# Patient Record
Sex: FEMALE | Race: WHITE | Employment: OTHER | URBAN - METROPOLITAN AREA
[De-identification: names, ages, dates, MRNs, and addresses within clinical notes are randomized per-mention and may not be internally consistent; named-entity substitution may affect disease eponyms.]

---

## 2017-02-09 ENCOUNTER — OFFICE VISIT (OUTPATIENT)
Dept: FAMILY MEDICINE CLINIC | Age: 47
End: 2017-02-09

## 2017-02-09 VITALS
BODY MASS INDEX: 44.41 KG/M2 | SYSTOLIC BLOOD PRESSURE: 132 MMHG | HEIGHT: 68 IN | WEIGHT: 293 LBS | DIASTOLIC BLOOD PRESSURE: 68 MMHG | RESPIRATION RATE: 16 BRPM | HEART RATE: 80 BPM

## 2017-02-09 DIAGNOSIS — E11.65 TYPE 2 DIABETES MELLITUS WITH HYPERGLYCEMIA, WITHOUT LONG-TERM CURRENT USE OF INSULIN (HCC): Primary | ICD-10-CM

## 2017-02-09 DIAGNOSIS — I10 ESSENTIAL HYPERTENSION: ICD-10-CM

## 2017-02-09 DIAGNOSIS — M12.9 ARTHROPATHY, MULTIPLE SITES: ICD-10-CM

## 2017-02-09 LAB
GLUCOSE BLD-MCNC: 177 MG/DL
HBA1C MFR BLD: 8 %

## 2017-02-09 PROCEDURE — 99204 OFFICE O/P NEW MOD 45 MIN: CPT | Performed by: FAMILY MEDICINE

## 2017-02-09 PROCEDURE — 82962 GLUCOSE BLOOD TEST: CPT | Performed by: FAMILY MEDICINE

## 2017-02-09 PROCEDURE — 83036 HEMOGLOBIN GLYCOSYLATED A1C: CPT | Performed by: FAMILY MEDICINE

## 2017-02-09 RX ORDER — GABAPENTIN 300 MG/1
300 CAPSULE ORAL 2 TIMES DAILY
Qty: 60 CAPSULE | Refills: 2 | Status: SHIPPED | OUTPATIENT
Start: 2017-02-09 | End: 2017-04-11 | Stop reason: SDUPTHER

## 2017-02-09 RX ORDER — TRIAMTERENE AND HYDROCHLOROTHIAZIDE 37.5; 25 MG/1; MG/1
1 TABLET ORAL DAILY
Refills: 0 | COMMUNITY
Start: 2017-01-10 | End: 2017-04-11 | Stop reason: SDUPTHER

## 2017-02-09 RX ORDER — GLIPIZIDE 5 MG/1
5 TABLET ORAL 2 TIMES DAILY
Qty: 60 TABLET | Refills: 5 | Status: CANCELLED | OUTPATIENT
Start: 2017-02-09

## 2017-02-09 RX ORDER — GLIPIZIDE 5 MG/1
1 TABLET ORAL 2 TIMES DAILY
Refills: 0 | COMMUNITY
Start: 2017-02-06 | End: 2017-02-09 | Stop reason: ALTCHOICE

## 2017-02-09 RX ORDER — GABAPENTIN 400 MG/1
1 CAPSULE ORAL DAILY
Refills: 0 | COMMUNITY
Start: 2016-12-16 | End: 2017-02-09 | Stop reason: SDUPTHER

## 2017-02-09 ASSESSMENT — ENCOUNTER SYMPTOMS
SHORTNESS OF BREATH: 1
BACK PAIN: 1
SINUS PRESSURE: 0
CONSTIPATION: 0
RHINORRHEA: 1

## 2017-02-10 ENCOUNTER — TELEPHONE (OUTPATIENT)
Dept: FAMILY MEDICINE CLINIC | Age: 47
End: 2017-02-10

## 2017-02-10 RX ORDER — AZITHROMYCIN 250 MG/1
TABLET, FILM COATED ORAL
Qty: 1 PACKET | Refills: 0 | Status: SHIPPED | OUTPATIENT
Start: 2017-02-10 | End: 2017-02-20

## 2017-02-15 ENCOUNTER — TELEPHONE (OUTPATIENT)
Dept: FAMILY MEDICINE CLINIC | Age: 47
End: 2017-02-15

## 2017-02-27 DIAGNOSIS — E11.65 TYPE 2 DIABETES MELLITUS WITH HYPERGLYCEMIA, WITHOUT LONG-TERM CURRENT USE OF INSULIN (HCC): Primary | ICD-10-CM

## 2017-02-27 RX ORDER — ALOGLIPTIN 25 MG/1
25 TABLET, FILM COATED ORAL DAILY
Qty: 30 TABLET | Refills: 11 | Status: SHIPPED | OUTPATIENT
Start: 2017-02-27 | End: 2018-12-06

## 2017-04-11 ENCOUNTER — OFFICE VISIT (OUTPATIENT)
Dept: FAMILY MEDICINE CLINIC | Age: 47
End: 2017-04-11

## 2017-04-11 VITALS
WEIGHT: 293 LBS | SYSTOLIC BLOOD PRESSURE: 134 MMHG | DIASTOLIC BLOOD PRESSURE: 76 MMHG | HEIGHT: 68 IN | HEART RATE: 80 BPM | RESPIRATION RATE: 16 BRPM | BODY MASS INDEX: 44.41 KG/M2

## 2017-04-11 DIAGNOSIS — I26.99 OTHER PULMONARY EMBOLISM WITHOUT ACUTE COR PULMONALE, UNSPECIFIED CHRONICITY (HCC): ICD-10-CM

## 2017-04-11 DIAGNOSIS — J01.10 ACUTE NON-RECURRENT FRONTAL SINUSITIS: ICD-10-CM

## 2017-04-11 DIAGNOSIS — E78.5 HYPERLIPIDEMIA WITH TARGET LDL LESS THAN 70: Primary | ICD-10-CM

## 2017-04-11 DIAGNOSIS — E11.65 TYPE 2 DIABETES MELLITUS WITH HYPERGLYCEMIA, WITHOUT LONG-TERM CURRENT USE OF INSULIN (HCC): ICD-10-CM

## 2017-04-11 DIAGNOSIS — R93.89 THICKENED ENDOMETRIUM: ICD-10-CM

## 2017-04-11 DIAGNOSIS — I10 ESSENTIAL HYPERTENSION: ICD-10-CM

## 2017-04-11 DIAGNOSIS — M12.9 ARTHROPATHY, MULTIPLE SITES: ICD-10-CM

## 2017-04-11 DIAGNOSIS — R06.09 DYSPNEA ON EXERTION: ICD-10-CM

## 2017-04-11 LAB
CREATININE URINE POCT: NORMAL
GLUCOSE BLD-MCNC: 187 MG/DL
MICROALBUMIN/CREAT 24H UR: 20 MG/G{CREAT}
MICROALBUMIN/CREAT UR-RTO: NORMAL

## 2017-04-11 PROCEDURE — 99213 OFFICE O/P EST LOW 20 MIN: CPT | Performed by: FAMILY MEDICINE

## 2017-04-11 PROCEDURE — 82962 GLUCOSE BLOOD TEST: CPT | Performed by: FAMILY MEDICINE

## 2017-04-11 PROCEDURE — 82044 UR ALBUMIN SEMIQUANTITATIVE: CPT | Performed by: FAMILY MEDICINE

## 2017-04-11 RX ORDER — TRIAMTERENE AND HYDROCHLOROTHIAZIDE 37.5; 25 MG/1; MG/1
1 TABLET ORAL DAILY
Qty: 30 TABLET | Refills: 11 | Status: SHIPPED | OUTPATIENT
Start: 2017-04-11 | End: 2018-06-25 | Stop reason: SDUPTHER

## 2017-04-11 RX ORDER — GABAPENTIN 300 MG/1
300 CAPSULE ORAL 2 TIMES DAILY
Qty: 60 CAPSULE | Refills: 11 | Status: ON HOLD | OUTPATIENT
Start: 2017-04-11 | End: 2019-09-07

## 2017-04-11 RX ORDER — ATORVASTATIN CALCIUM 20 MG/1
20 TABLET, FILM COATED ORAL DAILY
Qty: 30 TABLET | Refills: 11 | Status: SHIPPED | OUTPATIENT
Start: 2017-04-11 | End: 2018-06-25 | Stop reason: SDUPTHER

## 2017-04-11 RX ORDER — IPRATROPIUM BROMIDE 42 UG/1
2 SPRAY, METERED NASAL 4 TIMES DAILY
Qty: 1 BOTTLE | Refills: 5 | Status: SHIPPED | OUTPATIENT
Start: 2017-04-11 | End: 2017-08-18 | Stop reason: ALTCHOICE

## 2017-04-11 RX ORDER — PRAVASTATIN SODIUM 20 MG
20 TABLET ORAL NIGHTLY
Qty: 30 TABLET | Refills: 11 | Status: CANCELLED | OUTPATIENT
Start: 2017-04-11

## 2017-04-11 ASSESSMENT — ENCOUNTER SYMPTOMS
COUGH: 1
SINUS PRESSURE: 0
SHORTNESS OF BREATH: 1
CONSTIPATION: 0

## 2017-05-01 ENCOUNTER — TELEPHONE (OUTPATIENT)
Dept: FAMILY MEDICINE CLINIC | Age: 47
End: 2017-05-01

## 2017-05-11 ENCOUNTER — TELEPHONE (OUTPATIENT)
Dept: FAMILY MEDICINE CLINIC | Age: 47
End: 2017-05-11

## 2017-05-30 ENCOUNTER — TELEPHONE (OUTPATIENT)
Dept: FAMILY MEDICINE CLINIC | Age: 47
End: 2017-05-30

## 2017-07-26 ENCOUNTER — TELEPHONE (OUTPATIENT)
Dept: FAMILY MEDICINE CLINIC | Age: 47
End: 2017-07-26

## 2017-08-14 ENCOUNTER — TELEPHONE (OUTPATIENT)
Dept: FAMILY MEDICINE CLINIC | Age: 47
End: 2017-08-14

## 2017-08-18 ENCOUNTER — TELEPHONE (OUTPATIENT)
Dept: FAMILY MEDICINE CLINIC | Age: 47
End: 2017-08-18

## 2017-08-18 ENCOUNTER — OFFICE VISIT (OUTPATIENT)
Dept: FAMILY MEDICINE CLINIC | Age: 47
End: 2017-08-18

## 2017-08-18 VITALS
WEIGHT: 293 LBS | DIASTOLIC BLOOD PRESSURE: 60 MMHG | SYSTOLIC BLOOD PRESSURE: 108 MMHG | RESPIRATION RATE: 16 BRPM | HEIGHT: 68 IN | BODY MASS INDEX: 44.41 KG/M2 | HEART RATE: 68 BPM

## 2017-08-18 DIAGNOSIS — E11.65 TYPE 2 DIABETES MELLITUS WITH HYPERGLYCEMIA, WITHOUT LONG-TERM CURRENT USE OF INSULIN (HCC): Primary | ICD-10-CM

## 2017-08-18 DIAGNOSIS — H61.22 LEFT EAR IMPACTED CERUMEN: ICD-10-CM

## 2017-08-18 LAB
GLUCOSE BLD-MCNC: 176 MG/DL
HBA1C MFR BLD: 8.1 %

## 2017-08-18 PROCEDURE — 99213 OFFICE O/P EST LOW 20 MIN: CPT | Performed by: FAMILY MEDICINE

## 2017-08-18 PROCEDURE — 69210 REMOVE IMPACTED EAR WAX UNI: CPT | Performed by: FAMILY MEDICINE

## 2017-08-18 PROCEDURE — 83036 HEMOGLOBIN GLYCOSYLATED A1C: CPT | Performed by: FAMILY MEDICINE

## 2017-08-18 PROCEDURE — 82962 GLUCOSE BLOOD TEST: CPT | Performed by: FAMILY MEDICINE

## 2017-08-18 RX ORDER — PRAVASTATIN SODIUM 20 MG
20 TABLET ORAL DAILY
Refills: 0 | COMMUNITY
Start: 2017-05-13 | End: 2017-08-18

## 2017-08-18 ASSESSMENT — ENCOUNTER SYMPTOMS
BACK PAIN: 1
SHORTNESS OF BREATH: 1
SINUS PRESSURE: 0
CONSTIPATION: 0

## 2017-09-08 ENCOUNTER — HOSPITAL ENCOUNTER (EMERGENCY)
Age: 47
Discharge: HOME | End: 2017-09-08
Payer: COMMERCIAL

## 2017-09-08 ENCOUNTER — APPOINTMENT (OUTPATIENT)
Dept: CT IMAGING | Age: 47
End: 2017-09-08
Payer: COMMERCIAL

## 2017-09-08 ENCOUNTER — APPOINTMENT (OUTPATIENT)
Dept: INTERVENTIONAL RADIOLOGY/VASCULAR | Age: 47
End: 2017-09-08
Payer: COMMERCIAL

## 2017-09-08 VITALS
RESPIRATION RATE: 18 BRPM | OXYGEN SATURATION: 93 % | WEIGHT: 293 LBS | HEIGHT: 68 IN | SYSTOLIC BLOOD PRESSURE: 133 MMHG | HEART RATE: 82 BPM | BODY MASS INDEX: 44.41 KG/M2 | TEMPERATURE: 97.8 F | DIASTOLIC BLOOD PRESSURE: 72 MMHG

## 2017-09-08 DIAGNOSIS — L98.491 ULCER, SKIN, CHRONIC, LIMITED TO BREAKDOWN OF SKIN (HCC): ICD-10-CM

## 2017-09-08 DIAGNOSIS — I87.2 VENOUS INSUFFICIENCY (CHRONIC) (PERIPHERAL): Primary | ICD-10-CM

## 2017-09-08 LAB
ANION GAP SERPL CALCULATED.3IONS-SCNC: 14 MEQ/L (ref 8–16)
ANISOCYTOSIS: ABNORMAL
BASOPHILS # BLD: 1.1 %
BASOPHILS ABSOLUTE: 0.1 THOU/MM3 (ref 0–0.1)
BUN BLDV-MCNC: 12 MG/DL (ref 7–22)
CALCIUM SERPL-MCNC: 9.2 MG/DL (ref 8.5–10.5)
CHLORIDE BLD-SCNC: 100 MEQ/L (ref 98–111)
CO2: 26 MEQ/L (ref 23–33)
CREAT SERPL-MCNC: 0.5 MG/DL (ref 0.4–1.2)
EOSINOPHIL # BLD: 3.6 %
EOSINOPHILS ABSOLUTE: 0.2 THOU/MM3 (ref 0–0.4)
GFR SERPL CREATININE-BSD FRML MDRD: > 90 ML/MIN/1.73M2
GLUCOSE BLD-MCNC: 209 MG/DL (ref 70–108)
HCT VFR BLD CALC: 36.9 % (ref 37–47)
HEMOGLOBIN: 12.4 GM/DL (ref 12–16)
INR BLD: 1.07 (ref 0.85–1.13)
LYMPHOCYTES # BLD: 28 %
LYMPHOCYTES ABSOLUTE: 1.6 THOU/MM3 (ref 1–4.8)
MCH RBC QN AUTO: 28.1 PG (ref 27–31)
MCHC RBC AUTO-ENTMCNC: 33.6 GM/DL (ref 33–37)
MCV RBC AUTO: 83.7 FL (ref 81–99)
MONOCYTES # BLD: 5.6 %
MONOCYTES ABSOLUTE: 0.3 THOU/MM3 (ref 0.4–1.3)
NUCLEATED RED BLOOD CELLS: 0 /100 WBC
OSMOLALITY CALCULATION: 285.3 MOSMOL/KG (ref 275–300)
PDW BLD-RTO: 14.9 % (ref 11.5–14.5)
PLATELET # BLD: 217 THOU/MM3 (ref 130–400)
PMV BLD AUTO: 8.3 MCM (ref 7.4–10.4)
POTASSIUM SERPL-SCNC: 4.2 MEQ/L (ref 3.5–5.2)
RBC # BLD: 4.41 MILL/MM3 (ref 4.2–5.4)
RBC # BLD: NORMAL 10*6/UL
SEG NEUTROPHILS: 61.7 %
SEGMENTED NEUTROPHILS ABSOLUTE COUNT: 3.5 THOU/MM3 (ref 1.8–7.7)
SODIUM BLD-SCNC: 140 MEQ/L (ref 135–145)
TROPONIN T: < 0.01 NG/ML
TSH SERPL DL<=0.05 MIU/L-ACNC: 1.29 UIU/ML (ref 0.4–4.2)
WBC # BLD: 5.7 THOU/MM3 (ref 4.8–10.8)

## 2017-09-08 PROCEDURE — 84443 ASSAY THYROID STIM HORMONE: CPT

## 2017-09-08 PROCEDURE — 93970 EXTREMITY STUDY: CPT

## 2017-09-08 PROCEDURE — 84484 ASSAY OF TROPONIN QUANT: CPT

## 2017-09-08 PROCEDURE — 36415 COLL VENOUS BLD VENIPUNCTURE: CPT

## 2017-09-08 PROCEDURE — 85610 PROTHROMBIN TIME: CPT

## 2017-09-08 PROCEDURE — 6360000002 HC RX W HCPCS: Performed by: STUDENT IN AN ORGANIZED HEALTH CARE EDUCATION/TRAINING PROGRAM

## 2017-09-08 PROCEDURE — 71275 CT ANGIOGRAPHY CHEST: CPT

## 2017-09-08 PROCEDURE — 96374 THER/PROPH/DIAG INJ IV PUSH: CPT

## 2017-09-08 PROCEDURE — 6360000004 HC RX CONTRAST MEDICATION: Performed by: STUDENT IN AN ORGANIZED HEALTH CARE EDUCATION/TRAINING PROGRAM

## 2017-09-08 PROCEDURE — 85025 COMPLETE CBC W/AUTO DIFF WBC: CPT

## 2017-09-08 PROCEDURE — 80048 BASIC METABOLIC PNL TOTAL CA: CPT

## 2017-09-08 PROCEDURE — 93005 ELECTROCARDIOGRAM TRACING: CPT

## 2017-09-08 PROCEDURE — 99285 EMERGENCY DEPT VISIT HI MDM: CPT

## 2017-09-08 RX ORDER — CEPHALEXIN 500 MG/1
500 CAPSULE ORAL 4 TIMES DAILY
Qty: 28 CAPSULE | Refills: 0 | Status: SHIPPED | OUTPATIENT
Start: 2017-09-08 | End: 2017-09-13

## 2017-09-08 RX ORDER — IBUPROFEN 800 MG/1
800 TABLET ORAL EVERY 8 HOURS PRN
Qty: 30 TABLET | Refills: 0 | Status: SHIPPED | OUTPATIENT
Start: 2017-09-08 | End: 2018-06-25 | Stop reason: ALTCHOICE

## 2017-09-08 RX ADMIN — HYDROMORPHONE HYDROCHLORIDE 0.5 MG: 1 INJECTION, SOLUTION INTRAMUSCULAR; INTRAVENOUS; SUBCUTANEOUS at 17:57

## 2017-09-08 RX ADMIN — IOPAMIDOL 80 ML: 755 INJECTION, SOLUTION INTRAVENOUS at 16:17

## 2017-09-08 ASSESSMENT — ENCOUNTER SYMPTOMS
VOMITING: 0
BLOOD IN STOOL: 0
ABDOMINAL PAIN: 0
COUGH: 0
CHEST TIGHTNESS: 0
WHEEZING: 0
ABDOMINAL DISTENTION: 0
NAUSEA: 0
SHORTNESS OF BREATH: 0
DIARRHEA: 0
CONSTIPATION: 0

## 2017-09-08 ASSESSMENT — PAIN DESCRIPTION - PAIN TYPE: TYPE: ACUTE PAIN

## 2017-09-08 ASSESSMENT — PAIN DESCRIPTION - LOCATION: LOCATION: LEG

## 2017-09-08 ASSESSMENT — PAIN SCALES - GENERAL
PAINLEVEL_OUTOF10: 10
PAINLEVEL_OUTOF10: 7
PAINLEVEL_OUTOF10: 10

## 2017-09-08 ASSESSMENT — PAIN DESCRIPTION - FREQUENCY: FREQUENCY: CONTINUOUS

## 2017-09-08 ASSESSMENT — PAIN DESCRIPTION - DESCRIPTORS: DESCRIPTORS: ACHING

## 2017-09-08 ASSESSMENT — PAIN DESCRIPTION - ORIENTATION: ORIENTATION: LEFT

## 2017-09-09 LAB
EKG ATRIAL RATE: 77 BPM
EKG P AXIS: 32 DEGREES
EKG P-R INTERVAL: 190 MS
EKG Q-T INTERVAL: 400 MS
EKG QRS DURATION: 104 MS
EKG QTC CALCULATION (BAZETT): 452 MS
EKG R AXIS: 13 DEGREES
EKG T AXIS: 35 DEGREES
EKG VENTRICULAR RATE: 77 BPM

## 2018-01-10 ENCOUNTER — TELEPHONE (OUTPATIENT)
Dept: FAMILY MEDICINE CLINIC | Age: 48
End: 2018-01-10

## 2018-05-05 DIAGNOSIS — I26.99 OTHER PULMONARY EMBOLISM WITHOUT ACUTE COR PULMONALE, UNSPECIFIED CHRONICITY (HCC): ICD-10-CM

## 2018-05-14 RX ORDER — RIVAROXABAN 20 MG/1
TABLET, FILM COATED ORAL
Qty: 30 TABLET | Refills: 0 | Status: SHIPPED | OUTPATIENT
Start: 2018-05-14 | End: 2018-06-13 | Stop reason: SDUPTHER

## 2018-06-13 DIAGNOSIS — I26.99 OTHER PULMONARY EMBOLISM WITHOUT ACUTE COR PULMONALE, UNSPECIFIED CHRONICITY (HCC): ICD-10-CM

## 2018-06-25 ENCOUNTER — OFFICE VISIT (OUTPATIENT)
Dept: FAMILY MEDICINE CLINIC | Age: 48
End: 2018-06-25

## 2018-06-25 VITALS
RESPIRATION RATE: 16 BRPM | BODY MASS INDEX: 44.41 KG/M2 | HEART RATE: 80 BPM | HEIGHT: 68 IN | WEIGHT: 293 LBS | DIASTOLIC BLOOD PRESSURE: 60 MMHG | SYSTOLIC BLOOD PRESSURE: 122 MMHG

## 2018-06-25 DIAGNOSIS — G89.29 CHRONIC BILATERAL LOW BACK PAIN WITHOUT SCIATICA: ICD-10-CM

## 2018-06-25 DIAGNOSIS — E78.5 HYPERLIPIDEMIA WITH TARGET LDL LESS THAN 70: ICD-10-CM

## 2018-06-25 DIAGNOSIS — M54.50 CHRONIC BILATERAL LOW BACK PAIN WITHOUT SCIATICA: ICD-10-CM

## 2018-06-25 DIAGNOSIS — E11.65 TYPE 2 DIABETES MELLITUS WITH HYPERGLYCEMIA, WITHOUT LONG-TERM CURRENT USE OF INSULIN (HCC): Primary | ICD-10-CM

## 2018-06-25 DIAGNOSIS — I10 ESSENTIAL HYPERTENSION: ICD-10-CM

## 2018-06-25 DIAGNOSIS — I26.99 OTHER PULMONARY EMBOLISM WITHOUT ACUTE COR PULMONALE, UNSPECIFIED CHRONICITY (HCC): ICD-10-CM

## 2018-06-25 LAB
GLUCOSE BLD-MCNC: 222 MG/DL
HBA1C MFR BLD: 8.5 %

## 2018-06-25 PROCEDURE — 99214 OFFICE O/P EST MOD 30 MIN: CPT | Performed by: FAMILY MEDICINE

## 2018-06-25 PROCEDURE — 82962 GLUCOSE BLOOD TEST: CPT | Performed by: FAMILY MEDICINE

## 2018-06-25 PROCEDURE — 83036 HEMOGLOBIN GLYCOSYLATED A1C: CPT | Performed by: FAMILY MEDICINE

## 2018-06-25 RX ORDER — ATORVASTATIN CALCIUM 20 MG/1
20 TABLET, FILM COATED ORAL DAILY
Qty: 90 TABLET | Refills: 1 | Status: SHIPPED | OUTPATIENT
Start: 2018-06-25 | End: 2018-12-05 | Stop reason: SDUPTHER

## 2018-06-25 RX ORDER — ATORVASTATIN CALCIUM 20 MG/1
20 TABLET, FILM COATED ORAL DAILY
Qty: 30 TABLET | Refills: 5 | Status: SHIPPED | OUTPATIENT
Start: 2018-06-25 | End: 2018-06-25 | Stop reason: SDUPTHER

## 2018-06-25 RX ORDER — TRIAMTERENE AND HYDROCHLOROTHIAZIDE 37.5; 25 MG/1; MG/1
1 TABLET ORAL DAILY
Qty: 30 TABLET | Refills: 5 | Status: SHIPPED | OUTPATIENT
Start: 2018-06-25 | End: 2018-12-05 | Stop reason: SDUPTHER

## 2018-06-25 ASSESSMENT — ENCOUNTER SYMPTOMS
SHORTNESS OF BREATH: 1
BACK PAIN: 1
CONSTIPATION: 0
SINUS PRESSURE: 0

## 2018-06-27 ENCOUNTER — TELEPHONE (OUTPATIENT)
Dept: FAMILY MEDICINE CLINIC | Age: 48
End: 2018-06-27

## 2018-07-05 ENCOUNTER — TELEPHONE (OUTPATIENT)
Dept: FAMILY MEDICINE CLINIC | Age: 48
End: 2018-07-05

## 2018-07-06 ENCOUNTER — TELEPHONE (OUTPATIENT)
Dept: FAMILY MEDICINE CLINIC | Age: 48
End: 2018-07-06

## 2018-07-06 DIAGNOSIS — M17.0 PRIMARY OSTEOARTHRITIS OF BOTH KNEES: ICD-10-CM

## 2018-07-06 DIAGNOSIS — E11.65 TYPE 2 DIABETES MELLITUS WITH HYPERGLYCEMIA, WITHOUT LONG-TERM CURRENT USE OF INSULIN (HCC): Primary | ICD-10-CM

## 2018-07-06 DIAGNOSIS — G89.29 CHRONIC LOW BACK PAIN, UNSPECIFIED BACK PAIN LATERALITY, WITH SCIATICA PRESENCE UNSPECIFIED: ICD-10-CM

## 2018-07-06 DIAGNOSIS — M54.5 CHRONIC LOW BACK PAIN, UNSPECIFIED BACK PAIN LATERALITY, WITH SCIATICA PRESENCE UNSPECIFIED: ICD-10-CM

## 2018-07-06 RX ORDER — GLIPIZIDE 5 MG/1
5 TABLET ORAL DAILY
Qty: 90 TABLET | Refills: 1 | Status: SHIPPED | OUTPATIENT
Start: 2018-07-06 | End: 2018-12-05 | Stop reason: SDUPTHER

## 2018-07-18 ENCOUNTER — HOSPITAL ENCOUNTER (OUTPATIENT)
Age: 48
Discharge: HOME | End: 2018-07-18
Payer: COMMERCIAL

## 2018-07-18 ENCOUNTER — HOSPITAL ENCOUNTER (OUTPATIENT)
Dept: GENERAL RADIOLOGY | Age: 48
Discharge: HOME | End: 2018-07-18
Payer: COMMERCIAL

## 2018-07-18 DIAGNOSIS — M17.0 PRIMARY OSTEOARTHRITIS OF BOTH KNEES: ICD-10-CM

## 2018-07-18 DIAGNOSIS — M54.5 CHRONIC LOW BACK PAIN, UNSPECIFIED BACK PAIN LATERALITY, WITH SCIATICA PRESENCE UNSPECIFIED: ICD-10-CM

## 2018-07-18 DIAGNOSIS — G89.29 CHRONIC LOW BACK PAIN, UNSPECIFIED BACK PAIN LATERALITY, WITH SCIATICA PRESENCE UNSPECIFIED: ICD-10-CM

## 2018-07-18 PROCEDURE — 73560 X-RAY EXAM OF KNEE 1 OR 2: CPT

## 2018-07-18 PROCEDURE — 72110 X-RAY EXAM L-2 SPINE 4/>VWS: CPT

## 2018-07-19 ENCOUNTER — TELEPHONE (OUTPATIENT)
Dept: FAMILY MEDICINE CLINIC | Age: 48
End: 2018-07-19

## 2018-08-30 ENCOUNTER — TELEPHONE (OUTPATIENT)
Dept: FAMILY MEDICINE CLINIC | Age: 48
End: 2018-08-30

## 2018-08-30 NOTE — TELEPHONE ENCOUNTER
[facility] sent fax stating that the insurance company will only par for 44 pills in a total of 144 days. It will need a PA .  Would you like a PA done for the Rivaroxaban 10 mg ?

## 2018-09-04 NOTE — TELEPHONE ENCOUNTER
Called and spoke with Misha Black (mom) she stated patient does not have a working # right now, she will call her son to relay the message to call the office.

## 2018-09-18 NOTE — TELEPHONE ENCOUNTER
Prior Auth approved and faxed to [facility] in [place].      Tracking CU:[id number]

## 2018-10-02 NOTE — TELEPHONE ENCOUNTER
Patient called stating she is still having trouble with Xarelto.   Advised her I would contact [facility] in [address]

## 2018-10-16 ENCOUNTER — TELEPHONE (OUTPATIENT)
Dept: FAMILY MEDICINE CLINIC | Age: 48
End: 2018-10-16

## 2018-10-16 DIAGNOSIS — G89.29 CHRONIC BILATERAL BACK PAIN, UNSPECIFIED BACK LOCATION: Primary | ICD-10-CM

## 2018-10-16 DIAGNOSIS — M54.9 CHRONIC BILATERAL BACK PAIN, UNSPECIFIED BACK LOCATION: Primary | ICD-10-CM

## 2018-10-16 DIAGNOSIS — I26.99 OTHER PULMONARY EMBOLISM WITHOUT ACUTE COR PULMONALE, UNSPECIFIED CHRONICITY (HCC): ICD-10-CM

## 2018-10-25 DIAGNOSIS — I26.99 OTHER PULMONARY EMBOLISM WITHOUT ACUTE COR PULMONALE, UNSPECIFIED CHRONICITY (HCC): ICD-10-CM

## 2018-11-29 DIAGNOSIS — I26.99 OTHER PULMONARY EMBOLISM WITHOUT ACUTE COR PULMONALE, UNSPECIFIED CHRONICITY (HCC): ICD-10-CM

## 2018-12-03 ENCOUNTER — APPOINTMENT (OUTPATIENT)
Dept: GENERAL RADIOLOGY | Age: 48
End: 2018-12-03
Payer: COMMERCIAL

## 2018-12-03 ENCOUNTER — HOSPITAL ENCOUNTER (EMERGENCY)
Age: 48
Discharge: HOME | End: 2018-12-03
Payer: COMMERCIAL

## 2018-12-03 ENCOUNTER — APPOINTMENT (OUTPATIENT)
Dept: INTERVENTIONAL RADIOLOGY/VASCULAR | Age: 48
End: 2018-12-03
Payer: COMMERCIAL

## 2018-12-03 VITALS
SYSTOLIC BLOOD PRESSURE: 140 MMHG | OXYGEN SATURATION: 99 % | HEIGHT: 68 IN | RESPIRATION RATE: 15 BRPM | BODY MASS INDEX: 44.41 KG/M2 | HEART RATE: 84 BPM | WEIGHT: 293 LBS | TEMPERATURE: 97.7 F | DIASTOLIC BLOOD PRESSURE: 75 MMHG

## 2018-12-03 DIAGNOSIS — M17.12 ARTHRITIS OF LEFT KNEE: Primary | ICD-10-CM

## 2018-12-03 LAB
ALBUMIN SERPL-MCNC: 3.8 G/DL (ref 3.5–5.1)
ALP BLD-CCNC: 69 U/L (ref 38–126)
ALT SERPL-CCNC: 20 U/L (ref 11–66)
ANION GAP SERPL CALCULATED.3IONS-SCNC: 12 MEQ/L (ref 8–16)
AST SERPL-CCNC: 19 U/L (ref 5–40)
BASOPHILS # BLD: 1.1 %
BASOPHILS ABSOLUTE: 0.1 THOU/MM3 (ref 0–0.1)
BILIRUB SERPL-MCNC: 0.2 MG/DL (ref 0.3–1.2)
BUN BLDV-MCNC: 9 MG/DL (ref 7–22)
CALCIUM SERPL-MCNC: 9.1 MG/DL (ref 8.5–10.5)
CHLORIDE BLD-SCNC: 98 MEQ/L (ref 98–111)
CO2: 26 MEQ/L (ref 23–33)
CREAT SERPL-MCNC: 0.6 MG/DL (ref 0.4–1.2)
EKG ATRIAL RATE: 81 BPM
EKG P AXIS: 33 DEGREES
EKG P-R INTERVAL: 170 MS
EKG Q-T INTERVAL: 390 MS
EKG QRS DURATION: 102 MS
EKG QTC CALCULATION (BAZETT): 453 MS
EKG R AXIS: -4 DEGREES
EKG T AXIS: 50 DEGREES
EKG VENTRICULAR RATE: 81 BPM
EOSINOPHIL # BLD: 3.1 %
EOSINOPHILS ABSOLUTE: 0.2 THOU/MM3 (ref 0–0.4)
ERYTHROCYTE [DISTWIDTH] IN BLOOD BY AUTOMATED COUNT: 14.4 % (ref 11.5–14.5)
ERYTHROCYTE [DISTWIDTH] IN BLOOD BY AUTOMATED COUNT: 45.7 FL (ref 35–45)
GFR SERPL CREATININE-BSD FRML MDRD: > 90 ML/MIN/1.73M2
GLUCOSE BLD-MCNC: 242 MG/DL (ref 70–108)
HCT VFR BLD CALC: 38.9 % (ref 37–47)
HEMOGLOBIN: 12.7 GM/DL (ref 12–16)
IMMATURE GRANS (ABS): 0.02 THOU/MM3 (ref 0–0.07)
IMMATURE GRANULOCYTES: 0.4 %
LYMPHOCYTES # BLD: 27.5 %
LYMPHOCYTES ABSOLUTE: 1.5 THOU/MM3 (ref 1–4.8)
MCH RBC QN AUTO: 28.3 PG (ref 26–33)
MCHC RBC AUTO-ENTMCNC: 32.6 GM/DL (ref 32.2–35.5)
MCV RBC AUTO: 86.8 FL (ref 81–99)
MONOCYTES # BLD: 6.8 %
MONOCYTES ABSOLUTE: 0.4 THOU/MM3 (ref 0.4–1.3)
NUCLEATED RED BLOOD CELLS: 0 /100 WBC
OSMOLALITY CALCULATION: 278.6 MOSMOL/KG (ref 275–300)
PLATELET # BLD: 238 THOU/MM3 (ref 130–400)
PMV BLD AUTO: 9.7 FL (ref 9.4–12.4)
POTASSIUM SERPL-SCNC: 4.1 MEQ/L (ref 3.5–5.2)
RBC # BLD: 4.48 MILL/MM3 (ref 4.2–5.4)
SEG NEUTROPHILS: 61.1 %
SEGMENTED NEUTROPHILS ABSOLUTE COUNT: 3.4 THOU/MM3 (ref 1.8–7.7)
SODIUM BLD-SCNC: 136 MEQ/L (ref 135–145)
TOTAL PROTEIN: 7 G/DL (ref 6.1–8)
TROPONIN T: < 0.01 NG/ML
WBC # BLD: 5.6 THOU/MM3 (ref 4.8–10.8)

## 2018-12-03 PROCEDURE — 36415 COLL VENOUS BLD VENIPUNCTURE: CPT

## 2018-12-03 PROCEDURE — 71046 X-RAY EXAM CHEST 2 VIEWS: CPT

## 2018-12-03 PROCEDURE — 93971 EXTREMITY STUDY: CPT

## 2018-12-03 PROCEDURE — 73564 X-RAY EXAM KNEE 4 OR MORE: CPT

## 2018-12-03 PROCEDURE — 93010 ELECTROCARDIOGRAM REPORT: CPT | Performed by: NUCLEAR MEDICINE

## 2018-12-03 PROCEDURE — 85025 COMPLETE CBC W/AUTO DIFF WBC: CPT

## 2018-12-03 PROCEDURE — 99284 EMERGENCY DEPT VISIT MOD MDM: CPT

## 2018-12-03 PROCEDURE — 2709999900 HC NON-CHARGEABLE SUPPLY

## 2018-12-03 PROCEDURE — 80053 COMPREHEN METABOLIC PANEL: CPT

## 2018-12-03 PROCEDURE — 93005 ELECTROCARDIOGRAM TRACING: CPT | Performed by: NURSE PRACTITIONER

## 2018-12-03 PROCEDURE — 84484 ASSAY OF TROPONIN QUANT: CPT

## 2018-12-03 ASSESSMENT — PAIN DESCRIPTION - ORIENTATION
ORIENTATION: LEFT

## 2018-12-03 ASSESSMENT — PAIN DESCRIPTION - FREQUENCY
FREQUENCY: CONTINUOUS
FREQUENCY: CONTINUOUS

## 2018-12-03 ASSESSMENT — PAIN SCALES - GENERAL
PAINLEVEL_OUTOF10: 10
PAINLEVEL_OUTOF10: 10
PAINLEVEL_OUTOF10: 9

## 2018-12-03 ASSESSMENT — PAIN DESCRIPTION - PAIN TYPE
TYPE: ACUTE PAIN

## 2018-12-03 ASSESSMENT — PAIN DESCRIPTION - LOCATION
LOCATION: LEG

## 2018-12-03 ASSESSMENT — ENCOUNTER SYMPTOMS
CONSTIPATION: 0
SHORTNESS OF BREATH: 1
NAUSEA: 0
RHINORRHEA: 0
ABDOMINAL PAIN: 0
DIARRHEA: 0
SORE THROAT: 0
VOMITING: 0

## 2018-12-03 NOTE — ED PROVIDER NOTES
tablet by mouth daily, Disp-30 tablet, R-11Normal             ALLERGIES     is allergic to demerol and tylenol [acetaminophen]. FAMILY HISTORY     indicated that her mother is alive. She indicated that her father is alive. family history includes COPD in her mother; Cancer in her mother; Heart Disease in her father; High Blood Pressure in her father; Mental Illness in her brother, brother, sister, and sister; Other in her mother. SOCIAL HISTORY       Social History     Social History    Marital status:      Spouse name: N/A    Number of children: 3    Years of education: N/A     Occupational History    Not on file. Social History Main Topics    Smoking status: Former Smoker     Packs/day: 0.50     Types: Cigarettes     Quit date: 7/8/2005    Smokeless tobacco: Never Used    Alcohol use No    Drug use: No    Sexual activity: No     Other Topics Concern    Not on file     Social History Narrative    No narrative on file       PHYSICAL EXAM     INITIAL VITALS:  height is 5' 8\" (1.727 m) and weight is 355 lb (161 kg) (abnormal). Her oral temperature is 97.7 °F (36.5 °C). Her blood pressure is 140/75 (abnormal) and her pulse is 84. Her respiration is 15 and oxygen saturation is 99%. Physical Exam   Constitutional: She is oriented to person, place, and time. She appears well-developed and well-nourished. No distress. HENT:   Head: Normocephalic and atraumatic. Eyes: Pupils are equal, round, and reactive to light. EOM are normal.   Neck: Normal range of motion. Cardiovascular: Normal rate, regular rhythm and intact distal pulses. Dorsalis pedis pulses 2+ bilaterally   Pulmonary/Chest: Effort normal. She has wheezes (with expiration). Abdominal: Soft. Bowel sounds are normal. She exhibits no distension. There is no tenderness. Musculoskeletal: Normal range of motion. She exhibits tenderness (elicited with flexion of left knee). She exhibits no edema.    Neurological: She is DIFFERENTIAL - Abnormal; Notable for the following:        Result Value    RDW-SD 45.7 (*)     All other components within normal limits   COMPREHENSIVE METABOLIC PANEL - Abnormal; Notable for the following:     Glucose 242 (*)     Total Bilirubin 0.2 (*)     All other components within normal limits   TROPONIN   ANION GAP   OSMOLALITY   GLOMERULAR FILTRATION RATE, ESTIMATED       EMERGENCY DEPARTMENT COURSE:   Vitals:    Vitals:    12/03/18 1641 12/03/18 1752 12/03/18 1919   BP: (!) 171/89 134/72 (!) 140/75   Pulse: 84 73 84   Resp: 18 14 15   Temp: 97.7 °F (36.5 °C)     TempSrc: Oral     SpO2: 99% 100% 99%   Weight: (!) 355 lb (161 kg)     Height: 5' 8\" (1.727 m)           MDM    Patient was seen and evaluated in the emergency department, patient appeared to be in stable condition. Vital signs assessed, no abnormality noted. Physical exam completed. Significant left knee tenderness mostly to the posterior, decreased ROM, no significant edema, no decreased pulses  noted. Labs and imaging Ordered. Continued hyperglycemia due to uncontrolled DM II, negative doppler, significant arthritis noted. Based on my physical exam and work up I believe the patient has arthritis and can be discharged to follow up with PCP. ACE wrap applied to help with support. I discussed my findings and plan of care with patient. They are amenable with discharge. While here in the emergency department they maintained a stable course and were appropriate for discharge. Medications - No data to display    Patient was seenindependently by myself. The patient's final impression and disposition and plan was determined by myself. CRITICAL CARE:   None    CONSULTS:  None    PROCEDURES:  None    FINAL IMPRESSION     1.  Arthritis of left knee          DISPOSITION/PLAN   Patient discharged    PATIENT REFERREDTO:  Mariela Rod MD  [address]  [phone]    Go in 3 days  For follow up and

## 2018-12-05 ENCOUNTER — OFFICE VISIT (OUTPATIENT)
Dept: FAMILY MEDICINE CLINIC | Age: 48
End: 2018-12-05

## 2018-12-05 VITALS
HEART RATE: 82 BPM | RESPIRATION RATE: 16 BRPM | BODY MASS INDEX: 44.41 KG/M2 | DIASTOLIC BLOOD PRESSURE: 84 MMHG | WEIGHT: 293 LBS | HEIGHT: 68 IN | SYSTOLIC BLOOD PRESSURE: 138 MMHG

## 2018-12-05 DIAGNOSIS — L97.518 DIABETIC ULCER OF OTHER PART OF RIGHT FOOT ASSOCIATED WITH TYPE 2 DIABETES MELLITUS, WITH OTHER ULCER SEVERITY (HCC): ICD-10-CM

## 2018-12-05 DIAGNOSIS — E11.65 TYPE 2 DIABETES MELLITUS WITH HYPERGLYCEMIA, WITHOUT LONG-TERM CURRENT USE OF INSULIN (HCC): ICD-10-CM

## 2018-12-05 DIAGNOSIS — E11.621 DIABETIC ULCER OF OTHER PART OF RIGHT FOOT ASSOCIATED WITH TYPE 2 DIABETES MELLITUS, WITH OTHER ULCER SEVERITY (HCC): ICD-10-CM

## 2018-12-05 DIAGNOSIS — E78.5 HYPERLIPIDEMIA WITH TARGET LDL LESS THAN 70: ICD-10-CM

## 2018-12-05 DIAGNOSIS — I10 ESSENTIAL HYPERTENSION: ICD-10-CM

## 2018-12-05 DIAGNOSIS — M17.12 PRIMARY OSTEOARTHRITIS OF LEFT KNEE: Primary | ICD-10-CM

## 2018-12-05 DIAGNOSIS — I26.99 OTHER PULMONARY EMBOLISM WITHOUT ACUTE COR PULMONALE, UNSPECIFIED CHRONICITY (HCC): ICD-10-CM

## 2018-12-05 DIAGNOSIS — M12.9 ARTHROPATHY, MULTIPLE SITES: ICD-10-CM

## 2018-12-05 LAB
GLUCOSE BLD-MCNC: 154 MG/DL
HBA1C MFR BLD: 8.8 %

## 2018-12-05 PROCEDURE — 83036 HEMOGLOBIN GLYCOSYLATED A1C: CPT | Performed by: FAMILY MEDICINE

## 2018-12-05 PROCEDURE — 82962 GLUCOSE BLOOD TEST: CPT | Performed by: FAMILY MEDICINE

## 2018-12-05 PROCEDURE — 99214 OFFICE O/P EST MOD 30 MIN: CPT | Performed by: FAMILY MEDICINE

## 2018-12-05 RX ORDER — GLIPIZIDE 5 MG/1
5 TABLET ORAL DAILY
Qty: 90 TABLET | Refills: 1 | Status: ON HOLD | OUTPATIENT
Start: 2018-12-05 | End: 2022-03-14

## 2018-12-05 RX ORDER — ATORVASTATIN CALCIUM 20 MG/1
20 TABLET, FILM COATED ORAL DAILY
Qty: 90 TABLET | Refills: 1 | Status: ON HOLD | OUTPATIENT
Start: 2018-12-05 | End: 2019-09-07

## 2018-12-05 RX ORDER — TRIAMTERENE AND HYDROCHLOROTHIAZIDE 37.5; 25 MG/1; MG/1
1 TABLET ORAL DAILY
Qty: 90 TABLET | Refills: 1 | Status: SHIPPED | OUTPATIENT
Start: 2018-12-05 | End: 2020-12-01

## 2018-12-05 RX ORDER — GABAPENTIN 300 MG/1
300 CAPSULE ORAL 2 TIMES DAILY
Qty: 60 CAPSULE | Refills: 11 | Status: CANCELLED | OUTPATIENT
Start: 2018-12-05

## 2018-12-05 RX ORDER — CIPROFLOXACIN 500 MG/1
500 TABLET, FILM COATED ORAL 2 TIMES DAILY
Qty: 20 TABLET | Refills: 0 | Status: SHIPPED | OUTPATIENT
Start: 2018-12-05 | End: 2018-12-15

## 2018-12-05 RX ORDER — ALOGLIPTIN 25 MG/1
25 TABLET, FILM COATED ORAL DAILY
Qty: 30 TABLET | Refills: 11 | Status: CANCELLED | OUTPATIENT
Start: 2018-12-05

## 2018-12-06 ENCOUNTER — TELEPHONE (OUTPATIENT)
Dept: FAMILY MEDICINE CLINIC | Age: 48
End: 2018-12-06

## 2018-12-06 DIAGNOSIS — G62.9 NEUROPATHY: Primary | ICD-10-CM

## 2018-12-06 RX ORDER — GABAPENTIN 300 MG/1
300 CAPSULE ORAL 2 TIMES DAILY
Qty: 60 CAPSULE | Refills: 0 | Status: ON HOLD | OUTPATIENT
Start: 2018-12-06 | End: 2019-09-07

## 2018-12-06 ASSESSMENT — ENCOUNTER SYMPTOMS
SINUS PRESSURE: 0
CONSTIPATION: 0
BACK PAIN: 1
SHORTNESS OF BREATH: 1

## 2018-12-21 ENCOUNTER — OFFICE VISIT (OUTPATIENT)
Dept: FAMILY MEDICINE CLINIC | Age: 48
End: 2018-12-21

## 2018-12-21 ENCOUNTER — TELEPHONE (OUTPATIENT)
Dept: FAMILY MEDICINE CLINIC | Age: 48
End: 2018-12-21

## 2018-12-21 ENCOUNTER — HOSPITAL ENCOUNTER (OUTPATIENT)
Dept: WOUND CARE | Age: 48
Discharge: HOME | End: 2018-12-21
Payer: COMMERCIAL

## 2018-12-21 VITALS
HEIGHT: 68 IN | OXYGEN SATURATION: 98 % | DIASTOLIC BLOOD PRESSURE: 79 MMHG | HEART RATE: 94 BPM | SYSTOLIC BLOOD PRESSURE: 172 MMHG | WEIGHT: 293 LBS | RESPIRATION RATE: 18 BRPM | BODY MASS INDEX: 44.41 KG/M2

## 2018-12-21 VITALS
RESPIRATION RATE: 12 BRPM | DIASTOLIC BLOOD PRESSURE: 68 MMHG | HEART RATE: 72 BPM | BODY MASS INDEX: 44.41 KG/M2 | HEIGHT: 68 IN | WEIGHT: 293 LBS | SYSTOLIC BLOOD PRESSURE: 128 MMHG

## 2018-12-21 DIAGNOSIS — E11.65 TYPE 2 DIABETES MELLITUS WITH HYPERGLYCEMIA, WITHOUT LONG-TERM CURRENT USE OF INSULIN (HCC): Primary | ICD-10-CM

## 2018-12-21 DIAGNOSIS — E11.621 DIABETIC ULCER OF OTHER PART OF RIGHT FOOT ASSOCIATED WITH TYPE 2 DIABETES MELLITUS, WITH FAT LAYER EXPOSED (HCC): Primary | ICD-10-CM

## 2018-12-21 DIAGNOSIS — L97.512 DIABETIC ULCER OF OTHER PART OF RIGHT FOOT ASSOCIATED WITH TYPE 2 DIABETES MELLITUS, WITH FAT LAYER EXPOSED (HCC): Primary | ICD-10-CM

## 2018-12-21 DIAGNOSIS — E66.01 MORBID OBESITY (HCC): ICD-10-CM

## 2018-12-21 LAB — GLUCOSE BLD-MCNC: 212 MG/DL

## 2018-12-21 PROCEDURE — 2709999900 HC NON-CHARGEABLE SUPPLY

## 2018-12-21 PROCEDURE — 97597 DBRDMT OPN WND 1ST 20 CM/<: CPT

## 2018-12-21 PROCEDURE — 82962 GLUCOSE BLOOD TEST: CPT | Performed by: FAMILY MEDICINE

## 2018-12-21 PROCEDURE — 99213 OFFICE O/P EST LOW 20 MIN: CPT | Performed by: FAMILY MEDICINE

## 2018-12-21 ASSESSMENT — PAIN SCALES - GENERAL: PAINLEVEL_OUTOF10: 8

## 2018-12-21 ASSESSMENT — PAIN DESCRIPTION - ORIENTATION: ORIENTATION: RIGHT;LEFT

## 2018-12-21 ASSESSMENT — PAIN DESCRIPTION - LOCATION: LOCATION: LEG

## 2018-12-21 ASSESSMENT — PAIN DESCRIPTION - PAIN TYPE: TYPE: ACUTE PAIN

## 2018-12-21 ASSESSMENT — PAIN DESCRIPTION - DESCRIPTORS: DESCRIPTORS: ACHING;CONSTANT

## 2018-12-21 NOTE — PROGRESS NOTES
Wound 12/21/18 Foot Plantar;Right #1 (Active)   Wound Image   12/21/2018  3:49 PM   Wound Pressure Stage  2 12/21/2018 12:20 PM   Dressing Changed Changed/New 12/21/2018 12:20 PM   Dressing/Treatment Collagen;4x4 12/21/2018 12:20 PM   Wound Cleansed Rinsed/Irrigated with saline 12/21/2018 12:20 PM   Wound Length (cm) 2 cm 12/21/2018 12:20 PM   Wound Width (cm) 2.5 cm 12/21/2018 12:20 PM   Wound Depth (cm) 0.05 cm 12/21/2018 12:20 PM   Wound Surface Area (cm^2) 5 cm^2 12/21/2018 12:20 PM   Wound Volume (cm^3) 0.25 cm^3 12/21/2018 12:20 PM   Post-Procedure Length (cm) 0.6 cm 12/21/2018 12:20 PM   Post-Procedure Width (cm) 1 cm 12/21/2018 12:20 PM   Post-Procedure Depth (cm) 0.4 cm 12/21/2018 12:20 PM   Post-Procedure Surface Area (cm^2) 0.6 cm^2 12/21/2018 12:20 PM   Post-Procedure Volume (cm^3) 0.24 cm^3 12/21/2018 12:20 PM   Wound Assessment Brown;Black 12/21/2018 12:20 PM   Drainage Amount None 12/21/2018 12:20 PM   Margins Attached edges 12/21/2018 12:20 PM   Kathleen-wound Assessment Calloused 12/21/2018 12:20 PM   Black%Wound Bed 100 12/21/2018 12:20 PM   Number of days: 2       Percent of Wound Debrided:  100%  Total Surface Area Debrided:  5 sq cm  Bleeding:  Minimal  Hemostasis Achieved:  by pressure  Procedural Pain: 0  / 10   Post Procedural Pain:  0 / 10         Plan:     Patient initially examined and evaluated  Selective debridement of wound site perormed, tolerated well  Patient fit/dispensed  walker   Follow up in (1) week    Treatment:   Orders Placed This Encounter   Procedures    Debridement     Selective of necrotic tissue     Standing Status:   Standing     Number of Occurrences:   1    XR FOOT RIGHT (MIN 3 VIEWS)     Standing Status:   Future     Standing Expiration Date:   12/21/2019     Order Specific Question:   Reason for exam:     Answer:   chronic ulcer right platnar 5th met head    DME Order for (Specify) as OP     - DME device ordered -  walking shoe   - Diagnosis: right diabetic

## 2018-12-27 ENCOUNTER — TELEPHONE (OUTPATIENT)
Dept: FAMILY MEDICINE CLINIC | Age: 48
End: 2018-12-27

## 2018-12-27 NOTE — TELEPHONE ENCOUNTER
----- Message from Hailey Bernard MD sent at 12/21/2018  3:40 PM EST -----  [facility] [address] as I don't see the PA for the trulicity from 98/8/35

## 2019-01-01 ASSESSMENT — ENCOUNTER SYMPTOMS
SINUS PRESSURE: 0
SHORTNESS OF BREATH: 0
BACK PAIN: 1
CONSTIPATION: 0

## 2019-01-02 ENCOUNTER — TELEPHONE (OUTPATIENT)
Dept: FAMILY MEDICINE CLINIC | Age: 49
End: 2019-01-02

## 2019-01-07 ENCOUNTER — TELEPHONE (OUTPATIENT)
Dept: WOUND CARE | Age: 49
End: 2019-01-07

## 2019-03-13 ENCOUNTER — APPOINTMENT (OUTPATIENT)
Dept: GENERAL RADIOLOGY | Age: 49
End: 2019-03-13
Payer: COMMERCIAL

## 2019-03-13 ENCOUNTER — HOSPITAL ENCOUNTER (EMERGENCY)
Age: 49
Discharge: HOME | End: 2019-03-13
Attending: EMERGENCY MEDICINE
Payer: COMMERCIAL

## 2019-03-13 VITALS
TEMPERATURE: 97.8 F | OXYGEN SATURATION: 96 % | DIASTOLIC BLOOD PRESSURE: 74 MMHG | RESPIRATION RATE: 17 BRPM | BODY MASS INDEX: 44.41 KG/M2 | HEART RATE: 80 BPM | WEIGHT: 293 LBS | SYSTOLIC BLOOD PRESSURE: 142 MMHG | HEIGHT: 68 IN

## 2019-03-13 DIAGNOSIS — R07.9 CHEST PAIN WITH LOW RISK FOR CARDIAC ETIOLOGY: Primary | ICD-10-CM

## 2019-03-13 DIAGNOSIS — R42 DIZZINESS: ICD-10-CM

## 2019-03-13 LAB
ANION GAP SERPL CALCULATED.3IONS-SCNC: 15 MEQ/L (ref 8–16)
BASOPHILS # BLD: 0.8 %
BASOPHILS ABSOLUTE: 0.1 THOU/MM3 (ref 0–0.1)
BUN BLDV-MCNC: 10 MG/DL (ref 7–22)
CALCIUM SERPL-MCNC: 9.5 MG/DL (ref 8.5–10.5)
CHLORIDE BLD-SCNC: 95 MEQ/L (ref 98–111)
CO2: 26 MEQ/L (ref 23–33)
CREAT SERPL-MCNC: 0.5 MG/DL (ref 0.4–1.2)
EKG ATRIAL RATE: 81 BPM
EKG P AXIS: 41 DEGREES
EKG P-R INTERVAL: 172 MS
EKG Q-T INTERVAL: 378 MS
EKG QRS DURATION: 100 MS
EKG QTC CALCULATION (BAZETT): 439 MS
EKG R AXIS: 4 DEGREES
EKG T AXIS: 52 DEGREES
EKG VENTRICULAR RATE: 81 BPM
EOSINOPHIL # BLD: 4.2 %
EOSINOPHILS ABSOLUTE: 0.3 THOU/MM3 (ref 0–0.4)
ERYTHROCYTE [DISTWIDTH] IN BLOOD BY AUTOMATED COUNT: 14.1 % (ref 11.5–14.5)
ERYTHROCYTE [DISTWIDTH] IN BLOOD BY AUTOMATED COUNT: 43.8 FL (ref 35–45)
GFR SERPL CREATININE-BSD FRML MDRD: > 90 ML/MIN/1.73M2
GLUCOSE BLD-MCNC: 180 MG/DL (ref 70–108)
HCT VFR BLD CALC: 42.3 % (ref 37–47)
HEMOGLOBIN: 13.8 GM/DL (ref 12–16)
IMMATURE GRANS (ABS): 0.04 THOU/MM3 (ref 0–0.07)
IMMATURE GRANULOCYTES: 0.6 %
LYMPHOCYTES # BLD: 25.4 %
LYMPHOCYTES ABSOLUTE: 1.7 THOU/MM3 (ref 1–4.8)
MCH RBC QN AUTO: 28.1 PG (ref 26–33)
MCHC RBC AUTO-ENTMCNC: 32.6 GM/DL (ref 32.2–35.5)
MCV RBC AUTO: 86.2 FL (ref 81–99)
MONOCYTES # BLD: 5.7 %
MONOCYTES ABSOLUTE: 0.4 THOU/MM3 (ref 0.4–1.3)
NUCLEATED RED BLOOD CELLS: 0 /100 WBC
OSMOLALITY CALCULATION: 275.5 MOSMOL/KG (ref 275–300)
PLATELET # BLD: 236 THOU/MM3 (ref 130–400)
PMV BLD AUTO: 9.9 FL (ref 9.4–12.4)
POTASSIUM SERPL-SCNC: 4.1 MEQ/L (ref 3.5–5.2)
RBC # BLD: 4.91 MILL/MM3 (ref 4.2–5.4)
SEG NEUTROPHILS: 63.3 %
SEGMENTED NEUTROPHILS ABSOLUTE COUNT: 4.2 THOU/MM3 (ref 1.8–7.7)
SODIUM BLD-SCNC: 136 MEQ/L (ref 135–145)
TROPONIN T: < 0.01 NG/ML
WBC # BLD: 6.6 THOU/MM3 (ref 4.8–10.8)

## 2019-03-13 PROCEDURE — 36415 COLL VENOUS BLD VENIPUNCTURE: CPT

## 2019-03-13 PROCEDURE — 71046 X-RAY EXAM CHEST 2 VIEWS: CPT

## 2019-03-13 PROCEDURE — 84484 ASSAY OF TROPONIN QUANT: CPT

## 2019-03-13 PROCEDURE — 93010 ELECTROCARDIOGRAM REPORT: CPT | Performed by: INTERNAL MEDICINE

## 2019-03-13 PROCEDURE — 93005 ELECTROCARDIOGRAM TRACING: CPT | Performed by: EMERGENCY MEDICINE

## 2019-03-13 PROCEDURE — 99285 EMERGENCY DEPT VISIT HI MDM: CPT

## 2019-03-13 PROCEDURE — 80048 BASIC METABOLIC PNL TOTAL CA: CPT

## 2019-03-13 PROCEDURE — 85025 COMPLETE CBC W/AUTO DIFF WBC: CPT

## 2019-03-13 RX ORDER — ONDANSETRON 4 MG/1
4 TABLET, ORALLY DISINTEGRATING ORAL EVERY 8 HOURS PRN
Qty: 20 TABLET | Refills: 0 | Status: SHIPPED | OUTPATIENT
Start: 2019-03-13 | End: 2019-09-27

## 2019-03-13 RX ORDER — MECLIZINE HYDROCHLORIDE 25 MG/1
25 TABLET ORAL 3 TIMES DAILY PRN
Qty: 15 TABLET | Refills: 0 | Status: ON HOLD | OUTPATIENT
Start: 2019-03-13 | End: 2019-09-07

## 2019-03-13 ASSESSMENT — ENCOUNTER SYMPTOMS
DIARRHEA: 0
COUGH: 0
SHORTNESS OF BREATH: 0
SORE THROAT: 0
VOMITING: 0
WHEEZING: 0
BACK PAIN: 0
NAUSEA: 0
BLOOD IN STOOL: 0
ABDOMINAL PAIN: 0

## 2019-03-13 ASSESSMENT — PAIN DESCRIPTION - PROGRESSION
CLINICAL_PROGRESSION: NOT CHANGED
CLINICAL_PROGRESSION: NOT CHANGED

## 2019-03-13 ASSESSMENT — PAIN DESCRIPTION - LOCATION: LOCATION: ARM;CHEST

## 2019-03-13 ASSESSMENT — PAIN SCALES - GENERAL
PAINLEVEL_OUTOF10: 8

## 2019-03-13 ASSESSMENT — HEART SCORE: ECG: 0

## 2019-03-13 ASSESSMENT — PAIN DESCRIPTION - PAIN TYPE: TYPE: ACUTE PAIN

## 2019-05-03 RX ORDER — CALCIUM CITRATE/VITAMIN D3 200MG-6.25
TABLET ORAL
Qty: 100 STRIP | Refills: 0 | OUTPATIENT
Start: 2019-05-03

## 2019-06-25 DIAGNOSIS — I26.99 OTHER PULMONARY EMBOLISM WITHOUT ACUTE COR PULMONALE, UNSPECIFIED CHRONICITY (HCC): ICD-10-CM

## 2019-07-27 ENCOUNTER — APPOINTMENT (OUTPATIENT)
Dept: GENERAL RADIOLOGY | Age: 49
End: 2019-07-27
Payer: COMMERCIAL

## 2019-07-27 ENCOUNTER — HOSPITAL ENCOUNTER (EMERGENCY)
Age: 49
Discharge: HOME | End: 2019-07-27
Attending: FAMILY MEDICINE
Payer: COMMERCIAL

## 2019-07-27 VITALS
DIASTOLIC BLOOD PRESSURE: 77 MMHG | WEIGHT: 293 LBS | TEMPERATURE: 98 F | OXYGEN SATURATION: 95 % | HEART RATE: 75 BPM | BODY MASS INDEX: 44.41 KG/M2 | SYSTOLIC BLOOD PRESSURE: 151 MMHG | HEIGHT: 68 IN | RESPIRATION RATE: 16 BRPM

## 2019-07-27 DIAGNOSIS — D68.2 FACTOR V DEFICIENCY (HCC): ICD-10-CM

## 2019-07-27 DIAGNOSIS — I26.99 OTHER PULMONARY EMBOLISM WITHOUT ACUTE COR PULMONALE, UNSPECIFIED CHRONICITY (HCC): ICD-10-CM

## 2019-07-27 DIAGNOSIS — R07.89 OTHER CHEST PAIN: Primary | ICD-10-CM

## 2019-07-27 LAB
ALBUMIN SERPL-MCNC: 4 G/DL (ref 3.5–5.1)
ALP BLD-CCNC: 64 U/L (ref 38–126)
ALT SERPL-CCNC: 19 U/L (ref 11–66)
ANION GAP SERPL CALCULATED.3IONS-SCNC: 15 MEQ/L (ref 8–16)
AST SERPL-CCNC: 19 U/L (ref 5–40)
BASOPHILS # BLD: 1 %
BASOPHILS ABSOLUTE: 0.1 THOU/MM3 (ref 0–0.1)
BILIRUB SERPL-MCNC: 0.3 MG/DL (ref 0.3–1.2)
BUN BLDV-MCNC: 11 MG/DL (ref 7–22)
CALCIUM SERPL-MCNC: 9.4 MG/DL (ref 8.5–10.5)
CHLORIDE BLD-SCNC: 98 MEQ/L (ref 98–111)
CO2: 25 MEQ/L (ref 23–33)
CREAT SERPL-MCNC: 0.6 MG/DL (ref 0.4–1.2)
EKG ATRIAL RATE: 82 BPM
EKG P AXIS: 35 DEGREES
EKG P-R INTERVAL: 174 MS
EKG Q-T INTERVAL: 386 MS
EKG QRS DURATION: 100 MS
EKG QTC CALCULATION (BAZETT): 450 MS
EKG R AXIS: -3 DEGREES
EKG T AXIS: 30 DEGREES
EKG VENTRICULAR RATE: 82 BPM
EOSINOPHIL # BLD: 2.4 %
EOSINOPHILS ABSOLUTE: 0.1 THOU/MM3 (ref 0–0.4)
ERYTHROCYTE [DISTWIDTH] IN BLOOD BY AUTOMATED COUNT: 13.7 % (ref 11.5–14.5)
ERYTHROCYTE [DISTWIDTH] IN BLOOD BY AUTOMATED COUNT: 43.4 FL (ref 35–45)
GFR SERPL CREATININE-BSD FRML MDRD: > 90 ML/MIN/1.73M2
GLUCOSE BLD-MCNC: 181 MG/DL (ref 70–108)
HCT VFR BLD CALC: 41.8 % (ref 37–47)
HEMOGLOBIN: 13.9 GM/DL (ref 12–16)
IMMATURE GRANS (ABS): 0.01 THOU/MM3 (ref 0–0.07)
IMMATURE GRANULOCYTES: 0.2 %
LYMPHOCYTES # BLD: 27.3 %
LYMPHOCYTES ABSOLUTE: 1.4 THOU/MM3 (ref 1–4.8)
MCH RBC QN AUTO: 28.9 PG (ref 26–33)
MCHC RBC AUTO-ENTMCNC: 33.3 GM/DL (ref 32.2–35.5)
MCV RBC AUTO: 86.9 FL (ref 81–99)
MONOCYTES # BLD: 5.6 %
MONOCYTES ABSOLUTE: 0.3 THOU/MM3 (ref 0.4–1.3)
NUCLEATED RED BLOOD CELLS: 0 /100 WBC
OSMOLALITY CALCULATION: 279.7 MOSMOL/KG (ref 275–300)
PLATELET # BLD: 223 THOU/MM3 (ref 130–400)
PMV BLD AUTO: 10.2 FL (ref 9.4–12.4)
POTASSIUM SERPL-SCNC: 3.9 MEQ/L (ref 3.5–5.2)
RBC # BLD: 4.81 MILL/MM3 (ref 4.2–5.4)
SEG NEUTROPHILS: 63.5 %
SEGMENTED NEUTROPHILS ABSOLUTE COUNT: 3.2 THOU/MM3 (ref 1.8–7.7)
SODIUM BLD-SCNC: 138 MEQ/L (ref 135–145)
TOTAL PROTEIN: 6.9 G/DL (ref 6.1–8)
TROPONIN T: < 0.01 NG/ML
WBC # BLD: 5 THOU/MM3 (ref 4.8–10.8)

## 2019-07-27 PROCEDURE — 80053 COMPREHEN METABOLIC PANEL: CPT

## 2019-07-27 PROCEDURE — 85025 COMPLETE CBC W/AUTO DIFF WBC: CPT

## 2019-07-27 PROCEDURE — 71046 X-RAY EXAM CHEST 2 VIEWS: CPT

## 2019-07-27 PROCEDURE — 93005 ELECTROCARDIOGRAM TRACING: CPT | Performed by: FAMILY MEDICINE

## 2019-07-27 PROCEDURE — 6370000000 HC RX 637 (ALT 250 FOR IP)

## 2019-07-27 PROCEDURE — 36415 COLL VENOUS BLD VENIPUNCTURE: CPT

## 2019-07-27 PROCEDURE — 99285 EMERGENCY DEPT VISIT HI MDM: CPT

## 2019-07-27 PROCEDURE — 84484 ASSAY OF TROPONIN QUANT: CPT

## 2019-07-27 RX ORDER — ACETAMINOPHEN 500 MG
TABLET ORAL
Status: COMPLETED
Start: 2019-07-27 | End: 2019-07-27

## 2019-07-27 RX ADMIN — ACETAMINOPHEN 1000 MG: 500 TABLET ORAL at 12:29

## 2019-07-27 ASSESSMENT — PAIN DESCRIPTION - PROGRESSION: CLINICAL_PROGRESSION: NOT CHANGED

## 2019-07-27 ASSESSMENT — PAIN DESCRIPTION - ONSET: ONSET: ON-GOING

## 2019-07-27 ASSESSMENT — PAIN DESCRIPTION - PAIN TYPE: TYPE: ACUTE PAIN

## 2019-07-27 ASSESSMENT — ENCOUNTER SYMPTOMS
NAUSEA: 0
BACK PAIN: 0
SHORTNESS OF BREATH: 0
EYE PAIN: 0
RHINORRHEA: 0
WHEEZING: 0
VOMITING: 0
DIARRHEA: 0
EYE DISCHARGE: 0
COUGH: 0
ABDOMINAL PAIN: 0
SORE THROAT: 0

## 2019-07-27 ASSESSMENT — PAIN DESCRIPTION - LOCATION: LOCATION: CHEST

## 2019-07-27 ASSESSMENT — PAIN DESCRIPTION - DESCRIPTORS: DESCRIPTORS: STABBING

## 2019-07-27 ASSESSMENT — PAIN DESCRIPTION - FREQUENCY: FREQUENCY: CONTINUOUS

## 2019-07-27 ASSESSMENT — PAIN DESCRIPTION - ORIENTATION: ORIENTATION: MID

## 2019-07-27 ASSESSMENT — PAIN SCALES - GENERAL
PAINLEVEL_OUTOF10: 7
PAINLEVEL_OUTOF10: 7

## 2019-07-27 NOTE — ED PROVIDER NOTES
HISTORY    has a past medical history of Asthma, Bipolar 1 disorder ([facility] Blood circulation, collateral, Curvature of spine, Diabetes mellitus ([facility] DVT (deep venous thrombosis) ([facility] Factor 5 Leiden mutation, heterozygous ([facility] GERD (gastroesophageal reflux disease), HTN, goal below 130/80, Hx-TIA (transient ischemic attack), Hyperlipidemia, PE (pulmonary embolism), RA (rheumatoid arthritis) ([facility] and Unspecified cerebral artery occlusion with cerebral infarction. SURGICAL HISTORY    has a past surgical history that includes Tubal ligation (2003); Cholecystectomy (1992); Knee arthroscopy (2007&2010); Tympanostomy tube placement; Cardiac catheterization (feb 2015); and Tonsillectomy. CURRENT MEDICATIONS       Discharge Medication List as of 7/27/2019  1:07 PM      CONTINUE these medications which have NOT CHANGED    Details   blood glucose test strips (TRUETRACK TEST) strip 2 TIMES DAILY Starting Fri 3/15/2019, Disp-100 each, R-0, NormalAs needed.       ondansetron (ZOFRAN ODT) 4 MG disintegrating tablet Take 1 tablet by mouth every 8 hours as needed for Nausea, Disp-20 tablet, R-0Print      meclizine (ANTIVERT) 25 MG tablet Take 1 tablet by mouth 3 times daily as needed for Dizziness, Disp-15 tablet, R-0Print      atorvastatin (LIPITOR) 20 MG tablet Take 1 tablet by mouth daily, Disp-90 tablet, R-1Normal      glipiZIDE (GLUCOTROL) 5 MG tablet Take 1 tablet by mouth daily, Disp-90 tablet, R-1Normal      metFORMIN (GLUCOPHAGE) 1000 MG tablet Take 1 tablet by mouth 2 times daily (with meals), Disp-180 tablet, R-1Normal      triamterene-hydrochlorothiazide (MAXZIDE-25) 37.5-25 MG per tablet Take 1 tablet by mouth daily, Disp-90 tablet, R-1Normal      Dulaglutide (TRULICITY) 1.5 LS/1.1BK SOPN Inject 1.5 mg into the skin once a week, Disp-4 pen, R-5Normal      gabapentin (NEURONTIN) 300 MG capsule Take 1 capsule by mouth 2 times daily, Disp-60 capsule, R-11Normal             ALLERGIES     is allergic to demerol and tylenol [acetaminophen]. FAMILY HISTORY     She indicated that her mother is alive. She indicated that her father is alive. family history includes COPD in her mother; Cancer in her mother; Heart Disease in her father; High Blood Pressure in her father; Mental Illness in her brother, brother, sister, and sister; Other in her mother. SOCIAL HISTORY      reports that she quit smoking about 14 years ago. Her smoking use included cigarettes. She smoked 0.50 packs per day. She has never used smokeless tobacco. She reports that she does not drink alcohol or use drugs. PHYSICAL EXAM     INITIAL VITALS:  height is 5' 8\" (1.727 m) and weight is 357 lb (161.9 kg) (abnormal). Her oral temperature is 98 °F (36.7 °C). Her blood pressure is 151/77 (abnormal) and her pulse is 75. Her respiration is 16 and oxygen saturation is 95%. Physical Exam   Constitutional: She is oriented to person, place, and time. She appears well-developed and well-nourished. Non-toxic appearance. HENT:   Head: Normocephalic and atraumatic. Right Ear: Tympanic membrane and external ear normal.   Left Ear: Tympanic membrane and external ear normal.   Nose: Nose normal.   Mouth/Throat: Oropharynx is clear and moist and mucous membranes are normal. No oropharyngeal exudate, posterior oropharyngeal edema or posterior oropharyngeal erythema. Eyes: Conjunctivae and EOM are normal.   Neck: Normal range of motion. Neck supple. No JVD present. Cardiovascular: Normal rate, regular rhythm, normal heart sounds, intact distal pulses and normal pulses. Exam reveals no gallop and no friction rub. No murmur heard. Pulmonary/Chest: Effort normal and breath sounds normal. No respiratory distress. She has no decreased breath sounds. She has no wheezes. She has no rhonchi. She has no rales. Abdominal: Soft. Bowel sounds are normal. She exhibits no distension. There is no tenderness. There is no rebound, no guarding and no CVA tenderness. Cielo Clark MD:        EKG Chest Pain (Preliminary result)    Component (Lab Inquiry)   Collection Time Result Time Ventricular Rate Atrial Rate P-R Interval QRS Duration Q-T Interval   07/27/19 11:07:10 07/27/19 11:54:20 82 82 174 100 386       Collection Time Result Time QTc Calculation (Bazett) P Axis R Axis T Axis   07/27/19 11:07:10 07/27/19 11:54:20 450 35 -3 30         Preliminary result                Narrative:    Normal sinus rhythm  Normal ECG  When compared with ECG of 13-MAR-2019 17:46,  No significant change was found                      RADIOLOGY: non-plain film images(s) such as CT, Ultrasound and MRI are read by the radiologist.    XR CHEST STANDARD (2 VW)   Final Result      Stable radiographic appearance of the chest. No evidence of an acute intrathoracic process. **This report has been created using voice recognition software. It may contain minor errors which are inherent in voice recognition technology. **      Final report electronically signed by Dr. Rosemary Wallis on 7/27/2019 12:25 PM           LABS:   Labs Reviewed   CBC WITH AUTO DIFFERENTIAL - Abnormal; Notable for the following components:       Result Value    Monocytes # 0.3 (*)     All other components within normal limits   COMPREHENSIVE METABOLIC PANEL - Abnormal; Notable for the following components:    Glucose 181 (*)     All other components within normal limits   TROPONIN   ANION GAP   GLOMERULAR FILTRATION RATE, ESTIMATED   OSMOLALITY       EMERGENCY DEPARTMENT COURSE:   Vitals:    Vitals:    07/27/19 1104 07/27/19 1230   BP: (!) 158/79 (!) 151/77   Pulse: 80 75   Resp: 17 16   Temp: 98 °F (36.7 °C)    TempSrc: Oral    SpO2: 96% 95%   Weight: (!) 357 lb (161.9 kg)    Height: 5' 8\" (1.727 m)        11:06 AM: The patient was seen and evaluated. MDM:  The patient was seen and evaluated in a timely manner for the complaint of chest pain that started at approximately 8 AM this morning.  Upon my initial the documentation which was dictated to the scribe in my presence, and it accurately records my words and actions.     Otf Trammell MD 7/27/19 7:29 AM        Otf Trammell MD  07/28/19 0602

## 2019-07-29 PROCEDURE — 93010 ELECTROCARDIOGRAM REPORT: CPT | Performed by: INTERNAL MEDICINE

## 2019-09-06 ENCOUNTER — APPOINTMENT (OUTPATIENT)
Dept: MRI IMAGING | Age: 49
DRG: 420 | End: 2019-09-06
Payer: COMMERCIAL

## 2019-09-06 ENCOUNTER — HOSPITAL ENCOUNTER (INPATIENT)
Age: 49
LOS: 3 days | Discharge: HOME | DRG: 420 | End: 2019-09-09
Attending: EMERGENCY MEDICINE | Admitting: INTERNAL MEDICINE
Payer: COMMERCIAL

## 2019-09-06 ENCOUNTER — APPOINTMENT (OUTPATIENT)
Dept: GENERAL RADIOLOGY | Age: 49
DRG: 420 | End: 2019-09-06
Payer: COMMERCIAL

## 2019-09-06 DIAGNOSIS — L03.115 CELLULITIS OF FOOT, RIGHT: ICD-10-CM

## 2019-09-06 DIAGNOSIS — E11.65 TYPE 2 DIABETES MELLITUS WITH HYPERGLYCEMIA, WITHOUT LONG-TERM CURRENT USE OF INSULIN (HCC): ICD-10-CM

## 2019-09-06 DIAGNOSIS — E11.621 DIABETIC FOOT ULCER ASSOCIATED WITH TYPE 2 DIABETES MELLITUS, WITH FAT LAYER EXPOSED, UNSPECIFIED LATERALITY, UNSPECIFIED PART OF FOOT (HCC): Primary | ICD-10-CM

## 2019-09-06 DIAGNOSIS — L97.502 DIABETIC FOOT ULCER ASSOCIATED WITH TYPE 2 DIABETES MELLITUS, WITH FAT LAYER EXPOSED, UNSPECIFIED LATERALITY, UNSPECIFIED PART OF FOOT (HCC): Primary | ICD-10-CM

## 2019-09-06 PROBLEM — D72.829 LEUKOCYTOSIS: Status: ACTIVE | Noted: 2019-09-06

## 2019-09-06 PROBLEM — L03.90 CELLULITIS: Status: ACTIVE | Noted: 2019-09-06

## 2019-09-06 PROBLEM — S91.301A OPEN WOUND OF RIGHT FOOT: Status: ACTIVE | Noted: 2019-09-06

## 2019-09-06 LAB
ANION GAP SERPL CALCULATED.3IONS-SCNC: 14 MEQ/L (ref 8–16)
AVERAGE GLUCOSE: 189 MG/DL (ref 70–126)
BASOPHILS # BLD: 0.4 %
BASOPHILS ABSOLUTE: 0 THOU/MM3 (ref 0–0.1)
BUN BLDV-MCNC: 7 MG/DL (ref 7–22)
C-REACTIVE PROTEIN: 4.97 MG/DL (ref 0–1)
CALCIUM SERPL-MCNC: 9.7 MG/DL (ref 8.5–10.5)
CHLORIDE BLD-SCNC: 96 MEQ/L (ref 98–111)
CO2: 25 MEQ/L (ref 23–33)
CREAT SERPL-MCNC: 0.6 MG/DL (ref 0.4–1.2)
EOSINOPHIL # BLD: 0.8 %
EOSINOPHILS ABSOLUTE: 0.1 THOU/MM3 (ref 0–0.4)
ERYTHROCYTE [DISTWIDTH] IN BLOOD BY AUTOMATED COUNT: 14 % (ref 11.5–14.5)
ERYTHROCYTE [DISTWIDTH] IN BLOOD BY AUTOMATED COUNT: 43.7 FL (ref 35–45)
GFR SERPL CREATININE-BSD FRML MDRD: > 90 ML/MIN/1.73M2
GLUCOSE BLD-MCNC: 228 MG/DL (ref 70–108)
GLUCOSE BLD-MCNC: 247 MG/DL (ref 70–108)
HBA1C MFR BLD: 8.3 % (ref 4.4–6.4)
HCT VFR BLD CALC: 41.4 % (ref 37–47)
HEMOGLOBIN: 13.7 GM/DL (ref 12–16)
IMMATURE GRANS (ABS): 0.04 THOU/MM3 (ref 0–0.07)
IMMATURE GRANULOCYTES: 0 %
LACTIC ACID: 2.3 MMOL/L (ref 0.5–2.2)
LYMPHOCYTES # BLD: 10.2 %
LYMPHOCYTES ABSOLUTE: 1.2 THOU/MM3 (ref 1–4.8)
MCH RBC QN AUTO: 28.6 PG (ref 26–33)
MCHC RBC AUTO-ENTMCNC: 33.1 GM/DL (ref 32.2–35.5)
MCV RBC AUTO: 86.4 FL (ref 81–99)
MONOCYTES # BLD: 4.3 %
MONOCYTES ABSOLUTE: 0.5 THOU/MM3 (ref 0.4–1.3)
NUCLEATED RED BLOOD CELLS: 0 /100 WBC
OSMOLALITY CALCULATION: 276.3 MOSMOL/KG (ref 275–300)
PLATELET # BLD: 229 THOU/MM3 (ref 130–400)
PMV BLD AUTO: 9.6 FL (ref 9.4–12.4)
POTASSIUM SERPL-SCNC: 4.2 MEQ/L (ref 3.5–5.2)
RBC # BLD: 4.79 MILL/MM3 (ref 4.2–5.4)
SEDIMENTATION RATE, ERYTHROCYTE: 35 MM/HR (ref 0–20)
SEG NEUTROPHILS: 83.9 %
SEGMENTED NEUTROPHILS ABSOLUTE COUNT: 9.5 THOU/MM3 (ref 1.8–7.7)
SODIUM BLD-SCNC: 135 MEQ/L (ref 135–145)
TROPONIN T: < 0.01 NG/ML
WBC # BLD: 11.3 THOU/MM3 (ref 4.8–10.8)

## 2019-09-06 PROCEDURE — 6360000002 HC RX W HCPCS: Performed by: EMERGENCY MEDICINE

## 2019-09-06 PROCEDURE — 2709999900 HC NON-CHARGEABLE SUPPLY

## 2019-09-06 PROCEDURE — 73630 X-RAY EXAM OF FOOT: CPT

## 2019-09-06 PROCEDURE — 87075 CULTR BACTERIA EXCEPT BLOOD: CPT

## 2019-09-06 PROCEDURE — 73718 MRI LOWER EXTREMITY W/O DYE: CPT

## 2019-09-06 PROCEDURE — 6370000000 HC RX 637 (ALT 250 FOR IP): Performed by: EMERGENCY MEDICINE

## 2019-09-06 PROCEDURE — 87205 SMEAR GRAM STAIN: CPT

## 2019-09-06 PROCEDURE — 85651 RBC SED RATE NONAUTOMATED: CPT

## 2019-09-06 PROCEDURE — 84484 ASSAY OF TROPONIN QUANT: CPT

## 2019-09-06 PROCEDURE — G0378 HOSPITAL OBSERVATION PER HR: HCPCS

## 2019-09-06 PROCEDURE — 99223 1ST HOSP IP/OBS HIGH 75: CPT | Performed by: INTERNAL MEDICINE

## 2019-09-06 PROCEDURE — 1200000003 HC TELEMETRY R&B

## 2019-09-06 PROCEDURE — 87147 CULTURE TYPE IMMUNOLOGIC: CPT

## 2019-09-06 PROCEDURE — 0HDNXZZ EXTRACTION OF LEFT FOOT SKIN, EXTERNAL APPROACH: ICD-10-PCS | Performed by: INTERNAL MEDICINE

## 2019-09-06 PROCEDURE — 96366 THER/PROPH/DIAG IV INF ADDON: CPT

## 2019-09-06 PROCEDURE — 85025 COMPLETE CBC W/AUTO DIFF WBC: CPT

## 2019-09-06 PROCEDURE — 86140 C-REACTIVE PROTEIN: CPT

## 2019-09-06 PROCEDURE — 2580000003 HC RX 258: Performed by: INTERNAL MEDICINE

## 2019-09-06 PROCEDURE — 6360000002 HC RX W HCPCS: Performed by: INTERNAL MEDICINE

## 2019-09-06 PROCEDURE — 80048 BASIC METABOLIC PNL TOTAL CA: CPT

## 2019-09-06 PROCEDURE — 87077 CULTURE AEROBIC IDENTIFY: CPT

## 2019-09-06 PROCEDURE — 96367 TX/PROPH/DG ADDL SEQ IV INF: CPT

## 2019-09-06 PROCEDURE — 36415 COLL VENOUS BLD VENIPUNCTURE: CPT

## 2019-09-06 PROCEDURE — 87070 CULTURE OTHR SPECIMN AEROBIC: CPT

## 2019-09-06 PROCEDURE — 87040 BLOOD CULTURE FOR BACTERIA: CPT

## 2019-09-06 PROCEDURE — 83605 ASSAY OF LACTIC ACID: CPT

## 2019-09-06 PROCEDURE — 6370000000 HC RX 637 (ALT 250 FOR IP): Performed by: INTERNAL MEDICINE

## 2019-09-06 PROCEDURE — 99285 EMERGENCY DEPT VISIT HI MDM: CPT

## 2019-09-06 PROCEDURE — 96365 THER/PROPH/DIAG IV INF INIT: CPT

## 2019-09-06 PROCEDURE — 2580000003 HC RX 258: Performed by: EMERGENCY MEDICINE

## 2019-09-06 PROCEDURE — 87186 SC STD MICRODIL/AGAR DIL: CPT

## 2019-09-06 PROCEDURE — 82948 REAGENT STRIP/BLOOD GLUCOSE: CPT

## 2019-09-06 PROCEDURE — 83036 HEMOGLOBIN GLYCOSYLATED A1C: CPT

## 2019-09-06 RX ORDER — ATORVASTATIN CALCIUM 20 MG/1
20 TABLET, FILM COATED ORAL DAILY
Status: DISCONTINUED | OUTPATIENT
Start: 2019-09-07 | End: 2019-09-09 | Stop reason: HOSPADM

## 2019-09-06 RX ORDER — OXYCODONE HYDROCHLORIDE AND ACETAMINOPHEN 5; 325 MG/1; MG/1
1 TABLET ORAL ONCE
Status: COMPLETED | OUTPATIENT
Start: 2019-09-06 | End: 2019-09-06

## 2019-09-06 RX ORDER — SODIUM CHLORIDE 0.9 % (FLUSH) 0.9 %
10 SYRINGE (ML) INJECTION PRN
Status: DISCONTINUED | OUTPATIENT
Start: 2019-09-06 | End: 2019-09-09 | Stop reason: HOSPADM

## 2019-09-06 RX ORDER — SODIUM CHLORIDE 0.9 % (FLUSH) 0.9 %
10 SYRINGE (ML) INJECTION EVERY 12 HOURS SCHEDULED
Status: DISCONTINUED | OUTPATIENT
Start: 2019-09-06 | End: 2019-09-06

## 2019-09-06 RX ORDER — DEXTROSE MONOHYDRATE 50 MG/ML
100 INJECTION, SOLUTION INTRAVENOUS PRN
Status: DISCONTINUED | OUTPATIENT
Start: 2019-09-06 | End: 2019-09-09 | Stop reason: HOSPADM

## 2019-09-06 RX ORDER — GABAPENTIN 300 MG/1
300 CAPSULE ORAL 2 TIMES DAILY
Status: DISCONTINUED | OUTPATIENT
Start: 2019-09-06 | End: 2019-09-09 | Stop reason: HOSPADM

## 2019-09-06 RX ORDER — SODIUM CHLORIDE 9 MG/ML
INJECTION, SOLUTION INTRAVENOUS CONTINUOUS
Status: DISCONTINUED | OUTPATIENT
Start: 2019-09-06 | End: 2019-09-06

## 2019-09-06 RX ORDER — DEXTROSE MONOHYDRATE 25 G/50ML
12.5 INJECTION, SOLUTION INTRAVENOUS PRN
Status: DISCONTINUED | OUTPATIENT
Start: 2019-09-06 | End: 2019-09-09 | Stop reason: HOSPADM

## 2019-09-06 RX ORDER — SODIUM CHLORIDE 0.9 % (FLUSH) 0.9 %
10 SYRINGE (ML) INJECTION EVERY 12 HOURS SCHEDULED
Status: DISCONTINUED | OUTPATIENT
Start: 2019-09-06 | End: 2019-09-09 | Stop reason: HOSPADM

## 2019-09-06 RX ORDER — SODIUM CHLORIDE 9 MG/ML
INJECTION, SOLUTION INTRAVENOUS CONTINUOUS
Status: DISCONTINUED | OUTPATIENT
Start: 2019-09-06 | End: 2019-09-07

## 2019-09-06 RX ORDER — NICOTINE POLACRILEX 4 MG
15 LOZENGE BUCCAL PRN
Status: DISCONTINUED | OUTPATIENT
Start: 2019-09-06 | End: 2019-09-09 | Stop reason: HOSPADM

## 2019-09-06 RX ORDER — ACETAMINOPHEN 325 MG/1
650 TABLET ORAL EVERY 4 HOURS PRN
Status: DISCONTINUED | OUTPATIENT
Start: 2019-09-06 | End: 2019-09-09 | Stop reason: HOSPADM

## 2019-09-06 RX ORDER — SODIUM CHLORIDE 0.9 % (FLUSH) 0.9 %
10 SYRINGE (ML) INJECTION PRN
Status: DISCONTINUED | OUTPATIENT
Start: 2019-09-06 | End: 2019-09-06

## 2019-09-06 RX ORDER — MECLIZINE HCL 12.5 MG/1
25 TABLET ORAL 3 TIMES DAILY PRN
Status: DISCONTINUED | OUTPATIENT
Start: 2019-09-06 | End: 2019-09-09 | Stop reason: HOSPADM

## 2019-09-06 RX ORDER — ACETAMINOPHEN 325 MG/1
650 TABLET ORAL EVERY 4 HOURS PRN
Status: DISCONTINUED | OUTPATIENT
Start: 2019-09-06 | End: 2019-09-06

## 2019-09-06 RX ORDER — ONDANSETRON 2 MG/ML
4 INJECTION INTRAMUSCULAR; INTRAVENOUS EVERY 6 HOURS PRN
Status: DISCONTINUED | OUTPATIENT
Start: 2019-09-06 | End: 2019-09-09 | Stop reason: HOSPADM

## 2019-09-06 RX ORDER — HYDROCODONE BITARTRATE AND ACETAMINOPHEN 5; 325 MG/1; MG/1
1 TABLET ORAL ONCE
Status: COMPLETED | OUTPATIENT
Start: 2019-09-06 | End: 2019-09-06

## 2019-09-06 RX ADMIN — SODIUM CHLORIDE: 9 INJECTION, SOLUTION INTRAVENOUS at 22:38

## 2019-09-06 RX ADMIN — SODIUM CHLORIDE: 9 INJECTION, SOLUTION INTRAVENOUS at 13:22

## 2019-09-06 RX ADMIN — OXYCODONE HYDROCHLORIDE AND ACETAMINOPHEN 1 TABLET: 5; 325 TABLET ORAL at 19:36

## 2019-09-06 RX ADMIN — APIXABAN 2.5 MG: 2.5 TABLET, FILM COATED ORAL at 23:26

## 2019-09-06 RX ADMIN — INSULIN LISPRO 1 UNITS: 100 INJECTION, SOLUTION INTRAVENOUS; SUBCUTANEOUS at 23:26

## 2019-09-06 RX ADMIN — VANCOMYCIN HYDROCHLORIDE 1500 MG: 5 INJECTION, POWDER, LYOPHILIZED, FOR SOLUTION INTRAVENOUS at 23:26

## 2019-09-06 RX ADMIN — HYDROCODONE BITARTRATE AND ACETAMINOPHEN 1 TABLET: 5; 325 TABLET ORAL at 13:20

## 2019-09-06 RX ADMIN — GABAPENTIN 300 MG: 300 CAPSULE ORAL at 23:26

## 2019-09-06 RX ADMIN — CEFTRIAXONE SODIUM 1 G: 1 INJECTION, POWDER, FOR SOLUTION INTRAMUSCULAR; INTRAVENOUS at 15:15

## 2019-09-06 ASSESSMENT — PAIN - FUNCTIONAL ASSESSMENT: PAIN_FUNCTIONAL_ASSESSMENT: PREVENTS OR INTERFERES SOME ACTIVE ACTIVITIES AND ADLS

## 2019-09-06 ASSESSMENT — PAIN DESCRIPTION - ONSET
ONSET: PROGRESSIVE
ONSET: PROGRESSIVE

## 2019-09-06 ASSESSMENT — ENCOUNTER SYMPTOMS
CONSTIPATION: 0
COUGH: 0
SHORTNESS OF BREATH: 0
VOMITING: 0
CHEST TIGHTNESS: 0
DIARRHEA: 0
WHEEZING: 0
TROUBLE SWALLOWING: 0
NAUSEA: 0
VOICE CHANGE: 0
ABDOMINAL PAIN: 0
RHINORRHEA: 0
BACK PAIN: 0
SINUS PRESSURE: 0
SORE THROAT: 0

## 2019-09-06 ASSESSMENT — PAIN SCALES - GENERAL
PAINLEVEL_OUTOF10: 8
PAINLEVEL_OUTOF10: 7
PAINLEVEL_OUTOF10: 9
PAINLEVEL_OUTOF10: 10
PAINLEVEL_OUTOF10: 9

## 2019-09-06 ASSESSMENT — PAIN DESCRIPTION - ORIENTATION
ORIENTATION: RIGHT

## 2019-09-06 ASSESSMENT — PAIN DESCRIPTION - PROGRESSION
CLINICAL_PROGRESSION: GRADUALLY WORSENING
CLINICAL_PROGRESSION: GRADUALLY WORSENING

## 2019-09-06 ASSESSMENT — PAIN DESCRIPTION - PAIN TYPE
TYPE: ACUTE PAIN

## 2019-09-06 ASSESSMENT — PAIN DESCRIPTION - DESCRIPTORS
DESCRIPTORS: STABBING;BURNING
DESCRIPTORS: BURNING;STABBING

## 2019-09-06 ASSESSMENT — PAIN SCALES - WONG BAKER: WONGBAKER_NUMERICALRESPONSE: 10

## 2019-09-06 ASSESSMENT — PAIN DESCRIPTION - FREQUENCY
FREQUENCY: CONTINUOUS
FREQUENCY: CONTINUOUS

## 2019-09-06 ASSESSMENT — PAIN DESCRIPTION - LOCATION
LOCATION: FOOT

## 2019-09-06 NOTE — ED PROVIDER NOTES
[address]        CHIEF COMPLAINT    Chief Complaint   Patient presents with    Wound Infection     R foot       Nurses Notes reviewed and I agree except as noted in the HPI. HPI    Maria Luisa Kidd is a 57 y.o. female who presents for evaluation of right plantar foot pain for the past 2 days. ,  Patient also noted redness without any fever however had chills. Patient is diabetic and unsure how controlled it is asked her A1c has been done several months ago. Patient admits that she had a callus on that area of the foot for more than 8 months and being seen at the wound clinic. Patient has no family doctor at present as she got fired from Dr. Karey Kocher for no-show of her appointments      REVIEW OF SYSTEMS    Review of Systems   Constitutional: Negative for appetite change, chills, diaphoresis, fatigue and fever. HENT: Negative for congestion, ear pain, postnasal drip, rhinorrhea, sinus pressure, sneezing, sore throat, trouble swallowing and voice change. Respiratory: Negative for cough, chest tightness, shortness of breath and wheezing. Cardiovascular: Negative for chest pain, palpitations and leg swelling. Gastrointestinal: Negative for abdominal pain, constipation, diarrhea, nausea and vomiting. Musculoskeletal: Negative for arthralgias, back pain, joint swelling, myalgias, neck pain and neck stiffness. Right plantar foot pain swelling   Neurological: Negative for dizziness, syncope, weakness, light-headedness, numbness and headaches.        PAST MEDICAL HISTORY     has a past medical history of Asthma, Bipolar 1 disorder ([address] Blood circulation, collateral, Curvature of spine, Diabetes mellitus ([address] DVT (deep venous thrombosis) ([address] Factor 5 Leiden mutation, heterozygous ([address] GERD (gastroesophageal reflux disease), HTN, goal below 130/80, Hx-TIA (transient ischemic attack), Hyperlipidemia, PE (pulmonary used smokeless tobacco. She reports that she does not drink alcohol or use drugs. PHYSICAL EXAM      INITIAL VITALS: BP (!) 106/54   Pulse 81   Temp 98.3 °F (36.8 °C) (Oral)   Resp 16   Ht 5' 8\" (1.727 m)   Wt (!) 358 lb (162.4 kg)   LMP 06/21/2018   SpO2 96%   BMI 54.43 kg/m² Estimated body mass index is 54.43 kg/m² as calculated from the following:    Height as of this encounter: 5' 8\" (1.727 m). Weight as of this encounter: 358 lb (162.4 kg). Physical Exam   Constitutional: She is oriented to person, place, and time. She appears well-developed and well-nourished. She is cooperative. HENT:   Head: Normocephalic and atraumatic. Right Ear: External ear normal.   Left Ear: External ear normal.   Nose: Nose normal.   Mouth/Throat: Oropharynx is clear and moist.   Eyes: Pupils are equal, round, and reactive to light. Conjunctivae and EOM are normal. No scleral icterus. Neck: Normal range of motion. Neck supple. No JVD present. No thyromegaly present. Cardiovascular: Normal rate, regular rhythm and intact distal pulses. Exam reveals no friction rub. No murmur heard. Pulmonary/Chest: Effort normal and breath sounds normal. She has no wheezes. She has no rales. She exhibits no tenderness. Abdominal: Soft. Bowel sounds are normal. She exhibits no mass. There is no tenderness. Musculoskeletal: She exhibits no edema. Right foot showed a 2 cm callus surrounded by erythema, moderately to severely tender, noted on the fourth and fifth MTP joint. There is redness that extends to the dorsal lateral aspect of the right foot. Lymphadenopathy:     She has no cervical adenopathy. Neurological: She is alert and oriented to person, place, and time. Skin: No rash noted. Vitals reviewed. MEDICAL DECISION MAKING    DIFFERENTIAL DIAGNOSIS:  plantar foot callus cellulitis abscess  Diabetic foot      DIAGNOSTIC RESULTS      RADIOLOGY:  I have reviewed radiologic plain film image(s).   The Intravenous New Bag 9/6/19 3412)       The pt was seen and evaluated by me. Within the department, I observed the pt's vitalsigns to be within acceptable range. Laboratory and Radiological studies were performed, results were reviewed with the patient. Within the department, the pt was treated with IV fluid, Norco, Rocephin. I observed the pt's condition to be hemodynamically stable  during the duration of their stay. I explained my proposed course of treatment to the pt, and they were amenable to my decision. The case is referred to foot and ankle services and the residents came and debride the wound and recommend admission. Admission was then deferred to the hospitalist services, Dr. Wenda Scheuermann admission for IV antibiotic     CRITICAL CARE:   None. CONSULTS:  MAMTA Pruett      PROCEDURES:  None. FINAL IMPRESSION       1. Diabetic foot ulcer associated with type 2 diabetes mellitus, with fat layer exposed, unspecified laterality, unspecified part of foot ([address]    2. Cellulitis of foot, right    3.  Type 2 diabetes mellitus with hyperglycemia, without long-term current use of insulin ([facility])          DISPOSITION/PLAN  PATIENT REFERRED TO: She is admitted to the hospitalist services, Dr. Wenda Scheuermann graciously admitted the patient      (Please note that portions of this note were completed with a voice recognition program.  Efforts were made to edit the dictations but occasionallywords are mis-transcribed.)         09/06/19 5:55 PM      Howie Bowman MD      Emergency room physician           Howie Bowman MD  09/06/19 6580

## 2019-09-06 NOTE — H&P
 Blood circulation, collateral     Curvature of spine     Diabetes mellitus ([facility]     DVT (deep venous thrombosis) (HCC)     Factor 5 Leiden mutation, heterozygous (HCC)     GERD (gastroesophageal reflux disease)     HTN, goal below 130/80     Hx-TIA (transient ischemic attack)     Hyperlipidemia     PE (pulmonary embolism)     RA (rheumatoid arthritis) ([facility]     Unspecified cerebral artery occlusion with cerebral infarction        Past Surgical History:          Procedure Laterality Date    CARDIAC CATHETERIZATION  feb 2015    CHOLECYSTECTOMY  1992    KNEE ARTHROSCOPY  2007&2010    TONSILLECTOMY      TUBAL LIGATION  2003    TYMPANOSTOMY TUBE PLACEMENT         Medications Prior to Admission:      Prior to Admission medications    Medication Sig Start Date End Date Taking? Authorizing Provider   apixaban (ELIQUIS) 2.5 MG TABS tablet Take 2.5 mg by mouth 2 times daily   Yes Historical Provider, MD   meclizine (ANTIVERT) 25 MG tablet Take 1 tablet by mouth 3 times daily as needed for Dizziness 3/13/19  Yes Lamar Parekh MD   atorvastatin (LIPITOR) 20 MG tablet Take 1 tablet by mouth daily 12/5/18  Yes Berhane Gaston MD   glipiZIDE (GLUCOTROL) 5 MG tablet Take 1 tablet by mouth daily 12/5/18  Yes Berhane Gaston MD   metFORMIN (GLUCOPHAGE) 1000 MG tablet Take 1 tablet by mouth 2 times daily (with meals) 12/5/18  Yes Berhane Gaston MD   triamterene-hydrochlorothiazide [facility]) 37.5-25 MG per tablet Take 1 tablet by mouth daily 12/5/18  Yes Berhane Gaston MD   gabapentin (NEURONTIN) 300 MG capsule Take 1 capsule by mouth 2 times daily 4/11/17  Yes Berhane Gaston MD   rivaroxaban (XARELTO) 10 MG TABS tablet Take 1 tablet by mouth daily (with breakfast) 7/27/19 8/26/19  Minus MD Orly   blood glucose test strips (TRUETRACK TEST) strip 1 each by In Vitro route 2 times daily As needed.  3/15/19   Berhane Gaston MD   ondansetron (ZOFRAN ODT) 4 MG disintegrating tablet Take Encouraged, if warranted    Disposition:    [x] Home       [] TCU       [] Rehab       [] Psych       [] SNF       [] [place]       [] Other-    ASSESSMENT:    Active Hospital Problems    Diagnosis Date Noted    Cellulitis [L03.90] 09/06/2019     Priority: High    Open wound of right foot [S91.301A] 09/06/2019     Priority: Medium    Leukocytosis [D72.829] 09/06/2019    Essential hypertension [I10] 04/11/2017    Type 2 diabetes mellitus with hyperglycemia, without long-term current use of insulin ([facility] [E11.65] 02/27/2017    Physical deconditioning [R53.81]     History of pulmonary embolism [Z86.711]     HTN, goal below 130/80 [I10]     Hyperlipidemia with target LDL less than 70 [E78.5]        PLAN:    Admit to Telemetry Unit  Diet regular  IVF hydration as tolerated  Analgesics PRN  Antiemetics PRN  DVT prophylaxis, Eliquis  PT/OT encouraged, if warranted  IS  EKG in AM  Podiatry consult, apprec chart recs  Empiric antibiotics  Cultures pending  MRI foot pending  Consider ID consult  Will need to re-evaluate home medications in morning  Continue to monitor closely         Thank you No primary care provider on file. for the opportunity to be involved in this patient's care.     Electronically signed by Eliane Christie DO on 9/6/2019 at 8:21 PM

## 2019-09-07 LAB
ANION GAP SERPL CALCULATED.3IONS-SCNC: 13 MEQ/L (ref 8–16)
BUN BLDV-MCNC: 6 MG/DL (ref 7–22)
CALCIUM SERPL-MCNC: 8.9 MG/DL (ref 8.5–10.5)
CHLORIDE BLD-SCNC: 99 MEQ/L (ref 98–111)
CO2: 24 MEQ/L (ref 23–33)
CREAT SERPL-MCNC: 0.4 MG/DL (ref 0.4–1.2)
EKG ATRIAL RATE: 86 BPM
EKG P AXIS: 32 DEGREES
EKG P-R INTERVAL: 184 MS
EKG Q-T INTERVAL: 380 MS
EKG QRS DURATION: 100 MS
EKG QTC CALCULATION (BAZETT): 454 MS
EKG R AXIS: 11 DEGREES
EKG T AXIS: 51 DEGREES
EKG VENTRICULAR RATE: 86 BPM
ERYTHROCYTE [DISTWIDTH] IN BLOOD BY AUTOMATED COUNT: 14.4 % (ref 11.5–14.5)
ERYTHROCYTE [DISTWIDTH] IN BLOOD BY AUTOMATED COUNT: 46.4 FL (ref 35–45)
GFR SERPL CREATININE-BSD FRML MDRD: > 90 ML/MIN/1.73M2
GLUCOSE BLD-MCNC: 178 MG/DL (ref 70–108)
GLUCOSE BLD-MCNC: 186 MG/DL (ref 70–108)
GLUCOSE BLD-MCNC: 194 MG/DL (ref 70–108)
GLUCOSE BLD-MCNC: 222 MG/DL (ref 70–108)
GLUCOSE BLD-MCNC: 255 MG/DL (ref 70–108)
HCT VFR BLD CALC: 38 % (ref 37–47)
HEMOGLOBIN: 12.2 GM/DL (ref 12–16)
LACTIC ACID: 1.5 MMOL/L (ref 0.5–2.2)
MCH RBC QN AUTO: 28.3 PG (ref 26–33)
MCHC RBC AUTO-ENTMCNC: 32.1 GM/DL (ref 32.2–35.5)
MCV RBC AUTO: 88.2 FL (ref 81–99)
PLATELET # BLD: 198 THOU/MM3 (ref 130–400)
PMV BLD AUTO: 9.9 FL (ref 9.4–12.4)
POTASSIUM SERPL-SCNC: 4.4 MEQ/L (ref 3.5–5.2)
RBC # BLD: 4.31 MILL/MM3 (ref 4.2–5.4)
SODIUM BLD-SCNC: 136 MEQ/L (ref 135–145)
WBC # BLD: 6.8 THOU/MM3 (ref 4.8–10.8)

## 2019-09-07 PROCEDURE — G0378 HOSPITAL OBSERVATION PER HR: HCPCS

## 2019-09-07 PROCEDURE — 83605 ASSAY OF LACTIC ACID: CPT

## 2019-09-07 PROCEDURE — 6370000000 HC RX 637 (ALT 250 FOR IP): Performed by: INTERNAL MEDICINE

## 2019-09-07 PROCEDURE — 80048 BASIC METABOLIC PNL TOTAL CA: CPT

## 2019-09-07 PROCEDURE — 36415 COLL VENOUS BLD VENIPUNCTURE: CPT

## 2019-09-07 PROCEDURE — 99233 SBSQ HOSP IP/OBS HIGH 50: CPT | Performed by: FAMILY MEDICINE

## 2019-09-07 PROCEDURE — 96366 THER/PROPH/DIAG IV INF ADDON: CPT

## 2019-09-07 PROCEDURE — 93005 ELECTROCARDIOGRAM TRACING: CPT | Performed by: INTERNAL MEDICINE

## 2019-09-07 PROCEDURE — 85027 COMPLETE CBC AUTOMATED: CPT

## 2019-09-07 PROCEDURE — 6360000002 HC RX W HCPCS: Performed by: INTERNAL MEDICINE

## 2019-09-07 PROCEDURE — 1200000003 HC TELEMETRY R&B

## 2019-09-07 PROCEDURE — 82948 REAGENT STRIP/BLOOD GLUCOSE: CPT

## 2019-09-07 PROCEDURE — 80202 ASSAY OF VANCOMYCIN: CPT

## 2019-09-07 PROCEDURE — 2580000003 HC RX 258: Performed by: INTERNAL MEDICINE

## 2019-09-07 PROCEDURE — 6370000000 HC RX 637 (ALT 250 FOR IP): Performed by: FAMILY MEDICINE

## 2019-09-07 PROCEDURE — 6370000000 HC RX 637 (ALT 250 FOR IP): Performed by: PHYSICIAN ASSISTANT

## 2019-09-07 PROCEDURE — 2709999900 HC NON-CHARGEABLE SUPPLY

## 2019-09-07 RX ORDER — LIDOCAINE 4 G/G
1 PATCH TOPICAL DAILY
Status: DISCONTINUED | OUTPATIENT
Start: 2019-09-07 | End: 2019-09-09 | Stop reason: HOSPADM

## 2019-09-07 RX ORDER — TRAMADOL HYDROCHLORIDE 50 MG/1
50 TABLET ORAL EVERY 6 HOURS PRN
Status: DISCONTINUED | OUTPATIENT
Start: 2019-09-07 | End: 2019-09-07

## 2019-09-07 RX ORDER — OXYCODONE HYDROCHLORIDE AND ACETAMINOPHEN 5; 325 MG/1; MG/1
1 TABLET ORAL ONCE
Status: COMPLETED | OUTPATIENT
Start: 2019-09-07 | End: 2019-09-07

## 2019-09-07 RX ORDER — TRAMADOL HYDROCHLORIDE 50 MG/1
50 TABLET ORAL EVERY 6 HOURS PRN
Status: DISCONTINUED | OUTPATIENT
Start: 2019-09-07 | End: 2019-09-09 | Stop reason: HOSPADM

## 2019-09-07 RX ORDER — OXYCODONE HYDROCHLORIDE AND ACETAMINOPHEN 5; 325 MG/1; MG/1
1 TABLET ORAL EVERY 6 HOURS PRN
Status: DISCONTINUED | OUTPATIENT
Start: 2019-09-07 | End: 2019-09-09 | Stop reason: HOSPADM

## 2019-09-07 RX ORDER — ALOGLIPTIN 25 MG/1
25 TABLET, FILM COATED ORAL DAILY
COMMUNITY
End: 2022-02-15

## 2019-09-07 RX ORDER — GLIPIZIDE 5 MG/1
5 TABLET ORAL DAILY
Status: DISCONTINUED | OUTPATIENT
Start: 2019-09-07 | End: 2019-09-09 | Stop reason: HOSPADM

## 2019-09-07 RX ADMIN — INSULIN LISPRO 3 UNITS: 100 INJECTION, SOLUTION INTRAVENOUS; SUBCUTANEOUS at 17:28

## 2019-09-07 RX ADMIN — APIXABAN 2.5 MG: 2.5 TABLET, FILM COATED ORAL at 20:57

## 2019-09-07 RX ADMIN — CEFTRIAXONE SODIUM 1 G: 1 INJECTION, POWDER, FOR SOLUTION INTRAMUSCULAR; INTRAVENOUS at 14:42

## 2019-09-07 RX ADMIN — ATORVASTATIN CALCIUM 20 MG: 20 TABLET, FILM COATED ORAL at 09:46

## 2019-09-07 RX ADMIN — GLIPIZIDE 5 MG: 5 TABLET ORAL at 18:34

## 2019-09-07 RX ADMIN — APIXABAN 2.5 MG: 2.5 TABLET, FILM COATED ORAL at 09:46

## 2019-09-07 RX ADMIN — GABAPENTIN 300 MG: 300 CAPSULE ORAL at 20:57

## 2019-09-07 RX ADMIN — GABAPENTIN 300 MG: 300 CAPSULE ORAL at 09:46

## 2019-09-07 RX ADMIN — INSULIN LISPRO 1 UNITS: 100 INJECTION, SOLUTION INTRAVENOUS; SUBCUTANEOUS at 20:58

## 2019-09-07 RX ADMIN — INSULIN LISPRO 1 UNITS: 100 INJECTION, SOLUTION INTRAVENOUS; SUBCUTANEOUS at 08:04

## 2019-09-07 RX ADMIN — VANCOMYCIN HYDROCHLORIDE 1500 MG: 5 INJECTION, POWDER, LYOPHILIZED, FOR SOLUTION INTRAVENOUS at 08:02

## 2019-09-07 RX ADMIN — TRAMADOL HYDROCHLORIDE 50 MG: 50 TABLET, FILM COATED ORAL at 09:46

## 2019-09-07 RX ADMIN — LINAGLIPTIN 5 MG: 5 TABLET, FILM COATED ORAL at 18:34

## 2019-09-07 RX ADMIN — INSULIN LISPRO 2 UNITS: 100 INJECTION, SOLUTION INTRAVENOUS; SUBCUTANEOUS at 11:56

## 2019-09-07 RX ADMIN — OXYCODONE HYDROCHLORIDE AND ACETAMINOPHEN 1 TABLET: 5; 325 TABLET ORAL at 03:43

## 2019-09-07 RX ADMIN — VANCOMYCIN HYDROCHLORIDE 1500 MG: 5 INJECTION, POWDER, LYOPHILIZED, FOR SOLUTION INTRAVENOUS at 15:58

## 2019-09-07 RX ADMIN — OXYCODONE HYDROCHLORIDE AND ACETAMINOPHEN 1 TABLET: 5; 325 TABLET ORAL at 11:56

## 2019-09-07 ASSESSMENT — PAIN DESCRIPTION - ONSET: ONSET: ON-GOING

## 2019-09-07 ASSESSMENT — PAIN DESCRIPTION - PROGRESSION
CLINICAL_PROGRESSION: NOT CHANGED

## 2019-09-07 ASSESSMENT — PAIN DESCRIPTION - ORIENTATION: ORIENTATION: RIGHT

## 2019-09-07 ASSESSMENT — PAIN - FUNCTIONAL ASSESSMENT: PAIN_FUNCTIONAL_ASSESSMENT: PREVENTS OR INTERFERES SOME ACTIVE ACTIVITIES AND ADLS

## 2019-09-07 ASSESSMENT — PAIN DESCRIPTION - FREQUENCY: FREQUENCY: CONTINUOUS

## 2019-09-07 ASSESSMENT — PAIN SCALES - GENERAL
PAINLEVEL_OUTOF10: 7
PAINLEVEL_OUTOF10: 10
PAINLEVEL_OUTOF10: 9
PAINLEVEL_OUTOF10: 8
PAINLEVEL_OUTOF10: 6
PAINLEVEL_OUTOF10: 9

## 2019-09-07 ASSESSMENT — PAIN DESCRIPTION - PAIN TYPE: TYPE: ACUTE PAIN

## 2019-09-07 ASSESSMENT — PAIN DESCRIPTION - DESCRIPTORS: DESCRIPTORS: BURNING;STABBING

## 2019-09-07 ASSESSMENT — PAIN DESCRIPTION - LOCATION: LOCATION: FOOT

## 2019-09-07 NOTE — PLAN OF CARE
Problem: Falls - Risk of:  Goal: Will remain free from falls  Description  Will remain free from falls  Outcome: Ongoing  Note:   Fall sign posted. Arm band in place. Non-skid slippers on. Bed alarm on. Patient agreeable to use of call light. Call light within reach. Bed in lowest position. Problem: Pain:  Goal: Pain level will decrease  Description  Pain level will decrease  Outcome: Ongoing  Note:   Patient complains of pain throughout shift in right leg and back. PRN tramadol and percocet added to medications. Ice applied to leg with some relief. Leg remains elevated when in bed/chair. Patients stated pain goal of a \"4\" not met this shift. Will continue to assess pain hourly. Problem: Discharge Planning:  Goal: Participates in care planning  Description  Participates in care planning  Outcome: Ongoing  Note:   Patient plans to be discharged to home with family. Care plan reviewed with patient and family. Patient and family verbalize understanding of the plan of care and contribute to goal setting.

## 2019-09-08 LAB
GLUCOSE BLD-MCNC: 131 MG/DL (ref 70–108)
GLUCOSE BLD-MCNC: 143 MG/DL (ref 70–108)
GLUCOSE BLD-MCNC: 187 MG/DL (ref 70–108)
GLUCOSE BLD-MCNC: 198 MG/DL (ref 70–108)
VANCOMYCIN TROUGH: 12 UG/ML (ref 5–15)

## 2019-09-08 PROCEDURE — 99232 SBSQ HOSP IP/OBS MODERATE 35: CPT | Performed by: FAMILY MEDICINE

## 2019-09-08 PROCEDURE — 82948 REAGENT STRIP/BLOOD GLUCOSE: CPT

## 2019-09-08 PROCEDURE — 96366 THER/PROPH/DIAG IV INF ADDON: CPT

## 2019-09-08 PROCEDURE — 2709999900 HC NON-CHARGEABLE SUPPLY

## 2019-09-08 PROCEDURE — 6370000000 HC RX 637 (ALT 250 FOR IP): Performed by: FAMILY MEDICINE

## 2019-09-08 PROCEDURE — 6370000000 HC RX 637 (ALT 250 FOR IP): Performed by: INTERNAL MEDICINE

## 2019-09-08 PROCEDURE — G0378 HOSPITAL OBSERVATION PER HR: HCPCS

## 2019-09-08 PROCEDURE — 93010 ELECTROCARDIOGRAM REPORT: CPT | Performed by: INTERNAL MEDICINE

## 2019-09-08 PROCEDURE — 6360000002 HC RX W HCPCS: Performed by: INTERNAL MEDICINE

## 2019-09-08 PROCEDURE — 2580000003 HC RX 258: Performed by: INTERNAL MEDICINE

## 2019-09-08 PROCEDURE — 1200000003 HC TELEMETRY R&B

## 2019-09-08 RX ADMIN — VANCOMYCIN HYDROCHLORIDE 1500 MG: 5 INJECTION, POWDER, LYOPHILIZED, FOR SOLUTION INTRAVENOUS at 00:31

## 2019-09-08 RX ADMIN — VANCOMYCIN HYDROCHLORIDE 1500 MG: 5 INJECTION, POWDER, LYOPHILIZED, FOR SOLUTION INTRAVENOUS at 08:03

## 2019-09-08 RX ADMIN — OXYCODONE HYDROCHLORIDE AND ACETAMINOPHEN 1 TABLET: 5; 325 TABLET ORAL at 10:35

## 2019-09-08 RX ADMIN — APIXABAN 2.5 MG: 2.5 TABLET, FILM COATED ORAL at 08:33

## 2019-09-08 RX ADMIN — VANCOMYCIN HYDROCHLORIDE 1500 MG: 5 INJECTION, POWDER, LYOPHILIZED, FOR SOLUTION INTRAVENOUS at 16:07

## 2019-09-08 RX ADMIN — CEFTRIAXONE SODIUM 1 G: 1 INJECTION, POWDER, FOR SOLUTION INTRAMUSCULAR; INTRAVENOUS at 15:19

## 2019-09-08 RX ADMIN — INSULIN LISPRO 1 UNITS: 100 INJECTION, SOLUTION INTRAVENOUS; SUBCUTANEOUS at 21:50

## 2019-09-08 RX ADMIN — ACETAMINOPHEN 650 MG: 325 TABLET ORAL at 08:04

## 2019-09-08 RX ADMIN — OXYCODONE HYDROCHLORIDE AND ACETAMINOPHEN 1 TABLET: 5; 325 TABLET ORAL at 23:59

## 2019-09-08 RX ADMIN — INSULIN LISPRO 1 UNITS: 100 INJECTION, SOLUTION INTRAVENOUS; SUBCUTANEOUS at 08:05

## 2019-09-08 RX ADMIN — ATORVASTATIN CALCIUM 20 MG: 20 TABLET, FILM COATED ORAL at 08:33

## 2019-09-08 RX ADMIN — APIXABAN 2.5 MG: 2.5 TABLET, FILM COATED ORAL at 21:50

## 2019-09-08 RX ADMIN — GABAPENTIN 300 MG: 300 CAPSULE ORAL at 21:50

## 2019-09-08 RX ADMIN — GABAPENTIN 300 MG: 300 CAPSULE ORAL at 08:32

## 2019-09-08 RX ADMIN — LINAGLIPTIN 5 MG: 5 TABLET, FILM COATED ORAL at 08:32

## 2019-09-08 RX ADMIN — OXYCODONE HYDROCHLORIDE AND ACETAMINOPHEN 1 TABLET: 5; 325 TABLET ORAL at 00:30

## 2019-09-08 RX ADMIN — GLIPIZIDE 5 MG: 5 TABLET ORAL at 08:33

## 2019-09-08 ASSESSMENT — PAIN DESCRIPTION - PAIN TYPE: TYPE: ACUTE PAIN

## 2019-09-08 ASSESSMENT — PAIN DESCRIPTION - DESCRIPTORS: DESCRIPTORS: BURNING;STABBING

## 2019-09-08 ASSESSMENT — PAIN DESCRIPTION - ORIENTATION: ORIENTATION: LEFT

## 2019-09-08 ASSESSMENT — PAIN DESCRIPTION - PROGRESSION
CLINICAL_PROGRESSION: NOT CHANGED

## 2019-09-08 ASSESSMENT — PAIN DESCRIPTION - ONSET: ONSET: ON-GOING

## 2019-09-08 ASSESSMENT — PAIN SCALES - GENERAL
PAINLEVEL_OUTOF10: 10
PAINLEVEL_OUTOF10: 9
PAINLEVEL_OUTOF10: 7
PAINLEVEL_OUTOF10: 7

## 2019-09-08 ASSESSMENT — PAIN DESCRIPTION - LOCATION: LOCATION: LEG

## 2019-09-08 ASSESSMENT — PAIN DESCRIPTION - FREQUENCY: FREQUENCY: CONTINUOUS

## 2019-09-08 ASSESSMENT — PAIN - FUNCTIONAL ASSESSMENT: PAIN_FUNCTIONAL_ASSESSMENT: PREVENTS OR INTERFERES SOME ACTIVE ACTIVITIES AND ADLS

## 2019-09-08 NOTE — PROGRESS NOTES
Pharmacy Vancomycin Consult     Vancomycin Day: 2  Current Dosing: vanc 1500mg q8h    Temp max:  afebrile    Recent Labs     09/06/19  1300 09/07/19  0624   BUN 7 6*       Recent Labs     09/06/19  1300 09/07/19  0624   CREATININE 0.6 0.4       Recent Labs     09/06/19  1300 09/07/19  0624   WBC 11.3* 6.8         Intake/Output Summary (Last 24 hours) at 9/8/2019 0131  Last data filed at 9/8/2019 0028  Gross per 24 hour   Intake 3265.76 ml   Output 0 ml   Net 3265.76 ml     Culture Date Source Results   09/06/19  Foot  Coag + staph, GNB   09/06/19 Blood x 2 NGTD        Ht Readings from Last 1 Encounters:   09/06/19 5' 8\" (1.727 m)        Wt Readings from Last 1 Encounters:   09/06/19 (!) 358 lb (162.4 kg)         Body mass index is 54.43 kg/m². Estimated Creatinine Clearance: 287 mL/min (based on SCr of 0.4 mg/dL). Trough: 12    Assessment/Plan:  No change in dosage required at this time. Awaiting ID on bacteria.     Marely Vogel RPh, BCPS, BCGP  9/8/2019     1:33 AM

## 2019-09-08 NOTE — PLAN OF CARE
Problem: Cardiovascular  Goal: No DVT, peripheral vascular complications  Outcome: Met This Shift     Problem: Nutrition  Goal: Understanding of nutritional guidelines  Outcome: Met This Shift     Problem: Falls - Risk of:  Goal: Will remain free from falls  Description  Will remain free from falls  Outcome: Ongoing  Note:   Fall sign posted. Arm band in place. Non-skid slippers on. Bed alarm on. Patient agreeable to use of call light. Call light within reach. Bed in lowest position. Problem: Pain:  Goal: Pain level will decrease  Description  Pain level will decrease  Outcome: Ongoing  Note:   Patient complains of pain throughout shift in Left foot. Tried elevation & ice without relief. PRN percocet given when available. Pain goal of \"4\" not met this shift. Will continue to assess pain hourly. Problem: Skin Integrity/Risk  Goal: Wound healing  Outcome: Ongoing  Note:   Patient remains on IV rocephin and vancomycin. Podiatry following. Problem: Discharge Planning:  Goal: Participates in care planning  Description  Participates in care planning  Outcome: Ongoing  Note:   Awaiting blood cultures. Patient plans to be discharged to home on oral antibiotics with family. Care plan reviewed with patient and family. Patient and family verbalize understanding of the plan of care and contribute to goal setting.

## 2019-09-09 ENCOUNTER — HOSPITAL ENCOUNTER (EMERGENCY)
Age: 49
Discharge: LEFT BEFORE DISCHARGE | End: 2019-09-09
Payer: COMMERCIAL

## 2019-09-09 ENCOUNTER — APPOINTMENT (OUTPATIENT)
Dept: GENERAL RADIOLOGY | Age: 49
End: 2019-09-09
Payer: COMMERCIAL

## 2019-09-09 VITALS
BODY MASS INDEX: 54.74 KG/M2 | WEIGHT: 293 LBS | OXYGEN SATURATION: 98 % | HEART RATE: 92 BPM | RESPIRATION RATE: 20 BRPM | DIASTOLIC BLOOD PRESSURE: 84 MMHG | TEMPERATURE: 97.8 F | SYSTOLIC BLOOD PRESSURE: 151 MMHG

## 2019-09-09 VITALS
WEIGHT: 293 LBS | BODY MASS INDEX: 44.41 KG/M2 | DIASTOLIC BLOOD PRESSURE: 58 MMHG | OXYGEN SATURATION: 96 % | SYSTOLIC BLOOD PRESSURE: 117 MMHG | TEMPERATURE: 96.7 F | RESPIRATION RATE: 16 BRPM | HEIGHT: 68 IN | HEART RATE: 76 BPM

## 2019-09-09 LAB
AEROBIC CULTURE: ABNORMAL
ANAEROBIC CULTURE: ABNORMAL
ANION GAP SERPL CALCULATED.3IONS-SCNC: 15 MEQ/L (ref 8–16)
ANION GAP SERPL CALCULATED.3IONS-SCNC: 16 MEQ/L (ref 8–16)
BASOPHILS # BLD: 0.6 %
BASOPHILS # BLD: 0.9 %
BASOPHILS ABSOLUTE: 0 THOU/MM3 (ref 0–0.1)
BASOPHILS ABSOLUTE: 0 THOU/MM3 (ref 0–0.1)
BUN BLDV-MCNC: 14 MG/DL (ref 7–22)
BUN BLDV-MCNC: 8 MG/DL (ref 7–22)
CALCIUM SERPL-MCNC: 9.2 MG/DL (ref 8.5–10.5)
CALCIUM SERPL-MCNC: 9.4 MG/DL (ref 8.5–10.5)
CHLORIDE BLD-SCNC: 102 MEQ/L (ref 98–111)
CHLORIDE BLD-SCNC: 102 MEQ/L (ref 98–111)
CO2: 22 MEQ/L (ref 23–33)
CO2: 23 MEQ/L (ref 23–33)
CREAT SERPL-MCNC: 0.5 MG/DL (ref 0.4–1.2)
CREAT SERPL-MCNC: 0.7 MG/DL (ref 0.4–1.2)
EKG ATRIAL RATE: 92 BPM
EKG P AXIS: 34 DEGREES
EKG P-R INTERVAL: 182 MS
EKG Q-T INTERVAL: 356 MS
EKG QRS DURATION: 100 MS
EKG QTC CALCULATION (BAZETT): 440 MS
EKG R AXIS: 8 DEGREES
EKG T AXIS: 57 DEGREES
EKG VENTRICULAR RATE: 92 BPM
EOSINOPHIL # BLD: 2.9 %
EOSINOPHIL # BLD: 4.6 %
EOSINOPHILS ABSOLUTE: 0.2 THOU/MM3 (ref 0–0.4)
EOSINOPHILS ABSOLUTE: 0.2 THOU/MM3 (ref 0–0.4)
ERYTHROCYTE [DISTWIDTH] IN BLOOD BY AUTOMATED COUNT: 14.2 % (ref 11.5–14.5)
ERYTHROCYTE [DISTWIDTH] IN BLOOD BY AUTOMATED COUNT: 14.3 % (ref 11.5–14.5)
ERYTHROCYTE [DISTWIDTH] IN BLOOD BY AUTOMATED COUNT: 46.1 FL (ref 35–45)
ERYTHROCYTE [DISTWIDTH] IN BLOOD BY AUTOMATED COUNT: 46.6 FL (ref 35–45)
GFR SERPL CREATININE-BSD FRML MDRD: 90 ML/MIN/1.73M2
GFR SERPL CREATININE-BSD FRML MDRD: > 90 ML/MIN/1.73M2
GLUCOSE BLD-MCNC: 135 MG/DL (ref 70–108)
GLUCOSE BLD-MCNC: 159 MG/DL (ref 70–108)
GLUCOSE BLD-MCNC: 164 MG/DL (ref 70–108)
GLUCOSE BLD-MCNC: 197 MG/DL (ref 70–108)
GRAM STAIN RESULT: ABNORMAL
HCT VFR BLD CALC: 39.6 % (ref 37–47)
HCT VFR BLD CALC: 40.8 % (ref 37–47)
HEMOGLOBIN: 12.6 GM/DL (ref 12–16)
HEMOGLOBIN: 13 GM/DL (ref 12–16)
IMMATURE GRANS (ABS): 0.04 THOU/MM3 (ref 0–0.07)
IMMATURE GRANS (ABS): 0.05 THOU/MM3 (ref 0–0.07)
IMMATURE GRANULOCYTES: 1 %
IMMATURE GRANULOCYTES: 1 %
LYMPHOCYTES # BLD: 24.8 %
LYMPHOCYTES # BLD: 30.1 %
LYMPHOCYTES ABSOLUTE: 1.5 THOU/MM3 (ref 1–4.8)
LYMPHOCYTES ABSOLUTE: 1.6 THOU/MM3 (ref 1–4.8)
MCH RBC QN AUTO: 28.4 PG (ref 26–33)
MCH RBC QN AUTO: 28.7 PG (ref 26–33)
MCHC RBC AUTO-ENTMCNC: 31.8 GM/DL (ref 32.2–35.5)
MCHC RBC AUTO-ENTMCNC: 31.9 GM/DL (ref 32.2–35.5)
MCV RBC AUTO: 89.3 FL (ref 81–99)
MCV RBC AUTO: 90.2 FL (ref 81–99)
MONOCYTES # BLD: 5 %
MONOCYTES # BLD: 6.7 %
MONOCYTES ABSOLUTE: 0.3 THOU/MM3 (ref 0.4–1.3)
MONOCYTES ABSOLUTE: 0.4 THOU/MM3 (ref 0.4–1.3)
NUCLEATED RED BLOOD CELLS: 0 /100 WBC
NUCLEATED RED BLOOD CELLS: 0 /100 WBC
ORGANISM: ABNORMAL
ORGANISM: ABNORMAL
OSMOLALITY CALCULATION: 287.2 MOSMOL/KG (ref 275–300)
PLATELET # BLD: 194 THOU/MM3 (ref 130–400)
PLATELET # BLD: 241 THOU/MM3 (ref 130–400)
PMV BLD AUTO: 9.1 FL (ref 9.4–12.4)
PMV BLD AUTO: 9.4 FL (ref 9.4–12.4)
POTASSIUM SERPL-SCNC: 4.2 MEQ/L (ref 3.5–5.2)
POTASSIUM SERPL-SCNC: 4.3 MEQ/L (ref 3.5–5.2)
RBC # BLD: 4.39 MILL/MM3 (ref 4.2–5.4)
RBC # BLD: 4.57 MILL/MM3 (ref 4.2–5.4)
SEG NEUTROPHILS: 56.8 %
SEG NEUTROPHILS: 66.1 %
SEGMENTED NEUTROPHILS ABSOLUTE COUNT: 3.1 THOU/MM3 (ref 1.8–7.7)
SEGMENTED NEUTROPHILS ABSOLUTE COUNT: 4.1 THOU/MM3 (ref 1.8–7.7)
SODIUM BLD-SCNC: 139 MEQ/L (ref 135–145)
SODIUM BLD-SCNC: 141 MEQ/L (ref 135–145)
TROPONIN T: < 0.01 NG/ML
WBC # BLD: 5.4 THOU/MM3 (ref 4.8–10.8)
WBC # BLD: 6.2 THOU/MM3 (ref 4.8–10.8)

## 2019-09-09 PROCEDURE — 85025 COMPLETE CBC W/AUTO DIFF WBC: CPT

## 2019-09-09 PROCEDURE — G0378 HOSPITAL OBSERVATION PER HR: HCPCS

## 2019-09-09 PROCEDURE — 36415 COLL VENOUS BLD VENIPUNCTURE: CPT

## 2019-09-09 PROCEDURE — 80048 BASIC METABOLIC PNL TOTAL CA: CPT

## 2019-09-09 PROCEDURE — 96366 THER/PROPH/DIAG IV INF ADDON: CPT

## 2019-09-09 PROCEDURE — 6370000000 HC RX 637 (ALT 250 FOR IP): Performed by: FAMILY MEDICINE

## 2019-09-09 PROCEDURE — 84484 ASSAY OF TROPONIN QUANT: CPT

## 2019-09-09 PROCEDURE — 6370000000 HC RX 637 (ALT 250 FOR IP): Performed by: INTERNAL MEDICINE

## 2019-09-09 PROCEDURE — 93005 ELECTROCARDIOGRAM TRACING: CPT | Performed by: NURSE PRACTITIONER

## 2019-09-09 PROCEDURE — 82948 REAGENT STRIP/BLOOD GLUCOSE: CPT

## 2019-09-09 PROCEDURE — 4500000002 HC ER NO CHARGE

## 2019-09-09 PROCEDURE — 2580000003 HC RX 258: Performed by: INTERNAL MEDICINE

## 2019-09-09 PROCEDURE — 6360000002 HC RX W HCPCS: Performed by: INTERNAL MEDICINE

## 2019-09-09 PROCEDURE — 71046 X-RAY EXAM CHEST 2 VIEWS: CPT

## 2019-09-09 PROCEDURE — 99239 HOSP IP/OBS DSCHRG MGMT >30: CPT | Performed by: FAMILY MEDICINE

## 2019-09-09 PROCEDURE — 2709999900 HC NON-CHARGEABLE SUPPLY

## 2019-09-09 RX ORDER — OXYCODONE HYDROCHLORIDE AND ACETAMINOPHEN 5; 325 MG/1; MG/1
1 TABLET ORAL EVERY 6 HOURS PRN
Qty: 10 TABLET | Refills: 0 | Status: SHIPPED | OUTPATIENT
Start: 2019-09-09 | End: 2019-09-12

## 2019-09-09 RX ORDER — CEFUROXIME AXETIL 500 MG/1
500 TABLET ORAL 2 TIMES DAILY
Qty: 20 TABLET | Refills: 0 | Status: SHIPPED | OUTPATIENT
Start: 2019-09-09 | End: 2019-09-19

## 2019-09-09 RX ORDER — ACETAMINOPHEN 325 MG/1
650 TABLET ORAL EVERY 4 HOURS PRN
Qty: 120 TABLET | Refills: 3 | COMMUNITY
Start: 2019-09-09

## 2019-09-09 RX ADMIN — INSULIN LISPRO 1 UNITS: 100 INJECTION, SOLUTION INTRAVENOUS; SUBCUTANEOUS at 09:14

## 2019-09-09 RX ADMIN — ATORVASTATIN CALCIUM 20 MG: 20 TABLET, FILM COATED ORAL at 09:13

## 2019-09-09 RX ADMIN — APIXABAN 2.5 MG: 2.5 TABLET, FILM COATED ORAL at 09:13

## 2019-09-09 RX ADMIN — GLIPIZIDE 5 MG: 5 TABLET ORAL at 09:39

## 2019-09-09 RX ADMIN — LINAGLIPTIN 5 MG: 5 TABLET, FILM COATED ORAL at 09:13

## 2019-09-09 RX ADMIN — OXYCODONE HYDROCHLORIDE AND ACETAMINOPHEN 1 TABLET: 5; 325 TABLET ORAL at 09:14

## 2019-09-09 RX ADMIN — GABAPENTIN 300 MG: 300 CAPSULE ORAL at 09:13

## 2019-09-09 RX ADMIN — VANCOMYCIN HYDROCHLORIDE 1500 MG: 5 INJECTION, POWDER, LYOPHILIZED, FOR SOLUTION INTRAVENOUS at 00:20

## 2019-09-09 RX ADMIN — Medication 10 ML: at 09:39

## 2019-09-09 ASSESSMENT — PAIN SCALES - GENERAL
PAINLEVEL_OUTOF10: 0
PAINLEVEL_OUTOF10: 8

## 2019-09-09 NOTE — CARE COORDINATION
Karin Zavala is discharged to home today with no services added/requested. 9/9/19, 12:03 PM    Discharge plan discussed by  and . Discharge plan reviewed with patient/ family. Patient/ family verbalize understanding of discharge plan and are in agreement with plan. Understanding was demonstrated using the teach back method.
(ROCEPHIN) IV  1 g Intravenous Q24H    vancomycin (VANCOCIN) intermittent dosing (placeholder)   Other RX Placeholder     Continuous Infusions:   dextrose        Pertinent Info/Orders/Treatment Plan:  Podiatry consult, wound care, IV Rocephin, DM management, prn Percocet for pain, oxygen, incentive spirometry, SCD's, up as tolerated. Diet: DIET CARB CONTROL;   Smoking status:  reports that she quit smoking about 14 years ago. Her smoking use included cigarettes. She smoked 0.50 packs per day. She has never used smokeless tobacco.   PCP: No primary care provider on file. Readmission: No  Readmission Risk Score: 14%    Discharge Planning  Current Residence:  Private Residence  Living Arrangements:  Spouse/Significant Other, Children, Family Members   Support Systems:  Children, Spouse/Significant Other, Family Members  Current Services PTA:     Potential Assistance Needed:  N/A  Potential Assistance Purchasing Medications:  No  Does patient want to participate in local refill/ meds to beds program?  Yes  Type of Home Care Services:  None  Patient expects to be discharged to:  Home   Expected Discharge date:  09/10/19  Follow Up Appointment: Best Day/ Time: Friday PM  Discharge Plan: Met with Nj Pepepola and her family member present at bedside. Nj Nice states she resides at home with her . She has insurance and states she will follow-up at [facility]. She plans to follow with SHANNON Bruner CNP. Her hospital follow-up appointment is scheduled for this Friday at 2:15 p. m. The first available appointment. She will be seeing Dr. Buffy Stephens, as Rohit Faulkner does not have any openings until the end of the month and Nj Nice needs [facility] at discharge.     Evaluation: yes

## 2019-09-09 NOTE — PROGRESS NOTES
Podiatric Progress Note  Darin Rod  Subjective :     9/9/19  Patient seen bedside today on behalf of Dr. Darra Duverney. Patient appeared pleasant, was oriented to person, place and time and in no acute distress. Patient has good appetite and ready to d/c. Patient denies N/V/F/C/SOB or CP. Patient has no further pedal concerns at this point in time. 9/7/19  HPI            The patient is a 57 y.o. female with significant past medical history of HTN, RA, GERD, DM, who being seen at bedside on behalf of Dr. Darra Duverney. Patient has been seen by Dr. Darra Duverney in the past for diabetic ulcers. Pt states wound has been present for 8+ months and  In the past couple of days it started to be really painful and she came into the ED and was admitted. She walks normally but treats wound with a collagen product and wrapping. Pt denies any fever but states she had chills the other night but it has since resolved. Pt denies any other constitutional symptoms. Patient has no other pedal complaints at this time.      Current Medications:    Current Facility-Administered Medications: lidocaine 4 % external patch 1 patch, 1 patch, Transdermal, Daily  oxyCODONE-acetaminophen (PERCOCET) 5-325 MG per tablet 1 tablet, 1 tablet, Oral, Q6H PRN  traMADol (ULTRAM) tablet 50 mg, 50 mg, Oral, Q6H PRN  linagliptin (TRADJENTA) tablet 5 mg, 5 mg, Oral, Daily  glipiZIDE (GLUCOTROL) tablet 5 mg, 5 mg, Oral, Daily  apixaban (ELIQUIS) tablet 2.5 mg, 2.5 mg, Oral, BID  atorvastatin (LIPITOR) tablet 20 mg, 20 mg, Oral, Daily  insulin lispro (HUMALOG) injection vial 0-6 Units, 0-6 Units, Subcutaneous, TID WC  insulin lispro (HUMALOG) injection vial 0-3 Units, 0-3 Units, Subcutaneous, Nightly  glucose (GLUTOSE) 40 % oral gel 15 g, 15 g, Oral, PRN  dextrose 50 % IV solution, 12.5 g, Intravenous, PRN  glucagon (rDNA) injection 1 mg, 1 mg, Intramuscular, PRN  dextrose 5 % solution, 100 mL/hr, Intravenous, PRN  acetaminophen (TYLENOL) tablet 650 mg, 650 mg, Oral, Q4H necrosis noted. Lymphangitis present to dorsal foot up anterior leg to level of mid tibia, improving.     Neurovascular: Gross sensation decresed.     Musculoskeletal: MST deferred. Pain with palpation of ulcer on Rt foot. Pre-Debridement      Post-Debridement      XR FOOT RIGHT (MIN 3 VIEWS)  Impression   Degenerative changes. No acute fracture or dislocation. Please see above for additional comment.                   **This report has been created using voice recognition software. It may contain minor errors which are inherent in voice recognition technology. **       Final report electronically signed by Dr. Gisele Boas on 9/6/2019 1:26 PM     MRI FOOT RIGHT WO CONTRAST  Impression       1. There is no evidence to suggest osteomyelitis. Soft tissue irregularity and edema is noted surrounding the head of the fifth metatarsal which likely corresponds to the patient's history of a soft tissue ulcer. Evaluation is limited without contrast    however, this is suspicious for cellulitis. A small focus of susceptibility is also noted at the skin surface at the plantar aspect of the base of the fifth proximal phalanx which may correspond to a foreign body.       2. Degenerative changes are noted within the tarsal bones and at the tibiotalar articulation as further described in the body of the report.               **This report has been created using voice recognition software.  It may contain minor errors which are inherent in voice recognition technology. **       Final report electronically signed by Dr. Lalo Felix on 9/7/2019 8:21 AM       Non-Excisional Selective Debridement Procedure Note  Performed by: Paulie Manuel  Consent obtained:  Yes  Time out taken:  Yes  Pain Control:   n/a  Hemostasis: n/a    Non-Excisional Debridement: Selectivel Debridement Right Sub-5th Metatarsal Head  Instruments Used:  10 blade  Devitalized Tissue Debrided: Hyperkeratotic tissue, biofilm and slough    Depth: Down to and including epidermis, dermis and subcutaneous tissue  Did the debridement extend outside the wound margins: Yes, but only the epidermis was debrided beyond the wound margins. Post Debridement Measurements:  0.5 x 0.3 x 0.2    Wound #: 1    Pain during procedure: 1/10  Pain post procedure: 0/10    Patient tolerated procedure well. ASSESSMENT  Principle  #Diabetic ulcer with cellulitis, Rt foot    Chronic  Patient Active Problem List   Diagnosis    Chest pain    History of pulmonary embolism    Hyperlipidemia with target LDL less than 70    HTN, goal below 130/80    Pulmonary embolus (HCC)    Morbid obesity ([address]    Bilateral pulmonary embolism (HCC)    DVT (deep venous thrombosis) (HCC)    Acute right hip pain    Physical deconditioning    Metabolic acidosis    Moderate to severe pulmonary hypertension (HCC)    Type 2 diabetes mellitus with hyperglycemia, without long-term current use of insulin ([address]    Essential hypertension    Open wound of right foot    Cellulitis    Leukocytosis       PLAN:   - Patient initially examined and evaluated. - X Ray of right foot reviewed: No acute fracture or dislocation. Impression above  - MRI results reviewed: No evidence of osteomyelitis. Impression above. - Cultures from ED: Staphylococcus aureus and Morganella morganii ssp. Sibonii.  - Wound debrided with 10 blade. Procedure above. - Applied betadine to wound and dressed with gauze, kerlix and ACE. - Instructed patient to keep dressings C/D/I and PWB on heel in surgical shoe and with walker for transfers.  - Ordered surgical shoe. - Cellulitis improving.  - Patient will d/c with oral antibiotics. - Present to ED if SOI. Dispo: No surgical intervention needed at this time, d/c on oral antibiotics. Patient will f/u in [facility] on 9/16. Electronically signed by Win Galicia DPM on 9/9/2019 at 1:00 PM     Please refer to resident physicians note for further details.

## 2019-09-09 NOTE — PROGRESS NOTES
12/21/18; 350 lb 2 oz on 12/21/18)  · % Weight Change:  no weight loss in 9 months per EMR  · Ideal Body Wt: 140 lb (63.5 kg)   · BMI Classification: BMI > or equal to 40.0 Obese Class III(54.5)    Nutrition Interventions:   Continue current diet, Start ONS, Vitamin Supplement  Continued Inpatient Monitoring, Education Initiated, Coordination of Care(Discussed waht food are a good source of protein and encouraged lean protein sources with each meal to aid in healing)    Nutrition Evaluation:   · Evaluation: Goals set   · Goals: Pt will consume 75% or more of meals during LOS to aid in wound healing    · Monitoring: Nutrition Progression, Meal Intake, Supplement Intake, Skin Integrity, Wound Healing, Weight, Pertinent Labs, Monitor Bowel Function    Electronically signed by Jaiden Pepe RD, SELINA on 9/9/19 at 10:05 AM    Contact Number: [phone]

## 2019-09-10 PROCEDURE — 93010 ELECTROCARDIOGRAM REPORT: CPT | Performed by: NUCLEAR MEDICINE

## 2019-09-10 NOTE — ED TRIAGE NOTES
Pt presents to the ED with CP that began 2 hours ago, SOB, and right sided arm pain 10/10. Pt reports the pain being sharp and feeling like the skin it tight. Pt reports being d/c form [facility] today and the arm pain and hardness is above the site where she had the IV. EKG completed. Hx blood clots. Pt is medicated per elliquis twice a daily.

## 2019-09-10 NOTE — DISCHARGE SUMMARY
Hospitalist Discharge Summary     Patient Identification:  Sajan Ruff  : 1970  MRN: [id number]   Account: [de-identified]     Admit date: 2019  Discharge date: 2019    Attending provider: No att. providers found        Primary care provider: SHANNON Borges - CNP     Discharge Diagnoses:   1. Diabetic right foot ulcer with cellulitis with MSSA and morganella morganii s/p debridement in ER  by podiatry  2. Leukocytosis   3. Type 2 DM, uncontrolled  4. essential HTN   5. Chronic anticoagulation  6. Hx DVT/PE  7. HLD   8. Hx factor 5 leiden mutation  9. Hx CVA   10. Asthma w/o exacerbation  11. Bipolar d/o   12. Peripheral likely diabetic neuropathy  13. GERD  14. Hx RA  15. Mild pulm HTN -  16. Hx non-compliance   17. Morbid obesity Body mass index is 54.43 kg/m². Hospital Course:   Sajan Ruff is a 57 y.o. female admitted to [address] [place] [address] on 2019 for right foot drainage. See H&P by Romain Guerra DO on 19 for admitting details. 18. Diabetic right foot ulcer with cellulitis -- apprec podiatry assist - s/p debridement in ER  -- rocephin , vanc  --> home with Ceftin x 10 days and f/u with podiatry/wound clinic for further care -- PT REFUSED [facility] RN for assist at d/c.   -- foot wound cx  = MSSA, morganella morganii   -- MRI foot 19 = There is no evidence to suggest osteomyelitis. Soft tissue irregularity and edema is noted surrounding the head of the fifth metatarsal which likely corresponds to the patient's history of a soft tissue ulcer. Evaluation is limited without contrast   however, this is suspicious for cellulitis. A small focus of susceptibility is also noted at the skin surface at the plantar aspect of the base of the fifth proximal phalanx which may correspond to a foreign body.   19. Leukocytosis  -- see #1 -- improved to WNL since  -- cont rocephin , vanc  until wound cx cx with MSSA and morganella morganii -> home with Ceftin x 10 days   -- blood cx (-) since admission   -- afeb since admission and WBC normal for 2 days prior to d/c  20. Type 2 DM -- clarified home meds - pt states takes metformin, glipizide and aloglipitin -- resumed all but not glucophage --  - states not taking trulicity as \"makes me sick\" -- clarify meds and resume once clarified -- cont chems, SSI and monitor -- A1C 9/6/19 = 8.3 (was 8.8 on 12/2018)  21. essential HTN -- holding home maxzide -- resumed on d/c - BP stable - f/u pcp  22. Chronic anticoagulation -- for recurrent DVT/PE - cont eliquis -- was on xarelto at one time also - cont monitor   23. Hx DVT/PE -- cont eliquis  24. HLD -- cont statin on admission but at d/c pt stated not on anymore --> need to f/u with PCP  25. Hx factor 5 leiden mutation -- cont eliquis  26. Hx CVA -- no new sx -- cont eliquis  27. Asthma w/o exacerbation -- asx - ?add prn albuterol but no current sx  28. Bipolar d/o -- not current on meds - needs PCP to f/u - mood stable - flat affect  29. Peripheral likely diabetic neuropathy -- resumed on neurontin on admission -> per [facility] review pt has not filled since 12/2018 -- confronted pt as she states she takes it at home and stated \"I had some left over. \" --> NO neurontin prescribed at d/c  30. GERD -- asx  31. Hx RA  32. Mild pulm HTN -- per echo 2015 with RVSP 55 mmHg - ?JULITA  33. Hx non-compliance -- did not f/u as scheduled and instructed with prior PCP Dr. Lucy Esteban thus was terminated from their practice -- ?compliance with meds - states now goes to [facility] on [address] to get DM meds and eliquis  34. Morbid obesity Body mass index is 54.43 kg/m². Claudia was afebrile, HD stable, cary po, WBC normal and cleared by podiatry for d/c. She was set up with new PCP and will f/u with wound clinic with podiatry 9/16/19 with Dr. Amy Main. Pt refused [facility] RN for assist with wound monitoring and dressing changes at d/c.   She felt ready for d/c and was d/c home with ceftin note    Consults:   STR ED TO IP CONSULT  IP CONSULT TO PHARMACY  IP CONSULT TO PODIATRY  IP CONSULT TO SPIRITUAL SERVICES      Examination:  Vitals:  Vitals:    09/08/19 0800 09/08/19 1355 09/08/19 2145 09/09/19 0445   BP: 125/66  129/60 (!) 117/58   Pulse: 72 77 75 76   Resp: 16 18 16 16   Temp: 97.5 °F (36.4 °C) 97.6 °F (36.4 °C) 97.6 °F (36.4 °C) 96.7 °F (35.9 °C)   TempSrc: Oral Oral Oral Oral   SpO2: 96% 93% 98% 96%   Weight:       Height:         Weight: Weight: (!) 358 lb (162.4 kg)     24 hour intake/output:    Intake/Output Summary (Last 24 hours) at 9/9/2019 2242  Last data filed at 9/9/2019 1255  Gross per 24 hour   Intake 490 ml   Output --   Net 490 ml       General appearance - alert, well appearing, and in no distress, oriented to person, place, and time and overweight  Chest - clear to auscultation, no wheezes, rales or rhonchi, symmetric air entry, no tachypnea, retractions or cyanosis  Heart - normal rate, regular rhythm, normal S1, S2, no murmurs, rubs, clicks or gallops  Abdomen - soft, nontender, nondistended, no masses or organomegaly  bowel sounds normal  Obese: Yes; Protuberant: Yes   Neurological - alert, oriented, normal speech, no focal findings or movement disorder noted, cranial nerves II through XII intact  Extremities - venous stasis dermatitis noted left shin, right foot wrapped - no streaking up leg or erythema of BLE, no calf TTP. Significant Diagnostics:   Radiology:   MRI FOOT RIGHT WO CONTRAST   Final Result      1. There is no evidence to suggest osteomyelitis. Soft tissue irregularity and edema is noted surrounding the head of the fifth metatarsal which likely corresponds to the patient's history of a soft tissue ulcer. Evaluation is limited without contrast    however, this is suspicious for cellulitis. A small focus of susceptibility is also noted at the skin surface at the plantar aspect of the base of the fifth proximal phalanx which may correspond to a foreign body. 2. Degenerative changes are noted within the tarsal bones and at the tibiotalar articulation as further described in the body of the report. **This report has been created using voice recognition software. It may contain minor errors which are inherent in voice recognition technology. **      Final report electronically signed by Dr. Blanca Askew on 9/7/2019 8:21 AM      XR FOOT RIGHT (MIN 3 VIEWS)   Final Result   Degenerative changes. No acute fracture or dislocation. Please see above for additional comment. **This report has been created using voice recognition software. It may contain minor errors which are inherent in voice recognition technology. **      Final report electronically signed by Dr. Tracy Moran on 9/6/2019 1:26 PM          Labs:   Recent Results (from the past 72 hour(s))   EKG 12 Lead    Collection Time: 09/07/19  4:44 AM   Result Value Ref Range    Ventricular Rate 86 BPM    Atrial Rate 86 BPM    P-R Interval 184 ms    QRS Duration 100 ms    Q-T Interval 380 ms    QTc Calculation (Bazett) 454 ms    P Axis 32 degrees    R Axis 11 degrees    T Axis 51 degrees   CBC    Collection Time: 09/07/19  6:24 AM   Result Value Ref Range    WBC 6.8 4.8 - 10.8 thou/mm3    RBC 4.31 4.20 - 5.40 mill/mm3    Hemoglobin 12.2 12.0 - 16.0 gm/dl    Hematocrit 38.0 37.0 - 47.0 %    MCV 88.2 81.0 - 99.0 fL    MCH 28.3 26.0 - 33.0 pg    MCHC 32.1 (L) 32.2 - 35.5 gm/dl    RDW-CV 14.4 11.5 - 14.5 %    RDW-SD 46.4 (H) 35.0 - 45.0 fL    Platelets 941 569 - 515 thou/mm3    MPV 9.9 9.4 - 12.4 fL   Basic Metabolic Panel    Collection Time: 09/07/19  6:24 AM   Result Value Ref Range    Sodium 136 135 - 145 meq/L    Potassium 4.4 3.5 - 5.2 meq/L    Chloride 99 98 - 111 meq/L    CO2 24 23 - 33 meq/L    Glucose 194 (H) 70 - 108 mg/dL    BUN 6 (L) 7 - 22 mg/dL    CREATININE 0.4 0.4 - 1.2 mg/dL    Calcium 8.9 8.5 - 10.5 mg/dL   Anion Gap    Collection Time: 09/07/19  6:24 AM   Result Value Ref

## 2019-09-12 ENCOUNTER — APPOINTMENT (OUTPATIENT)
Dept: INTERVENTIONAL RADIOLOGY/VASCULAR | Age: 49
End: 2019-09-12
Payer: COMMERCIAL

## 2019-09-12 ENCOUNTER — HOSPITAL ENCOUNTER (EMERGENCY)
Age: 49
Discharge: HOME | End: 2019-09-12
Attending: EMERGENCY MEDICINE
Payer: COMMERCIAL

## 2019-09-12 VITALS
DIASTOLIC BLOOD PRESSURE: 70 MMHG | SYSTOLIC BLOOD PRESSURE: 147 MMHG | RESPIRATION RATE: 17 BRPM | HEART RATE: 84 BPM | OXYGEN SATURATION: 99 % | WEIGHT: 293 LBS | BODY MASS INDEX: 54.74 KG/M2 | TEMPERATURE: 97.7 F

## 2019-09-12 DIAGNOSIS — I80.8 SUPERFICIAL THROMBOPHLEBITIS OF RIGHT UPPER EXTREMITY: Primary | ICD-10-CM

## 2019-09-12 DIAGNOSIS — S91.301D UNSPECIFIED OPEN WOUND, RIGHT FOOT, SUBSEQUENT ENCOUNTER: ICD-10-CM

## 2019-09-12 LAB
ANION GAP SERPL CALCULATED.3IONS-SCNC: 13 MEQ/L (ref 8–16)
BASOPHILS # BLD: 0.6 %
BASOPHILS ABSOLUTE: 0 THOU/MM3 (ref 0–0.1)
BLOOD CULTURE, ROUTINE: NORMAL
BLOOD CULTURE, ROUTINE: NORMAL
BUN BLDV-MCNC: 8 MG/DL (ref 7–22)
CALCIUM SERPL-MCNC: 9.4 MG/DL (ref 8.5–10.5)
CHLORIDE BLD-SCNC: 97 MEQ/L (ref 98–111)
CO2: 26 MEQ/L (ref 23–33)
CREAT SERPL-MCNC: 0.5 MG/DL (ref 0.4–1.2)
EOSINOPHIL # BLD: 3.1 %
EOSINOPHILS ABSOLUTE: 0.2 THOU/MM3 (ref 0–0.4)
ERYTHROCYTE [DISTWIDTH] IN BLOOD BY AUTOMATED COUNT: 14.1 % (ref 11.5–14.5)
ERYTHROCYTE [DISTWIDTH] IN BLOOD BY AUTOMATED COUNT: 45.4 FL (ref 35–45)
GFR SERPL CREATININE-BSD FRML MDRD: > 90 ML/MIN/1.73M2
GLUCOSE BLD-MCNC: 230 MG/DL (ref 70–108)
HCT VFR BLD CALC: 38.8 % (ref 37–47)
HEMOGLOBIN: 12.6 GM/DL (ref 12–16)
IMMATURE GRANS (ABS): 0.03 THOU/MM3 (ref 0–0.07)
IMMATURE GRANULOCYTES: 1 %
LYMPHOCYTES # BLD: 26.8 %
LYMPHOCYTES ABSOLUTE: 1.7 THOU/MM3 (ref 1–4.8)
MCH RBC QN AUTO: 28.6 PG (ref 26–33)
MCHC RBC AUTO-ENTMCNC: 32.5 GM/DL (ref 32.2–35.5)
MCV RBC AUTO: 88.2 FL (ref 81–99)
MONOCYTES # BLD: 6.3 %
MONOCYTES ABSOLUTE: 0.4 THOU/MM3 (ref 0.4–1.3)
NUCLEATED RED BLOOD CELLS: 0 /100 WBC
OSMOLALITY CALCULATION: 277.6 MOSMOL/KG (ref 275–300)
PLATELET # BLD: 220 THOU/MM3 (ref 130–400)
PMV BLD AUTO: 9.4 FL (ref 9.4–12.4)
POTASSIUM SERPL-SCNC: 3.9 MEQ/L (ref 3.5–5.2)
RBC # BLD: 4.4 MILL/MM3 (ref 4.2–5.4)
REASON FOR REJECTION: NORMAL
REJECTED TEST: NORMAL
SEG NEUTROPHILS: 62.7 %
SEGMENTED NEUTROPHILS ABSOLUTE COUNT: 3.9 THOU/MM3 (ref 1.8–7.7)
SODIUM BLD-SCNC: 136 MEQ/L (ref 135–145)
WBC # BLD: 6.2 THOU/MM3 (ref 4.8–10.8)

## 2019-09-12 PROCEDURE — 99284 EMERGENCY DEPT VISIT MOD MDM: CPT

## 2019-09-12 PROCEDURE — 80048 BASIC METABOLIC PNL TOTAL CA: CPT

## 2019-09-12 PROCEDURE — 85025 COMPLETE CBC W/AUTO DIFF WBC: CPT

## 2019-09-12 PROCEDURE — 93971 EXTREMITY STUDY: CPT

## 2019-09-12 PROCEDURE — 36415 COLL VENOUS BLD VENIPUNCTURE: CPT

## 2019-09-12 RX ORDER — TRAMADOL HYDROCHLORIDE 50 MG/1
50 TABLET ORAL EVERY 8 HOURS PRN
Qty: 20 TABLET | Refills: 0 | Status: SHIPPED | OUTPATIENT
Start: 2019-09-12 | End: 2019-09-15

## 2019-09-12 ASSESSMENT — PAIN DESCRIPTION - FREQUENCY: FREQUENCY: CONTINUOUS

## 2019-09-12 ASSESSMENT — PAIN DESCRIPTION - LOCATION: LOCATION: ARM;FOOT

## 2019-09-12 ASSESSMENT — PAIN DESCRIPTION - ORIENTATION: ORIENTATION: RIGHT

## 2019-09-12 ASSESSMENT — PAIN SCALES - GENERAL: PAINLEVEL_OUTOF10: 10

## 2019-09-12 ASSESSMENT — PAIN DESCRIPTION - DESCRIPTORS: DESCRIPTORS: ACHING

## 2019-09-12 NOTE — ED PROVIDER NOTES
Andre [facility] COMPLAINT       Chief Complaint   Patient presents with    Foot Pain     right    Arm Pain     right upper       Nurses Notes reviewed and I agree except as noted in the HPI. HISTORY OF PRESENT ILLNESS    Sonia Peres is a 57 y.o. female. This patient was recently in the hospital for an infected wound on the plantar surface of the right foot. It was treated with IV antibiotics. Apparently multiple blood draws and IV attempts and she is on Eliquis, so developed what appears to be hematoma of the right upper arm. But she is concerned for possible DVT because that is how she ended up on Eliquis in the first place. No chest pain or shortness of breath. She has continued pain in the right foot. REVIEW OF SYSTEMS       No fever, chest pain, no shortness of breath, no abdominal pain, no vomiting. Remainder of review of systems is otherwise reviewed as negative. PAST MEDICAL HISTORY    has a past medical history of Asthma, Bipolar 1 disorder ([address] Blood circulation, collateral, Curvature of spine, Diabetes mellitus ([address] DVT (deep venous thrombosis) ([address] Factor 5 Leiden mutation, heterozygous ([address] GERD (gastroesophageal reflux disease), HTN, goal below 130/80, Hx of blood clots, Hx-TIA (transient ischemic attack), Hyperlipidemia, PE (pulmonary embolism), RA (rheumatoid arthritis) ([address] and Unspecified cerebral artery occlusion with cerebral infarction. SURGICAL HISTORY      has a past surgical history that includes Tubal ligation (2003); Cholecystectomy (1992); Knee arthroscopy (2007&2010); Tympanostomy tube placement; Tonsillectomy; and Cardiac catheterization (feb 2015).     CURRENT MEDICATIONS       Previous Medications    ACETAMINOPHEN (TYLENOL) 325 MG TABLET    Take 2 tablets by mouth every 4 hours as needed for Pain    ALOGLIPTIN (NESINA) 25 MG TABS TABLET    Take 25 mg by mouth daily    APIXABAN completed with a voice recognition program.  Efforts were made to edit the dictations but occasionally words are mis-transcribed.)    Jeanmarie Abraham, [address] [place] [address] DO Gloria  09/12/19 7208

## 2019-09-16 ENCOUNTER — HOSPITAL ENCOUNTER (OUTPATIENT)
Dept: WOUND CARE | Age: 49
Discharge: HOME | End: 2019-09-16
Payer: COMMERCIAL

## 2019-09-16 VITALS
RESPIRATION RATE: 16 BRPM | SYSTOLIC BLOOD PRESSURE: 142 MMHG | DIASTOLIC BLOOD PRESSURE: 72 MMHG | TEMPERATURE: 97.4 F | OXYGEN SATURATION: 97 % | HEART RATE: 81 BPM

## 2019-09-16 PROCEDURE — 99212 OFFICE O/P EST SF 10 MIN: CPT

## 2019-09-16 RX ORDER — GABAPENTIN 300 MG/1
600 CAPSULE ORAL 2 TIMES DAILY
COMMUNITY
End: 2022-02-15

## 2019-09-16 ASSESSMENT — PAIN SCALES - GENERAL: PAINLEVEL_OUTOF10: 10

## 2019-09-16 NOTE — PROGRESS NOTES
9/16/2019  8:55 AM   Wound Surface Area (cm^2) 0.12 cm^2 9/16/2019  8:55 AM   Change in Wound Size % (l*w) 97.6 9/16/2019  8:55 AM   Wound Volume (cm^3) 0.04 cm^3 9/16/2019  8:55 AM   Wound Healing % 84 9/16/2019  8:55 AM   Post-Procedure Length (cm) 0.6 cm 12/21/2018 12:20 PM   Post-Procedure Width (cm) 1 cm 12/21/2018 12:20 PM   Post-Procedure Depth (cm) 0.4 cm 12/21/2018 12:20 PM   Post-Procedure Surface Area (cm^2) 0.6 cm^2 12/21/2018 12:20 PM   Post-Procedure Volume (cm^3) 0.24 cm^3 12/21/2018 12:20 PM   Wound Assessment Pink 9/16/2019  8:55 AM   Drainage Amount Small 9/16/2019  8:55 AM   Drainage Description Serosanguinous 9/16/2019  8:55 AM   Odor None 9/16/2019  8:55 AM   Margins Attached edges 9/16/2019  8:55 AM   Kathleen-wound Assessment Dry;Calloused 9/16/2019  8:55 AM   [place]%Wound Bed 100 9/16/2019  8:55 AM   Black%Wound Bed 100 12/21/2018 12:20 PM   Number of days: 163       LABS      CBC:   Lab Results   Component Value Date    WBC 6.2 09/12/2019    HGB 12.6 09/12/2019    HCT 38.8 09/12/2019    MCV 88.2 09/12/2019     09/12/2019     BMP:   Lab Results   Component Value Date     09/12/2019    K 3.9 09/12/2019    CL 97 09/12/2019    CO2 26 09/12/2019    BUN 8 09/12/2019    CREATININE 0.5 09/12/2019     PT/INR:   Lab Results   Component Value Date    PROTIME 2.38 11/22/2011    INR 1.07 09/08/2017     Prealbumin: No results found for: PREALBUMIN  Albumin:  Lab Results   Component Value Date    LABALBU 4.0 07/27/2019     Sed Rate:  Lab Results   Component Value Date    SEDRATE 35 09/06/2019     CRP:   Lab Results   Component Value Date    CRP 4.97 09/06/2019     Micro:   Lab Results   Component Value Date    BC No growth-preliminaryNo growth 09/06/2019    BC No growth-preliminaryNo growth 09/06/2019      Hemoglobin A1C:   Lab Results   Component Value Date    LABA1C 8.3 09/06/2019       Assessment:     Ulcer Identification:  Ulcer Type: diabetic and pressure  Contributing Factors: edema, diabetes,

## 2019-09-16 NOTE — H&P
arth[facility]) [facility]     Unspecified cerebral artery occlusion with cerebral infarction        PAST SURGICAL HISTORY    Past Surgical History:   Procedure Laterality Date    CARDIAC CATHETERIZATION  2015    Heart caths    CHOLECYSTECTOMY  1992    KNEE ARTHROSCOPY  &    TONSILLECTOMY      TUBAL LIGATION      TYMPANOSTOMY TUBE PLACEMENT         FAMILY HISTORY    Family History   Problem Relation Age of Onset    COPD Mother     Other Mother     Cancer Mother     High Blood Pressure Father     Heart Disease Father     Mental Illness Sister     Mental Illness Brother     Mental Illness Sister     Mental Illness Brother        SOCIAL HISTORY    Social History     Tobacco Use    Smoking status: Former Smoker     Packs/day: 0.50     Types: Cigarettes     Last attempt to quit: 2005     Years since quittin.2    Smokeless tobacco: Never Used   Substance Use Topics    Alcohol use: No    Drug use: No       ALLERGIES    Allergies   Allergen Reactions    Codeine Hives     + Nausea vomiting    Demerol      vomiting    Toradol [Ketorolac Tromethamine] Rash       MEDICATIONS    Current Outpatient Medications on File Prior to Encounter   Medication Sig Dispense Refill    gabapentin (NEURONTIN) 300 MG capsule Take 300 mg by mouth 2 times daily.       cefUROXime (CEFTIN) 500 MG tablet Take 1 tablet by mouth 2 times daily for 10 days 20 tablet 0    alogliptin (NESINA) 25 MG TABS tablet Take 25 mg by mouth daily      apixaban (ELIQUIS) 2.5 MG TABS tablet Take 2.5 mg by mouth 2 times daily      ondansetron (ZOFRAN ODT) 4 MG disintegrating tablet Take 1 tablet by mouth every 8 hours as needed for Nausea 20 tablet 0    glipiZIDE (GLUCOTROL) 5 MG tablet Take 1 tablet by mouth daily 90 tablet 1    metFORMIN (GLUCOPHAGE) 1000 MG tablet Take 1 tablet by mouth 2 times daily (with meals) 180 tablet 1    triamterene-hydrochlorothiazide (MAXZIDE-25) 37.5-25 MG per tablet Take 1 tablet by mouth daily

## 2019-09-20 NOTE — PROGRESS NOTES
CLINICAL PHARMACY NOTE: MEDS TO [address] [place] [address] Drive Select Patient?: No  Total # of Prescriptions Filled: 1   The following medications were delivered to the patient:  Cefuroxime 250mg  Total # of Interventions Completed: 2  Time Spent (min): 30    Additional Documentation:

## 2019-09-27 ENCOUNTER — APPOINTMENT (OUTPATIENT)
Dept: GENERAL RADIOLOGY | Age: 49
DRG: 952 | End: 2019-09-27
Payer: COMMERCIAL

## 2019-09-27 ENCOUNTER — HOSPITAL ENCOUNTER (INPATIENT)
Age: 49
LOS: 4 days | Discharge: HOME | DRG: 952 | End: 2019-10-01
Attending: PODIATRIST | Admitting: PODIATRIST
Payer: COMMERCIAL

## 2019-09-27 DIAGNOSIS — E11.65 UNCONTROLLED TYPE 2 DIABETES MELLITUS WITH HYPERGLYCEMIA (HCC): ICD-10-CM

## 2019-09-27 DIAGNOSIS — L03.115 CELLULITIS OF RIGHT FOOT: Primary | ICD-10-CM

## 2019-09-27 DIAGNOSIS — M79.671 RIGHT FOOT PAIN: ICD-10-CM

## 2019-09-27 DIAGNOSIS — Z78.9 FAILURE OF OUTPATIENT TREATMENT: ICD-10-CM

## 2019-09-27 LAB
ALBUMIN SERPL-MCNC: 3.9 G/DL (ref 3.5–5.1)
ALP BLD-CCNC: 67 U/L (ref 38–126)
ALT SERPL-CCNC: 14 U/L (ref 11–66)
ANION GAP SERPL CALCULATED.3IONS-SCNC: 17 MEQ/L (ref 8–16)
AST SERPL-CCNC: 14 U/L (ref 5–40)
BASOPHILS # BLD: 0.3 %
BASOPHILS ABSOLUTE: 0 THOU/MM3 (ref 0–0.1)
BILIRUB SERPL-MCNC: 0.3 MG/DL (ref 0.3–1.2)
BUN BLDV-MCNC: 12 MG/DL (ref 7–22)
C-REACTIVE PROTEIN: 3.18 MG/DL (ref 0–1)
CALCIUM SERPL-MCNC: 9.6 MG/DL (ref 8.5–10.5)
CHLORIDE BLD-SCNC: 94 MEQ/L (ref 98–111)
CO2: 22 MEQ/L (ref 23–33)
CREAT SERPL-MCNC: 0.6 MG/DL (ref 0.4–1.2)
EOSINOPHIL # BLD: 1.3 %
EOSINOPHILS ABSOLUTE: 0.1 THOU/MM3 (ref 0–0.4)
ERYTHROCYTE [DISTWIDTH] IN BLOOD BY AUTOMATED COUNT: 14.1 % (ref 11.5–14.5)
ERYTHROCYTE [DISTWIDTH] IN BLOOD BY AUTOMATED COUNT: 44.8 FL (ref 35–45)
GFR SERPL CREATININE-BSD FRML MDRD: > 90 ML/MIN/1.73M2
GLUCOSE BLD-MCNC: 166 MG/DL (ref 70–108)
GLUCOSE BLD-MCNC: 228 MG/DL (ref 70–108)
HCT VFR BLD CALC: 40 % (ref 37–47)
HEMOGLOBIN: 13.2 GM/DL (ref 12–16)
IMMATURE GRANS (ABS): 0.03 THOU/MM3 (ref 0–0.07)
IMMATURE GRANULOCYTES: 0.3 %
LACTIC ACID: 1.9 MMOL/L (ref 0.5–2.2)
LACTIC ACID: 3 MMOL/L (ref 0.5–2.2)
LYMPHOCYTES # BLD: 13.2 %
LYMPHOCYTES ABSOLUTE: 1.2 THOU/MM3 (ref 1–4.8)
MCH RBC QN AUTO: 28.8 PG (ref 26–33)
MCHC RBC AUTO-ENTMCNC: 33 GM/DL (ref 32.2–35.5)
MCV RBC AUTO: 87.1 FL (ref 81–99)
MONOCYTES # BLD: 4.6 %
MONOCYTES ABSOLUTE: 0.4 THOU/MM3 (ref 0.4–1.3)
NUCLEATED RED BLOOD CELLS: 0 /100 WBC
OSMOLALITY CALCULATION: 273.3 MOSMOL/KG (ref 275–300)
PLATELET # BLD: 240 THOU/MM3 (ref 130–400)
PMV BLD AUTO: 9.9 FL (ref 9.4–12.4)
POTASSIUM SERPL-SCNC: 4.3 MEQ/L (ref 3.5–5.2)
PROCALCITONIN: 0.05 NG/ML (ref 0.01–0.09)
RBC # BLD: 4.59 MILL/MM3 (ref 4.2–5.4)
SEG NEUTROPHILS: 80.3 %
SEGMENTED NEUTROPHILS ABSOLUTE COUNT: 7.5 THOU/MM3 (ref 1.8–7.7)
SODIUM BLD-SCNC: 133 MEQ/L (ref 135–145)
TOTAL PROTEIN: 7.3 G/DL (ref 6.1–8)
WBC # BLD: 9.4 THOU/MM3 (ref 4.8–10.8)

## 2019-09-27 PROCEDURE — 82948 REAGENT STRIP/BLOOD GLUCOSE: CPT

## 2019-09-27 PROCEDURE — 2580000003 HC RX 258: Performed by: STUDENT IN AN ORGANIZED HEALTH CARE EDUCATION/TRAINING PROGRAM

## 2019-09-27 PROCEDURE — 80053 COMPREHEN METABOLIC PANEL: CPT

## 2019-09-27 PROCEDURE — 73630 X-RAY EXAM OF FOOT: CPT

## 2019-09-27 PROCEDURE — 85025 COMPLETE CBC W/AUTO DIFF WBC: CPT

## 2019-09-27 PROCEDURE — 2709999900 HC NON-CHARGEABLE SUPPLY

## 2019-09-27 PROCEDURE — 2580000003 HC RX 258: Performed by: PHYSICIAN ASSISTANT

## 2019-09-27 PROCEDURE — 1200000000 HC SEMI PRIVATE

## 2019-09-27 PROCEDURE — 83605 ASSAY OF LACTIC ACID: CPT

## 2019-09-27 PROCEDURE — 36415 COLL VENOUS BLD VENIPUNCTURE: CPT

## 2019-09-27 PROCEDURE — 6370000000 HC RX 637 (ALT 250 FOR IP): Performed by: STUDENT IN AN ORGANIZED HEALTH CARE EDUCATION/TRAINING PROGRAM

## 2019-09-27 PROCEDURE — 99284 EMERGENCY DEPT VISIT MOD MDM: CPT

## 2019-09-27 PROCEDURE — 86140 C-REACTIVE PROTEIN: CPT

## 2019-09-27 PROCEDURE — 84145 PROCALCITONIN (PCT): CPT

## 2019-09-27 PROCEDURE — 6360000002 HC RX W HCPCS: Performed by: STUDENT IN AN ORGANIZED HEALTH CARE EDUCATION/TRAINING PROGRAM

## 2019-09-27 PROCEDURE — 6370000000 HC RX 637 (ALT 250 FOR IP): Performed by: PHYSICIAN ASSISTANT

## 2019-09-27 RX ORDER — TRIAMTERENE AND HYDROCHLOROTHIAZIDE 37.5; 25 MG/1; MG/1
1 TABLET ORAL DAILY
Status: DISCONTINUED | OUTPATIENT
Start: 2019-09-28 | End: 2019-10-01 | Stop reason: HOSPADM

## 2019-09-27 RX ORDER — DEXTROSE MONOHYDRATE 50 MG/ML
100 INJECTION, SOLUTION INTRAVENOUS PRN
Status: DISCONTINUED | OUTPATIENT
Start: 2019-09-27 | End: 2019-10-01 | Stop reason: HOSPADM

## 2019-09-27 RX ORDER — DEXTROSE MONOHYDRATE 50 MG/ML
100 INJECTION, SOLUTION INTRAVENOUS PRN
Status: DISCONTINUED | OUTPATIENT
Start: 2019-09-27 | End: 2019-09-28 | Stop reason: SDUPTHER

## 2019-09-27 RX ORDER — GLIPIZIDE 5 MG/1
5 TABLET ORAL
Status: DISCONTINUED | OUTPATIENT
Start: 2019-09-28 | End: 2019-09-29

## 2019-09-27 RX ORDER — NICOTINE POLACRILEX 4 MG
15 LOZENGE BUCCAL PRN
Status: DISCONTINUED | OUTPATIENT
Start: 2019-09-27 | End: 2019-09-28 | Stop reason: SDUPTHER

## 2019-09-27 RX ORDER — GABAPENTIN 300 MG/1
300 CAPSULE ORAL 2 TIMES DAILY
Status: DISCONTINUED | OUTPATIENT
Start: 2019-09-27 | End: 2019-10-01 | Stop reason: HOSPADM

## 2019-09-27 RX ORDER — DEXTROSE MONOHYDRATE 25 G/50ML
12.5 INJECTION, SOLUTION INTRAVENOUS PRN
Status: DISCONTINUED | OUTPATIENT
Start: 2019-09-27 | End: 2019-09-28 | Stop reason: SDUPTHER

## 2019-09-27 RX ORDER — 0.9 % SODIUM CHLORIDE 0.9 %
1000 INTRAVENOUS SOLUTION INTRAVENOUS ONCE
Status: COMPLETED | OUTPATIENT
Start: 2019-09-27 | End: 2019-09-28

## 2019-09-27 RX ORDER — SODIUM CHLORIDE 0.9 % (FLUSH) 0.9 %
10 SYRINGE (ML) INJECTION PRN
Status: DISCONTINUED | OUTPATIENT
Start: 2019-09-27 | End: 2019-09-30 | Stop reason: SDUPTHER

## 2019-09-27 RX ORDER — CEFUROXIME AXETIL 250 MG/1
250 TABLET ORAL 2 TIMES DAILY
COMMUNITY
End: 2020-12-01

## 2019-09-27 RX ORDER — ACETAMINOPHEN 325 MG/1
650 TABLET ORAL ONCE
Status: COMPLETED | OUTPATIENT
Start: 2019-09-27 | End: 2019-09-27

## 2019-09-27 RX ORDER — ACETAMINOPHEN 325 MG/1
650 TABLET ORAL EVERY 4 HOURS PRN
Status: DISCONTINUED | OUTPATIENT
Start: 2019-09-27 | End: 2019-10-01 | Stop reason: HOSPADM

## 2019-09-27 RX ORDER — NICOTINE POLACRILEX 4 MG
15 LOZENGE BUCCAL PRN
Status: DISCONTINUED | OUTPATIENT
Start: 2019-09-27 | End: 2019-10-01 | Stop reason: HOSPADM

## 2019-09-27 RX ORDER — ONDANSETRON 2 MG/ML
4 INJECTION INTRAMUSCULAR; INTRAVENOUS EVERY 6 HOURS PRN
Status: DISCONTINUED | OUTPATIENT
Start: 2019-09-27 | End: 2019-09-30 | Stop reason: SDUPTHER

## 2019-09-27 RX ORDER — DEXTROSE MONOHYDRATE 25 G/50ML
12.5 INJECTION, SOLUTION INTRAVENOUS PRN
Status: DISCONTINUED | OUTPATIENT
Start: 2019-09-27 | End: 2019-10-01 | Stop reason: HOSPADM

## 2019-09-27 RX ORDER — SODIUM CHLORIDE 0.9 % (FLUSH) 0.9 %
10 SYRINGE (ML) INJECTION EVERY 12 HOURS SCHEDULED
Status: DISCONTINUED | OUTPATIENT
Start: 2019-09-27 | End: 2019-09-30 | Stop reason: SDUPTHER

## 2019-09-27 RX ORDER — OXYCODONE HYDROCHLORIDE AND ACETAMINOPHEN 5; 325 MG/1; MG/1
2 TABLET ORAL EVERY 4 HOURS PRN
Status: DISCONTINUED | OUTPATIENT
Start: 2019-09-27 | End: 2019-09-28

## 2019-09-27 RX ADMIN — VANCOMYCIN HYDROCHLORIDE 1500 MG: 5 INJECTION, POWDER, LYOPHILIZED, FOR SOLUTION INTRAVENOUS at 22:49

## 2019-09-27 RX ADMIN — ACETAMINOPHEN 650 MG: 325 TABLET ORAL at 18:09

## 2019-09-27 RX ADMIN — SODIUM CHLORIDE 1000 ML: 9 INJECTION, SOLUTION INTRAVENOUS at 21:52

## 2019-09-27 RX ADMIN — APIXABAN 2.5 MG: 2.5 TABLET, FILM COATED ORAL at 22:50

## 2019-09-27 RX ADMIN — OXYCODONE HYDROCHLORIDE AND ACETAMINOPHEN 2 TABLET: 5; 325 TABLET ORAL at 22:50

## 2019-09-27 RX ADMIN — GABAPENTIN 300 MG: 300 CAPSULE ORAL at 22:50

## 2019-09-27 ASSESSMENT — PAIN SCALES - GENERAL
PAINLEVEL_OUTOF10: 10
PAINLEVEL_OUTOF10: 8
PAINLEVEL_OUTOF10: 10
PAINLEVEL_OUTOF10: 10
PAINLEVEL_OUTOF10: 6
PAINLEVEL_OUTOF10: 10
PAINLEVEL_OUTOF10: 10

## 2019-09-27 ASSESSMENT — PAIN DESCRIPTION - ORIENTATION
ORIENTATION: RIGHT

## 2019-09-27 ASSESSMENT — ENCOUNTER SYMPTOMS
WHEEZING: 0
NAUSEA: 0
ABDOMINAL PAIN: 0
RHINORRHEA: 0
SORE THROAT: 0
COLOR CHANGE: 1
BACK PAIN: 0
COUGH: 0
SHORTNESS OF BREATH: 0
VOMITING: 0
EYE DISCHARGE: 0
DIARRHEA: 0
EYE PAIN: 0

## 2019-09-27 ASSESSMENT — PAIN DESCRIPTION - PAIN TYPE
TYPE: ACUTE PAIN

## 2019-09-27 ASSESSMENT — PAIN DESCRIPTION - FREQUENCY
FREQUENCY: CONTINUOUS
FREQUENCY: CONTINUOUS

## 2019-09-27 ASSESSMENT — PAIN DESCRIPTION - ONSET
ONSET: ON-GOING
ONSET: ON-GOING

## 2019-09-27 ASSESSMENT — PAIN DESCRIPTION - PROGRESSION
CLINICAL_PROGRESSION: GRADUALLY WORSENING
CLINICAL_PROGRESSION: NOT CHANGED

## 2019-09-27 ASSESSMENT — PAIN DESCRIPTION - LOCATION
LOCATION: FOOT

## 2019-09-27 ASSESSMENT — PAIN - FUNCTIONAL ASSESSMENT: PAIN_FUNCTIONAL_ASSESSMENT: PREVENTS OR INTERFERES SOME ACTIVE ACTIVITIES AND ADLS

## 2019-09-27 ASSESSMENT — PAIN DESCRIPTION - DESCRIPTORS
DESCRIPTORS: SHARP;BURNING
DESCRIPTORS: ACHING
DESCRIPTORS: RADIATING;SHARP;BURNING

## 2019-09-28 ENCOUNTER — APPOINTMENT (OUTPATIENT)
Dept: INTERVENTIONAL RADIOLOGY/VASCULAR | Age: 49
DRG: 952 | End: 2019-09-28
Payer: COMMERCIAL

## 2019-09-28 LAB
ALBUMIN SERPL-MCNC: 3.8 G/DL (ref 3.5–5.1)
ALP BLD-CCNC: 71 U/L (ref 38–126)
ALT SERPL-CCNC: 13 U/L (ref 11–66)
ANION GAP SERPL CALCULATED.3IONS-SCNC: 14 MEQ/L (ref 8–16)
AST SERPL-CCNC: 14 U/L (ref 5–40)
AVERAGE GLUCOSE: 192 MG/DL (ref 70–126)
BASOPHILS # BLD: 0.6 %
BASOPHILS ABSOLUTE: 0 THOU/MM3 (ref 0–0.1)
BILIRUB SERPL-MCNC: 0.3 MG/DL (ref 0.3–1.2)
BUN BLDV-MCNC: 11 MG/DL (ref 7–22)
CALCIUM SERPL-MCNC: 9.3 MG/DL (ref 8.5–10.5)
CHLORIDE BLD-SCNC: 97 MEQ/L (ref 98–111)
CO2: 24 MEQ/L (ref 23–33)
CREAT SERPL-MCNC: 0.6 MG/DL (ref 0.4–1.2)
EOSINOPHIL # BLD: 1.6 %
EOSINOPHILS ABSOLUTE: 0.1 THOU/MM3 (ref 0–0.4)
ERYTHROCYTE [DISTWIDTH] IN BLOOD BY AUTOMATED COUNT: 14 % (ref 11.5–14.5)
ERYTHROCYTE [DISTWIDTH] IN BLOOD BY AUTOMATED COUNT: 45.3 FL (ref 35–45)
GFR SERPL CREATININE-BSD FRML MDRD: > 90 ML/MIN/1.73M2
GLUCOSE BLD-MCNC: 157 MG/DL (ref 70–108)
GLUCOSE BLD-MCNC: 171 MG/DL (ref 70–108)
GLUCOSE BLD-MCNC: 177 MG/DL (ref 70–108)
GLUCOSE BLD-MCNC: 264 MG/DL (ref 70–108)
GLUCOSE BLD-MCNC: 306 MG/DL (ref 70–108)
HBA1C MFR BLD: 8.4 % (ref 4.4–6.4)
HCT VFR BLD CALC: 39.1 % (ref 37–47)
HEMOGLOBIN: 12.8 GM/DL (ref 12–16)
IMMATURE GRANS (ABS): 0.03 THOU/MM3 (ref 0–0.07)
IMMATURE GRANULOCYTES: 0.5 %
LYMPHOCYTES # BLD: 19 %
LYMPHOCYTES ABSOLUTE: 1.2 THOU/MM3 (ref 1–4.8)
MCH RBC QN AUTO: 29 PG (ref 26–33)
MCHC RBC AUTO-ENTMCNC: 32.7 GM/DL (ref 32.2–35.5)
MCV RBC AUTO: 88.5 FL (ref 81–99)
MONOCYTES # BLD: 5.8 %
MONOCYTES ABSOLUTE: 0.4 THOU/MM3 (ref 0.4–1.3)
NUCLEATED RED BLOOD CELLS: 0 /100 WBC
PLATELET # BLD: 225 THOU/MM3 (ref 130–400)
PMV BLD AUTO: 9.4 FL (ref 9.4–12.4)
POTASSIUM REFLEX MAGNESIUM: 4.4 MEQ/L (ref 3.5–5.2)
RBC # BLD: 4.42 MILL/MM3 (ref 4.2–5.4)
SEG NEUTROPHILS: 72.5 %
SEGMENTED NEUTROPHILS ABSOLUTE COUNT: 4.5 THOU/MM3 (ref 1.8–7.7)
SODIUM BLD-SCNC: 135 MEQ/L (ref 135–145)
TOTAL PROTEIN: 7 G/DL (ref 6.1–8)
WBC # BLD: 6.2 THOU/MM3 (ref 4.8–10.8)

## 2019-09-28 PROCEDURE — 1200000000 HC SEMI PRIVATE

## 2019-09-28 PROCEDURE — 6370000000 HC RX 637 (ALT 250 FOR IP): Performed by: INTERNAL MEDICINE

## 2019-09-28 PROCEDURE — 82948 REAGENT STRIP/BLOOD GLUCOSE: CPT

## 2019-09-28 PROCEDURE — 85025 COMPLETE CBC W/AUTO DIFF WBC: CPT

## 2019-09-28 PROCEDURE — 99233 SBSQ HOSP IP/OBS HIGH 50: CPT | Performed by: FAMILY MEDICINE

## 2019-09-28 PROCEDURE — 2580000003 HC RX 258: Performed by: STUDENT IN AN ORGANIZED HEALTH CARE EDUCATION/TRAINING PROGRAM

## 2019-09-28 PROCEDURE — 6370000000 HC RX 637 (ALT 250 FOR IP): Performed by: STUDENT IN AN ORGANIZED HEALTH CARE EDUCATION/TRAINING PROGRAM

## 2019-09-28 PROCEDURE — 83036 HEMOGLOBIN GLYCOSYLATED A1C: CPT

## 2019-09-28 PROCEDURE — 80053 COMPREHEN METABOLIC PANEL: CPT

## 2019-09-28 PROCEDURE — 93971 EXTREMITY STUDY: CPT

## 2019-09-28 PROCEDURE — 94760 N-INVAS EAR/PLS OXIMETRY 1: CPT

## 2019-09-28 PROCEDURE — 36415 COLL VENOUS BLD VENIPUNCTURE: CPT

## 2019-09-28 PROCEDURE — 6360000002 HC RX W HCPCS: Performed by: STUDENT IN AN ORGANIZED HEALTH CARE EDUCATION/TRAINING PROGRAM

## 2019-09-28 RX ORDER — HYDROCODONE BITARTRATE AND ACETAMINOPHEN 5; 325 MG/1; MG/1
1 TABLET ORAL EVERY 4 HOURS PRN
Status: DISCONTINUED | OUTPATIENT
Start: 2019-09-28 | End: 2019-10-01 | Stop reason: HOSPADM

## 2019-09-28 RX ORDER — HYDROCODONE BITARTRATE AND ACETAMINOPHEN 5; 325 MG/1; MG/1
2 TABLET ORAL EVERY 4 HOURS PRN
Status: DISCONTINUED | OUTPATIENT
Start: 2019-09-28 | End: 2019-10-01 | Stop reason: HOSPADM

## 2019-09-28 RX ADMIN — GLIPIZIDE 5 MG: 5 TABLET ORAL at 08:19

## 2019-09-28 RX ADMIN — METFORMIN HYDROCHLORIDE 1000 MG: 500 TABLET ORAL at 08:18

## 2019-09-28 RX ADMIN — TRIAMTERENE AND HYDROCHLOROTHIAZIDE 1 TABLET: 37.5; 25 TABLET ORAL at 08:19

## 2019-09-28 RX ADMIN — PIPERACILLIN AND TAZOBACTAM 3.38 G: 3; .375 INJECTION, POWDER, LYOPHILIZED, FOR SOLUTION INTRAVENOUS at 17:13

## 2019-09-28 RX ADMIN — APIXABAN 2.5 MG: 2.5 TABLET, FILM COATED ORAL at 08:18

## 2019-09-28 RX ADMIN — INSULIN LISPRO 3 UNITS: 100 INJECTION, SOLUTION INTRAVENOUS; SUBCUTANEOUS at 08:27

## 2019-09-28 RX ADMIN — PIPERACILLIN AND TAZOBACTAM 3.38 G: 3; .375 INJECTION, POWDER, LYOPHILIZED, FOR SOLUTION INTRAVENOUS at 08:19

## 2019-09-28 RX ADMIN — GABAPENTIN 300 MG: 300 CAPSULE ORAL at 08:18

## 2019-09-28 RX ADMIN — METFORMIN HYDROCHLORIDE 1000 MG: 500 TABLET ORAL at 17:12

## 2019-09-28 RX ADMIN — VANCOMYCIN HYDROCHLORIDE 1500 MG: 5 INJECTION, POWDER, LYOPHILIZED, FOR SOLUTION INTRAVENOUS at 05:55

## 2019-09-28 RX ADMIN — PIPERACILLIN AND TAZOBACTAM 3.38 G: 3; .375 INJECTION, POWDER, LYOPHILIZED, FOR SOLUTION INTRAVENOUS at 00:52

## 2019-09-28 RX ADMIN — VANCOMYCIN HYDROCHLORIDE 1500 MG: 5 INJECTION, POWDER, LYOPHILIZED, FOR SOLUTION INTRAVENOUS at 21:52

## 2019-09-28 RX ADMIN — HYDROCODONE BITARTRATE AND ACETAMINOPHEN 2 TABLET: 5; 325 TABLET ORAL at 22:28

## 2019-09-28 RX ADMIN — INSULIN LISPRO 1 UNITS: 100 INJECTION, SOLUTION INTRAVENOUS; SUBCUTANEOUS at 20:27

## 2019-09-28 RX ADMIN — OXYCODONE HYDROCHLORIDE AND ACETAMINOPHEN 2 TABLET: 5; 325 TABLET ORAL at 08:22

## 2019-09-28 RX ADMIN — GABAPENTIN 300 MG: 300 CAPSULE ORAL at 20:27

## 2019-09-28 RX ADMIN — VANCOMYCIN HYDROCHLORIDE 1500 MG: 5 INJECTION, POWDER, LYOPHILIZED, FOR SOLUTION INTRAVENOUS at 15:15

## 2019-09-28 ASSESSMENT — PAIN SCALES - GENERAL
PAINLEVEL_OUTOF10: 7
PAINLEVEL_OUTOF10: 6
PAINLEVEL_OUTOF10: 9
PAINLEVEL_OUTOF10: 6
PAINLEVEL_OUTOF10: 0
PAINLEVEL_OUTOF10: 5
PAINLEVEL_OUTOF10: 6
PAINLEVEL_OUTOF10: 9

## 2019-09-28 ASSESSMENT — PAIN DESCRIPTION - PAIN TYPE
TYPE: ACUTE PAIN

## 2019-09-28 ASSESSMENT — PAIN DESCRIPTION - FREQUENCY: FREQUENCY: CONTINUOUS

## 2019-09-28 ASSESSMENT — PAIN DESCRIPTION - ORIENTATION
ORIENTATION: RIGHT

## 2019-09-28 ASSESSMENT — PAIN DESCRIPTION - PROGRESSION: CLINICAL_PROGRESSION: NOT CHANGED

## 2019-09-28 ASSESSMENT — PAIN DESCRIPTION - DESCRIPTORS: DESCRIPTORS: SHARP;BURNING

## 2019-09-28 ASSESSMENT — PAIN DESCRIPTION - LOCATION
LOCATION: LEG
LOCATION: FOOT

## 2019-09-28 ASSESSMENT — PAIN - FUNCTIONAL ASSESSMENT: PAIN_FUNCTIONAL_ASSESSMENT: PREVENTS OR INTERFERES WITH MANY ACTIVE NOT PASSIVE ACTIVITIES

## 2019-09-28 ASSESSMENT — PAIN DESCRIPTION - ONSET: ONSET: ON-GOING

## 2019-09-29 LAB
ANION GAP SERPL CALCULATED.3IONS-SCNC: 13 MEQ/L (ref 8–16)
BASOPHILS # BLD: 1.2 %
BASOPHILS ABSOLUTE: 0.1 THOU/MM3 (ref 0–0.1)
BUN BLDV-MCNC: 9 MG/DL (ref 7–22)
CALCIUM SERPL-MCNC: 9.1 MG/DL (ref 8.5–10.5)
CHLORIDE BLD-SCNC: 97 MEQ/L (ref 98–111)
CO2: 25 MEQ/L (ref 23–33)
CREAT SERPL-MCNC: 0.6 MG/DL (ref 0.4–1.2)
EOSINOPHIL # BLD: 4.6 %
EOSINOPHILS ABSOLUTE: 0.2 THOU/MM3 (ref 0–0.4)
ERYTHROCYTE [DISTWIDTH] IN BLOOD BY AUTOMATED COUNT: 14.3 % (ref 11.5–14.5)
ERYTHROCYTE [DISTWIDTH] IN BLOOD BY AUTOMATED COUNT: 45.6 FL (ref 35–45)
GFR SERPL CREATININE-BSD FRML MDRD: > 90 ML/MIN/1.73M2
GLUCOSE BLD-MCNC: 102 MG/DL (ref 70–108)
GLUCOSE BLD-MCNC: 128 MG/DL (ref 70–108)
GLUCOSE BLD-MCNC: 205 MG/DL (ref 70–108)
GLUCOSE BLD-MCNC: 206 MG/DL (ref 70–108)
GLUCOSE BLD-MCNC: 219 MG/DL (ref 70–108)
HCT VFR BLD CALC: 37.8 % (ref 37–47)
HEMOGLOBIN: 12.5 GM/DL (ref 12–16)
IMMATURE GRANS (ABS): 0.02 THOU/MM3 (ref 0–0.07)
IMMATURE GRANULOCYTES: 0.4 %
LYMPHOCYTES # BLD: 25.3 %
LYMPHOCYTES ABSOLUTE: 1.2 THOU/MM3 (ref 1–4.8)
MCH RBC QN AUTO: 29 PG (ref 26–33)
MCHC RBC AUTO-ENTMCNC: 33.1 GM/DL (ref 32.2–35.5)
MCV RBC AUTO: 87.7 FL (ref 81–99)
MONOCYTES # BLD: 8.1 %
MONOCYTES ABSOLUTE: 0.4 THOU/MM3 (ref 0.4–1.3)
NUCLEATED RED BLOOD CELLS: 0 /100 WBC
PLATELET # BLD: 205 THOU/MM3 (ref 130–400)
PMV BLD AUTO: 9.6 FL (ref 9.4–12.4)
POTASSIUM SERPL-SCNC: 3.9 MEQ/L (ref 3.5–5.2)
RBC # BLD: 4.31 MILL/MM3 (ref 4.2–5.4)
SEDIMENTATION RATE, ERYTHROCYTE: 48 MM/HR (ref 0–20)
SEG NEUTROPHILS: 60.4 %
SEGMENTED NEUTROPHILS ABSOLUTE COUNT: 2.9 THOU/MM3 (ref 1.8–7.7)
SODIUM BLD-SCNC: 135 MEQ/L (ref 135–145)
VANCOMYCIN TROUGH: 10.8 UG/ML (ref 5–15)
WBC # BLD: 4.8 THOU/MM3 (ref 4.8–10.8)

## 2019-09-29 PROCEDURE — 85651 RBC SED RATE NONAUTOMATED: CPT

## 2019-09-29 PROCEDURE — 85025 COMPLETE CBC W/AUTO DIFF WBC: CPT

## 2019-09-29 PROCEDURE — 99233 SBSQ HOSP IP/OBS HIGH 50: CPT | Performed by: FAMILY MEDICINE

## 2019-09-29 PROCEDURE — 82948 REAGENT STRIP/BLOOD GLUCOSE: CPT

## 2019-09-29 PROCEDURE — 6370000000 HC RX 637 (ALT 250 FOR IP): Performed by: STUDENT IN AN ORGANIZED HEALTH CARE EDUCATION/TRAINING PROGRAM

## 2019-09-29 PROCEDURE — 6370000000 HC RX 637 (ALT 250 FOR IP): Performed by: INTERNAL MEDICINE

## 2019-09-29 PROCEDURE — 80048 BASIC METABOLIC PNL TOTAL CA: CPT

## 2019-09-29 PROCEDURE — 6360000002 HC RX W HCPCS: Performed by: STUDENT IN AN ORGANIZED HEALTH CARE EDUCATION/TRAINING PROGRAM

## 2019-09-29 PROCEDURE — 36415 COLL VENOUS BLD VENIPUNCTURE: CPT

## 2019-09-29 PROCEDURE — 94760 N-INVAS EAR/PLS OXIMETRY 1: CPT

## 2019-09-29 PROCEDURE — 2580000003 HC RX 258: Performed by: STUDENT IN AN ORGANIZED HEALTH CARE EDUCATION/TRAINING PROGRAM

## 2019-09-29 PROCEDURE — 1200000000 HC SEMI PRIVATE

## 2019-09-29 PROCEDURE — 80202 ASSAY OF VANCOMYCIN: CPT

## 2019-09-29 PROCEDURE — 6370000000 HC RX 637 (ALT 250 FOR IP): Performed by: FAMILY MEDICINE

## 2019-09-29 RX ORDER — GLIPIZIDE 5 MG/1
5 TABLET ORAL EVERY 12 HOURS
Status: DISCONTINUED | OUTPATIENT
Start: 2019-09-29 | End: 2019-10-01 | Stop reason: HOSPADM

## 2019-09-29 RX ADMIN — INSULIN LISPRO 1 UNITS: 100 INJECTION, SOLUTION INTRAVENOUS; SUBCUTANEOUS at 22:13

## 2019-09-29 RX ADMIN — GABAPENTIN 300 MG: 300 CAPSULE ORAL at 22:14

## 2019-09-29 RX ADMIN — TRIAMTERENE AND HYDROCHLOROTHIAZIDE 1 TABLET: 37.5; 25 TABLET ORAL at 09:03

## 2019-09-29 RX ADMIN — METFORMIN HYDROCHLORIDE 1000 MG: 500 TABLET ORAL at 09:02

## 2019-09-29 RX ADMIN — CEFTRIAXONE SODIUM 1 G: 1 INJECTION, POWDER, FOR SOLUTION INTRAMUSCULAR; INTRAVENOUS at 15:15

## 2019-09-29 RX ADMIN — GABAPENTIN 300 MG: 300 CAPSULE ORAL at 09:02

## 2019-09-29 RX ADMIN — GLIPIZIDE 5 MG: 5 TABLET ORAL at 22:13

## 2019-09-29 RX ADMIN — VANCOMYCIN HYDROCHLORIDE 1500 MG: 5 INJECTION, POWDER, LYOPHILIZED, FOR SOLUTION INTRAVENOUS at 06:52

## 2019-09-29 RX ADMIN — LINAGLIPTIN 5 MG: 5 TABLET, FILM COATED ORAL at 09:02

## 2019-09-29 RX ADMIN — Medication 10 ML: at 22:14

## 2019-09-29 RX ADMIN — PIPERACILLIN AND TAZOBACTAM 3.38 G: 3; .375 INJECTION, POWDER, LYOPHILIZED, FOR SOLUTION INTRAVENOUS at 00:42

## 2019-09-29 RX ADMIN — ACETAMINOPHEN 650 MG: 325 TABLET ORAL at 14:49

## 2019-09-29 RX ADMIN — GLIPIZIDE 5 MG: 5 TABLET ORAL at 09:02

## 2019-09-29 RX ADMIN — INSULIN LISPRO 2 UNITS: 100 INJECTION, SOLUTION INTRAVENOUS; SUBCUTANEOUS at 09:03

## 2019-09-29 ASSESSMENT — PAIN DESCRIPTION - PAIN TYPE: TYPE: ACUTE PAIN

## 2019-09-29 ASSESSMENT — PAIN SCALES - GENERAL
PAINLEVEL_OUTOF10: 0
PAINLEVEL_OUTOF10: 5
PAINLEVEL_OUTOF10: 5

## 2019-09-29 ASSESSMENT — PAIN DESCRIPTION - ORIENTATION: ORIENTATION: ANTERIOR

## 2019-09-29 ASSESSMENT — PAIN DESCRIPTION - FREQUENCY: FREQUENCY: INTERMITTENT

## 2019-09-29 ASSESSMENT — PAIN DESCRIPTION - DESCRIPTORS: DESCRIPTORS: HEADACHE

## 2019-09-29 ASSESSMENT — PAIN DESCRIPTION - ONSET: ONSET: GRADUAL

## 2019-09-29 ASSESSMENT — PAIN - FUNCTIONAL ASSESSMENT: PAIN_FUNCTIONAL_ASSESSMENT: ACTIVITIES ARE NOT PREVENTED

## 2019-09-29 ASSESSMENT — PAIN DESCRIPTION - LOCATION: LOCATION: HEAD

## 2019-09-30 LAB
GLUCOSE BLD-MCNC: 100 MG/DL (ref 70–108)
GLUCOSE BLD-MCNC: 114 MG/DL (ref 70–108)
GLUCOSE BLD-MCNC: 143 MG/DL (ref 70–108)
GLUCOSE BLD-MCNC: 218 MG/DL (ref 70–108)

## 2019-09-30 PROCEDURE — 6370000000 HC RX 637 (ALT 250 FOR IP): Performed by: STUDENT IN AN ORGANIZED HEALTH CARE EDUCATION/TRAINING PROGRAM

## 2019-09-30 PROCEDURE — 1200000000 HC SEMI PRIVATE

## 2019-09-30 PROCEDURE — 99233 SBSQ HOSP IP/OBS HIGH 50: CPT | Performed by: FAMILY MEDICINE

## 2019-09-30 PROCEDURE — 6360000002 HC RX W HCPCS: Performed by: STUDENT IN AN ORGANIZED HEALTH CARE EDUCATION/TRAINING PROGRAM

## 2019-09-30 PROCEDURE — 0Q8N0ZZ DIVISION OF RIGHT METATARSAL, OPEN APPROACH: ICD-10-PCS | Performed by: PODIATRIST

## 2019-09-30 PROCEDURE — 2500000003 HC RX 250 WO HCPCS: Performed by: PODIATRIST

## 2019-09-30 PROCEDURE — 2580000003 HC RX 258: Performed by: STUDENT IN AN ORGANIZED HEALTH CARE EDUCATION/TRAINING PROGRAM

## 2019-09-30 PROCEDURE — 2709999900 HC NON-CHARGEABLE SUPPLY: Performed by: PODIATRIST

## 2019-09-30 PROCEDURE — 0JBQ0ZZ EXCISION OF RIGHT FOOT SUBCUTANEOUS TISSUE AND FASCIA, OPEN APPROACH: ICD-10-PCS | Performed by: PODIATRIST

## 2019-09-30 PROCEDURE — 3600000002 HC SURGERY LEVEL 2 BASE: Performed by: PODIATRIST

## 2019-09-30 PROCEDURE — 94760 N-INVAS EAR/PLS OXIMETRY 1: CPT

## 2019-09-30 PROCEDURE — 82948 REAGENT STRIP/BLOOD GLUCOSE: CPT

## 2019-09-30 PROCEDURE — 3600000012 HC SURGERY LEVEL 2 ADDTL 15MIN: Performed by: PODIATRIST

## 2019-09-30 RX ORDER — SODIUM CHLORIDE 0.9 % (FLUSH) 0.9 %
10 SYRINGE (ML) INJECTION PRN
Status: DISCONTINUED | OUTPATIENT
Start: 2019-09-30 | End: 2019-10-01 | Stop reason: HOSPADM

## 2019-09-30 RX ORDER — ONDANSETRON 2 MG/ML
4 INJECTION INTRAMUSCULAR; INTRAVENOUS EVERY 6 HOURS PRN
Status: DISCONTINUED | OUTPATIENT
Start: 2019-09-30 | End: 2019-10-01 | Stop reason: HOSPADM

## 2019-09-30 RX ORDER — SODIUM CHLORIDE 0.9 % (FLUSH) 0.9 %
10 SYRINGE (ML) INJECTION EVERY 12 HOURS SCHEDULED
Status: DISCONTINUED | OUTPATIENT
Start: 2019-09-30 | End: 2019-10-01 | Stop reason: HOSPADM

## 2019-09-30 RX ORDER — DOCUSATE SODIUM 100 MG/1
100 CAPSULE, LIQUID FILLED ORAL 2 TIMES DAILY
Status: DISCONTINUED | OUTPATIENT
Start: 2019-09-30 | End: 2019-10-01 | Stop reason: HOSPADM

## 2019-09-30 RX ORDER — BUPIVACAINE HYDROCHLORIDE 5 MG/ML
INJECTION, SOLUTION EPIDURAL; INTRACAUDAL PRN
Status: DISCONTINUED | OUTPATIENT
Start: 2019-09-30 | End: 2019-09-30 | Stop reason: HOSPADM

## 2019-09-30 RX ADMIN — ACETAMINOPHEN 650 MG: 325 TABLET ORAL at 00:20

## 2019-09-30 RX ADMIN — METFORMIN HYDROCHLORIDE 1000 MG: 500 TABLET ORAL at 18:32

## 2019-09-30 RX ADMIN — LINAGLIPTIN 5 MG: 5 TABLET, FILM COATED ORAL at 11:04

## 2019-09-30 RX ADMIN — GABAPENTIN 300 MG: 300 CAPSULE ORAL at 11:04

## 2019-09-30 RX ADMIN — SODIUM CHLORIDE, PRESERVATIVE FREE 10 ML: 5 INJECTION INTRAVENOUS at 20:00

## 2019-09-30 RX ADMIN — GLIPIZIDE 5 MG: 5 TABLET ORAL at 20:02

## 2019-09-30 RX ADMIN — INSULIN LISPRO 2 UNITS: 100 INJECTION, SOLUTION INTRAVENOUS; SUBCUTANEOUS at 11:05

## 2019-09-30 RX ADMIN — GLIPIZIDE 5 MG: 5 TABLET ORAL at 11:04

## 2019-09-30 RX ADMIN — APIXABAN 2.5 MG: 2.5 TABLET, FILM COATED ORAL at 20:02

## 2019-09-30 RX ADMIN — TRIAMTERENE AND HYDROCHLOROTHIAZIDE 1 TABLET: 37.5; 25 TABLET ORAL at 11:04

## 2019-09-30 RX ADMIN — HYDROCODONE BITARTRATE AND ACETAMINOPHEN 2 TABLET: 5; 325 TABLET ORAL at 13:43

## 2019-09-30 RX ADMIN — CEFTRIAXONE SODIUM 1 G: 1 INJECTION, POWDER, FOR SOLUTION INTRAMUSCULAR; INTRAVENOUS at 03:57

## 2019-09-30 RX ADMIN — GABAPENTIN 300 MG: 300 CAPSULE ORAL at 20:02

## 2019-09-30 RX ADMIN — INSULIN LISPRO 1 UNITS: 100 INJECTION, SOLUTION INTRAVENOUS; SUBCUTANEOUS at 22:00

## 2019-09-30 RX ADMIN — CEFTRIAXONE SODIUM 1 G: 1 INJECTION, POWDER, FOR SOLUTION INTRAMUSCULAR; INTRAVENOUS at 16:54

## 2019-09-30 ASSESSMENT — PAIN SCALES - GENERAL
PAINLEVEL_OUTOF10: 9
PAINLEVEL_OUTOF10: 0
PAINLEVEL_OUTOF10: 0
PAINLEVEL_OUTOF10: 4
PAINLEVEL_OUTOF10: 6
PAINLEVEL_OUTOF10: 0

## 2019-09-30 ASSESSMENT — PAIN DESCRIPTION - PROGRESSION
CLINICAL_PROGRESSION: GRADUALLY IMPROVING
CLINICAL_PROGRESSION: NOT CHANGED
CLINICAL_PROGRESSION: RESOLVED

## 2019-09-30 ASSESSMENT — PAIN DESCRIPTION - FREQUENCY
FREQUENCY: CONTINUOUS
FREQUENCY: CONTINUOUS
FREQUENCY: INTERMITTENT

## 2019-09-30 ASSESSMENT — PAIN DESCRIPTION - PAIN TYPE
TYPE: ACUTE PAIN
TYPE: SURGICAL PAIN
TYPE: ACUTE PAIN

## 2019-09-30 ASSESSMENT — PAIN - FUNCTIONAL ASSESSMENT
PAIN_FUNCTIONAL_ASSESSMENT: PREVENTS OR INTERFERES SOME ACTIVE ACTIVITIES AND ADLS
PAIN_FUNCTIONAL_ASSESSMENT: ACTIVITIES ARE NOT PREVENTED
PAIN_FUNCTIONAL_ASSESSMENT: ACTIVITIES ARE NOT PREVENTED

## 2019-09-30 ASSESSMENT — PAIN DESCRIPTION - ORIENTATION
ORIENTATION: ANTERIOR
ORIENTATION: RIGHT
ORIENTATION: RIGHT

## 2019-09-30 ASSESSMENT — PAIN DESCRIPTION - DESCRIPTORS
DESCRIPTORS: HEADACHE
DESCRIPTORS: ACHING
DESCRIPTORS: ACHING

## 2019-09-30 ASSESSMENT — PAIN DESCRIPTION - ONSET
ONSET: ON-GOING

## 2019-09-30 ASSESSMENT — PAIN DESCRIPTION - LOCATION
LOCATION: HEAD
LOCATION: FOOT
LOCATION: LEG

## 2019-09-30 ASSESSMENT — PAIN DESCRIPTION - DIRECTION: RADIATING_TOWARDS: KNEE

## 2019-10-01 VITALS
BODY MASS INDEX: 44.41 KG/M2 | TEMPERATURE: 98.2 F | HEART RATE: 77 BPM | RESPIRATION RATE: 17 BRPM | OXYGEN SATURATION: 95 % | HEIGHT: 68 IN | SYSTOLIC BLOOD PRESSURE: 105 MMHG | WEIGHT: 293 LBS | DIASTOLIC BLOOD PRESSURE: 58 MMHG

## 2019-10-01 LAB
ANION GAP SERPL CALCULATED.3IONS-SCNC: 15 MEQ/L (ref 8–16)
BASOPHILS # BLD: 0.9 %
BASOPHILS ABSOLUTE: 0.1 THOU/MM3 (ref 0–0.1)
BUN BLDV-MCNC: 12 MG/DL (ref 7–22)
CALCIUM SERPL-MCNC: 9.2 MG/DL (ref 8.5–10.5)
CHLORIDE BLD-SCNC: 99 MEQ/L (ref 98–111)
CO2: 24 MEQ/L (ref 23–33)
CREAT SERPL-MCNC: 0.6 MG/DL (ref 0.4–1.2)
EOSINOPHIL # BLD: 3.2 %
EOSINOPHILS ABSOLUTE: 0.2 THOU/MM3 (ref 0–0.4)
ERYTHROCYTE [DISTWIDTH] IN BLOOD BY AUTOMATED COUNT: 14.1 % (ref 11.5–14.5)
ERYTHROCYTE [DISTWIDTH] IN BLOOD BY AUTOMATED COUNT: 45.1 FL (ref 35–45)
GFR SERPL CREATININE-BSD FRML MDRD: > 90 ML/MIN/1.73M2
GLUCOSE BLD-MCNC: 163 MG/DL (ref 70–108)
GLUCOSE BLD-MCNC: 167 MG/DL (ref 70–108)
GLUCOSE BLD-MCNC: 182 MG/DL (ref 70–108)
HCT VFR BLD CALC: 39.6 % (ref 37–47)
HEMOGLOBIN: 12.9 GM/DL (ref 12–16)
IMMATURE GRANS (ABS): 0.03 THOU/MM3 (ref 0–0.07)
IMMATURE GRANULOCYTES: 0.5 %
LYMPHOCYTES # BLD: 26.3 %
LYMPHOCYTES ABSOLUTE: 1.7 THOU/MM3 (ref 1–4.8)
MCH RBC QN AUTO: 28.7 PG (ref 26–33)
MCHC RBC AUTO-ENTMCNC: 32.6 GM/DL (ref 32.2–35.5)
MCV RBC AUTO: 88.2 FL (ref 81–99)
MONOCYTES # BLD: 7.5 %
MONOCYTES ABSOLUTE: 0.5 THOU/MM3 (ref 0.4–1.3)
NUCLEATED RED BLOOD CELLS: 0 /100 WBC
PLATELET # BLD: 229 THOU/MM3 (ref 130–400)
PMV BLD AUTO: 9.5 FL (ref 9.4–12.4)
POTASSIUM SERPL-SCNC: 4.1 MEQ/L (ref 3.5–5.2)
RBC # BLD: 4.49 MILL/MM3 (ref 4.2–5.4)
SEG NEUTROPHILS: 61.6 %
SEGMENTED NEUTROPHILS ABSOLUTE COUNT: 4.1 THOU/MM3 (ref 1.8–7.7)
SODIUM BLD-SCNC: 138 MEQ/L (ref 135–145)
WBC # BLD: 6.6 THOU/MM3 (ref 4.8–10.8)

## 2019-10-01 PROCEDURE — 36415 COLL VENOUS BLD VENIPUNCTURE: CPT

## 2019-10-01 PROCEDURE — 99233 SBSQ HOSP IP/OBS HIGH 50: CPT | Performed by: FAMILY MEDICINE

## 2019-10-01 PROCEDURE — 6360000002 HC RX W HCPCS: Performed by: STUDENT IN AN ORGANIZED HEALTH CARE EDUCATION/TRAINING PROGRAM

## 2019-10-01 PROCEDURE — 6370000000 HC RX 637 (ALT 250 FOR IP): Performed by: STUDENT IN AN ORGANIZED HEALTH CARE EDUCATION/TRAINING PROGRAM

## 2019-10-01 PROCEDURE — 80048 BASIC METABOLIC PNL TOTAL CA: CPT

## 2019-10-01 PROCEDURE — 97163 PT EVAL HIGH COMPLEX 45 MIN: CPT

## 2019-10-01 PROCEDURE — 94760 N-INVAS EAR/PLS OXIMETRY 1: CPT

## 2019-10-01 PROCEDURE — 82948 REAGENT STRIP/BLOOD GLUCOSE: CPT

## 2019-10-01 PROCEDURE — 2580000003 HC RX 258: Performed by: STUDENT IN AN ORGANIZED HEALTH CARE EDUCATION/TRAINING PROGRAM

## 2019-10-01 PROCEDURE — 97530 THERAPEUTIC ACTIVITIES: CPT

## 2019-10-01 PROCEDURE — 85025 COMPLETE CBC W/AUTO DIFF WBC: CPT

## 2019-10-01 RX ORDER — TRAMADOL HYDROCHLORIDE 50 MG/1
50 TABLET ORAL EVERY 6 HOURS PRN
Qty: 20 TABLET | Refills: 0 | Status: SHIPPED | OUTPATIENT
Start: 2019-10-01 | End: 2019-10-06

## 2019-10-01 RX ORDER — SULFAMETHOXAZOLE AND TRIMETHOPRIM 800; 160 MG/1; MG/1
1 TABLET ORAL 2 TIMES DAILY
Qty: 28 TABLET | Refills: 0 | Status: SHIPPED | OUTPATIENT
Start: 2019-10-01 | End: 2019-10-15

## 2019-10-01 RX ADMIN — GLIPIZIDE 5 MG: 5 TABLET ORAL at 10:11

## 2019-10-01 RX ADMIN — DOCUSATE SODIUM 100 MG: 100 CAPSULE, LIQUID FILLED ORAL at 10:11

## 2019-10-01 RX ADMIN — METFORMIN HYDROCHLORIDE 1000 MG: 500 TABLET ORAL at 10:11

## 2019-10-01 RX ADMIN — HYDROCODONE BITARTRATE AND ACETAMINOPHEN 2 TABLET: 5; 325 TABLET ORAL at 04:55

## 2019-10-01 RX ADMIN — LINAGLIPTIN 5 MG: 5 TABLET, FILM COATED ORAL at 10:11

## 2019-10-01 RX ADMIN — CEFTRIAXONE SODIUM 1 G: 1 INJECTION, POWDER, FOR SOLUTION INTRAMUSCULAR; INTRAVENOUS at 03:30

## 2019-10-01 RX ADMIN — SODIUM CHLORIDE, PRESERVATIVE FREE 10 ML: 5 INJECTION INTRAVENOUS at 10:11

## 2019-10-01 RX ADMIN — APIXABAN 2.5 MG: 2.5 TABLET, FILM COATED ORAL at 10:11

## 2019-10-01 RX ADMIN — HYDROCODONE BITARTRATE AND ACETAMINOPHEN 2 TABLET: 5; 325 TABLET ORAL at 10:11

## 2019-10-01 RX ADMIN — GABAPENTIN 300 MG: 300 CAPSULE ORAL at 10:11

## 2019-10-01 RX ADMIN — INSULIN LISPRO 1 UNITS: 100 INJECTION, SOLUTION INTRAVENOUS; SUBCUTANEOUS at 10:12

## 2019-10-01 ASSESSMENT — PAIN DESCRIPTION - ORIENTATION
ORIENTATION: RIGHT
ORIENTATION: RIGHT

## 2019-10-01 ASSESSMENT — PAIN SCALES - GENERAL
PAINLEVEL_OUTOF10: 9
PAINLEVEL_OUTOF10: 9
PAINLEVEL_OUTOF10: 7
PAINLEVEL_OUTOF10: 7
PAINLEVEL_OUTOF10: 8
PAINLEVEL_OUTOF10: 0

## 2019-10-01 ASSESSMENT — PAIN SCALES - WONG BAKER: WONGBAKER_NUMERICALRESPONSE: 0

## 2019-10-01 ASSESSMENT — PAIN DESCRIPTION - DESCRIPTORS: DESCRIPTORS: ACHING

## 2019-10-01 ASSESSMENT — PAIN - FUNCTIONAL ASSESSMENT: PAIN_FUNCTIONAL_ASSESSMENT: ACTIVITIES ARE NOT PREVENTED

## 2019-10-01 ASSESSMENT — PAIN DESCRIPTION - LOCATION
LOCATION: FOOT
LOCATION: FOOT

## 2019-10-01 ASSESSMENT — PAIN DESCRIPTION - PROGRESSION: CLINICAL_PROGRESSION: NOT CHANGED

## 2019-10-01 ASSESSMENT — PAIN DESCRIPTION - PAIN TYPE
TYPE: SURGICAL PAIN
TYPE: SURGICAL PAIN

## 2019-10-01 ASSESSMENT — PAIN DESCRIPTION - ONSET: ONSET: ON-GOING

## 2019-10-01 ASSESSMENT — PAIN DESCRIPTION - FREQUENCY: FREQUENCY: CONTINUOUS

## 2019-10-01 ASSESSMENT — PAIN DESCRIPTION - DIRECTION: RADIATING_TOWARDS: KNEE

## 2020-05-20 ENCOUNTER — TELEPHONE (OUTPATIENT)
Dept: SURGERY | Age: 50
End: 2020-05-20

## 2020-10-22 ENCOUNTER — HOSPITAL ENCOUNTER (EMERGENCY)
Age: 50
Discharge: HOME | End: 2020-10-23
Attending: EMERGENCY MEDICINE
Payer: COMMERCIAL

## 2020-10-22 LAB
EKG ATRIAL RATE: 99 BPM
EKG P AXIS: 42 DEGREES
EKG P-R INTERVAL: 184 MS
EKG Q-T INTERVAL: 374 MS
EKG QRS DURATION: 98 MS
EKG QTC CALCULATION (BAZETT): 479 MS
EKG R AXIS: 9 DEGREES
EKG T AXIS: 69 DEGREES
EKG VENTRICULAR RATE: 99 BPM

## 2020-10-22 PROCEDURE — 83690 ASSAY OF LIPASE: CPT

## 2020-10-22 PROCEDURE — 84484 ASSAY OF TROPONIN QUANT: CPT

## 2020-10-22 PROCEDURE — 99284 EMERGENCY DEPT VISIT MOD MDM: CPT

## 2020-10-22 PROCEDURE — 93005 ELECTROCARDIOGRAM TRACING: CPT | Performed by: EMERGENCY MEDICINE

## 2020-10-22 PROCEDURE — 83880 ASSAY OF NATRIURETIC PEPTIDE: CPT

## 2020-10-22 PROCEDURE — 80053 COMPREHEN METABOLIC PANEL: CPT

## 2020-10-22 PROCEDURE — 36415 COLL VENOUS BLD VENIPUNCTURE: CPT

## 2020-10-22 PROCEDURE — 85025 COMPLETE CBC W/AUTO DIFF WBC: CPT

## 2020-10-22 ASSESSMENT — PAIN DESCRIPTION - PAIN TYPE: TYPE: ACUTE PAIN

## 2020-10-22 ASSESSMENT — PAIN SCALES - GENERAL: PAINLEVEL_OUTOF10: 8

## 2020-10-22 ASSESSMENT — PAIN DESCRIPTION - DESCRIPTORS: DESCRIPTORS: ACHING;SQUEEZING

## 2020-10-22 ASSESSMENT — PAIN DESCRIPTION - ORIENTATION: ORIENTATION: LEFT

## 2020-10-23 ENCOUNTER — APPOINTMENT (OUTPATIENT)
Dept: CT IMAGING | Age: 50
End: 2020-10-23
Payer: COMMERCIAL

## 2020-10-23 VITALS
OXYGEN SATURATION: 98 % | BODY MASS INDEX: 44.41 KG/M2 | WEIGHT: 293 LBS | HEIGHT: 68 IN | RESPIRATION RATE: 20 BRPM | DIASTOLIC BLOOD PRESSURE: 75 MMHG | TEMPERATURE: 99.2 F | SYSTOLIC BLOOD PRESSURE: 122 MMHG | HEART RATE: 65 BPM

## 2020-10-23 LAB
ALBUMIN SERPL-MCNC: 4.4 G/DL (ref 3.5–5.1)
ALP BLD-CCNC: 87 U/L (ref 38–126)
ALT SERPL-CCNC: 34 U/L (ref 11–66)
ANION GAP SERPL CALCULATED.3IONS-SCNC: 15 MEQ/L (ref 8–16)
AST SERPL-CCNC: 29 U/L (ref 5–40)
BASOPHILS # BLD: 0.9 %
BASOPHILS ABSOLUTE: 0.1 THOU/MM3 (ref 0–0.1)
BILIRUB SERPL-MCNC: 0.3 MG/DL (ref 0.3–1.2)
BUN BLDV-MCNC: 16 MG/DL (ref 7–22)
CALCIUM SERPL-MCNC: 9.9 MG/DL (ref 8.5–10.5)
CHLORIDE BLD-SCNC: 93 MEQ/L (ref 98–111)
CO2: 24 MEQ/L (ref 23–33)
CREAT SERPL-MCNC: 0.6 MG/DL (ref 0.4–1.2)
EOSINOPHIL # BLD: 1.5 %
EOSINOPHILS ABSOLUTE: 0.1 THOU/MM3 (ref 0–0.4)
ERYTHROCYTE [DISTWIDTH] IN BLOOD BY AUTOMATED COUNT: 13.7 % (ref 11.5–14.5)
ERYTHROCYTE [DISTWIDTH] IN BLOOD BY AUTOMATED COUNT: 41.9 FL (ref 35–45)
GFR SERPL CREATININE-BSD FRML MDRD: > 90 ML/MIN/1.73M2
GLUCOSE BLD-MCNC: 290 MG/DL (ref 70–108)
HCT VFR BLD CALC: 44.9 % (ref 37–47)
HEMOGLOBIN: 14.9 GM/DL (ref 12–16)
IMMATURE GRANS (ABS): 0.02 THOU/MM3 (ref 0–0.07)
IMMATURE GRANULOCYTES: 0.3 %
LIPASE: 25.8 U/L (ref 5.6–51.3)
LYMPHOCYTES # BLD: 27.4 %
LYMPHOCYTES ABSOLUTE: 1.9 THOU/MM3 (ref 1–4.8)
MCH RBC QN AUTO: 27.9 PG (ref 26–33)
MCHC RBC AUTO-ENTMCNC: 33.2 GM/DL (ref 32.2–35.5)
MCV RBC AUTO: 84.1 FL (ref 81–99)
MONOCYTES # BLD: 6.3 %
MONOCYTES ABSOLUTE: 0.4 THOU/MM3 (ref 0.4–1.3)
NUCLEATED RED BLOOD CELLS: 0 /100 WBC
OSMOLALITY CALCULATION: 276.3 MOSMOL/KG (ref 275–300)
PLATELET # BLD: 247 THOU/MM3 (ref 130–400)
PMV BLD AUTO: 10.5 FL (ref 9.4–12.4)
POTASSIUM REFLEX MAGNESIUM: 3.7 MEQ/L (ref 3.5–5.2)
PRO-BNP: 12.9 PG/ML (ref 0–450)
RBC # BLD: 5.34 MILL/MM3 (ref 4.2–5.4)
SEG NEUTROPHILS: 63.6 %
SEGMENTED NEUTROPHILS ABSOLUTE COUNT: 4.4 THOU/MM3 (ref 1.8–7.7)
SODIUM BLD-SCNC: 132 MEQ/L (ref 135–145)
TOTAL PROTEIN: 7.9 G/DL (ref 6.1–8)
TROPONIN T: < 0.01 NG/ML
WBC # BLD: 6.9 THOU/MM3 (ref 4.8–10.8)

## 2020-10-23 PROCEDURE — 71275 CT ANGIOGRAPHY CHEST: CPT

## 2020-10-23 PROCEDURE — 93010 ELECTROCARDIOGRAM REPORT: CPT | Performed by: NUCLEAR MEDICINE

## 2020-10-23 PROCEDURE — 70450 CT HEAD/BRAIN W/O DYE: CPT

## 2020-10-23 PROCEDURE — 6360000004 HC RX CONTRAST MEDICATION: Performed by: NURSE PRACTITIONER

## 2020-10-23 RX ADMIN — IOPAMIDOL 80 ML: 755 INJECTION, SOLUTION INTRAVENOUS at 01:12

## 2020-10-23 NOTE — ED TRIAGE NOTES
Pt presents to the ED from home c/o chest pain. Pt states the pain began about 4 days ago and has progressively gotten worse. Pt reports having a filter, hx of clots in lungs and legs, and TIA. Pt states she \"waits for the last minute to come when she thinks she has clots. \" Pt reports chest pain, and throbbing head and chest. Daughter at bedside. Pt reports pain in the chest 8/10 and describes it as an ache. Pt reports have SOB. Daughter states pt has not been answering questions correctly. Pt is currently A+Ox4.

## 2020-10-23 NOTE — ED NOTES
ED nurse-to-nurse bedside report    Chief Complaint   Patient presents with    Chest Pain      LOC: alert and orientated to name, place, date  Vital signs   Vitals:    10/22/20 2326 10/23/20 0055   BP: (!) 182/75 (!) 168/79   Pulse: 92 91   Resp: 14 16   Temp: 99.2 °F (37.3 °C)    TempSrc: Oral    SpO2: 95% 100%   Weight: (!) 362 lb (164.2 kg)    Height: 5' 8\" (1.727 m)       Pain:    Pain Interventions: None  Pain Goal: 3  Oxygen: RA    Current needs required RA   Telemetry: Yes  LDAs:   Peripheral IV 10/22/20 Right Forearm (Active)   Site Assessment Clean;Dry; Intact 10/22/20 2356   Line Status Brisk blood return;Flushed;Normal saline locked 10/22/20 2356   Dressing Status Clean;Dry; Intact 10/22/20 2356   Dressing Intervention New 10/22/20 2356     Continuous Infusions:   Mobility: Requires assistance * 1  Lion Fall Risk Score: No flowsheet data found.   Fall Interventions: call light in reach  Report given to: Rhina Weeks, [address] [place] [address], RN  10/23/20 6660

## 2020-10-23 NOTE — ED NOTES
This RN received report from Tim [facility]. Pt laying on cot with respirations easy and unlabored. Pt denies any further needs at this time.  Pt to [address] [place] [address], [place]  10/23/20 0710

## 2020-10-23 NOTE — ED PROVIDER NOTES
Attending Supervising Physicians Attestation Statement  I was present with the nurse practitioner during the history and exam. I discussed the findings and plans with the nurse practitioner and agree as documented in her note     Electronically signed by Bhupendra Rdoriguez MD on 10/23/20 at 2:43 AM EDT           Bhupendra Rodriguez MD  10/23/20 0061

## 2020-10-23 NOTE — ED NOTES
Pt updated on POC. Patient resting in bed. Respirations easy and unlabored. No distress noted. Call light within reach. Daughter at bedside.      Mariza Loja RN  10/23/20 0803

## 2020-10-23 NOTE — ED NOTES
Pt laying on cot with respirations easy and unlabored. Pt up and ambulating to bathroom. Will continue to monitor.       Torey Kaye RN  10/23/20 4180

## 2020-10-28 ASSESSMENT — ENCOUNTER SYMPTOMS
CHEST TIGHTNESS: 0
COUGH: 0
SHORTNESS OF BREATH: 1
NAUSEA: 0
VOMITING: 0
ABDOMINAL PAIN: 0
BACK PAIN: 0
RHINORRHEA: 0

## 2020-10-28 NOTE — ED PROVIDER NOTES
[facility] Emergency Department    CHIEF COMPLAINT       Chief Complaint   Patient presents with    Chest Pain       Nurses Notes reviewed and I agree except as noted in the HPI. HISTORY OF PRESENT ILLNESS    Kristen Jay brian 54 y.o. female who presents to the ED for evaluation of shortness of breath and chest discomfort. The patient has a long history of PE and DVT. She is currently on xarelto and denies that she has missed any doses. She reports shortness of breath gets worse with exertion. Patient also admits to having some anxiety about having blood clots. She states she has developed them on blood thinners in the past.      HPI was provided by the patient. REVIEW OF SYSTEMS     Review of Systems   Constitutional: Positive for fatigue. Negative for chills and fever. HENT: Negative for congestion, ear discharge, ear pain, postnasal drip and rhinorrhea. Respiratory: Positive for shortness of breath. Negative for cough and chest tightness. Cardiovascular: Positive for chest pain and leg swelling. Negative for palpitations. Gastrointestinal: Negative for abdominal pain, nausea and vomiting. Genitourinary: Negative for difficulty urinating, dysuria, enuresis, flank pain and hematuria. Musculoskeletal: Positive for myalgias. Negative for arthralgias, back pain and joint swelling. Skin: Negative for rash. Neurological: Negative for dizziness, light-headedness, numbness and headaches. Psychiatric/Behavioral: Negative for agitation, behavioral problems and confusion. The patient is nervous/anxious.          PAST MEDICAL HISTORY     Past Medical History:   Diagnosis Date    Asthma     [facility] ([facility]     Blood circulation, collateral     CAD (coronary artery disease)     Curvature of spine     Diabetes mellitus ([facility]     [facility]     Factor 5 Leiden mutation, heterozygous ([facility]     GERD (gastroesophageal reflux disease)     HTN, goal below in her father; High Blood Pressure in her father; Mental Illness in her brother, brother, sister, and sister; Other in her mother. SOCIAL HISTORY       Social History     Socioeconomic History    Marital status:      Spouse name: Not on file    Number of children: 3    Years of education: Not on file    Highest education level: Not on file   Occupational History    Not on file   Social Needs    Financial resource strain: Not on file    Food insecurity     Worry: Not on file     Inability: Not on file    Transportation needs     Medical: Not on file     Non-medical: Not on file   Tobacco Use    Smoking status: Former Smoker     Packs/day: 0.50     Types: Cigarettes     Last attempt to quit: 7/8/2005     Years since quitting: 15.3    Smokeless tobacco: Never Used   Substance and Sexual Activity    Alcohol use: No    Drug use: No    Sexual activity: Not on file   Lifestyle    Physical activity     Days per week: Not on file     Minutes per session: Not on file    Stress: Not on file   Relationships    Social connections     Talks on phone: Not on file     Gets together: Not on file     Attends Latter-day service: Not on file     Active member of club or organization: Not on file     Attends meetings of clubs or organizations: Not on file     Relationship status: Not on file    Intimate partner violence     Fear of current or ex partner: Not on file     Emotionally abused: Not on file     Physically abused: Not on file     Forced sexual activity: Not on file   Other Topics Concern    Not on file   Social History Narrative    Not on file       PHYSICAL EXAM     INITIAL VITALS:  height is 5' 8\" (1.727 m) and weight is 362 lb (164.2 kg) (abnormal). Her oral temperature is 99.2 °F (37.3 °C). Her blood pressure is 122/75 and her pulse is 65. Her respiration is 20 and oxygen saturation is 98%. Physical Exam  Vitals signs and nursing note reviewed.    Constitutional:       General: She is not in acute distress. Appearance: Normal appearance. She is well-developed. She is not ill-appearing, toxic-appearing or diaphoretic. HENT:      Head: Normocephalic and atraumatic. Mouth/Throat:      Mouth: Mucous membranes are moist.      Pharynx: Oropharynx is clear. Eyes:      General:         Right eye: No discharge. Left eye: No discharge. Conjunctiva/sclera: Conjunctivae normal.   Neck:      Musculoskeletal: Normal range of motion. Trachea: No tracheal deviation. Cardiovascular:      Rate and Rhythm: Normal rate and regular rhythm. Pulses: Normal pulses. Heart sounds: Normal heart sounds. No murmur. No gallop. Comments: Normal capillary refill  Pulmonary:      Effort: Pulmonary effort is normal. No respiratory distress. Breath sounds: Normal breath sounds. No stridor. Abdominal:      General: Bowel sounds are normal.      Palpations: Abdomen is soft. Musculoskeletal: Normal range of motion. General: No tenderness or deformity. Right lower leg: Edema present. Left lower leg: Edema present. Skin:     General: Skin is warm and dry. Capillary Refill: Capillary refill takes less than 2 seconds. Coloration: Skin is not pale. Findings: No erythema or rash. Neurological:      General: No focal deficit present. Mental Status: She is alert and oriented to person, place, and time. Cranial Nerves: No cranial nerve deficit.    Psychiatric:         Behavior: Behavior normal.         DIFFERENTIAL DIAGNOSIS:   PE, ACS, PNA    DIAGNOSTIC RESULTS     EKG: All EKG's are interpreted by the Emergency Department Physician who eithersigns or Co-signs this chart in the absence of a cardiologist.     EKG Chest Pain (Final result)    Component (Lab Inquiry)   Collection Time  Result Time  Ventricular Rate  Atrial Rate  P-R Interval  QRS Duration  Q-T Interval  QTc Calculation (Bazett)  P Axis  R Axis  T Axis    10/22/20 23:24:25  10/22/20 23:24:25  99  99  184  98  374  479  42  9  69    Previous Results    09/09/19 21:49:37  09/09/19 21:49:37  92  92  182  100  356  440  34  8  57    09/07/19 04:44:25  09/07/19 04:44:25  86  86  184  100  380  454  32  11  51    07/27/19 11:07:10  07/27/19 11:07:10  82  82  174  100  386  450  35  -3  30    03/13/19 17:46:10  03/13/19 17:46:10  81  81  172  100  378  439  41  4  52    12/03/18 16:43:09  12/03/18 16:43:09  81  81  170  102  390  453  33  -4  50           Final result                 Narrative:     Poor data quality, interpretation may be adversely affected   Normal sinus rhythm   Normal ECG   When compared with ECG of 09-SEP-2019 21:49,   No significant change was found   Confirmed by Lj Arora ([id number]) on 10/23/2020 7:34:15 AM                     RADIOLOGY: non-plainfilm images(s) such as CT, Ultrasound and MRI are read by the radiologist.  Plain radiographic images are visualized and preliminarily interpreted by the emergency physician unless otherwise stated below. CT HEAD WO CONTRAST   Final Result   No acute intracranial findings. This document has been electronically signed by: Henrry Blevins MD on    10/23/2020 01:42 AM      All CT scans at this facility use dose modulation, iterative    reconstruction, and/or weight-based   dosing when appropriate to reduce radiation dose to as low as reasonably    achievable. CTA CHEST W WO CONTRAST   Final Result   No pulmonary emboli. No acute parenchymal lung disease. This document has been electronically signed by: Henrry Blevins MD on    10/23/2020 01:46 AM      All CT scans at this facility use dose modulation, iterative    reconstruction, and/or weight-based   dosing when appropriate to reduce radiation dose to as low as reasonably    achievable.                LABS:   Labs Reviewed   COMPREHENSIVE METABOLIC PANEL W/ REFLEX TO MG FOR LOW K - Abnormal; Notable for the following components:       Result Value    Glucose 290 (*) Sodium 132 (*)     Chloride 93 (*)     All other components within normal limits   CBC WITH AUTO DIFFERENTIAL   LIPASE   TROPONIN   BRAIN NATRIURETIC PEPTIDE   ANION GAP   GLOMERULAR FILTRATION RATE, ESTIMATED   OSMOLALITY       EMERGENCY DEPARTMENT COURSE:   Vitals:    Vitals:    10/22/20 2326 10/23/20 0055 10/23/20 0122 10/23/20 0232   BP: (!) 182/75 (!) 168/79 (!) 158/72 122/75   Pulse: 92 91 70 65   Resp: 14 16 22 20   Temp: 99.2 °F (37.3 °C)      TempSrc: Oral      SpO2: 95% 100% 98% 98%   Weight: (!) 362 lb (164.2 kg)      Height: 5' 8\" (1.727 m)             MDM                       Patient was seen in the ER for concern for a blood clot in her lung. She has a long history of PE and DVT. Appropriate labs and imaging are ordered and reviewed. Labs are reassuring. Patient is on xarelto currently. CTA of chest and CT head is obtained. Both are negative for acute pathology. Dr. Irish Ly also evaluated the patient. He agrees with my POC to discharge the patient. The patient is comfortable with this plan. Medications   iopamidol (ISOVUE-370) 76 % injection 80 mL (80 mLs Intravenous Given 10/23/20 0112)         Patient was seenindependently by myself. The patient's final impression and disposition and plan was determined by myself. CRITICAL CARE:   None    CONSULTS:  None    PROCEDURES:  None    FINAL IMPRESSION     1. Chronic anticoagulation    2. History of pulmonary embolus (PE)    3. Bilateral leg pain    4.  Shortness of breath          DISPOSITION/PLAN   DISPOSITION Decision To Discharge 10/23/2020 03:08:03 AM      PATIENT REFERREDTO:  Iliana Hurley, APRN - [facility]  [address] [place] [address]  [phone]    Schedule an appointment as soon as possible for a visit in 2 days  For follow up      DISCHARGE MEDICATIONS:  Discharge Medication List as of 10/23/2020  3:09 AM          (Please note that portions of this note were completed with a voice recognition program.  Efforts were made to edit the dictations but occasionally words are mis-transcribed.)         SHANNON Calvert CNP, APRN - CNP  10/28/20 [id number]

## 2020-11-03 PROBLEM — I10 ESSENTIAL HYPERTENSION: Status: RESOLVED | Noted: 2017-04-11 | Resolved: 2020-11-03

## 2020-12-01 ENCOUNTER — HOSPITAL ENCOUNTER (EMERGENCY)
Age: 50
Discharge: HOME | End: 2020-12-01
Payer: COMMERCIAL

## 2020-12-01 VITALS
TEMPERATURE: 98.8 F | BODY MASS INDEX: 44.41 KG/M2 | RESPIRATION RATE: 16 BRPM | DIASTOLIC BLOOD PRESSURE: 82 MMHG | HEIGHT: 68 IN | WEIGHT: 293 LBS | HEART RATE: 78 BPM | OXYGEN SATURATION: 97 % | SYSTOLIC BLOOD PRESSURE: 139 MMHG

## 2020-12-01 PROCEDURE — 99212 OFFICE O/P EST SF 10 MIN: CPT

## 2020-12-01 PROCEDURE — 99213 OFFICE O/P EST LOW 20 MIN: CPT | Performed by: NURSE PRACTITIONER

## 2020-12-01 RX ORDER — ASCORBIC ACID/MULTIVIT-MIN 1000 MG
EFFERVESCENT POWDER IN PACKET ORAL
Refills: 0 | COMMUNITY
Start: 2020-12-01 | End: 2022-02-15

## 2020-12-01 RX ORDER — ALBUTEROL SULFATE 90 UG/1
2 AEROSOL, METERED RESPIRATORY (INHALATION) EVERY 6 HOURS PRN
Qty: 1 INHALER | Refills: 0 | Status: SHIPPED | OUTPATIENT
Start: 2020-12-01 | End: 2022-02-15

## 2020-12-01 RX ORDER — AZITHROMYCIN 250 MG/1
250 TABLET, FILM COATED ORAL DAILY
Qty: 6 TABLET | Refills: 0 | Status: SHIPPED | OUTPATIENT
Start: 2020-12-01 | End: 2020-12-07

## 2020-12-01 RX ORDER — CALCIUM CARBONATE 260MG(650)
TABLET,CHEWABLE ORAL
Refills: 0 | COMMUNITY
Start: 2020-12-01 | End: 2022-02-15

## 2020-12-01 ASSESSMENT — ENCOUNTER SYMPTOMS
SORE THROAT: 1
COUGH: 1
VOMITING: 0
APNEA: 0
CONSTIPATION: 0
SINUS PAIN: 1
DIARRHEA: 0
SINUS PRESSURE: 1
NAUSEA: 0
STRIDOR: 0
CHEST TIGHTNESS: 0
ABDOMINAL PAIN: 0
RHINORRHEA: 1
WHEEZING: 0
CHOKING: 0
SWOLLEN GLANDS: 0
SHORTNESS OF BREATH: 0

## 2020-12-01 NOTE — ED TRIAGE NOTES
Pt to [facility] ambulatory with sore throat, URI s/s, and sinus pressure. This started on Saturday.

## 2020-12-01 NOTE — ED PROVIDER NOTES
[facility]  Urgent Care Encounter      CHIEF COMPLAINT       Chief Complaint   Patient presents with    Pharyngitis    URI    Sinusitis       Nurses Notes reviewed and I agree except as noted in the HPI. HISTORY OFPRESENT ILLNESS   Nile Fontana is a 52 y.o. The history is provided by the patient. No  was used. URI   Presenting symptoms: congestion, cough, ear pain, rhinorrhea and sore throat    Presenting symptoms: no facial pain, no fatigue and no fever    Severity:  Moderate  Onset quality:  Sudden  Duration:  3 days  Timing:  Constant  Progression:  Worsening  Chronicity:  Recurrent  Relieved by:  Nothing  Worsened by:  Certain positions  Ineffective treatments:  OTC medications  Associated symptoms: headaches, sinus pain and sneezing    Associated symptoms: no arthralgias, no myalgias, no neck pain, no swollen glands and no wheezing    Risk factors: chronic respiratory disease and sick contacts    Risk factors: not elderly, no chronic cardiac disease, no chronic kidney disease, no diabetes mellitus, no immunosuppression, no recent illness and no recent travel        REVIEW OF SYSTEMS     Review of Systems   Constitutional: Negative for activity change, appetite change, chills, diaphoresis, fatigue and fever. HENT: Positive for congestion, ear pain, postnasal drip, rhinorrhea, sinus pressure, sinus pain, sneezing and sore throat. Respiratory: Positive for cough. Negative for apnea, choking, chest tightness, shortness of breath, wheezing and stridor. Cardiovascular: Negative for chest pain, palpitations and leg swelling. Gastrointestinal: Negative for abdominal pain, constipation, diarrhea, nausea and vomiting. Musculoskeletal: Negative for arthralgias, myalgias and neck pain. Neurological: Positive for headaches. Negative for dizziness and light-headedness.        PAST MEDICAL HISTORY         Diagnosis Date    Asthma     [facility] disorder ([facility]  Blood circulation, collateral     CAD (coronary artery disease)     Curvature of spine     Diabetes mellitus (HCC)     [facility]     Factor 5 Leiden mutation, heterozygous ([facility]     GERD (gastroesophageal reflux disease)     HTN, goal below 130/80     Hx of blood clots     PE x3 and DVT x3    Hx-TIA (transient ischemic attack)     Hyperlipidemia     PE (pulmonary embolism)     RA (rheumatoid arthritis) ([facility]     Unspecified cerebral artery occlusion with cerebral infarction        SURGICAL HISTORY     Patient  has a past surgical history that includes Tubal ligation (2003); Cholecystectomy (1992); Knee arthroscopy (2007&2010); Tympanostomy tube placement; Tonsillectomy; Cardiac catheterization (feb 2015); and Foot Debridement (Right, 9/30/2019). CURRENT MEDICATIONS       Discharge Medication List as of 12/1/2020 11:57 AM      CONTINUE these medications which have NOT CHANGED    Details   gabapentin (NEURONTIN) 300 MG capsule Take 300 mg by mouth 2 times daily. Historical Med      acetaminophen (TYLENOL) 325 MG tablet Take 2 tablets by mouth every 4 hours as needed for Pain, Disp-120 tablet, R-3OTC      alogliptin (NESINA) 25 MG TABS tablet Take 25 mg by mouth dailyHistorical Med      glipiZIDE (GLUCOTROL) 5 MG tablet Take 1 tablet by mouth daily, Disp-90 tablet, R-1Normal      metFORMIN (GLUCOPHAGE) 1000 MG tablet Take 1 tablet by mouth 2 times daily (with meals), Disp-180 tablet, R-1Normal      blood glucose test strips (TRUETRACK TEST) strip 2 TIMES DAILY Starting Fri 3/15/2019, Disp-100 each, R-0, NormalAs needed. ALLERGIES     Patient is is allergic to codeine; demerol; ultram [tramadol hcl]; percocet [oxycodone-acetaminophen]; and toradol [ketorolac tromethamine].     FAMILY HISTORY     Patient's family history includes COPD in her mother; Cancer in her mother; Heart Disease in her father; High Blood Pressure in her father; Mental Illness in her brother, brother, sister, and sister; Other in her mother. SOCIAL HISTORY     Patient  reports that she quit smoking about 15 years ago. Her smoking use included cigarettes. She smoked 0.50 packs per day. She has never used smokeless tobacco. She reports that she does not drink alcohol or use drugs. PHYSICAL EXAM     ED TRIAGE VITALS  BP: 139/82, Temp: 98.8 °F (37.1 °C), Pulse: 78, Resp: 16, SpO2: 97 %  Physical Exam  Vitals signs and nursing note reviewed. Constitutional:       General: She is not in acute distress. Appearance: She is well-developed. She is obese. She is not ill-appearing, toxic-appearing or diaphoretic. HENT:      Head: Normocephalic and atraumatic. Right Ear: Hearing, ear canal and external ear normal. Tympanic membrane is bulging. Left Ear: Hearing, tympanic membrane, ear canal and external ear normal.      Nose: Congestion and rhinorrhea present. Mouth/Throat:      Lips: Pink. No lesions. Mouth: Mucous membranes are moist. No oral lesions. Pharynx: Uvula midline. Pharyngeal swelling and posterior oropharyngeal erythema present. No oropharyngeal exudate or uvula swelling. Tonsils: No tonsillar exudate or tonsillar abscesses. Neck:      Musculoskeletal: Normal range of motion and neck supple. Pulmonary:      Effort: Pulmonary effort is normal. No respiratory distress. Breath sounds: No stridor. No wheezing, rhonchi or rales. Chest:      Chest wall: No tenderness. Skin:     General: Skin is warm. Neurological:      General: No focal deficit present. Mental Status: She is alert and oriented to person, place, and time. Psychiatric:         Mood and Affect: Mood normal.         Behavior: Behavior normal.         DIAGNOSTIC RESULTS   Labs:No results found for this visit on 12/01/20.     IMAGING:  No orders to display     URGENT CARE COURSE:     Vitals:    12/01/20 1137 12/01/20 1155   BP: (!) 167/88 139/82   Pulse: 84 78   Resp: 18 16   Temp: 98.8 °F (37.1 °C)    TempSrc: Temporal    SpO2: 95% 97%   Weight: (!) 355 lb (161 kg)    Height: 5' 8\" (1.727 m)        Medications - No data to display  PROCEDURES:  None  FINAL IMPRESSION      1. Acute pharyngitis, unspecified etiology        DISPOSITION/PLAN   Decision To Discharge      Drink lots of fluids  Take Motrin or Tylenol for headache  Humidification of air  Use nasal spray as directed  Take a nasal decongestant as directed  Monitor for any fever increased and sinus pain or pressure  Follow-up see her primary care provider in 48 hours  Or go to the emergency department for any changes or concerns. PATIENT REFERRED TO:  SHANNON Sorensen CNP  [address]  [phone]    Schedule an appointment as soon as possible for a visit in 1 week      DISCHARGE MEDICATIONS:  Discharge Medication List as of 12/1/2020 11:57 AM      START taking these medications    Details   Multiple Vitamins-Minerals (EMERGEN-C VITAMIN C) PACK Follow package directions, R-0OTC      Zinc 10 MG LOZG Follow package directions, R-0OTC      azithromycin (ZITHROMAX) 250 MG tablet Take 1 tablet by mouth daily for 6 doses Disregard above directions. Take 2 pills today and 1 for the next 4 days. , Disp-6 tablet,R-0Normal      albuterol sulfate  (90 Base) MCG/ACT inhaler Inhale 2 puffs into the lungs every 6 hours as needed for Wheezing or Shortness of Breath, Disp-1 Inhaler,R-0Normal           Discharge Medication List as of 12/1/2020 11:57 AM          SHANNON Delgadillo CNP, APRN - CNP  12/01/20 [id number]

## 2021-12-30 ENCOUNTER — HOSPITAL ENCOUNTER (EMERGENCY)
Age: 51
Discharge: HOME | End: 2021-12-30
Payer: COMMERCIAL

## 2021-12-30 ENCOUNTER — APPOINTMENT (OUTPATIENT)
Dept: GENERAL RADIOLOGY | Age: 51
End: 2021-12-30
Payer: COMMERCIAL

## 2021-12-30 VITALS
BODY MASS INDEX: 51.24 KG/M2 | SYSTOLIC BLOOD PRESSURE: 156 MMHG | OXYGEN SATURATION: 99 % | WEIGHT: 293 LBS | RESPIRATION RATE: 19 BRPM | HEART RATE: 73 BPM | TEMPERATURE: 97.8 F | DIASTOLIC BLOOD PRESSURE: 72 MMHG

## 2021-12-30 DIAGNOSIS — U07.1 COVID-19: Primary | ICD-10-CM

## 2021-12-30 LAB
ALBUMIN SERPL-MCNC: 4.6 G/DL (ref 3.5–5.1)
ALP BLD-CCNC: 74 U/L (ref 38–126)
ALT SERPL-CCNC: 23 U/L (ref 11–66)
ANION GAP SERPL CALCULATED.3IONS-SCNC: 13 MEQ/L (ref 8–16)
AST SERPL-CCNC: 24 U/L (ref 5–40)
BASOPHILS # BLD: 0.8 %
BASOPHILS ABSOLUTE: 0 THOU/MM3 (ref 0–0.1)
BILIRUB SERPL-MCNC: 0.4 MG/DL (ref 0.3–1.2)
BUN BLDV-MCNC: 7 MG/DL (ref 7–22)
CALCIUM SERPL-MCNC: 9.9 MG/DL (ref 8.5–10.5)
CHLORIDE BLD-SCNC: 99 MEQ/L (ref 98–111)
CO2: 25 MEQ/L (ref 23–33)
CREAT SERPL-MCNC: 0.5 MG/DL (ref 0.4–1.2)
D-DIMER QUANTITATIVE: 493 NG/ML FEU (ref 0–500)
EOSINOPHIL # BLD: 4 %
EOSINOPHILS ABSOLUTE: 0.2 THOU/MM3 (ref 0–0.4)
ERYTHROCYTE [DISTWIDTH] IN BLOOD BY AUTOMATED COUNT: 14.2 % (ref 11.5–14.5)
ERYTHROCYTE [DISTWIDTH] IN BLOOD BY AUTOMATED COUNT: 46.2 FL (ref 35–45)
FLU A ANTIGEN: NEGATIVE
FLU B ANTIGEN: NEGATIVE
GFR SERPL CREATININE-BSD FRML MDRD: > 90 ML/MIN/1.73M2
GLUCOSE BLD-MCNC: 179 MG/DL (ref 70–108)
GROUP A STREP CULTURE, REFLEX: NEGATIVE
HCT VFR BLD CALC: 44.9 % (ref 37–47)
HEMOGLOBIN: 14.7 GM/DL (ref 12–16)
IMMATURE GRANS (ABS): 0.02 THOU/MM3 (ref 0–0.07)
IMMATURE GRANULOCYTES: 0.3 %
LIPASE: 22.4 U/L (ref 5.6–51.3)
LYMPHOCYTES # BLD: 12.7 %
LYMPHOCYTES ABSOLUTE: 0.8 THOU/MM3 (ref 1–4.8)
MCH RBC QN AUTO: 29.4 PG (ref 26–33)
MCHC RBC AUTO-ENTMCNC: 32.7 GM/DL (ref 32.2–35.5)
MCV RBC AUTO: 89.8 FL (ref 81–99)
MONOCYTES # BLD: 5.1 %
MONOCYTES ABSOLUTE: 0.3 THOU/MM3 (ref 0.4–1.3)
NUCLEATED RED BLOOD CELLS: 0 /100 WBC
OSMOLALITY CALCULATION: 276.3 MOSMOL/KG (ref 275–300)
PLATELET # BLD: 239 THOU/MM3 (ref 130–400)
PMV BLD AUTO: 9.7 FL (ref 9.4–12.4)
POTASSIUM REFLEX MAGNESIUM: 4.2 MEQ/L (ref 3.5–5.2)
PRO-BNP: 63.3 PG/ML (ref 0–450)
RBC # BLD: 5 MILL/MM3 (ref 4.2–5.4)
REFLEX THROAT C + S: NORMAL
SARS-COV-2, NAAT: DETECTED
SEG NEUTROPHILS: 77.1 %
SEGMENTED NEUTROPHILS ABSOLUTE COUNT: 4.6 THOU/MM3 (ref 1.8–7.7)
SODIUM BLD-SCNC: 137 MEQ/L (ref 135–145)
TOTAL PROTEIN: 7.4 G/DL (ref 6.1–8)
TROPONIN T: < 0.01 NG/ML
WBC # BLD: 6 THOU/MM3 (ref 4.8–10.8)

## 2021-12-30 PROCEDURE — 84484 ASSAY OF TROPONIN QUANT: CPT

## 2021-12-30 PROCEDURE — 83880 ASSAY OF NATRIURETIC PEPTIDE: CPT

## 2021-12-30 PROCEDURE — 87804 INFLUENZA ASSAY W/OPTIC: CPT

## 2021-12-30 PROCEDURE — 87070 CULTURE OTHR SPECIMN AEROBIC: CPT

## 2021-12-30 PROCEDURE — 99282 EMERGENCY DEPT VISIT SF MDM: CPT

## 2021-12-30 PROCEDURE — 87880 STREP A ASSAY W/OPTIC: CPT

## 2021-12-30 PROCEDURE — 85025 COMPLETE CBC W/AUTO DIFF WBC: CPT

## 2021-12-30 PROCEDURE — 36415 COLL VENOUS BLD VENIPUNCTURE: CPT

## 2021-12-30 PROCEDURE — 85379 FIBRIN DEGRADATION QUANT: CPT

## 2021-12-30 PROCEDURE — 71045 X-RAY EXAM CHEST 1 VIEW: CPT

## 2021-12-30 PROCEDURE — 80053 COMPREHEN METABOLIC PANEL: CPT

## 2021-12-30 PROCEDURE — 87635 SARS-COV-2 COVID-19 AMP PRB: CPT

## 2021-12-30 PROCEDURE — 83690 ASSAY OF LIPASE: CPT

## 2021-12-30 PROCEDURE — 93005 ELECTROCARDIOGRAM TRACING: CPT | Performed by: NURSE PRACTITIONER

## 2021-12-30 ASSESSMENT — ENCOUNTER SYMPTOMS
CONSTIPATION: 0
RHINORRHEA: 0
SHORTNESS OF BREATH: 1
VOMITING: 0
EYE PAIN: 0
SORE THROAT: 1
WHEEZING: 0
ABDOMINAL PAIN: 0
NAUSEA: 0
COUGH: 1
DIARRHEA: 0
BLOOD IN STOOL: 0

## 2021-12-30 ASSESSMENT — PAIN DESCRIPTION - LOCATION: LOCATION: THROAT

## 2021-12-30 ASSESSMENT — PAIN DESCRIPTION - DESCRIPTORS
DESCRIPTORS: ACHING
DESCRIPTORS: BURNING

## 2021-12-30 ASSESSMENT — PAIN DESCRIPTION - PAIN TYPE: TYPE: ACUTE PAIN

## 2021-12-30 ASSESSMENT — PAIN SCALES - GENERAL: PAINLEVEL_OUTOF10: 6

## 2021-12-30 ASSESSMENT — PAIN DESCRIPTION - FREQUENCY: FREQUENCY: CONTINUOUS

## 2021-12-30 NOTE — Clinical Note
Danae Gaytan was seen and treated in our emergency department on 12/30/2021. COVID19 virus is suspected. Per the CDC guidelines we recommend home isolation until the following conditions are all met:    1. At least 10 days have passed since symptoms first appeared and  2. At least 24 hours have passed since last fever without the use of fever-reducing medications and  3. Symptoms (e.g., cough, shortness of breath) have improved    If you have any questions or concerns, please don't hesitate to call. She may return to work/school on 01/12/2022    Positive for COVID-19 on 12/30/2021.     Mathew Sanchez, APRN - CNP

## 2021-12-30 NOTE — ED PROVIDER NOTES
[address] [facility] [address] [address] EMERGENCY DEPT  EMERGENCY MEDICINE     Pt Name: Kristin Metzger  MRN: [id number]  [place] 1970  Date of evaluation: 12/30/2021  PCP:    SHANNON Gaines CNP  Provider: SHANNON Foote CNP    CHIEF COMPLAINT       Chief Complaint   Patient presents with    Pharyngitis    Cough           HISTORY OF PRESENT ILLNESS    Kiera Greer is a 53-year-old female patient that presents to ER with complaint of headache, cough and sore throat. She states she has chest pain and shortness of breath. She denies nausea, vomiting, diarrhea or fever. Patient states she does have a history of pulmonary embolism. She is concerned about this because she keeps having periods where \"her heart races\". Her chest pain is midsternal and described as pressure. Triage notes and Nursing notes were reviewed by myself. Any discrepancies are addressed above.     PAST MEDICAL HISTORY     Past Medical History:   Diagnosis Date    Asthma     Bipolar 1 disorder ([facility]     Blood circulation, collateral     CAD (coronary artery disease)     Curvature of spine     Diabetes mellitus (HCC)     D[facility] thrombosis) [facility]     Factor 5 Leiden mutation, heterozygous ([facility]     GERD (gastroesophageal reflux disease)     HTN, goal below 130/80     Hx of blood clots     PE x3 and DVT x3    Hx-TIA (transient ischemic attack)     Hyperlipidemia     PE (pulmonary embolism)     RA (rheumatoid arthritis) ([facility]     Unspecified cerebral artery occlusion with cerebral infarction        SURGICAL HISTORY       Past Surgical History:   Procedure Laterality Date    CARDIAC CATHETERIZATION  feb 2015    Heart caths    CHOLECYSTECTOMY  1992    FOOT DEBRIDEMENT Right 9/30/2019    FOOT DEBRIDEMENT INCISION AND DRAINAGE performed by Anastasiya Cid DPM at [address] [place] [address] ARTHROSCOPY  2007&2010    TONSILLECTOMY      TUBAL LIGATION  2003    TYMPANOSTOMY TUBE PLACEMENT         CURRENT MEDICATIONS       Previous Medications    ACETAMINOPHEN (TYLENOL) 325 MG TABLET    Take 2 tablets by mouth every 4 hours as needed for Pain    ALBUTEROL SULFATE  (90 BASE) MCG/ACT INHALER    Inhale 2 puffs into the lungs every 6 hours as needed for Wheezing or Shortness of Breath    ALOGLIPTIN (NESINA) 25 MG TABS TABLET    Take 25 mg by mouth daily    BLOOD GLUCOSE TEST STRIPS (TRUETRACK TEST) STRIP    1 each by In Vitro route 2 times daily As needed. GABAPENTIN (NEURONTIN) 300 MG CAPSULE    Take 300 mg by mouth 2 times daily.     GLIPIZIDE (GLUCOTROL) 5 MG TABLET    Take 1 tablet by mouth daily    METFORMIN (GLUCOPHAGE) 1000 MG TABLET    Take 1 tablet by mouth 2 times daily (with meals)    MULTIPLE VITAMINS-MINERALS (EMERGEN-C VITAMIN C) PACK    Follow package directions    ZINC 10 MG LOZG    Follow package directions       ALLERGIES       Allergies   Allergen Reactions    Codeine Hives     + Nausea vomiting    Demerol      vomiting    Ultram [Tramadol Hcl] Other (See Comments)     Dizziness     Percocet [Oxycodone-Acetaminophen] Nausea And Vomiting    Toradol [Ketorolac Tromethamine] Rash       FAMILY HISTORY       Family History   Problem Relation Age of Onset    COPD Mother     Other Mother     Cancer Mother     High Blood Pressure Father     Heart Disease Father     Mental Illness Sister     Mental Illness Brother     Mental Illness Sister     Mental Illness Brother         SOCIAL HISTORY       Social History     Socioeconomic History    Marital status:      Spouse name: None    Number of children: 3    Years of education: None    Highest education level: None   Occupational History    None   Tobacco Use    Smoking status: Former Smoker     Packs/day: 0.50     Types: Cigarettes     Quit date: 2005     Years since quittin.4    Smokeless tobacco: Never Used   Vaping Use    Vaping Use: Never used   Substance and Sexual Activity    Alcohol use: No    Drug use: No    Sexual activity: None Other Topics Concern    None   Social History Narrative    None     Social Determinants of Health     Financial Resource Strain:     Difficulty of Paying Living Expenses: Not on file   Food Insecurity:     Worried About Running Out of Food in the Last Year: Not on file    Nathalia of Food in the Last Year: Not on file   Transportation Needs:     Lack of Transportation (Medical): Not on file    Lack of Transportation (Non-Medical): Not on file   Physical Activity:     Days of Exercise per Week: Not on file    Minutes of Exercise per Session: Not on file   Stress:     Feeling of Stress : Not on file   Social Connections:     Frequency of Communication with Friends and Family: Not on file    Frequency of Social Gatherings with Friends and Family: Not on file    Attends Jain Services: Not on file    Active Member of [address] [place] [address] [employer] or Organizations: Not on file    Attends Club or Organization Meetings: Not on file    Marital Status: Not on file   Intimate Partner Violence:     Fear of Current or Ex-Partner: Not on file    Emotionally Abused: Not on file    Physically Abused: Not on file    Sexually Abused: Not on file   Housing Stability:     Unable to Pay for Housing in the Last Year: Not on file    Number of [place] in the Last Year: Not on file    Unstable Housing in the Last Year: Not on file       REVIEW OF SYSTEMS     Review of Systems   Constitutional: Positive for fatigue. Negative for appetite change, chills, fever and unexpected weight change. HENT: Positive for sore throat. Negative for congestion, ear pain and rhinorrhea. Eyes: Negative for pain and visual disturbance. Respiratory: Positive for cough and shortness of breath. Negative for wheezing. Cardiovascular: Positive for chest pain. Negative for palpitations and leg swelling. Gastrointestinal: Negative for abdominal pain, blood in stool, constipation, diarrhea, nausea and vomiting.    Genitourinary: Negative for decreased urine volume, dysuria, frequency and hematuria. Musculoskeletal: Negative for arthralgias, joint swelling and neck stiffness. Skin: Negative for rash. Neurological: Positive for headaches. Negative for dizziness, syncope, weakness and light-headedness. Hematological: Does not bruise/bleed easily. Except as noted above the remainder of the review of systems was reviewed and is negative. SCREENINGS                        PHYSICAL EXAM    (up to 7 for level 4, 8 or more for level 5)     ED Triage Vitals   BP Temp Temp src Pulse Resp SpO2 Height Weight   -- -- -- -- -- -- -- --       Physical Exam  Vitals and nursing note reviewed. Constitutional:       Appearance: She is well-developed. HENT:      Head: Normocephalic and atraumatic. Mouth/Throat:      Pharynx: Posterior oropharyngeal erythema present. No pharyngeal swelling or oropharyngeal exudate. Tonsils: No tonsillar exudate or tonsillar abscesses. 1+ on the right. 1+ on the left. Eyes:      Conjunctiva/sclera: Conjunctivae normal.      Pupils: Pupils are equal, round, and reactive to light. Cardiovascular:      Rate and Rhythm: Normal rate and regular rhythm. Heart sounds: Normal heart sounds. No murmur heard. No gallop. Pulmonary:      Effort: Pulmonary effort is normal. No respiratory distress. Breath sounds: Normal breath sounds. No decreased breath sounds, wheezing, rhonchi or rales. Abdominal:      General: Bowel sounds are normal.      Palpations: Abdomen is soft. Musculoskeletal:         General: Normal range of motion. Cervical back: Normal range of motion. Skin:     General: Skin is warm and dry. Neurological:      Mental Status: She is alert and oriented to person, place, and time.            DIAGNOSTIC RESULTS     EKG:(none if blank)  All EKGs are interpreted by the Emergency Department Physician who either signs or Co-signs this chart in the absence of a cardiologist.    Normal sinus rhythm with no ectopy noted    RADIOLOGY: (none if blank)   I directly visualized the following images and reviewed the radiologist interpretations. Interpretation per the Radiologist below, if available at the time of this note:  XR CHEST PORTABLE   Final Result      No acute intrathoracic process. **This report has been created using voice recognition software. It may contain minor errors which are inherent in voice recognition technology. **      Final report electronically signed by Dr. Franck Meyer on 12/30/2021 1:22 PM          LABS:  Labs Reviewed   COVID-19, RAPID - Abnormal; Notable for the following components:       Result Value    SARS-CoV-2, NAAT DETECTED (*)     All other components within normal limits   CBC WITH AUTO DIFFERENTIAL - Abnormal; Notable for the following components:    RDW-SD 46.2 (*)     Lymphocytes Absolute 0.8 (*)     Monocytes Absolute 0.3 (*)     All other components within normal limits   COMPREHENSIVE METABOLIC PANEL W/ REFLEX TO MG FOR LOW K - Abnormal; Notable for the following components:    Glucose 179 (*)     All other components within normal limits   RAPID INFLUENZA A/B ANTIGENS   CULTURE, THROAT    Narrative:     Source: Specimen not received       Site:           Current Antibiotics:   GROUP A STREP, REFLEX   LIPASE   TROPONIN   BRAIN NATRIURETIC PEPTIDE   D-DIMER, QUANTITATIVE   ANION GAP   OSMOLALITY   GLOMERULAR FILTRATION RATE, ESTIMATED       All other labs were within normal range or not returned as of this dictation. Please note, any cultures that may have been sent were not resulted at the time of this patient visit.     EMERGENCY DEPARTMENT COURSE and Medical Decision Making:     Vitals:    Vitals:    12/30/21 1152   BP: (!) 156/72   Pulse: 73   Resp: 19   Temp: 97.8 °F (36.6 °C)   TempSrc: Oral   SpO2: 99%   Weight: (!) 337 lb (152.9 kg)       PROCEDURES: (None if blank)  Procedures    ED Course as of 12/30/21 1326   Thu Dec 30, 2021   1319 Reviewed case with Dr. Valentine Almanzar. Okay to discharge with pulse ox. [NW]      ED Course User Index  [NW] SHANNON Albright CNP     MDM  Number of Diagnoses or Management Options     Amount and/or Complexity of Data Reviewed  Clinical lab tests: ordered and reviewed  Tests in the radiology section of CPT®: ordered and reviewed  Tests in the medicine section of CPT®: ordered and reviewed  Review and summarize past medical records: yes  Discuss the patient with other providers: yes  Independent visualization of images, tracings, or specimens: yes    Risk of Complications, Morbidity, and/or Mortality  Presenting problems: low  Diagnostic procedures: moderate  Management options: low      Patient presents to ER with complaint of cough, sore throat, fatigue, chest pain and shortness of breath. Differential diagnosis includes but not limited to ACS, COVID-19, influenza, strep throat and a low suspicion for PE despite history of DVT and PE.  S x-ray, labs and EKG are both reassuring. Patient tested for COVID-19 comes back positive. Discussed need for isolation. Will discharge home with pulse ox checks ordered and reviewed with patient.   Strict return precautions and follow up instructions were discussed with the patient with which the patient agrees    ED Medications administered this visit:  Medications - No data to display      FINAL IMPRESSION      1. COVID-19          DISPOSITION/PLAN   DISPOSITION    Discharge home    PATIENT REFERRED TO:  SHANNON Borrego CNP  [address]  [phone]            DISCHARGE MEDICATIONS:  New Prescriptions    No medications on file              SHANNON Albright CNP (electronically signed)           SHANNON Albright CNP  12/30/21 6909

## 2021-12-31 LAB
EKG ATRIAL RATE: 76 BPM
EKG P AXIS: 39 DEGREES
EKG P-R INTERVAL: 174 MS
EKG Q-T INTERVAL: 398 MS
EKG QRS DURATION: 98 MS
EKG QTC CALCULATION (BAZETT): 447 MS
EKG R AXIS: 21 DEGREES
EKG T AXIS: 39 DEGREES
EKG VENTRICULAR RATE: 76 BPM

## 2022-01-01 LAB — THROAT/NOSE CULTURE: NORMAL

## 2022-01-03 ENCOUNTER — CARE COORDINATION (OUTPATIENT)
Dept: CARE COORDINATION | Age: 52
End: 2022-01-03

## 2022-01-03 NOTE — CARE COORDINATION
Attempted to reach Jhony Born today for ED f/u [facility] outreach. No answer. Message left to return call.

## 2022-01-03 NOTE — CARE COORDINATION
Patient contacted regarding COVID-19 diagnosis and pulse oximeter ordered at discharge. Discussed COVID-19 related testing which was available at this time. Test results were positive. Patient informed of results, if available? Yes. Ambulatory Care Manager contacted the patient by telephone to perform post discharge assessment. Call within 2 business days of discharge: Yes. Verified name and  with patient as identifiers. Provided introduction to self, and explanation of the CTN/ACM role, and reason for call due to risk factors for infection and/or exposure to COVID-19. Symptoms reviewed with patient who verbalized the following symptoms: cough, dizziness/lightheadedness, no new symptoms and no worsening symptoms. Due to no new or worsening symptoms encounter was not routed to provider for escalation. Discussed follow-up appointments. If no appointment was previously scheduled, appointment scheduling offered: No.  [facility] follow up appointment(s): No future appointments. Non-[facility] follow up appointment(s): advised to f/u with Tracie Cisneros CNP    Non-face-to-face services provided:  Obtained and reviewed discharge summary and/or continuity of care documents     Advance Care Planning:   Does patient have an Advance Directive:  not on file. Educated patient about risk for severe COVID-19 due to risk factors according to CDC guidelines. ACM reviewed discharge instructions, medical action plan and red flag symptoms with the patient who verbalized understanding. Discussed COVID vaccination status: No. Education provided on COVID-19 vaccination as appropriate. Discussed exposure protocols and quarantine with CDC Guidelines. Patient was given an opportunity to verbalize any questions and concerns and agrees to contact ACM or health care provider for questions related to their healthcare.     Reviewed and educated patient on any new and changed medications related to discharge diagnosis     Was patient discharged with a pulse oximeter? Yes Discussed and confirmed pulse oximeter discharge instructions and when to notify provider or seek emergency care. ACM provided contact information. Plan for follow-up call in 3-5 days based on severity of symptoms and risk factors. Reviewed COVID zones.

## 2022-01-04 ENCOUNTER — HOSPITAL ENCOUNTER (EMERGENCY)
Age: 52
Discharge: HOME | End: 2022-01-04
Payer: COMMERCIAL

## 2022-01-04 ENCOUNTER — APPOINTMENT (OUTPATIENT)
Dept: CT IMAGING | Age: 52
End: 2022-01-04
Payer: COMMERCIAL

## 2022-01-04 VITALS
DIASTOLIC BLOOD PRESSURE: 73 MMHG | SYSTOLIC BLOOD PRESSURE: 149 MMHG | TEMPERATURE: 97.8 F | RESPIRATION RATE: 18 BRPM | OXYGEN SATURATION: 98 % | HEART RATE: 63 BPM

## 2022-01-04 DIAGNOSIS — R06.00 DYSPNEA AND RESPIRATORY ABNORMALITIES: Primary | ICD-10-CM

## 2022-01-04 DIAGNOSIS — R06.89 DYSPNEA AND RESPIRATORY ABNORMALITIES: Primary | ICD-10-CM

## 2022-01-04 DIAGNOSIS — U07.1 COVID-19 VIRUS INFECTION: ICD-10-CM

## 2022-01-04 LAB
ALBUMIN SERPL-MCNC: 4.3 G/DL (ref 3.5–5.1)
ALP BLD-CCNC: 63 U/L (ref 38–126)
ALT SERPL-CCNC: 35 U/L (ref 11–66)
ANION GAP SERPL CALCULATED.3IONS-SCNC: 12 MEQ/L (ref 8–16)
AST SERPL-CCNC: 37 U/L (ref 5–40)
BASOPHILS # BLD: 0.7 %
BASOPHILS ABSOLUTE: 0 THOU/MM3 (ref 0–0.1)
BILIRUB SERPL-MCNC: 0.3 MG/DL (ref 0.3–1.2)
BUN BLDV-MCNC: 5 MG/DL (ref 7–22)
CALCIUM SERPL-MCNC: 9 MG/DL (ref 8.5–10.5)
CHLORIDE BLD-SCNC: 102 MEQ/L (ref 98–111)
CO2: 23 MEQ/L (ref 23–33)
CREAT SERPL-MCNC: 0.5 MG/DL (ref 0.4–1.2)
EOSINOPHIL # BLD: 1 %
EOSINOPHILS ABSOLUTE: 0 THOU/MM3 (ref 0–0.4)
ERYTHROCYTE [DISTWIDTH] IN BLOOD BY AUTOMATED COUNT: 14.1 % (ref 11.5–14.5)
ERYTHROCYTE [DISTWIDTH] IN BLOOD BY AUTOMATED COUNT: 45.3 FL (ref 35–45)
GFR SERPL CREATININE-BSD FRML MDRD: > 90 ML/MIN/1.73M2
GLUCOSE BLD-MCNC: 183 MG/DL (ref 70–108)
HCT VFR BLD CALC: 43.7 % (ref 37–47)
HEMOGLOBIN: 14.9 GM/DL (ref 12–16)
IMMATURE GRANS (ABS): 0.01 THOU/MM3 (ref 0–0.07)
IMMATURE GRANULOCYTES: 0.3 %
LYMPHOCYTES # BLD: 22.1 %
LYMPHOCYTES ABSOLUTE: 0.7 THOU/MM3 (ref 1–4.8)
MCH RBC QN AUTO: 30 PG (ref 26–33)
MCHC RBC AUTO-ENTMCNC: 34.1 GM/DL (ref 32.2–35.5)
MCV RBC AUTO: 87.9 FL (ref 81–99)
MONOCYTES # BLD: 6.9 %
MONOCYTES ABSOLUTE: 0.2 THOU/MM3 (ref 0.4–1.3)
NUCLEATED RED BLOOD CELLS: 0 /100 WBC
OSMOLALITY CALCULATION: 275.8 MOSMOL/KG (ref 275–300)
PLATELET # BLD: 171 THOU/MM3 (ref 130–400)
PMV BLD AUTO: 9.6 FL (ref 9.4–12.4)
POTASSIUM SERPL-SCNC: 3.8 MEQ/L (ref 3.5–5.2)
RBC # BLD: 4.97 MILL/MM3 (ref 4.2–5.4)
SEG NEUTROPHILS: 69 %
SEGMENTED NEUTROPHILS ABSOLUTE COUNT: 2.1 THOU/MM3 (ref 1.8–7.7)
SODIUM BLD-SCNC: 137 MEQ/L (ref 135–145)
TOTAL PROTEIN: 6.9 G/DL (ref 6.1–8)
WBC # BLD: 3 THOU/MM3 (ref 4.8–10.8)

## 2022-01-04 PROCEDURE — 6360000004 HC RX CONTRAST MEDICATION: Performed by: PHYSICIAN ASSISTANT

## 2022-01-04 PROCEDURE — 85025 COMPLETE CBC W/AUTO DIFF WBC: CPT

## 2022-01-04 PROCEDURE — 36415 COLL VENOUS BLD VENIPUNCTURE: CPT

## 2022-01-04 PROCEDURE — 2580000003 HC RX 258: Performed by: PHYSICIAN ASSISTANT

## 2022-01-04 PROCEDURE — 80053 COMPREHEN METABOLIC PANEL: CPT

## 2022-01-04 PROCEDURE — 71275 CT ANGIOGRAPHY CHEST: CPT

## 2022-01-04 PROCEDURE — 93005 ELECTROCARDIOGRAM TRACING: CPT | Performed by: PHYSICIAN ASSISTANT

## 2022-01-04 PROCEDURE — 99284 EMERGENCY DEPT VISIT MOD MDM: CPT

## 2022-01-04 PROCEDURE — 6370000000 HC RX 637 (ALT 250 FOR IP)

## 2022-01-04 RX ORDER — ORPHENADRINE CITRATE 30 MG/ML
60 INJECTION INTRAMUSCULAR; INTRAVENOUS ONCE
Status: DISCONTINUED | OUTPATIENT
Start: 2022-01-04 | End: 2022-01-04 | Stop reason: HOSPADM

## 2022-01-04 RX ORDER — HYDROCODONE BITARTRATE AND ACETAMINOPHEN 5; 325 MG/1; MG/1
TABLET ORAL
Status: COMPLETED
Start: 2022-01-04 | End: 2022-01-04

## 2022-01-04 RX ORDER — HYDROCODONE BITARTRATE AND ACETAMINOPHEN 5; 325 MG/1; MG/1
1 TABLET ORAL ONCE
Status: COMPLETED | OUTPATIENT
Start: 2022-01-04 | End: 2022-01-04

## 2022-01-04 RX ORDER — 0.9 % SODIUM CHLORIDE 0.9 %
500 INTRAVENOUS SOLUTION INTRAVENOUS ONCE
Status: COMPLETED | OUTPATIENT
Start: 2022-01-04 | End: 2022-01-04

## 2022-01-04 RX ADMIN — SODIUM CHLORIDE 500 ML: 9 INJECTION, SOLUTION INTRAVENOUS at 12:09

## 2022-01-04 RX ADMIN — IOPAMIDOL 80 ML: 755 INJECTION, SOLUTION INTRAVENOUS at 12:29

## 2022-01-04 RX ADMIN — HYDROCODONE BITARTRATE AND ACETAMINOPHEN 1 TABLET: 5; 325 TABLET ORAL at 13:57

## 2022-01-04 ASSESSMENT — ENCOUNTER SYMPTOMS
VOMITING: 0
CHEST TIGHTNESS: 1
PHOTOPHOBIA: 0
NAUSEA: 1
ABDOMINAL PAIN: 0
SHORTNESS OF BREATH: 1
DIARRHEA: 0
SORE THROAT: 1
RHINORRHEA: 0
COUGH: 1

## 2022-01-04 ASSESSMENT — PAIN DESCRIPTION - LOCATION: LOCATION: GROIN;LEG

## 2022-01-04 ASSESSMENT — PAIN DESCRIPTION - ORIENTATION: ORIENTATION: RIGHT

## 2022-01-04 ASSESSMENT — PAIN DESCRIPTION - DESCRIPTORS: DESCRIPTORS: THROBBING

## 2022-01-04 ASSESSMENT — PAIN SCALES - GENERAL
PAINLEVEL_OUTOF10: 8
PAINLEVEL_OUTOF10: 7

## 2022-01-04 ASSESSMENT — PAIN DESCRIPTION - PAIN TYPE: TYPE: ACUTE PAIN

## 2022-01-04 NOTE — ED TRIAGE NOTES
Pt presents to the ED with c/o covid complications. Pt states since this morning she has felt really dizzy, feels like it is hard to breath and has groin pain on the right side, along with abdominal pain.

## 2022-01-04 NOTE — ED NOTES
Rn to speak with MORALES, [address]. According to provider patient is not leaving at this time due to wanting doppler studies done as well as wanting to be admitted according to daughter. Provider to order pain medication at this time.       Demond Leigh RN  01/04/22 5219

## 2022-01-04 NOTE — ED PROVIDER NOTES
[facility] EMERGENCY DEPT      CHIEF COMPLAINT       Chief Complaint   Patient presents with    Shortness of Breath    Dizziness       Nurses Notes reviewed and I agree except as noted in the HPI. HISTORY OF PRESENT ILLNESS    Antonio Love is a 54 y.o. female who presents for shortness of breath. Patient has been sick since 12-26 with symptoms of cough, dyspnea, chest tightness, sore throat, myalgias, arthralgias, fatigue, and mild diarrhea. In addition she endorses recently paresthesias to the fingers, lightheadedness, and worsening of dyspnea on exertion. Patient was diagnosed with Covid on 12-30. She has a home pulse ox which has been noted to stay 95-97 even with exertion. Daughter however reports that last night patient's lips looked purple. Patient is concerned for pneumonia or blood clot as she has a history of factor V Leiden. Patient has a [place] filter and takes Xarelto. Patient reports some type of heart condition but does not know the medical term. Patient has not been vaccinated against Covid. Patient quit smoking in 2013. REVIEW OF SYSTEMS     Review of Systems   Constitutional: Positive for activity change, appetite change and fatigue. Negative for chills, diaphoresis and fever. HENT: Positive for sore throat. Negative for congestion and rhinorrhea. Eyes: Negative for photophobia. Respiratory: Positive for cough, chest tightness and shortness of breath. Cardiovascular: Negative for chest pain and leg swelling. Gastrointestinal: Positive for nausea. Negative for abdominal pain, diarrhea and vomiting. Genitourinary: Negative for decreased urine volume and difficulty urinating. Musculoskeletal: Positive for myalgias. Negative for gait problem. Skin: Negative for rash. Neurological: Positive for light-headedness. Negative for dizziness, weakness and numbness.    Hematological: Positive for adenopathy (Reports a knot in the right groin, behind the right knee, and in her belly). Psychiatric/Behavioral: Negative for confusion. The patient is not nervous/anxious. PAST MEDICAL HISTORY    has a past medical history of Asthma, Bipolar 1 disorder ([facility] [address] Blood circulation, collateral, CAD (coronary artery disease), Curvature of spine, Diabetes mellitus ([facility] [address] DVT (deep venous thrombosis) ([facility] [address] Factor 5 Leiden mutation, heterozygous ([facility] [address] GERD (gastroesophageal reflux disease), HTN, goal below 130/80, Hx of blood clots, Hx-TIA (transient ischemic attack), Hyperlipidemia, PE (pulmonary embolism), RA (rheumatoid arthritis) ([facility] [address] and Unspecified cerebral artery occlusion with cerebral infarction. SURGICAL HISTORY      has a past surgical history that includes Tubal ligation (2003); Cholecystectomy (1992); Knee arthroscopy (2007&2010); Tympanostomy tube placement; Tonsillectomy; Cardiac catheterization (feb 2015); and Foot Debridement (Right, 9/30/2019). CURRENT MEDICATIONS       Discharge Medication List as of 1/4/2022  2:25 PM      CONTINUE these medications which have NOT CHANGED    Details   Multiple Vitamins-Minerals (EMERGEN-C VITAMIN C) PACK Follow package directions, R-0OTC      Zinc 10 MG LOZG Follow package directions, R-0OTC      albuterol sulfate  (90 Base) MCG/ACT inhaler Inhale 2 puffs into the lungs every 6 hours as needed for Wheezing or Shortness of Breath, Disp-1 Inhaler,R-0Normal      gabapentin (NEURONTIN) 300 MG capsule Take 300 mg by mouth 2 times daily. Historical Med      acetaminophen (TYLENOL) 325 MG tablet Take 2 tablets by mouth every 4 hours as needed for Pain, Disp-120 tablet, R-3OTC      alogliptin (NESINA) 25 MG TABS tablet Take 25 mg by mouth dailyHistorical Med      blood glucose test strips (TRUETRACK TEST) strip 2 TIMES DAILY Starting Fri 3/15/2019, Disp-100 each, R-0, NormalAs needed.       glipiZIDE (GLUCOTROL) 5 MG tablet Take 1 tablet by mouth daily, Disp-90 tablet, R-1Normal      metFORMIN (GLUCOPHAGE) 1000 MG tablet Take 1 tablet by mouth 2 times daily (with meals), Disp-180 tablet, R-1Normal             ALLERGIES     is allergic to codeine, demerol, ultram [tramadol hcl], percocet [oxycodone-acetaminophen], and toradol [ketorolac tromethamine]. FAMILY HISTORY     She indicated that her mother is alive. She indicated that her father is alive. family history includes COPD in her mother; Cancer in her mother; Heart Disease in her father; High Blood Pressure in her father; Mental Illness in her brother, brother, sister, and sister; Other in her mother. SOCIAL HISTORY    reports that she quit smoking about 16 years ago. Her smoking use included cigarettes. She smoked 0.50 packs per day. She has never used smokeless tobacco. She reports that she does not drink alcohol and does not use drugs. PHYSICAL EXAM     INITIAL VITALS:  oral temperature is 97.8 °F (36.6 °C). Her blood pressure is 149/73 (abnormal) and her pulse is 63. Her respiration is 18 and oxygen saturation is 98%. Physical Exam  Constitutional:       Appearance: Normal appearance. She is well-developed. She is morbidly obese. She is not ill-appearing or diaphoretic. HENT:      Head: Normocephalic and atraumatic. Right Ear: Hearing normal.      Left Ear: Hearing normal.      Nose: Nose normal. No rhinorrhea. Mouth/Throat:      Lips: Pink. Mouth: Mucous membranes are moist.      Pharynx: Oropharynx is clear. Eyes:      General: Lids are normal. No scleral icterus. Extraocular Movements: Extraocular movements intact. Conjunctiva/sclera: Conjunctivae normal.      Pupils: Pupils are equal, round, and reactive to light. Neck:      Trachea: Trachea normal.   Cardiovascular:      Rate and Rhythm: Normal rate and regular rhythm. Heart sounds: Normal heart sounds. No murmur heard. Pulmonary:      Effort: Pulmonary effort is normal.      Breath sounds: Normal breath sounds and air entry. No decreased breath sounds, wheezing or rhonchi. Abdominal:      General: There is no distension. Palpations: Abdomen is soft. Tenderness: There is no abdominal tenderness. There is no guarding or rebound. Hernia: No hernia is present. Musculoskeletal:      Cervical back: Normal range of motion and neck supple. No rigidity. Comments: Well perfused; movement normal as observed; no signs of DVT   Lymphadenopathy:      Cervical: No cervical adenopathy. Lower Body: Right inguinal adenopathy present. Skin:     General: Skin is warm and dry. Findings: No rash. Neurological:      General: No focal deficit present. Mental Status: She is alert. Sensory: Sensation is intact. Motor: Motor function is intact. Gait: Gait is intact. Psychiatric:         Mood and Affect: Mood normal.         Speech: Speech normal.         Behavior: Behavior is cooperative. DIFFERENTIAL DIAGNOSIS:   Including but not limited to: Discomforts of Covid, pneumonia, dehydration, less likely as clinically not present but considered PE    DIAGNOSTIC RESULTS     EKG: All EKG's are interpreted by [facility] who either signs or Co-signs this chart in the absence of a cardiologist.  Ventricular Rate BPM 77 P    Atrial Rate BPM 77 P    P-R Interval ms 182 P    QRS Duration ms 100 P    Q-T Interval ms 412 P    QTc Calculation (Bazett) ms 466 P    P Axis degrees 44 P    R Axis degrees 17 P    T Axis degrees 27 P           Narrative & Impression    Normal sinus rhythm  Incomplete right bundle branch block  Borderline ECG  When compared with ECG of 30-DEC-2021 13:14,  No significant change was found               RADIOLOGY: non-plain film images(s) such as CT,Ultrasound and MRI are read by the radiologist.  Plain radiographic images are visualized and preliminarily interpreted by the emergency physician unless otherwise stated below.   CTA CHEST W WO CONTRAST   Final Result    No pulmonary emboli or pulmonary infiltrates. Focal atelectasis versus a part solid nodule right lower lobe. Follow-up chest CT in 3 months recommended               **This report has been created using voice recognition software. It may contain minor errors which are inherent in voice recognition technology. **      Final report electronically signed by Dr. Devonte Quesada on 1/4/2022 12:50 PM      VL DUP LOWER EXTREMITY VENOUS RIGHT    (Results Pending)       LABS:   Labs Reviewed   CBC WITH AUTO DIFFERENTIAL - Abnormal; Notable for the following components:       Result Value    WBC 3.0 (*)     RDW-SD 45.3 (*)     Lymphocytes Absolute 0.7 (*)     Monocytes Absolute 0.2 (*)     All other components within normal limits   COMPREHENSIVE METABOLIC PANEL - Abnormal; Notable for the following components:    Glucose 183 (*)     BUN 5 (*)     All other components within normal limits   ANION GAP   GLOMERULAR FILTRATION RATE, ESTIMATED   OSMOLALITY       EMERGENCY DEPARTMENT COURSE:   Vitals:    Vitals:    01/04/22 1055 01/04/22 1219 01/04/22 1354   BP: (!) 162/79  (!) 149/73   Pulse: 84 68 63   Resp: 18 20 18   Temp: 97.8 °F (36.6 °C)     TempSrc: Oral     SpO2: 97% 98% 98%       Patient was seen in the emergency department during the global pandemic, when there was surge capacity and regional healthcare crisis. MDM:  The patient was seen and evaluated within the ED today for dyspnea on exertion, dizziness, among other complaints in the setting of Covid. On exam, I appreciated no acute findings. Patient was nontoxic-appearing and lungs were clear to auscultation bilaterally. Legs were similar in size. Old records were reviewed. Within the department, I observed the patient's vital signs to be within acceptable range. Patient was most concerned for pneumonia or blood clot which would require CT scan. This was discussed with the patient and she wished to proceed. Radiological studies within the department revealed no PE or infiltrates.  Laboratory work was reassuring. Within the department the patient was treated with Norflex and [place]. I observed the patient's condition to modestly remained stable during the duration of their stay. On re-exam there is no respiratory difficulty. Vital signs remained stable. The patient remained non-toxic appearing with no distress evident in the ER. Patient and daughter conveyed concerns for DVT and that patient symptoms were worse at night. Daughter felt the patient should be admitted for observation at least for night. I offered to perform an ultrasound which patient then ultimately declined which I felt was appropriate. I did not strongly feel an ultrasound was necessary. I also discussed admission which would likely be declined by the hospitalist as the patient has not been hypoxic and admittedly has normal pulse ox is with exertion at home. Monoclonal antibody therapy discussed with the patient. She was advised that she had a 10-day window from symptom onset to receive the medication. Patient was provided verbal and written information. She declined a scheduled appointment therefore was given outpatient nursing contact information. The patient was comfortable with the plan of discharge home. Anticipatory guidance was given. The patient was instructed to return to the emergency department for any worsening of their symptoms. Patient was discharged from the emergency department in good condition with all questions answered. See disposition below. I have given the patient strict written and verbal instructions about care at home, follow-up, and signs and symptoms of worsening of condition and they did verbalize understanding. CRITICAL CARE:   None    CONSULTS:  None    PROCEDURES:  None    FINAL IMPRESSION      1. Dyspnea and respiratory abnormalities    2. COVID-19 virus infection          DISPOSITION/PLAN     1. Dyspnea and respiratory abnormalities    2.  COVID-19 virus infection        PATIENT REFERRED TO:  [facility] EMERGENCY DEPT  [address]  [phone]    If symptoms worsen      DISCHARGE MEDICATIONS:  Discharge Medication List as of 1/4/2022  2:25 PM          (Please note that portions of this note were completed with a voice recognition program.  Efforts were made to edit the dictations but occasionally words are mis-transcribed.)    Tom Garza PA-C 01/04/22 4:27 PM    CHINTAN Aranda PA-C  01/04/22 [id number]

## 2022-01-04 NOTE — ED NOTES
Pt ready for discharge at this time per patient. RN to notify provider.      Mohamud Reed RN  01/04/22 [id number]

## 2022-01-05 ENCOUNTER — CARE COORDINATION (OUTPATIENT)
Dept: CARE COORDINATION | Age: 52
End: 2022-01-05

## 2022-01-05 LAB
EKG ATRIAL RATE: 77 BPM
EKG P AXIS: 44 DEGREES
EKG P-R INTERVAL: 182 MS
EKG Q-T INTERVAL: 412 MS
EKG QRS DURATION: 100 MS
EKG QTC CALCULATION (BAZETT): 466 MS
EKG R AXIS: 17 DEGREES
EKG T AXIS: 27 DEGREES
EKG VENTRICULAR RATE: 77 BPM

## 2022-01-05 PROCEDURE — 93010 ELECTROCARDIOGRAM REPORT: CPT | Performed by: INTERNAL MEDICINE

## 2022-01-05 NOTE — CARE COORDINATION
Attempted to reach Agustina Romano for ED f/u [facility] outreach. No answer. Message left to return call.

## 2022-01-06 ENCOUNTER — CLINICAL DOCUMENTATION (OUTPATIENT)
Dept: CARE COORDINATION | Age: 52
End: 2022-01-06

## 2022-01-06 ENCOUNTER — CARE COORDINATION (OUTPATIENT)
Dept: CARE COORDINATION | Age: 52
End: 2022-01-06

## 2022-01-06 NOTE — ACP (ADVANCE CARE PLANNING)
Advance Care Planning   Healthcare Decision Maker:    Primary Decision Maker: Juno Romero [facility] [phone]    Click here to complete Healthcare Decision Makers including selection of the Healthcare Decision Maker Relationship (ie \"Primary\"). Today we documented Decision Maker(s) consistent with Legal Next of Kin hierarchy.

## 2022-01-13 ENCOUNTER — CARE COORDINATION (OUTPATIENT)
Dept: CARE COORDINATION | Age: 52
End: 2022-01-13

## 2022-01-13 NOTE — CARE COORDINATION
You Patient resolved from the Care Transitions episode on 1/13/22  Discussed COVID-19 related testing which was available at this time. Test results were positive. Patient informed of results, if available? n/a    Patient/family has been provided the following resources and education related to COVID-19:                         Signs, symptoms and red flags related to COVID-19            [facility] exposure and quarantine guidelines            [facility] exposure contact - [phone]            Contact for their local Department of Health                 Patient currently reports that the following symptoms have improved:  no new/worsening symptoms mild headache, back pain, mild dizziness at times, cough. S/s are improving. No further outreach scheduled with this CTN/ACM. Episode of Care resolved. Patient has this CTN/ACM contact information if future needs arise.

## 2022-01-23 ENCOUNTER — HOSPITAL ENCOUNTER (EMERGENCY)
Age: 52
Discharge: HOME | End: 2022-01-23
Payer: COMMERCIAL

## 2022-01-23 ENCOUNTER — APPOINTMENT (OUTPATIENT)
Dept: INTERVENTIONAL RADIOLOGY/VASCULAR | Age: 52
End: 2022-01-23
Payer: COMMERCIAL

## 2022-01-23 ENCOUNTER — APPOINTMENT (OUTPATIENT)
Dept: CT IMAGING | Age: 52
End: 2022-01-23
Payer: COMMERCIAL

## 2022-01-23 VITALS
SYSTOLIC BLOOD PRESSURE: 135 MMHG | OXYGEN SATURATION: 99 % | WEIGHT: 293 LBS | BODY MASS INDEX: 50.63 KG/M2 | RESPIRATION RATE: 16 BRPM | HEART RATE: 79 BPM | TEMPERATURE: 97.5 F | DIASTOLIC BLOOD PRESSURE: 66 MMHG

## 2022-01-23 DIAGNOSIS — R79.89 ELEVATED LACTIC ACID LEVEL: ICD-10-CM

## 2022-01-23 DIAGNOSIS — L03.119 CELLULITIS OF LOWER EXTREMITY, UNSPECIFIED LATERALITY: Primary | ICD-10-CM

## 2022-01-23 LAB
ALBUMIN SERPL-MCNC: 4.3 G/DL (ref 3.5–5.1)
ALP BLD-CCNC: 72 U/L (ref 38–126)
ALT SERPL-CCNC: 22 U/L (ref 11–66)
ANION GAP SERPL CALCULATED.3IONS-SCNC: 12 MEQ/L (ref 8–16)
APTT: 35.1 SECONDS (ref 22–38)
AST SERPL-CCNC: 22 U/L (ref 5–40)
BASOPHILS # BLD: 0.9 %
BASOPHILS ABSOLUTE: 0.1 THOU/MM3 (ref 0–0.1)
BILIRUB SERPL-MCNC: 0.3 MG/DL (ref 0.3–1.2)
BUN BLDV-MCNC: 10 MG/DL (ref 7–22)
C-REACTIVE PROTEIN: 1.22 MG/DL (ref 0–1)
CALCIUM SERPL-MCNC: 9.2 MG/DL (ref 8.5–10.5)
CHLORIDE BLD-SCNC: 101 MEQ/L (ref 98–111)
CO2: 24 MEQ/L (ref 23–33)
CREAT SERPL-MCNC: 0.6 MG/DL (ref 0.4–1.2)
EKG ATRIAL RATE: 87 BPM
EKG P AXIS: 30 DEGREES
EKG P-R INTERVAL: 174 MS
EKG Q-T INTERVAL: 394 MS
EKG QRS DURATION: 96 MS
EKG QTC CALCULATION (BAZETT): 474 MS
EKG R AXIS: -2 DEGREES
EKG T AXIS: 11 DEGREES
EKG VENTRICULAR RATE: 87 BPM
EOSINOPHIL # BLD: 1.7 %
EOSINOPHILS ABSOLUTE: 0.1 THOU/MM3 (ref 0–0.4)
ERYTHROCYTE [DISTWIDTH] IN BLOOD BY AUTOMATED COUNT: 13.5 % (ref 11.5–14.5)
ERYTHROCYTE [DISTWIDTH] IN BLOOD BY AUTOMATED COUNT: 43.7 FL (ref 35–45)
GFR SERPL CREATININE-BSD FRML MDRD: > 90 ML/MIN/1.73M2
GLUCOSE BLD-MCNC: 173 MG/DL (ref 70–108)
HCT VFR BLD CALC: 42.1 % (ref 37–47)
HEMOGLOBIN: 14.1 GM/DL (ref 12–16)
IMMATURE GRANS (ABS): 0.02 THOU/MM3 (ref 0–0.07)
IMMATURE GRANULOCYTES: 0.3 %
INR BLD: 1.09 (ref 0.85–1.13)
LACTIC ACID, SEPSIS: 2.4 MMOL/L (ref 0.5–1.9)
LACTIC ACID: 2.9 MMOL/L (ref 0.5–2)
LYMPHOCYTES # BLD: 23.1 %
LYMPHOCYTES ABSOLUTE: 1.5 THOU/MM3 (ref 1–4.8)
MCH RBC QN AUTO: 29.7 PG (ref 26–33)
MCHC RBC AUTO-ENTMCNC: 33.5 GM/DL (ref 32.2–35.5)
MCV RBC AUTO: 88.8 FL (ref 81–99)
MONOCYTES # BLD: 5.9 %
MONOCYTES ABSOLUTE: 0.4 THOU/MM3 (ref 0.4–1.3)
NUCLEATED RED BLOOD CELLS: 0 /100 WBC
OSMOLALITY CALCULATION: 277 MOSMOL/KG (ref 275–300)
PLATELET # BLD: 204 THOU/MM3 (ref 130–400)
PMV BLD AUTO: 10.1 FL (ref 9.4–12.4)
POTASSIUM REFLEX MAGNESIUM: 4.5 MEQ/L (ref 3.5–5.2)
PRO-BNP: 41.8 PG/ML (ref 0–450)
PROCALCITONIN: 0.03 NG/ML (ref 0.01–0.09)
RBC # BLD: 4.74 MILL/MM3 (ref 4.2–5.4)
SEDIMENTATION RATE, ERYTHROCYTE: 22 MM/HR (ref 0–20)
SEG NEUTROPHILS: 68.1 %
SEGMENTED NEUTROPHILS ABSOLUTE COUNT: 4.5 THOU/MM3 (ref 1.8–7.7)
SODIUM BLD-SCNC: 137 MEQ/L (ref 135–145)
TOTAL PROTEIN: 7 G/DL (ref 6.1–8)
TROPONIN T: < 0.01 NG/ML
WBC # BLD: 6.6 THOU/MM3 (ref 4.8–10.8)

## 2022-01-23 PROCEDURE — 93010 ELECTROCARDIOGRAM REPORT: CPT | Performed by: NUCLEAR MEDICINE

## 2022-01-23 PROCEDURE — 80053 COMPREHEN METABOLIC PANEL: CPT

## 2022-01-23 PROCEDURE — 83605 ASSAY OF LACTIC ACID: CPT

## 2022-01-23 PROCEDURE — 6360000004 HC RX CONTRAST MEDICATION: Performed by: NURSE PRACTITIONER

## 2022-01-23 PROCEDURE — 71275 CT ANGIOGRAPHY CHEST: CPT

## 2022-01-23 PROCEDURE — 84484 ASSAY OF TROPONIN QUANT: CPT

## 2022-01-23 PROCEDURE — 93005 ELECTROCARDIOGRAM TRACING: CPT | Performed by: NURSE PRACTITIONER

## 2022-01-23 PROCEDURE — 36415 COLL VENOUS BLD VENIPUNCTURE: CPT

## 2022-01-23 PROCEDURE — 83880 ASSAY OF NATRIURETIC PEPTIDE: CPT

## 2022-01-23 PROCEDURE — 86140 C-REACTIVE PROTEIN: CPT

## 2022-01-23 PROCEDURE — 2580000003 HC RX 258: Performed by: NURSE PRACTITIONER

## 2022-01-23 PROCEDURE — 85651 RBC SED RATE NONAUTOMATED: CPT

## 2022-01-23 PROCEDURE — 93971 EXTREMITY STUDY: CPT

## 2022-01-23 PROCEDURE — 85610 PROTHROMBIN TIME: CPT

## 2022-01-23 PROCEDURE — 85730 THROMBOPLASTIN TIME PARTIAL: CPT

## 2022-01-23 PROCEDURE — 85025 COMPLETE CBC W/AUTO DIFF WBC: CPT

## 2022-01-23 PROCEDURE — 99283 EMERGENCY DEPT VISIT LOW MDM: CPT

## 2022-01-23 PROCEDURE — 84145 PROCALCITONIN (PCT): CPT

## 2022-01-23 RX ORDER — 0.9 % SODIUM CHLORIDE 0.9 %
1000 INTRAVENOUS SOLUTION INTRAVENOUS ONCE
Status: COMPLETED | OUTPATIENT
Start: 2022-01-23 | End: 2022-01-23

## 2022-01-23 RX ORDER — DOXYCYCLINE HYCLATE 100 MG
100 TABLET ORAL 2 TIMES DAILY
Qty: 20 TABLET | Refills: 0 | Status: SHIPPED | OUTPATIENT
Start: 2022-01-23 | End: 2022-02-02

## 2022-01-23 RX ADMIN — IOPAMIDOL 80 ML: 755 INJECTION, SOLUTION INTRAVENOUS at 20:03

## 2022-01-23 RX ADMIN — SODIUM CHLORIDE 1000 ML: 9 INJECTION, SOLUTION INTRAVENOUS at 20:23

## 2022-01-23 ASSESSMENT — PAIN DESCRIPTION - DESCRIPTORS: DESCRIPTORS: ACHING

## 2022-01-23 ASSESSMENT — PAIN DESCRIPTION - LOCATION: LOCATION: FOOT

## 2022-01-23 ASSESSMENT — PAIN SCALES - GENERAL: PAINLEVEL_OUTOF10: 7

## 2022-01-23 ASSESSMENT — PAIN DESCRIPTION - ONSET: ONSET: ON-GOING

## 2022-01-23 ASSESSMENT — PAIN DESCRIPTION - PAIN TYPE: TYPE: ACUTE PAIN

## 2022-01-23 ASSESSMENT — PAIN DESCRIPTION - FREQUENCY: FREQUENCY: CONTINUOUS

## 2022-01-23 ASSESSMENT — PAIN DESCRIPTION - ORIENTATION: ORIENTATION: RIGHT

## 2022-01-23 ASSESSMENT — PAIN DESCRIPTION - PROGRESSION: CLINICAL_PROGRESSION: GRADUALLY WORSENING

## 2022-01-23 NOTE — ED NOTES
Patient to the ED with complaints of right foot pain. Patient states that she has history of diabetes and is afraid that she has an infection in her right foot. Small dried closed abrasion on distal plantar aspect of foot. Minimal redness is observed. Patient is resting in bed with easy and unlabored respirations. Call light in reach. Side rails up x2. Patient denies further complaints or concerns. Will monitor.         Benedicto Kerr RN  01/23/22 3613

## 2022-01-23 NOTE — ED PROVIDER NOTES
[facility] [address] [address] EMERGENCY DEPT  EMERGENCY MEDICINE     Pt Name: Nolene Goodell  MRN: [id number]  [place] 1970  Date of evaluation: 1/23/2022  PCP:    SHANNON Sherman CNP  Provider: SHANNON Randolph CNP    CHIEF COMPLAINT       Chief Complaint   Patient presents with    Foot Problem       HISTORY OF PRESENT ILLNESS      Patient is a 53-year-old female with factor V Leiden, type 2 diabetes and a history of recurrent pulmonary emboli and deep vein thrombosis presenting to the emergency department complaining of shortness of breath, palpitations, \"feeling like she has a blood clot \". Patient states that she tested positive for COVID 12/30, reports since then she has had a persistent cough, shortness of breath, and chest tightness that is worsened with activity. Patient has a history of recurrent DVTs and PEs; most recent was in 2016, patient was taking Xarelto at the time she was diagnosed. She endorses intermittent heart palpitations; states she has alternating episodes that like her heart is racing and and feeling like it is beating very slow. She endorses intermittent dizziness and lightheadedness as well. She denies fever. She also complains of \"feeling like she has a blood clot\" in her left calf, and states that she feels bumps under him to the touch, and that this feels similar to when she had prior blood clots in the past.  Denies calf pain. Patient additionally complains of right foot pain, states that she feels like she has 2 areas on the bottom of her right foot that feels similar to when she had previous diabetic foot ulcers. She endorses experiencing a severe pain during ambulation in these areas. Triage notes and Nursing notes were reviewed by myself. Any discrepancies are addressed above.     PAST MEDICAL HISTORY     Past Medical History:   Diagnosis Date    Asthma     B[facility] 1 disorder ([facility]     Blood circulation, collateral     CAD (coronary artery disease)     Curvature of spine     Diabetes mellitus ([facility]     [facility]     Factor 5 Leiden mutation, heterozygous ([facility]     GERD (gastroesophageal reflux disease)     HTN, goal below 130/80     Hx of blood clots     PE x3 and DVT x3    Hx-TIA (transient ischemic attack)     Hyperlipidemia     PE (pulmonary embolism)     RA (rheumatoid arthritis) ([facility]     Unspecified cerebral artery occlusion with cerebral infarction        SURGICAL HISTORY       Past Surgical History:   Procedure Laterality Date    CARDIAC CATHETERIZATION  feb 2015    Heart caths    CHOLECYSTECTOMY  1992    FOOT DEBRIDEMENT Right 9/30/2019    FOOT DEBRIDEMENT INCISION AND DRAINAGE performed by Braydon Mak DPM at [facility] ARTHROSCOPY  2007&2010    TONSILLECTOMY      TUBAL LIGATION  2003    T[place]MPANOSTOMY R [place] [address]       Discharge Medication List as of 1/23/2022 10:52 PM      CONTINUE these medications which have NOT CHANGED    Details   Multiple Vitamins-Minerals (EMERGEN-C VITAMIN C) PACK Follow package directions, R-0OTC      Zinc 10 MG LOZG Follow package directions, R-0OTC      albuterol sulfate  (90 Base) MCG/ACT inhaler Inhale 2 puffs into the lungs every 6 hours as needed for Wheezing or Shortness of Breath, Disp-1 Inhaler,R-0Normal      gabapentin (NEURONTIN) 300 MG capsule Take 300 mg by mouth 2 times daily. Historical Med      acetaminophen (TYLENOL) 325 MG tablet Take 2 tablets by mouth every 4 hours as needed for Pain, Disp-120 tablet, R-3OTC      alogliptin (NESINA) 25 MG TABS tablet Take 25 mg by mouth dailyHistorical Med      blood glucose test strips (TRUETRACK TEST) strip 2 TIMES DAILY Starting Fri 3/15/2019, Disp-100 each, R-0, NormalAs needed.       glipiZIDE (GLUCOTROL) 5 MG tablet Take 1 tablet by mouth daily, Disp-90 tablet, R-1Normal      metFORMIN (GLUCOPHAGE) 1000 MG tablet Take 1 tablet by mouth 2 times daily (with meals), Disp-180 tablet, R-1Normal             ALLERGIES       Allergies   Allergen Reactions    Codeine Hives     + Nausea vomiting    Demerol      vomiting    Ultram [Tramadol Hcl] Other (See Comments)     Dizziness     Percocet [Oxycodone-Acetaminophen] Nausea And Vomiting    Toradol [Ketorolac Tromethamine] Rash       FAMILY HISTORY       Family History   Problem Relation Age of Onset    COPD Mother     Other Mother     Cancer Mother     High Blood Pressure Father     Heart Disease Father     Mental Illness Sister     Mental Illness Brother     Mental Illness Sister     Mental Illness Brother         SOCIAL HISTORY       Social History     Socioeconomic History    Marital status:      Spouse name: None    Number of children: 3    Years of education: None    Highest education level: None   Occupational History    None   Tobacco Use    Smoking status: Former Smoker     Packs/day: 0.50     Types: Cigarettes     Quit date: 2005     Years since quittin.5    Smokeless tobacco: Never Used   Vaping Use    Vaping Use: Never used   Substance and Sexual Activity    Alcohol use: No    Drug use: No    Sexual activity: None   Other Topics Concern    None   Social History Narrative    None     Social Determinants of Health     Financial Resource Strain:     Difficulty of Paying Living Expenses: Not on file   Food Insecurity:     Worried About Running Out of Food in the Last Year: Not on file    Nathalia of Food in the Last Year: Not on file   Transportation Needs:     Lack of Transportation (Medical): Not on file    Lack of Transportation (Non-Medical):  Not on file   Physical Activity:     Days of Exercise per Week: Not on file    Minutes of Exercise per Session: Not on file   Stress:     Feeling of Stress : Not on file   Social Connections:     Frequency of Communication with Friends and Family: Not on file    Frequency of Social Gatherings with Friends and Family: Not on file    Attends Hoahaoism Services: Not on file    Active Member of Clubs or Organizations: Not on file    Attends Club or Organization Meetings: Not on file    Marital Status: Not on file   Intimate Partner Violence:     Fear of Current or Ex-Partner: Not on file    Emotionally Abused: Not on file    Physically Abused: Not on file    Sexually Abused: Not on file   Housing Stability:     Unable to Pay for Housing in the Last Year: Not on file    Number of [place] in the Last Year: Not on file    Unstable Housing in the Last Year: Not on file       REVIEW OF SYSTEMS     Review of Systems   Constitutional: Negative for appetite change, chills, fatigue, fever and unexpected weight change. HENT: Negative for ear pain and rhinorrhea. Eyes: Negative for pain and visual disturbance. Respiratory: Positive for cough and shortness of breath. Negative for wheezing. Cardiovascular: Negative for chest pain, palpitations and leg swelling. Gastrointestinal: Negative for abdominal pain, blood in stool, constipation, diarrhea, nausea and vomiting. Genitourinary: Negative for dysuria, frequency and hematuria. Musculoskeletal: Negative for arthralgias, joint swelling and neck stiffness. Skin: Positive for wound. Negative for rash. Neurological: Negative for dizziness, syncope, weakness, light-headedness and headaches. Hematological: Does not bruise/bleed easily. Except as noted above the remainder of the review of systems was reviewed and is negative. SCREENINGS                        PHYSICAL EXAM    (up to 7 for level 4, 8 or more for level 5)     ED Triage Vitals [01/23/22 1652]   BP Temp Temp Source Pulse Resp SpO2 Height Weight   (!) 153/76 97.5 °F (36.4 °C) Oral 98 16 98 % -- (!) 333 lb (151 kg)       Physical Exam  Vitals and nursing note reviewed. Constitutional:       Appearance: She is well-developed. HENT:      Head: Normocephalic and atraumatic.    Eyes:      Conjunctiva/sclera: Conjunctivae normal. Pupils: Pupils are equal, round, and reactive to light. Cardiovascular:      Rate and Rhythm: Normal rate and regular rhythm. Heart sounds: Normal heart sounds. No murmur heard. No gallop. Pulmonary:      Effort: Pulmonary effort is normal. No respiratory distress. Breath sounds: Normal breath sounds. No wheezing or rales. Abdominal:      General: Bowel sounds are normal.      Palpations: Abdomen is soft. Musculoskeletal:         General: Normal range of motion. Cervical back: Normal range of motion. Skin:     General: Skin is warm and dry. Neurological:      Mental Status: She is alert and oriented to person, place, and time. DIAGNOSTIC RESULTS     EKG:(none if blank)  All EKGs are interpreted by the Emergency Department Physician who either signs or Co-signs this chart in the absence of a cardiologist.        RADIOLOGY: (none if blank)   I directly visualized the following images and reviewed the radiologist interpretations. Interpretation per the Radiologist below, if available at the time of this note:  CTA CHEST W WO CONTRAST - r/o Pulmonary Embolism   Final Result      1. No pulmonary emboli or pulmonary infiltrates. 2. The previously described semisolid nodular density in the right lower lobe has resolved since the previous exam from 3 weeks prior. This is likely related to atelectasis on the previous exam.   3. Splenomegaly. **This report has been created using voice recognition software. It may contain minor errors which are inherent in voice recognition technology. **      Final report electronically signed by Dr Karla Mills on 1/23/2022 8:13 PM      VL DUP LOWER EXTREMITY VENOUS LEFT   Final Result   No evidence of a DVT. **This report has been created using voice recognition software. It may contain minor errors which are inherent in voice recognition technology. **      Final report electronically signed by Dr Karla Mills on 1/23/2022 7:45 PM          LABS:  Labs Reviewed   COMPREHENSIVE METABOLIC PANEL W/ REFLEX TO MG FOR LOW K - Abnormal; Notable for the following components:       Result Value    Glucose 173 (*)     All other components within normal limits   LACTATE, SEPSIS - Abnormal; Notable for the following components:    Lactic Acid, Sepsis 2.4 (*)     All other components within normal limits   C-REACTIVE PROTEIN - Abnormal; Notable for the following components:    CRP 1.22 (*)     All other components within normal limits   SEDIMENTATION RATE - Abnormal; Notable for the following components:    Sed Rate 22 (*)     All other components within normal limits   LACTIC ACID, PLASMA - Abnormal; Notable for the following components:    Lactic Acid 2.9 (*)     All other components within normal limits   CBC WITH AUTO DIFFERENTIAL   TROPONIN   PROTIME-INR   APTT   BRAIN NATRIURETIC PEPTIDE   PROCALCITONIN   ANION GAP   GLOMERULAR FILTRATION RATE, ESTIMATED   OSMOLALITY       All other labs were within normal range or not returned as of this dictation. Please note, any cultures that may have been sent were not resulted at the time of this patient visit. EMERGENCY DEPARTMENT COURSE and Medical Decision Making:     Vitals:    Vitals:    01/23/22 1652 01/23/22 2005 01/23/22 2121   BP: (!) 153/76 (!) 141/77 135/66   Pulse: 98 85 79   Resp: 16 16 16   Temp: 97.5 °F (36.4 °C)     TempSrc: Oral     SpO2: 98% 99% 99%   Weight: (!) 333 lb (151 kg)         PROCEDURES: (None if blank)  Procedures    ED Course as of 01/25/22 2018   Sun Jan 23, 2022 2240 Reviewed with Dr. Keely Leblanc. He advises to give more fluids and repeat lactic acid [KJ]   2249 Went to review results with patient and informed her she needed more fluids and a repeat lactic acid. She declined further treatment. She will follow up with her pcp. Reviewed return precautions. She states she just wants to go home.    [KJ]      ED Course User Index  [KJ] Rip Pulse, APRN - CNP     MDM  Number of Diagnoses or Management Options  Cellulitis of lower extremity, unspecified laterality: new, needed workup  Elevated lactic acid level: new, needed workup     Amount and/or Complexity of Data Reviewed  Clinical lab tests: ordered and reviewed  Tests in the radiology section of CPT®: ordered and reviewed  Tests in the medicine section of CPT®: ordered and reviewed  Review and summarize past medical records: yes  Discuss the patient with other providers: yes  Independent visualization of images, tracings, or specimens: yes    Risk of Complications, Morbidity, and/or Mortality  Presenting problems: low  Diagnostic procedures: moderate  Management options: low      Patient presents to ER with multiple complaints including shortness of breath, cough and a wound on her foot. She is also complaining of leg pain and swelling. Differential diagnosis includes but not limited to DVT, cellulitis, diabetic ulcer that is infected, COVID-19, influenza, and less likely PE. Patient does have a history of PE CTA of the chest was reassuring. Labs reveal an elevated lactic acid. Patient was handed off to Kishan Burton at 6 PM.  Strict return precautions and follow up instructions were discussed with the patient with which the patient agrees    ED Medications administered this visit:    Medications   iopamidol (ISOVUE-370) 76 % injection 80 mL (80 mLs IntraVENous Given 1/23/22 2003)   0.9 % sodium chloride bolus (0 mLs IntraVENous Stopped 1/23/22 2238)         FINAL IMPRESSION      1. Cellulitis of lower extremity, unspecified laterality    2.  Elevated lactic acid level          DISPOSITION/PLAN   DISPOSITION Decision To Discharge 01/23/2022 10:50:44 PM      PATIENT REFERRED TO:  SHANNON Fowler - [facility]  [address]  [phone]    Call in 1 day  For follow up      DISCHARGE MEDICATIONS:  Discharge Medication List as of 1/23/2022 10:52 PM      START taking these medications    Details

## 2022-01-24 NOTE — ED NOTES
Pt resting in the chair. Denies pain or needs. Lab at bedside to draw.       Graeme Ramos RN  01/23/22 2123

## 2022-01-24 NOTE — ED NOTES
Pt up to BR per self. Updated on plan of care and IV fluids started.       Yamileth Sood RN  01/23/22 2022

## 2022-01-24 NOTE — ED NOTES
ED nurse-to-nurse bedside report    Chief Complaint   Patient presents with    Foot Problem      LOC: alert and orientated to name, place, date  Vital signs   Vitals:    01/23/22 1652   BP: (!) 153/76   Pulse: 98   Resp: 16   Temp: 97.5 °F (36.4 °C)   TempSrc: Oral   SpO2: 98%   Weight: (!) 333 lb (151 kg)      Oxygen: NA    Current needs required room air   LDAs:   Peripheral IV 01/23/22 Left Antecubital (Active)   Site Assessment Clean;Dry; Intact 01/23/22 1810   Line Status Blood return noted;Specimen collected 01/23/22 1810   Dressing Status Clean;Dry; Intact 01/23/22 1810   Dressing Intervention New 01/23/22 1810     Continuous Infusions:   Mobility: Independent  Lion Fall Risk Score: No flowsheet data found.   Fall Interventions: Side rails up x1 per request  Report given to: Brijesh Iqbal RN  01/23/22 6527

## 2022-01-24 NOTE — ED NOTES
Pt currently in CT.  Granddaughter went with pt to CT per pt request.      Lindley Angelucci, RN  01/23/22 1916

## 2022-01-25 ASSESSMENT — ENCOUNTER SYMPTOMS
RHINORRHEA: 0
EYE PAIN: 0
SHORTNESS OF BREATH: 1
WHEEZING: 0
CONSTIPATION: 0
ABDOMINAL PAIN: 0
BLOOD IN STOOL: 0
VOMITING: 0
DIARRHEA: 0
COUGH: 1
NAUSEA: 0

## 2022-01-29 NOTE — ED PROVIDER NOTES
[address]     Pt Name: Taylor Gambino  MRN: [id number]  [place] 1970  Date of evaluation: 1/29/22        Mid-level provider Note:    I have personally performed and/or participated in the history, exam and medical decision making and agree with all pertinent clinical information as noted by the previous provider. I have also reviewed and agree with the past medical, family and social history unless otherwise noted. I have personally performed a face to face diagnostic evaluation on this patient. I have reviewed the previous provider's findings and agree. Evaluation: I assumed care of this patient from [facility] pending labs and imaging. She had an elevated lactic so ivf were given and repeat lactic went up again. I reviewed with patient. She declined to stay for further evaluation. The patient needs to discuss with pcp about being on metformin as that can cause lactic acidosis. She will be dc home with oral abx for the cellulitis. ED Course as of 01/29/22 0347   Suzan Lee Jan 23, 2022 2240 Reviewed with Dr. Cristino Hammond. He advises to give more fluids and repeat lactic acid [KJ]   2249 Went to review results with patient and informed her she needed more fluids and a repeat lactic acid. She declined further treatment. She will follow up with her pcp. Reviewed return precautions. She states she just wants to go home. [KJ]      ED Course User Index  [KJ] Kiah Villalobos, APRN - CNP       CTA CHEST W WO CONTRAST - r/o Pulmonary Embolism    Result Date: 1/23/2022  PROCEDURE: CTA CHEST W WO CONTRAST CLINICAL INFORMATION: 73-year-old female with shortness of breath and chest pain. COMPARISON: CT 01/04/2022. TECHNIQUE: 3 mm axial images were obtained through the chest after the administration of IV contrast.  A non-contrast localizer was obtained.   3D reconstructions were performed on the scanner to include MIP images through the right and left pulmonary arteries and sagittal images through the chest. Isovue was the intravenous contrast utilized. All CT scans at this facility use dose modulation, iterative reconstruction, and/or weight-based dosing when appropriate to reduce radiation dose to as low as reasonably achievable. FINDINGS: The thyroid gland is present. The central airways are patent. The heart is normal in size. There is no pericardial effusion. The pulmonary arteries are adequately opacified. There is no pulmonary embolism. There are no pathologically enlarged lymph nodes in the mediastinum or axillary regions. The previously described pleural-based nodular density in the right lower lobe is no longer present. There is no pleural effusion or pneumothorax. There are no pulmonary infiltrates. The spleen is enlarged measuring 13.7 cm. The bones are intact. There are degenerative changes. 1. No pulmonary emboli or pulmonary infiltrates. 2. The previously described semisolid nodular density in the right lower lobe has resolved since the previous exam from 3 weeks prior. This is likely related to atelectasis on the previous exam. 3. Splenomegaly. **This report has been created using voice recognition software. It may contain minor errors which are inherent in voice recognition technology. ** Final report electronically signed by Dr Thelma Olivas on 1/23/2022 8:13 PM    CTA CHEST W WO CONTRAST    Result Date: 1/4/2022  PROCEDURE: CTA CHEST W WO CONTRAST CLINICAL INFORMATION: HX blood clots and FX 5 leiden; covid +, sob, chest tightness. COMPARISON: 10/23/2020 TECHNIQUE: 3 mm axial images were obtained through the chest after the administration of IV contrast.  A non-contrast localizer was obtained. 3D reconstructions were performed on the scanner to include MIP coronal and sagittal images through the chest. Isovue was the intravenous contrast utilized.  All CT scans at this facility use dose modulation, iterative reconstruction, and/or weight-based dosing when appropriate to reduce radiation dose to as low as reasonably achievable. FINDINGS: There is adequate opacification of the pulmonary arterial system. No pulmonary emboli are present. The aorta is within acceptable limits. The heart size is normal. There is no pericardial or pleural effusion. There is no mediastinal, axillary or hilar adenopathy. 8mm area of focal atelectasis versus heart solid pleural-based nodule superior segment right lower lobe image 79 series 4  No suspicious osseous lesions are present. There are no suspicious findings in the imaged aspects of the upper abdomen. No pulmonary emboli or pulmonary infiltrates. Focal atelectasis versus a part solid nodule right lower lobe. Follow-up chest CT in 3 months recommended **This report has been created using voice recognition software. It may contain minor errors which are inherent in voice recognition technology. ** Final report electronically signed by Dr. Greg Gutierrez on 1/4/2022 12:50 PM    XR CHEST PORTABLE    Result Date: 12/30/2021  PROCEDURE: XR CHEST PORTABLE CLINICAL INFORMATION: Chest pain TECHNIQUE: Mobile AP chest radiograph. COMPARISON: PA and lateral chest radiographs 9/9/2019 FINDINGS: Cardiomediastinal silhouette is within normal limits. There are no lung consolidations. Degenerative changes in the thoracic spine are poorly visualized. Soft tissues are unremarkable. No acute intrathoracic process. **This report has been created using voice recognition software. It may contain minor errors which are inherent in voice recognition technology. ** Final report electronically signed by Dr. Bryan Borrego on 12/30/2021 1:22 PM    VL DUP LOWER EXTREMITY VENOUS LEFT    Result Date: 1/23/2022  PROCEDURE: VL DUP LOWER EXTREMITY VENOUS LEFT CLINICAL INFORMATION: 72-year-old female with pain and redness of the left lower extremity. COMPARISON: Vascular ultrasound 12/3/2018.  TECHNIQUE: Venous doppler ultrasound was performed of the left lower extremity using gray scale, color flow and spectral doppler imaging. FINDINGS: There is normal color flow, spectral analysis and compressibility of the common femoral vein, superficial femoral vein and popliteal vein on the left . There is normal color flow and compressibility in the posterior tibial veins, anterior tibial veins and peroneal veins. There is normal color flow, spectral analysis and compressibility in the contralateral common femoral vein. No evidence of a DVT. **This report has been created using voice recognition software. It may contain minor errors which are inherent in voice recognition technology. ** Final report electronically signed by Dr Dawn Calderon on 1/23/2022 7:45 PM        DIAGNOSIS  1. Cellulitis of lower extremity, unspecified laterality    2.  Elevated lactic acid level           DISPOSITION/PLAN  PATIENT REFERRED TO:  SHANNON Dupree - CNP  [address]  [phone]    Call in 1 day  For follow up    [facility]:  Discharge Medication List as of 1/23/2022 10:52 PM      START taking these medications    Details   doxycycline hyclate (VIBRA-TABS) 100 MG tablet Take 1 tablet by mouth 2 times daily for 10 days, Disp-20 tablet, R-0Print               Lukasz Thompson, APRN - CNP        SHANNON Hanley - [facility]  01/29/22 0127

## 2022-02-02 ENCOUNTER — TELEPHONE (OUTPATIENT)
Dept: CARDIOLOGY CLINIC | Age: 52
End: 2022-02-02

## 2022-02-02 ENCOUNTER — HOSPITAL ENCOUNTER (EMERGENCY)
Age: 52
Discharge: HOME | End: 2022-02-02
Attending: EMERGENCY MEDICINE
Payer: COMMERCIAL

## 2022-02-02 ENCOUNTER — APPOINTMENT (OUTPATIENT)
Dept: CT IMAGING | Age: 52
End: 2022-02-02
Payer: COMMERCIAL

## 2022-02-02 VITALS
HEIGHT: 68 IN | HEART RATE: 67 BPM | RESPIRATION RATE: 18 BRPM | SYSTOLIC BLOOD PRESSURE: 128 MMHG | OXYGEN SATURATION: 99 % | DIASTOLIC BLOOD PRESSURE: 75 MMHG | WEIGHT: 293 LBS | BODY MASS INDEX: 44.41 KG/M2 | TEMPERATURE: 97.8 F

## 2022-02-02 DIAGNOSIS — R00.0 TACHYCARDIA: Primary | ICD-10-CM

## 2022-02-02 DIAGNOSIS — R00.2 PALPITATIONS: ICD-10-CM

## 2022-02-02 LAB
ANION GAP SERPL CALCULATED.3IONS-SCNC: 14 MEQ/L (ref 8–16)
BASOPHILS # BLD: 0.7 %
BASOPHILS ABSOLUTE: 0 THOU/MM3 (ref 0–0.1)
BUN BLDV-MCNC: 6 MG/DL (ref 7–22)
CALCIUM SERPL-MCNC: 9.2 MG/DL (ref 8.5–10.5)
CHLORIDE BLD-SCNC: 100 MEQ/L (ref 98–111)
CO2: 24 MEQ/L (ref 23–33)
CREAT SERPL-MCNC: 0.6 MG/DL (ref 0.4–1.2)
EKG ATRIAL RATE: 85 BPM
EKG P AXIS: 36 DEGREES
EKG P-R INTERVAL: 176 MS
EKG Q-T INTERVAL: 378 MS
EKG QRS DURATION: 96 MS
EKG QTC CALCULATION (BAZETT): 449 MS
EKG R AXIS: 13 DEGREES
EKG T AXIS: 36 DEGREES
EKG VENTRICULAR RATE: 85 BPM
EOSINOPHIL # BLD: 2.6 %
EOSINOPHILS ABSOLUTE: 0.2 THOU/MM3 (ref 0–0.4)
ERYTHROCYTE [DISTWIDTH] IN BLOOD BY AUTOMATED COUNT: 13.4 % (ref 11.5–14.5)
ERYTHROCYTE [DISTWIDTH] IN BLOOD BY AUTOMATED COUNT: 44 FL (ref 35–45)
GFR SERPL CREATININE-BSD FRML MDRD: > 90 ML/MIN/1.73M2
GLUCOSE BLD-MCNC: 199 MG/DL (ref 70–108)
HCT VFR BLD CALC: 43.1 % (ref 37–47)
HEMOGLOBIN: 13.9 GM/DL (ref 12–16)
IMMATURE GRANS (ABS): 0.02 THOU/MM3 (ref 0–0.07)
IMMATURE GRANULOCYTES: 0.3 %
LYMPHOCYTES # BLD: 18.1 %
LYMPHOCYTES ABSOLUTE: 1.3 THOU/MM3 (ref 1–4.8)
MCH RBC QN AUTO: 29 PG (ref 26–33)
MCHC RBC AUTO-ENTMCNC: 32.3 GM/DL (ref 32.2–35.5)
MCV RBC AUTO: 89.8 FL (ref 81–99)
MONOCYTES # BLD: 4.6 %
MONOCYTES ABSOLUTE: 0.3 THOU/MM3 (ref 0.4–1.3)
NUCLEATED RED BLOOD CELLS: 0 /100 WBC
OSMOLALITY CALCULATION: 278.9 MOSMOL/KG (ref 275–300)
PLATELET # BLD: 224 THOU/MM3 (ref 130–400)
PMV BLD AUTO: 9.8 FL (ref 9.4–12.4)
POTASSIUM REFLEX MAGNESIUM: 4.1 MEQ/L (ref 3.5–5.2)
RBC # BLD: 4.8 MILL/MM3 (ref 4.2–5.4)
SEG NEUTROPHILS: 73.7 %
SEGMENTED NEUTROPHILS ABSOLUTE COUNT: 5.2 THOU/MM3 (ref 1.8–7.7)
SODIUM BLD-SCNC: 138 MEQ/L (ref 135–145)
WBC # BLD: 7 THOU/MM3 (ref 4.8–10.8)

## 2022-02-02 PROCEDURE — 93005 ELECTROCARDIOGRAM TRACING: CPT | Performed by: EMERGENCY MEDICINE

## 2022-02-02 PROCEDURE — 99283 EMERGENCY DEPT VISIT LOW MDM: CPT

## 2022-02-02 PROCEDURE — 36415 COLL VENOUS BLD VENIPUNCTURE: CPT

## 2022-02-02 PROCEDURE — 80048 BASIC METABOLIC PNL TOTAL CA: CPT

## 2022-02-02 PROCEDURE — 85025 COMPLETE CBC W/AUTO DIFF WBC: CPT

## 2022-02-02 PROCEDURE — 70450 CT HEAD/BRAIN W/O DYE: CPT

## 2022-02-02 NOTE — ED TRIAGE NOTES
Pt to the ED with c/o tachycardia. Pt states it feel like her heart has been intermittently racing for 3 days. Pt denies chest pain and SOB. Pt has been getting treatment for cellulitis in her legs which has significantly improved.

## 2022-02-02 NOTE — TELEPHONE ENCOUNTER
Patient is needing to re-est care. She was seen in the ED and was advised to follow up with cardiology. She said she was seen for heart racing and left arm numbness.  Patient saw Keenan Mejía in 2015  Please advise  [phone]

## 2022-02-04 NOTE — ED PROVIDER NOTES
[address] \[facility]\"" [address] [address] EMERGENCY DEPT      CHIEF COMPLAINT       Chief Complaint   Patient presents with    Tachycardia       Nurses Notes reviewed and I agree except as noted in the HPI. HISTORY OF PRESENT ILLNESS    Jr Johnson is a 54 y.o. female who presents with complaint of tachycardia, patient states that she was recently diagnosed with Covid and has since developed tachycardia. Patient would like an ultrasound of her heart. Otherwise she denies chest pain, shortness of breath, no presyncope/syncope, no excessive fatigue. She has history of DVT on blood thinners, reports compliance with anticoagulation. Onset: Acute  Duration: Unknown  Timing: Persistent  Location of Pain: No pain  Intesity/severity: Unknown  Modifying Factors: Covid infection  Relieved by;  Previous Episodes; Tx Before arrival: None  REVIEW OF SYSTEMS      Review of Systems   Constitutional: Negative for fever, chills, diaphoresis and fatigue. HENT: Negative for congestion, drooling, facial swelling and sore throat. Eyes: Negative for photophobia, pain and discharge. Respiratory: Negative for cough, shortness of breath, wheezing and stridor. Cardiovascular: Negative for chest pain, palpitations and leg swelling. Positive for tachycardia. Gastrointestinal: Negative for abdominal pain, blood in stool and abdominal distention. Genitourinary: Negative for dysuria, urgency, hematuria and difficulty urinating. Musculoskeletal: Negative for gait problem, neck pain and neck stiffness. Positive for left arm pain/numbness. Skin; No rash, No itching  Neurological: Negative for seizures, weakness and numbness. Hematological: Negative for adenopathy. Does not bruise/bleed easily.      PAST MEDICAL HISTORY    has a past medical history of Asthma, Bipolar 1 disorder ([address] Blood circulation, collateral, CAD (coronary artery disease), Curvature of spine, Diabetes mellitus ([facility] [address] DVT (deep venous thrombosis) ([facility] [address] Factor 5 Leiden mutation, heterozygous ([facility] GERD (gastroesophageal reflux disease), HTN, goal below 130/80, Hx of blood clots, Hx-TIA (transient ischemic attack), Hyperlipidemia, PE (pulmonary embolism), RA (rheumatoid arthritis) ([facility] and Unspecified cerebral artery occlusion with cerebral infarction. SURGICAL HISTORY      has a past surgical history that includes Tubal ligation (2003); Cholecystectomy (1992); Knee arthroscopy (2007&2010); Tympanostomy tube placement; Tonsillectomy; Cardiac catheterization (feb 2015); and Foot Debridement (Right, 9/30/2019). CURRENT MEDICATIONS       Discharge Medication List as of 2/2/2022  2:01 PM      CONTINUE these medications which have NOT CHANGED    Details   doxycycline hyclate (VIBRA-TABS) 100 MG tablet Take 1 tablet by mouth 2 times daily for 10 days, Disp-20 tablet, R-0Print      Multiple Vitamins-Minerals (EMERGEN-C VITAMIN C) PACK Follow package directions, R-0OTC      Zinc 10 MG LOZG Follow package directions, R-0OTC      albuterol sulfate  (90 Base) MCG/ACT inhaler Inhale 2 puffs into the lungs every 6 hours as needed for Wheezing or Shortness of Breath, Disp-1 Inhaler,R-0Normal      gabapentin (NEURONTIN) 300 MG capsule Take 300 mg by mouth 2 times daily. Historical Med      acetaminophen (TYLENOL) 325 MG tablet Take 2 tablets by mouth every 4 hours as needed for Pain, Disp-120 tablet, R-3OTC      alogliptin (NESINA) 25 MG TABS tablet Take 25 mg by mouth dailyHistorical Med      blood glucose test strips (TRUETRACK TEST) strip 2 TIMES DAILY Starting Fri 3/15/2019, Disp-100 each, R-0, NormalAs needed.       glipiZIDE (GLUCOTROL) 5 MG tablet Take 1 tablet by mouth daily, Disp-90 tablet, R-1Normal      metFORMIN (GLUCOPHAGE) 1000 MG tablet Take 1 tablet by mouth 2 times daily (with meals), Disp-180 tablet, R-1Normal             ALLERGIES     is allergic to codeine, demerol, ultram [tramadol hcl], percocet [oxycodone-acetaminophen], and toradol [employer] tromethamine]. FAMILY HISTORY     She indicated that her mother is alive. She indicated that her father is alive. family history includes COPD in her mother; Cancer in her mother; Heart Disease in her father; High Blood Pressure in her father; Mental Illness in her brother, brother, sister, and sister; Other in her mother. SOCIAL HISTORY      reports that she quit smoking about 16 years ago. Her smoking use included cigarettes. She smoked 0.50 packs per day. She has never used smokeless tobacco. She reports that she does not drink alcohol and does not use drugs. PHYSICAL EXAM     INITIAL VITALS:  height is 5' 8\" (1.727 m) and weight is 340 lb (154.2 kg) (abnormal). Her oral temperature is 97.8 °F (36.6 °C). Her blood pressure is 128/75 and her pulse is 67. Her respiration is 18 and oxygen saturation is 99%. Physical Exam   Constitutional:  well-developed and well-nourished. HENT: Head: Normocephalic, atraumatic, Bilateral external ears normal, Oropharynx mosit, No oral exudates, Nose normal.   Eyes: PERRL, EOMI, Conjunctiva normal, No discharge. No scleral icterus  Neck: Normal range of motion, No tenderness, Supple  Cardiovascular: Normal rate, regular rhythm, S1 normal and S2 normal.  Exam reveals no gallop. Pulmonary/Chest: Effort normal and breath sounds normal. No accessory muscle usage or stridor. No respiratory distress. no wheezes. has no rales. exhibits no tenderness. Abdominal: Soft. Bowel sounds are normal.  exhibits no distension. There is no tenderness. There is no rebound and no guarding. Extremities: No edema, no tenderness, no cyanosis, no clubbing. Musculoskeletal: Good range of motion in major joints is observed. No major deformities noted. Neurological: Alert and oriented ×3, normal motor function, normal sensory function, no focal deficits. GCS  15  Skin: Skin is warm, dry and intact. No rash noted. No erythema.    Psychiatric: Affect normal, judgment normal, mood normal.  DIFFERENTIAL DIAGNOSIS:       DIAGNOSTIC RESULTS     EKG: All EKG's are interpreted by the Emergency Department Physician who either signs or Co-signs this chart in the absence of a cardiologist.      RADIOLOGY: non-plain film images(s) such as CT, Ultrasound and MRI are read by the radiologist.  Plain radiographic images are visualized and preliminarily interpreted by the emergency physician unless otherwise stated below. LABS:   Labs Reviewed   CBC WITH AUTO DIFFERENTIAL - Abnormal; Notable for the following components:       Result Value    Monocytes Absolute 0.3 (*)     All other components within normal limits   BASIC METABOLIC PANEL W/ REFLEX TO MG FOR LOW K - Abnormal; Notable for the following components:    Glucose 199 (*)     BUN 6 (*)     All other components within normal limits   ANION GAP   OSMOLALITY   GLOMERULAR FILTRATION RATE, ESTIMATED       EMERGENCY DEPARTMENT COURSE:   Vitals:    Vitals:    02/02/22 1131 02/02/22 1403   BP: (!) 156/95 128/75   Pulse: 98 67   Resp: 20 18   Temp: 97.8 °F (36.6 °C)    TempSrc: Oral    SpO2: 98% 99%   Weight: (!) 340 lb (154.2 kg)    Height: 5' 8\" (1.727 m)      Patient presenting with complaint of tachycardia post Covid. No tachycardia noted in the ED, patient's heart rate in the 60s and 70s. Patient also complained of left arm pain/numbness which appears to be musculoskeletal. Nonetheless, patient had a negative neuro exam, no weakness noted, CT head negative. Patient discharged home, she has an appointment to follow-up with cardiology for further evaluation of the reported tachycardia. CRITICAL CARE:       CONSULTS:  None    PROCEDURES:  None    FINAL IMPRESSION      1. Tachycardia    2.  Palpitations          DISPOSITION/PLAN   Decision To Discharge    PATIENT REFERRED TO:  Garth Botello MD  [place] [address] [address] [facility] [address] [facility] [address] [phone]    Schedule an appointment as soon as possible for a visit in 3 days  RE-CHECK AND FURTHER TESTING AS NEEDED      DISCHARGE MEDICATIONS:  Discharge Medication List as of 2/2/2022  2:01 PM          (Please note that portions of this note were completed with a voice recognition program.  Efforts were made to edit the dictations but occasionally words are mis-transcribed.)    DO Yaneli Proctor DO  02/04/22 0416

## 2022-02-15 ENCOUNTER — OFFICE VISIT (OUTPATIENT)
Dept: CARDIOLOGY CLINIC | Age: 52
End: 2022-02-15
Payer: COMMERCIAL

## 2022-02-15 VITALS
DIASTOLIC BLOOD PRESSURE: 84 MMHG | HEART RATE: 88 BPM | BODY MASS INDEX: 44.41 KG/M2 | SYSTOLIC BLOOD PRESSURE: 139 MMHG | HEIGHT: 68 IN | WEIGHT: 293 LBS

## 2022-02-15 DIAGNOSIS — I50.810 RVF (RIGHT VENTRICULAR FAILURE) (HCC): ICD-10-CM

## 2022-02-15 DIAGNOSIS — R06.09 DOE (DYSPNEA ON EXERTION): ICD-10-CM

## 2022-02-15 DIAGNOSIS — I27.20 PULMONARY HYPERTENSION (HCC): Primary | ICD-10-CM

## 2022-02-15 DIAGNOSIS — L03.032 CELLULITIS OF TOE OF LEFT FOOT: ICD-10-CM

## 2022-02-15 PROCEDURE — G8484 FLU IMMUNIZE NO ADMIN: HCPCS | Performed by: INTERNAL MEDICINE

## 2022-02-15 PROCEDURE — 1036F TOBACCO NON-USER: CPT | Performed by: INTERNAL MEDICINE

## 2022-02-15 PROCEDURE — 99204 OFFICE O/P NEW MOD 45 MIN: CPT | Performed by: INTERNAL MEDICINE

## 2022-02-15 PROCEDURE — G8417 CALC BMI ABV UP PARAM F/U: HCPCS | Performed by: INTERNAL MEDICINE

## 2022-02-15 PROCEDURE — G8427 DOCREV CUR MEDS BY ELIG CLIN: HCPCS | Performed by: INTERNAL MEDICINE

## 2022-02-15 RX ORDER — ATORVASTATIN CALCIUM 40 MG/1
TABLET, FILM COATED ORAL
Status: ON HOLD | COMMUNITY
Start: 2021-11-24 | End: 2022-03-15 | Stop reason: HOSPADM

## 2022-02-15 RX ORDER — SITAGLIPTIN 100 MG/1
TABLET, FILM COATED ORAL
COMMUNITY
Start: 2022-01-17

## 2022-02-15 RX ORDER — SULFAMETHOXAZOLE AND TRIMETHOPRIM 800; 160 MG/1; MG/1
TABLET ORAL
Status: ON HOLD | COMMUNITY
Start: 2022-02-12 | End: 2022-03-14

## 2022-02-15 RX ORDER — GABAPENTIN 600 MG/1
TABLET ORAL
COMMUNITY
Start: 2022-01-21

## 2022-02-15 RX ORDER — RIVAROXABAN 20 MG/1
TABLET, FILM COATED ORAL
COMMUNITY
Start: 2022-01-17

## 2022-02-15 NOTE — PROGRESS NOTES
[address] [facility] [place] [address]  [place] [address]  Dept: [phone]  Dept Fax: [phone]  Loc: [phone]    Visit Date: 2/15/2022    Ms. Ba Jara is a 54 y.o. female  who presented for:    Palpitations  Tachycardia     HPI:   STEVE Barreto is a pleasant 54year old female patient who  has a past medical history of Asthma, Bipolar 1 disorder ([facility] [address] Blood circulation, collateral, CAD (coronary artery disease), Curvature of spine, Diabetes mellitus ([facility] [address] DVT (deep venous thrombosis) ([facility] [address] Factor 5 Leiden mutation, heterozygous ([facility] [address] GERD (gastroesophageal reflux disease), HTN, goal below 130/80, Hx of blood clots, Hx-TIA (transient ischemic attack), Hyperlipidemia, PE (pulmonary embolism), RA (rheumatoid arthritis) ([facility] [address] and Unspecified cerebral artery occlusion with cerebral infarction. In 2015, [facility] revealed no significant CAD. Echo in 2015 revealed preserved EF, ROSIBEL, RV dilation, elevated RVSP. She had recent COVID infection. Patient is being treated for cellulitis, left second toe ulcer. On 2/4/2022, patient was seen at [facility] ER for palpitations, tachycardia. She was referred to cardiology for further evaluation. Chest CTA was negative for PE. EKG revealed NSR. Troponin <0.01. The patient reports intermittent chest pains, retrosternal, occurred in the past few weeks. Patient reports shortness of breath, dyspnea on exertion. Current Outpatient Medications:     Multiple Vitamins-Minerals (EMERGEN-C VITAMIN C) PACK, Follow package directions, Disp:  , Rfl: 0    Zinc 10 MG LOZG, Follow package directions, Disp:  , Rfl: 0    albuterol sulfate  (90 Base) MCG/ACT inhaler, Inhale 2 puffs into the lungs every 6 hours as needed for Wheezing or Shortness of Breath, Disp: 1 Inhaler, Rfl: 0    gabapentin (NEURONTIN) 300 MG capsule, Take 300 mg by mouth 2 times daily. , Disp: , Rfl:     acetaminophen (TYLENOL) 325 MG tablet, Take 2 tablets by mouth every 4 hours as needed for Pain, Disp: 120 tablet, Rfl: 3    alogliptin (NESINA) 25 MG TABS tablet, Take 25 mg by mouth daily, Disp: , Rfl:     blood glucose test strips (TRUETRACK TEST) strip, 1 each by In Vitro route 2 times daily As needed. , Disp: 100 each, Rfl: 0    glipiZIDE (GLUCOTROL) 5 MG tablet, Take 1 tablet by mouth daily, Disp: 90 tablet, Rfl: 1    metFORMIN (GLUCOPHAGE) 1000 MG tablet, Take 1 tablet by mouth 2 times daily (with meals), Disp: 180 tablet, Rfl: 1    Past Medical History  Cassie Goddard  has a past medical history of Asthma, Bipolar 1 disorder ([facility] [address] Blood circulation, collateral, CAD (coronary artery disease), Curvature of spine, Diabetes mellitus ([facility] [address] DVT (deep venous thrombosis) ([facility] [address] Factor 5 Leiden mutation, heterozygous ([facility] [address] GERD (gastroesophageal reflux disease), HTN, goal below 130/80, Hx of blood clots, Hx-TIA (transient ischemic attack), Hyperlipidemia, PE (pulmonary embolism), RA (rheumatoid arthritis) ([facility] [address] and Unspecified cerebral artery occlusion with cerebral infarction. Social History  Cassie Goddard  reports that she quit smoking about 16 years ago. Her smoking use included cigarettes. She smoked 0.50 packs per day. She has never used smokeless tobacco. She reports that she does not drink alcohol and does not use drugs. Family History  Cassie Goddard family history includes COPD in her mother; Cancer in her mother; Heart Disease in her father; High Blood Pressure in her father; Mental Illness in her brother, brother, sister, and sister; Other in her mother.     Past Surgical History   Past Surgical History:   Procedure Laterality Date    CARDIAC CATHETERIZATION  feb 2015    Heart caths    CHOLECYSTECTOMY  1992    FOOT DEBRIDEMENT Right 9/30/2019    FOOT DEBRIDEMENT INCISION AND DRAINAGE performed by Cynthia Prieto DPM at [address] [place] [address] ARTHROSCOPY  2007&2010    TONSILLECTOMY      TUBAL LIGATION  2003    TYMPANOSTOMY TUBE PLACEMENT Review of Systems   Constitutional: Negative for chills and fever  HENT: Negative for congestion, sinus pressure, sneezing and sore throat. Eyes: Negative for pain, discharge, redness and itching. Respiratory: Negative for apnea, cough  Gastrointestinal: Negative for blood in stool, constipation, diarrhea   Endocrine: Negative for cold intolerance, heat intolerance, polydipsia. Genitourinary: Negative for dysuria, enuresis, flank pain and hematuria. Musculoskeletal: Negative for arthralgias, joint swelling and neck pain. Neurological: Negative for numbness and headaches. Psychiatric/Behavioral: Negative for agitation, confusion, decreased concentration and dysphoric mood. Objective:     LMP 06/21/2018     Wt Readings from Last 3 Encounters:   02/02/22 (!) 340 lb (154.2 kg)   01/23/22 (!) 333 lb (151 kg)   12/30/21 (!) 337 lb (152.9 kg)     BP Readings from Last 3 Encounters:   02/02/22 128/75   01/23/22 135/66   01/04/22 (!) 149/73       Nursing note and vitals reviewed. Physical Exam   Constitutional: Oriented to person, place, and time. Appears well-developed and well-nourished. ENT: Moist mucous membranes. No bleeding. Tongue is midline. Head: Normocephalic and atraumatic. Eyes: EOM are normal. Pupils are equal, round, and reactive to light. Neck: Normal range of motion. Neck supple. No JVD present. Cardiovascular: Normal rate, regular rhythm, systolic murmur, no rubs, and intact distal pulses. Pulmonary/Chest: Effort normal and breath sounds normal. No respiratory distress. No wheezes. No rales. Abdominal: Soft. Bowel sounds are normal. No distension. There is no tenderness. Musculoskeletal: Normal range of motion. trace edema. Venous stasis changes. Left 2nd toe redness. Neurological: Alert and oriented to person, place, and time. No cranial nerve deficit. Coordination normal.   Skin: Skin is warm and dry. Psychiatric: Normal mood and affect.        Lab Results Component Value Date    CKTOTAL 29 03/19/2015       Lab Results   Component Value Date    WBC 7.0 02/02/2022    RBC 4.80 02/02/2022    RBC 4.29 02/06/2012    HGB 13.9 02/02/2022    HCT 43.1 02/02/2022    MCV 89.8 02/02/2022    MCH 29.0 02/02/2022    MCHC 32.3 02/02/2022    RDW 14.9 09/08/2017     02/02/2022    MPV 9.8 02/02/2022       Lab Results   Component Value Date     02/02/2022    K 4.1 02/02/2022     02/02/2022    CO2 24 02/02/2022    BUN 6 02/02/2022    LABALBU 4.3 01/23/2022    CREATININE 0.6 02/02/2022    CALCIUM 9.2 02/02/2022    LABGLOM >90 02/02/2022    GLUCOSE 199 02/02/2022       Lab Results   Component Value Date    ALKPHOS 72 01/23/2022    ALT 22 01/23/2022    AST 22 01/23/2022    PROT 7.0 01/23/2022    BILITOT 0.3 01/23/2022    BILIDIR <0.2 03/14/2015    LABALBU 4.3 01/23/2022       No results found for: MG    Lab Results   Component Value Date    INR 1.09 01/23/2022    INR 1.07 09/08/2017    INR 1.54 (H) 09/09/2015    PROTIME 2.38 (H) 11/22/2011    PROTIME 1.36 (H) 10/17/2011    PROTIME 2.24 (H) 09/13/2011         Lab Results   Component Value Date    LABA1C 8.4 09/28/2019       Lab Results   Component Value Date    TRIG 268 01/29/2015    HDL 30 01/29/2015    LDLCALC 85 01/29/2015    LDLDIRECT 143.66 01/27/2015       Lab Results   Component Value Date    TSH 1.290 09/08/2017         Testing Reviewed:      I have individually reviewed the cardiac test below:    ECHO: No results found for this or any previous visit. [id number]  CORONARY ANGIOGRAM RESULTS:    1. Left main is patent and gives rise to LAD and left circ. 2.    LAD is patent. 3.    Left circumflex is patent. 4.    Right coronary artery has mild luminal irregularity. CONCLUSION:    1. Pretty much no significant coronary artery disease. 2.    Normal left ventricular function. 3.    No complication.     AssessmentPlan:     Leatha Caldwell is a pleasant 54year old female patient who  has a past medical history of Asthma, Bipolar 1 disorder ([facility] [address] Blood circulation, collateral, CAD (coronary artery disease), Curvature of spine, Diabetes mellitus ([facility] [address] DVT (deep venous thrombosis) ([facility] [address] Factor 5 Leiden mutation, heterozygous ([facility] [address] GERD (gastroesophageal reflux disease), HTN, goal below 130/80, Hx of blood clots, Hx-TIA (transient ischemic attack), Hyperlipidemia, PE (pulmonary embolism), RA (rheumatoid arthritis) ([facility] [address] and Unspecified cerebral artery occlusion with cerebral infarction. In 2015, [facility] revealed no significant CAD. Echo in 2015 revealed preserved EF, ROSIBEL, RV dilation, elevated RVSP. She had recent COVID infection. Patient is being treated for cellulitis, left second toe ulcer. On 2/4/2022, patient was seen at [address] [place] [address] ER for palpitations, tachycardia. She was referred to cardiology for further evaluation. Chest CTA was negative for PE. EKG revealed NSR. Troponin <0.01. The patient reports intermittent chest pains, retrosternal, occurred in the past few weeks. Patient reports shortness of breath, dyspnea on exertion. 1. Chest pain   2. SOB  3. [place]  4. Recent COVID infection  5. Cellulitis   6. Left 2nd toe infection   7. Palpitations   8. Tachycardia  9. Post recent ER visit for palpitations  10. Bi-atrial enlargement, RV dilation, pulmonary HTN (Echo 2015)  11. DM  12. HTN   13. Dyslipidemia   14. H/o CVA, TIA    15. H/o DVT, PE      Check lipid panel (h/o dyslipidemia)    lipitor    On xarelto    In 2015, [facility] revealed no significant CAD   Echo in 2015 revealed preserved EF, ROSIBEL, RV dilation, elevated RVSP   On 2/4/2022, patient was seen at [address] [place] [address] ER for palpitations, tachycardia. She was referred to cardiology for further evaluation. EKG revealed NSR.  Troponin <0.01   Will need to investigate for underlying structural heart disease, will proceed with a transthoracic echocardiogram    Based on patient's risk factors and clinical presentation, stress test is indicated to investigate for possible underlying ischemic heart disease. Patient  agrees with that work up and its risks and benefits and potential need for additional testing if stress test is abnormal such as cardiac catheterization   holter    CHECK ISMA      Above findings and plan of care were discussed with patient in details, patient's questions were answered.      Disposition:  RTC in 1 year            Electronically signed by Marylee Lower, MD, [facility] [place], [place]    2/15/2022 at 1:12 PM EST

## 2022-02-16 ENCOUNTER — TELEPHONE (OUTPATIENT)
Dept: CARDIOLOGY CLINIC | Age: 52
End: 2022-02-16

## 2022-02-16 NOTE — TELEPHONE ENCOUNTER
Peer to Peer done with Dr Tapia [facility]   ECHO approved: # [id number]  Valid until 3/17/22

## 2022-02-16 NOTE — TELEPHONE ENCOUNTER
Per Ruby Sethi, the Echo ordered for this patient is pending denial for a peer to peer as the clinical information does not support medical necessity for the ordered test. All available clinical has been submitted at this time. The denial reasoning has been uploaded into the media tab for you review. Peer to peer available  # [phone]  Tracking# [id number]    Please advise.     *the stress was approved*

## 2022-03-01 LAB
CHOLESTEROL, TOTAL: 261 MG/DL
CHOLESTEROL/HDL RATIO: ABNORMAL
HDLC SERPL-MCNC: 44 MG/DL (ref 35–70)
LDL CHOLESTEROL CALCULATED: 188 MG/DL (ref 0–160)
NONHDLC SERPL-MCNC: ABNORMAL MG/DL
TRIGL SERPL-MCNC: 158 MG/DL
VLDLC SERPL CALC-MCNC: 29 MG/DL

## 2022-03-03 ENCOUNTER — HOSPITAL ENCOUNTER (OUTPATIENT)
Dept: NON INVASIVE DIAGNOSTICS | Age: 52
Discharge: HOME | End: 2022-03-03
Payer: COMMERCIAL

## 2022-03-03 ENCOUNTER — TELEPHONE (OUTPATIENT)
Dept: CARDIOLOGY CLINIC | Age: 52
End: 2022-03-03

## 2022-03-03 ENCOUNTER — HOSPITAL ENCOUNTER (OUTPATIENT)
Dept: INTERVENTIONAL RADIOLOGY/VASCULAR | Age: 52
Discharge: HOME | End: 2022-03-03
Payer: COMMERCIAL

## 2022-03-03 VITALS — HEIGHT: 68 IN | WEIGHT: 293 LBS | BODY MASS INDEX: 44.41 KG/M2

## 2022-03-03 DIAGNOSIS — R94.39 ABNORMAL STRESS TEST: Primary | ICD-10-CM

## 2022-03-03 DIAGNOSIS — I50.810 RVF (RIGHT VENTRICULAR FAILURE) (HCC): ICD-10-CM

## 2022-03-03 DIAGNOSIS — R06.09 DOE (DYSPNEA ON EXERTION): ICD-10-CM

## 2022-03-03 DIAGNOSIS — L03.032 CELLULITIS OF TOE OF LEFT FOOT: ICD-10-CM

## 2022-03-03 DIAGNOSIS — I27.20 PULMONARY HYPERTENSION (HCC): ICD-10-CM

## 2022-03-03 LAB
LV EF: 60 %
LVEF MODALITY: NORMAL

## 2022-03-03 PROCEDURE — A9500 TC99M SESTAMIBI: HCPCS | Performed by: INTERNAL MEDICINE

## 2022-03-03 PROCEDURE — 93225 XTRNL ECG REC<48 HRS REC: CPT

## 2022-03-03 PROCEDURE — 93306 TTE W/DOPPLER COMPLETE: CPT

## 2022-03-03 PROCEDURE — 93017 CV STRESS TEST TRACING ONLY: CPT | Performed by: INTERNAL MEDICINE

## 2022-03-03 PROCEDURE — 78452 HT MUSCLE IMAGE SPECT MULT: CPT

## 2022-03-03 PROCEDURE — 93226 XTRNL ECG REC<48 HR SCAN A/R: CPT

## 2022-03-03 PROCEDURE — 3430000000 HC RX DIAGNOSTIC RADIOPHARMACEUTICAL: Performed by: INTERNAL MEDICINE

## 2022-03-03 PROCEDURE — 93922 UPR/L XTREMITY ART 2 LEVELS: CPT

## 2022-03-03 PROCEDURE — 6360000002 HC RX W HCPCS

## 2022-03-03 RX ADMIN — Medication 31.1 MILLICURIE: at 13:40

## 2022-03-03 RX ADMIN — Medication 8.8 MILLICURIE: at 12:40

## 2022-03-03 NOTE — TELEPHONE ENCOUNTER
Result note for Stress test Chantel Brower)      ----- Message from Kristen Nunez MD sent at 3/3/2022  4:02 PM EST -----  Scheduled C on 3/14/2022 at 7 AM

## 2022-03-03 NOTE — PROCEDURES
48 hour Holter Monitor was applied to patient. Patient was instructed to remove monitor on 03/05/2022 at [phone] and return to  of hospital. The serial number on the Holter Monitor is [id number].

## 2022-03-07 NOTE — TELEPHONE ENCOUNTER
Cath scheduled for 3/14/22 at 7:00am.  Pt to arrive at 5:00a. Patient notified of instructions and to hold Xarelto for 2 days. She voiced understanding.   Instructions mailed to her also

## 2022-03-10 ENCOUNTER — APPOINTMENT (OUTPATIENT)
Dept: INTERVENTIONAL RADIOLOGY/VASCULAR | Age: 52
End: 2022-03-10
Payer: COMMERCIAL

## 2022-03-10 ENCOUNTER — APPOINTMENT (OUTPATIENT)
Dept: CT IMAGING | Age: 52
End: 2022-03-10
Payer: COMMERCIAL

## 2022-03-10 ENCOUNTER — APPOINTMENT (OUTPATIENT)
Dept: GENERAL RADIOLOGY | Age: 52
End: 2022-03-10
Payer: COMMERCIAL

## 2022-03-10 ENCOUNTER — HOSPITAL ENCOUNTER (EMERGENCY)
Age: 52
Discharge: HOME | End: 2022-03-10
Attending: EMERGENCY MEDICINE
Payer: COMMERCIAL

## 2022-03-10 VITALS
BODY MASS INDEX: 44.41 KG/M2 | HEART RATE: 73 BPM | RESPIRATION RATE: 17 BRPM | TEMPERATURE: 98.2 F | HEIGHT: 68 IN | WEIGHT: 293 LBS | DIASTOLIC BLOOD PRESSURE: 90 MMHG | SYSTOLIC BLOOD PRESSURE: 158 MMHG | OXYGEN SATURATION: 99 %

## 2022-03-10 DIAGNOSIS — R51.9 ACUTE NONINTRACTABLE HEADACHE, UNSPECIFIED HEADACHE TYPE: ICD-10-CM

## 2022-03-10 DIAGNOSIS — R06.00 DYSPNEA, UNSPECIFIED TYPE: Primary | ICD-10-CM

## 2022-03-10 LAB
ALBUMIN SERPL-MCNC: 4.4 G/DL (ref 3.5–5.1)
ALP BLD-CCNC: 65 U/L (ref 38–126)
ALT SERPL-CCNC: 22 U/L (ref 11–66)
ANION GAP SERPL CALCULATED.3IONS-SCNC: 16 MEQ/L (ref 8–16)
AST SERPL-CCNC: 22 U/L (ref 5–40)
BASOPHILS # BLD: 0.7 %
BASOPHILS ABSOLUTE: 0 THOU/MM3 (ref 0–0.1)
BILIRUB SERPL-MCNC: 0.3 MG/DL (ref 0.3–1.2)
BILIRUBIN DIRECT: < 0.2 MG/DL (ref 0–0.3)
BUN BLDV-MCNC: 8 MG/DL (ref 7–22)
CALCIUM SERPL-MCNC: 9.7 MG/DL (ref 8.5–10.5)
CHLORIDE BLD-SCNC: 101 MEQ/L (ref 98–111)
CO2: 21 MEQ/L (ref 23–33)
CREAT SERPL-MCNC: 0.5 MG/DL (ref 0.4–1.2)
D-DIMER QUANTITATIVE: 678 NG/ML FEU (ref 0–500)
EKG ATRIAL RATE: 86 BPM
EKG P AXIS: 35 DEGREES
EKG P-R INTERVAL: 174 MS
EKG Q-T INTERVAL: 374 MS
EKG QRS DURATION: 96 MS
EKG QTC CALCULATION (BAZETT): 447 MS
EKG R AXIS: 11 DEGREES
EKG T AXIS: 41 DEGREES
EKG VENTRICULAR RATE: 86 BPM
EOSINOPHIL # BLD: 1.6 %
EOSINOPHILS ABSOLUTE: 0.1 THOU/MM3 (ref 0–0.4)
ERYTHROCYTE [DISTWIDTH] IN BLOOD BY AUTOMATED COUNT: 13.8 % (ref 11.5–14.5)
ERYTHROCYTE [DISTWIDTH] IN BLOOD BY AUTOMATED COUNT: 43.8 FL (ref 35–45)
GFR SERPL CREATININE-BSD FRML MDRD: > 90 ML/MIN/1.73M2
GLUCOSE BLD-MCNC: 165 MG/DL (ref 70–108)
HCT VFR BLD CALC: 44.6 % (ref 37–47)
HEMOGLOBIN: 14.8 GM/DL (ref 12–16)
IMMATURE GRANS (ABS): 0.02 THOU/MM3 (ref 0–0.07)
IMMATURE GRANULOCYTES: 0.3 %
LYMPHOCYTES # BLD: 26 %
LYMPHOCYTES ABSOLUTE: 1.8 THOU/MM3 (ref 1–4.8)
MCH RBC QN AUTO: 29.3 PG (ref 26–33)
MCHC RBC AUTO-ENTMCNC: 33.2 GM/DL (ref 32.2–35.5)
MCV RBC AUTO: 88.3 FL (ref 81–99)
MONOCYTES # BLD: 6 %
MONOCYTES ABSOLUTE: 0.4 THOU/MM3 (ref 0.4–1.3)
NUCLEATED RED BLOOD CELLS: 0 /100 WBC
OSMOLALITY CALCULATION: 277.7 MOSMOL/KG (ref 275–300)
PLATELET # BLD: 233 THOU/MM3 (ref 130–400)
PMV BLD AUTO: 9.9 FL (ref 9.4–12.4)
POTASSIUM REFLEX MAGNESIUM: 4.1 MEQ/L (ref 3.5–5.2)
RBC # BLD: 5.05 MILL/MM3 (ref 4.2–5.4)
SEG NEUTROPHILS: 65.4 %
SEGMENTED NEUTROPHILS ABSOLUTE COUNT: 4.6 THOU/MM3 (ref 1.8–7.7)
SODIUM BLD-SCNC: 138 MEQ/L (ref 135–145)
TOTAL PROTEIN: 7.5 G/DL (ref 6.1–8)
TROPONIN T: < 0.01 NG/ML
WBC # BLD: 7 THOU/MM3 (ref 4.8–10.8)

## 2022-03-10 PROCEDURE — 93970 EXTREMITY STUDY: CPT

## 2022-03-10 PROCEDURE — 93005 ELECTROCARDIOGRAM TRACING: CPT | Performed by: FAMILY MEDICINE

## 2022-03-10 PROCEDURE — 84484 ASSAY OF TROPONIN QUANT: CPT

## 2022-03-10 PROCEDURE — 85025 COMPLETE CBC W/AUTO DIFF WBC: CPT

## 2022-03-10 PROCEDURE — 36415 COLL VENOUS BLD VENIPUNCTURE: CPT

## 2022-03-10 PROCEDURE — 70460 CT HEAD/BRAIN W/DYE: CPT

## 2022-03-10 PROCEDURE — 85379 FIBRIN DEGRADATION QUANT: CPT

## 2022-03-10 PROCEDURE — 80076 HEPATIC FUNCTION PANEL: CPT

## 2022-03-10 PROCEDURE — 70450 CT HEAD/BRAIN W/O DYE: CPT

## 2022-03-10 PROCEDURE — 6360000004 HC RX CONTRAST MEDICATION: Performed by: EMERGENCY MEDICINE

## 2022-03-10 PROCEDURE — 80048 BASIC METABOLIC PNL TOTAL CA: CPT

## 2022-03-10 PROCEDURE — 93010 ELECTROCARDIOGRAM REPORT: CPT | Performed by: NUCLEAR MEDICINE

## 2022-03-10 PROCEDURE — 99285 EMERGENCY DEPT VISIT HI MDM: CPT

## 2022-03-10 PROCEDURE — 71045 X-RAY EXAM CHEST 1 VIEW: CPT

## 2022-03-10 PROCEDURE — 71275 CT ANGIOGRAPHY CHEST: CPT

## 2022-03-10 RX ADMIN — IOPAMIDOL 80 ML: 755 INJECTION, SOLUTION INTRAVENOUS at 18:23

## 2022-03-10 NOTE — ED NOTES
Pt and vs reassessed. RR even and unlabored. Pt resting in bed alert. Pt given pillow and water and denies any other needs. No distress noted.  Pt stable at this time     Renay Officer, ARNOLD  03/10/22 7613

## 2022-03-10 NOTE — ED NOTES
Pt and vs reassessed. RR even and unlabored. Pt resting in bed alert and denies any needs. No distress noted.  Pt stable at this time     [facility]  03/10/22 [id number]

## 2022-03-10 NOTE — ED TRIAGE NOTES
Pt arrives to ED from home with c/o chest tightness and SOB, pt states she is scheduled for a heart cath on Monday and has had some SOB and chest tightness for a few days. Pt states she called her PCP to advise on what to do.  PCP recommended coming to ED for eval.   Pt is AOx4, VSS

## 2022-03-10 NOTE — ED NOTES
Pt and vs reassessed. RR even and unlabored. Pt resting in bed alert and updated on POC.  Pt denies any needs and stable at this time     [facility]  03/10/22 2312

## 2022-03-11 ENCOUNTER — PREP FOR PROCEDURE (OUTPATIENT)
Dept: CARDIOLOGY | Age: 52
End: 2022-03-11

## 2022-03-11 LAB
ACQUISITION DURATION: NORMAL S
AVERAGE HEART RATE: 74 BPM
HOOKUP DATE: NORMAL
HOOKUP TIME: NORMAL
MAX HEART RATE TIME/DATE: NORMAL
MAX HEART RATE: 124 BPM
MIN HEART RATE TIME/DATE: NORMAL
MIN HEART RATE: 53 BPM
NUMBER OF QRS COMPLEXES: NORMAL
NUMBER OF SUPRAVENTRICULAR COUPLETS: 0
NUMBER OF SUPRAVENTRICULAR ECTOPICS: 104
NUMBER OF SUPRAVENTRICULAR ISOLATED BEATS: 96
NUMBER OF VENTRICULAR BIGEMINAL CYCLES: 4
NUMBER OF VENTRICULAR COUPLETS: 1
NUMBER OF VENTRICULAR ECTOPICS: 257

## 2022-03-11 RX ORDER — NITROGLYCERIN 0.4 MG/1
0.4 TABLET SUBLINGUAL EVERY 5 MIN PRN
Status: CANCELLED | OUTPATIENT
Start: 2022-03-11

## 2022-03-11 RX ORDER — SODIUM CHLORIDE 0.9 % (FLUSH) 0.9 %
5-40 SYRINGE (ML) INJECTION PRN
Status: CANCELLED | OUTPATIENT
Start: 2022-03-11

## 2022-03-11 RX ORDER — SODIUM CHLORIDE 9 MG/ML
50 INJECTION, SOLUTION INTRAVENOUS CONTINUOUS
Status: CANCELLED | OUTPATIENT
Start: 2022-03-11

## 2022-03-11 RX ORDER — SODIUM CHLORIDE 0.9 % (FLUSH) 0.9 %
5-40 SYRINGE (ML) INJECTION EVERY 12 HOURS SCHEDULED
Status: CANCELLED | OUTPATIENT
Start: 2022-03-11

## 2022-03-11 RX ORDER — ASPIRIN 325 MG
325 TABLET ORAL ONCE
Status: CANCELLED | OUTPATIENT
Start: 2022-03-11 | End: 2022-03-11

## 2022-03-11 RX ORDER — DIPHENHYDRAMINE HYDROCHLORIDE 50 MG/ML
50 INJECTION INTRAMUSCULAR; INTRAVENOUS ONCE
Status: CANCELLED | OUTPATIENT
Start: 2022-03-11 | End: 2022-03-11

## 2022-03-11 RX ORDER — SODIUM CHLORIDE 9 MG/ML
25 INJECTION, SOLUTION INTRAVENOUS PRN
Status: CANCELLED | OUTPATIENT
Start: 2022-03-11

## 2022-03-11 NOTE — ED NOTES
Pt and vs reassessed. RR even and unlabored. Pt resting in bed alert. No distress noted.  Pt stable at this time     \[facility] [place] [facility]\""  03/10/22 1936

## 2022-03-14 ENCOUNTER — HOSPITAL ENCOUNTER (OUTPATIENT)
Dept: INPATIENT UNIT | Age: 52
Discharge: HOME | End: 2022-03-15
Attending: INTERNAL MEDICINE | Admitting: INTERNAL MEDICINE
Payer: COMMERCIAL

## 2022-03-14 PROBLEM — I25.10 CAD IN NATIVE ARTERY: Status: ACTIVE | Noted: 2022-03-14

## 2022-03-14 PROBLEM — Z98.61 CAD S/P PERCUTANEOUS CORONARY ANGIOPLASTY: Status: ACTIVE | Noted: 2022-03-14

## 2022-03-14 PROBLEM — I25.10 CAD S/P PERCUTANEOUS CORONARY ANGIOPLASTY: Status: ACTIVE | Noted: 2022-03-14

## 2022-03-14 LAB
ABO: NORMAL
ACTIVATED CLOTTING TIME: 232 SECONDS (ref 1–150)
ACTIVATED CLOTTING TIME: 237 SECONDS (ref 1–150)
ALBUMIN SERPL-MCNC: 4.1 G/DL (ref 3.5–5.1)
ALP BLD-CCNC: 63 U/L (ref 38–126)
ALT SERPL-CCNC: 19 U/L (ref 11–66)
ANION GAP SERPL CALCULATED.3IONS-SCNC: 13 MEQ/L (ref 8–16)
ANTIBODY SCREEN: NORMAL
APTT: 33.9 SECONDS (ref 22–38)
AST SERPL-CCNC: 15 U/L (ref 5–40)
BILIRUB SERPL-MCNC: 0.3 MG/DL (ref 0.3–1.2)
BUN BLDV-MCNC: 8 MG/DL (ref 7–22)
CALCIUM SERPL-MCNC: 9.3 MG/DL (ref 8.5–10.5)
CHLORIDE BLD-SCNC: 103 MEQ/L (ref 98–111)
CHOLESTEROL, TOTAL: 247 MG/DL (ref 100–199)
CO2: 24 MEQ/L (ref 23–33)
CREAT SERPL-MCNC: 0.6 MG/DL (ref 0.4–1.2)
EKG ATRIAL RATE: 73 BPM
EKG ATRIAL RATE: 74 BPM
EKG P AXIS: 31 DEGREES
EKG P AXIS: 43 DEGREES
EKG P-R INTERVAL: 188 MS
EKG P-R INTERVAL: 202 MS
EKG Q-T INTERVAL: 386 MS
EKG Q-T INTERVAL: 394 MS
EKG QRS DURATION: 94 MS
EKG QRS DURATION: 98 MS
EKG QTC CALCULATION (BAZETT): 428 MS
EKG QTC CALCULATION (BAZETT): 434 MS
EKG R AXIS: 0 DEGREES
EKG R AXIS: 16 DEGREES
EKG T AXIS: 27 DEGREES
EKG T AXIS: 34 DEGREES
EKG VENTRICULAR RATE: 73 BPM
EKG VENTRICULAR RATE: 74 BPM
ERYTHROCYTE [DISTWIDTH] IN BLOOD BY AUTOMATED COUNT: 13.7 % (ref 11.5–14.5)
ERYTHROCYTE [DISTWIDTH] IN BLOOD BY AUTOMATED COUNT: 44.3 FL (ref 35–45)
GFR SERPL CREATININE-BSD FRML MDRD: > 90 ML/MIN/1.73M2
GLUCOSE BLD-MCNC: 164 MG/DL (ref 70–108)
GLUCOSE BLD-MCNC: 232 MG/DL (ref 70–108)
HCT VFR BLD CALC: 41.3 % (ref 37–47)
HDLC SERPL-MCNC: 39 MG/DL
HEMOGLOBIN: 13.8 GM/DL (ref 12–16)
INR BLD: 0.99 (ref 0.85–1.13)
LDL CHOLESTEROL CALCULATED: 171 MG/DL
MCH RBC QN AUTO: 29.3 PG (ref 26–33)
MCHC RBC AUTO-ENTMCNC: 33.4 GM/DL (ref 32.2–35.5)
MCV RBC AUTO: 87.7 FL (ref 81–99)
PLATELET # BLD: 205 THOU/MM3 (ref 130–400)
PMV BLD AUTO: 9.7 FL (ref 9.4–12.4)
POTASSIUM REFLEX MAGNESIUM: 3.8 MEQ/L (ref 3.5–5.2)
RBC # BLD: 4.71 MILL/MM3 (ref 4.2–5.4)
RH FACTOR: NORMAL
SODIUM BLD-SCNC: 140 MEQ/L (ref 135–145)
TOTAL PROTEIN: 6.9 G/DL (ref 6.1–8)
TRIGL SERPL-MCNC: 186 MG/DL (ref 0–199)
WBC # BLD: 5.1 THOU/MM3 (ref 4.8–10.8)

## 2022-03-14 PROCEDURE — C9601 PERC DRUG-EL COR STENT BRAN: HCPCS

## 2022-03-14 PROCEDURE — 80053 COMPREHEN METABOLIC PANEL: CPT

## 2022-03-14 PROCEDURE — 85730 THROMBOPLASTIN TIME PARTIAL: CPT

## 2022-03-14 PROCEDURE — 85610 PROTHROMBIN TIME: CPT

## 2022-03-14 PROCEDURE — 2580000003 HC RX 258: Performed by: NURSE PRACTITIONER

## 2022-03-14 PROCEDURE — 36415 COLL VENOUS BLD VENIPUNCTURE: CPT

## 2022-03-14 PROCEDURE — 80061 LIPID PANEL: CPT

## 2022-03-14 PROCEDURE — 85347 COAGULATION TIME ACTIVATED: CPT

## 2022-03-14 PROCEDURE — 86901 BLOOD TYPING SEROLOGIC RH(D): CPT

## 2022-03-14 PROCEDURE — C1887 CATHETER, GUIDING: HCPCS

## 2022-03-14 PROCEDURE — 93005 ELECTROCARDIOGRAM TRACING: CPT | Performed by: INTERNAL MEDICINE

## 2022-03-14 PROCEDURE — C1874 STENT, COATED/COV W/DEL SYS: HCPCS

## 2022-03-14 PROCEDURE — 86850 RBC ANTIBODY SCREEN: CPT

## 2022-03-14 PROCEDURE — 6370000000 HC RX 637 (ALT 250 FOR IP): Performed by: INTERNAL MEDICINE

## 2022-03-14 PROCEDURE — 92929 PR PRQ TRLUML CORONARY STENT W/ANGIO ADDL ART/BRNCH: CPT | Performed by: INTERNAL MEDICINE

## 2022-03-14 PROCEDURE — C1894 INTRO/SHEATH, NON-LASER: HCPCS

## 2022-03-14 PROCEDURE — 86900 BLOOD TYPING SEROLOGIC ABO: CPT

## 2022-03-14 PROCEDURE — 93010 ELECTROCARDIOGRAM REPORT: CPT | Performed by: INTERNAL MEDICINE

## 2022-03-14 PROCEDURE — 2500000003 HC RX 250 WO HCPCS

## 2022-03-14 PROCEDURE — 93458 L HRT ARTERY/VENTRICLE ANGIO: CPT | Performed by: INTERNAL MEDICINE

## 2022-03-14 PROCEDURE — 82948 REAGENT STRIP/BLOOD GLUCOSE: CPT

## 2022-03-14 PROCEDURE — 6360000004 HC RX CONTRAST MEDICATION: Performed by: INTERNAL MEDICINE

## 2022-03-14 PROCEDURE — 85027 COMPLETE CBC AUTOMATED: CPT

## 2022-03-14 PROCEDURE — C9600 PERC DRUG-EL COR STENT SING: HCPCS

## 2022-03-14 PROCEDURE — 93458 L HRT ARTERY/VENTRICLE ANGIO: CPT

## 2022-03-14 PROCEDURE — 2580000003 HC RX 258: Performed by: INTERNAL MEDICINE

## 2022-03-14 PROCEDURE — 6360000002 HC RX W HCPCS

## 2022-03-14 PROCEDURE — C1725 CATH, TRANSLUMIN NON-LASER: HCPCS

## 2022-03-14 PROCEDURE — 6370000000 HC RX 637 (ALT 250 FOR IP)

## 2022-03-14 PROCEDURE — 92928 PRQ TCAT PLMT NTRAC ST 1 LES: CPT | Performed by: INTERNAL MEDICINE

## 2022-03-14 PROCEDURE — C1769 GUIDE WIRE: HCPCS

## 2022-03-14 PROCEDURE — 93005 ELECTROCARDIOGRAM TRACING: CPT | Performed by: NURSE PRACTITIONER

## 2022-03-14 RX ORDER — DIPHENHYDRAMINE HYDROCHLORIDE 50 MG/ML
50 INJECTION INTRAMUSCULAR; INTRAVENOUS ONCE
Status: DISCONTINUED | OUTPATIENT
Start: 2022-03-14 | End: 2022-03-15 | Stop reason: HOSPADM

## 2022-03-14 RX ORDER — SODIUM CHLORIDE 9 MG/ML
25 INJECTION, SOLUTION INTRAVENOUS PRN
Status: DISCONTINUED | OUTPATIENT
Start: 2022-03-14 | End: 2022-03-15 | Stop reason: HOSPADM

## 2022-03-14 RX ORDER — ATORVASTATIN CALCIUM 40 MG/1
40 TABLET, FILM COATED ORAL NIGHTLY
Status: DISCONTINUED | OUTPATIENT
Start: 2022-03-14 | End: 2022-03-15

## 2022-03-14 RX ORDER — ASPIRIN 325 MG
325 TABLET ORAL ONCE
Status: DISCONTINUED | OUTPATIENT
Start: 2022-03-14 | End: 2022-03-15 | Stop reason: HOSPADM

## 2022-03-14 RX ORDER — SODIUM CHLORIDE 9 MG/ML
INJECTION, SOLUTION INTRAVENOUS CONTINUOUS
Status: DISCONTINUED | OUTPATIENT
Start: 2022-03-14 | End: 2022-03-15 | Stop reason: HOSPADM

## 2022-03-14 RX ORDER — SODIUM CHLORIDE 0.9 % (FLUSH) 0.9 %
5-40 SYRINGE (ML) INJECTION PRN
Status: DISCONTINUED | OUTPATIENT
Start: 2022-03-14 | End: 2022-03-15 | Stop reason: HOSPADM

## 2022-03-14 RX ORDER — CLOPIDOGREL BISULFATE 75 MG/1
75 TABLET ORAL DAILY
Status: DISCONTINUED | OUTPATIENT
Start: 2022-03-15 | End: 2022-03-15 | Stop reason: HOSPADM

## 2022-03-14 RX ORDER — NITROGLYCERIN 0.4 MG/1
0.4 TABLET SUBLINGUAL EVERY 5 MIN PRN
Status: DISCONTINUED | OUTPATIENT
Start: 2022-03-14 | End: 2022-03-15 | Stop reason: HOSPADM

## 2022-03-14 RX ORDER — SODIUM CHLORIDE 0.9 % (FLUSH) 0.9 %
5-40 SYRINGE (ML) INJECTION EVERY 12 HOURS SCHEDULED
Status: DISCONTINUED | OUTPATIENT
Start: 2022-03-14 | End: 2022-03-15 | Stop reason: HOSPADM

## 2022-03-14 RX ORDER — ACETAMINOPHEN 325 MG/1
650 TABLET ORAL EVERY 4 HOURS PRN
Status: DISCONTINUED | OUTPATIENT
Start: 2022-03-14 | End: 2022-03-15 | Stop reason: HOSPADM

## 2022-03-14 RX ORDER — METOPROLOL SUCCINATE 25 MG/1
25 TABLET, EXTENDED RELEASE ORAL DAILY
Status: DISCONTINUED | OUTPATIENT
Start: 2022-03-14 | End: 2022-03-15 | Stop reason: HOSPADM

## 2022-03-14 RX ORDER — SODIUM CHLORIDE 9 MG/ML
50 INJECTION, SOLUTION INTRAVENOUS CONTINUOUS
Status: DISCONTINUED | OUTPATIENT
Start: 2022-03-14 | End: 2022-03-15 | Stop reason: HOSPADM

## 2022-03-14 RX ORDER — ASPIRIN 81 MG/1
81 TABLET ORAL DAILY
Status: DISCONTINUED | OUTPATIENT
Start: 2022-03-15 | End: 2022-03-15 | Stop reason: HOSPADM

## 2022-03-14 RX ADMIN — SODIUM CHLORIDE, PRESERVATIVE FREE 5 ML: 5 INJECTION INTRAVENOUS at 20:35

## 2022-03-14 RX ADMIN — SODIUM CHLORIDE 50 ML/HR: 9 INJECTION, SOLUTION INTRAVENOUS at 06:20

## 2022-03-14 RX ADMIN — ATORVASTATIN CALCIUM 40 MG: 40 TABLET, FILM COATED ORAL at 22:13

## 2022-03-14 RX ADMIN — SODIUM CHLORIDE: 9 INJECTION, SOLUTION INTRAVENOUS at 12:11

## 2022-03-14 RX ADMIN — IOPAMIDOL 395 ML: 755 INJECTION, SOLUTION INTRAVENOUS at 09:11

## 2022-03-14 RX ADMIN — ACETAMINOPHEN 650 MG: 325 TABLET ORAL at 23:35

## 2022-03-14 RX ADMIN — ACETAMINOPHEN 650 MG: 325 TABLET ORAL at 18:52

## 2022-03-14 RX ADMIN — METOPROLOL SUCCINATE 25 MG: 25 TABLET, FILM COATED, EXTENDED RELEASE ORAL at 10:48

## 2022-03-14 ASSESSMENT — PAIN SCALES - GENERAL
PAINLEVEL_OUTOF10: 6
PAINLEVEL_OUTOF10: 0
PAINLEVEL_OUTOF10: 3
PAINLEVEL_OUTOF10: 0
PAINLEVEL_OUTOF10: 6

## 2022-03-14 NOTE — H&P
[facility]  Sedation/Analgesia History & Physical    Pt Name: Leatha Caldwell  Account number: [de-identified]  MRN: [id number]  YOB: 1970  Provider Performing Procedure: Noni Long MD MD  Referring Provider: Noni Long MD   Primary Care Physician: SHANNON Aguilera - SAKSHI  Date: 3/14/2022    PRE-PROCEDURE    Code Status: FULL CODE  Brief History/Pre-Procedure Diagnosis:      Chest pain/angina   Abnormal stress test   DM, HYN, Obesity, h/o TIA   Palpitations   PVCs     Multiple ER visit, including on 3/10/2022 where she was seen for chest pain/heaviness     Consent: : I have discussed with the patient risks, benefits, and alternatives (and relevant risks, benefits, and side effects related to alternatives or not receiving care), and likelihood of the success. The patient and/or representative appear to understand and agree to proceed. The discussion encompasses risks, benefits, and side effects related to the alternatives and the risks related to not receiving the proposed care, treatment, and services. The indication, risks and benefits of the procedure and possible therapeutic consequences and alternatives were discussed with the patient. The patient was given the opportunity to ask questions and to have them answered to his/her satisfaction.  Risks of the procedure include but are not limited to the following: Bleeding, hematoma including retroperitoneal hemmorhage, infection, pain and discomfort, injury to the aorta and other blood vessels, rhythm disturbance, low blood pressure, myocardial infarction, stroke, kidney damage/failure, myocardial perforation, allergic reactions to sedatives/contrast material, loss of pulse/vascular compromise requiring surgery, aneurysm/pseudoaneurysm formation, possible loss of a limb/hand/leg, needing blood transfusion, requiring emergent open heart surgery or emergent coronary intervention, the need for intubation/respiratory support, the requirement for defibrillation/cardioversion, and death. Alternatives to and omission of the suggested procedure were discussed. The patient had no further questions and wished to proceed; the consent form was signed. MEDICAL HISTORY  []ASHD/ANGINA/MI/CHF   []Hypertension  []Diabetes  []Hyperlipidemia  []Smoking  []Family Hx of ASHD  []Additional information:       has a past medical history of Asthma, Bipolar 1 disorder ([facility] Blood circulation, collateral, CAD (coronary artery disease), Curvature of spine, Diabetes mellitus ([facility] [address] DVT (deep venous thrombosis) ([facility] [address] Factor 5 Leiden mutation, heterozygous ([facility] [address] GERD (gastroesophageal reflux disease), HTN, goal below 130/80, Hx of blood clots, Hx-TIA (transient ischemic attack), Hyperlipidemia, PE (pulmonary embolism), RA (rheumatoid arthritis) ([facility] [address] and Unspecified cerebral artery occlusion with cerebral infarction. SURGICAL HISTORY   has a past surgical history that includes Tubal ligation (2003); Cholecystectomy (1992); Knee arthroscopy (2007&2010); Tympanostomy tube placement; Tonsillectomy; Cardiac catheterization (feb 2015); and Foot Debridement (Right, 9/30/2019).   Additional information:       ALLERGIES   Allergies as of 03/14/2022 - Fully Reviewed 03/14/2022   Allergen Reaction Noted    Codeine Hives 09/06/2019    Demerol  08/28/2012    Ultram [tramadol hcl] Other (See Comments) 09/27/2019    Percocet [oxycodone-acetaminophen] Nausea And Vomiting 10/22/2020    Toradol [ketorolac tromethamine] Rash 09/06/2019     Additional information:       MEDICATIONS   Aspirin  [] 81 mg  [] 325 mg  [] None  Antiplatelet drug therapy use last 5 days  []No []Yes  Coumadin Use Last 5 Days []No []Yes  Other anticoagulant use last 5 days  []No []Yes    Current Facility-Administered Medications:     0.9 % sodium chloride infusion, 50 mL/hr, IntraVENous, Continuous, SHANNON Reno CNP, Last Rate: 50 mL/hr at 03/14/22 0620, 50 mL/hr at 03/14/22 0620    0.9 % sodium chloride infusion, 25 mL, IntraVENous, PRN, SHANNON Watson - CNP    aspirin tablet 325 mg, 325 mg, Oral, Once, Samra Kay, APRN - CNP    diphenhydrAMINE (BENADRYL) injection 50 mg, 50 mg, IntraVENous, Once, Samra Kay, APRN - CNP    hydrocortisone sodium succinate PF (SOLU-CORTEF) injection 200 mg, 200 mg, IntraVENous, Once, Samra Kay APRN - CNP    nitroGLYCERIN (NITROSTAT) SL tablet 0.4 mg, 0.4 mg, SubLINGual, Q5 Min PRN, SHANNON Watson - CNP    sodium chloride flush 0.9 % injection 5-40 mL, 5-40 mL, IntraVENous, 2 times per day, SHANNON Watson - CNP    sodium chloride flush 0.9 % injection 5-40 mL, 5-40 mL, IntraVENous, PRN, SHANNON Watson - SAKSHI  Prior to Admission medications    Medication Sig Start Date End Date Taking? Authorizing Provider   gabapentin (NEURONTIN) 600 MG tablet take 1 tablet by mouth twice a day 1/21/22  Yes Historical Provider, MD   acetaminophen (TYLENOL) 325 MG tablet Take 2 tablets by mouth every 4 hours as needed for Pain 9/9/19  Yes Esperanza Nova MD   XARELTO 20 MG TABS tablet take 1 tablet by mouth once daily 1/17/22   Historical Provider, MD Mary Ellen Liao 100 MG tablet take 1 tablet by mouth once daily  Patient not taking: Reported on 2/15/2022 1/17/22   Historical Provider, MD   atorvastatin (LIPITOR) 40 MG tablet take 1 tablet by mouth once daily  Patient not taking: Reported on 2/15/2022 11/24/21   Historical Provider, MD   blood glucose test strips (TRUETRACK TEST) strip 1 each by In Vitro route 2 times daily As needed. 3/15/19   Alessandro Ricketts MD     Additional information:       VITAL SIGNS   Vitals:    03/14/22 0521   BP: (!) 143/70   Pulse:    Resp:    Temp:    SpO2:        PHYSICAL:   General: No acute distress  HEENT:  Unremarkable for age  Neck: without increased JVD, carotid pulses 2+ bilaterally without bruits  Heart: RRR, S1 & S2 WNL.  No murmurs   Lungs: Clear to auscultation    Abdomen: BS present, without HSM, masses, or tenderness    Extremities: without C,C,E.  Pulses 2+ bilaterally   Mental Status: Alert & Oriented        PLANNED PROCEDURE   [x]Cath  [x]PCI                []Pacemaker/AICD  []ELÍAS             []Cardioversion []Peripheral angiography/PTA  []Other:      SEDATION  Planned agent:[x]Midazolam []Meperidine []Sublimaze []Morphine  []Diazepam  []Other:     ASA Classification:  []1 [x]2 []3 []4 []5   Class 1: A normal healthy patient  Class 2: Pt with mild to moderate systemic disease  Class 3: Severe systemic disease or disturbance  Class 4: Severe systemic disorders that are already life threatening. Class 5: Moribund pt with little chances of survival, for more than 24 hours. Mallampati I Airway Classification:   []1 []2 [x]3 []4     [x]Pre-procedure diagnostic studies complete and results available. Comment:    [x]Previous sedation/anesthesia experiences assessed. Comment:    [x]The patient is an appropriate candidate to undergo the planned procedure sedation and anesthesia. (Refer to nursing sedation/analgesia documentation record)  [x]Formulation and discussion of sedation/procedure plan, risks, and expectations with patient and/or responsible adult completed. [x]Patient examined immediately prior to the procedure.  (Refer to nursing sedation/analgesia documentation record)      Lori Quiroga MD, Flora Fuentes    Electronically signed 3/14/2022 at 7:11 AM

## 2022-03-14 NOTE — PROGRESS NOTES
Patient arrived in stable condition to room 8B-26. Patient belongings at bedside. Call light within reach.

## 2022-03-14 NOTE — BRIEF OP NOTE
[address] [address]  Sedation/Analgesia Post Sedation Record    Pt Name: Rosa Garcia  Account number: [de-identified]  MRN: [id number]  YOB: 1970  Procedure Performed By: Darius Lowe MD MD   Primary Care Physician: Ayesha Perez APRN - CNP  Date: 3/14/2022    POST-PROCEDURE    Physicians/Assistants: Darius Lowe MD MD     Procedure Performed:Cath/ PCI      Sedation/Anesthesia: Versed/ Fentanyl and 2% xylocaine local anesthesia. Estimated Blood Loss: < 50 ml. Specimens Removed: None         Disposition of Specimen: N/A        Complications: No Immediate Complications. Post-procedure Diagnosis/Findings:        Successful PCI/XENIA of LCX/OM1   Successful PCI/XENIA of RPDA    Recommendations:  Bed rest for 4 hours post procedure   Admit overnight for observation post PCI   Monitor on Telemetry   Optimize medical therapy for Coronary Artery Disease  Aggressive risk factor modification   Access site care  Antiplatelet therapy: ASA 81 mg PO daily and PLAVIX 75 MG PO DAILY  Resume Xarelto tomorrow  Stop ASA after one month    High intensity statin therapy  Repeat EKG on the floor   IVF: NS at 125 cc/h  AM Labs: BMP, CBC  Outpatient follow up in office in 2 weeks        Above findings and plan of care were discussed with patient and her family, questions were answered, agreeable with plan.        Darius Lowe MD, Regis Florez   Electronically signed 3/14/2022 at 9:18 AM  Interventional Cardiology

## 2022-03-14 NOTE — PROGRESS NOTES
Inpatient Cardiac Rehabilitation Consult    Received consult for Phase II Cardiac Rehabilitation. Patient needs cardiac rehab due to PCI on 3/14/2022. Importance of Cardiac Rehab discussed with patient. Reviewed cardiac rehab class times. Patient questions answered. We will contact patient at home to schedule evaluation appointment. Cassie Goddard may not be able to participate due to having to take care of her 3year old grandchild. She also does not have transportation. Cardiac Rehab brochure given.

## 2022-03-14 NOTE — PROGRESS NOTES
Acute Coronary Syndrome Folder given to patient which includes the following education:    1. Heart healthy Diet booklet  2. Reduce your risk of Heart attack paper  3. Cardiac Rehab phamphlet  4. How to take your Nitroglycerine paper  5. Resources for you and your family paper  6. Smoking Cessation information  7. Cardiac Cath Discharge Instructions Groin/Radial Approach    Stent card given to patient and placed into the ACS folder.

## 2022-03-14 NOTE — PROGRESS NOTES
0742 Patient admitted to 14 e 9 [facility] 1938  Ambulatory for [facility]. Patient NPO. Patient accompanied by daughter, [place]. Vital signs obtained. Assessment and data collection intiated. Oriented to room. Policies and procedures for 2E explained. All questions answered with no further questions at this time. Fall prevention and safety precautions discussed with patient. 0530 Care plan reviewed with patient and daughter. Patient and daughter verbalize understanding of the plan of care and contribute to goal setting. 0710 To cath lab per bed, stable condition. 0825 Care taken over from cath lab. Radial site stable, no bleeding seen, site soft. Armboard continued with vascband. Patient instructed not to bend wrist, not to put pressure on wrist and not to lift  or twist with wrist. Patient voices understanding. Bandaid applied to site at 1315 and vascband removed, armboard continued, site stable. Patient instructed not to bend, twist, lift, push or pull with right wrist, voices understanding.

## 2022-03-14 NOTE — FLOWSHEET NOTE
Advance Care Planning     Advance Care Planning Inpatient Note  Spiritual Care Department    Today's Date: 3/14/2022  Unit: [facility] 2E    Received request from patient. Upon review of chart and communication with care team, patient's decision making abilities are not in question. . Child/Children was/were present in the room during visit. Health Care Decision Makers:       Primary Decision Maker: Raad Piyush - Parent - [phone]    Summary:  No Decision Maker named by patient at this time    Advance Care Planning Documents (Patient Wishes):  Living Will/Advance Directive. Advance Directive Consult: Advance Directive materials were provided to patient and explained. Patient is not ready to complete at this time, gave information for Spiritual Care to be contacted if further assistance is needed. Assessment:  Pt was not in the room, the AD was given to her children and family members.       Electronically signed by Lenore Mariano, [employer] on 3/14/2022 at 10:38 AM

## 2022-03-14 NOTE — PROCEDURES
[address] [place] [address]                            CARDIAC CATHETERIZATION    PATIENT NAME: Pearle Nyhan                     :        1970  MED REC NO:   [id number]                           ROOM:       0011  ACCOUNT NO:   [de-identified]                           ADMIT DATE: 2022  PROVIDER:     Elmer Burden MD    DATE OF PROCEDURE:  2022    INDICATION:  1. Angina, chest pain, recurrent hospital emergency room evaluations  including defibrillation in the emergency room for chest pain heaviness. 2.  CCS class III angina. 3.  Abnormal stress test.    PROCEDURES PERFORMED:  1. Left cardiac catheterization with selective coronary angiogram.  2.  Left ventriculogram.  3.  Successful PCI and stenting of the first obtuse marginal branch. 4.  Successful PCI and stenting of the circumflex artery. 5.  Successful PCI and stenting of the right posterior descending  artery. DESCRIPTION OF PROCEDURE/INTERVENTION DETAILS:  After informed consent,  the patient was brought to the cardiac catheterization room. She was  prepped and draped in a sterile fashion. 2% lidocaine was injected in  the skin and subcutaneous tissue overlying the right radial artery. Under ultrasound guidance using modified Seldinger technique, I obtained  access in the right radial artery. 5/6 Slender sheath was inserted. Standard antithrombotic/antispasmodic medications were given. I used  5-French JR4, 5-French JL3.5 diagnostic catheters to complete the  procedure. Based on findings, decision was made to proceed with PCI. Heparin bolus was given. ACT was monitored throughout the procedure  with repeat heparin as needed. The initial ACT was 235. Three more  thousand units of heparin was given later. 6-French EBU 3.5 guide  catheter was used to cannulate the left main coronary artery.    Runthrough wire was used to wire OM1.  2.5 x 12 mm PTCA balloon was used  for predilation. I then proceeded with stenting. Resolute Festus 3.0 mm  x 18 mm drug-eluting stent was successfully deployed. This was  postdilated using a 3.0 x 15 mm noncompliant balloon. Second wire, this  time Fielder wire, was utilized to wire the circumflex artery. Kissing  balloon inflation was done using 3.5 in the OM1 and 2.0 in the  circumflex. The wire was removed from the [facility]. Repeat angiogram  revealed well-expanded stent with ANTHONY-3 flow. I then proceeded with  stenting. Resolute Festus 3.5 x 34 mm drug-eluting stent was successfully  deployed in the left circumflex artery. This was postdilated using a  3.5 x 12 mm noncompliant balloon. I then proceeded with kissing balloon  inflation. I rewired the side branch first.  A 2.0 balloon was used to  open the stent struts. I then was able to proceed with postdilation. Kissing balloon angioplasty using a 3.5 x 12 mm noncompliant balloon in  the left circumflex artery and a 2.5 mm x 12 mm noncompliant balloon in  the OM1. Wires were removed. Final angiogram revealed well expanded  stents. No complications including no dissection, distal embolization  or perforation. I then exchanged the guide to 6-Syriac JR4 guide. Leonel Delay was utilized. Runthrough wire was used to cross the lesion. I advanced the Guidezilla over 2.0 x 12 mm PTCA balloon which was also  used for predilation angioplasty in the RPDA. I then proceeded with  stenting. 2.25 mm x 18 mm Resolute Festus drug-eluting stent was  successfully deployed. Postdilated using 2.5 mm noncompliant balloon. Wires and equipments were removed. Final angiogram revealed  well-expanded stent. No complications including no dissection, distal  embolization or perforation. FINDINGS:  HEMODYNAMICS:  LVEDP 3 mmHg. LEFT VENTRICULOGRAM:  No regional wall motion abnormality. Ejection  fraction 60%.   No significant pressure gradient across the aortic valve  upon pullback. CORONARY ANGIOGRAM:  1. RIGHT CORONARY ARTERY:  Mild luminal irregularities in the proximal  and mid RCA, tortuous vessel. Distal RCA with 30% stenosis. RPL with  mild diffuse disease. RPDA has an ostial 80% to 90% stenosis. 2.  LEFT MAIN CORONARY ARTERY:  Patent without significant stenosis. Bifurcates into left circumflex and left anterior descending artery. 3.  LEFT CIRCUMFLEX ARTERY:  Proximal LCX is patent. Mid segment of  left circumflex artery has 70% stenosis. OM1 is a relatively large  vessel, tortuous with 80% to 90% proximal segment stenosis. Distal LCX  with mild diffuse disease. 4. LEFT ANTERIOR DESCENDING ARTERY:  Proximal LAD is patent. Mid LAD  has 30% stenosis with component of myocardial bridging. Distal to  apical LAD has moderate diffuse disease. D1 is a relatively large  vessel with mild luminal irregularities. MEDICATIONS:  See MAR. COMPLICATIONS:  None. ESTIMATED BLOOD LOSS:  Less than 50 mL. ACCESS:  Right radial artery access. Vasc Band was applied. Hemostasis  was achieved. IMPRESSION:  1. Severe coronary artery disease with details as listed above. 2.  Status post successful PCI and stenting of mid circumflex artery and  OM1. Kissing balloon angioplasty was utilized. 3.  Successful PCI and stenting of the right posterior descending  artery. RECOMMENDATIONS:  Admit for observation on telemetry. Access site care. Aspirin and Plavix. Stop Plavix after one month. May resume Xarelto  tomorrow. High intensity statin therapy. Optimize medical therapy for  CAD. Outpatient followup in office.         Milagros Corbett MD    D: 03/14/2022 10:15:00       T: 03/14/2022 10:20:06     [email or web address]  Job#: [id number]     Doc#: [id number]    CC:

## 2022-03-15 VITALS
BODY MASS INDEX: 44.41 KG/M2 | HEART RATE: 79 BPM | OXYGEN SATURATION: 97 % | DIASTOLIC BLOOD PRESSURE: 61 MMHG | RESPIRATION RATE: 18 BRPM | SYSTOLIC BLOOD PRESSURE: 117 MMHG | WEIGHT: 293 LBS | HEIGHT: 68 IN | TEMPERATURE: 97.4 F

## 2022-03-15 LAB
ANION GAP SERPL CALCULATED.3IONS-SCNC: 11 MEQ/L (ref 8–16)
BUN BLDV-MCNC: 6 MG/DL (ref 7–22)
CALCIUM SERPL-MCNC: 9.2 MG/DL (ref 8.5–10.5)
CHLORIDE BLD-SCNC: 105 MEQ/L (ref 98–111)
CO2: 25 MEQ/L (ref 23–33)
CREAT SERPL-MCNC: 0.6 MG/DL (ref 0.4–1.2)
ERYTHROCYTE [DISTWIDTH] IN BLOOD BY AUTOMATED COUNT: 14.1 % (ref 11.5–14.5)
ERYTHROCYTE [DISTWIDTH] IN BLOOD BY AUTOMATED COUNT: 46.2 FL (ref 35–45)
GFR SERPL CREATININE-BSD FRML MDRD: > 90 ML/MIN/1.73M2
GLUCOSE BLD-MCNC: 136 MG/DL (ref 70–108)
HCT VFR BLD CALC: 39.8 % (ref 37–47)
HEMOGLOBIN: 12.9 GM/DL (ref 12–16)
MCH RBC QN AUTO: 29.1 PG (ref 26–33)
MCHC RBC AUTO-ENTMCNC: 32.4 GM/DL (ref 32.2–35.5)
MCV RBC AUTO: 89.8 FL (ref 81–99)
PLATELET # BLD: 182 THOU/MM3 (ref 130–400)
PMV BLD AUTO: 9.8 FL (ref 9.4–12.4)
POTASSIUM SERPL-SCNC: 4.1 MEQ/L (ref 3.5–5.2)
RBC # BLD: 4.43 MILL/MM3 (ref 4.2–5.4)
SODIUM BLD-SCNC: 141 MEQ/L (ref 135–145)
WBC # BLD: 5.9 THOU/MM3 (ref 4.8–10.8)

## 2022-03-15 PROCEDURE — 6370000000 HC RX 637 (ALT 250 FOR IP): Performed by: INTERNAL MEDICINE

## 2022-03-15 PROCEDURE — 36415 COLL VENOUS BLD VENIPUNCTURE: CPT

## 2022-03-15 PROCEDURE — 80048 BASIC METABOLIC PNL TOTAL CA: CPT

## 2022-03-15 PROCEDURE — 85027 COMPLETE CBC AUTOMATED: CPT

## 2022-03-15 PROCEDURE — 99217 PR OBSERVATION CARE DISCHARGE MANAGEMENT: CPT | Performed by: STUDENT IN AN ORGANIZED HEALTH CARE EDUCATION/TRAINING PROGRAM

## 2022-03-15 PROCEDURE — 2580000003 HC RX 258: Performed by: INTERNAL MEDICINE

## 2022-03-15 RX ORDER — ASPIRIN 81 MG/1
81 TABLET ORAL DAILY
Qty: 30 TABLET | Refills: 3 | Status: SHIPPED | OUTPATIENT
Start: 2022-03-15 | End: 2022-04-27

## 2022-03-15 RX ORDER — ATORVASTATIN CALCIUM 80 MG/1
80 TABLET, FILM COATED ORAL NIGHTLY
Qty: 30 TABLET | Refills: 3 | Status: SHIPPED | OUTPATIENT
Start: 2022-03-15 | End: 2022-04-11 | Stop reason: SDUPTHER

## 2022-03-15 RX ORDER — NITROGLYCERIN 0.4 MG/1
TABLET SUBLINGUAL
Qty: 25 TABLET | Refills: 3 | Status: SHIPPED | OUTPATIENT
Start: 2022-03-15

## 2022-03-15 RX ORDER — CLOPIDOGREL BISULFATE 75 MG/1
75 TABLET ORAL DAILY
Qty: 30 TABLET | Refills: 3 | Status: SHIPPED | OUTPATIENT
Start: 2022-03-15 | End: 2022-04-11 | Stop reason: SDUPTHER

## 2022-03-15 RX ORDER — METOPROLOL SUCCINATE 25 MG/1
25 TABLET, EXTENDED RELEASE ORAL DAILY
Qty: 30 TABLET | Refills: 3 | Status: SHIPPED | OUTPATIENT
Start: 2022-03-15 | End: 2022-04-11 | Stop reason: SDUPTHER

## 2022-03-15 RX ORDER — ATORVASTATIN CALCIUM 80 MG/1
80 TABLET, FILM COATED ORAL NIGHTLY
Status: DISCONTINUED | OUTPATIENT
Start: 2022-03-15 | End: 2022-03-15 | Stop reason: HOSPADM

## 2022-03-15 RX ADMIN — SODIUM CHLORIDE, PRESERVATIVE FREE 5 ML: 5 INJECTION INTRAVENOUS at 09:34

## 2022-03-15 RX ADMIN — ASPIRIN 81 MG: 81 TABLET, COATED ORAL at 09:33

## 2022-03-15 RX ADMIN — CLOPIDOGREL BISULFATE 75 MG: 75 TABLET ORAL at 09:33

## 2022-03-15 RX ADMIN — METOPROLOL SUCCINATE 25 MG: 25 TABLET, FILM COATED, EXTENDED RELEASE ORAL at 09:33

## 2022-03-15 ASSESSMENT — PAIN SCALES - GENERAL: PAINLEVEL_OUTOF10: 0

## 2022-03-15 NOTE — PLAN OF CARE
Problem: Falls - Risk of:  Goal: Will remain free from falls  Description: Will remain free from falls  Outcome: Ongoing  Note: Pt is fall free this shift, pt is able to call for assistance when needed. Pt's bed at lowest position, call light in reach, side railsx2, socks on. Rn to monitor     Problem: Falls - Risk of:  Goal: Absence of physical injury  Description: Absence of physical injury  Outcome: Ongoing  Note: Pt has no new injury this shift. Pt able to verbalizes pain/ injury. RN to monitor     Problem: Discharge Planning:  Goal: Discharged to appropriate level of care  Description: Discharged to appropriate level of care  Outcome: Ongoing  Note: Pt is involved in discharge planning. Tx to follow      Problem: Cardiac Output - Decreased:  Goal: Hemodynamic stability will improve  Description: Hemodynamic stability will improve  Outcome: Ongoing  Note: Pt has VS stable, no bleeding seen at site.  Rn continue to monitor closely     Problem: Serum Glucose Level - Abnormal:  Goal: Ability to maintain appropriate glucose levels will improve  Description: Ability to maintain appropriate glucose levels will improve  Outcome: Ongoing

## 2022-03-15 NOTE — PROGRESS NOTES
Cardiology Progress Note      Patient:  Vonda Guy  YOB: 1970  MRN: [id number]   Acct: [de-identified]  Admit Date:  3/14/2022  Primary Cardiologist: Rolando Burr MD    Pt presented for OP C for abnormal stress test.     Subjective (Events in last 24 hours):   Pt awake, alert. NAD. Denies cp, sob. Does she podiatry for cellulitis and foot ulcers. D/w patient about importance of taking plavix for new heart stents and restarting xarelto today. Take aspirin for 30 days then stop. cardic rehab mentioned. States her palpitations have been improved since being in the hospital. Has been getting toprol while here.      Objective:   /67   Pulse 70   Temp 98.6 °F (37 °C) (Oral)   Resp 18   Ht 5' 8\" (1.727 m)   Wt (!) 313 lb (142 kg)   LMP 06/21/2018   SpO2 98%   BMI 47.59 kg/m²      vss  TELEMETRY: nsr    Physical Exam:  General Appearance: alert and oriented to person, place and time, in no acute distress  Cardiovascular: normal rate, regular rhythm, normal S1 and S2, no murmurs, rubs, clicks, or gallops, distal pulses intact, no carotid bruits, no JVD  Pulmonary/Chest: clear to auscultation bilaterally- no wheezes, rales or rhonchi, normal air movement, no respiratory distress  Abdomen: soft, non-tender, non-distended, normal bowel sounds, no masses   Extremities: no cyanosis, clubbing or edema, pulse   Skin: warm and dry, no rash or erythema  Neurological: alert, oriented, normal speech, no focal findings or movement disorder noted  Right radial cath site- no ecchymossi, nontender, no edema, nv check WNL   Medications:    aspirin  325 mg Oral Once    diphenhydrAMINE  50 mg IntraVENous Once    hydrocortisone sodium succinate PF  200 mg IntraVENous Once    sodium chloride flush  5-40 mL IntraVENous 2 times per day    SITagliptin  25 mg Oral Daily    atorvastatin  40 mg Oral Nightly    sodium chloride flush  5-40 mL IntraVENous 2 times per day    aspirin  81 mg Oral Daily    clopidogrel  75 mg Oral Daily    metoprolol succinate  25 mg Oral Daily      sodium chloride 50 mL/hr (03/14/22 0620)    sodium chloride      sodium chloride      sodium chloride 125 mL/hr at 03/14/22 1211     sodium chloride, 25 mL, PRN  nitroGLYCERIN, 0.4 mg, Q5 Min PRN  sodium chloride flush, 5-40 mL, PRN  sodium chloride flush, 5-40 mL, PRN  sodium chloride, 25 mL, PRN  acetaminophen, 650 mg, Q4H PRN        Diagnostics:  EKG:   Normal sinus rhythm  Normal ECG  When compared with ECG of 14-MAR-2022 05:50,  No significant change was found  Confirmed by Lyla Paul MD, Sylvia Foley ([id number]) on 3/14/2022   Echo:   Conclusions      Summary   Normal left ventricle size and systolic function. Ejection fraction was   estimated at 60%. There were no regional left ventricular wall motion   abnormalities and wall thickness was within normal limits. Mild tricuspid regurgitation      Signature      ----------------------------------------------------------------   Electronically signed by Zaina Winkler MD (Interpreting   physician) on 03/03/2022   Stress:   Imaging Results:Calculated gated LVEF 57 %. The T.I.D. ratio was 1 . There was a small sized, moderate in intensity, fixed myocardial perfusion  defect of the inferior-apical wall. There was a moderate sized, moderate in intensity, reversible myocardial  perfusion defect of the inferior wall. There is mild attenuation artifact noted in the anterior wall seems to be  related to breast artifact. The nuclear images is suggestive for myocardial ischemia.      Conclusions      Summary   This Nuclear Medicine study was abnormal .      Recommendation   Invasive ischemia workup is recommended if clinically indicated.       Signatures      ----------------------------------------------------------------   Electronically signed by Zaina Winkler MD (Interpreting   Cardiologist) on 03/03/2022   Left Heart Cath:   FINDINGS:  HEMODYNAMICS:  LVEDP 3 mmHg.     LEFT VENTRICULOGRAM:  No regional wall motion abnormality. Ejection  fraction 60%. No significant pressure gradient across the aortic valve  upon pullback.     CORONARY ANGIOGRAM:  1. RIGHT CORONARY ARTERY:  Mild luminal irregularities in the proximal  and mid RCA, tortuous vessel. Distal RCA with 30% stenosis. RPL with  mild diffuse disease. RPDA has an ostial 80% to 90% stenosis. 2.  LEFT MAIN CORONARY ARTERY:  Patent without significant stenosis. Bifurcates into left circumflex and left anterior descending artery. 3.  LEFT CIRCUMFLEX ARTERY:  Proximal LCX is patent. Mid segment of  left circumflex artery has 70% stenosis. OM1 is a relatively large  vessel, tortuous with 80% to 90% proximal segment stenosis. Distal LCX  with mild diffuse disease. 4. LEFT ANTERIOR DESCENDING ARTERY:  Proximal LAD is patent. Mid LAD  has 30% stenosis with component of myocardial bridging. Distal to  apical LAD has moderate diffuse disease. D1 is a relatively large  vessel with mild luminal irregularities.     MEDICATIONS:  See MAR.     COMPLICATIONS:  None.     ESTIMATED BLOOD LOSS:  Less than 50 mL.     ACCESS:  Right radial artery access. Vasc Band was applied. Hemostasis  was achieved.     IMPRESSION:  1. Severe coronary artery disease with details as listed above. 2.  Status post successful PCI and stenting of mid circumflex artery and  OM1. Kissing balloon angioplasty was utilized. 3.  Successful PCI and stenting of the right posterior descending  artery.     RECOMMENDATIONS:  Admit for observation on telemetry. Access site care. Aspirin and Plavix. Stop Plavix after one month. May resume Xarelto  tomorrow. High intensity statin therapy. Optimize medical therapy for  CAD. Outpatient followup in office.           Sandie Dutta MD     D: 03/14/2022  Lab Data:    Cardiac Enzymes:  No results for input(s): CKTOTAL, CKMB, CKMBINDEX, TROPONINI in the last 72 hours.     CBC:   Lab Results   Component Value Date    WBC 5.9 03/15/2022    RBC 4.43 03/15/2022    RBC 4.29 02/06/2012    HGB 12.9 03/15/2022    HCT 39.8 03/15/2022     03/15/2022       CMP:    Lab Results   Component Value Date     03/15/2022    K 4.1 03/15/2022    K 3.8 03/14/2022     03/15/2022    CO2 25 03/15/2022    BUN 6 03/15/2022    CREATININE 0.6 03/15/2022    LABGLOM >90 03/15/2022    GLUCOSE 136 03/15/2022    CALCIUM 9.2 03/15/2022       Hepatic Function Panel:    Lab Results   Component Value Date    ALKPHOS 63 03/14/2022    ALT 19 03/14/2022    AST 15 03/14/2022    PROT 6.9 03/14/2022    BILITOT 0.3 03/14/2022    BILIDIR <0.2 03/10/2022    LABALBU 4.1 03/14/2022       Magnesium:  No results found for: MG    PT/INR:    Lab Results   Component Value Date    PROTIME 2.38 11/22/2011    INR 0.99 03/14/2022       HgBA1c:    Lab Results   Component Value Date    LABA1C 8.4 09/28/2019       FLP:    Lab Results   Component Value Date    TRIG 186 03/14/2022    HDL 39 03/14/2022    LDLCALC 171 03/14/2022    LDLDIRECT 143.66 01/27/2015       TSH:    Lab Results   Component Value Date    TSH 1.290 09/08/2017         Assessment:  Abnormal stress test s/p [facility]  CAD S/p PCI to mid circ and OM1, right PDA  Preserved EF, 60%   HLD- total 247, trigs 186,  ( has not been taking lipitor)   DM2--a1c has been above 8 in the past, none noted since 2019-states she just had this checked recently. Was 7.5. Plan:  ACS Guidelines  ASA/Plavix/brilinta-yes, ASA/plavix  Statin- Yes. lipitor 80 mg daily   BB-yes. toprol 25 mg daily  ACE/ARB-no. Lower BP after initiating toprol. Repatha-? Consider after FLP recheck. Cardiac Rehab-ordered. SL nitro at discharge. Needs to recheck FLP in 3-6 months.      Condition at discharge: stable  Disposition: home  Time spent: >30 mins   Electronically signed by Zenon Lr PA-C on 3/15/2022 at 8:28 AM

## 2022-03-21 ENCOUNTER — TELEPHONE (OUTPATIENT)
Dept: WOUND CARE | Age: 52
End: 2022-03-21

## 2022-03-21 NOTE — TELEPHONE ENCOUNTER
Patient called and stated her car would not start this morning. Called patient back and she stated she wanted to see when her daughter would be available to bring her to the appointment. Patient will call with dates and times.

## 2022-03-24 ENCOUNTER — TELEPHONE (OUTPATIENT)
Dept: WOUND CARE | Age: 52
End: 2022-03-24

## 2022-03-24 NOTE — TELEPHONE ENCOUNTER
----- Message from Maria Del Rosario Alvarez sent at 3/24/2022  1:37 PM EDT -----  [facility] [phone] MARYA CALLED TO GET R/S FOR AN APPT, WITH RAQUEL, THAT SHE HAD MISSED

## 2022-03-25 NOTE — PLAN OF CARE
Hospital Facility-Based Program  [facility]/Traditional Cardiac Rehab  PHYSICIAN ORDER  Class I Level B based on research  Medical Director:  Dr. Nubia Alva MD     Patient Name: Jermaine Cabello : 1970  Referring Physician: Dr. Karla King  Date: 3/25/2022  Allergies: Allergies as of 2022 - Fully Reviewed 2022   Allergen Reaction Noted    Codeine Hives 2019    Demerol  2012    Ultram [tramadol hcl] Other (See Comments) 2019    Percocet [oxycodone-acetaminophen] Nausea And Vomiting 10/22/2020    Toradol [ketorolac tromethamine] Rash 2019        Diagnosis:  PCI on 3/14/22    [x] [facility] Cardiac Rehab with telemetry monitoring, resting and exercise        BPs & HRs with each session. Hospital setting for patient safety. [x] 72 sessions: 36 exercise sessions, 36 education sessions   [] 36 sessions: 18 exercise sessions, 18 education sessions  [] Traditional Cardiac Rehab with telemetry monitoring, resting and exercise BPs &       HRs with each session. Hospital setting for patient safety. [] 36 sessions:  32 exercise sessions, 4 education sessions     Per Patient symptoms, proceed with:   [x]Nitroglycerine 0.4mg SL every 5 minutes prn, maximum of 3, for chest pain   [x]12-lead EKG for symptoms of chest pain or noted change in heart rhythm   [x]Administer O2 per nasal cannula for symptoms of chest pain or acute dyspnea    Physician Prescribed Exercise:  Plan of Care:  Patient to attend exercise sessions with aerobic endurance and strength training for a total of 31-60 min/day, 3 days/week with supplemented 30+ minutes of aerobic exercise at home on days not participating in Cardiac Rehab. Aerobic Endurance Training  Aerobic Endurance mode (TM, AD, NS) starting at 5-8 minutes progressing by 2-3 minutes each week to a total of 15-30 minutes 2-3x/week.   Arms only 5 min  Stair step increasing to 2 min  Resistance/strength training: Hand weights starting at 1-5 lbs increasing in weight by 1-2 lbs and/or per patient tolerance weekly. Start with 8 repetitions and increase the repetitions each exercise session per patient tolerance for a total of 15 repetitions. Measurable Endurance Goal:  Aerobic endurance goal to be measured in minutes. Start endurance training per patient tolerance at 1-8 minutes per exercise type, progressing to a total of 31-60 minutes using various modes of training (see Exercise Prescription). Progression:    [x] Weekly 5-10% intensity progression, as tolerated, during cardiac rehab sessions. [x] 13-17 Rate of Perceived Exertion on the Sergei Scale (6-20). Measurable Muscular Strength Goal:  Starting at 1-5 lbs x 8 reps, progressing to 5-25 lbs x 15 reps per patient tolerance.       Electronically signed by Dionicio Blancas on 3/25/2022 at 3:02 PM  Exercise Physiologist/Date/Time

## 2022-03-27 ENCOUNTER — HOSPITAL ENCOUNTER (EMERGENCY)
Age: 52
Discharge: HOME | End: 2022-03-27
Payer: COMMERCIAL

## 2022-03-27 VITALS
DIASTOLIC BLOOD PRESSURE: 78 MMHG | HEIGHT: 68 IN | BODY MASS INDEX: 44.41 KG/M2 | HEART RATE: 87 BPM | SYSTOLIC BLOOD PRESSURE: 159 MMHG | TEMPERATURE: 98.1 F | RESPIRATION RATE: 18 BRPM | OXYGEN SATURATION: 96 % | WEIGHT: 293 LBS

## 2022-03-27 DIAGNOSIS — L02.91 ABSCESS: Primary | ICD-10-CM

## 2022-03-27 PROCEDURE — 10061 I&D ABSCESS COMP/MULTIPLE: CPT

## 2022-03-27 PROCEDURE — 99283 EMERGENCY DEPT VISIT LOW MDM: CPT

## 2022-03-27 RX ORDER — LIDOCAINE HYDROCHLORIDE 10 MG/ML
INJECTION, SOLUTION INFILTRATION; PERINEURAL
Status: DISCONTINUED
Start: 2022-03-27 | End: 2022-03-27 | Stop reason: HOSPADM

## 2022-03-27 RX ORDER — CEPHALEXIN 500 MG/1
500 CAPSULE ORAL 4 TIMES DAILY
Qty: 28 CAPSULE | Refills: 0 | Status: SHIPPED | OUTPATIENT
Start: 2022-03-27 | End: 2022-04-03

## 2022-03-27 RX ORDER — SULFAMETHOXAZOLE AND TRIMETHOPRIM 800; 160 MG/1; MG/1
1 TABLET ORAL 2 TIMES DAILY
Qty: 14 TABLET | Refills: 0 | Status: SHIPPED | OUTPATIENT
Start: 2022-03-27 | End: 2022-04-03

## 2022-03-27 NOTE — ED PROVIDER NOTES
[facility] Emergency Department    CHIEF COMPLAINT       Chief Complaint   Patient presents with    Abscess       Nurses Notes reviewed and I agree except as noted in the HPI. HISTORY OF PRESENT ILLNESS    Aminta Rendon is a 54 y.o. female who presents to the ED for evaluation of abscess. Patient notes a chronic cyst to her left buttocks, she notes has become inflamed and more painful over the last couple of days, she denies any drainage from it. She denies ever being I indeed in the past.  She denies any fevers or chills. Denies any other symptoms at this time. She has past medical history of coronary artery disease, DVT, she is on Plavix and Xarelto. HPI was provided by the patient. REVIEW OF SYSTEMS     Review of Systems   Constitutional: Negative for activity change, chills and fever. Musculoskeletal: Negative for arthralgias and myalgias. Skin:        Abscess left buttocks   Allergic/Immunologic: Negative for immunocompromised state. Neurological: Negative for weakness. Hematological: Does not bruise/bleed easily. Psychiatric/Behavioral: Negative for agitation, behavioral problems and confusion. PAST MEDICAL HISTORY     Past Medical History:   Diagnosis Date    Asthma     Bipolar 1 disorder ([address]     Blood circulation, collateral     CAD (coronary artery disease)     Curvature of spine     Diabetes mellitus ([address]     [facility] venous thrombosis) ([facility]     Factor 5 Leiden mutation, heterozygous ([address]     GERD (gastroesophageal reflux disease)     HTN, goal below 130/80     Hx of blood clots     PE x3 and DVT x3    Hx-TIA (transient ischemic attack)     Hyperlipidemia     PE (pulmonary embolism)     RA (rheumatoid arthritis) ([address]     Unspecified cerebral artery occlusion with cerebral infarction        SURGICALHISTORY      has a past surgical history that includes Tubal ligation (2003); Cholecystectomy (1992); Knee arthroscopy (2007&2010);  Tympanostomy tube placement; Tonsillectomy; Cardiac catheterization (2015); and Foot Debridement (Right, 2019). CURRENT MEDICATIONS       Previous Medications    ACETAMINOPHEN (TYLENOL) 325 MG TABLET    Take 2 tablets by mouth every 4 hours as needed for Pain    ASPIRIN 81 MG EC TABLET    Take 1 tablet by mouth daily    ATORVASTATIN (LIPITOR) 80 MG TABLET    Take 1 tablet by mouth at bedtime    BLOOD GLUCOSE TEST STRIPS (TRUETRACK TEST) STRIP    1 each by In Vitro route 2 times daily As needed. CLOPIDOGREL (PLAVIX) 75 MG TABLET    Take 1 tablet by mouth daily    GABAPENTIN (NEURONTIN) 600 MG TABLET    take 1 tablet by mouth twice a day    JANUVIA 100 MG TABLET    take 1 tablet by mouth once daily    METOPROLOL SUCCINATE (TOPROL XL) 25 MG EXTENDED RELEASE TABLET    Take 1 tablet by mouth daily    NITROGLYCERIN (NITROSTAT) 0.4 MG SL TABLET    up to max of 3 total doses. If no relief after 1 dose, call 911. XARELTO 20 MG TABS TABLET    take 1 tablet by mouth once daily       ALLERGIES     is allergic to codeine, demerol, ultram [tramadol hcl], percocet [oxycodone-acetaminophen], and toradol [ketorolac tromethamine]. FAMILY HISTORY     She indicated that her mother is alive. She indicated that her father is alive. family history includes COPD in her mother; Cancer in her mother; Heart Disease in her father; High Blood Pressure in her father; Mental Illness in her brother, brother, sister, and sister; Other in her mother.     SOCIAL HISTORY       Social History     Socioeconomic History    Marital status:      Spouse name: Not on file    Number of children: 3    Years of education: Not on file    Highest education level: Not on file   Occupational History    Not on file   Tobacco Use    Smoking status: Former Smoker     Packs/day: 0.50     Types: Cigarettes     Quit date: 2005     Years since quittin.7    Smokeless tobacco: Never Used   Vaping Use    Vaping Use: Never used   Substance and Sexual Activity    Alcohol use: No    Drug use: No    Sexual activity: Not on file   Other Topics Concern    Not on file   Social History Narrative    Not on file     Social Determinants of Health     Financial Resource Strain:     Difficulty of Paying Living Expenses: Not on file   Food Insecurity:     Worried About Running Out of Food in the Last Year: Not on file    Nathalia of Food in the Last Year: Not on file   Transportation Needs:     Lack of Transportation (Medical): Not on file    Lack of Transportation (Non-Medical): Not on file   Physical Activity:     Days of Exercise per Week: Not on file    Minutes of Exercise per Session: Not on file   Stress:     Feeling of Stress : Not on file   Social Connections:     Frequency of Communication with Friends and Family: Not on file    Frequency of Social Gatherings with Friends and Family: Not on file    Attends [employer] Services: Not on file    Active Member of [address] [place] [address] or Organizations: Not on file    Attends Club or Organization Meetings: Not on file    Marital Status: Not on file   Intimate Partner Violence:     Fear of Current or Ex-Partner: Not on file    Emotionally Abused: Not on file    Physically Abused: Not on file    Sexually Abused: Not on file   Housing Stability:     Unable to Pay for Housing in the Last Year: Not on file    Number of [place] in the Last Year: Not on file    Unstable Housing in the Last Year: Not on file       PHYSICAL EXAM     INITIAL VITALS:  height is 5' 8\" (1.727 m) and weight is 313 lb (142 kg) (abnormal). Her oral temperature is 98.1 °F (36.7 °C). Her blood pressure is 159/78 (abnormal) and her pulse is 87. Her respiration is 18 and oxygen saturation is 96%. Physical Exam  Vitals and nursing note reviewed. Constitutional:       Appearance: Normal appearance. She is well-developed. She is obese. HENT:      Head: Normocephalic. Mouth/Throat:      Pharynx: Uvula midline.    Eyes: Conjunctiva/sclera: Conjunctivae normal.   Cardiovascular:      Rate and Rhythm: Normal rate and regular rhythm. Heart sounds: Normal heart sounds, S1 normal and S2 normal.   Pulmonary:      Effort: Pulmonary effort is normal. No respiratory distress. Breath sounds: Normal breath sounds. Chest:      Chest wall: No tenderness. Abdominal:      General: Bowel sounds are normal. There is no distension. Palpations: Abdomen is soft. Tenderness: There is no abdominal tenderness. Musculoskeletal:         General: Normal range of motion. Cervical back: Normal range of motion and neck supple. Lymphadenopathy:      Cervical: No cervical adenopathy. Skin:     General: Skin is warm and dry. Capillary Refill: Capillary refill takes less than 2 seconds. Findings: Abscess (Approximate 4 cm abscess to the left buttocks with white comedonal head) and erythema present. Neurological:      Mental Status: She is alert and oriented to person, place, and time. Psychiatric:         Speech: Speech normal.         Behavior: Behavior normal.         Thought Content: Thought content normal.           DIFFERENTIAL DIAGNOSIS:   Abscess, cyst, lipoma, cellulitis    DIAGNOSTIC RESULTS       RADIOLOGY: non-plainfilm images(s) such as CT, Ultrasound and MRI are read by the radiologist.  Plain radiographic images are visualized and preliminarily interpreted by the emergency physician unless otherwise stated below. No orders to display         LABS:   Labs Reviewed - No data to display    EMERGENCY DEPARTMENT COURSE:   Vitals:    Vitals:    03/27/22 0935   BP: (!) 159/78   Pulse: 87   Resp: 18   Temp: 98.1 °F (36.7 °C)   TempSrc: Oral   SpO2: 96%   Weight: (!) 313 lb (142 kg)   Height: 5' 8\" (1.727 m)         MDM    Patient was seen and evaluated in the emergency department, patient appeared to be in no acute distress, vital signs reviewed, no significant findings noted.   Physical exam was completed, left buttocks abscess noted, erythema surrounding the area. Point-of-care ultrasound was performed to evaluate for fluctuance, noted fluid within the space. Offered I&D to the patient versus antibiotics alone, she would prefer I&D with antibiotics. Procedure was performed with little difficulty, please see the note below. Patient will be discharged with Bactrim and Keflex. She is advised to follow-up with her family doctor for repeat evaluation of the wound. She is advised to remove the packing within 24 hours. She verbalized understanding of plan of care. Medications   lidocaine 1 % injection (has no administration in time range)       Patient was seenindependently by myself. The patient's final impression and disposition and plan was determined by myself. CRITICAL CARE:   None    CONSULTS:  None    PROCEDURES:  Incision/Drainage    Date/Time: 3/27/2022 10:30 AM  Performed by: SHANNON Patino CNP  Authorized by: SHANNON Patino CNP     Consent:     Consent obtained:  Verbal    Consent given by:  Patient    Risks discussed:  Bleeding, incomplete drainage and pain    Alternatives discussed:  No treatment, delayed treatment and alternative treatment  Location:     Type:  Abscess    Size:  4cm    Location:  Lower extremity    Lower extremity location:  Buttock    Buttock location:  L buttock  Pre-procedure details:     Skin preparation:  Betadine  Anesthesia (see MAR for exact dosages): Anesthesia method:  Local infiltration    Local anesthetic:  Lidocaine 1% w/o epi  Procedure type:     Complexity:  Simple  Procedure details:     Needle aspiration: no      Incision types:  Single straight    Incision depth:  Dermal    Scalpel blade:  11    Wound management:  Probed and deloculated and irrigated with saline    Drainage:  Purulent and bloody    Drainage amount:   Moderate    Wound treatment:  Wound left open    Packing materials:  1/2 in iodoform gauze    Amount 1/2\" iodoform: 24cm  Post-procedure details:     Patient tolerance of procedure: Tolerated well, no immediate complications        FINAL IMPRESSION     1. Abscess          DISPOSITION/PLAN   Patient discharged in stable condition    PATIENT REFERREDTO:  Cristina Serra, SHANNON - CNP  [id number]  [phone]    Go to   For follow up and evaluation      DISCHARGE MEDICATIONS:  New Prescriptions    CEPHALEXIN (KEFLEX) 500 MG CAPSULE    Take 1 capsule by mouth 4 times daily for 7 days    SULFAMETHOXAZOLE-TRIMETHOPRIM (BACTRIM DS) 800-160 MG PER TABLET    Take 1 tablet by mouth 2 times daily for 7 days       (Please note that portions of this note were completed with a voice recognition program.  Efforts were made to edit the dictations but occasionally words are mis-transcribed.)    Provider:  I personally performed the services described in the documentation,reviewed and edited the documentation which was dictated to the scribe in my presence, and it accurately records my words and actions.     Miguel A John CNP 03/27/22 10:27 AM    SHANNON Lindo - SAKSHI        [employer], APRN - CNP  03/27/22 4179

## 2022-03-28 ENCOUNTER — HOSPITAL ENCOUNTER (OUTPATIENT)
Dept: WOUND CARE | Age: 52
Discharge: HOME | End: 2022-03-28
Payer: COMMERCIAL

## 2022-03-28 VITALS
BODY MASS INDEX: 44.41 KG/M2 | SYSTOLIC BLOOD PRESSURE: 169 MMHG | DIASTOLIC BLOOD PRESSURE: 82 MMHG | TEMPERATURE: 97.5 F | HEART RATE: 68 BPM | RESPIRATION RATE: 16 BRPM | OXYGEN SATURATION: 96 % | HEIGHT: 68 IN | WEIGHT: 293 LBS

## 2022-03-28 DIAGNOSIS — S91.105A OPEN WOUND OF SECOND TOE, LEFT, INITIAL ENCOUNTER: Primary | ICD-10-CM

## 2022-03-28 PROCEDURE — 11055 PARING/CUTG B9 HYPRKER LES 1: CPT

## 2022-03-28 RX ORDER — LIDOCAINE 40 MG/G
CREAM TOPICAL ONCE
Status: CANCELLED | OUTPATIENT
Start: 2022-03-28 | End: 2022-03-28

## 2022-03-28 RX ORDER — LIDOCAINE HYDROCHLORIDE 20 MG/ML
JELLY TOPICAL ONCE
Status: CANCELLED | OUTPATIENT
Start: 2022-03-28 | End: 2022-03-28

## 2022-03-28 RX ORDER — LIDOCAINE 50 MG/G
OINTMENT TOPICAL ONCE
Status: CANCELLED | OUTPATIENT
Start: 2022-03-28 | End: 2022-03-28

## 2022-03-28 RX ORDER — CLOBETASOL PROPIONATE 0.5 MG/G
OINTMENT TOPICAL ONCE
Status: CANCELLED | OUTPATIENT
Start: 2022-03-28 | End: 2022-03-28

## 2022-03-28 RX ORDER — LIDOCAINE HYDROCHLORIDE 40 MG/ML
SOLUTION TOPICAL ONCE
Status: CANCELLED | OUTPATIENT
Start: 2022-03-28 | End: 2022-03-28

## 2022-03-28 RX ORDER — BACITRACIN, NEOMYCIN, POLYMYXIN B 400; 3.5; 5 [USP'U]/G; MG/G; [USP'U]/G
OINTMENT TOPICAL ONCE
Status: CANCELLED | OUTPATIENT
Start: 2022-03-28 | End: 2022-03-28

## 2022-03-28 RX ORDER — GENTAMICIN SULFATE 1 MG/G
OINTMENT TOPICAL ONCE
Status: CANCELLED | OUTPATIENT
Start: 2022-03-28 | End: 2022-03-28

## 2022-03-28 RX ORDER — BETAMETHASONE DIPROPIONATE 0.05 %
OINTMENT (GRAM) TOPICAL ONCE
Status: CANCELLED | OUTPATIENT
Start: 2022-03-28 | End: 2022-03-28

## 2022-03-28 RX ORDER — GINSENG 100 MG
CAPSULE ORAL ONCE
Status: CANCELLED | OUTPATIENT
Start: 2022-03-28 | End: 2022-03-28

## 2022-03-28 RX ORDER — BACITRACIN ZINC AND POLYMYXIN B SULFATE 500; 1000 [USP'U]/G; [USP'U]/G
OINTMENT TOPICAL ONCE
Status: CANCELLED | OUTPATIENT
Start: 2022-03-28 | End: 2022-03-28

## 2022-03-28 ASSESSMENT — PAIN SCALES - GENERAL: PAINLEVEL_OUTOF10: 0

## 2022-03-28 NOTE — PROGRESS NOTES
Pain Timing/Severity: none  Quality of pain: N/A  Severity:  0 / 10   Modifying Factors: None  Associated Signs/Symptoms: edema, erythema and drainage        PAST MEDICAL HISTORY        Diagnosis Date    Asthma     Bipolar 1 disorder ([facility]     Blood circulation, collateral     CAD (coronary artery disease)     Curvature of spine     Diabetes mellitus (HCC)     D[facility]deep venous thrombosis) ([facility])     Factor 5 Leiden mutation, heterozygous ([facility]     GERD (gastroesophageal reflux disease)     HTN, goal below 130/80     Hx of blood clots     PE x3 and DVT x3    Hx-TIA (transient ischemic attack)     Hyperlipidemia     PE (pulmonary embolism)     RA (rheumatoid arthritis) ([facility]     Unspecified cerebral artery occlusion with cerebral infarction        PAST SURGICAL HISTORY    Past Surgical History:   Procedure Laterality Date    CARDIAC CATHETERIZATION  2015    Heart caths    CHOLECYSTECTOMY  1992    FOOT DEBRIDEMENT Right 2019    FOOT DEBRIDEMENT INCISION AND DRAINAGE performed [facility] Jun [facility]iz DPM [facility] ARTHROSCOPY  &    TONSILLECTOMY      TUBAL LIGATION      TYMPANOSTOMY TUBE PLACEMENT         FAMILY HISTORY    Family History   Problem Relation Age of Onset    COPD Mother     Other Mother     Cancer Mother     High Blood Pressure Father     Heart Disease Father     Mental Illness Sister     Mental Illness Brother     Mental Illness Sister     Mental Illness Brother        SOCIAL HISTORY    Social History     Tobacco Use    Smoking status: Former Smoker     Packs/day: 0.50     Types: Cigarettes     Quit date: 2005     Years since quittin.7    Smokeless tobacco: Never Used   Vaping Use    Vaping Use: Never used   Substance Use Topics    Alcohol use: No    Drug use: No       ALLERGIES    Allergies   Allergen Reactions    Codeine Hives     + Nausea vomiting    Demerol      vomiting    Ultram [Tramadol Hcl] Other (See Comments) Dizziness     Percocet [Oxycodone-Acetaminophen] Nausea And Vomiting    Toradol [Ketorolac Tromethamine] Rash       MEDICATIONS    Current Outpatient Medications on File Prior to Encounter   Medication Sig Dispense Refill    cephALEXin (KEFLEX) 500 MG capsule Take 1 capsule by mouth 4 times daily for 7 days 28 capsule 0    sulfamethoxazole-trimethoprim (BACTRIM DS) 800-160 MG per tablet Take 1 tablet by mouth 2 times daily for 7 days 14 tablet 0    aspirin 81 MG EC tablet Take 1 tablet by mouth daily 30 tablet 3    nitroGLYCERIN (NITROSTAT) 0.4 MG SL tablet up to max of 3 total doses. If no relief after 1 dose, call 911. 25 tablet 3    atorvastatin (LIPITOR) 80 MG tablet Take 1 tablet by mouth at bedtime 30 tablet 3    metoprolol succinate (TOPROL XL) 25 MG extended release tablet Take 1 tablet by mouth daily 30 tablet 3    clopidogrel (PLAVIX) 75 MG tablet Take 1 tablet by mouth daily 30 tablet 3    XARELTO 20 MG TABS tablet take 1 tablet by mouth once daily      JANUVIA 100 MG tablet take 1 tablet by mouth once daily      gabapentin (NEURONTIN) 600 MG tablet take 1 tablet by mouth twice a day      acetaminophen (TYLENOL) 325 MG tablet Take 2 tablets by mouth every 4 hours as needed for Pain 120 tablet 3    blood glucose test strips (TRUETRACK TEST) strip 1 each by In Vitro route 2 times daily As needed. 100 each 0     No current facility-administered medications on file prior to encounter. REVIEW OF SYSTEMS    Pertinent items are noted in HPI.     Objective:      BP (!) 169/82   Pulse 68   Temp 97.5 °F (36.4 °C) (Infrared)   Resp 16   Ht 5' 8\" (1.727 m)   Wt (!) 313 lb (142 kg)   LMP 06/21/2018   SpO2 96%   BMI 47.59 kg/m²     Wt Readings from Last 3 Encounters:   03/28/22 (!) 313 lb (142 kg)   03/27/22 (!) 313 lb (142 kg)   03/14/22 (!) 313 lb (142 kg)       PHYSICAL EXAM    General Appearance: Alert and oriented to person, place and time, well developed and well- nourished, in no acute distress. Skin: Patient is found to have excessive callus with an underlying wound to the distal aspect of this left second toe. Callus was removed in office today revealing this wound. Wound is relatively superficial nature. There is no probe to bone or proximal streaking noted. Very little scant serous drainage of note. No malodor of note. Patient denies pain to the wound site. Vascular: DP and PT pulses are faintly palpable to the left lower extremity. Skin temperature warm to cool from proximal tibia to distal digits of the left lower extremity. Cap refill time is intact. Minimal swelling noted to the distal aspect of left second toe. Patient states that a few weeks ago toe was quite a bit larger prior to her being on antibiotics. Neurovascular: Epicritic and protopathic sensations are grossly diminished. Wound 12/21/18 Foot Plantar;Right #1 (Active)   Wound Image   09/16/19 0855   Wound Etiology Diabetic 09/16/19 0855   Wound Cleansed Rinsed/Irrigated with saline 09/16/19 0855   Dressing/Treatment Dry Dressing 09/16/19 0855   Wound Length (cm) 0.4 cm 09/16/19 0855   Wound Width (cm) 0.3 cm 09/16/19 0855   Wound Depth (cm) 0.3 cm 09/16/19 0855   Wound Surface Area (cm^2) 0.12 cm^2 09/16/19 0855   Change in Wound Size % (l*w) 97.6 09/16/19 0855   Wound Volume (cm^3) 0.04 cm^3 09/16/19 0855   Wound Healing % 84 09/16/19 0855   Post-Procedure Length (cm) 0.6 cm 12/21/18 1220   Post-Procedure Width (cm) 1 cm 12/21/18 1220   Post-Procedure Depth (cm) 0.4 cm 12/21/18 1220   Post-Procedure Surface Area (cm^2) 0.6 cm^2 12/21/18 1220   Post-Procedure Volume (cm^3) 0.24 cm^3 12/21/18 1220   Number of days: 5502       Wound 03/14/22 Toe (Comment  which one) Left unstageable (Active)   Wound Image   03/28/22 1057   Wound Etiology Pressure Unstageable 03/28/22 1057   Dressing Status Intact; Old drainage noted 03/28/22 1057   Wound Cleansed Cleansed with saline 03/28/22 1057   Dressing/Treatment Open to air 03/15/22 0930   Wound Length (cm) 1 cm 03/28/22 1057   Wound Width (cm) 1.5 cm 03/28/22 1057   Wound Depth (cm) 0 cm 03/28/22 1057   Wound Surface Area (cm^2) 1.5 cm^2 03/28/22 1057   Wound Volume (cm^3) 0 cm^3 03/28/22 1057   Wound Assessment Dry 03/28/22 1057   Drainage Amount Scant 03/28/22 1057   Drainage Description Serosanguinous 03/28/22 1057   Odor None 03/28/22 1057   Kathleen-wound Assessment Hyperkeratosis (callous) 03/28/22 1057   Margins Attached edges 03/28/22 1057   Number of days: 14       LABS       CBC:   Lab Results   Component Value Date    WBC 5.9 03/15/2022    HGB 12.9 03/15/2022    HCT 39.8 03/15/2022    MCV 89.8 03/15/2022     03/15/2022     BMP:   Lab Results   Component Value Date     03/15/2022    K 4.1 03/15/2022    K 3.8 03/14/2022     03/15/2022    CO2 25 03/15/2022    BUN 6 03/15/2022    CREATININE 0.6 03/15/2022     PT/INR:   Lab Results   Component Value Date    PROTIME 2.38 11/22/2011    INR 0.99 03/14/2022     Prealbumin: No results found for: PREALBUMIN  Albumin:  Lab Results   Component Value Date    LABALBU 4.1 03/14/2022     Sed Rate:  Lab Results   Component Value Date    SEDRATE 22 01/23/2022     CRP:   Lab Results   Component Value Date    CRP 1.22 01/23/2022     Micro:   Lab Results   Component Value Date    BC No growth-preliminaryNo growth 09/06/2019    BC No growth-preliminaryNo growth 09/06/2019      Hemoglobin A1C:   Lab Results   Component Value Date    LABA1C 8.4 09/28/2019       Assessment:     Ulcer Identification:  Ulcer Type: diabetic and pressure  Contributing Factors: diabetes, chronic pressure and obesity    Wound: pressure    Depth of Diabetic/Pressure/Non Pressure Ulcers or Wound:  Diabetic ulcer, limited to breakdown of skin    Patient Active Problem List   Diagnosis Code    Chest pain R07.9    History of pulmonary embolism Z86.711    Hyperlipidemia with target LDL less than 70 E78.5    HTN, goal below 130/80 I10    Pulmonary embolus ([facility]) I26.99    Morbid obesity ([facility]) E66.01    Bilateral pulmonary embolism (HCC) I26.99    DVT (deep venous thrombosis) ([facility]) I82.409    Acute right hip pain M25.551    Physical deconditioning Z75.37    Metabolic acidosis L18.0    Moderate to severe pulmonary hypertension ([facility]) I27.20    Type 2 diabetes mellitus with hyperglycemia, without long-term current use of insulin ([facility]) E11.65    Open wound of right foot S91.301A    Cellulitis L03.90    Leukocytosis D72.829    Diabetic foot ulcer associated with type 2 diabetes mellitus, with fat layer exposed ([facility]) E11.621, L97.502    Cellulitis of foot, right L03.115    Chronic anticoagulation Z79.01    History of recurrent deep vein thrombosis (DVT) Z86.718    Factor 5 Leiden mutation, heterozygous ([facility]) D68.51    History of CVA (cerebrovascular accident) Z80.78    Noncompliance Z91.19    CAD in native artery I25.10    CAD S/P percutaneous coronary angioplasty I25.10, Z98.61    Abnormal stress test R94.39    Open wound of second toe, left, initial encounter S91.105A       Procedure Note  35898: Paring or cutting of a benign hyperkeratotic lesion. A tissue nipper was used to remove excessive callus buildup to the distal aspect of the left second toe. Callus was removed down to level of healthy dermal tissue revealing a small wound to the distal aspect of the left second toe. No bleeding noted. Patient tolerated callus removal well. Plan:     Patient examined and evaluated today. Patient returns with a new wound to the distal aspect of the left second toe. Excessive callus was removed from the distal aspect of the left second toe revealing a small wound to the distal left second toe. Wound was treated with a Band-Aid and Betadine today. Patient is also provided with 2 crest pads to be used daily for ambulation to help offload this wound. Patient will treat this toe once if not twice daily with Betadine and a Band-Aid.   Patient encouraged to continue taking her Keflex and Bactrim as prescribed. These are prescribed due to a left buttock abscess that was drained yesterday. Patient encouraged try and offload this wound is much as possible. Patient will follow back up with us again in 2 weeks for reevaluation. Treatment: No orders of the defined types were placed in this encounter. Antibiotics: Yes -Keflex and Bactrim    Follow up: 2 weeks    Please see attached Discharge Instructions    Written patient dismissal instructions given to patient and signed by patient or POA. Discharge Instructions       Visit Discharge/Physician Orders:  -Crest pads given today  -Wear crest pads daily on toes, take off at bedtime      Wound Location: Left second    Dressing orders:     1) Gather wound care supplies and arrange on clean table. 2) Wash your hands with soap and water or use alcohol based hand  for 20 seconds (sing \"Happy Birthday\" twice). 3) Cleanse wounds with normal saline or wound cleanser and gauze. Pat dry with clean gauze. 4) Left second toe- Apply betadine to wound bed, cover with a bandage. Change daily        Keep all dressings clean & dry. Do not shower, take baths or get wound wet, unless otherwise instructed by your Wound Care doctor. Follow up visit: 2 Weeks at 10:30    Keep next scheduled appointment. Please give 24 hour notice if unable to keep appointment. [phone]    If you experience any of the following, please call the Wound Care Service during business hours: Monday through Friday 8:00 am - 4:30 pm  ([phone]). *Increase in pain   *Temperature over 101   *Increase in drainage from your wound or a foul odor   *Uncontrolled swelling   *Need for compression bandage changes due to slippage, breakthrough drainage    If you need medical attention outside of business hours, please contact your Primary Care Doctor or go to the nearest emergency room.                  Electronically signed by SHANNON Bergeron CNP on 3/28/2022 at 11:41 AM

## 2022-03-30 ENCOUNTER — OFFICE VISIT (OUTPATIENT)
Dept: CARDIOLOGY CLINIC | Age: 52
End: 2022-03-30
Payer: COMMERCIAL

## 2022-03-30 VITALS
WEIGHT: 293 LBS | DIASTOLIC BLOOD PRESSURE: 86 MMHG | HEART RATE: 71 BPM | SYSTOLIC BLOOD PRESSURE: 150 MMHG | BODY MASS INDEX: 44.41 KG/M2 | HEIGHT: 68 IN

## 2022-03-30 DIAGNOSIS — I25.10 CAD S/P PERCUTANEOUS CORONARY ANGIOPLASTY: Primary | ICD-10-CM

## 2022-03-30 DIAGNOSIS — I27.20 MODERATE TO SEVERE PULMONARY HYPERTENSION (HCC): ICD-10-CM

## 2022-03-30 DIAGNOSIS — E78.5 HYPERLIPIDEMIA WITH TARGET LDL LESS THAN 70: ICD-10-CM

## 2022-03-30 DIAGNOSIS — Z98.61 CAD S/P PERCUTANEOUS CORONARY ANGIOPLASTY: Primary | ICD-10-CM

## 2022-03-30 DIAGNOSIS — I10 HTN, GOAL BELOW 130/80: ICD-10-CM

## 2022-03-30 PROCEDURE — 99213 OFFICE O/P EST LOW 20 MIN: CPT | Performed by: STUDENT IN AN ORGANIZED HEALTH CARE EDUCATION/TRAINING PROGRAM

## 2022-03-30 PROCEDURE — G8417 CALC BMI ABV UP PARAM F/U: HCPCS | Performed by: STUDENT IN AN ORGANIZED HEALTH CARE EDUCATION/TRAINING PROGRAM

## 2022-03-30 PROCEDURE — 1036F TOBACCO NON-USER: CPT | Performed by: STUDENT IN AN ORGANIZED HEALTH CARE EDUCATION/TRAINING PROGRAM

## 2022-03-30 PROCEDURE — G8427 DOCREV CUR MEDS BY ELIG CLIN: HCPCS | Performed by: STUDENT IN AN ORGANIZED HEALTH CARE EDUCATION/TRAINING PROGRAM

## 2022-03-30 PROCEDURE — G8484 FLU IMMUNIZE NO ADMIN: HCPCS | Performed by: STUDENT IN AN ORGANIZED HEALTH CARE EDUCATION/TRAINING PROGRAM

## 2022-03-30 RX ORDER — BLOOD PRESSURE TEST KIT
1 KIT MISCELLANEOUS PRN
Qty: 1 KIT | Refills: 0 | Status: SHIPPED | OUTPATIENT
Start: 2022-03-30

## 2022-03-30 NOTE — PROGRESS NOTES
[facility] PROFESSIONAL SERVICES  [facility]   [address]   [address] [address]   Dept: [phone]   Dept Fax: [phone]   Loc: [phone]      Chief Complaint   Patient presents with    Follow-Up from Hospital     cath, stents    Coronary Artery Disease     Cardiologist:  Dr. Ginny Hein  53 yo male presents for f/u of [facility] with PCI to Lcx, OM1, RPDA after abnormal stress test. Hx of asthma, Bipolar, CAD, DM, DVT/PE, Factor 5, GERD, HTN, TIA, pulm HTN. No chest pain, angina, [place], orthopnea, PND, sob at rest, LE edema, or syncope. Little bit of dizziness, after taking medications, for about 15 mins. Resolves. Has been dealing with loss of her mother in the last week. Doing as well as could be expected at this time.      General:   No fever, no chills, no weight loss, no fatigue  Pulmonary:    No dyspnea, no wheezing  Cardiac:    Denies recent chest pain   GI:     No nausea or vomiting, no abdominal pain  Neuro:      No dizziness or light headedness  Musculoskeletal:  No recent active issues  Extremities:   No edema      Past Medical History:   Diagnosis Date    Asthma     [facility] disorder [facility]     Blood circulation, collateral     CAD (coronary artery disease)     Curvature of spine     Diabetes mellitus ([facility]     DVT [facility] thrombosis) [facility]     Factor 5 Leiden mutation, heterozygous ([facility]     GERD (gastroesophageal reflux disease)     HTN, goal below 130/80     Hx of blood clots     PE x3 and DVT x3    Hx-TIA (transient ischemic attack)     Hyperlipidemia     PE (pulmonary embolism)     RA (rheumatoid arthritis) ([facility]     Unspecified cerebral artery occlusion with cerebral infarction        Allergies   Allergen Reactions    Codeine Hives     + Nausea vomiting    Demerol      vomiting    Ultram [Tramadol Hcl] Other (See Comments)     Dizziness     Percocet [Oxycodone-Acetaminophen] Nausea And Vomiting    Toradol [Ketorolac Tromethamine] Rash       Current Outpatient Medications   Medication Sig Dispense Refill    cephALEXin (KEFLEX) 500 MG capsule Take 1 capsule by mouth 4 times daily for 7 days 28 capsule 0    sulfamethoxazole-trimethoprim (BACTRIM DS) 800-160 MG per tablet Take 1 tablet by mouth 2 times daily for 7 days 14 tablet 0    aspirin 81 MG EC tablet Take 1 tablet by mouth daily 30 tablet 3    nitroGLYCERIN (NITROSTAT) 0.4 MG SL tablet up to max of 3 total doses. If no relief after 1 dose, call 911. 25 tablet 3    atorvastatin (LIPITOR) 80 MG tablet Take 1 tablet by mouth at bedtime 30 tablet 3    metoprolol succinate (TOPROL XL) 25 MG extended release tablet Take 1 tablet by mouth daily 30 tablet 3    clopidogrel (PLAVIX) 75 MG tablet Take 1 tablet by mouth daily 30 tablet 3    XARELTO 20 MG TABS tablet take 1 tablet by mouth once daily      JANUVIA 100 MG tablet take 1 tablet by mouth once daily      gabapentin (NEURONTIN) 600 MG tablet take 1 tablet by mouth twice a day      acetaminophen (TYLENOL) 325 MG tablet Take 2 tablets by mouth every 4 hours as needed for Pain 120 tablet 3    blood glucose test strips (TRUETRACK TEST) strip 1 each by In Vitro route 2 times daily As needed. 100 each 0     No current facility-administered medications for this visit.        Social History     Socioeconomic History    Marital status:      Spouse name: Not on file    Number of children: 3    Years of education: Not on file    Highest education level: Not on file   Occupational History    Not on file   Tobacco Use    Smoking status: Former Smoker     Packs/day: 0.50     Types: Cigarettes     Quit date: 2005     Years since quittin.7    Smokeless tobacco: Never Used   Vaping Use    Vaping Use: Never used   Substance and Sexual Activity    Alcohol use: No    Drug use: No    Sexual activity: Not on file   Other Topics Concern    Not on file   Social History Narrative    Not on file     Social Determinants of Health     Financial Resource Strain:     Difficulty of Paying Living Expenses: Not on file   Food Insecurity:     Worried About Running Out of Food in the Last Year: Not on file    Nathalia of Food in the Last Year: Not on file   Transportation Needs:     Lack of Transportation (Medical): Not on file    Lack of Transportation (Non-Medical): Not on file   Physical Activity:     Days of Exercise per Week: Not on file    Minutes of Exercise per Session: Not on file   Stress:     Feeling of Stress : Not on file   Social Connections:     Frequency of Communication with Friends and Family: Not on file    Frequency of Social Gatherings with Friends and Family: Not on file    Attends [employer] Services: Not on file    Active Member of Clubs or Organizations: Not on file    Attends Club or Organization Meetings: Not on file    Marital Status: Not on file   Intimate Partner Violence:     Fear of Current or Ex-Partner: Not on file    Emotionally Abused: Not on file    Physically Abused: Not on file    Sexually Abused: Not on file   Housing Stability:     Unable to Pay for Housing in the Last Year: Not on file    Number of [place] in the Last Year: Not on file    Unstable Housing in the Last Year: Not on file       Family History   Problem Relation Age of Onset    COPD Mother     Other Mother     Cancer Mother     High Blood Pressure Father     Heart Disease Father     Mental Illness Sister     Mental Illness Brother     Mental Illness Sister     Mental Illness Brother        Blood pressure (!) 150/86, pulse 71, height 5' 8\" (1.727 m), weight (!) 314 lb 12.8 oz (142.8 kg), last menstrual period 06/21/2018, not currently breastfeeding.     General:   Well developed, well nourished  Lungs:   Clear to auscultation  Heart:    Normal S1 S2, No murmur, rubs, or gallops  Abdomen:   Soft, non tender, no organomegalies, positive bowel sounds  Extremities:   No edema, no cyanosis, good peripheral pulses  Neurological:   Awake, alert, oriented. No obvious focal deficits  Musculoskeletal:  No obvious deformities    EKG:     ACCESS:  Right radial artery access. Vasc Band was applied. Hemostasis  was achieved.     IMPRESSION:  1. Severe coronary artery disease with details as listed above. 2.  Status post successful PCI and stenting of mid circumflex artery and  OM1. Kissing balloon angioplasty was utilized. 3.  Successful PCI and stenting of the right posterior descending  artery.     RECOMMENDATIONS:  Admit for observation on telemetry. Access site care. Aspirin and Plavix. Stop Plavix after one month. May resume Xarelto  tomorrow. High intensity statin therapy. Optimize medical therapy for  CAD. Outpatient followup in office.  Delma Alcazar MD     D: 03/14/2022     Diagnosis Orders   1. CAD S/P percutaneous coronary angioplasty     2. Moderate to severe pulmonary hypertension ([address]     3. HTN, goal below 130/80     4. Hyperlipidemia with target LDL less than 70         No orders of the defined types were placed in this encounter. Assessment/Plan:   CAD s/p PCI to LCx, OM1, RDPA- no cp, a little dizzy after meds, usually resolves pretty quickly, has been getting better. Continue GDMT with toprol/statin/asa/plavix/nitro. HTN-a little elevated. Giving Rx for BP kit at home. Will check BP and update if not controlled. HLD- recheck lipids at next visit. Placed on lipitor 80 mg daily. Disposition:   F/u in 6 months.    Continue Dr Chucho Faustin current treatment plan  Follow up with Dr Mary Jane Murphy as scheduled or sooner if needed

## 2022-04-05 ENCOUNTER — HOSPITAL ENCOUNTER (OUTPATIENT)
Dept: CARDIAC REHAB | Age: 52
Setting detail: THERAPIES SERIES
Discharge: HOME | End: 2022-04-05

## 2022-04-11 ENCOUNTER — TELEPHONE (OUTPATIENT)
Dept: WOUND CARE | Age: 52
End: 2022-04-11

## 2022-04-11 ENCOUNTER — TELEPHONE (OUTPATIENT)
Dept: CARDIOLOGY CLINIC | Age: 52
End: 2022-04-11

## 2022-04-11 RX ORDER — METOPROLOL SUCCINATE 25 MG/1
25 TABLET, EXTENDED RELEASE ORAL DAILY
Qty: 90 TABLET | Refills: 2 | Status: SHIPPED | OUTPATIENT
Start: 2022-04-11 | End: 2022-04-27

## 2022-04-11 RX ORDER — ATORVASTATIN CALCIUM 80 MG/1
80 TABLET, FILM COATED ORAL NIGHTLY
Qty: 90 TABLET | Refills: 2 | Status: SHIPPED | OUTPATIENT
Start: 2022-04-11

## 2022-04-11 RX ORDER — CLOPIDOGREL BISULFATE 75 MG/1
75 TABLET ORAL DAILY
Qty: 90 TABLET | Refills: 2 | Status: SHIPPED | OUTPATIENT
Start: 2022-04-11

## 2022-04-11 NOTE — TELEPHONE ENCOUNTER
Pt called in c/o pain and tightness in left calf. When asked about redness and swelling, she states it is always red d/t cellulitis. She states it does seem \"a little\" more swollen than normal. When she flexes toes, she feels a \"muscle cramping\" feeling. Please advise. Pt does have hx of DVT and is prescribed Xarelto.

## 2022-04-11 NOTE — TELEPHONE ENCOUNTER
----- Message from Florencia Rodriguez sent at 4/11/2022 10:29 AM EDT -----  Braulio HER VAN WILL NOT START, AND WANTS TO R/S

## 2022-04-12 NOTE — TELEPHONE ENCOUNTER
Recent vascular studies were normal (No DVT, ISMA ok)  Cont Xarelto  Possible cellulitis, instruct patient to see her PCP ASAP

## 2022-04-14 ENCOUNTER — HOSPITAL ENCOUNTER (EMERGENCY)
Age: 52
Discharge: HOME | End: 2022-04-14
Attending: EMERGENCY MEDICINE
Payer: COMMERCIAL

## 2022-04-14 ENCOUNTER — APPOINTMENT (OUTPATIENT)
Dept: INTERVENTIONAL RADIOLOGY/VASCULAR | Age: 52
End: 2022-04-14
Payer: COMMERCIAL

## 2022-04-14 VITALS
WEIGHT: 293 LBS | TEMPERATURE: 97.2 F | HEART RATE: 92 BPM | SYSTOLIC BLOOD PRESSURE: 137 MMHG | RESPIRATION RATE: 18 BRPM | DIASTOLIC BLOOD PRESSURE: 64 MMHG | BODY MASS INDEX: 44.41 KG/M2 | HEIGHT: 68 IN | OXYGEN SATURATION: 97 %

## 2022-04-14 DIAGNOSIS — M79.662 PAIN OF LEFT CALF: Primary | ICD-10-CM

## 2022-04-14 PROCEDURE — 93971 EXTREMITY STUDY: CPT

## 2022-04-14 PROCEDURE — 99282 EMERGENCY DEPT VISIT SF MDM: CPT

## 2022-04-14 ASSESSMENT — ENCOUNTER SYMPTOMS
VOMITING: 0
COLOR CHANGE: 0
ABDOMINAL PAIN: 0
SHORTNESS OF BREATH: 0
COUGH: 0

## 2022-04-14 ASSESSMENT — PAIN SCALES - GENERAL: PAINLEVEL_OUTOF10: 10

## 2022-04-14 ASSESSMENT — PAIN - FUNCTIONAL ASSESSMENT: PAIN_FUNCTIONAL_ASSESSMENT: 0-10

## 2022-04-14 ASSESSMENT — PAIN DESCRIPTION - DESCRIPTORS: DESCRIPTORS: SHARP

## 2022-04-14 ASSESSMENT — PAIN DESCRIPTION - ORIENTATION: ORIENTATION: LEFT

## 2022-04-14 ASSESSMENT — PAIN DESCRIPTION - FREQUENCY: FREQUENCY: CONTINUOUS

## 2022-04-14 ASSESSMENT — PAIN DESCRIPTION - LOCATION: LOCATION: LEG

## 2022-04-14 NOTE — ED NOTES
Pt presents to the ED with complaints of left leg pain x5 days. Pt rates the pain a 10/10. Pt states she had a cath where she had stents place on 3/12. Pt states she feels like there is increased pressure in her leg that is making it difficult for her to get around. Pt ambulated to room.      Sumanth Vazquez  04/14/22 [id number]

## 2022-04-14 NOTE — ED PROVIDER NOTES
[facility] [address] [address] EMERGENCY DEPT    EMERGENCY MEDICINE     Pt Name: Arik Sauer  MRN: [id number]  [place] 1970  Date of evaluation: 4/14/2022  Provider: Lisa East MD    CHIEF COMPLAINT       Chief Complaint   Patient presents with    Leg Pain       HISTORY OF PRESENT ILLNESS    Arik Sauer is a pleasant 54 y.o. female   Presents to the emergency department from home complaining of L calf pain, concerned for possible DVT. She reports she has noticed pain in the calf over the last couple days, hurts to push on calf mostly, mild pain when walking. She reports she has been compliant with her anticoagulation. She denies any associated cp/sob, denies any fever or chills, denies any weakness/numbess. No other complaints, no other known exacerbating/relieving factors. Triage notes and Nursing notes were reviewed by myself. Any discrepancies are addressed above.     PAST MEDICAL HISTORY     Past Medical History:   Diagnosis Date    Asthma     [facility] 1 disorder ([facility]     Blood circulation, collateral     CAD (coronary artery disease)     Curvature of spine     Diabetes mellitus (HCC)     [facility]     Factor 5 Leiden mutation, heterozygous ([facility]     GERD (gastroesophageal reflux disease)     HTN, goal below 130/80     Hx of blood clots     PE x3 and DVT x3    Hx-TIA (transient ischemic attack)     Hyperlipidemia     PE (pulmonary embolism)     RA (rheumatoid arthritis) ([facility]     Unspecified cerebral artery occlusion with cerebral infarction        SURGICAL HISTORY       Past Surgical History:   Procedure Laterality Date    CARDIAC CATHETERIZATION  feb 2015    Heart caths    CHOLECYSTECTOMY  1992    CORONARY ANGIOPLASTY WITH STENT PLACEMENT      FOOT DEBRIDEMENT Right 9/30/2019    FOOT DEBRIDEMENT INCISION AND DRAINAGE performed by Venkat Choi DPM at [address] [place] [address] ARTHROSCOPY  2007&2010    LIPOMA RESECTION      TONSILLECTOMY      TUBAL LIGATION 2003    TYMPANOSTOMY TUBE PLACEMENT         CURRENT MEDICATIONS       Current Discharge Medication List      CONTINUE these medications which have NOT CHANGED    Details   atorvastatin (LIPITOR) 80 MG tablet Take 1 tablet by mouth at bedtime  Qty: 90 tablet, Refills: 2      metoprolol succinate (TOPROL XL) 25 MG extended release tablet Take 1 tablet by mouth daily  Qty: 90 tablet, Refills: 2      clopidogrel (PLAVIX) 75 MG tablet Take 1 tablet by mouth daily  Qty: 90 tablet, Refills: 2      Blood Pressure KIT 1 kit by Does not apply route as needed (PRN)  Qty: 1 kit, Refills: 0      aspirin 81 MG EC tablet Take 1 tablet by mouth daily  Qty: 30 tablet, Refills: 3      nitroGLYCERIN (NITROSTAT) 0.4 MG SL tablet up to max of 3 total doses. If no relief after 1 dose, call 911. Qty: 25 tablet, Refills: 3      XARELTO 20 MG TABS tablet take 1 tablet by mouth once daily      JANUVIA 100 MG tablet take 1 tablet by mouth once daily      gabapentin (NEURONTIN) 600 MG tablet take 1 tablet by mouth twice a day      acetaminophen (TYLENOL) 325 MG tablet Take 2 tablets by mouth every 4 hours as needed for Pain  Qty: 120 tablet, Refills: 3      blood glucose test strips (TRUETRACK TEST) strip 1 each by In Vitro route 2 times daily As needed.   Qty: 100 each, Refills: 0             ALLERGIES     Codeine, Demerol, Ultram [tramadol hcl], Percocet [oxycodone-acetaminophen], and Toradol [ketorolac tromethamine]    FAMILY HISTORY       Family History   Problem Relation Age of Onset    COPD Mother     Other Mother     Cancer Mother     High Blood Pressure Father     Heart Disease Father     Mental Illness Sister     Mental Illness Brother     Mental Illness Sister     Mental Illness Brother         SOCIAL HISTORY       Social History     Socioeconomic History    Marital status:      Spouse name: None    Number of children: 3    Years of education: None    Highest education level: None   Occupational History    None Tobacco Use    Smoking status: Former Smoker     Packs/day: 0.50     Types: Cigarettes     Quit date: 2005     Years since quittin.7    Smokeless tobacco: Never Used   Vaping Use    Vaping Use: Never used   Substance and Sexual Activity    Alcohol use: No    Drug use: No    Sexual activity: None   Other Topics Concern    None   Social History Narrative    None     Social Determinants of Health     Financial Resource Strain:     Difficulty of Paying Living Expenses: Not on file   Food Insecurity:     Worried About Running Out of Food in the Last Year: Not on file    Nathalia of Food in the Last Year: Not on file   Transportation Needs:     Lack of Transportation (Medical): Not on file    Lack of Transportation (Non-Medical): Not on file   Physical Activity:     Days of Exercise per Week: Not on file    Minutes of Exercise per Session: Not on file   Stress:     Feeling of Stress : Not on file   Social Connections:     Frequency of Communication with Friends and Family: Not on file    Frequency of Social Gatherings with Friends and Family: Not on file    Attends Worship Services: Not on file    Active Member of [address] [place] [address] or Organizations: Not on file    Attends Club or Organization Meetings: Not on file    Marital Status: Not on file   Intimate Partner Violence:     Fear of Current or Ex-Partner: Not on file    Emotionally Abused: Not on file    Physically Abused: Not on file    Sexually Abused: Not on file   Housing Stability:     Unable to Pay for Housing in the Last Year: Not on file    Number of [place] in the Last Year: Not on file    Unstable Housing in the Last Year: Not on file       REVIEW OF SYSTEMS     Review of Systems   Constitutional: Negative for chills and fever. Respiratory: Negative for cough and shortness of breath. Cardiovascular: Negative for chest pain and leg swelling. Gastrointestinal: Negative for abdominal pain and vomiting.    Skin: Negative for color change and rash. All other systems reviewed and are negative. Except as noted above the remainder of the review of systems was reviewed and is. PHYSICAL EXAM    (up to 7 for level 4, 8 or more for level 5)     ED Triage Vitals [04/14/22 1325]   BP Temp Temp Source Pulse Resp SpO2 Height Weight   137/64 97.2 °F (36.2 °C) Oral 92 18 97 % 5' 8\" (1.727 m) (!) 314 lb (142.4 kg)       Physical Exam  Vitals and nursing note reviewed. Constitutional:       Appearance: She is obese. HENT:      Head: Normocephalic and atraumatic. Nose: Nose normal.      Mouth/Throat:      Lips: Pink. Mouth: Mucous membranes are moist.   Eyes:      General: Lids are normal.      Conjunctiva/sclera: Conjunctivae normal.   Neck:      Vascular: No JVD. Cardiovascular:      Rate and Rhythm: Normal rate and regular rhythm. Heart sounds: No murmur heard. No friction rub. No gallop. Comments: Trace nonpitting pedal edema b/l, chronic venous insufficiency skin changes in LE equal b/l, +L calf tenderness, compartments soft, full painless ROM of ankles and toes, normal temp to touch, 2+ DP pulses, no induration or fluctuance, +varicose veins  Pulmonary:      Effort: Pulmonary effort is normal.      Breath sounds: Normal breath sounds and air entry. No wheezing, rhonchi or rales. Abdominal:      Palpations: Abdomen is soft. Tenderness: There is no abdominal tenderness. Musculoskeletal:      Cervical back: Neck supple. Skin:     General: Skin is warm and dry. Findings: No rash. Neurological:      Mental Status: She is alert. GCS: GCS eye subscore is 4. GCS verbal subscore is 5. GCS motor subscore is 6. Sensory: Sensation is intact. Motor: Motor function is intact. Psychiatric:         Behavior: Behavior is cooperative.          DIAGNOSTIC RESULTS     EKG:(none if blank)  All EKG's are [facility] Phys[facility] who either signs or Co-signs this chart in the absence of a cardiologist.        RADIOLOGY: (none if blank)   Interpretation per the Radiologistbelow, if available at the time of this note:    VL DUP LOWER EXTREMITY VENOUS LEFT   Final Result      No evidence of deep venous thrombosis in the left lower extremity. Final report electronically signed by Dr. Yesenia Castrejon on 4/14/2022 2:22 PM          LABS:  Labs Reviewed - No data to display    All other labs were within normal range or not returned as of this dictation. Please note, any cultures that may have been sent were not resulted at the time of this patient visit. EMERGENCY DEPARTMENT COURSE andMedical Decision Making:     MDM/   Pt presents to the ER with L calf pain and concern for DVT, venous doppler neg, normal pulses, no systemic complaints, no cp/sob, vss. Pt stable for dc home, counseled regarding importance of pcp f/u as well as ER return precautions. Strict returnprecautions and follow up instructions were discussed with the patient with which the patient agrees      ED Medications administered this visit:  Medications - No data to display      Procedures: (None if blank)         CLINICAL IMPRESSION     1.  Pain of left calf          DISPOSITION/PLAN   DISPOSITION Decision To Discharge 04/14/2022 02:28:08 PM      PATIENT REFERRED TO:  SHANNON Gasca - CNP  [address]  [phone]    In 3 days        DISCHARGE MEDICATIONS:  Current Discharge Medication List              (Please note that portions of this note were completed with a voice recognition program.  Efforts were made to edit the dictations but occasionallywords are mis-transcribed.)      Flor Pressley MD,FACEP (electronically signed)  Attending Physician, Emergency Department        Bonita Miller MD  04/14/22 2167

## 2022-04-21 NOTE — ED PROVIDER NOTES
[facility]  EMERGENCY DEPARTMENT ENCOUNTER      PATIENT NAME: Kathleen Guerrero  MRN: [id number]  : 1970  [place]: 3/10/2022  PROVIDER: Chano Blackmon MD      [address] [place] [address]       Chief Complaint   Patient presents with    Shortness of Breath       Patient is seen and evaluated in a timely fashion. Nurses Notes are reviewed and I agree except as noted in the HPI. HISTORY OF PRESENT ILLNESS    Kathleen Guerrero is a 54 y.o. female who presents to Emergency Department with Shortness of Breath     A 72-year-old female with past medical history of bipolar disorder, morbid obesity, CAD, diabetes, DVT and PE on [address], factor V Leiden deficiency, TIA, and asthma presents to ED for worsening shortness of breath over last several months. Patient had a heart cath scheduled on 3/14/2022. Since yesterday, patient has been having worsening headache. Given her history of DVT and PE, her PCP was concerned patient may have cerebral vein thrombosis or anticoagulation side effect such as intracranial bleed. Patient also has intermittent pain from her right leg. His PCP sent patient to ED for DVT study also. Patient otherwise has no fever, no chills. No chest pain currently. Mild headache from crown area. No neck pain. This HPI was provided by patient.     REVIEW OF SYSTEMS   Ten-point review of systems is negative except those documented in above HPI including constitutional, HEENT, respiratory, cardiovascular, gastrointestinal, genitourinary, musculoskeletal, skin, neurological, hematological and behavioral.      PAST MEDICAL HISTORY     Past Medical History:   Diagnosis Date    Asthma     Bipolar 1 disorder ([address]     Blood circulation, collateral     CAD (coronary artery disease)     Curvature of spine     Diabetes mellitus ([address]     DVT (deep venous thrombosis) (HCC)     Factor 5 Leiden mutation, heterozygous ([address]     GERD (gastroesophageal reflux disease)     HTN, goal below 130/80  Hx of blood clots     PE x3 and DVT x3    Hx-TIA (transient ischemic attack)     Hyperlipidemia     PE (pulmonary embolism)     RA [facility]rheumatoid arthritis) ([facility]     Unspecified cerebral artery occlusion with cerebral infarction        SURGICAL HISTORY       Past Surgical History:   Procedure Laterality Date    CARDIAC CATHETERIZATION  feb 2015    Heart caths    CHOLECYSTECTOMY  1992    FOOT DEBRIDEMENT Right 9/30/2019    FOOT DEBRIDEMENT INCISION AND DRAINAGE performed by Qiana Healy DPM at [facility] ARTHROSCOPY  2007&2010    TONSILLECTOMY      TUBAL LIGATION  2003    TYMPANOSTOMY TUBE PLACEMENT         CURRENT MEDICATIONS       Previous Medications    ACETAMINOPHEN (TYLENOL) 325 MG TABLET    Take 2 tablets by mouth every 4 hours as needed for Pain    ATORVASTATIN (LIPITOR) 40 MG TABLET    take 1 tablet by mouth once daily    BLOOD GLUCOSE TEST STRIPS (TRUETRACK TEST) STRIP    1 each by In Vitro route 2 times daily As needed. GABAPENTIN (NEURONTIN) 600 MG TABLET    take 1 tablet by mouth twice a day    GLIPIZIDE (GLUCOTROL) 5 MG TABLET    Take 1 tablet by mouth daily    JANUVIA 100 MG TABLET    take 1 tablet by mouth once daily    METFORMIN (GLUCOPHAGE) 1000 MG TABLET    Take 1 tablet by mouth 2 times daily (with meals)    SULFAMETHOXAZOLE-TRIMETHOPRIM (BACTRIM DS;SEPTRA DS) 800-160 MG PER TABLET    take 1 tablet by mouth twice a day for 10 days    XARELTO 20 MG TABS TABLET    take 1 tablet by mouth once daily       ALLERGIES     Codeine, Demerol, Ultram [tramadol hcl], Percocet [oxycodone-acetaminophen], and Toradol [ketorolac tromethamine]    FAMILY HISTORY     She indicated that her mother is alive. She indicated that her father is alive. family history includes COPD in her mother; Cancer in her mother; Heart Disease in her father; High Blood Pressure in her father; Mental Illness in her brother, brother, sister, and sister; Other in her mother.     SOCIAL HISTORY      reports that she quit smoking about 16 years ago. Her smoking use included cigarettes. She smoked 0.50 packs per day. She has never used smokeless tobacco. She reports that she does not drink alcohol and does not use drugs. PHYSICAL EXAM      height is 5' 8\" (1.727 m) and weight is 312 lb (141.5 kg) (abnormal). Her oral temperature is 98.2 °F (36.8 °C). Her blood pressure is 163/84 (abnormal) and her pulse is 81. Her respiration is 22 and oxygen saturation is 99%. Physical Exam  Vitals and nursing note reviewed. Constitutional:       Appearance: She is well-developed. She is not diaphoretic. HENT:      Head: Normocephalic and atraumatic. Nose: Nose normal.   Eyes:      General: No scleral icterus. Right eye: No discharge. Left eye: No discharge. Conjunctiva/sclera: Conjunctivae normal.      Pupils: Pupils are equal, round, and reactive to light. Neck:      Vascular: No JVD. Trachea: No tracheal deviation. Cardiovascular:      Rate and Rhythm: Normal rate and regular rhythm. Heart sounds: Normal heart sounds. No murmur heard. No friction rub. No gallop. Pulmonary:      Effort: Pulmonary effort is normal. No respiratory distress. Breath sounds: Normal breath sounds. No stridor. No wheezing or rales. Chest:      Chest wall: No tenderness. Abdominal:      General: Bowel sounds are normal. There is no distension. Palpations: Abdomen is soft. There is no mass. Tenderness: There is no abdominal tenderness. There is no guarding or rebound. Hernia: No hernia is present. Musculoskeletal:         General: No tenderness or deformity. Cervical back: Normal range of motion and neck supple. Lymphadenopathy:      Cervical: No cervical adenopathy. Skin:     General: Skin is warm and dry. Capillary Refill: Capillary refill takes less than 2 seconds. Coloration: Skin is not pale. Findings: No erythema or rash.    Neurological:      Mental Status: She is alert and oriented to person, place, and time. Cranial Nerves: No cranial nerve deficit. Sensory: No sensory deficit. Motor: No abnormal muscle tone. Coordination: Coordination normal.      Deep Tendon Reflexes: Reflexes normal.   Psychiatric:         Behavior: Behavior normal.         Thought Content: Thought content normal.         Judgment: Judgment normal.       ANCILLARY TEST RESULTS   EKG:    Interpreted by me  Normal sinus rhythm, ventricular rate 86 bpm, FL interval 174 ms, QRS duration 96 ms,  ms, no ST elevation or acute T wave    LAB RESULTS:  Results for orders placed or performed during the hospital encounter of 96/90/01   Basic Metabolic Panel w/ Reflex to MG   Result Value Ref Range    Sodium 138 135 - 145 meq/L    Potassium reflex Magnesium 4.1 3.5 - 5.2 meq/L    Chloride 101 98 - 111 meq/L    CO2 21 (L) 23 - 33 meq/L    Glucose 165 (H) 70 - 108 mg/dL    BUN 8 7 - 22 mg/dL    CREATININE 0.5 0.4 - 1.2 mg/dL    Calcium 9.7 8.5 - 10.5 mg/dL   CBC with Auto Differential   Result Value Ref Range    WBC 7.0 4.8 - 10.8 thou/mm3    RBC 5.05 4.20 - 5.40 mill/mm3    Hemoglobin 14.8 12.0 - 16.0 gm/dl    Hematocrit 44.6 37.0 - 47.0 %    MCV 88.3 81.0 - 99.0 fL    MCH 29.3 26.0 - 33.0 pg    MCHC 33.2 32.2 - 35.5 gm/dl    RDW-CV 13.8 11.5 - 14.5 %    RDW-SD 43.8 35.0 - 45.0 fL    Platelets 271 767 - 715 thou/mm3    MPV 9.9 9.4 - 12.4 fL    Seg Neutrophils 65.4 %    Lymphocytes 26.0 %    Monocytes 6.0 %    Eosinophils 1.6 %    Basophils 0.7 %    Immature Granulocytes 0.3 %    Segs Absolute 4.6 1.8 - 7.7 thou/mm3    Lymphocytes Absolute 1.8 1.0 - 4.8 thou/mm3    Monocytes Absolute 0.4 0.4 - 1.3 thou/mm3    Eosinophils Absolute 0.1 0.0 - 0.4 thou/mm3    Basophils Absolute 0.0 0.0 - 0.1 thou/mm3    Immature Grans (Abs) 0.02 0.00 - 0.07 thou/mm3    nRBC 0 /100 wbc   Troponin   Result Value Ref Range    Troponin T < 0.010 ng/ml   Hepatic Function Panel   Result Value Ref Range    Albumin 4.4 3.5 - 5.1 g/dL    Total Bilirubin 0.3 0.3 - 1.2 mg/dL    Bilirubin, Direct <0.2 0.0 - 0.3 mg/dL    Alkaline Phosphatase 65 38 - 126 U/L    AST 22 5 - 40 U/L    ALT 22 11 - 66 U/L    Total Protein 7.5 6.1 - 8.0 g/dL   D-Dimer, Quantitative   Result Value Ref Range    D-Dimer, Quant 678.00 (H) 0.00 - 500.00 ng/ml FEU   Anion Gap   Result Value Ref Range    Anion Gap 16.0 8.0 - 16.0 meq/L   Osmolality   Result Value Ref Range    Osmolality Calc 277.7 275.0 - 300.0 mOsmol/kg   Glomerular Filtration Rate, Estimated   Result Value Ref Range    Est, Glom Filt Rate >90 ml/min/1.73m2   EKG 12 Lead   Result Value Ref Range    Ventricular Rate 86 BPM    Atrial Rate 86 BPM    P-R Interval 174 ms    QRS Duration 96 ms    Q-T Interval 374 ms    QTc Calculation (Bazett) 447 ms    P Axis 35 degrees    R Axis 11 degrees    T Axis 41 degrees       RADIOLOGY REPORTS  CTA CHEST W WO CONTRAST   Final Result   Pulmonary artery opacification is inadequate for embolus evaluation. This document has been electronically signed by: Dawn Urena MD on 03/10/2022 07:04 PM      All CTs at this facility use dose modulation techniques and iterative    reconstructions, and/or weight-based dosing   when appropriate to reduce radiation to a low as reasonably achievable. CT HEAD W CONTRAST   Final Result   1. No acute intracranial findings      This document has been electronically signed by: Dawn Urena MD on 03/10/2022 07:04 PM      All CTs at this facility use dose modulation techniques and iterative    reconstructions, and/or weight-based dosing   when appropriate to reduce radiation to a low as reasonably achievable. VL DUP LOWER EXTREMITY VENOUS BILATERAL   Final Result   1. Negative for bilateral lower extremity deep vein thrombosis. This document has been electronically signed by:  Dawn Urena MD on 03/10/2022 07:00 PM      Technique Used: Duplex examination performed utilizing grayscale, color    and spectral analysis. CT HEAD WO CONTRAST   Final Result      1. Stable CT scan of the brain, no interval change since  previous study dated 2nd of February 2022         **This report has been created using voice recognition software. It may contain minor errors which are inherent in voice recognition technology. **      Final report electronically signed by DR Leeann Lopez on 3/10/2022 5:01 PM      XR CHEST PORTABLE   Final Result   1. No acute pulmonary infiltrate. **This report has been created using voice recognition software. It may contain minor errors which are inherent in voice recognition technology. **      Final report electronically signed by Dr Dolores Rabago on 3/10/2022 4:25 PM          [facility] [address] ([facility])     Differential Diagnosis: Anxiety, asthma, CHF, obesity related hypoventilation, PE, DVT, high-grade headache    Summary:    Stable vital signs during initial and repeat evaluation. ECG did not show acute ischemic changes. Lab results were reassuring except for D-dimer 678. Therefore CTA chest and CTV brain were done which were negative for acute findings, clear lungs. Pulmonary artery opacification is inadequate for embolus evaluation. No cerebral vein thrombosis. Bilateral lower extremity venous duplex negative for DVT. Patient is reassured and discharged with PCP follow-up in 1 week. ED Medications  Medications   iopamidol (ISOVUE-370) 76 % injection 80 mL (80 mLs IntraVENous Given 3/10/22 1823)     Vital signs in ED  Vitals:    03/10/22 1541 03/10/22 1640 03/10/22 1739 03/10/22 1839   BP: (!) 162/80 (!) 151/75 (!) 162/75 (!) 163/84   Pulse: 87 79 82 81   Resp: 18 19 19 22   Temp: 98.2 °F (36.8 °C)      TempSrc: Oral      SpO2: 96% 98% 100% 99%   Weight:       Height:           CRITICAL CARE   None    CONSULTS   None    PROCEDURES   None    FINAL IMPRESSION AND DISPOSITION      1. Dyspnea, unspecified type    2.  Acute nonintractable headache, unspecified headache type        DISPOSITION Decision To Discharge 03/10/2022 07:31:50 PM        PATIENT REFERRED TO:  Yaima La APRN - [facility]  [address]  [phone]    In 1 week  ED discharge follow-up      DISCHARGE MEDICATIONS:  New Prescriptions    No medications on file       (Please note that portions of this note were completed with a voice recognition program.  Efforts were made to edit the dictations but occasionally words aremis-transcribed.)    MD Chano Ovalles MD  03/10/22 6025 Cephalexin Counseling: I counseled the patient regarding use of cephalexin as an antibiotic for prophylactic and/or therapeutic purposes. Cephalexin (commonly prescribed under brand name Keflex) is a cephalosporin antibiotic which is active against numerous classes of bacteria, including most skin bacteria. Side effects may include nausea, diarrhea, gastrointestinal upset, rash, hives, yeast infections, and in rare cases, hepatitis, kidney disease, seizures, fever, confusion, neurologic symptoms, and others. Patients with severe allergies to penicillin medications are cautioned that there is about a 10% incidence of cross-reactivity with cephalosporins. When possible, patients with penicillin allergies should use alternatives to cephalosporins for antibiotic therapy.

## 2022-04-25 ENCOUNTER — HOSPITAL ENCOUNTER (EMERGENCY)
Age: 52
Discharge: HOME | End: 2022-04-25
Attending: EMERGENCY MEDICINE
Payer: COMMERCIAL

## 2022-04-25 ENCOUNTER — TELEPHONE (OUTPATIENT)
Dept: CARDIAC REHAB | Age: 52
End: 2022-04-25

## 2022-04-25 ENCOUNTER — APPOINTMENT (OUTPATIENT)
Dept: GENERAL RADIOLOGY | Age: 52
End: 2022-04-25
Payer: COMMERCIAL

## 2022-04-25 VITALS
RESPIRATION RATE: 18 BRPM | TEMPERATURE: 97.8 F | SYSTOLIC BLOOD PRESSURE: 144 MMHG | WEIGHT: 293 LBS | OXYGEN SATURATION: 99 % | HEART RATE: 69 BPM | BODY MASS INDEX: 44.41 KG/M2 | DIASTOLIC BLOOD PRESSURE: 70 MMHG | HEIGHT: 68 IN

## 2022-04-25 DIAGNOSIS — R07.89 CHEST WALL PAIN: Primary | ICD-10-CM

## 2022-04-25 LAB
ALBUMIN SERPL-MCNC: 4.3 G/DL (ref 3.5–5.1)
ALP BLD-CCNC: 76 U/L (ref 38–126)
ALT SERPL-CCNC: 18 U/L (ref 11–66)
ANION GAP SERPL CALCULATED.3IONS-SCNC: 11 MEQ/L (ref 8–16)
AST SERPL-CCNC: 15 U/L (ref 5–40)
BASOPHILS # BLD: 0.8 %
BASOPHILS ABSOLUTE: 0 THOU/MM3 (ref 0–0.1)
BILIRUB SERPL-MCNC: 0.5 MG/DL (ref 0.3–1.2)
BUN BLDV-MCNC: 9 MG/DL (ref 7–22)
CALCIUM SERPL-MCNC: 9.3 MG/DL (ref 8.5–10.5)
CHLORIDE BLD-SCNC: 102 MEQ/L (ref 98–111)
CO2: 26 MEQ/L (ref 23–33)
CREAT SERPL-MCNC: 0.6 MG/DL (ref 0.4–1.2)
EKG ATRIAL RATE: 66 BPM
EKG P AXIS: 31 DEGREES
EKG P-R INTERVAL: 186 MS
EKG Q-T INTERVAL: 394 MS
EKG QRS DURATION: 98 MS
EKG QTC CALCULATION (BAZETT): 413 MS
EKG R AXIS: 19 DEGREES
EKG T AXIS: 27 DEGREES
EKG VENTRICULAR RATE: 66 BPM
EOSINOPHIL # BLD: 2.1 %
EOSINOPHILS ABSOLUTE: 0.1 THOU/MM3 (ref 0–0.4)
ERYTHROCYTE [DISTWIDTH] IN BLOOD BY AUTOMATED COUNT: 14.2 % (ref 11.5–14.5)
ERYTHROCYTE [DISTWIDTH] IN BLOOD BY AUTOMATED COUNT: 45.9 FL (ref 35–45)
GFR SERPL CREATININE-BSD FRML MDRD: > 90 ML/MIN/1.73M2
GLUCOSE BLD-MCNC: 183 MG/DL (ref 70–108)
HCT VFR BLD CALC: 39.5 % (ref 37–47)
HEMOGLOBIN: 13 GM/DL (ref 12–16)
IMMATURE GRANS (ABS): 0.01 THOU/MM3 (ref 0–0.07)
IMMATURE GRANULOCYTES: 0.2 %
LYMPHOCYTES # BLD: 21.9 %
LYMPHOCYTES ABSOLUTE: 1.1 THOU/MM3 (ref 1–4.8)
MCH RBC QN AUTO: 29.4 PG (ref 26–33)
MCHC RBC AUTO-ENTMCNC: 32.9 GM/DL (ref 32.2–35.5)
MCV RBC AUTO: 89.4 FL (ref 81–99)
MONOCYTES # BLD: 8.1 %
MONOCYTES ABSOLUTE: 0.4 THOU/MM3 (ref 0.4–1.3)
NUCLEATED RED BLOOD CELLS: 0 /100 WBC
OSMOLALITY CALCULATION: 280.9 MOSMOL/KG (ref 275–300)
PLATELET # BLD: 187 THOU/MM3 (ref 130–400)
PMV BLD AUTO: 9.6 FL (ref 9.4–12.4)
POTASSIUM SERPL-SCNC: 4.2 MEQ/L (ref 3.5–5.2)
PRO-BNP: 48.4 PG/ML (ref 0–450)
RBC # BLD: 4.42 MILL/MM3 (ref 4.2–5.4)
SEG NEUTROPHILS: 66.9 %
SEGMENTED NEUTROPHILS ABSOLUTE COUNT: 3.2 THOU/MM3 (ref 1.8–7.7)
SODIUM BLD-SCNC: 139 MEQ/L (ref 135–145)
TOTAL PROTEIN: 7.4 G/DL (ref 6.1–8)
TROPONIN T: < 0.01 NG/ML
WBC # BLD: 4.8 THOU/MM3 (ref 4.8–10.8)

## 2022-04-25 PROCEDURE — 93005 ELECTROCARDIOGRAM TRACING: CPT | Performed by: EMERGENCY MEDICINE

## 2022-04-25 PROCEDURE — 84484 ASSAY OF TROPONIN QUANT: CPT

## 2022-04-25 PROCEDURE — 71045 X-RAY EXAM CHEST 1 VIEW: CPT

## 2022-04-25 PROCEDURE — 83880 ASSAY OF NATRIURETIC PEPTIDE: CPT

## 2022-04-25 PROCEDURE — 99285 EMERGENCY DEPT VISIT HI MDM: CPT

## 2022-04-25 PROCEDURE — 93010 ELECTROCARDIOGRAM REPORT: CPT | Performed by: INTERNAL MEDICINE

## 2022-04-25 PROCEDURE — 85025 COMPLETE CBC W/AUTO DIFF WBC: CPT

## 2022-04-25 PROCEDURE — 80053 COMPREHEN METABOLIC PANEL: CPT

## 2022-04-25 PROCEDURE — 36415 COLL VENOUS BLD VENIPUNCTURE: CPT

## 2022-04-25 ASSESSMENT — PAIN - FUNCTIONAL ASSESSMENT: PAIN_FUNCTIONAL_ASSESSMENT: NONE - DENIES PAIN

## 2022-04-25 NOTE — TELEPHONE ENCOUNTER
Spoke with pt and advised to present to ED for evaluation of CP radiating to arm/shoulder, intermittent irregular HR, and previous cardiac hx. Pt voiced understanding and will present to ED---informed pt I would keep her appt with Netta WHITNEY as scheduled for 4-27-22, but that she should present to ED; pt voiced understanding.

## 2022-04-25 NOTE — ED PROVIDER NOTES
[facility]  EMERGENCY DEPARTMENT ENCOUNTER      PATIENT NAME: Kiersten Jordan  MRN: [id number]  : 1970  [place]: 2022  PROVIDER: Ayaka Rodriguez MD      CHIEF COMPLAINT       Chief Complaint   Patient presents with    Chest Pain       Patient is seen and evaluated in a timely fashion. Nurses Notes are reviewed and I agree except as noted in the HPI. HISTORY OF PRESENT ILLNESS    Kiersten Jordan is a 54 y.o. female who presents to Emergency Department with Chest Pain     This patient is a 65-year-old female with past medical history of morbid obesity, hypertension, PE, DVT, metabolic acidosis, moderate to severe pulmonary artery hypertension, diabetes, CVA, and recent PCI on 3/14/2022 at [facility] resents to ED for evaluation of intermittent chest pain over the last 5 days (2022. Chest pain was sharp, from left chest below breast, lasting 1-2 minutes, sometimes radiating to right shoulder, pain was not associated with nausea, vomiting, diaphoresis, or syncope. Currently chest pain free. This HPI was provided by patient.      REVIEW OF SYSTEMS   Ten-point review of systems is negative except those documented in above HPI including constitutional, HEENT, respiratory, cardiovascular, gastrointestinal, genitourinary, musculoskeletal, skin, neurological, hematological and behavioral.      PAST MEDICAL HISTORY     Past Medical History:   Diagnosis Date    Asthma     Bipolar 1 disorder ([address]     Blood circulation, collateral     CAD (coronary artery disease)     Curvature of spine     Diabetes mellitus ([address]     [facility]     Factor 5 Leiden mutation, heterozygous ([address]     GERD (gastroesophageal reflux disease)     HTN, goal below 130/80     Hx of blood clots     PE x3 and DVT x3    Hx-TIA (transient ischemic attack)     Hyperlipidemia     PE (pulmonary embolism)     RA (rheumatoid arthritis) ([address]     Unspecified cerebral artery occlusion with cerebral infarction        SURGICAL HISTORY       Past Surgical History:   Procedure Laterality Date    CARDIAC CATHETERIZATION  feb 2015    Heart caths    CHOLECYSTECTOMY  1992    CORONARY ANGIOPLASTY WITH STENT PLACEMENT      FOOT DEBRIDEMENT Right 9/30/2019    FOOT DEBRIDEMENT INCISION AND DRAINAGE performed by Dora Valera DPM at [facility] ARTHROSCOPY  2007&2010    LIPOMA RESECTION      TONSILLECTOMY      TUBAL LIGATION  2003    TYMPANOSTOMY TUBE PLACEMENT         CURRENT MEDICATIONS       Previous Medications    ACETAMINOPHEN (TYLENOL) 325 MG TABLET    Take 2 tablets by mouth every 4 hours as needed for Pain    ASPIRIN 81 MG EC TABLET    Take 1 tablet by mouth daily    ATORVASTATIN (LIPITOR) 80 MG TABLET    Take 1 tablet by mouth at bedtime    BLOOD GLUCOSE TEST STRIPS (TRUETRACK TEST) STRIP    1 each by In Vitro route 2 times daily As needed. BLOOD PRESSURE KIT    1 kit by Does not apply route as needed (PRN)    CLOPIDOGREL (PLAVIX) 75 MG TABLET    Take 1 tablet by mouth daily    GABAPENTIN (NEURONTIN) 600 MG TABLET    take 1 tablet by mouth twice a day    JANUVIA 100 MG TABLET    take 1 tablet by mouth once daily    METOPROLOL SUCCINATE (TOPROL XL) 25 MG EXTENDED RELEASE TABLET    Take 1 tablet by mouth daily    NITROGLYCERIN (NITROSTAT) 0.4 MG SL TABLET    up to max of 3 total doses. If no relief after 1 dose, call 911. XARELTO 20 MG TABS TABLET    take 1 tablet by mouth once daily       ALLERGIES     Codeine, Demerol, Ultram [tramadol hcl], Percocet [oxycodone-acetaminophen], and Toradol [ketorolac tromethamine]    FAMILY HISTORY     She indicated that her mother is alive. She indicated that her father is alive. family history includes COPD in her mother; Cancer in her mother; Heart Disease in her father; High Blood Pressure in her father; Mental Illness in her brother, brother, sister, and sister; Other in her mother.     SOCIAL HISTORY      reports that she quit smoking about 16 years ago. Her smoking use included cigarettes. She smoked 0.50 packs per day. She has never used smokeless tobacco. She reports that she does not drink alcohol and does not use drugs. PHYSICAL EXAM      height is 5' 8\" (1.727 m) and weight is 313 lb (142 kg) (abnormal). Her temperature is 97.8 °F (36.6 °C). Her blood pressure is 144/70 (abnormal) and her pulse is 69. Her respiration is 18 and oxygen saturation is 99%. Physical Exam  Vitals and nursing note reviewed. Constitutional:       Appearance: She is well-developed. She is not diaphoretic. HENT:      Head: Normocephalic and atraumatic. Nose: Nose normal.   Eyes:      General: No scleral icterus. Right eye: No discharge. Left eye: No discharge. Conjunctiva/sclera: Conjunctivae normal.      Pupils: Pupils are equal, round, and reactive to light. Neck:      Vascular: No JVD. Trachea: No tracheal deviation. Cardiovascular:      Rate and Rhythm: Normal rate and regular rhythm. Heart sounds: Normal heart sounds. No murmur heard. No friction rub. No gallop. Pulmonary:      Effort: Pulmonary effort is normal. No respiratory distress. Breath sounds: Normal breath sounds. No stridor. No wheezing or rales. Chest:      Chest wall: Tenderness present. Abdominal:      General: Bowel sounds are normal. There is no distension. Palpations: Abdomen is soft. There is no mass. Tenderness: There is no abdominal tenderness. There is no guarding or rebound. Hernia: No hernia is present. Musculoskeletal:         General: No tenderness or deformity. Cervical back: Normal range of motion and neck supple. Lymphadenopathy:      Cervical: No cervical adenopathy. Skin:     General: Skin is warm and dry. Capillary Refill: Capillary refill takes less than 2 seconds. Coloration: Skin is not pale. Findings: No erythema or rash.    Neurological:      Mental Status: She is alert and oriented to person, place, and time. Cranial Nerves: No cranial nerve deficit. Sensory: No sensory deficit. Motor: No abnormal muscle tone. Coordination: Coordination normal.      Deep Tendon Reflexes: Reflexes normal.   Psychiatric:         Behavior: Behavior normal.         Thought Content: Thought content normal.         Judgment: Judgment normal.       ANCILLARY TEST RESULTS   EKG:    Interpreted by me  Normal sinus rhythm, ventricular rate of 66 bpm, OR interval 186 ms, QRS duration 98 ms,  ms, no ST elevation or acute T wave    LAB RESULTS:  Results for orders placed or performed during the hospital encounter of 04/25/22   CBC with Auto Differential   Result Value Ref Range    WBC 4.8 4.8 - 10.8 thou/mm3    RBC 4.42 4.20 - 5.40 mill/mm3    Hemoglobin 13.0 12.0 - 16.0 gm/dl    Hematocrit 39.5 37.0 - 47.0 %    MCV 89.4 81.0 - 99.0 fL    MCH 29.4 26.0 - 33.0 pg    MCHC 32.9 32.2 - 35.5 gm/dl    RDW-CV 14.2 11.5 - 14.5 %    RDW-SD 45.9 (H) 35.0 - 45.0 fL    Platelets 023 985 - 818 thou/mm3    MPV 9.6 9.4 - 12.4 fL    Seg Neutrophils 66.9 %    Lymphocytes 21.9 %    Monocytes 8.1 %    Eosinophils 2.1 %    Basophils 0.8 %    Immature Granulocytes 0.2 %    Segs Absolute 3.2 1.8 - 7.7 thou/mm3    Lymphocytes Absolute 1.1 1.0 - 4.8 thou/mm3    Monocytes Absolute 0.4 0.4 - 1.3 thou/mm3    Eosinophils Absolute 0.1 0.0 - 0.4 thou/mm3    Basophils Absolute 0.0 0.0 - 0.1 thou/mm3    Immature Grans (Abs) 0.01 0.00 - 0.07 thou/mm3    nRBC 0 /100 wbc   Comprehensive Metabolic Panel   Result Value Ref Range    Glucose 183 (H) 70 - 108 mg/dL    CREATININE 0.6 0.4 - 1.2 mg/dL    BUN 9 7 - 22 mg/dL    Sodium 139 135 - 145 meq/L    Potassium 4.2 3.5 - 5.2 meq/L    Chloride 102 98 - 111 meq/L    CO2 26 23 - 33 meq/L    Calcium 9.3 8.5 - 10.5 mg/dL    AST 15 5 - 40 U/L    Alkaline Phosphatase 76 38 - 126 U/L    Total Protein 7.4 6.1 - 8.0 g/dL    Albumin 4.3 3.5 - 5.1 g/dL    Total Bilirubin 0.5 0.3 - 1.2 mg/dL    ALT 18 11 - 66 U/L   Brain Natriuretic Peptide   Result Value Ref Range    Pro-BNP 48.4 0.0 - 450.0 pg/mL   Troponin   Result Value Ref Range    Troponin T < 0.010 ng/ml   Anion Gap   Result Value Ref Range    Anion Gap 11.0 8.0 - 16.0 meq/L   Osmolality   Result Value Ref Range    Osmolality Calc 280.9 275.0 - 300.0 mOsmol/kg   Glomerular Filtration Rate, Estimated   Result Value Ref Range    Est, Glom Filt Rate >90 ml/min/1.73m2       RADIOLOGY REPORTS  XR CHEST PORTABLE   Final Result   1. No acute cardiopulmonary abnormality. This document has been electronically signed by: Denise Hollins MD on    04/25/2022 06:04 PM          MEDICAL [id number] (MDM)     Differential Diagnosis: Include but not limited to: Unstable angina, angina, anxiety, PE, atypical chest pain, aortic dissection, pneumonia, GERD, costochondritis, pleurisy, chest wall pain, dyspnea, CHF, COPD, bronchitis, biliary disease. Initial Plans:   Large bore IV  Labs  CXR  Monitor    Summary:    Patient presents for evaluation for chest pain and right shoulder pain. Patient has left chest wall tenderness and right shoulder tenderness. EKG is normal.  Patient's chest pain is not consistent with a cardiac etiology even though patient had recent PCI. Labs and chest x-ray are obtained which are unremarkable. Given that patient has been having persistent chest pain for last 5 days with normal ECG and negative troponin, ACS is rule out. In addition patient's chest pain is reproducible and positional.    I recommend close cardiology follow-up (patient has a follow-up appointment on 4/27/2022 with a cardiologist). Discharged in stable conditions.     ED Medications  Medications - No data to display  Vital signs in ED  Vitals:    04/25/22 1658 04/25/22 1853   BP: (!) 160/72 (!) 144/70   Pulse: 71 69   Resp: 17 18   Temp: 97.8 °F (36.6 °C)    SpO2: 98% 99%   Weight: (!) 313 lb (142 kg) (!) 313 lb (142 kg)   Height:  5' 8\" (1.727 m) CRITICAL CARE   None    CONSULTS   None    PROCEDURES   None    FINAL IMPRESSION AND DISPOSITION      1. Chest wall pain        Discharge Home    PATIENT REFERRED TO:  Abiola Ya MD  [address] [place] [id number]  [phone]    On 4/27/2022  as scheduled.       DISCHARGE MEDICATIONS:  New Prescriptions    No medications on file       (Please note that portions of this note were completed with a voice recognition program.  Efforts were made to edit the dictations but occasionally words aremis-transcribed.)    MD Jan Lu MD  04/25/22 [id number]

## 2022-04-25 NOTE — TELEPHONE ENCOUNTER
Called patient to remind of cardiac rehab evaluation. Patient stated she would not have transportation due to vehicle being in for repairs. Patient also stated she was thinking about going to ED due to chest pain and what feels like an irregular HR. Patient stated she might wait until appointment with cardiology on 4/27/22, advised patient to go to ED. Cardiac rehab eval canceled.

## 2022-04-26 NOTE — PROGRESS NOTES
[facility]   [address]   [address] [address]   Dept: [phone]   Dept Fax: [phone]   Loc: [phone]      Chief Complaint   Patient presents with   [employer] Follow-up     Cardiologist:  Dr. Devon Swain  53 yo male presents for chest ache. Recent hx of LHC with PCI to Lcx, OM1, RPDA after abnormal stress test. Hx of asthma, Bipolar, CAD, DM, DVT/PE, Factor 5, GERD, HTN, TIA, pulm HTN. Still c/o of chest ache and palpitations. Chest pain starts left sided and radiates to right shoulder. Has not tried nitro tablets. Breathing has been fine. having some occasional palpitations, does have hx of PACs. Unclear if totallly associated symptoms or occurring separate. Went over cath results again with patient.      General:   No fever, no chills, no weight loss, + fatigue  Pulmonary:    No dyspnea, no wheezing  Cardiac:    + recent chest pain   GI:     No nausea or vomiting, no abdominal pain  Neuro:      No dizziness or light headedness  Musculoskeletal:  No recent active issues  Extremities:   No edema      Past Medical History:   Diagnosis Date    Asthma     Bipolar 1 disorder ([address]     Blood circulation, collateral     CAD (coronary artery disease)     Curvature of spine     Diabetes mellitus ([address]     D[facility]) ([facility])     [facility] Le[facility], [facility]     GERD (gastroesophageal reflux disease)     HTN, goal below 130/80     Hx of blood clots     PE x3 and DVT x3    Hx-TIA (transient ischemic attack)     Hyperlipidemia     PE (pulmonary embolism)     RA [facility]oid [facility]) ([facility]     Unspecified cerebral artery occlusion with cerebral infarction        Allergies   Allergen Reactions    Codeine Hives     + Nausea vomiting    Demerol      vomiting    Ultram [Tramadol Hcl] Other (See Comments)     Dizziness     Percocet [Oxycodone-Acetaminophen] Nausea And Vomiting    Toradol [Ketorolac Tromethamine] Rash       Current Outpatient Medications   Medication Sig Dispense Refill    cephALEXin (KEFLEX) 500 MG capsule Take 500 mg by mouth 4 times daily      metoprolol succinate (TOPROL XL) 25 MG extended release tablet Take 1.5 tablets by mouth daily 90 tablet 2    atorvastatin (LIPITOR) 80 MG tablet Take 1 tablet by mouth at bedtime 90 tablet 2    clopidogrel (PLAVIX) 75 MG tablet Take 1 tablet by mouth daily 90 tablet 2    Blood Pressure KIT 1 kit by Does not apply route as needed (PRN) 1 kit 0    nitroGLYCERIN (NITROSTAT) 0.4 MG SL tablet up to max of 3 total doses. If no relief after 1 dose, call 911. 25 tablet 3    XARELTO 20 MG TABS tablet take 1 tablet by mouth once daily      JANUVIA 100 MG tablet take 1 tablet by mouth once daily      gabapentin (NEURONTIN) 600 MG tablet take 1 tablet by mouth twice a day      acetaminophen (TYLENOL) 325 MG tablet Take 2 tablets by mouth every 4 hours as needed for Pain 120 tablet 3    blood glucose test strips (TRUETRACK TEST) strip 1 each by In Vitro route 2 times daily As needed. 100 each 0     No current facility-administered medications for this visit.        Social History     Socioeconomic History    Marital status:      Spouse name: None    Number of children: 3    Years of education: None    Highest education level: None   Occupational History    None   Tobacco Use    Smoking status: Former Smoker     Packs/day: 0.50     Types: Cigarettes     Quit date: 2005     Years since quittin.8    Smokeless tobacco: Never Used   Vaping Use    Vaping Use: Never used   Substance and Sexual Activity    Alcohol use: No    Drug use: No    Sexual activity: None   Other Topics Concern    None   Social History Narrative    None     Social Determinants of Health     Financial Resource Strain:     Difficulty of Paying Living Expenses: Not on file   Food Insecurity:     Worried About Running Out of Food in the Last Year: Not on file  Ran Out of Food in the Last Year: Not on file   Transportation Needs:     Lack of Transportation (Medical): Not on file    Lack of Transportation (Non-Medical): Not on file   Physical Activity:     Days of Exercise per Week: Not on file    Minutes of Exercise per Session: Not on file   Stress:     Feeling of Stress : Not on file   Social Connections:     Frequency of Communication with Friends and Family: Not on file    Frequency of Social Gatherings with Friends and Family: Not on file    Attends Protestant Services: Not on file    Active Member of Clubs or Organizations: Not on file    Attends Club or Organization Meetings: Not on file    Marital Status: Not on file   Intimate Partner Violence:     Fear of Current or Ex-Partner: Not on file    Emotionally Abused: Not on file    Physically Abused: Not on file    Sexually Abused: Not on file   Housing Stability:     Unable to Pay for Housing in the Last Year: Not on file    Number of [place] in the Last Year: Not on file    Unstable Housing in the Last Year: Not on file       Family History   Problem Relation Age of Onset    COPD Mother     Other Mother     Cancer Mother     High Blood Pressure Father     Heart Disease Father     Mental Illness Sister     Mental Illness Brother     Mental Illness Sister     Mental Illness Brother        Blood pressure 106/61, pulse 63, height 5' 8\" (1.727 m), weight (!) 310 lb (140.6 kg), last menstrual period 06/21/2018, not currently breastfeeding. General:   Well developed, well nourished  Lungs:   Clear to auscultation  Heart:    Normal S1 S2, No murmur, rubs, or gallops  Abdomen:   Soft, non tender, no organomegalies, positive bowel sounds  Extremities:   No edema, no cyanosis, good peripheral pulses  Neurological:   Awake, alert, oriented. No obvious focal deficits  Musculoskeletal:  No obvious deformities    EKG:     ACCESS:  Right radial artery access. Vasc Band was applied. Hemostasis  was achieved.     IMPRESSION:  1. Severe coronary artery disease with details as listed above. 2.  Status post successful PCI and stenting of mid circumflex artery and  OM1. Kissing balloon angioplasty was utilized. 3.  Successful PCI and stenting of the right posterior descending  artery.     RECOMMENDATIONS:  Admit for observation on telemetry. Access site care. Aspirin and Plavix. Stop Plavix after one month. May resume Xarelto  tomorrow. High intensity statin therapy. Optimize medical therapy for  CAD. Outpatient followup in office.  Mirna Jama MD     D: 03/14/2022     Diagnosis Orders   1. CAD S/P percutaneous coronary angioplasty     2. PAC (premature atrial contraction)     3. Atypical chest pain         No orders of the defined types were placed in this encounter. Assessment/Plan:   CAD s/p PCI to LCx, OM1, RDPA- continues to have atypical chest pain that radiates to right shoulder. ACS was r/o in ER. Patient is concerned for possible clots. is on plavix and xarelto and taking as directed she says. will increase toprol to 37.5 mg daily. Her stent was 6 weeks ago at this point. Will continue to monitor. Return instructions given to patient for new or worsening symptoms. Continue GDMT with toprol/statin/asa/plavix/nitro. HTN- well controlled. Recheck was 126/80. Okay to increase BB. HLD- recheck lipids at next visit. Placed on lipitor 80 mg daily. Disposition:   F/u as scheduled.     Continue Dr Gerson Lopez current treatment plan  Follow up with Dr Yeison Calvert as scheduled or sooner if needed

## 2022-04-27 ENCOUNTER — OFFICE VISIT (OUTPATIENT)
Dept: CARDIOLOGY CLINIC | Age: 52
End: 2022-04-27
Payer: COMMERCIAL

## 2022-04-27 VITALS
SYSTOLIC BLOOD PRESSURE: 106 MMHG | HEIGHT: 68 IN | BODY MASS INDEX: 44.41 KG/M2 | DIASTOLIC BLOOD PRESSURE: 61 MMHG | WEIGHT: 293 LBS | HEART RATE: 63 BPM

## 2022-04-27 DIAGNOSIS — I49.1 PAC (PREMATURE ATRIAL CONTRACTION): ICD-10-CM

## 2022-04-27 DIAGNOSIS — R07.89 ATYPICAL CHEST PAIN: ICD-10-CM

## 2022-04-27 DIAGNOSIS — Z98.61 CAD S/P PERCUTANEOUS CORONARY ANGIOPLASTY: Primary | ICD-10-CM

## 2022-04-27 DIAGNOSIS — I25.10 CAD S/P PERCUTANEOUS CORONARY ANGIOPLASTY: Primary | ICD-10-CM

## 2022-04-27 PROCEDURE — G8417 CALC BMI ABV UP PARAM F/U: HCPCS | Performed by: STUDENT IN AN ORGANIZED HEALTH CARE EDUCATION/TRAINING PROGRAM

## 2022-04-27 PROCEDURE — 99214 OFFICE O/P EST MOD 30 MIN: CPT | Performed by: STUDENT IN AN ORGANIZED HEALTH CARE EDUCATION/TRAINING PROGRAM

## 2022-04-27 PROCEDURE — 1036F TOBACCO NON-USER: CPT | Performed by: STUDENT IN AN ORGANIZED HEALTH CARE EDUCATION/TRAINING PROGRAM

## 2022-04-27 PROCEDURE — G8427 DOCREV CUR MEDS BY ELIG CLIN: HCPCS | Performed by: STUDENT IN AN ORGANIZED HEALTH CARE EDUCATION/TRAINING PROGRAM

## 2022-04-27 RX ORDER — CEPHALEXIN 500 MG/1
500 CAPSULE ORAL 4 TIMES DAILY
COMMUNITY
End: 2022-06-23

## 2022-04-27 RX ORDER — METOPROLOL SUCCINATE 25 MG/1
37.5 TABLET, EXTENDED RELEASE ORAL DAILY
Qty: 90 TABLET | Refills: 2 | Status: SHIPPED | OUTPATIENT
Start: 2022-04-27 | End: 2022-06-06 | Stop reason: SDUPTHER

## 2022-05-09 ENCOUNTER — HOSPITAL ENCOUNTER (OUTPATIENT)
Dept: WOUND CARE | Age: 52
Discharge: HOME | End: 2022-05-09

## 2022-05-09 ENCOUNTER — TELEPHONE (OUTPATIENT)
Dept: WOUND CARE | Age: 52
End: 2022-05-09

## 2022-05-09 DIAGNOSIS — S91.105A OPEN WOUND OF SECOND TOE, LEFT, INITIAL ENCOUNTER: ICD-10-CM

## 2022-06-06 RX ORDER — METOPROLOL SUCCINATE 25 MG/1
37.5 TABLET, EXTENDED RELEASE ORAL DAILY
Qty: 135 TABLET | Refills: 1 | Status: SHIPPED | OUTPATIENT
Start: 2022-06-06

## 2022-06-16 ENCOUNTER — TELEPHONE (OUTPATIENT)
Dept: CARDIOLOGY CLINIC | Age: 52
End: 2022-06-16

## 2022-06-16 NOTE — TELEPHONE ENCOUNTER
Pt. Called to confirm that the rx going to [facility] is 37.5mg. Patient also said she has two tablets left. Medication refill has been requested, has this been sent to pharm? Please inform pt.

## 2022-06-22 NOTE — PROGRESS NOTES
[facility] PROFESSIONAL SERVICES  [facility]   [address] [address]   Dept: [phone]   Dept Fax: [phone]   Loc: [phone]      Chief Complaint   Patient presents with    Follow-up    Shortness of Breath     Cardiologist:  Dr. Hayden Aguila  55 yo male presents for breathing difficulties. Recent hx of LHC with PCI to Lcx, OM1, RPDA after abnormal stress test. Hx of asthma, Bipolar, CAD, DM, DVT/PE, Factor 5, GERD, HTN, TIA, pulm HTN. Still with chest pressure and breathing difficulties. Palpitations happen more often when sleeping. Similar to what she is feeling before but chest pressure and not being able to catch her breath are somewhat new in nature.      General:   No fever, no chills, no weight loss, + fatigue  Pulmonary:    No dyspnea, no wheezing  Cardiac:    + recent chest pain   GI:     No nausea or vomiting, no abdominal pain  Neuro:      occ dizziness or light headedness  Musculoskeletal:  No recent active issues  Extremities:   No edema      Past Medical History:   Diagnosis Date    Asthma     Bipolar 1 disorder ([address]     Blood circulation, collateral     CAD (coronary artery disease)     Curvature of spine     Diabetes mellitus ([address]     [facility]     Factor 5 Leiden mutation, heterozygous ([address]     GERD (gastroesophageal reflux disease)     HTN, goal below 130/80     Hx of blood clots     PE x3 and DVT x3    Hx-TIA (transient ischemic attack)     Hyperlipidemia     PE (pulmonary embolism)     RA (rheumatoid arthritis) [facility]     Unspecified cerebral artery occlusion with cerebral infarction        Allergies   Allergen Reactions    Codeine Hives     + Nausea vomiting    Demerol      vomiting    Ultram [Tramadol Hcl] Other (See Comments)     Dizziness     Percocet [Oxycodone-Acetaminophen] Nausea And Vomiting    Toradol [Ketorolac Tromethamine] Rash       Current Outpatient Medications   Medication Sig Dispense Refill    metoprolol succinate (TOPROL XL) 25 MG extended release tablet Take 1.5 tablets by mouth daily 135 tablet 1    atorvastatin (LIPITOR) 80 MG tablet Take 1 tablet by mouth at bedtime 90 tablet 2    clopidogrel (PLAVIX) 75 MG tablet Take 1 tablet by mouth daily 90 tablet 2    Blood Pressure KIT 1 kit by Does not apply route as needed (PRN) 1 kit 0    nitroGLYCERIN (NITROSTAT) 0.4 MG SL tablet up to max of 3 total doses. If no relief after 1 dose, call 911. 25 tablet 3    XARELTO 20 MG TABS tablet take 1 tablet by mouth once daily      JANUVIA 100 MG tablet take 1 tablet by mouth once daily      gabapentin (NEURONTIN) 600 MG tablet take 1 tablet by mouth twice a day      acetaminophen (TYLENOL) 325 MG tablet Take 2 tablets by mouth every 4 hours as needed for Pain 120 tablet 3    blood glucose test strips (TRUETRACK TEST) strip 1 each by In Vitro route 2 times daily As needed. 100 each 0     No current facility-administered medications for this visit. Social History     Socioeconomic History    Marital status:      Spouse name: None    Number of children: 3    Years of education: None    Highest education level: None   Occupational History    None   Tobacco Use    Smoking status: Former Smoker     Packs/day: 0.50     Types: Cigarettes     Quit date: 2005     Years since quittin.9    Smokeless tobacco: Never Used   Vaping Use    Vaping Use: Never used   Substance and Sexual Activity    Alcohol use: No    Drug use: No    Sexual activity: None   Other Topics Concern    None   Social History Narrative    None     Social Determinants of Health     Financial Resource Strain:     Difficulty of Paying Living Expenses: Not on file   Food Insecurity:     Worried About Running Out of Food in the Last Year: Not on file    Nathalia of Food in the Last Year: Not on file   Transportation Needs:     Lack of Transportation (Medical):  Not on file    Lack of Transportation (Non-Medical): Not on file   Physical Activity:     Days of Exercise per Week: Not on file    Minutes of Exercise per Session: Not on file   Stress:     Feeling of Stress : Not on file   Social Connections:     Frequency of Communication with Friends and Family: Not on file    Frequency of Social Gatherings with Friends and Family: Not on file    Attends [employer] Services: Not on file    Active Member of Clubs or Organizations: Not on file    Attends Club or Organization Meetings: Not on file    Marital Status: Not on file   Intimate Partner Violence:     Fear of Current or Ex-Partner: Not on file    Emotionally Abused: Not on file    Physically Abused: Not on file    Sexually Abused: Not on file   Housing Stability:     Unable to Pay for Housing in the Last Year: Not on file    Number of [place] in the Last Year: Not on file    Unstable Housing in the Last Year: Not on file       Family History   Problem Relation Age of Onset    COPD Mother     Other Mother     Cancer Mother     High Blood Pressure Father     Heart Disease Father     Mental Illness Sister     Mental Illness Brother     Mental Illness Sister     Mental Illness Brother        Blood pressure 119/64, pulse 65, height 5' 8\" (1.727 m), weight (!) 309 lb (140.2 kg), last menstrual period 06/21/2018, not currently breastfeeding. General:   Well developed, well nourished  Lungs:   Clear to auscultation  Heart:    Normal S1 S2, No murmur, rubs, or gallops  Abdomen:   Soft, non tender, no organomegalies, positive bowel sounds  Extremities:   No edema, no cyanosis, good peripheral pulses  Neurological:   Awake, alert, oriented. No obvious focal deficits  Musculoskeletal:  No obvious deformities    EKG:     ACCESS:  Right radial artery access. Vasc Band was applied. Hemostasis  was achieved.     IMPRESSION:  1. Severe coronary artery disease with details as listed above.   2.  Status post successful PCI and stenting of mid circumflex artery and  OM1. Kissing balloon angioplasty was utilized. 3.  Successful PCI and stenting of the right posterior descending  artery.     RECOMMENDATIONS:  Admit for observation on telemetry. Access site care. Aspirin and Plavix. Stop Plavix after one month. May resume Xarelto  tomorrow. High intensity statin therapy. Optimize medical therapy for  CAD. Outpatient followup in office.  Jasvir Jiménez MD     D: 03/14/2022     Diagnosis Orders   1. Angina of effort [facility])  Stress test, lexiscan   2. CAD S/P percutaneous coronary angioplasty     3. HTN, goal below 130/80         Orders Placed This Encounter   Procedures    Stress test, lexiscan     Standing Status:   Future     Standing Expiration Date:   6/23/2023     Order Specific Question:   Reason for Exam?     Answer:   Chest pain     Order Specific Question:   Reason for Exam?     Answer:   Shortness of breath       Assessment/Plan:   CAD s/p PCI to LCx, OM1, RDPA- atypical chest pressure and breathing difficulties as above. Will check stress test to r/o any continuing ischemic issues. Consider f/u with pulm if stress test negative. Continue GDMT with toprol/statin/asa/plavix/nitro. HTN- well controlled. Recheck was 126/80. Okay to increase BB. HLD- recheck lipids at next visit. Placed on lipitor 80 mg daily. Stress test.   pulm referral with PFT if stress test is normal.     Disposition:   F/u Dr Hector Macias in 4 months.    Continue Dr Fernando Millard current treatment plan  Follow up with Dr Hector Macias as scheduled or sooner if needed

## 2022-06-23 ENCOUNTER — OFFICE VISIT (OUTPATIENT)
Dept: CARDIOLOGY CLINIC | Age: 52
End: 2022-06-23
Payer: COMMERCIAL

## 2022-06-23 VITALS
BODY MASS INDEX: 44.41 KG/M2 | SYSTOLIC BLOOD PRESSURE: 119 MMHG | HEIGHT: 68 IN | HEART RATE: 65 BPM | WEIGHT: 293 LBS | DIASTOLIC BLOOD PRESSURE: 64 MMHG

## 2022-06-23 DIAGNOSIS — Z98.61 CAD S/P PERCUTANEOUS CORONARY ANGIOPLASTY: ICD-10-CM

## 2022-06-23 DIAGNOSIS — I20.8 ANGINA OF EFFORT (HCC): Primary | ICD-10-CM

## 2022-06-23 DIAGNOSIS — I25.10 CAD S/P PERCUTANEOUS CORONARY ANGIOPLASTY: ICD-10-CM

## 2022-06-23 DIAGNOSIS — I10 HTN, GOAL BELOW 130/80: ICD-10-CM

## 2022-06-23 PROCEDURE — 1036F TOBACCO NON-USER: CPT | Performed by: STUDENT IN AN ORGANIZED HEALTH CARE EDUCATION/TRAINING PROGRAM

## 2022-06-23 PROCEDURE — G8427 DOCREV CUR MEDS BY ELIG CLIN: HCPCS | Performed by: STUDENT IN AN ORGANIZED HEALTH CARE EDUCATION/TRAINING PROGRAM

## 2022-06-23 PROCEDURE — 99214 OFFICE O/P EST MOD 30 MIN: CPT | Performed by: STUDENT IN AN ORGANIZED HEALTH CARE EDUCATION/TRAINING PROGRAM

## 2022-06-23 PROCEDURE — G8417 CALC BMI ABV UP PARAM F/U: HCPCS | Performed by: STUDENT IN AN ORGANIZED HEALTH CARE EDUCATION/TRAINING PROGRAM

## 2022-08-25 ENCOUNTER — APPOINTMENT (OUTPATIENT)
Dept: ULTRASOUND IMAGING | Age: 52
DRG: 517 | End: 2022-08-25
Payer: COMMERCIAL

## 2022-08-25 ENCOUNTER — APPOINTMENT (OUTPATIENT)
Dept: INTERVENTIONAL RADIOLOGY/VASCULAR | Age: 52
DRG: 517 | End: 2022-08-25
Payer: COMMERCIAL

## 2022-08-25 ENCOUNTER — APPOINTMENT (OUTPATIENT)
Dept: GENERAL RADIOLOGY | Age: 52
DRG: 517 | End: 2022-08-25
Payer: COMMERCIAL

## 2022-08-25 ENCOUNTER — HOSPITAL ENCOUNTER (INPATIENT)
Age: 52
LOS: 4 days | Discharge: HOME | DRG: 517 | End: 2022-08-29
Admitting: INTERNAL MEDICINE
Payer: COMMERCIAL

## 2022-08-25 DIAGNOSIS — D62 ACUTE BLOOD LOSS ANEMIA: ICD-10-CM

## 2022-08-25 DIAGNOSIS — N93.8 DUB (DYSFUNCTIONAL UTERINE BLEEDING): Primary | ICD-10-CM

## 2022-08-25 DIAGNOSIS — R93.89 ENDOMETRIAL THICKENING ON ULTRASOUND: ICD-10-CM

## 2022-08-25 PROBLEM — R55 SYNCOPE AND COLLAPSE: Status: ACTIVE | Noted: 2022-08-25

## 2022-08-25 LAB
ABO: NORMAL
ALBUMIN SERPL-MCNC: 3.9 G/DL (ref 3.5–5.1)
ALP BLD-CCNC: 63 U/L (ref 38–126)
ALT SERPL-CCNC: 10 U/L (ref 11–66)
ANION GAP SERPL CALCULATED.3IONS-SCNC: 14 MEQ/L (ref 8–16)
ANTIBODY SCREEN: NORMAL
AST SERPL-CCNC: 12 U/L (ref 5–40)
BASOPHILS # BLD: 0.6 %
BASOPHILS ABSOLUTE: 0 THOU/MM3 (ref 0–0.1)
BILIRUB SERPL-MCNC: 0.3 MG/DL (ref 0.3–1.2)
BILIRUBIN DIRECT: < 0.2 MG/DL (ref 0–0.3)
BUN BLDV-MCNC: 8 MG/DL (ref 7–22)
CALCIUM SERPL-MCNC: 8.9 MG/DL (ref 8.5–10.5)
CHLORIDE BLD-SCNC: 103 MEQ/L (ref 98–111)
CO2: 21 MEQ/L (ref 23–33)
CREAT SERPL-MCNC: 0.5 MG/DL (ref 0.4–1.2)
EKG ATRIAL RATE: 88 BPM
EKG P AXIS: 22 DEGREES
EKG P-R INTERVAL: 170 MS
EKG Q-T INTERVAL: 384 MS
EKG QRS DURATION: 98 MS
EKG QTC CALCULATION (BAZETT): 464 MS
EKG R AXIS: 16 DEGREES
EKG T AXIS: 48 DEGREES
EKG VENTRICULAR RATE: 88 BPM
EOSINOPHIL # BLD: 0.4 %
EOSINOPHILS ABSOLUTE: 0 THOU/MM3 (ref 0–0.4)
ERYTHROCYTE [DISTWIDTH] IN BLOOD BY AUTOMATED COUNT: 14.6 % (ref 11.5–14.5)
ERYTHROCYTE [DISTWIDTH] IN BLOOD BY AUTOMATED COUNT: 48.5 FL (ref 35–45)
GFR SERPL CREATININE-BSD FRML MDRD: > 90 ML/MIN/1.73M2
GLUCOSE BLD-MCNC: 268 MG/DL (ref 70–108)
GLUCOSE BLD-MCNC: 270 MG/DL (ref 70–108)
HCT VFR BLD CALC: 24.2 % (ref 37–47)
HCT VFR BLD CALC: 25.7 % (ref 37–47)
HCT VFR BLD CALC: 27 % (ref 37–47)
HEMOGLOBIN: 7.9 GM/DL (ref 12–16)
HEMOGLOBIN: 8.4 GM/DL (ref 12–16)
HEMOGLOBIN: 8.5 GM/DL (ref 12–16)
IMMATURE GRANS (ABS): 0.04 THOU/MM3 (ref 0–0.07)
IMMATURE GRANULOCYTES: 0.6 %
LIPASE: 26.2 U/L (ref 5.6–51.3)
LYMPHOCYTES # BLD: 10.5 %
LYMPHOCYTES ABSOLUTE: 0.7 THOU/MM3 (ref 1–4.8)
MCH RBC QN AUTO: 28.9 PG (ref 26–33)
MCHC RBC AUTO-ENTMCNC: 31.5 GM/DL (ref 32.2–35.5)
MCV RBC AUTO: 91.8 FL (ref 81–99)
MONOCYTES # BLD: 4.1 %
MONOCYTES ABSOLUTE: 0.3 THOU/MM3 (ref 0.4–1.3)
NUCLEATED RED BLOOD CELLS: 0 /100 WBC
OSMOLALITY CALCULATION: 283.4 MOSMOL/KG (ref 275–300)
PLATELET # BLD: 225 THOU/MM3 (ref 130–400)
PMV BLD AUTO: 9.8 FL (ref 9.4–12.4)
POTASSIUM REFLEX MAGNESIUM: 3.9 MEQ/L (ref 3.5–5.2)
PRO-BNP: 31.2 PG/ML (ref 0–450)
RBC # BLD: 2.94 MILL/MM3 (ref 4.2–5.4)
RH FACTOR: NORMAL
SEG NEUTROPHILS: 83.8 %
SEGMENTED NEUTROPHILS ABSOLUTE COUNT: 5.9 THOU/MM3 (ref 1.8–7.7)
SODIUM BLD-SCNC: 138 MEQ/L (ref 135–145)
TOTAL PROTEIN: 6 G/DL (ref 6.1–8)
TROPONIN T: < 0.01 NG/ML
WBC # BLD: 7 THOU/MM3 (ref 4.8–10.8)

## 2022-08-25 PROCEDURE — 80048 BASIC METABOLIC PNL TOTAL CA: CPT

## 2022-08-25 PROCEDURE — 84484 ASSAY OF TROPONIN QUANT: CPT

## 2022-08-25 PROCEDURE — 83690 ASSAY OF LIPASE: CPT

## 2022-08-25 PROCEDURE — 86900 BLOOD TYPING SEROLOGIC ABO: CPT

## 2022-08-25 PROCEDURE — 6370000000 HC RX 637 (ALT 250 FOR IP)

## 2022-08-25 PROCEDURE — 85025 COMPLETE CBC W/AUTO DIFF WBC: CPT

## 2022-08-25 PROCEDURE — 93010 ELECTROCARDIOGRAM REPORT: CPT | Performed by: INTERNAL MEDICINE

## 2022-08-25 PROCEDURE — 83880 ASSAY OF NATRIURETIC PEPTIDE: CPT

## 2022-08-25 PROCEDURE — 76830 TRANSVAGINAL US NON-OB: CPT

## 2022-08-25 PROCEDURE — 99285 EMERGENCY DEPT VISIT HI MDM: CPT

## 2022-08-25 PROCEDURE — 86901 BLOOD TYPING SEROLOGIC RH(D): CPT

## 2022-08-25 PROCEDURE — 36415 COLL VENOUS BLD VENIPUNCTURE: CPT

## 2022-08-25 PROCEDURE — 85014 HEMATOCRIT: CPT

## 2022-08-25 PROCEDURE — 82948 REAGENT STRIP/BLOOD GLUCOSE: CPT

## 2022-08-25 PROCEDURE — 93005 ELECTROCARDIOGRAM TRACING: CPT | Performed by: NURSE PRACTITIONER

## 2022-08-25 PROCEDURE — 73030 X-RAY EXAM OF SHOULDER: CPT

## 2022-08-25 PROCEDURE — P9016 RBC LEUKOCYTES REDUCED: HCPCS

## 2022-08-25 PROCEDURE — 86850 RBC ANTIBODY SCREEN: CPT

## 2022-08-25 PROCEDURE — 71045 X-RAY EXAM CHEST 1 VIEW: CPT

## 2022-08-25 PROCEDURE — 2580000003 HC RX 258: Performed by: NURSE PRACTITIONER

## 2022-08-25 PROCEDURE — 2580000003 HC RX 258

## 2022-08-25 PROCEDURE — 99223 1ST HOSP IP/OBS HIGH 75: CPT

## 2022-08-25 PROCEDURE — 80076 HEPATIC FUNCTION PANEL: CPT

## 2022-08-25 PROCEDURE — 6370000000 HC RX 637 (ALT 250 FOR IP): Performed by: NURSE PRACTITIONER

## 2022-08-25 PROCEDURE — 1200000003 HC TELEMETRY R&B

## 2022-08-25 PROCEDURE — 93971 EXTREMITY STUDY: CPT

## 2022-08-25 PROCEDURE — 85018 HEMOGLOBIN: CPT

## 2022-08-25 PROCEDURE — 86923 COMPATIBILITY TEST ELECTRIC: CPT

## 2022-08-25 RX ORDER — ATORVASTATIN CALCIUM 80 MG/1
80 TABLET, FILM COATED ORAL NIGHTLY
Status: DISCONTINUED | OUTPATIENT
Start: 2022-08-25 | End: 2022-08-29 | Stop reason: HOSPADM

## 2022-08-25 RX ORDER — INSULIN LISPRO 100 [IU]/ML
0-4 INJECTION, SOLUTION INTRAVENOUS; SUBCUTANEOUS NIGHTLY
Status: DISCONTINUED | OUTPATIENT
Start: 2022-08-25 | End: 2022-08-25 | Stop reason: SDUPTHER

## 2022-08-25 RX ORDER — SODIUM CHLORIDE, SODIUM LACTATE, POTASSIUM CHLORIDE, CALCIUM CHLORIDE 600; 310; 30; 20 MG/100ML; MG/100ML; MG/100ML; MG/100ML
INJECTION, SOLUTION INTRAVENOUS CONTINUOUS
Status: DISCONTINUED | OUTPATIENT
Start: 2022-08-25 | End: 2022-08-26

## 2022-08-25 RX ORDER — GABAPENTIN 600 MG/1
600 TABLET ORAL 2 TIMES DAILY
Status: DISCONTINUED | OUTPATIENT
Start: 2022-08-26 | End: 2022-08-29

## 2022-08-25 RX ORDER — POLYETHYLENE GLYCOL 3350 17 G/17G
17 POWDER, FOR SOLUTION ORAL DAILY PRN
Status: DISCONTINUED | OUTPATIENT
Start: 2022-08-25 | End: 2022-08-29 | Stop reason: HOSPADM

## 2022-08-25 RX ORDER — POTASSIUM CHLORIDE 20 MEQ/1
40 TABLET, EXTENDED RELEASE ORAL PRN
Status: DISCONTINUED | OUTPATIENT
Start: 2022-08-25 | End: 2022-08-26

## 2022-08-25 RX ORDER — ONDANSETRON 4 MG/1
4 TABLET, ORALLY DISINTEGRATING ORAL EVERY 8 HOURS PRN
Status: DISCONTINUED | OUTPATIENT
Start: 2022-08-25 | End: 2022-08-29 | Stop reason: HOSPADM

## 2022-08-25 RX ORDER — SODIUM CHLORIDE 0.9 % (FLUSH) 0.9 %
10 SYRINGE (ML) INJECTION PRN
Status: DISCONTINUED | OUTPATIENT
Start: 2022-08-25 | End: 2022-08-29 | Stop reason: HOSPADM

## 2022-08-25 RX ORDER — CLOPIDOGREL BISULFATE 75 MG/1
75 TABLET ORAL DAILY
Status: DISCONTINUED | OUTPATIENT
Start: 2022-08-26 | End: 2022-08-29 | Stop reason: HOSPADM

## 2022-08-25 RX ORDER — POTASSIUM CHLORIDE 7.45 MG/ML
10 INJECTION INTRAVENOUS PRN
Status: DISCONTINUED | OUTPATIENT
Start: 2022-08-25 | End: 2022-08-26

## 2022-08-25 RX ORDER — DEXTROSE MONOHYDRATE 100 MG/ML
INJECTION, SOLUTION INTRAVENOUS CONTINUOUS PRN
Status: DISCONTINUED | OUTPATIENT
Start: 2022-08-25 | End: 2022-08-29 | Stop reason: HOSPADM

## 2022-08-25 RX ORDER — METOPROLOL SUCCINATE 25 MG/1
37.5 TABLET, EXTENDED RELEASE ORAL DAILY
Status: DISCONTINUED | OUTPATIENT
Start: 2022-08-26 | End: 2022-08-29 | Stop reason: HOSPADM

## 2022-08-25 RX ORDER — SODIUM CHLORIDE, SODIUM LACTATE, POTASSIUM CHLORIDE, AND CALCIUM CHLORIDE .6; .31; .03; .02 G/100ML; G/100ML; G/100ML; G/100ML
1000 INJECTION, SOLUTION INTRAVENOUS ONCE
Status: COMPLETED | OUTPATIENT
Start: 2022-08-25 | End: 2022-08-25

## 2022-08-25 RX ORDER — SODIUM CHLORIDE 9 MG/ML
INJECTION, SOLUTION INTRAVENOUS PRN
Status: DISCONTINUED | OUTPATIENT
Start: 2022-08-25 | End: 2022-08-29 | Stop reason: HOSPADM

## 2022-08-25 RX ORDER — MEGESTROL ACETATE 40 MG/1
40 TABLET ORAL 2 TIMES DAILY
Qty: 30 TABLET | Refills: 0 | Status: SHIPPED | OUTPATIENT
Start: 2022-08-25 | End: 2022-08-26 | Stop reason: SDUPTHER

## 2022-08-25 RX ORDER — ONDANSETRON 2 MG/ML
4 INJECTION INTRAMUSCULAR; INTRAVENOUS EVERY 6 HOURS PRN
Status: DISCONTINUED | OUTPATIENT
Start: 2022-08-25 | End: 2022-08-29 | Stop reason: HOSPADM

## 2022-08-25 RX ORDER — ACETAMINOPHEN 325 MG/1
650 TABLET ORAL EVERY 6 HOURS PRN
Status: DISCONTINUED | OUTPATIENT
Start: 2022-08-25 | End: 2022-08-29 | Stop reason: HOSPADM

## 2022-08-25 RX ORDER — MAGNESIUM SULFATE IN WATER 40 MG/ML
2000 INJECTION, SOLUTION INTRAVENOUS PRN
Status: DISCONTINUED | OUTPATIENT
Start: 2022-08-25 | End: 2022-08-29 | Stop reason: HOSPADM

## 2022-08-25 RX ORDER — INSULIN LISPRO 100 [IU]/ML
0-8 INJECTION, SOLUTION INTRAVENOUS; SUBCUTANEOUS
Status: DISCONTINUED | OUTPATIENT
Start: 2022-08-25 | End: 2022-08-29 | Stop reason: HOSPADM

## 2022-08-25 RX ORDER — SODIUM CHLORIDE 0.9 % (FLUSH) 0.9 %
10 SYRINGE (ML) INJECTION EVERY 12 HOURS SCHEDULED
Status: DISCONTINUED | OUTPATIENT
Start: 2022-08-25 | End: 2022-08-29 | Stop reason: HOSPADM

## 2022-08-25 RX ORDER — MEGESTROL ACETATE 40 MG/1
40 TABLET ORAL ONCE
Status: COMPLETED | OUTPATIENT
Start: 2022-08-25 | End: 2022-08-25

## 2022-08-25 RX ADMIN — MEGESTROL ACETATE 40 MG: 40 TABLET ORAL at 15:44

## 2022-08-25 RX ADMIN — SODIUM CHLORIDE, POTASSIUM CHLORIDE, SODIUM LACTATE AND CALCIUM CHLORIDE 1000 ML: 600; 310; 30; 20 INJECTION, SOLUTION INTRAVENOUS at 11:06

## 2022-08-25 RX ADMIN — SODIUM CHLORIDE, PRESERVATIVE FREE 10 ML: 5 INJECTION INTRAVENOUS at 21:18

## 2022-08-25 RX ADMIN — SODIUM CHLORIDE, POTASSIUM CHLORIDE, SODIUM LACTATE AND CALCIUM CHLORIDE: 600; 310; 30; 20 INJECTION, SOLUTION INTRAVENOUS at 21:18

## 2022-08-25 RX ADMIN — ACETAMINOPHEN 650 MG: 325 TABLET ORAL at 22:26

## 2022-08-25 RX ADMIN — INSULIN LISPRO 4 UNITS: 100 INJECTION, SOLUTION INTRAVENOUS; SUBCUTANEOUS at 22:22

## 2022-08-25 ASSESSMENT — ENCOUNTER SYMPTOMS
SHORTNESS OF BREATH: 1
WHEEZING: 0
BLOOD IN STOOL: 0
COUGH: 0
DIARRHEA: 0
SORE THROAT: 0
VOMITING: 0
NAUSEA: 0
SHORTNESS OF BREATH: 0
EYE REDNESS: 0
BACK PAIN: 0
BACK PAIN: 1
CONSTIPATION: 0
ABDOMINAL PAIN: 0
EYE PAIN: 0
COLOR CHANGE: 1
ABDOMINAL DISTENTION: 0
CHEST TIGHTNESS: 1
RHINORRHEA: 0
CHEST TIGHTNESS: 0

## 2022-08-25 ASSESSMENT — PAIN DESCRIPTION - ORIENTATION: ORIENTATION: LEFT;RIGHT

## 2022-08-25 ASSESSMENT — LIFESTYLE VARIABLES
HOW MANY STANDARD DRINKS CONTAINING ALCOHOL DO YOU HAVE ON A TYPICAL DAY: PATIENT DOES NOT DRINK
HOW OFTEN DO YOU HAVE A DRINK CONTAINING ALCOHOL: NEVER

## 2022-08-25 ASSESSMENT — PAIN DESCRIPTION - LOCATION: LOCATION: ARM;LEG;HIP

## 2022-08-25 ASSESSMENT — PAIN SCALES - GENERAL: PAINLEVEL_OUTOF10: 6

## 2022-08-25 ASSESSMENT — PAIN - FUNCTIONAL ASSESSMENT: PAIN_FUNCTIONAL_ASSESSMENT: 0-10

## 2022-08-25 ASSESSMENT — PAIN DESCRIPTION - DESCRIPTORS: DESCRIPTORS: ACHING

## 2022-08-25 NOTE — ED NOTES
Pt tolerating transfusion well, no reaction noted  Pt denies any new symptoms  Accompanied for 15 minutes by the nurse  Daughter at bedside  Pt resting in bed watching TV     Josep Stark RN  08/25/22 [id number]

## 2022-08-25 NOTE — PROGRESS NOTES
Pharmacy Medication History Note      List of current medications patient is taking is complete. Source of information: Patient, external fill history    Changes made to medication list:    Other notes (ex. Recent course of antibiotics, Coumadin dosing):  Patient confirmed they are using atorvastatin 80 mg. External fill history does have recent fills for atorvastatin 80 mg QD and atorvastatin 40 mg QD. Patient takes Januvia 100 mg QD at suppertime. Patient has not used gabapentin 600 mg BID in 1 week, but confirms she does take it. Denies use of other OTC or herbal medications.       Allergies reviewed      Electronically signed by Golden Roberto on 8/25/2022 at 6:52 PM

## 2022-08-25 NOTE — DISCHARGE INSTRUCTIONS
megestrol  Pronunciation: barb CHRISTIAN martinez  Brand: Galindo SAL  What is the most important information I should know about megestrol? Do not use if you are pregnant. Use effective birth control, and tell your doctor if you become pregnantduring treatment. What is megestrol? Megestrol is used to treat loss of appetite and wasting syndrome in people withacquired immunodeficiency syndrome (AIDS). Megestrol is not for use in preventing weight loss or wasting syndrome. Megestrol may also be used for purposes not listed in this medication guide. What should I discuss with my healthcare provider before taking megestrol? You should not use megestrol if you are allergic to it, or if you are pregnant. Tell your doctor if you have ever had:  diabetes;  an adrenal gland disorder; or  a stroke or blood clot. You may need to have a negative pregnancy test before starting this treatment. Do not use megestrol if you are pregnant. Use effective birth control to prevent pregnancy while you are using thismedicine. Tell your doctor if you become pregnant. In animal studies, megestrol caused low birth weight and other problems when used during pregnancy. However, it is not known whether these effects wouldoccur in humans. Ask your doctor about your risk. Women with HIV or AIDS should not breastfeed a baby. Even if your baby is bornwithout HIV, the virus may be passed to the baby in your breast milk. How should I take megestrol? Follow all directions on your prescription label and read all medication guidesor instruction sheets. Use the medicine exactly as directed. Shake the oral suspension (liquid) before you measure a dose. Use the dosing syringe provided, or use amedicine dose-measuring device (not a kitchen spoon). Your dose needs may change if you switch to a different brand, strength, or form of this medicine. Avoid medication errors by using only the form and strength your doctor prescribes.   Your dosage needs may also change if you have surgery, are ill, are under stress, or have an infection. Do not change your medication dose or schedule without your doctor's advice. Store at room temperature away from moisture and heat. When you stop using megestrol after long-term use, you may have withdrawal symptoms such as nausea, vomiting, dizziness, orweakness. Ask your doctor how to safely stop using this medicine. What happens if I miss a dose? Take the medicine as soon as you can, but skip the missed dose if it is almost time for your next dose. Do not take two doses at one time. What happens if I overdose? Seek emergency medical attention or call the Poison Help line at 1-999.538.2799. What should I avoid while taking megestrol? Follow your doctor's instructions about any restrictions on food, beverages, oractivity. What are the possible side effects of megestrol? Get emergency medical help if you have signs of an allergic reaction: hives; difficult breathing; swelling of your face, lips, tongue, or throat. Call your doctor at once if you have any of these side effects during or after your treatment with megestrol:  chest pain, sudden cough, wheezing, rapid breathing, coughing up blood;  swelling, warmth, or redness in an arm or leg;  increased thirst, increased urination, dry mouth, fruity breath odor;  weight gain (especially in your waist and upper back);  muscle weakness, tiredness, feeling light-headed;  skin discoloration, thinning skin, increased body hair; or  mood changes, menstrual changes, sexual changes. Common side effects may include:  nausea, gas, diarrhea;  increased blood pressure;  impotence, sexual problems;  rash; or  weakness. This is not a complete list of side effects and others may occur. Call your doctor for medical advice about side effects. You may report side effects toFDA at 3-708-XUO-7788. What other drugs will affect megestrol?   Tell your doctor about all your other medicines, especially:  a blood thinner --warfarin, Coumadin, Jantoven. This list is not complete. Other drugs may affect megestrol, including prescription and over-the-counter medicines, vitamins, and herbal products. Notall possible drug interactions are listed here. Where can I get more information? Your pharmacist can provide more information about megestrol. Remember, keep this and all other medicines out of the reach of children, never share your medicines with others, and use this medication only for the indication prescribed. Every effort has been made to ensure that the information provided by [address] is accurate, up-to-date, and complete, but no guarantee is made to that effect. Drug information contained herein may be time sensitive. [facility] information has been compiled for use by healthcare practitioners and consumers in the [place] and therefore [facility] does not warrant that uses outside of the [place] are appropriate, unless specifically indicated otherwise. [facility]'s drug information does not endorse drugs, diagnose patients or recommend therapy. [facility] drug information is an informational resource designed to assist licensed healthcare practitioners in caring for their patients and/or to serve consumers viewing this service as a supplement to, and not a substitute for, the expertise, skill, knowledge and judgment of healthcare practitioners. The absence of a warning for a given drug or drug combination in no way should be construed to indicate that the drug or drug combination is safe, effective or appropriate for any given patient. [facility] does not assume any responsibility for any aspect of healthcare administered with the aid of information [facility] provides. The information contained herein is not intended to cover all possible uses, directions, precautions, warnings, drug interactions, allergic reactions, or adverse effects.  If you have questions about the drugs you are taking, check with yourdoctor, nurse or pharmacist.  Copyright 5199-7231 [address]: 7.01. Revision date: 8/30/2019. Care instructions adapted under license by [facility] ([facility]). If you have questions about a medical condition or this instruction, always ask your healthcare professional. [address] any warranty or liability for your use of this information.

## 2022-08-25 NOTE — ED NOTES
Pt tolerating transfusion well  Resting in bed, daughter at bedside   VSS,     Yoel Gray, RN  08/25/22 0370

## 2022-08-25 NOTE — ED NOTES
Patient resting in bed. Respirations easy and unlabored. No distress noted. Call light within reach.        Nana Bernheim, RN  08/25/22 0144

## 2022-08-25 NOTE — ED PROVIDER NOTES
[facility] [address] [address] EMERGENCY DEPT  EMERGENCY MEDICINE     Pt Name: Gissel Granger  MRN: [id number]  [place] 1970  Date of evaluation: 8/25/2022  PCP:    SHANNON Connors CNP  Provider: SHANNON Chiang CNP    CHIEF COMPLAINT       Chief Complaint   Patient presents with    Vaginal Bleeding       HISTORY OF PRESENT ILLNESS    Gissel Granger is a 54 y.o. female patient that presents to ER with vaginal bleeding and syncope. Patient states that she has been on her period for 15 days. She reports LMP as 6 months ago. She also takes Plavix and Xarelto for history of 3 DVTs and PEs. She states she is wearing 2 pads at a time and has been bleeding through them within an hour with large clots being passed. On Tuesday, she states she had a syncopal episode at the gas station. During this episode she lost consciousness and woke up on the ground. She states she fell right on her upper arm and has pain and decreased range of motion and weakness since without numbness or tingling. She reports lightheadedness upon standing, heart racing, weakness, fatigue, and chest pain since the syncopal episode. She thinks she scraped her head but did not hit her head. She denies headache, changes to vision, memory loss, chest pain, fevers/chills, abdominal cramping,     Triage notes and Nursing notes were reviewed by myself. Any discrepancies are addressed above.     PAST MEDICAL HISTORY     Past Medical History:   Diagnosis Date    Asthma     Bipolar 1 disorder ([facility] [address]     Blood circulation, collateral     CAD (coronary artery disease)     Curvature of spine     Diabetes mellitus (HCC)     DVT (deep venous thrombosis) (HCC)     Factor 5 Leiden mutation, heterozygous (HCC)     GERD (gastroesophageal reflux disease)     HTN, goal below 130/80     Hx of blood clots     PE x3 and DVT x3    Hx-TIA (transient ischemic attack)     Hyperlipidemia     PE (pulmonary embolism)     RA (rheumatoid arthritis) ([address]     Unspecified cerebral artery occlusion with cerebral infarction        SURGICAL HISTORY       Past Surgical History:   Procedure Laterality Date    CARDIAC CATHETERIZATION  feb 2015    Heart caths    CHOLECYSTECTOMY  1992    CORONARY ANGIOPLASTY WITH STENT PLACEMENT      FOOT DEBRIDEMENT Right 9/30/2019    FOOT DEBRIDEMENT INCISION AND DRAINAGE performed by Laron Choudhary DPM at [place] [address] ARTHROSCOPY  2007&2010    LIPOMA RESECTION      TONSILLECTOMY      TUBAL LIGATION  2003    TYMPANOSTOMY TUBE PLACEMENT         CURRENT MEDICATIONS       Previous Medications    ACETAMINOPHEN (TYLENOL) 325 MG TABLET    Take 2 tablets by mouth every 4 hours as needed for Pain    ATORVASTATIN (LIPITOR) 80 MG TABLET    Take 1 tablet by mouth at bedtime    BLOOD GLUCOSE TEST STRIPS (TRUETRACK TEST) STRIP    1 each by In Vitro route 2 times daily As needed. BLOOD PRESSURE KIT    1 kit by Does not apply route as needed (PRN)    CLOPIDOGREL (PLAVIX) 75 MG TABLET    Take 1 tablet by mouth daily    GABAPENTIN (NEURONTIN) 600 MG TABLET    take 1 tablet by mouth twice a day    JANUVIA 100 MG TABLET    take 1 tablet by mouth once daily    METOPROLOL SUCCINATE (TOPROL XL) 25 MG EXTENDED RELEASE TABLET    Take 1.5 tablets by mouth daily    NITROGLYCERIN (NITROSTAT) 0.4 MG SL TABLET    up to max of 3 total doses. If no relief after 1 dose, call 911.     XARELTO 20 MG TABS TABLET    take 1 tablet by mouth once daily       ALLERGIES       Allergies   Allergen Reactions    Codeine Hives     + Nausea vomiting    Demerol      vomiting    Ultram [Tramadol Hcl] Other (See Comments)     Dizziness     Percocet [Oxycodone-Acetaminophen] Nausea And Vomiting    Toradol [Ketorolac Tromethamine] Rash       FAMILY HISTORY       Family History   Problem Relation Age of Onset    COPD Mother     Other Mother     Cancer Mother     High Blood Pressure Father     Heart Disease Father     Mental Illness Sister     Mental Illness Brother     Mental Illness Sister     Mental Illness Brother         SOCIAL HISTORY       Social History     Socioeconomic History    Marital status:     Number of children: 3   Tobacco Use    Smoking status: Former     Packs/day: 0.50     Types: Cigarettes     Quit date: 2005     Years since quittin.1    Smokeless tobacco: Never   Vaping Use    Vaping Use: Never used   Substance and Sexual Activity    Alcohol use: No    Drug use: No       REVIEW OF SYSTEMS     Review of Systems   Constitutional:  Positive for fatigue. Negative for appetite change, chills, fever and unexpected weight change. HENT:  Negative for ear discharge, ear pain, hearing loss and rhinorrhea. Eyes:  Negative for pain, redness and visual disturbance. Respiratory:  Positive for chest tightness and shortness of breath. Negative for cough and wheezing. Cardiovascular:  Negative for chest pain, palpitations and leg swelling. Gastrointestinal:  Negative for abdominal pain, blood in stool, constipation, diarrhea, nausea and vomiting. Genitourinary:  Positive for vaginal bleeding. Negative for dysuria, frequency, hematuria and vaginal pain. Musculoskeletal:  Positive for back pain. Negative for arthralgias, joint swelling and neck stiffness. Skin:  Positive for color change. Negative for rash. Neurological:  Positive for syncope and light-headedness. Negative for dizziness, speech difficulty, weakness and headaches. Hematological:  Does not bruise/bleed easily. Except as noted above the remainder of the review of systems was reviewed and is negative. SCREENINGS         PHYSICAL EXAM    (up to 7 for level 4, 8 or more for level 5)     ED Triage Vitals [22 1512]   BP Temp Temp Source Pulse Resp SpO2 Height Weight   (!) 134/95 98.2 °F (36.8 °C) Oral 124 16 100 % -- --       Physical Exam  Vitals and nursing note reviewed. Constitutional:       General: She is not in acute distress. Appearance: She is well-developed. She is not ill-appearing. HENT:      Head: Normocephalic and atraumatic. Mouth/Throat:      Mouth: Mucous membranes are moist.   Eyes:      Conjunctiva/sclera: Conjunctivae normal.      Pupils: Pupils are equal, round, and reactive to light. Cardiovascular:      Rate and Rhythm: Normal rate and regular rhythm. Heart sounds: Normal heart sounds. No gallop. Pulmonary:      Effort: Pulmonary effort is normal. No respiratory distress. Breath sounds: Normal breath sounds. No wheezing or rales. Abdominal:      General: Bowel sounds are normal.      Palpations: Abdomen is soft. Tenderness: There is no abdominal tenderness. Musculoskeletal:         General: Normal range of motion. Cervical back: Normal range of motion. Skin:     General: Skin is warm and dry. Capillary Refill: Capillary refill takes less than 2 seconds. Neurological:      Mental Status: She is alert and oriented to person, place, and time. Psychiatric:         Mood and Affect: Mood normal.         DIAGNOSTIC RESULTS     EKG:(none if blank)  All EKGs are interpreted by the Emergency Department Physician who either signs or Co-signs this chart in the absence of a cardiologist.        RADIOLOGY: (none if blank)   I directly visualized the following images and reviewed the radiologist interpretations. Interpretation per the Radiologist below, if available at the time of this note:  [address] [place] [address]   Final Result      1. Abnormal echogenicity within the endometrium which measures 3.2 cm in thickness. Endometrial carcinoma cannot be ruled out. GYN consult would be helpful for better evaluation. 2. Cyst  in the region of the cervix measuring 12 x 10 x 8 mm in size. 3. Anechoic area in the right ovary measuring 4 x 2.1 x 3.7 cm in size. 4. The left ovary was not clearly visualized. 5. There is no definite free fluid within the pelvis. **This report has been created using voice recognition software.  It may contain minor errors which are inherent in voice recognition technology. **      Final report electronically signed by DR Lon Lynn on 8/25/2022 1:02 PM      VL DUP LOWER EXTREMITY VENOUS LEFT   Final Result   No evidence of a DVT in the left lower extremity. **This report has been created using voice recognition software. It may contain minor errors which are inherent in voice recognition technology. **      Final report electronically signed by DR Lon Lynn on 8/25/2022 10:29 AM      XR CHEST PORTABLE   Final Result   1. No interval change since previous study dated 25 April 2022.   2. Borderline cardiomegaly, otherwise negative chest x-ray. .               **This report has been created using voice recognition software. It may contain minor errors which are inherent in voice recognition technology. **      Final report electronically signed by DR Lon Lynn on 8/25/2022 10:08 AM      XR SHOULDER LEFT (MIN 2 VIEWS)   Final Result       1. No acute fracture or dislocation. 2. Degenerative change involving the acromioclavicular and to lesser extent glenohumeral joints. 3. Slight irregularity along the infra glenoid scapula. **This report has been created using voice recognition software. It may contain minor errors which are inherent in voice recognition technology. **      Final report electronically signed by DR Lon Lynn on 8/25/2022 10:05 AM          LABS:  Labs Reviewed   CBC WITH AUTO DIFFERENTIAL - Abnormal; Notable for the following components:       Result Value    RBC 2.94 (*)     Hemoglobin 8.5 (*)     Hematocrit 27.0 (*)     MCHC 31.5 (*)     RDW-CV 14.6 (*)     RDW-SD 48.5 (*)     Lymphocytes Absolute 0.7 (*)     Monocytes Absolute 0.3 (*)     All other components within normal limits   BASIC METABOLIC PANEL W/ REFLEX TO MG FOR LOW K - Abnormal; Notable for the following components:    CO2 21 (*)     Glucose 268 (*)     All other components within normal limits   HEPATIC FUNCTION PANEL - Abnormal; Notable for the following components:    ALT 10 (*)     Total Protein 6.0 (*)     All other components within normal limits   HEMOGLOBIN AND HEMATOCRIT - Abnormal; Notable for the following components:    Hemoglobin 8.4 (*)     Hematocrit 25.7 (*)     All other components within normal limits   TROPONIN   LIPASE   BRAIN NATRIURETIC PEPTIDE   ANION GAP   GLOMERULAR FILTRATION RATE, ESTIMATED   OSMOLALITY   TYPE AND SCREEN   PREPARE RBC (CROSSMATCH)    Narrative:     [id number]     issued       All other labs were within normal range or not returned as of this dictation. Please note, any cultures that may have been sent were not resulted at the time of this patient visit. EMERGENCY DEPARTMENT COURSE and Medical Decision Making:     Vitals:    Vitals:    08/25/22 1429 08/25/22 1431 08/25/22 1435 08/25/22 1707   BP: (!) 148/66 138/67 (!) 149/70 136/72   Pulse: 87 95 79 74   Resp: 18 16 18 19   Temp: 97.8 °F (36.6 °C) 98.7 °F (37.1 °C) 98.5 °F (36.9 °C) 98.3 °F (36.8 °C)   TempSrc: Oral Oral Oral Oral   SpO2: 98% 99% 98% 98%   Weight:       Height:           PROCEDURES: (None if blank)  Procedures    ED Course as of 08/25/22 1829   u Aug 25, 2022   1124 Reviewed case with Dr. Teodoro Martinez. We will check orthostatic vital signs following infusion. [NW]   1630 Reviewed case again with Dr. Teodoro Martinez. Okay to discharge if patient's no longer orthostatic following blood transfusion and hemoglobin remained stable. [NW]   1826 Reviewed results with patient and decision was made that patient should be admitted for further work-up and evaluation of her anemia. Patient is agreeable with this plan.  [NW]   [facility] has agreed to admit patient for continued evaluation of her anemia and vaginal bleeding. [NW]      ED Course User Index  [NW] SHANNON Banks - CNP     MDM  Number of Diagnoses or Management Options  Acute blood loss anemia: new, needed workup  DUB (dysfunctional uterine bleeding): new, needed workup  Endometrial thickening on ultrasound: new, needed workup     Amount and/or Complexity of Data Reviewed  Clinical lab tests: ordered and reviewed  Tests in the radiology section of CPT®: ordered and reviewed  Review and summarize past medical records: yes  Discuss the patient with other providers: yes  Independent visualization of images, tracings, or specimens: yes    Risk of Complications, Morbidity, and/or Mortality  Presenting problems: low  Diagnostic procedures: moderate  Management options: moderate  General comments: She required multiple reevaluations due to continued vaginal bleeding. Total critical care time was 62 minutes. Critical Care  Total time providing critical care: 30-74 minutes    Patient presented to ED with 15 days of vaginal bleeding and syncopal episode. Differential diagnosis includes but not limited to anemia, dysfunctional uterine bleeding, endometrial cancer, DVT or hypokalemia. Labs reveal a hemoglobin of 8.5. Patient was given a liter of lactated Ringer's and a unit of packed red blood cells. Her hemoglobin dropped to 8.4 following transfusion. Patient continues to have a large amount of vaginal bleeding. Decision was made to admit to the hospitalist who is graciously agreed to admit patient. ED Medications administered this visit:    Medications   0.9 % sodium chloride infusion (has no administration in time range)   lactated ringers bolus (0 mLs IntraVENous Stopped 8/25/22 1245)   megestrol (MEGACE) tablet 40 mg (40 mg Oral Given 8/25/22 6835)         FINAL IMPRESSION      1. DUB (dysfunctional uterine bleeding)    2. Endometrial thickening on ultrasound    3. Acute blood loss anemia          DISPOSITION/PLAN   DISPOSITION Decision To Admit 08/25/2022 06:07:19 PM      PATIENT REFERRED TO:  No follow-up provider specified.     DISCHARGE MEDICATIONS:  New Prescriptions    MEGESTROL (MEGACE) 40 MG TABLET    Take 1 tablet by mouth 2 times daily Michelene Siemens, PA-S Marlyne December, APRNESSA - CNP (electronically signed)           Dasia DecemberSHANNON CNP  08/25/22 3027

## 2022-08-25 NOTE — ED NOTES
Anesthesia Post Evaluation    Patient: Trell Landaverde    Procedure(s) Performed: Procedure(s) (LRB):  GASTRECTOMY, SLEEVE, LAPAROSCOPIC, with intraop EGD 97171 (N/A)  EGD (ESOPHAGOGASTRODUODENOSCOPY) (N/A)    Final Anesthesia Type: general  Patient location during evaluation: PACU  Patient participation: Yes- Able to Participate  Level of consciousness: awake and alert  Post-procedure vital signs: reviewed and stable  Pain management: adequate  Airway patency: patent  PONV status at discharge: No PONV  Anesthetic complications: no      Cardiovascular status: stable  Respiratory status: unassisted and spontaneous ventilation  Hydration status: euvolemic  Follow-up not needed.          Vitals Value Taken Time   /83 9/26/2019  3:32 PM   Temp 36.3 °C (97.3 °F) 9/26/2019  3:32 PM   Pulse 82 9/26/2019  3:32 PM   Resp 16 9/26/2019  3:32 PM   SpO2 93 % 9/26/2019  3:32 PM         Event Time     Out of Recovery 14:00:00          Pain/Robin Score: Pain Rating Prior to Med Admin: 7 (9/26/2019  2:18 PM)         Blood reaches vein at 1421, double checked with 2 RN's.    Will monitor pt      Jso Ludwig RN  08/25/22 7919

## 2022-08-25 NOTE — ED NOTES
Bedside report taken from [facility] this nurse assuming care   Patient resting in bed. Respirations easy and unlabored. No distress noted. Call light within reach.        Max Gill RN  08/25/22 2597

## 2022-08-25 NOTE — H&P
Hospitalist - History & Physical      Patient: Donato Chisholm    Unit/Bed:GEMMA /GEMMA  YOB: 1970  MRN: [id number]   Acct: [de-identified]   PCP: SHANNON Osuna CNP    Date of Service: Pt seen/examined on 08/25/22  and Admitted to Inpatient with expected LOS greater than two midnights due to medical therapy. Chief Complaint:  Passed out 2 days ago     Assessment and Plan:  Syncope and collapse, with palpitations:    Pt reports syncopal event 2 days ago, fell on left side. Has had persistent palpitations since with activity. Pt afebrile, hemodynamically stable. Labs show acute normocytic anemia, no WBC, Trop and BNP WNL. Suspect secondary to #2. EKG is NSR. Last Cardiac cath shows EF 60%. Limited ECHO ordered. Telemetry. Orthostatic Bps Q shift. Repeat BMP, CBC in the AM.     Menorrhagia, with acute normocytic anemia:    Pt reports menstruation x 22 days. Last menstrual cycle approximately 6 months ago. Hgb 8.5, pt has no known history of anemia. On Xarelto, Plavix daily. Given 1 PRBC in ED due to significant drop and pt is symptomatic. Recheck Hgb 8.4. Transvaginal US shows endometrium thickness, cervical cyst, anechoic area of right ovary. GYN consulted from ED, recommending Megace. Will recheck upon admission and trend Hgb Q8H. Left side chest pain with Hx CAD, s/p PCI:    [facility] 3/14/22  with PCI to Lcx, OM1, RDPA. On Plaix and Xarelto- continue for now. PT reports left sided chest pain, tenderness with palpation over T9/T10 rib of the left side. Pain started after the fall, therefore suspect MSK etiology. Initial Trop and BNP WNL. EKG without acute ischemic changes. CXR unremarkable. Continue to monitor. Essential HTN:    BP overall stable. Continue home meds. Continue to monitor closely. Factor 5 Leiden mutation:    Hx of PE and DVT. Continue Xarelto for now as Hgb is stable. Obesity:    BMI 48.35 kg/m2. Pt reports losing 100lbs.        History Of Present Illness:    Debo Denis is a 53 y/o  female with a PMHx of Factor 5 Leiden mutation, CAD, HTN, T2DM, Hx of TIA who presents to [facility] ED today for the evaluation of passing out 2 days ago and has been having persistent palpitations since. Pt reports she was at a gas station 2 days ago when she finished paying at the pump when she turned around to get into her car and felt lightheaded and fell to the ground and loss consciousness. Pt states she remembers the whole event and denies chest pain at the time of the event. Pt states she was out for a couple seconds when she was able to get up with minimal assistance and later was able to drive home. Pt also mentions she has been having a very heavy and prolonged period for 22 days now. She states her last period was approximately 6 months ago. Her period now has been persistently heavy for the whole 22 days, states she wears 2 pads at a time. She reports passing several large clots. Otherwise she denies chest pain, fever, chills, shortness of breath, nausea or vomiting. Pt denies hitting her head in the fall, and denies weakness or neurologic deficits at this time. She reports left arm and left chest pain located at the bra line, right where she fell.        Past Medical History:        Diagnosis Date    Asthma     Bipolar 1 disorder ([facility]     Blood circulation, collateral     CAD (coronary artery disease)     Curvature of spine     Diabetes mellitus (HCC)     DVT (deep venous thrombosis) (HCC)     Factor 5 Leiden mutation, heterozygous (HCC)     GERD (gastroesophageal reflux disease)     HTN, goal below 130/80     Hx of blood clots     PE x3 and DVT x3    Hx-TIA (transient ischemic attack)     Hyperlipidemia     PE (pulmonary embolism)     RA (rheumatoid arthritis) ([facility] [address]     Unspecified cerebral artery occlusion with cerebral infarction        Past Surgical History:        Procedure Laterality Date    CARDIAC CATHETERIZATION  feb 2015    Heart caths    CHOLECYSTECTOMY  1992    CORONARY ANGIOPLASTY WITH STENT PLACEMENT      FOOT DEBRIDEMENT Right 9/30/2019    FOOT DEBRIDEMENT INCISION AND DRAINAGE performed by Dora Florence DPM at [place] [address] ARTHROSCOPY  2007&2010    LIPOMA RESECTION      TONSILLECTOMY      TUBAL LIGATION  2003    TYMPANOSTOMY TUBE PLACEMENT         Home Medications:   No current facility-administered medications on file prior to encounter. Current Outpatient Medications on File Prior to Encounter   Medication Sig Dispense Refill    metoprolol succinate (TOPROL XL) 25 MG extended release tablet Take 1.5 tablets by mouth daily 135 tablet 1    atorvastatin (LIPITOR) 80 MG tablet Take 1 tablet by mouth at bedtime 90 tablet 2    clopidogrel (PLAVIX) 75 MG tablet Take 1 tablet by mouth daily 90 tablet 2    Blood Pressure KIT 1 kit by Does not apply route as needed (PRN) 1 kit 0    nitroGLYCERIN (NITROSTAT) 0.4 MG SL tablet up to max of 3 total doses. If no relief after 1 dose, call 911. 25 tablet 3    XARELTO 20 MG TABS tablet take 1 tablet by mouth once daily      JANUVIA 100 MG tablet take 1 tablet by mouth once daily      gabapentin (NEURONTIN) 600 MG tablet take 1 tablet by mouth twice a day      acetaminophen (TYLENOL) 325 MG tablet Take 2 tablets by mouth every 4 hours as needed for Pain 120 tablet 3    blood glucose test strips (TRUETRACK TEST) strip 1 each by In Vitro route 2 times daily As needed. 100 each 0       Allergies:    Codeine, Demerol, Ultram [tramadol hcl], Percocet [oxycodone-acetaminophen], and Toradol [ketorolac tromethamine]    Social History:    reports that she quit smoking about 17 years ago. Her smoking use included cigarettes. She smoked an average of .5 packs per day. She has never used smokeless tobacco. She reports that she does not drink alcohol and does not use drugs.     Family History:       Problem Relation Age of Onset    COPD Mother     Other Mother     Cancer Mother     High Blood Pressure Father     Heart Disease Father     Mental Illness Sister     Mental Illness Brother     Mental Illness Sister     Mental Illness Brother        Diet:  No diet orders on file    Review of systems:     Review of Systems   Constitutional:  Positive for activity change. Negative for appetite change, chills, fatigue and fever. HENT:  Negative for congestion, rhinorrhea and sore throat. Eyes:  Negative for visual disturbance. Respiratory:  Negative for chest tightness and shortness of breath. Cardiovascular:  Positive for chest pain and palpitations. Negative for leg swelling. Gastrointestinal:  Negative for abdominal distention, abdominal pain, constipation, diarrhea, nausea and vomiting. Genitourinary:  Positive for menstrual problem. Negative for difficulty urinating, flank pain, pelvic pain and urgency. Musculoskeletal:  Positive for arthralgias and gait problem. Negative for back pain. Neurological:  Positive for syncope and light-headedness. Negative for dizziness, weakness and headaches. Hematological:  Bruises/bleeds easily. PHYSICAL EXAM:  /71   Pulse 79   Temp 98.3 °F (36.8 °C) (Oral)   Resp 18   Ht 5' 8\" (1.727 m)   Wt (!) 318 lb (144.2 kg)   LMP 06/21/2018   SpO2 99%   BMI 48.35 kg/m²   General appearance: Chronically ill appearing. No apparent distress. Appears stated age and cooperative. Skin: Skin color, texture, turgor normal.  No rashes or lesions. HEENT: Normal cephalic, atraumatic without obvious deformity. Pupils equal, round, and reactive to light. Extra-ocular muscles intact. Conjunctivae/corneas clear. Neck: Trachea midline. Supple, with full range of motion. No jugular venous distention. Cardiovascular: Regular rate and rhythm with normal S1/S2. No murmurs, rubs or gallops. Respiratory:  Normal respiratory effort. Clear to auscultation, bilaterally without rales, wheezes, or rhonchi. Abdomen: Soft, non-tender, non-distended. Normal bowel sounds.    Musculoskeletal:  No weakness or instability noted.  No edema, erythema, or gross deformity noted. Vascular: Pulses +2 palpable, equal bilaterally. Neurologic:  Neurovascularly intact without any focal sensory/motor deficits. Cranial nerves: II-XII grossly intact. Psychiatric: Alert and oriented, thought content appropriate, normal insight      Labs:   Recent Labs     08/25/22 0923 08/25/22  1742   WBC 7.0  --    HGB 8.5* 8.4*   HCT 27.0* 25.7*     --      Recent Labs     08/25/22 0923      K 3.9      CO2 21*   BUN 8   CREATININE 0.5   CALCIUM 8.9     Recent Labs     08/25/22 0923   AST 12   ALT 10*   BILIDIR <0.2   BILITOT 0.3   ALKPHOS 63     No results for input(s): INR in the last 72 hours. No results for input(s): Sari Parisian in the last 72 hours. Urinalysis:    Lab Results   Component Value Date/Time    NITRU NEGATIVE 09/09/2015 04:20 PM    WBCUA 5-10 09/09/2015 04:20 PM    WBCUA 2-4 10/17/2011 06:05 PM    BACTERIA NONE 09/09/2015 04:20 PM    RBCUA 0-2 09/09/2015 04:20 PM    BLOODU MODERATE 09/09/2015 04:20 PM    SPECGRAV 1.007 09/09/2015 04:20 PM    GLUCOSEU >= 1000 03/14/2015 02:28 PM       Radiology:   US NON OB TRANSVAGINAL   Final Result      1. Abnormal echogenicity within the endometrium which measures 3.2 cm in thickness. Endometrial carcinoma cannot be ruled out. GYN consult would be helpful for better evaluation. 2. Cyst  in the region of the cervix measuring 12 x 10 x 8 mm in size. 3. Anechoic area in the right ovary measuring 4 x 2.1 x 3.7 cm in size. 4. The left ovary was not clearly visualized. 5. There is no definite free fluid within the pelvis. **This report has been created using voice recognition software. It may contain minor errors which are inherent in voice recognition technology. **      Final report electronically signed by DR Missy Ibarra on 8/25/2022 1:02 PM      VL DUP LOWER EXTREMITY VENOUS LEFT   Final Result   No evidence of a DVT in the left lower extremity. **This report has been created using voice recognition software. It may contain minor errors which are inherent in voice recognition technology. **      Final report electronically signed by DR Miri Whitlock on 8/25/2022 10:29 AM      XR CHEST PORTABLE   Final Result   1. No interval change since previous study dated 25 April 2022.   2. Borderline cardiomegaly, otherwise negative chest x-ray. .               **This report has been created using voice recognition software. It may contain minor errors which are inherent in voice recognition technology. **      Final report electronically signed by DR Miri Whitlock on 8/25/2022 10:08 AM      XR SHOULDER LEFT (MIN 2 VIEWS)   Final Result       1. No acute fracture or dislocation. 2. Degenerative change involving the acromioclavicular and to lesser extent glenohumeral joints. 3. Slight irregularity along the infra glenoid scapula. **This report has been created using voice recognition software. It may contain minor errors which are inherent in voice recognition technology. **      Final report electronically signed by DR Miri Whitlock on 8/25/2022 10:05 AM        US NON OB TRANSVAGINAL    Result Date: 8/25/2022  PROCEDURE: US NON OB TRANSVAGINAL CLINICAL INFORMATION: postmenopausal vaginal bleeding. COMPARISON: No prior study. TECHNIQUE:   . Transvaginal ultrasound scan was carried out through the pelvis. FINDINGS: LMP-03/10/2022 Uterus - 12.1 x 7.3 x 6.4 cm Endometrium - 3.2 cm Right Ovary - 3.7 x 2.7 x 2.9 cm Left Ovary - not visualized The uterus measures 12.1 x 7.3 x 6.4 cm in size. There is abnormal endometrial thickening which measures 3.2 cm in thickness. This could represent endometrial carcinoma. GYN consult would be helpful for further evaluation. There is a cyst  in the region of the cervix measuring 12 x 10 x 8 mm in size. . There is an anechoic area in the right ovary measuring 4 x 3.1 x 3.7 cm in size. . The left ovary was not clearly seen.  There is no free fluid within the pelvis. Cristhian Amend 1. Abnormal echogenicity within the endometrium which measures 3.2 cm in thickness. Endometrial carcinoma cannot be ruled out. GYN consult would be helpful for better evaluation. 2. Cyst  in the region of the cervix measuring 12 x 10 x 8 mm in size. 3. Anechoic area in the right ovary measuring 4 x 2.1 x 3.7 cm in size. 4. The left ovary was not clearly visualized. 5. There is no definite free fluid within the pelvis. **This report has been created using voice recognition software. It may contain minor errors which are inherent in voice recognition technology. ** Final report electronically signed by DR Jovita Jacques on 8/25/2022 1:02 PM    XR SHOULDER LEFT (MIN 2 VIEWS)    Result Date: 8/25/2022  PROCEDURE: XR SHOULDER LEFT (MIN 2 VIEWS) CLINICAL INFORMATION: fall with shoulder pain. COMPARISON: Plain radiographs dated second of May 2014. . TECHNIQUE: 3 views of the shoulder including AP view, a glenoid view, and a scapular Y view. FINDINGS: There is degenerative change involving the acromioclavicular and to a lesser extent glenohumeral joints. There is no fracture or dislocation. There is slight irregularity along the infra glenoid scapula. The clavicle is normal.  The soft tissues are normal.  There are no suspicious findings in the visualized aspects of the upper lung. 1. No acute fracture or dislocation. 2. Degenerative change involving the acromioclavicular and to lesser extent glenohumeral joints. 3. Slight irregularity along the infra glenoid scapula. **This report has been created using voice recognition software. It may contain minor errors which are inherent in voice recognition technology. ** Final report electronically signed by DR Jovita Jacques on 8/25/2022 10:05 AM    XR CHEST PORTABLE    Result Date: 8/25/2022  PROCEDURE: XR CHEST PORTABLE CLINICAL INFORMATION: palpitations. COMPARISON: Chest x-ray dated 25 April 2022.  TECHNIQUE: AP upright view of the chest. FINDINGS: There is borderline cardiomegaly. .The mediastinum is not widened. There are no pulmonary infiltrates or effusions. The pulmonary vascularity is normal. No suspicious osseous lesions are present. 1. No interval change since previous study dated 25 April 2022. 2. Borderline cardiomegaly, otherwise negative chest x-ray. . **This report has been created using voice recognition software. It may contain minor errors which are inherent in voice recognition technology. ** Final report electronically signed by DR Michelle Valdez on 8/25/2022 10:08 AM    VL DUP LOWER EXTREMITY VENOUS LEFT    Result Date: 8/25/2022  PROCEDURE: VL DUP LOWER EXTREMITY VENOUS LEFT CLINICAL INFORMATION: pain and swelling. COMPARISON: No prior study. TECHNIQUE: Venous doppler ultrasound was performed of the left lower extremity using gray scale, color flow and spectral doppler imaging. FINDINGS: There is normal color flow, spectral analysis and compressibility of the left common femoral vein, femoral vein and popliteal veins. There is normal color flow and compressibility in the left posterior tibial veins, anterior tibial veins and peroneal veins. There is normal color flow, spectral analysis and compressibility in the right common femoral vein. No evidence of a DVT in the left lower extremity. **This report has been created using voice recognition software. It may contain minor errors which are inherent in voice recognition technology. ** Final report electronically signed by DR Michelle Valdez on 8/25/2022 10:29 AM        EKG:  NSR    Electronically signed by Mary Jane Hernandez PA-C on 8/25/2022 at 7:43 PM

## 2022-08-26 ENCOUNTER — APPOINTMENT (OUTPATIENT)
Dept: CT IMAGING | Age: 52
DRG: 517 | End: 2022-08-26
Payer: COMMERCIAL

## 2022-08-26 PROBLEM — N84.1 CERVICAL POLYP: Status: ACTIVE | Noted: 2022-08-26

## 2022-08-26 PROBLEM — N92.0 MENORRHAGIA: Status: ACTIVE | Noted: 2022-08-26

## 2022-08-26 LAB
ANION GAP SERPL CALCULATED.3IONS-SCNC: 9 MEQ/L (ref 8–16)
BASOPHILS # BLD: 0.7 %
BASOPHILS ABSOLUTE: 0 THOU/MM3 (ref 0–0.1)
BUN BLDV-MCNC: 6 MG/DL (ref 7–22)
CALCIUM SERPL-MCNC: 7.9 MG/DL (ref 8.5–10.5)
CHLORIDE BLD-SCNC: 104 MEQ/L (ref 98–111)
CO2: 24 MEQ/L (ref 23–33)
CREAT SERPL-MCNC: 0.4 MG/DL (ref 0.4–1.2)
EOSINOPHIL # BLD: 1.2 %
EOSINOPHILS ABSOLUTE: 0.1 THOU/MM3 (ref 0–0.4)
ERYTHROCYTE [DISTWIDTH] IN BLOOD BY AUTOMATED COUNT: 14.7 % (ref 11.5–14.5)
ERYTHROCYTE [DISTWIDTH] IN BLOOD BY AUTOMATED COUNT: 49.2 FL (ref 35–45)
GFR SERPL CREATININE-BSD FRML MDRD: > 90 ML/MIN/1.73M2
GLUCOSE BLD-MCNC: 148 MG/DL (ref 70–108)
GLUCOSE BLD-MCNC: 153 MG/DL (ref 70–108)
GLUCOSE BLD-MCNC: 168 MG/DL (ref 70–108)
GLUCOSE BLD-MCNC: 169 MG/DL (ref 70–108)
HCT VFR BLD CALC: 22.3 % (ref 37–47)
HCT VFR BLD CALC: 25.5 % (ref 37–47)
HCT VFR BLD CALC: 25.9 % (ref 37–47)
HEMOGLOBIN: 7.1 GM/DL (ref 12–16)
HEMOGLOBIN: 8.5 GM/DL (ref 12–16)
HEMOGLOBIN: 8.7 GM/DL (ref 12–16)
IMMATURE GRANS (ABS): 0.03 THOU/MM3 (ref 0–0.07)
IMMATURE GRANULOCYTES: 0.5 %
LYMPHOCYTES # BLD: 32.8 %
LYMPHOCYTES ABSOLUTE: 1.9 THOU/MM3 (ref 1–4.8)
MAGNESIUM: 1.8 MG/DL (ref 1.6–2.4)
MCH RBC QN AUTO: 29.6 PG (ref 26–33)
MCHC RBC AUTO-ENTMCNC: 31.8 GM/DL (ref 32.2–35.5)
MCV RBC AUTO: 92.9 FL (ref 81–99)
MONOCYTES # BLD: 6.7 %
MONOCYTES ABSOLUTE: 0.4 THOU/MM3 (ref 0.4–1.3)
NUCLEATED RED BLOOD CELLS: 0 /100 WBC
PLATELET # BLD: 190 THOU/MM3 (ref 130–400)
PMV BLD AUTO: 9.8 FL (ref 9.4–12.4)
POTASSIUM REFLEX MAGNESIUM: 3.2 MEQ/L (ref 3.5–5.2)
RBC # BLD: 2.4 MILL/MM3 (ref 4.2–5.4)
SEG NEUTROPHILS: 58.1 %
SEGMENTED NEUTROPHILS ABSOLUTE COUNT: 3.4 THOU/MM3 (ref 1.8–7.7)
SODIUM BLD-SCNC: 137 MEQ/L (ref 135–145)
WBC # BLD: 5.9 THOU/MM3 (ref 4.8–10.8)

## 2022-08-26 PROCEDURE — 80048 BASIC METABOLIC PNL TOTAL CA: CPT

## 2022-08-26 PROCEDURE — 2580000003 HC RX 258

## 2022-08-26 PROCEDURE — 99233 SBSQ HOSP IP/OBS HIGH 50: CPT | Performed by: INTERNAL MEDICINE

## 2022-08-26 PROCEDURE — 36430 TRANSFUSION BLD/BLD COMPNT: CPT

## 2022-08-26 PROCEDURE — 88305 TISSUE EXAM BY PATHOLOGIST: CPT

## 2022-08-26 PROCEDURE — 2580000003 HC RX 258: Performed by: INTERNAL MEDICINE

## 2022-08-26 PROCEDURE — 71275 CT ANGIOGRAPHY CHEST: CPT

## 2022-08-26 PROCEDURE — 36415 COLL VENOUS BLD VENIPUNCTURE: CPT

## 2022-08-26 PROCEDURE — 93307 TTE W/O DOPPLER COMPLETE: CPT

## 2022-08-26 PROCEDURE — 0UDB7ZX EXTRACTION OF ENDOMETRIUM, VIA NATURAL OR ARTIFICIAL OPENING, DIAGNOSTIC: ICD-10-PCS | Performed by: OBSTETRICS & GYNECOLOGY

## 2022-08-26 PROCEDURE — 82948 REAGENT STRIP/BLOOD GLUCOSE: CPT

## 2022-08-26 PROCEDURE — 1200000003 HC TELEMETRY R&B

## 2022-08-26 PROCEDURE — 85014 HEMATOCRIT: CPT

## 2022-08-26 PROCEDURE — 85025 COMPLETE CBC W/AUTO DIFF WBC: CPT

## 2022-08-26 PROCEDURE — 83735 ASSAY OF MAGNESIUM: CPT

## 2022-08-26 PROCEDURE — 6370000000 HC RX 637 (ALT 250 FOR IP)

## 2022-08-26 PROCEDURE — 6370000000 HC RX 637 (ALT 250 FOR IP): Performed by: INTERNAL MEDICINE

## 2022-08-26 PROCEDURE — 85018 HEMOGLOBIN: CPT

## 2022-08-26 PROCEDURE — 6370000000 HC RX 637 (ALT 250 FOR IP): Performed by: OBSTETRICS & GYNECOLOGY

## 2022-08-26 PROCEDURE — 6360000004 HC RX CONTRAST MEDICATION: Performed by: INTERNAL MEDICINE

## 2022-08-26 RX ORDER — SODIUM CHLORIDE 9 MG/ML
INJECTION, SOLUTION INTRAVENOUS PRN
Status: DISCONTINUED | OUTPATIENT
Start: 2022-08-26 | End: 2022-08-29 | Stop reason: HOSPADM

## 2022-08-26 RX ORDER — MEGESTROL ACETATE 40 MG/1
40 TABLET ORAL 2 TIMES DAILY
Qty: 60 TABLET | Refills: 1 | Status: SHIPPED | OUTPATIENT
Start: 2022-08-26

## 2022-08-26 RX ORDER — POTASSIUM CHLORIDE 20 MEQ/1
40 TABLET, EXTENDED RELEASE ORAL ONCE
Status: COMPLETED | OUTPATIENT
Start: 2022-08-26 | End: 2022-08-26

## 2022-08-26 RX ORDER — SODIUM CHLORIDE 9 MG/ML
INJECTION, SOLUTION INTRAVENOUS CONTINUOUS
Status: DISCONTINUED | OUTPATIENT
Start: 2022-08-26 | End: 2022-08-28

## 2022-08-26 RX ORDER — MEGESTROL ACETATE 40 MG/1
40 TABLET ORAL 2 TIMES DAILY
Status: DISCONTINUED | OUTPATIENT
Start: 2022-08-26 | End: 2022-08-29 | Stop reason: HOSPADM

## 2022-08-26 RX ADMIN — SODIUM CHLORIDE: 9 INJECTION, SOLUTION INTRAVENOUS at 12:47

## 2022-08-26 RX ADMIN — SODIUM CHLORIDE, PRESERVATIVE FREE 10 ML: 5 INJECTION INTRAVENOUS at 09:30

## 2022-08-26 RX ADMIN — MEGESTROL ACETATE 40 MG: 40 TABLET ORAL at 21:45

## 2022-08-26 RX ADMIN — POTASSIUM CHLORIDE 40 MEQ: 1500 TABLET, EXTENDED RELEASE ORAL at 12:52

## 2022-08-26 RX ADMIN — IOPAMIDOL 80 ML: 755 INJECTION, SOLUTION INTRAVENOUS at 16:01

## 2022-08-26 RX ADMIN — METOPROLOL SUCCINATE 37.5 MG: 25 TABLET, EXTENDED RELEASE ORAL at 10:01

## 2022-08-26 RX ADMIN — SODIUM CHLORIDE, PRESERVATIVE FREE 10 ML: 5 INJECTION INTRAVENOUS at 20:28

## 2022-08-26 RX ADMIN — ACETAMINOPHEN 650 MG: 325 TABLET ORAL at 21:50

## 2022-08-26 RX ADMIN — SODIUM CHLORIDE: 9 INJECTION, SOLUTION INTRAVENOUS at 16:54

## 2022-08-26 RX ADMIN — ATORVASTATIN CALCIUM 80 MG: 80 TABLET, FILM COATED ORAL at 20:25

## 2022-08-26 ASSESSMENT — PAIN DESCRIPTION - ORIENTATION: ORIENTATION: MID

## 2022-08-26 ASSESSMENT — PAIN SCALES - GENERAL
PAINLEVEL_OUTOF10: 0
PAINLEVEL_OUTOF10: 0
PAINLEVEL_OUTOF10: 5
PAINLEVEL_OUTOF10: 0
PAINLEVEL_OUTOF10: 3

## 2022-08-26 ASSESSMENT — PAIN DESCRIPTION - LOCATION: LOCATION: BACK

## 2022-08-26 ASSESSMENT — ENCOUNTER SYMPTOMS
RHINORRHEA: 0
NAUSEA: 0
VOMITING: 0
CONSTIPATION: 0
DIARRHEA: 0
SHORTNESS OF BREATH: 0
BACK PAIN: 0
ABDOMINAL PAIN: 0
SORE THROAT: 0
ABDOMINAL DISTENTION: 0
CHEST TIGHTNESS: 0

## 2022-08-26 ASSESSMENT — PAIN DESCRIPTION - DESCRIPTORS: DESCRIPTORS: ACHING

## 2022-08-26 NOTE — CARE COORDINATION
8/26/22, 7:42 AM EDT  DISCHARGE PLANNING EVALUATION:    Luzmaria Hernández       Admitted: 8/25/2022/ [facility] day: 1   Location: [facility]24/024-A Reason for admit: Syncope and collapse [R55]  Acute blood loss anemia [D62]  DUB (dysfunctional uterine bleeding) [N93.8]  Endometrial thickening on ultrasound [R93.89]   PMH:  has a past medical history of Asthma, Bipolar 1 disorder ([facility] [address] Blood circulation, collateral, CAD (coronary artery disease), Curvature of spine, Diabetes mellitus ([facility] [address] DVT (deep venous thrombosis) ([facility] [address] [facility] Le[facility], [facility] GERD (gastroesophageal reflux disease), HTN, goal below 130/80, Hx of blood clots, Hx-TIA (transient ischemic attack), Hyperlipidemia, PE (pulmonary embolism), RA (rheumatoid arthritis) ([facility] [address] and Unspecified cerebral artery occlusion with cerebral infarction. Procedure: none  Barriers to Discharge:  K+3.2, Ca+7.9. Hgb 8.5 on admission, received 2 units PRBC, post-transfusion Hgb is 7.1. LR infusion. H/H q8h. OB/GYN consult pending. Echo ordered. PCP: SHANNON Esposito CNP  Readmission Risk Score: 10.9%  Patient's Healthcare Decision Maker: Named in [address] [place] [address]    Patient Goals/Plan/Treatment Preferences: Met with Jason Silva, she is from home alone. She babysits her grandchild regularly. She amb with a cane, drives, has PCP and insurance. She does not anticipate discharge needs at this time. Transportation/Food Security/Housekeeping Addressed:  No issues identified. 8/26/22, 11:47 AM EDT    Possible weekend discharge. Patient goals/plan/ treatment preferences discussed by  and . Patient goals/plan/ treatment preferences reviewed with patient/ family. Patient/ family verbalize understanding of discharge plan and are in agreement with goal/plan/treatment preferences. Understanding was demonstrated using the teach back method.   AVS provided by RN at time of discharge, which includes all necessary medical information pertaining to the patients current course of illness, treatment, post-discharge goals of care, and treatment preferences.      Services At/After Discharge: None

## 2022-08-26 NOTE — PLAN OF CARE
Problem: Discharge Planning  Goal: Discharge to home or other facility with appropriate resources  8/26/2022 0006 by Asher Adames RN  Outcome: Progressing  8/26/2022 0005 by Asher Adames RN  Outcome: Progressing     Problem: Pain  Goal: Verbalizes/displays adequate comfort level or baseline comfort level  8/26/2022 0006 by Asher Adames RN  Outcome: Progressing  8/26/2022 0005 by Asher Adames RN  Outcome: Progressing     Problem: Chronic Conditions and Co-morbidities  Goal: Patient's chronic conditions and co-morbidity symptoms are monitored and maintained or improved  Outcome: Progressing   Care plan reviewed with patient. Patient verbalizes understanding of the plan of care and contribute to goal setting.

## 2022-08-26 NOTE — CONSULTS
[facility]  Consultation Note      Patient:  Gissel Granger  YOB: 1970  MRN: [id number]     Acct: [de-identified]    HISTORY OF PRESENT ILLNESS:     Gissel Granger is a 54 y.o. female  with menorrhagia for the last 22 days. States she was going though 6 pads an hour. She states she hasn't had a period for 6 months and then started bleeding 22 days ago. On Tuesday, she passed out while pumping gas. Pt was given megace yesterday and states that her bleeding is minimal today.     Obstetric History: [unfilled]    Past Medical History:        Diagnosis Date    Asthma     Bipolar 1 disorder ([facility] [address]     Blood circulation, collateral     CAD (coronary artery disease)     Curvature of spine     Diabetes mellitus (HCC)     DVT (deep venous thrombosis) ([facility])     Factor 5 Leiden mutation, heterozygous (HCC)     GERD (gastroesophageal reflux disease)     HTN, goal below 130/80     Hx of blood clots     PE x3 and DVT x3    Hx-TIA (transient ischemic attack)     Hyperlipidemia     PE (pulmonary embolism)     RA (rheumatoid arthritis) ([facility] [address]     Unspecified cerebral artery occlusion with cerebral infarction        Past Surgical History:        Procedure Laterality Date    CARDIAC CATHETERIZATION  2015    Heart caths    CHOLECYSTECTOMY      CORONARY ANGIOPLASTY WITH STENT PLACEMENT      FOOT DEBRIDEMENT Right 2019    FOOT DEBRIDEMENT INCISION AND DRAINAGE performed by Yoli Mukherjee DPM at [place] [id number] ARTHROSCOPY  &    LIPOMA RESECTION      TONSILLECTOMY      TUBAL LIGATION      TYMPANOSTOMY TUBE PLACEMENT         Medications: Scheduled Meds:   potassium chloride  40 mEq Oral Once    megestrol  40 mg Oral BID    atorvastatin  80 mg Oral Nightly    [Held by provider] clopidogrel  75 mg Oral Daily    metoprolol succinate  37.5 mg Oral Daily    [Held by provider] rivaroxaban  20 mg Oral Daily with breakfast    sodium chloride flush  10 mL IntraVENous 2 times per day    insulin (144.7 kg)   08/25/22 2008 (!) 144/67 98.4 °F (36.9 °C) Oral 85 18 100 % -- --   08/25/22 1835 133/71 -- -- 79 18 99 % -- --   08/25/22 1707 136/72 98.3 °F (36.8 °C) Oral 74 19 98 % -- --   08/25/22 1435 (!) 149/70 98.5 °F (36.9 °C) Oral 79 18 98 % -- --   08/25/22 1431 138/67 98.7 °F (37.1 °C) Oral 95 16 99 % -- --   08/25/22 1429 (!) 148/66 97.8 °F (36.6 °C) Oral 87 18 98 % -- --   08/25/22 1422 135/68 98.6 °F (37 °C) Oral 85 16 98 % -- --   08/25/22 1409 (!) 97/52 97.9 °F (36.6 °C) Oral 81 19 100 % -- --   08/25/22 1245 (!) 134/48 97.8 °F (36.6 °C) Oral 83 22 99 % -- --   08/25/22 1244 (!) 134/48 97.8 °F (36.6 °C) -- 85 21 99 % -- --         Wt Readings from Last 1 Encounters:   08/25/22 (!) 319 lb (144.7 kg)         General appearance - alert, well appearing, and in no distress  Abdomen - soft, nontender, nondistended, no masses or organomegaly  Pelvic - exam chaperoned by female assistant, normal vagina and vulva, normal cervix without lesions, polyps or tenderness, endocervical polyp seen (about 4mm), uterus normal size, shape, consistency, no mass or tenderness, adnexa normal in size without mass or tenderness; dark blood present, no active bleeding.   Neurological - alert, oriented, normal speech, no focal findings or movement disorder noted  Musculoskeletal - no joint tenderness, deformity or swelling  Extremities - peripheral pulses normal, no pedal edema, no clubbing or cyanosis  Skin - normal coloration and turgor, no rashes, no suspicious skin lesions noted      Review of Labs and Diagnostic Testing:    Lab Results   Component Value Date/Time    WBC 5.9 08/26/2022 04:37 AM    RBC 2.40 08/26/2022 04:37 AM    RBC 4.29 02/06/2012 02:02 PM    HGB 7.1 08/26/2022 04:37 AM    HCT 22.3 08/26/2022 04:37 AM    MCV 92.9 08/26/2022 04:37 AM    MCH 29.6 08/26/2022 04:37 AM    MCHC 31.8 08/26/2022 04:37 AM    RDW 14.9 09/08/2017 03:42 PM     08/26/2022 04:37 AM    MPV 9.8 08/26/2022 04:37 AM     Lab Results Component Value Date/Time     08/26/2022 04:37 AM    K 3.2 08/26/2022 04:37 AM     08/26/2022 04:37 AM    CO2 24 08/26/2022 04:37 AM    BUN 6 08/26/2022 04:37 AM    CREATININE 0.4 08/26/2022 04:37 AM    GLUCOSE 153 08/26/2022 04:37 AM    CALCIUM 7.9 08/26/2022 04:37 AM       Radiology:        Assessment:    Patient Active Problem List   Diagnosis    Chest pain    History of pulmonary embolism    Hyperlipidemia with target LDL less than 70    HTN, goal below 130/80    Pulmonary embolus (HCC)    Morbid obesity (HCC)    Bilateral pulmonary embolism (HCC)    DVT (deep venous thrombosis) (HCC)    Acute right hip pain    Physical deconditioning    Metabolic acidosis    Moderate to severe pulmonary hypertension (HCC)    Type 2 diabetes mellitus with hyperglycemia, without long-term current use of insulin (HCC)    Open wound of right foot    Cellulitis    Leukocytosis    Diabetic foot ulcer associated with type 2 diabetes mellitus, with fat layer exposed ([facility] [address]    Cellulitis of foot, right    Chronic anticoagulation    History of recurrent deep vein thrombosis (DVT)    Factor 5 Leiden mutation, heterozygous ([facility] [address]    History of CVA (cerebrovascular accident)    Noncompliance    CAD in native artery    CAD S/P percutaneous coronary angioplasty    Abnormal stress test    Open wound of second toe, left, initial encounter    Syncope and collapse    Menorrhagia    Cervical polyp         Plan: 1. Menorrhagia- Bleeding much improved with megace, will continue this after discharge. Pap and endometrial biopsy obtained. [facility] for discharge from gyn perspective as bleeding is much improved. She received 1 unitl prbc yesterday and is currently getting another unit. Plan for follow up with me in office in 1-2 weeks. 2. Cervical polyp- discussed with pt these are typically benign. Can be removed in the office or during surgery if needed. Does not need to removed during this visit.       Electronically signed by Jaida Craig MD on 8/26/2022 at 11:12 AM

## 2022-08-26 NOTE — PROGRESS NOTES
Pt was sitting on the chair beside her bed as her daughter was visiting with her at the time of the visit. She was dealing with syncope and collapse. She got to the hospital yesterday through the emergency dept. She complained of very low hemoglobin and since have received two unit of blood. She was not giving up but was given words of encouragement. Prayer appreciated. 08/26/22 1713   Encounter Summary   Encounter Overview/Reason  Initial Encounter   Service Provided For: Patient and family together   Referral/Consult From: [facility]   Last Encounter  08/26/22   Complexity of Encounter Low   Spiritual/Emotional needs   Type Spiritual Support   Assessment/Intervention/Outcome   Assessment Calm; Hopeful   Intervention Active listening;Empowerment

## 2022-08-26 NOTE — PROCEDURES
Pt underwent endometrial biopsy. Consent with obtained and signed. Speculum was placed and cervix identified. Pipelle x3 was inserted and removed with blood/tissue. Pt tolerated well. Specimen sent for pathology.

## 2022-08-27 ENCOUNTER — APPOINTMENT (OUTPATIENT)
Dept: INTERVENTIONAL RADIOLOGY/VASCULAR | Age: 52
DRG: 517 | End: 2022-08-27
Payer: COMMERCIAL

## 2022-08-27 LAB
ANION GAP SERPL CALCULATED.3IONS-SCNC: 9 MEQ/L (ref 8–16)
BUN BLDV-MCNC: 7 MG/DL (ref 7–22)
CALCIUM SERPL-MCNC: 8.5 MG/DL (ref 8.5–10.5)
CHLORIDE BLD-SCNC: 108 MEQ/L (ref 98–111)
CO2: 23 MEQ/L (ref 23–33)
CREAT SERPL-MCNC: 0.5 MG/DL (ref 0.4–1.2)
GFR SERPL CREATININE-BSD FRML MDRD: > 90 ML/MIN/1.73M2
GLUCOSE BLD-MCNC: 146 MG/DL (ref 70–108)
GLUCOSE BLD-MCNC: 161 MG/DL (ref 70–108)
GLUCOSE BLD-MCNC: 178 MG/DL (ref 70–108)
GLUCOSE BLD-MCNC: 184 MG/DL (ref 70–108)
HCT VFR BLD CALC: 23.7 % (ref 37–47)
HCT VFR BLD CALC: 25.7 % (ref 37–47)
HCT VFR BLD CALC: 26.5 % (ref 37–47)
HEMOGLOBIN: 7.7 GM/DL (ref 12–16)
HEMOGLOBIN: 8.4 GM/DL (ref 12–16)
HEMOGLOBIN: 8.7 GM/DL (ref 12–16)
POTASSIUM REFLEX MAGNESIUM: 4 MEQ/L (ref 3.5–5.2)
SODIUM BLD-SCNC: 140 MEQ/L (ref 135–145)

## 2022-08-27 PROCEDURE — 6370000000 HC RX 637 (ALT 250 FOR IP)

## 2022-08-27 PROCEDURE — 1200000003 HC TELEMETRY R&B

## 2022-08-27 PROCEDURE — 85018 HEMOGLOBIN: CPT

## 2022-08-27 PROCEDURE — 82948 REAGENT STRIP/BLOOD GLUCOSE: CPT

## 2022-08-27 PROCEDURE — 2580000003 HC RX 258

## 2022-08-27 PROCEDURE — 93970 EXTREMITY STUDY: CPT

## 2022-08-27 PROCEDURE — 99233 SBSQ HOSP IP/OBS HIGH 50: CPT | Performed by: INTERNAL MEDICINE

## 2022-08-27 PROCEDURE — 36415 COLL VENOUS BLD VENIPUNCTURE: CPT

## 2022-08-27 PROCEDURE — 80048 BASIC METABOLIC PNL TOTAL CA: CPT

## 2022-08-27 PROCEDURE — 6370000000 HC RX 637 (ALT 250 FOR IP): Performed by: OBSTETRICS & GYNECOLOGY

## 2022-08-27 PROCEDURE — 2580000003 HC RX 258: Performed by: INTERNAL MEDICINE

## 2022-08-27 PROCEDURE — 85014 HEMATOCRIT: CPT

## 2022-08-27 RX ADMIN — ACETAMINOPHEN 650 MG: 325 TABLET ORAL at 20:13

## 2022-08-27 RX ADMIN — SODIUM CHLORIDE: 9 INJECTION, SOLUTION INTRAVENOUS at 20:16

## 2022-08-27 RX ADMIN — MEGESTROL ACETATE 40 MG: 40 TABLET ORAL at 10:00

## 2022-08-27 RX ADMIN — ACETAMINOPHEN 650 MG: 325 TABLET ORAL at 12:18

## 2022-08-27 RX ADMIN — MEGESTROL ACETATE 40 MG: 40 TABLET ORAL at 23:12

## 2022-08-27 RX ADMIN — ATORVASTATIN CALCIUM 80 MG: 80 TABLET, FILM COATED ORAL at 20:13

## 2022-08-27 RX ADMIN — SODIUM CHLORIDE, PRESERVATIVE FREE 10 ML: 5 INJECTION INTRAVENOUS at 20:14

## 2022-08-27 RX ADMIN — METOPROLOL SUCCINATE 37.5 MG: 25 TABLET, EXTENDED RELEASE ORAL at 08:40

## 2022-08-27 ASSESSMENT — PAIN DESCRIPTION - ONSET: ONSET: ON-GOING

## 2022-08-27 ASSESSMENT — ENCOUNTER SYMPTOMS
ABDOMINAL PAIN: 0
BACK PAIN: 0
NAUSEA: 0
ABDOMINAL DISTENTION: 0
CHEST TIGHTNESS: 0
RHINORRHEA: 0
DIARRHEA: 0
CONSTIPATION: 0
SHORTNESS OF BREATH: 0
VOMITING: 0
SORE THROAT: 0

## 2022-08-27 ASSESSMENT — PAIN DESCRIPTION - ORIENTATION
ORIENTATION: LEFT
ORIENTATION: MID

## 2022-08-27 ASSESSMENT — PAIN DESCRIPTION - LOCATION
LOCATION: LEG
LOCATION: BACK

## 2022-08-27 ASSESSMENT — PAIN SCALES - GENERAL
PAINLEVEL_OUTOF10: 3
PAINLEVEL_OUTOF10: 6
PAINLEVEL_OUTOF10: 3
PAINLEVEL_OUTOF10: 0

## 2022-08-27 ASSESSMENT — PAIN DESCRIPTION - PAIN TYPE: TYPE: ACUTE PAIN

## 2022-08-27 ASSESSMENT — PAIN - FUNCTIONAL ASSESSMENT: PAIN_FUNCTIONAL_ASSESSMENT: ACTIVITIES ARE NOT PREVENTED

## 2022-08-27 ASSESSMENT — PAIN DESCRIPTION - DESCRIPTORS
DESCRIPTORS: ACHING
DESCRIPTORS: ACHING;CRAMPING

## 2022-08-27 ASSESSMENT — PAIN DESCRIPTION - FREQUENCY: FREQUENCY: INTERMITTENT

## 2022-08-27 NOTE — PROGRESS NOTES
Hospitalist       Patient: Nely Ortiz    Unit/Bed:6K-24/024-A  YOB: 1970  MRN: [id number]   Acct: [de-identified]   PCP: SHANNON Leahy CNP    Date of Service: Pt seen/examined on 08/27/22  and Admitted to Inpatient with expected LOS greater than two midnights due to medical therapy. Chief Complaint:  Passed out 2 days ago     Assessment and Plan:  Syncope and collapse, with palpitations:    Pt reports syncopal event 2 days ago, fell on left side. Has had persistent palpitations since with activity. Pt afebrile, hemodynamically stable. Labs show acute normocytic anemia, no WBC, Trop and BNP WNL. Suspect secondary to #2. EKG is NSR. Last Cardiac cath shows EF 60%. Limited ECHO ordered. Telemetry. Orthostatic Bps Q shift. Repeat BMP, CBC in the AM.     Menorrhagia, with acute normocytic anemia:    Pt reports menstruation x 22 days. Last menstrual cycle approximately 6 months ago. Hgb 8.5, pt has no known history of anemia. On Xarelto, Plavix daily. Given 1 PRBC in ED due to significant drop and pt is symptomatic. Recheck Hgb 8.4. Transvaginal US shows endometrium thickness, cervical cyst, anechoic area of right ovary. GYN consulted from ED, recommending Megace. Will recheck upon admission and trend Hgb Q8H. Left side chest pain with Hx CAD, s/p PCI:    [facility] 3/14/22  with PCI to Lcx, OM1, RDPA. On Plaix and Xarelto- continue for now. PT reports left sided chest pain, tenderness with palpation over T9/T10 rib of the left side. Pain started after the fall, therefore suspect MSK etiology. Initial Trop and BNP WNL. EKG without acute ischemic changes. CXR unremarkable. Continue to monitor. Essential HTN:    BP overall stable. Continue home meds. Continue to monitor closely. Factor 5 Leiden mutation:    Hx of PE and DVT. Continue Xarelto for now as Hgb is stable. Obesity:    BMI 48.35 kg/m2. Pt reports losing 100lbs.      8.26.2022 patient seen this a.m. states she is no longer passing vaginal clots. Patient states that she has a feeling in her chest like she did when she had a pulmonary embolism and she is very concerned. She states that she has had multiple PEs and DVTs due to her factor V Leiden. We will obtain a stat CT angiogram PE protocol, hemoglobin is trending down currently at 7.1 will transfuse and start IV fluids. Gynecology has been consulted. We will currently hold Xarelto and Plavix at this time until the hemoglobin becomes more stable    8.27.2022 patient seen this a.m. markedly decreased amount of bleeding. Hemoglobin this a.m. was 7.7, needs to be stable before discharge, and restarting her Xarelto and Plavix. She is to follow-up with OB/GYN for further evaluation. If hemoglobin is less than 7 we will give 1 more unit of blood. And trend. Will place on SCDs    History Of Present Illness:    Stacey Gomez is a 53 y/o  female with a PMHx of Factor 5 Leiden mutation, CAD, HTN, T2DM, Hx of TIA who presents to [facility] ED today for the evaluation of passing out 2 days ago and has been having persistent palpitations since. Pt reports she was at a gas station 2 days ago when she finished paying at the pump when she turned around to get into her car and felt lightheaded and fell to the ground and loss consciousness. Pt states she remembers the whole event and denies chest pain at the time of the event. Pt states she was out for a couple seconds when she was able to get up with minimal assistance and later was able to drive home. Pt also mentions she has been having a very heavy and prolonged period for 22 days now. She states her last period was approximately 6 months ago. Her period now has been persistently heavy for the whole 22 days, states she wears 2 pads at a time. She reports passing several large clots. Otherwise she denies chest pain, fever, chills, shortness of breath, nausea or vomiting.  Pt denies hitting her head in the fall, and denies weakness or gabapentin (NEURONTIN) 600 MG tablet take 1 tablet by mouth twice a day      acetaminophen (TYLENOL) 325 MG tablet Take 2 tablets by mouth every 4 hours as needed for Pain 120 tablet 3    blood glucose test strips (TRUETRACK TEST) strip 1 each by In Vitro route 2 times daily As needed. 100 each 0       Allergies:    Codeine, Demerol, Ultram [tramadol hcl], Percocet [oxycodone-acetaminophen], and Toradol [ketorolac tromethamine]    Social History:    reports that she quit smoking about 17 years ago. Her smoking use included cigarettes. She smoked an average of .5 packs per day. She has never used smokeless tobacco. She reports that she does not drink alcohol and does not use drugs. Family History:       Problem Relation Age of Onset    COPD Mother     Other Mother     Cancer Mother     High Blood Pressure Father     Heart Disease Father     Mental Illness Sister     Mental Illness Brother     Mental Illness Sister     Mental Illness Brother        Diet:  ADULT DIET; Regular; 3 carb choices (45 gm/meal)    Review of systems:     Review of Systems   Constitutional:  Positive for activity change. Negative for appetite change, chills, fatigue and fever. HENT:  Negative for congestion, rhinorrhea and sore throat. Eyes:  Negative for visual disturbance. Respiratory:  Negative for chest tightness and shortness of breath. Cardiovascular:  Positive for chest pain and palpitations. Negative for leg swelling. Gastrointestinal:  Negative for abdominal distention, abdominal pain, constipation, diarrhea, nausea and vomiting. Genitourinary:  Positive for menstrual problem. Negative for difficulty urinating, flank pain, pelvic pain and urgency. Musculoskeletal:  Positive for arthralgias and gait problem. Negative for back pain. Neurological:  Positive for syncope and light-headedness. Negative for dizziness, weakness and headaches. Hematological:  Bruises/bleeds easily.        PHYSICAL EXAM:  /60 Pulse 76   Temp 97 °F (36.1 °C) (Oral)   Resp 20   Ht 5' 8\" (1.727 m)   Wt (!) 319 lb (144.7 kg)   LMP 06/21/2018   SpO2 100%   BMI 48.50 kg/m²   General appearance: Chronically ill appearing. No apparent distress. Appears stated age and cooperative. Skin: Skin color, texture, turgor normal.  No rashes or lesions. HEENT: Normal cephalic, atraumatic without obvious deformity. Pupils equal, round, and reactive to light. Extra-ocular muscles intact. Conjunctivae/corneas clear. Neck: Trachea midline. Supple, with full range of motion. No jugular venous distention. Cardiovascular: Regular rate and rhythm with normal S1/S2. No murmurs, rubs or gallops. Respiratory:  Normal respiratory effort. Clear to auscultation, bilaterally without rales, wheezes, or rhonchi. Abdomen: Soft, non-tender, non-distended. Normal bowel sounds. Musculoskeletal:  No weakness or instability noted. No edema, erythema, or gross deformity noted. Vascular: Pulses +2 palpable, equal bilaterally. Neurologic:  Neurovascularly intact without any focal sensory/motor deficits. Cranial nerves: II-XII grossly intact. Psychiatric: Alert and oriented, thought content appropriate, normal insight      Labs:   Recent Labs     08/25/22  0923 08/25/22  1742 08/26/22  0437 08/26/22  1442 08/26/22  2237 08/27/22  0526   WBC 7.0  --  5.9  --   --   --    HGB 8.5*   < > 7.1* 8.7* 8.5* 7.7*   HCT 27.0*   < > 22.3* 25.9* 25.5* 23.7*     --  190  --   --   --     < > = values in this interval not displayed. Recent Labs     08/25/22  0923 08/26/22  0437 08/27/22  0526    137 140   K 3.9 3.2* 4.0    104 108   CO2 21* 24 23   BUN 8 6* 7   CREATININE 0.5 0.4 0.5   CALCIUM 8.9 7.9* 8.5       Recent Labs     08/25/22  0923   AST 12   ALT 10*   BILIDIR <0.2   BILITOT 0.3   ALKPHOS 63       No results for input(s): INR in the last 72 hours. No results for input(s): Alexis Pears in the last 72 hours.     Urinalysis:    Lab Results   Component Value Date/Time    NITRU NEGATIVE 09/09/2015 04:20 PM    WBCUA 5-10 09/09/2015 04:20 PM    WBCUA 2-4 10/17/2011 06:05 PM    BACTERIA NONE 09/09/2015 04:20 PM    RBCUA 0-2 09/09/2015 04:20 PM    BLOODU MODERATE 09/09/2015 04:20 PM    SPECGRAV 1.007 09/09/2015 04:20 PM    GLUCOSEU >= 1000 03/14/2015 02:28 PM       Radiology:   CTA CHEST W CONTRAST   Final Result   1. No filling defects are noted within the pulmonary arterial vasculature to suggest the presence of pulmonary embolism. No acute intrathoracic process is observed. 2. Chronic findings are discussed. **This report has been created using voice recognition software. It may contain minor errors which are inherent in voice recognition technology. **      Final report electronically signed by Dr Yanira Flowers on 8/26/2022 4:18 PM      US NON OB TRANSVAGINAL   Final Result      1. Abnormal echogenicity within the endometrium which measures 3.2 cm in thickness. Endometrial carcinoma cannot be ruled out. GYN consult would be helpful for better evaluation. 2. Cyst  in the region of the cervix measuring 12 x 10 x 8 mm in size. 3. Anechoic area in the right ovary measuring 4 x 2.1 x 3.7 cm in size. 4. The left ovary was not clearly visualized. 5. There is no definite free fluid within the pelvis. **This report has been created using voice recognition software. It may contain minor errors which are inherent in voice recognition technology. **      Final report electronically signed by DR Miri Whitlock on 8/25/2022 1:02 PM      VL DUP LOWER EXTREMITY VENOUS LEFT   Final Result   No evidence of a DVT in the left lower extremity. **This report has been created using voice recognition software. It may contain minor errors which are inherent in voice recognition technology. **      Final report electronically signed by DR Miri Whitlock on 8/25/2022 10:29 AM      XR CHEST PORTABLE   Final Result   1.  No interval change since previous study dated 25 April 2022.   2. Borderline cardiomegaly, otherwise negative chest x-ray. .               **This report has been created using voice recognition software. It may contain minor errors which are inherent in voice recognition technology. **      Final report electronically signed by DR Ema Chirinos on 8/25/2022 10:08 AM      XR SHOULDER LEFT (MIN 2 VIEWS)   Final Result       1. No acute fracture or dislocation. 2. Degenerative change involving the acromioclavicular and to lesser extent glenohumeral joints. 3. Slight irregularity along the infra glenoid scapula. **This report has been created using voice recognition software. It may contain minor errors which are inherent in voice recognition technology. **      Final report electronically signed by DR Ema Chirinos on 8/25/2022 10:05 AM      VL DUP LOWER EXTREMITY VENOUS BILATERAL    (Results Pending)     US NON OB TRANSVAGINAL    Result Date: 8/25/2022  PROCEDURE: US NON OB TRANSVAGINAL CLINICAL INFORMATION: postmenopausal vaginal bleeding. COMPARISON: No prior study. TECHNIQUE:   . Transvaginal ultrasound scan was carried out through the pelvis. FINDINGS: LMP-03/10/2022 Uterus - 12.1 x 7.3 x 6.4 cm Endometrium - 3.2 cm Right Ovary - 3.7 x 2.7 x 2.9 cm Left Ovary - not visualized The uterus measures 12.1 x 7.3 x 6.4 cm in size. There is abnormal endometrial thickening which measures 3.2 cm in thickness. This could represent endometrial carcinoma. GYN consult would be helpful for further evaluation. There is a cyst  in the region of the cervix measuring 12 x 10 x 8 mm in size. . There is an anechoic area in the right ovary measuring 4 x 3.1 x 3.7 cm in size. . The left ovary was not clearly seen. There is no free fluid within the pelvis. Raulito Umana 1. Abnormal echogenicity within the endometrium which measures 3.2 cm in thickness. Endometrial carcinoma cannot be ruled out. GYN consult would be helpful for better evaluation.  2. Cyst in the region of the cervix measuring 12 x 10 x 8 mm in size. 3. Anechoic area in the right ovary measuring 4 x 2.1 x 3.7 cm in size. 4. The left ovary was not clearly visualized. 5. There is no definite free fluid within the pelvis. **This report has been created using voice recognition software. It may contain minor errors which are inherent in voice recognition technology. ** Final report electronically signed by DR Christophe Jara on 8/25/2022 1:02 PM    XR SHOULDER LEFT (MIN 2 VIEWS)    Result Date: 8/25/2022  PROCEDURE: XR SHOULDER LEFT (MIN 2 VIEWS) CLINICAL INFORMATION: fall with shoulder pain. COMPARISON: Plain radiographs dated second of May 2014. . TECHNIQUE: 3 views of the shoulder including AP view, a glenoid view, and a scapular Y view. FINDINGS: There is degenerative change involving the acromioclavicular and to a lesser extent glenohumeral joints. There is no fracture or dislocation. There is slight irregularity along the infra glenoid scapula. The clavicle is normal.  The soft tissues are normal.  There are no suspicious findings in the visualized aspects of the upper lung. 1. No acute fracture or dislocation. 2. Degenerative change involving the acromioclavicular and to lesser extent glenohumeral joints. 3. Slight irregularity along the infra glenoid scapula. **This report has been created using voice recognition software. It may contain minor errors which are inherent in voice recognition technology. ** Final report electronically signed by DR Christophe Jara on 8/25/2022 10:05 AM    XR CHEST PORTABLE    Result Date: 8/25/2022  PROCEDURE: XR CHEST PORTABLE CLINICAL INFORMATION: palpitations. COMPARISON: Chest x-ray dated 25 April 2022. TECHNIQUE: AP upright view of the chest. FINDINGS: There is borderline cardiomegaly. .The mediastinum is not widened. There are no pulmonary infiltrates or effusions. The pulmonary vascularity is normal. No suspicious osseous lesions are present.      1. No interval change since previous study dated 25 April 2022. 2. Borderline cardiomegaly, otherwise negative chest x-ray. . **This report has been created using voice recognition software. It may contain minor errors which are inherent in voice recognition technology. ** Final report electronically signed by DR Aki Suazo on 8/25/2022 10:08 AM    VL DUP LOWER EXTREMITY VENOUS LEFT    Result Date: 8/25/2022  PROCEDURE: VL DUP LOWER EXTREMITY VENOUS LEFT CLINICAL INFORMATION: pain and swelling. COMPARISON: No prior study. TECHNIQUE: Venous doppler ultrasound was performed of the left lower extremity using gray scale, color flow and spectral doppler imaging. FINDINGS: There is normal color flow, spectral analysis and compressibility of the left common femoral vein, femoral vein and popliteal veins. There is normal color flow and compressibility in the left posterior tibial veins, anterior tibial veins and peroneal veins. There is normal color flow, spectral analysis and compressibility in the right common femoral vein. No evidence of a DVT in the left lower extremity. **This report has been created using voice recognition software. It may contain minor errors which are inherent in voice recognition technology. ** Final report electronically signed by DR Aki Suazo on 8/25/2022 10:29 AM        EKG:  NSR    Electronically signed by Adela Cordoba DO on 8/27/2022 at 10:48 AM

## 2022-08-27 NOTE — PLAN OF CARE
Problem: Discharge Planning  Goal: Discharge to home or other facility with appropriate resources  8/27/2022 1329 by Naif Puri RN  Outcome: Progressing  Flowsheets (Taken 8/27/2022 0830)  Discharge to home or other facility with appropriate resources: Identify barriers to discharge with patient and caregiver     Problem: Pain  Goal: Verbalizes/displays adequate comfort level or baseline comfort level  8/27/2022 1329 by Naif Puri RN  Outcome: Progressing  Flowsheets (Taken 8/27/2022 0343 by Maya Rainey RN)  Verbalizes/displays adequate comfort level or baseline comfort level: Assess pain using appropriate pain scale     Problem: Chronic Conditions and Co-morbidities  Goal: Patient's chronic conditions and co-morbidity symptoms are monitored and maintained or improved  8/27/2022 1329 by Naif Puri RN  Outcome: Progressing  Flowsheets (Taken 8/27/2022 0830)  Care Plan - Patient's Chronic Conditions and Co-Morbidity Symptoms are Monitored and Maintained or Improved: Monitor and assess patient's chronic conditions and comorbid symptoms for stability, deterioration, or improvement     Problem: ABCDS Injury Assessment  Goal: Absence of physical injury  Outcome: Progressing  Flowsheets (Taken 8/27/2022 1329)  Absence of Physical Injury: Implement safety measures based on patient assessment     Care plan reviewed with patient. Patient verbalizes understanding of the plan of care and contributed to goal setting.

## 2022-08-27 NOTE — FLOWSHEET NOTE
This Virtual RN rounded with patient. Pt sitting up in chair   Pt denies pain/nausea at this time. Pt denies to be repositioned. No concerns at this time. Call light by pt.

## 2022-08-28 LAB
ANION GAP SERPL CALCULATED.3IONS-SCNC: 8 MEQ/L (ref 8–16)
BUN BLDV-MCNC: 6 MG/DL (ref 7–22)
CALCIUM SERPL-MCNC: 8.5 MG/DL (ref 8.5–10.5)
CHLORIDE BLD-SCNC: 109 MEQ/L (ref 98–111)
CO2: 24 MEQ/L (ref 23–33)
CREAT SERPL-MCNC: 0.4 MG/DL (ref 0.4–1.2)
GFR SERPL CREATININE-BSD FRML MDRD: > 90 ML/MIN/1.73M2
GLUCOSE BLD-MCNC: 144 MG/DL (ref 70–108)
GLUCOSE BLD-MCNC: 165 MG/DL (ref 70–108)
GLUCOSE BLD-MCNC: 173 MG/DL (ref 70–108)
GLUCOSE BLD-MCNC: 182 MG/DL (ref 70–108)
GLUCOSE BLD-MCNC: 188 MG/DL (ref 70–108)
HCT VFR BLD CALC: 24.6 % (ref 37–47)
HCT VFR BLD CALC: 27.5 % (ref 37–47)
HEMOGLOBIN: 7.8 GM/DL (ref 12–16)
HEMOGLOBIN: 8.8 GM/DL (ref 12–16)
POTASSIUM REFLEX MAGNESIUM: 4.2 MEQ/L (ref 3.5–5.2)
SODIUM BLD-SCNC: 141 MEQ/L (ref 135–145)

## 2022-08-28 PROCEDURE — 85014 HEMATOCRIT: CPT

## 2022-08-28 PROCEDURE — 2580000003 HC RX 258

## 2022-08-28 PROCEDURE — 6370000000 HC RX 637 (ALT 250 FOR IP)

## 2022-08-28 PROCEDURE — 82948 REAGENT STRIP/BLOOD GLUCOSE: CPT

## 2022-08-28 PROCEDURE — 80048 BASIC METABOLIC PNL TOTAL CA: CPT

## 2022-08-28 PROCEDURE — 6370000000 HC RX 637 (ALT 250 FOR IP): Performed by: OBSTETRICS & GYNECOLOGY

## 2022-08-28 PROCEDURE — 1200000003 HC TELEMETRY R&B

## 2022-08-28 PROCEDURE — 85018 HEMOGLOBIN: CPT

## 2022-08-28 PROCEDURE — 99233 SBSQ HOSP IP/OBS HIGH 50: CPT | Performed by: INTERNAL MEDICINE

## 2022-08-28 PROCEDURE — 36415 COLL VENOUS BLD VENIPUNCTURE: CPT

## 2022-08-28 RX ADMIN — METOPROLOL SUCCINATE 37.5 MG: 25 TABLET, EXTENDED RELEASE ORAL at 10:18

## 2022-08-28 RX ADMIN — SODIUM CHLORIDE, PRESERVATIVE FREE 10 ML: 5 INJECTION INTRAVENOUS at 20:42

## 2022-08-28 RX ADMIN — ATORVASTATIN CALCIUM 80 MG: 80 TABLET, FILM COATED ORAL at 20:41

## 2022-08-28 RX ADMIN — MEGESTROL ACETATE 40 MG: 40 TABLET ORAL at 21:23

## 2022-08-28 RX ADMIN — MEGESTROL ACETATE 40 MG: 40 TABLET ORAL at 10:18

## 2022-08-28 RX ADMIN — RIVAROXABAN 20 MG: 20 TABLET, FILM COATED ORAL at 10:17

## 2022-08-28 RX ADMIN — ACETAMINOPHEN 650 MG: 325 TABLET ORAL at 17:15

## 2022-08-28 RX ADMIN — ACETAMINOPHEN 650 MG: 325 TABLET ORAL at 10:17

## 2022-08-28 RX ADMIN — CLOPIDOGREL BISULFATE 75 MG: 75 TABLET ORAL at 10:17

## 2022-08-28 ASSESSMENT — ENCOUNTER SYMPTOMS
CONSTIPATION: 0
DIARRHEA: 0
BACK PAIN: 0
SHORTNESS OF BREATH: 0
RHINORRHEA: 0
ABDOMINAL DISTENTION: 0
VOMITING: 0
CHEST TIGHTNESS: 0
NAUSEA: 0
ABDOMINAL PAIN: 0
SORE THROAT: 0

## 2022-08-28 NOTE — PROGRESS NOTES
Hospitalist       Patient: Kasia Mead    Unit/Bed:6K-24/024-A  YOB: 1970  MRN: [id number]   Acct: [de-identified]   PCP: SHANNON Delaney CNP    Date of Service: Pt seen/examined on 08/28/22  and Admitted to Inpatient with expected LOS greater than two midnights due to medical therapy. Chief Complaint:  Passed out 2 days ago     Assessment and Plan:  Syncope and collapse, with palpitations:    Pt reports syncopal event 2 days ago, fell on left side. Has had persistent palpitations since with activity. Pt afebrile, hemodynamically stable. Labs show acute normocytic anemia, no WBC, Trop and BNP WNL. Suspect secondary to #2. EKG is NSR. Last Cardiac cath shows EF 60%. Limited ECHO ordered. Telemetry. Orthostatic Bps Q shift. Repeat BMP, CBC in the AM.     Menorrhagia, with acute normocytic anemia:    Pt reports menstruation x 22 days. Last menstrual cycle approximately 6 months ago. Hgb 8.5, pt has no known history of anemia. On Xarelto, Plavix daily. Given 1 PRBC in ED due to significant drop and pt is symptomatic. Recheck Hgb 8.4. Transvaginal US shows endometrium thickness, cervical cyst, anechoic area of right ovary. GYN consulted from ED, recommending Megace. Will recheck upon admission and trend Hgb Q8H. Left side chest pain with Hx CAD, s/p PCI:    [facility] 3/14/22  with PCI to Lcx, OM1, RDPA. On Plaix and Xarelto- continue for now. PT reports left sided chest pain, tenderness with palpation over T9/T10 rib of the left side. Pain started after the fall, therefore suspect MSK etiology. Initial Trop and BNP WNL. EKG without acute ischemic changes. CXR unremarkable. Continue to monitor. Essential HTN:    BP overall stable. Continue home meds. Continue to monitor closely. Factor 5 Leiden mutation:    Hx of PE and DVT. Continue Xarelto for now as Hgb is stable. Obesity:    BMI 48.35 kg/m2. Pt reports losing 100lbs.      8.26.2022 patient seen this a.m. states she is no longer passing vaginal clots. Patient states that she has a feeling in her chest like she did when she had a pulmonary embolism and she is very concerned. She states that she has had multiple PEs and DVTs due to her factor V Leiden. We will obtain a stat CT angiogram PE protocol, hemoglobin is trending down currently at 7.1 will transfuse and start IV fluids. Gynecology has been consulted. We will currently hold Xarelto and Plavix at this time until the hemoglobin becomes more stable    8.27.2022 patient seen this a.m. markedly decreased amount of bleeding. Hemoglobin this a.m. was 7.7, needs to be stable before discharge, and restarting her Xarelto and Plavix. She is to follow-up with OB/GYN for further evaluation. If hemoglobin is less than 7 we will give 1 more unit of blood. And trend. Will place on SCDs    8.28.2022 patient seen this a.m. states she is has no bleeding. We will restart Xarelto and Plavix and monitor she is to follow-up with her OB/GYN for further evaluation. Patient not wearing SCDs, does not like them. Possible discharge in a.m. Patient does have IVC filter in place. History Of Present Illness:    Krista Kenyon is a 55 y/o  female with a PMHx of Factor 5 Leiden mutation, CAD, HTN, T2DM, Hx of TIA who presents to [facility] ED today for the evaluation of passing out 2 days ago and has been having persistent palpitations since. Pt reports she was at a gas station 2 days ago when she finished paying at the pump when she turned around to get into her car and felt lightheaded and fell to the ground and loss consciousness. Pt states she remembers the whole event and denies chest pain at the time of the event. Pt states she was out for a couple seconds when she was able to get up with minimal assistance and later was able to drive home. Pt also mentions she has been having a very heavy and prolonged period for 22 days now. She states her last period was approximately 6 months ago.  Her period now has been persistently heavy for the whole 22 days, states she wears 2 pads at a time. She reports passing several large clots. Otherwise she denies chest pain, fever, chills, shortness of breath, nausea or vomiting. Pt denies hitting her head in the fall, and denies weakness or neurologic deficits at this time. She reports left arm and left chest pain located at the bra line, right where she fell. Past Medical History:        Diagnosis Date    Asthma     Bipolar 1 disorder ([facility] [address]     Blood circulation, collateral     CAD (coronary artery disease)     Curvature of spine     Diabetes mellitus (HCC)     DVT (deep venous thrombosis) (HCC)     Factor 5 Leiden mutation, heterozygous (HCC)     GERD (gastroesophageal reflux disease)     HTN, goal below 130/80     Hx of blood clots     PE x3 and DVT x3    Hx-TIA (transient ischemic attack)     Hyperlipidemia     PE (pulmonary embolism)     RA (rheumatoid arthritis) ([facility] [address]     Unspecified cerebral artery occlusion with cerebral infarction        Past Surgical History:        Procedure Laterality Date    CARDIAC CATHETERIZATION  feb 2015    Heart caths    CHOLECYSTECTOMY  1992    CORONARY ANGIOPLASTY WITH STENT PLACEMENT      FOOT DEBRIDEMENT Right 9/30/2019    FOOT DEBRIDEMENT INCISION AND DRAINAGE performed by Derian Peralta DPM at [address] [facility] [address] ARTHROSCOPY  2007&2010    LIPOMA RESECTION      TONSILLECTOMY      TUBAL LIGATION  2003    TYMPANOSTOMY TUBE PLACEMENT         Home Medications:   No current facility-administered medications on file prior to encounter.      Current Outpatient Medications on File Prior to Encounter   Medication Sig Dispense Refill    metoprolol succinate (TOPROL XL) 25 MG extended release tablet Take 1.5 tablets by mouth daily 135 tablet 1    atorvastatin (LIPITOR) 80 MG tablet Take 1 tablet by mouth at bedtime 90 tablet 2    clopidogrel (PLAVIX) 75 MG tablet Take 1 tablet by mouth daily 90 tablet 2    Blood Pressure KIT 1 kit by Does not apply route as needed (PRN) 1 kit 0    nitroGLYCERIN (NITROSTAT) 0.4 MG SL tablet up to max of 3 total doses. If no relief after 1 dose, call 911. 25 tablet 3    XARELTO 20 MG TABS tablet take 1 tablet by mouth once daily      JANUVIA 100 MG tablet take 1 tablet by mouth once daily      gabapentin (NEURONTIN) 600 MG tablet take 1 tablet by mouth twice a day      acetaminophen (TYLENOL) 325 MG tablet Take 2 tablets by mouth every 4 hours as needed for Pain 120 tablet 3    blood glucose test strips (TRUETRACK TEST) strip 1 each by In Vitro route 2 times daily As needed. 100 each 0       Allergies:    Codeine, Demerol, Ultram [tramadol hcl], Percocet [oxycodone-acetaminophen], and Toradol [ketorolac tromethamine]    Social History:    reports that she quit smoking about 17 years ago. Her smoking use included cigarettes. She smoked an average of .5 packs per day. She has never used smokeless tobacco. She reports that she does not drink alcohol and does not use drugs. Family History:       Problem Relation Age of Onset    COPD Mother     Other Mother     Cancer Mother     High Blood Pressure Father     Heart Disease Father     Mental Illness Sister     Mental Illness Brother     Mental Illness Sister     Mental Illness Brother        Diet:  ADULT DIET; Regular; 3 carb choices (45 gm/meal)    Review of systems:     Review of Systems   Constitutional:  Positive for activity change. Negative for appetite change, chills, fatigue and fever. HENT:  Negative for congestion, rhinorrhea and sore throat. Eyes:  Negative for visual disturbance. Respiratory:  Negative for chest tightness and shortness of breath. Cardiovascular:  Positive for chest pain and palpitations. Negative for leg swelling. Gastrointestinal:  Negative for abdominal distention, abdominal pain, constipation, diarrhea, nausea and vomiting. Genitourinary:  Positive for menstrual problem.  Negative for difficulty urinating, flank pain, pelvic pain and urgency. Musculoskeletal:  Positive for arthralgias and gait problem. Negative for back pain. Neurological:  Positive for syncope and light-headedness. Negative for dizziness, weakness and headaches. Hematological:  Bruises/bleeds easily. PHYSICAL EXAM:  BP (!) 112/48   Pulse 78   Temp 98.4 °F (36.9 °C) (Oral)   Resp 17   Ht 5' 8\" (1.727 m)   Wt (!) 337 lb 3.2 oz (153 kg)   LMP 06/21/2018   SpO2 98%   BMI 51.27 kg/m²   General appearance: Chronically ill appearing. No apparent distress. Appears stated age and cooperative. Skin: Skin color, texture, turgor normal.  No rashes or lesions. HEENT: Normal cephalic, atraumatic without obvious deformity. Pupils equal, round, and reactive to light. Extra-ocular muscles intact. Conjunctivae/corneas clear. Neck: Trachea midline. Supple, with full range of motion. No jugular venous distention. Cardiovascular: Regular rate and rhythm with normal S1/S2. No murmurs, rubs or gallops. Respiratory:  Normal respiratory effort. Clear to auscultation, bilaterally without rales, wheezes, or rhonchi. Abdomen: Soft, non-tender, non-distended. Normal bowel sounds. Musculoskeletal:  No weakness or instability noted. No edema, erythema, or gross deformity noted. Vascular: Pulses +2 palpable, equal bilaterally. Neurologic:  Neurovascularly intact without any focal sensory/motor deficits. Cranial nerves: II-XII grossly intact. Psychiatric: Alert and oriented, thought content appropriate, normal insight      Labs:   Recent Labs     08/25/22  0923 08/25/22  1742 08/26/22  0437 08/26/22  1442 08/27/22  1200 08/27/22 2016 08/28/22  0624   WBC 7.0  --  5.9  --   --   --   --    HGB 8.5*   < > 7.1*   < > 8.7* 8.4* 7.8*   HCT 27.0*   < > 22.3*   < > 26.5* 25.7* 24.6*     --  190  --   --   --   --     < > = values in this interval not displayed.        Recent Labs     08/26/22  0437 08/27/22  0526 08/28/22  0624    140 141   K 3.2* 4.0 4.2    108 109   CO2 24 23 24   BUN 6* 7 6*   CREATININE 0.4 0.5 0.4   CALCIUM 7.9* 8.5 8.5       Recent Labs     08/25/22  0923   AST 12   ALT 10*   BILIDIR <0.2   BILITOT 0.3   ALKPHOS 63       No results for input(s): INR in the last 72 hours. No results for input(s): Rhae Childes in the last 72 hours. Urinalysis:    Lab Results   Component Value Date/Time    NITRU NEGATIVE 09/09/2015 04:20 PM    WBCUA 5-10 09/09/2015 04:20 PM    WBCUA 2-4 10/17/2011 06:05 PM    BACTERIA NONE 09/09/2015 04:20 PM    RBCUA 0-2 09/09/2015 04:20 PM    BLOODU MODERATE 09/09/2015 04:20 PM    SPECGRAV 1.007 09/09/2015 04:20 PM    GLUCOSEU >= 1000 03/14/2015 02:28 PM       Radiology:   VL DUP LOWER EXTREMITY VENOUS BILATERAL   Final Result   1. Study limited by patient's body habitus and swelling of legs. 2. Small focus of subacute/chronic appearing nonobstructing mural thrombus seen in the left popliteal vein. 3. None of the veins to be identified in the left calf and the anterior tibial veins could not be visualized the right calf. 4. No definite evidence for acute deep venous thrombosis. **This report has been created using voice recognition software. It may contain minor errors which are inherent in voice recognition technology. **      Final report electronically signed by Dr. Jennie Cintron on 8/27/2022 1:17 PM      CTA CHEST W CONTRAST   Final Result   1. No filling defects are noted within the pulmonary arterial vasculature to suggest the presence of pulmonary embolism. No acute intrathoracic process is observed. 2. Chronic findings are discussed. **This report has been created using voice recognition software. It may contain minor errors which are inherent in voice recognition technology. **      Final report electronically signed by Dr Michele Gonzalez on 8/26/2022 4:18 PM      US NON OB TRANSVAGINAL   Final Result      1.  Abnormal echogenicity within the endometrium which measures 3.2 cm in thickness. Endometrial carcinoma cannot be ruled out. GYN consult would be helpful for better evaluation. 2. Cyst  in the region of the cervix measuring 12 x 10 x 8 mm in size. 3. Anechoic area in the right ovary measuring 4 x 2.1 x 3.7 cm in size. 4. The left ovary was not clearly visualized. 5. There is no definite free fluid within the pelvis. **This report has been created using voice recognition software. It may contain minor errors which are inherent in voice recognition technology. **      Final report electronically signed by DR Lon Lynn on 8/25/2022 1:02 PM      VL DUP LOWER EXTREMITY VENOUS LEFT   Final Result   No evidence of a DVT in the left lower extremity. **This report has been created using voice recognition software. It may contain minor errors which are inherent in voice recognition technology. **      Final report electronically signed by DR Lon Lynn on 8/25/2022 10:29 AM      XR CHEST PORTABLE   Final Result   1. No interval change since previous study dated 25 April 2022.   2. Borderline cardiomegaly, otherwise negative chest x-ray. .               **This report has been created using voice recognition software. It may contain minor errors which are inherent in voice recognition technology. **      Final report electronically signed by DR Lon Lynn on 8/25/2022 10:08 AM      XR SHOULDER LEFT (MIN 2 VIEWS)   Final Result       1. No acute fracture or dislocation. 2. Degenerative change involving the acromioclavicular and to lesser extent glenohumeral joints. 3. Slight irregularity along the infra glenoid scapula. **This report has been created using voice recognition software. It may contain minor errors which are inherent in voice recognition technology. **      Final report electronically signed by DR Lon Lynn on 8/25/2022 10:05 AM        US NON OB TRANSVAGINAL    Result Date: 8/25/2022  PROCEDURE: US NON OB TRANSVAGINAL CLINICAL INFORMATION: postmenopausal vaginal bleeding. COMPARISON: No prior study. TECHNIQUE:   . Transvaginal ultrasound scan was carried out through the pelvis. FINDINGS: LMP-03/10/2022 Uterus - 12.1 x 7.3 x 6.4 cm Endometrium - 3.2 cm Right Ovary - 3.7 x 2.7 x 2.9 cm Left Ovary - not visualized The uterus measures 12.1 x 7.3 x 6.4 cm in size. There is abnormal endometrial thickening which measures 3.2 cm in thickness. This could represent endometrial carcinoma. GYN consult would be helpful for further evaluation. There is a cyst  in the region of the cervix measuring 12 x 10 x 8 mm in size. . There is an anechoic area in the right ovary measuring 4 x 3.1 x 3.7 cm in size. . The left ovary was not clearly seen. There is no free fluid within the pelvis. Jerome Power 1. Abnormal echogenicity within the endometrium which measures 3.2 cm in thickness. Endometrial carcinoma cannot be ruled out. GYN consult would be helpful for better evaluation. 2. Cyst  in the region of the cervix measuring 12 x 10 x 8 mm in size. 3. Anechoic area in the right ovary measuring 4 x 2.1 x 3.7 cm in size. 4. The left ovary was not clearly visualized. 5. There is no definite free fluid within the pelvis. **This report has been created using voice recognition software. It may contain minor errors which are inherent in voice recognition technology. ** Final report electronically signed by DR Nadeem Erazo on 8/25/2022 1:02 PM    XR SHOULDER LEFT (MIN 2 VIEWS)    Result Date: 8/25/2022  PROCEDURE: XR SHOULDER LEFT (MIN 2 VIEWS) CLINICAL INFORMATION: fall with shoulder pain. COMPARISON: Plain radiographs dated second of May 2014. . TECHNIQUE: 3 views of the shoulder including AP view, a glenoid view, and a scapular Y view. FINDINGS: There is degenerative change involving the acromioclavicular and to a lesser extent glenohumeral joints. There is no fracture or dislocation. There is slight irregularity along the infra glenoid scapula.   The clavicle is normal.  The soft tissues are normal.  There are no suspicious findings in the visualized aspects of the upper lung. 1. No acute fracture or dislocation. 2. Degenerative change involving the acromioclavicular and to lesser extent glenohumeral joints. 3. Slight irregularity along the infra glenoid scapula. **This report has been created using voice recognition software. It may contain minor errors which are inherent in voice recognition technology. ** Final report electronically signed by DR Aki Suazo on 8/25/2022 10:05 AM    XR CHEST PORTABLE    Result Date: 8/25/2022  PROCEDURE: XR CHEST PORTABLE CLINICAL INFORMATION: palpitations. COMPARISON: Chest x-ray dated 25 April 2022. TECHNIQUE: AP upright view of the chest. FINDINGS: There is borderline cardiomegaly. .The mediastinum is not widened. There are no pulmonary infiltrates or effusions. The pulmonary vascularity is normal. No suspicious osseous lesions are present. 1. No interval change since previous study dated 25 April 2022. 2. Borderline cardiomegaly, otherwise negative chest x-ray. . **This report has been created using voice recognition software. It may contain minor errors which are inherent in voice recognition technology. ** Final report electronically signed by DR Aki Suazo on 8/25/2022 10:08 AM    VL DUP LOWER EXTREMITY VENOUS LEFT    Result Date: 8/25/2022  PROCEDURE: VL DUP LOWER EXTREMITY VENOUS LEFT CLINICAL INFORMATION: pain and swelling. COMPARISON: No prior study. TECHNIQUE: Venous doppler ultrasound was performed of the left lower extremity using gray scale, color flow and spectral doppler imaging. FINDINGS: There is normal color flow, spectral analysis and compressibility of the left common femoral vein, femoral vein and popliteal veins. There is normal color flow and compressibility in the left posterior tibial veins, anterior tibial veins and peroneal veins. There is normal color flow, spectral analysis and compressibility in the right common femoral vein.

## 2022-08-28 NOTE — FLOWSHEET NOTE
This Virtual Rn unable to round on patient at this time. Patient is with nursing staff. Will check back at later time.

## 2022-08-29 VITALS
HEIGHT: 68 IN | OXYGEN SATURATION: 97 % | DIASTOLIC BLOOD PRESSURE: 61 MMHG | SYSTOLIC BLOOD PRESSURE: 128 MMHG | HEART RATE: 76 BPM | RESPIRATION RATE: 16 BRPM | TEMPERATURE: 97.7 F | WEIGHT: 293 LBS | BODY MASS INDEX: 44.41 KG/M2

## 2022-08-29 LAB
BASOPHILS # BLD: 0.8 %
BASOPHILS ABSOLUTE: 0 THOU/MM3 (ref 0–0.1)
EOSINOPHIL # BLD: 1.8 %
EOSINOPHILS ABSOLUTE: 0.1 THOU/MM3 (ref 0–0.4)
ERYTHROCYTE [DISTWIDTH] IN BLOOD BY AUTOMATED COUNT: 16.6 % (ref 11.5–14.5)
ERYTHROCYTE [DISTWIDTH] IN BLOOD BY AUTOMATED COUNT: 51.7 FL (ref 35–45)
GLUCOSE BLD-MCNC: 161 MG/DL (ref 70–108)
GLUCOSE BLD-MCNC: 187 MG/DL (ref 70–108)
HCT VFR BLD CALC: 25.9 % (ref 37–47)
HEMOGLOBIN: 8.4 GM/DL (ref 12–16)
IMMATURE GRANS (ABS): 0.02 THOU/MM3 (ref 0–0.07)
IMMATURE GRANULOCYTES: 0.4 %
LYMPHOCYTES # BLD: 19.5 %
LYMPHOCYTES ABSOLUTE: 1 THOU/MM3 (ref 1–4.8)
MCH RBC QN AUTO: 30.2 PG (ref 26–33)
MCHC RBC AUTO-ENTMCNC: 32.4 GM/DL (ref 32.2–35.5)
MCV RBC AUTO: 93.2 FL (ref 81–99)
MONOCYTES # BLD: 6.7 %
MONOCYTES ABSOLUTE: 0.3 THOU/MM3 (ref 0.4–1.3)
NUCLEATED RED BLOOD CELLS: 0 /100 WBC
PLATELET # BLD: 215 THOU/MM3 (ref 130–400)
PMV BLD AUTO: 9.6 FL (ref 9.4–12.4)
RBC # BLD: 2.78 MILL/MM3 (ref 4.2–5.4)
SEG NEUTROPHILS: 70.8 %
SEGMENTED NEUTROPHILS ABSOLUTE COUNT: 3.6 THOU/MM3 (ref 1.8–7.7)
WBC # BLD: 5.1 THOU/MM3 (ref 4.8–10.8)

## 2022-08-29 PROCEDURE — 36415 COLL VENOUS BLD VENIPUNCTURE: CPT

## 2022-08-29 PROCEDURE — 6370000000 HC RX 637 (ALT 250 FOR IP)

## 2022-08-29 PROCEDURE — 82948 REAGENT STRIP/BLOOD GLUCOSE: CPT

## 2022-08-29 PROCEDURE — 99233 SBSQ HOSP IP/OBS HIGH 50: CPT | Performed by: INTERNAL MEDICINE

## 2022-08-29 PROCEDURE — 6370000000 HC RX 637 (ALT 250 FOR IP): Performed by: OBSTETRICS & GYNECOLOGY

## 2022-08-29 PROCEDURE — 2580000003 HC RX 258

## 2022-08-29 PROCEDURE — 85025 COMPLETE CBC W/AUTO DIFF WBC: CPT

## 2022-08-29 RX ADMIN — CLOPIDOGREL BISULFATE 75 MG: 75 TABLET ORAL at 08:29

## 2022-08-29 RX ADMIN — SODIUM CHLORIDE, PRESERVATIVE FREE 10 ML: 5 INJECTION INTRAVENOUS at 08:30

## 2022-08-29 RX ADMIN — METOPROLOL SUCCINATE 37.5 MG: 25 TABLET, EXTENDED RELEASE ORAL at 08:29

## 2022-08-29 RX ADMIN — MEGESTROL ACETATE 40 MG: 40 TABLET ORAL at 08:33

## 2022-08-29 RX ADMIN — ACETAMINOPHEN 650 MG: 325 TABLET ORAL at 08:55

## 2022-08-29 RX ADMIN — RIVAROXABAN 20 MG: 20 TABLET, FILM COATED ORAL at 08:55

## 2022-08-29 ASSESSMENT — PAIN DESCRIPTION - ORIENTATION
ORIENTATION: LOWER
ORIENTATION: LOWER

## 2022-08-29 ASSESSMENT — PAIN DESCRIPTION - DESCRIPTORS
DESCRIPTORS: ACHING
DESCRIPTORS: ACHING

## 2022-08-29 ASSESSMENT — ENCOUNTER SYMPTOMS
ABDOMINAL DISTENTION: 0
DIARRHEA: 0
NAUSEA: 0
BACK PAIN: 0
SORE THROAT: 0
CHEST TIGHTNESS: 0
ABDOMINAL PAIN: 0
RHINORRHEA: 0
SHORTNESS OF BREATH: 0
CONSTIPATION: 0
VOMITING: 0

## 2022-08-29 ASSESSMENT — PAIN SCALES - GENERAL
PAINLEVEL_OUTOF10: 8
PAINLEVEL_OUTOF10: 8

## 2022-08-29 ASSESSMENT — PAIN DESCRIPTION - LOCATION
LOCATION: BACK
LOCATION: BACK

## 2022-08-29 NOTE — FLOWSHEET NOTE
AVS reviewed with pt. Meds to beds not available because rx filled 8/25 at [facility] in [address] [facility]. Pt confirmed availability of rx prior to leaving. Transported by wheelchair to [facility].

## 2022-08-29 NOTE — PROGRESS NOTES
Hospitalist       Patient: Chichi Jarrett    Unit/Bed:6K-24/024-A  YOB: 1970  MRN: [id number]   Acct: [de-identified]   PCP: SHANNON Plummer CNP    Date of Service: Pt seen/examined on 08/29/22  and Admitted to Inpatient with expected LOS greater than two midnights due to medical therapy. Chief Complaint:  Passed out 2 days ago     Assessment and Plan:  Syncope and collapse, with palpitations:    Pt reports syncopal event 2 days ago, fell on left side. Has had persistent palpitations since with activity. Pt afebrile, hemodynamically stable. Labs show acute normocytic anemia, no WBC, Trop and BNP WNL. Suspect secondary to #2. EKG is NSR. Last Cardiac cath shows EF 60%. Limited ECHO ordered. Telemetry. Orthostatic Bps Q shift. Repeat BMP, CBC in the AM.     Menorrhagia, with acute normocytic anemia:    Pt reports menstruation x 22 days. Last menstrual cycle approximately 6 months ago. Hgb 8.5, pt has no known history of anemia. On Xarelto, Plavix daily. Given 1 PRBC in ED due to significant drop and pt is symptomatic. Recheck Hgb 8.4. Transvaginal US shows endometrium thickness, cervical cyst, anechoic area of right ovary. GYN consulted from ED, recommending Megace. Will recheck upon admission and trend Hgb Q8H. Left side chest pain with Hx CAD, s/p PCI:    [facility] 3/14/22  with PCI to Lcx, OM1, RDPA. On Plaix and Xarelto- continue for now. PT reports left sided chest pain, tenderness with palpation over T9/T10 rib of the left side. Pain started after the fall, therefore suspect MSK etiology. Initial Trop and BNP WNL. EKG without acute ischemic changes. CXR unremarkable. Continue to monitor. Essential HTN:    BP overall stable. Continue home meds. Continue to monitor closely. Factor 5 Leiden mutation:    Hx of PE and DVT. Continue Xarelto for now as Hgb is stable. Obesity:    BMI 48.35 kg/m2. Pt reports losing 100lbs.      8.26.2022 patient seen this a.m. states she is no longer passing vaginal clots. Patient states that she has a feeling in her chest like she did when she had a pulmonary embolism and she is very concerned. She states that she has had multiple PEs and DVTs due to her factor V Leiden. We will obtain a stat CT angiogram PE protocol, hemoglobin is trending down currently at 7.1 will transfuse and start IV fluids. Gynecology has been consulted. We will currently hold Xarelto and Plavix at this time until the hemoglobin becomes more stable    8.27.2022 patient seen this a.m. markedly decreased amount of bleeding. Hemoglobin this a.m. was 7.7, needs to be stable before discharge, and restarting her Xarelto and Plavix. She is to follow-up with OB/GYN for further evaluation. If hemoglobin is less than 7 we will give 1 more unit of blood. And trend. Will place on SCDs    8.28.2022 patient seen this a.m. states she is has no bleeding. We will restart Xarelto and Plavix and monitor she is to follow-up with her OB/GYN for further evaluation. Patient not wearing SCDs, does not like them. Possible discharge in a.m. Patient does have IVC filter in place. [id number] patient seen this a.m. states she has no bleeding. Xarelto and Plavix have been restarted, will continue with Megace, will need follow-up with OB/GYN at time of discharge. Possible discharge today if hemoglobin is stable    History Of Present Illness:    Jim Jordan is a 53 y/o  female with a PMHx of Factor 5 Leiden mutation, CAD, HTN, T2DM, Hx of TIA who presents to [facility] ED today for the evaluation of passing out 2 days ago and has been having persistent palpitations since. Pt reports she was at a gas station 2 days ago when she finished paying at the pump when she turned around to get into her car and felt lightheaded and fell to the ground and loss consciousness. Pt states she remembers the whole event and denies chest pain at the time of the event.  Pt states she was out for a couple seconds when she was able to get up with minimal assistance and later was able to drive home. Pt also mentions she has been having a very heavy and prolonged period for 22 days now. She states her last period was approximately 6 months ago. Her period now has been persistently heavy for the whole 22 days, states she wears 2 pads at a time. She reports passing several large clots. Otherwise she denies chest pain, fever, chills, shortness of breath, nausea or vomiting. Pt denies hitting her head in the fall, and denies weakness or neurologic deficits at this time. She reports left arm and left chest pain located at the bra line, right where she fell. Past Medical History:        Diagnosis Date    Asthma     Bipolar 1 disorder ([facility] [address]     Blood circulation, collateral     CAD (coronary artery disease)     Curvature of spine     Diabetes mellitus (HCC)     DVT (deep venous thrombosis) ([facility])     Factor 5 Leiden mutation, heterozygous ([facility])     GERD (gastroesophageal reflux disease)     HTN, goal below 130/80     Hx of blood clots     PE x3 and DVT x3    Hx-TIA (transient ischemic attack)     Hyperlipidemia     PE (pulmonary embolism)     RA (rheumatoid arthritis) ([facility] [address]     Unspecified cerebral artery occlusion with cerebral infarction        Past Surgical History:        Procedure Laterality Date    CARDIAC CATHETERIZATION  feb 2015    Heart caths    CHOLECYSTECTOMY  1992    CORONARY ANGIOPLASTY WITH STENT PLACEMENT      FOOT DEBRIDEMENT Right 9/30/2019    FOOT DEBRIDEMENT INCISION AND DRAINAGE performed by Laron Choudhary DPM at [place] [address] ARTHROSCOPY  2007&2010    LIPOMA RESECTION      TONSILLECTOMY      TUBAL LIGATION  2003    TYMPANOSTOMY TUBE PLACEMENT         Home Medications:   No current facility-administered medications on file prior to encounter.      Current Outpatient Medications on File Prior to Encounter   Medication Sig Dispense Refill    metoprolol succinate (TOPROL XL) 25 MG extended release tablet Take 1.5 tablets by mouth daily 135 tablet 1    atorvastatin (LIPITOR) 80 MG tablet Take 1 tablet by mouth at bedtime 90 tablet 2    clopidogrel (PLAVIX) 75 MG tablet Take 1 tablet by mouth daily 90 tablet 2    Blood Pressure KIT 1 kit by Does not apply route as needed (PRN) 1 kit 0    nitroGLYCERIN (NITROSTAT) 0.4 MG SL tablet up to max of 3 total doses. If no relief after 1 dose, call 911. 25 tablet 3    XARELTO 20 MG TABS tablet take 1 tablet by mouth once daily      JANUVIA 100 MG tablet take 1 tablet by mouth once daily      gabapentin (NEURONTIN) 600 MG tablet take 1 tablet by mouth twice a day      acetaminophen (TYLENOL) 325 MG tablet Take 2 tablets by mouth every 4 hours as needed for Pain 120 tablet 3    blood glucose test strips (TRUETRACK TEST) strip 1 each by In Vitro route 2 times daily As needed. 100 each 0       Allergies:    Codeine, Demerol, Ultram [tramadol hcl], Percocet [oxycodone-acetaminophen], and Toradol [ketorolac tromethamine]    Social History:    reports that she quit smoking about 17 years ago. Her smoking use included cigarettes. She smoked an average of .5 packs per day. She has never used smokeless tobacco. She reports that she does not drink alcohol and does not use drugs. Family History:       Problem Relation Age of Onset    COPD Mother     Other Mother     Cancer Mother     High Blood Pressure Father     Heart Disease Father     Mental Illness Sister     Mental Illness Brother     Mental Illness Sister     Mental Illness Brother        Diet:  ADULT DIET; Regular; 3 carb choices (45 gm/meal)    Review of systems:     Review of Systems   Constitutional:  Positive for activity change. Negative for appetite change, chills, fatigue and fever. HENT:  Negative for congestion, rhinorrhea and sore throat. Eyes:  Negative for visual disturbance. Respiratory:  Negative for chest tightness and shortness of breath. Cardiovascular:  Positive for chest pain and palpitations.  Negative for 6*   CREATININE 0.5 0.4   CALCIUM 8.5 8.5       No results for input(s): AST, ALT, BILIDIR, BILITOT, ALKPHOS in the last 72 hours. No results for input(s): INR in the last 72 hours. No results for input(s): Rhae Childes in the last 72 hours. Urinalysis:    Lab Results   Component Value Date/Time    NITRU NEGATIVE 09/09/2015 04:20 PM    WBCUA 5-10 09/09/2015 04:20 PM    WBCUA 2-4 10/17/2011 06:05 PM    BACTERIA NONE 09/09/2015 04:20 PM    RBCUA 0-2 09/09/2015 04:20 PM    BLOODU MODERATE 09/09/2015 04:20 PM    SPECGRAV 1.007 09/09/2015 04:20 PM    GLUCOSEU >= 1000 03/14/2015 02:28 PM       Radiology:   VL DUP LOWER EXTREMITY VENOUS BILATERAL   Final Result   1. Study limited by patient's body habitus and swelling of legs. 2. Small focus of subacute/chronic appearing nonobstructing mural thrombus seen in the left popliteal vein. 3. None of the veins to be identified in the left calf and the anterior tibial veins could not be visualized the right calf. 4. No definite evidence for acute deep venous thrombosis. **This report has been created using voice recognition software. It may contain minor errors which are inherent in voice recognition technology. **      Final report electronically signed by Dr. Jennie Cintron on 8/27/2022 1:17 PM      CTA CHEST W CONTRAST   Final Result   1. No filling defects are noted within the pulmonary arterial vasculature to suggest the presence of pulmonary embolism. No acute intrathoracic process is observed. 2. Chronic findings are discussed. **This report has been created using voice recognition software. It may contain minor errors which are inherent in voice recognition technology. **      Final report electronically signed by Dr Michele Gonzalez on 8/26/2022 4:18 PM      US NON OB TRANSVAGINAL   Final Result      1. Abnormal echogenicity within the endometrium which measures 3.2 cm in thickness. Endometrial carcinoma cannot be ruled out.  GYN consult would be helpful for better evaluation. 2. Cyst  in the region of the cervix measuring 12 x 10 x 8 mm in size. 3. Anechoic area in the right ovary measuring 4 x 2.1 x 3.7 cm in size. 4. The left ovary was not clearly visualized. 5. There is no definite free fluid within the pelvis. **This report has been created using voice recognition software. It may contain minor errors which are inherent in voice recognition technology. **      Final report electronically signed by DR Missy Ibarra on 8/25/2022 1:02 PM      VL DUP LOWER EXTREMITY VENOUS LEFT   Final Result   No evidence of a DVT in the left lower extremity. **This report has been created using voice recognition software. It may contain minor errors which are inherent in voice recognition technology. **      Final report electronically signed by DR Missy Ibarra on 8/25/2022 10:29 AM      XR CHEST PORTABLE   Final Result   1. No interval change since previous study dated 25 April 2022.   2. Borderline cardiomegaly, otherwise negative chest x-ray. .               **This report has been created using voice recognition software. It may contain minor errors which are inherent in voice recognition technology. **      Final report electronically signed by DR Missy Ibarra on 8/25/2022 10:08 AM      XR SHOULDER LEFT (MIN 2 VIEWS)   Final Result       1. No acute fracture or dislocation. 2. Degenerative change involving the acromioclavicular and to lesser extent glenohumeral joints. 3. Slight irregularity along the infra glenoid scapula. **This report has been created using voice recognition software. It may contain minor errors which are inherent in voice recognition technology. **      Final report electronically signed by DR Missy Ibarra on 8/25/2022 10:05 AM        US NON OB TRANSVAGINAL    Result Date: 8/25/2022  PROCEDURE: US NON OB TRANSVAGINAL CLINICAL INFORMATION: postmenopausal vaginal bleeding. COMPARISON: No prior study. TECHNIQUE:   . Transvaginal ultrasound scan was carried out through the pelvis. FINDINGS: LMP-03/10/2022 Uterus - 12.1 x 7.3 x 6.4 cm Endometrium - 3.2 cm Right Ovary - 3.7 x 2.7 x 2.9 cm Left Ovary - not visualized The uterus measures 12.1 x 7.3 x 6.4 cm in size. There is abnormal endometrial thickening which measures 3.2 cm in thickness. This could represent endometrial carcinoma. GYN consult would be helpful for further evaluation. There is a cyst  in the region of the cervix measuring 12 x 10 x 8 mm in size. . There is an anechoic area in the right ovary measuring 4 x 3.1 x 3.7 cm in size. . The left ovary was not clearly seen. There is no free fluid within the pelvis. Sue Hidalgo 1. Abnormal echogenicity within the endometrium which measures 3.2 cm in thickness. Endometrial carcinoma cannot be ruled out. GYN consult would be helpful for better evaluation. 2. Cyst  in the region of the cervix measuring 12 x 10 x 8 mm in size. 3. Anechoic area in the right ovary measuring 4 x 2.1 x 3.7 cm in size. 4. The left ovary was not clearly visualized. 5. There is no definite free fluid within the pelvis. **This report has been created using voice recognition software. It may contain minor errors which are inherent in voice recognition technology. ** Final report electronically signed by DR Jens Sullivan on 8/25/2022 1:02 PM    XR SHOULDER LEFT (MIN 2 VIEWS)    Result Date: 8/25/2022  PROCEDURE: XR SHOULDER LEFT (MIN 2 VIEWS) CLINICAL INFORMATION: fall with shoulder pain. COMPARISON: Plain radiographs dated second of May 2014. . TECHNIQUE: 3 views of the shoulder including AP view, a glenoid view, and a scapular Y view. FINDINGS: There is degenerative change involving the acromioclavicular and to a lesser extent glenohumeral joints. There is no fracture or dislocation. There is slight irregularity along the infra glenoid scapula.   The clavicle is normal.  The soft tissues are normal.  There are no suspicious findings in the visualized

## 2022-08-29 NOTE — CARE COORDINATION
8/29/22, 9:10 AM EDT    DISCHARGE   Alexandre Knott day: 4  Location: [facility]24/024-A Reason for admit: Syncope and collapse [R55]  Acute blood loss anemia [D62]  DUB (dysfunctional uterine bleeding) [N93.8]  Endometrial thickening on ultrasound [R93.89]   Procedure: 8/26: ECHO:Normal left ventricle size and systolic function. Ejection fraction was   estimated at 55-60%. There were no regional left ventricular wall motion   abnormalities and wall thickness was within normal limits. 8/26: Endometrial biopsy  Barriers to Discharge: monitor H/H-today 8.4/25.9  PCP: SHANNON Matos CNP  Readmission Risk Score: 9.2%  Patient Goals/Plan/Treatment Preferences: Plan return home alone. Follow up with Gyn/onc OP. Will continue to follow for home going needs. 8/29/22, 11:02 AM EDT    Patient goals/plan/ treatment preferences discussed by  and . Patient goals/plan/ treatment preferences reviewed with patient/ family. Patient/ family verbalize understanding of discharge plan and are in agreement with goal/plan/treatment preferences. Understanding was demonstrated using the teach back method. AVS provided by RN at time of discharge, which includes all necessary medical information pertaining to the patients current course of illness, treatment, post-discharge goals of care, and treatment preferences.      Services At/After Discharge: None

## 2022-08-30 NOTE — DISCHARGE SUMMARY
Hospital Medicine Discharge Summary      Patient Identification:   Ihsan Cuba   : 1970  MRN: [id number]   Account: [de-identified]      Patient's PCP: SHANNON Patel CNP    Admit Date: 2022     Discharge Date: 2022      Admitting Physician: Gabi Goddard DO     Discharge Physician: Gabi Goddard DO     Discharge Diagnoses: Active Hospital Problems    Diagnosis Date Noted    Menorrhagia [N92.0] 2022     Priority: Medium    Cervical polyp [N84.1] 2022     Priority: Medium    Syncope and collapse [R55] 2022     Priority: Medium       The patient was seen and examined on day of discharge and this discharge summary is in conjunction with any daily progress note from day of discharge. Hospital Course:   Ihsan Cuba is a 54 y.o. female admitted to [facility] on 2022 for syncopy. Syncope and collapse, with palpitations:               Pt reports syncopal event 2 days ago, fell on left side. Has had persistent palpitations since with activity. Pt afebrile, hemodynamically stable. Labs show acute normocytic anemia, no WBC, Trop and BNP WNL. Suspect secondary to #2. EKG is NSR. Last Cardiac cath shows EF 60%. Limited ECHO ordered. Telemetry. Orthostatic Bps Q shift. Repeat BMP, CBC in the AM.      Menorrhagia, with acute normocytic anemia:               Pt reports menstruation x 22 days. Last menstrual cycle approximately 6 months ago. Hgb 8.5, pt has no known history of anemia. On Xarelto, Plavix daily. Given 1 PRBC in ED due to significant drop and pt is symptomatic. Recheck Hgb 8.4. Transvaginal US shows endometrium thickness, cervical cyst, anechoic area of right ovary. GYN consulted from ED, recommending Megace. Will recheck upon admission and trend Hgb Q8H. Left side chest pain with Hx CAD, s/p PCI:               C 3/14/22  with PCI to Lcx, OM1, RDPA. On Plaix and Xarelto- continue for now.  PT reports left sided chest pain, tenderness with palpation over T9/T10 rib of the left side. Pain started after the fall, therefore suspect MSK etiology. Initial Trop and BNP WNL. EKG without acute ischemic changes. CXR unremarkable. Continue to monitor. Essential HTN:               BP overall stable. Continue home meds. Continue to monitor closely. Factor 5 Leiden mutation:               Hx of PE and DVT. Continue Xarelto for now as Hgb is stable. Obesity:               BMI 48.35 kg/m2. Pt reports losing 100lbs. 8.26.2022 patient seen this a.m. states she is no longer passing vaginal clots. Patient states that she has a feeling in her chest like she did when she had a pulmonary embolism and she is very concerned. She states that she has had multiple PEs and DVTs due to her factor V Leiden. We will obtain a stat CT angiogram PE protocol, hemoglobin is trending down currently at 7.1 will transfuse and start IV fluids. Gynecology has been consulted. We will currently hold Xarelto and Plavix at this time until the hemoglobin becomes more stable     8.27.2022 patient seen this a.m. markedly decreased amount of bleeding. Hemoglobin this a.m. was 7.7, needs to be stable before discharge, and restarting her Xarelto and Plavix. She is to follow-up with OB/GYN for further evaluation. If hemoglobin is less than 7 we will give 1 more unit of blood. And trend. Will place on SCDs     8.28.2022 patient seen this a.m. states she is has no bleeding. We will restart Xarelto and Plavix and monitor she is to follow-up with her OB/GYN for further evaluation. Patient not wearing SCDs, does not like them. Possible discharge in a.m. Patient does have IVC filter in place. [id number] patient seen this a.m. states she has no bleeding. Xarelto and Plavix have been restarted, will continue with Megaconstantino, will need follow-up with OB/GYN at time of discharge.   Possible discharge today if hemoglobin is stable      Exam:     Vitals:  Vitals: 08/28/22 2312 08/29/22 0310 08/29/22 0745 08/29/22 0829   BP: (!) 141/66 (!) 106/55 128/61 128/61   Pulse: 72 78 76 76   Resp: 16 16 16    Temp: 98.2 °F (36.8 °C) 98.2 °F (36.8 °C) 97.7 °F (36.5 °C)    TempSrc:   Oral    SpO2: 98% 97% 97%    Weight:  (!) 336 lb (152.4 kg)     Height:         Weight: Weight: (!) 336 lb (152.4 kg)     24 hour intake/output:No intake or output data in the 24 hours ending 08/30/22 1302      General appearance:  No apparent distress, appears stated age and cooperative. HEENT:  Normal cephalic, atraumatic without obvious deformity. Pupils equal, round, and reactive to light. Extra ocular muscles intact. Conjunctivae/corneas clear. Neck: Supple, with full range of motion. No jugular venous distention. Trachea midline. Respiratory:  Normal respiratory effort. Clear to auscultation, bilaterally without Rales/Wheezes/Rhonchi. Cardiovascular:  Regular rate and rhythm with normal S1/S2 without murmurs, rubs or gallops. Abdomen: Soft, non-tender, non-distended with normal bowel sounds. Musculoskeletal:  No clubbing, cyanosis or edema bilaterally. Full range of motion without deformity. Skin: Skin color, texture, turgor normal.  No rashes or lesions. Neurologic:  Neurovascularly intact without any focal sensory/motor deficits. Cranial nerves: II-XII intact, grossly non-focal.  Psychiatric:  Alert and oriented, thought content appropriate, normal insight  Capillary Refill: Brisk,< 3 seconds   Peripheral Pulses: +2 palpable, equal bilaterally       Labs:  For convenience and continuity at follow-up the following most recent labs are provided:      CBC:    Lab Results   Component Value Date/Time    WBC 5.1 08/29/2022 08:37 AM    HGB 8.4 08/29/2022 08:37 AM    HCT 25.9 08/29/2022 08:37 AM     08/29/2022 08:37 AM       Renal:    Lab Results   Component Value Date/Time     08/28/2022 06:24 AM    K 4.2 08/28/2022 06:24 AM     08/28/2022 06:24 AM    CO2 24 08/28/2022 06:24 AM BUN 6 08/28/2022 06:24 AM    CREATININE 0.4 08/28/2022 06:24 AM    CALCIUM 8.5 08/28/2022 06:24 AM         Significant Diagnostic Studies    Radiology:   VL DUP LOWER EXTREMITY VENOUS BILATERAL   Final Result   1. Study limited by patient's body habitus and swelling of legs. 2. Small focus of subacute/chronic appearing nonobstructing mural thrombus seen in the left popliteal vein. 3. None of the veins to be identified in the left calf and the anterior tibial veins could not be visualized the right calf. 4. No definite evidence for acute deep venous thrombosis. **This report has been created using voice recognition software. It may contain minor errors which are inherent in voice recognition technology. **      Final report electronically signed by Dr. Worley Dakin on 8/27/2022 1:17 PM      CTA CHEST W CONTRAST   Final Result   1. No filling defects are noted within the pulmonary arterial vasculature to suggest the presence of pulmonary embolism. No acute intrathoracic process is observed. 2. Chronic findings are discussed. **This report has been created using voice recognition software. It may contain minor errors which are inherent in voice recognition technology. **      Final report electronically signed by Dr Janett Yin on 8/26/2022 4:18 PM      US NON OB TRANSVAGINAL   Final Result      1. Abnormal echogenicity within the endometrium which measures 3.2 cm in thickness. Endometrial carcinoma cannot be ruled out. GYN consult would be helpful for better evaluation. 2. Cyst  in the region of the cervix measuring 12 x 10 x 8 mm in size. 3. Anechoic area in the right ovary measuring 4 x 2.1 x 3.7 cm in size. 4. The left ovary was not clearly visualized. 5. There is no definite free fluid within the pelvis. **This report has been created using voice recognition software. It may contain minor errors which are inherent in voice recognition technology. **      Final report electronically signed by DR Lon Lynn on 8/25/2022 1:02 PM      VL DUP LOWER EXTREMITY VENOUS LEFT   Final Result   No evidence of a DVT in the left lower extremity. **This report has been created using voice recognition software. It may contain minor errors which are inherent in voice recognition technology. **      Final report electronically signed by DR Lon Lynn on 8/25/2022 10:29 AM      XR CHEST PORTABLE   Final Result   1. No interval change since previous study dated 25 April 2022.   2. Borderline cardiomegaly, otherwise negative chest x-ray. .               **This report has been created using voice recognition software. It may contain minor errors which are inherent in voice recognition technology. **      Final report electronically signed by DR Lon Lynn on 8/25/2022 10:08 AM      XR SHOULDER LEFT (MIN 2 VIEWS)   Final Result       1. No acute fracture or dislocation. 2. Degenerative change involving the acromioclavicular and to lesser extent glenohumeral joints. 3. Slight irregularity along the infra glenoid scapula. **This report has been created using voice recognition software. It may contain minor errors which are inherent in voice recognition technology. **      Final report electronically signed by DR Lon Lynn on 8/25/2022 10:05 AM             Consults:     IP CONSULT TO OB GYN    Disposition:    [x] Home       [] TCU       [] Rehab       [] Psych       [] SNF       [] [facility]       [] Other-    Condition at Discharge: Stable    Code Status:  Prior     Patient Instructions:    Discharge lab work:    Activity: activity as tolerated  Diet: No diet orders on file      Follow-up visits:   Huber Wong, APRN - CNP  [address]  [phone]    Schedule an appointment as soon as possible for a visit  office will call for a follow-up appointment time and date    Reagan Lweis MD  [place] [id number]  [facility]

## 2022-09-07 ENCOUNTER — NURSE ONLY (OUTPATIENT)
Dept: LAB | Age: 52
End: 2022-09-07

## 2022-09-07 ENCOUNTER — TELEPHONE (OUTPATIENT)
Dept: CARDIOLOGY CLINIC | Age: 52
End: 2022-09-07

## 2022-09-07 DIAGNOSIS — R07.89 ATYPICAL CHEST PAIN: Primary | ICD-10-CM

## 2022-09-07 DIAGNOSIS — R06.09 DOE (DYSPNEA ON EXERTION): ICD-10-CM

## 2022-09-07 LAB — CYTOLOGY THIN PREP PAP: NORMAL

## 2022-09-07 NOTE — TELEPHONE ENCOUNTER
Pre op Risk Assessment    Procedure Hysteroscopy D&C and endometrial ablation  Physician Dr. Margoth Charles  Date of surgery/procedure TBD    Last OV 3-2-22 with Dr. Carlitos Villareal      3-30-22 with Deepak Glass        4-27-22 with Deepak Glass      6-23-22 with Deepak Glass  Last Stress 3-3-22  Last Echo 8-25-22  Last Cath 3-14-22  Last Stent 3-14-22  Is patient on blood thinners Plavix and Xarelto  Hold Meds/how many days ?      Fax- [phone]

## 2022-09-07 NOTE — TELEPHONE ENCOUNTER
Patient notified of balwinder and voiced understanding. Patient denies cardiac symptoms but states she does get sob sometimes. Order placed and given to scheduling.

## 2022-09-07 NOTE — TELEPHONE ENCOUNTER
Most recent office note from valerio was reviewed. Patient had angina, stress test was ordered but it seem like it was not done?   Lexiscan nuclear stress test if not already done  Inquire about cardiac symptoms

## 2022-09-14 ENCOUNTER — TELEPHONE (OUTPATIENT)
Dept: WOUND CARE | Age: 52
End: 2022-09-14

## 2022-09-14 NOTE — TELEPHONE ENCOUNTER
Patient called requesting to be seen in wound clinic. She has previously follow with Florencia Carter CNP for her wounds. Has not been seen since March. Advised patient that she would need a new referral to come back to wound clinic. She will reach out to her PCP to have them fax referrral to us.

## 2022-09-15 ENCOUNTER — APPOINTMENT (OUTPATIENT)
Dept: CT IMAGING | Age: 52
End: 2022-09-15
Payer: COMMERCIAL

## 2022-09-15 ENCOUNTER — APPOINTMENT (OUTPATIENT)
Dept: GENERAL RADIOLOGY | Age: 52
End: 2022-09-15
Payer: COMMERCIAL

## 2022-09-15 ENCOUNTER — HOSPITAL ENCOUNTER (EMERGENCY)
Age: 52
Discharge: HOME | End: 2022-09-15
Attending: EMERGENCY MEDICINE
Payer: COMMERCIAL

## 2022-09-15 VITALS
RESPIRATION RATE: 18 BRPM | BODY MASS INDEX: 44.41 KG/M2 | HEIGHT: 68 IN | OXYGEN SATURATION: 100 % | DIASTOLIC BLOOD PRESSURE: 67 MMHG | TEMPERATURE: 98.2 F | SYSTOLIC BLOOD PRESSURE: 152 MMHG | HEART RATE: 78 BPM | WEIGHT: 293 LBS

## 2022-09-15 DIAGNOSIS — S49.91XA INJURY OF RIGHT SHOULDER, INITIAL ENCOUNTER: ICD-10-CM

## 2022-09-15 DIAGNOSIS — M54.12 CERVICAL RADICULOPATHY: Primary | ICD-10-CM

## 2022-09-15 LAB
ALBUMIN SERPL-MCNC: 4.3 G/DL (ref 3.5–5.1)
ALP BLD-CCNC: 84 U/L (ref 38–126)
ALT SERPL-CCNC: 9 U/L (ref 11–66)
ANION GAP SERPL CALCULATED.3IONS-SCNC: 17 MEQ/L (ref 8–16)
AST SERPL-CCNC: 14 U/L (ref 5–40)
BASE EXCESS MIXED: -2.4 MMOL/L (ref -2–3)
BASOPHILS # BLD: 0.8 %
BASOPHILS ABSOLUTE: 0.1 THOU/MM3 (ref 0–0.1)
BILIRUB SERPL-MCNC: 0.3 MG/DL (ref 0.3–1.2)
BUN BLDV-MCNC: 7 MG/DL (ref 7–22)
CALCIUM SERPL-MCNC: 9.5 MG/DL (ref 8.5–10.5)
CHLORIDE BLD-SCNC: 105 MEQ/L (ref 98–111)
CO2: 17 MEQ/L (ref 23–33)
COLLECTED BY:: ABNORMAL
CREAT SERPL-MCNC: 0.5 MG/DL (ref 0.4–1.2)
D-DIMER QUANTITATIVE: 582 NG/ML FEU (ref 0–500)
EOSINOPHIL # BLD: 1.1 %
EOSINOPHILS ABSOLUTE: 0.1 THOU/MM3 (ref 0–0.4)
ERYTHROCYTE [DISTWIDTH] IN BLOOD BY AUTOMATED COUNT: 14.2 % (ref 11.5–14.5)
ERYTHROCYTE [DISTWIDTH] IN BLOOD BY AUTOMATED COUNT: 45.1 FL (ref 35–45)
GFR SERPL CREATININE-BSD FRML MDRD: > 90 ML/MIN/1.73M2
GLUCOSE BLD-MCNC: 180 MG/DL (ref 70–108)
HCO3, MIXED: 22 MMOL/L (ref 23–28)
HCT VFR BLD CALC: 35 % (ref 37–47)
HEMOGLOBIN: 11.2 GM/DL (ref 12–16)
IMMATURE GRANS (ABS): 0.02 THOU/MM3 (ref 0–0.07)
IMMATURE GRANULOCYTES: 0.3 %
LYMPHOCYTES # BLD: 18.8 %
LYMPHOCYTES ABSOLUTE: 1.2 THOU/MM3 (ref 1–4.8)
MCH RBC QN AUTO: 27.7 PG (ref 26–33)
MCHC RBC AUTO-ENTMCNC: 32 GM/DL (ref 32.2–35.5)
MCV RBC AUTO: 86.6 FL (ref 81–99)
MONOCYTES # BLD: 5 %
MONOCYTES ABSOLUTE: 0.3 THOU/MM3 (ref 0.4–1.3)
NUCLEATED RED BLOOD CELLS: 0 /100 WBC
O2 SAT, MIXED: 53 %
OSMOLALITY CALCULATION: 280 MOSMOL/KG (ref 275–300)
PCO2, MIXED VENOUS: 34 MMHG (ref 41–51)
PH, MIXED: 7.41 (ref 7.31–7.41)
PLATELET # BLD: 307 THOU/MM3 (ref 130–400)
PMV BLD AUTO: 10.2 FL (ref 9.4–12.4)
PO2 MIXED: 28 MMHG (ref 25–40)
POTASSIUM SERPL-SCNC: 4.3 MEQ/L (ref 3.5–5.2)
PRO-BNP: 34.8 PG/ML (ref 0–450)
RBC # BLD: 4.04 MILL/MM3 (ref 4.2–5.4)
SARS-COV-2, NAAT: NOT  DETECTED
SEG NEUTROPHILS: 74 %
SEGMENTED NEUTROPHILS ABSOLUTE COUNT: 4.9 THOU/MM3 (ref 1.8–7.7)
SODIUM BLD-SCNC: 139 MEQ/L (ref 135–145)
TOTAL PROTEIN: 7.2 G/DL (ref 6.1–8)
TROPONIN T: < 0.01 NG/ML
WBC # BLD: 6.6 THOU/MM3 (ref 4.8–10.8)

## 2022-09-15 PROCEDURE — 80053 COMPREHEN METABOLIC PANEL: CPT

## 2022-09-15 PROCEDURE — 93005 ELECTROCARDIOGRAM TRACING: CPT | Performed by: EMERGENCY MEDICINE

## 2022-09-15 PROCEDURE — 83880 ASSAY OF NATRIURETIC PEPTIDE: CPT

## 2022-09-15 PROCEDURE — 71045 X-RAY EXAM CHEST 1 VIEW: CPT

## 2022-09-15 PROCEDURE — 85379 FIBRIN DEGRADATION QUANT: CPT

## 2022-09-15 PROCEDURE — 82803 BLOOD GASES ANY COMBINATION: CPT

## 2022-09-15 PROCEDURE — 84484 ASSAY OF TROPONIN QUANT: CPT

## 2022-09-15 PROCEDURE — 6360000004 HC RX CONTRAST MEDICATION: Performed by: EMERGENCY MEDICINE

## 2022-09-15 PROCEDURE — 99285 EMERGENCY DEPT VISIT HI MDM: CPT

## 2022-09-15 PROCEDURE — 87635 SARS-COV-2 COVID-19 AMP PRB: CPT

## 2022-09-15 PROCEDURE — 73030 X-RAY EXAM OF SHOULDER: CPT

## 2022-09-15 PROCEDURE — 72040 X-RAY EXAM NECK SPINE 2-3 VW: CPT

## 2022-09-15 PROCEDURE — 85025 COMPLETE CBC W/AUTO DIFF WBC: CPT

## 2022-09-15 PROCEDURE — 71275 CT ANGIOGRAPHY CHEST: CPT

## 2022-09-15 RX ORDER — METHYLPREDNISOLONE SODIUM SUCCINATE 125 MG/2ML
80 INJECTION, POWDER, LYOPHILIZED, FOR SOLUTION INTRAMUSCULAR; INTRAVENOUS ONCE
Status: DISCONTINUED | OUTPATIENT
Start: 2022-09-15 | End: 2022-09-15 | Stop reason: HOSPADM

## 2022-09-15 RX ORDER — CYCLOBENZAPRINE HCL 10 MG
10 TABLET ORAL 3 TIMES DAILY PRN
Qty: 21 TABLET | Refills: 0 | Status: SHIPPED | OUTPATIENT
Start: 2022-09-15 | End: 2022-09-25

## 2022-09-15 RX ORDER — PREDNISONE 20 MG/1
TABLET ORAL
Qty: 10 TABLET | Refills: 0 | Status: SHIPPED | OUTPATIENT
Start: 2022-09-15

## 2022-09-15 RX ORDER — HYDROCODONE BITARTRATE AND ACETAMINOPHEN 5; 325 MG/1; MG/1
1 TABLET ORAL EVERY 4 HOURS PRN
Qty: 10 TABLET | Refills: 0 | Status: SHIPPED | OUTPATIENT
Start: 2022-09-15 | End: 2022-09-18

## 2022-09-15 RX ADMIN — IOPAMIDOL 80 ML: 755 INJECTION, SOLUTION INTRAVENOUS at 14:58

## 2022-09-15 ASSESSMENT — PAIN SCALES - GENERAL
PAINLEVEL_OUTOF10: 8
PAINLEVEL_OUTOF10: 7

## 2022-09-15 ASSESSMENT — PAIN DESCRIPTION - DESCRIPTORS: DESCRIPTORS: ACHING

## 2022-09-15 ASSESSMENT — PAIN DESCRIPTION - LOCATION
LOCATION: HEAD;NECK
LOCATION: HEAD;NECK

## 2022-09-15 ASSESSMENT — PAIN - FUNCTIONAL ASSESSMENT
PAIN_FUNCTIONAL_ASSESSMENT: 0-10

## 2022-09-15 ASSESSMENT — PAIN DESCRIPTION - FREQUENCY: FREQUENCY: CONTINUOUS

## 2022-09-15 ASSESSMENT — PAIN DESCRIPTION - PAIN TYPE: TYPE: ACUTE PAIN

## 2022-09-15 NOTE — ED NOTES
Pt arrives to the ED for shortness of breath with activity and head/neck pain that began about 2-3 days ago. Pt states she fell in august and was never seen for her fall. Pt states she has had intermittent headaches, neck and right shoulder pain since then. Pt states her pain is a 8/10 at this time. Pt states she has a hx of blood clots and low hgb. Pt respirations are unlabored. Pt VSS.  EKG completed      Deven Carmona RN  09/15/22 7219

## 2022-09-15 NOTE — ED NOTES
Pt. Resting in bed with even and unlabored respirations. Pt. States pain is a 7/10 at this time. Pt. Updated about plan of care and treatment. Family at bedside. Pt. Has no further concerns, questions or needs at this time. Call light within reach.         Ambika Bella RN  09/15/22 5325

## 2022-09-15 NOTE — ED NOTES
Discharge instructions and new medications discussed and explained with Pt. Pt. Verbalized understanding and has no further questions or needs at this time.         Cameron Sher RN  09/15/22 1600

## 2022-09-15 NOTE — ED PROVIDER NOTES
[facility]  EMERGENCY DEPARTMENT ENCOUNTER      PATIENT NAME: Channing Mane  MRN: [id number]  : 1970  [place]: 9/15/2022  PROVIDER: Compa Meyers MD      [address] [place] [address]       Chief Complaint   Patient presents with    Neck Pain    Shortness of Breath       Patient is seen and evaluated in a timely fashion. Nurses Notes are reviewed and I agree except as noted in the HPI. HISTORY OF PRESENT ILLNESS    Channing Mane is a 54 y.o. female who presents to Emergency Department with Neck Pain and Shortness of Breath     Patient presents for evaluation of increasing shortness of breath and neck pain over last 2 days duration. Patient states neck pain is persistent, from bilateral paracervical area, pain is worse on neck movement. Pain is rated at 7/10. Patient also complains right shoulder pain. She states that she fell on 2022, she has been having persistent right clavicle pain or shoulder pain. She also has been having increasing exertional shortness of breath over last 2 days duration. She has no fever or chills. She has no chest pain. No abdominal pain. No nausea or vomiting. No diarrhea. No urinary symptoms. Past medical history remarkable for CAD with PCI in 2022 (currently on aspirin, and Plavix), DVT and PE (currently on Xarelto), GERD, hypertension, morbid obesity, TIA, RA, bipolar disorder, blood loss anemia requiring transfusion, and asthma. This HPI was provided by patient.      REVIEW OF SYSTEMS   Ten-point review of systems is negative except those documented in above HPI including constitutional, HEENT, respiratory, cardiovascular, gastrointestinal, genitourinary, musculoskeletal, skin, neurological, hematological and behavioral.      PAST MEDICAL HISTORY    has a past medical history of Asthma, Bipolar 1 disorder ([address] Blood circulation, collateral, CAD (coronary artery disease), Curvature of spine, Diabetes mellitus ([address] DVT (deep venous thrombosis) ([facility] Factor 5 Leiden mutation, heterozygous ([facility] GERD (gastroesophageal reflux disease), HTN, goal below 130/80, Hx of blood clots, Hx-TIA (transient ischemic attack), Hyperlipidemia, PE (pulmonary embolism), RA (rheumatoid arthritis) ([facility] and Unspecified cerebral artery occlusion with cerebral infarction. SURGICAL HISTORY      has a past surgical history that includes Tubal ligation (2003); Cholecystectomy (1992); Knee arthroscopy (2007&2010); Tympanostomy tube placement; Tonsillectomy; Cardiac catheterization (feb 2015); Foot Debridement (Right, 9/30/2019); Coronary angioplasty with stent; and lipoma resection. CURRENT MEDICATIONS       Discharge Medication List as of 9/15/2022  3:49 PM        CONTINUE these medications which have NOT CHANGED    Details   megestrol (MEGACE) 40 MG tablet Take 1 tablet by mouth 2 times daily, Disp-60 tablet, R-1Normal      metoprolol succinate (TOPROL XL) 25 MG extended release tablet Take 1.5 tablets by mouth daily, Disp-135 tablet, R-1Normal      atorvastatin (LIPITOR) 80 MG tablet Take 1 tablet by mouth at bedtime, Disp-90 tablet, R-2Normal      clopidogrel (PLAVIX) 75 MG tablet Take 1 tablet by mouth daily, Disp-90 tablet, R-2Normal      Blood Pressure KIT PRN Starting Wed 3/30/2022, Disp-1 kit, R-0, Print      nitroGLYCERIN (NITROSTAT) 0.4 MG SL tablet up to max of 3 total doses. If no relief after 1 dose, call 911., Disp-25 tablet, R-3Normal      XARELTO 20 MG TABS tablet take 1 tablet by mouth once daily, DAWHistorical Med      JANUVIA 100 MG tablet take 1 tablet by mouth once daily, DAWHistorical Med      gabapentin (NEURONTIN) 600 MG tablet take 1 tablet by mouth twice a dayHistorical Med      acetaminophen (TYLENOL) 325 MG tablet Take 2 tablets by mouth every 4 hours as needed for Pain, Disp-120 tablet, R-3OTC      blood glucose test strips (TRUETRACK TEST) strip 2 TIMES DAILY Starting Fri 3/15/2019, Disp-100 each, R-0, NormalAs needed.              ALLERGIES is allergic to codeine, demerol, ultram [tramadol hcl], percocet [oxycodone-acetaminophen], and toradol [ketorolac tromethamine]. FAMILY HISTORY     She indicated that her mother is alive. She indicated that her father is alive. family history includes COPD in her mother; Cancer in her mother; Heart Disease in her father; High Blood Pressure in her father; Mental Illness in her brother, brother, sister, and sister; Other in her mother. SOCIAL HISTORY      reports that she quit smoking about 17 years ago. Her smoking use included cigarettes. She smoked an average of .5 packs per day. She has never used smokeless tobacco. She reports that she does not drink alcohol and does not use drugs. PHYSICAL EXAM      height is 5' 8\" (1.727 m) and weight is 336 lb (152.4 kg) (abnormal). Her oral temperature is 98.2 °F (36.8 °C). Her blood pressure is 152/67 (abnormal) and her pulse is 78. Her respiration is 18 and oxygen saturation is 100%. Physical Exam  Vitals and nursing note reviewed. Constitutional:       Appearance: She is well-developed. She is not diaphoretic. HENT:      Head: Normocephalic and atraumatic. Nose: Nose normal.   Eyes:      General: No scleral icterus. Right eye: No discharge. Left eye: No discharge. Conjunctiva/sclera: Conjunctivae normal.      Pupils: Pupils are equal, round, and reactive to light. Neck:      Vascular: No JVD. Trachea: No tracheal deviation. Comments: Bilateral paracervical tenderness. Bilateral trapezius muscle area tenderness. Cardiovascular:      Rate and Rhythm: Normal rate and regular rhythm. Heart sounds: Normal heart sounds. No murmur heard. No friction rub. No gallop. Pulmonary:      Effort: Pulmonary effort is normal. No respiratory distress. Breath sounds: Normal breath sounds. No stridor. No wheezing or rales. Chest:      Chest wall: No tenderness.    Abdominal:      General: Bowel sounds are normal. There is no distension. Palpations: Abdomen is soft. There is no mass. Tenderness: There is no abdominal tenderness. There is no guarding or rebound. Hernia: No hernia is present. Musculoskeletal:         General: No tenderness or deformity. Cervical back: Normal range of motion and neck supple. Comments: Right clavicle tenderness, no clavicle deformity. Right shoulder has mild diffuse tenderness, no shoulder deformity, no shoulder swelling. Lymphadenopathy:      Cervical: No cervical adenopathy. Skin:     General: Skin is warm and dry. Capillary Refill: Capillary refill takes less than 2 seconds. Coloration: Skin is not pale. Findings: No erythema or rash. Neurological:      Mental Status: She is alert and oriented to person, place, and time. Cranial Nerves: No cranial nerve deficit. Sensory: No sensory deficit. Motor: No abnormal muscle tone. Coordination: Coordination normal.      Deep Tendon Reflexes: Reflexes normal.   Psychiatric:         Behavior: Behavior normal.         Thought Content: Thought content normal.         Judgment: Judgment normal.     Right shoulder moderate tenderness, no shoulder swelling, no shoulder deformity. Right shoulder has normal range of motion. ANCILLARY TEST RESULTS   EKG:    Interpreted by me  Normal sinus rhythm, ventricular rate 75 bpm, TN interval 144 ms, QRS duration 92 ms,  ms, no ST elevation or acute T wave    LAB RESULTS:  Results for orders placed or performed during the hospital encounter of 09/15/22   COVID-19, Rapid   Result Value Ref Range    SARS-CoV-2, NAAT NOT  DETECTED NOT DETECTED   CBC with Auto Differential   Result Value Ref Range    WBC 6.6 4.8 - 10.8 thou/mm3    RBC 4.04 (L) 4.20 - 5.40 mill/mm3    Hemoglobin 11.2 (L) 12.0 - 16.0 gm/dl    Hematocrit 35.0 (L) 37.0 - 47.0 %    MCV 86.6 81.0 - 99.0 fL    MCH 27.7 26.0 - 33.0 pg    MCHC 32.0 (L) 32.2 - 35.5 gm/dl    RDW-CV 14.2 11.5 - 14.5 %    RDW-SD 45.1 (H) 35.0 - 45.0 fL    Platelets 228 898 - 443 thou/mm3    MPV 10.2 9.4 - 12.4 fL    Seg Neutrophils 74.0 %    Lymphocytes 18.8 %    Monocytes 5.0 %    Eosinophils 1.1 %    Basophils 0.8 %    Immature Granulocytes 0.3 %    Segs Absolute 4.9 1.8 - 7.7 thou/mm3    Lymphocytes Absolute 1.2 1.0 - 4.8 thou/mm3    Monocytes Absolute 0.3 (L) 0.4 - 1.3 thou/mm3    Eosinophils Absolute 0.1 0.0 - 0.4 thou/mm3    Basophils Absolute 0.1 0.0 - 0.1 thou/mm3    Immature Grans (Abs) 0.02 0.00 - 0.07 thou/mm3    nRBC 0 /100 wbc   Comprehensive Metabolic Panel   Result Value Ref Range    Glucose 180 (H) 70 - 108 mg/dL    Creatinine 0.5 0.4 - 1.2 mg/dL    BUN 7 7 - 22 mg/dL    Sodium 139 135 - 145 meq/L    Potassium 4.3 3.5 - 5.2 meq/L    Chloride 105 98 - 111 meq/L    CO2 17 (L) 23 - 33 meq/L    Calcium 9.5 8.5 - 10.5 mg/dL    AST 14 5 - 40 U/L    Alkaline Phosphatase 84 38 - 126 U/L    Total Protein 7.2 6.1 - 8.0 g/dL    Albumin 4.3 3.5 - 5.1 g/dL    Total Bilirubin 0.3 0.3 - 1.2 mg/dL    ALT 9 (L) 11 - 66 U/L   Brain Natriuretic Peptide   Result Value Ref Range    Pro-BNP 34.8 0.0 - 450.0 pg/mL   Troponin   Result Value Ref Range    Troponin T < 0.010 ng/ml   D-Dimer, Quantitative   Result Value Ref Range    D-Dimer, Quant 582.00 (H) 0.00 - 500.00 ng/ml FEU   Anion Gap   Result Value Ref Range    Anion Gap 17.0 (H) 8.0 - 16.0 meq/L   Glomerular Filtration Rate, Estimated   Result Value Ref Range    Est, Glom Filt Rate >90 ml/min/1.73m2   Osmolality   Result Value Ref Range    Osmolality Calc 280.0 275.0 - 300.0 mOsmol/kg   Blood gas, venous   Result Value Ref Range    PH MIXED 7.41 7.31 - 7.41    PCO2, MIXED VENOUS 34 (L) 41 - 51 mmhg    PO2, Mixed 28 25 - 40 mmhg    HCO3, Mixed 22 (L) 23 - 28 mmol/l    Base Exc, Mixed -2.4 (L) -2.0 - 3.0 mmol/l    O2 Sat, Mixed 53 %    COLLECTED BY: [id number]    EKG 12 Lead   Result Value Ref Range    Ventricular Rate 75 BPM    Atrial Rate 75 BPM    P-R Interval 144 ms    QRS Duration 92 ms    Q-T Interval 382 ms    QTc Calculation (Bazett) 426 ms    P Axis -8 degrees    R Axis 6 degrees    T Axis 75 degrees       RADIOLOGY REPORTS  CTA CHEST W WO CONTRAST   Final Result   1. No pulmonary embolism. 2. No acute intrathoracic process observed. **This report has been created using voice recognition software. It may contain minor errors which are inherent in voice recognition technology. **      Final report electronically signed by Dr Elli Stearns on 9/15/2022 3:18 PM      XR CHEST PORTABLE   Final Result   1. No acute cardiopulmonary finding. **This report has been created using voice recognition software. It may contain minor errors which are inherent in voice recognition technology. **      Final report electronically signed by Dr Tiny Noble on 9/15/2022 1:13 PM      XR SHOULDER RIGHT (MIN 2 VIEWS)   Final Result   1. No acute bony abnormality   2. Mild degenerative changes of the acromioclavicular and glenohumeral joints. **This report has been created using voice recognition software. It may contain minor errors which are inherent in voice recognition technology. **      Final report electronically signed by Dr Tiny Noble on 9/15/2022 1:11 PM      XR CERVICAL SPINE (2-3 VIEWS)   Final Result   1. No acute bony abnormality. 2. Straightening of the cervical lordosis. This may be related to spasm or positioning. **This report has been created using voice recognition software. It may contain minor errors which are inherent in voice recognition technology. **      Final report electronically signed by Dr Tiny Noble on 9/15/2022 1:09 PM          [facility] AND ED COURSE   Patient is seen and evaluated at 12:03 PM EDT.    DDx: Cervical radiculopathy, ACS, shoulder strain, shoulder rotator cuff injury, ruling out for clavicle fracture, CHF  Plan: Large-bore IV, IV Solu-Medrol, x-rays of C-spine, chest x-ray,

## 2022-09-16 LAB
EKG ATRIAL RATE: 75 BPM
EKG P AXIS: -8 DEGREES
EKG P-R INTERVAL: 144 MS
EKG Q-T INTERVAL: 382 MS
EKG QRS DURATION: 92 MS
EKG QTC CALCULATION (BAZETT): 426 MS
EKG R AXIS: 6 DEGREES
EKG T AXIS: 75 DEGREES
EKG VENTRICULAR RATE: 75 BPM

## 2022-09-16 PROCEDURE — 93010 ELECTROCARDIOGRAM REPORT: CPT | Performed by: INTERNAL MEDICINE

## 2022-09-26 ENCOUNTER — HOSPITAL ENCOUNTER (OUTPATIENT)
Dept: WOUND CARE | Age: 52
Discharge: HOME | End: 2022-09-26
Payer: COMMERCIAL

## 2022-09-26 VITALS
HEART RATE: 68 BPM | DIASTOLIC BLOOD PRESSURE: 65 MMHG | TEMPERATURE: 97.3 F | RESPIRATION RATE: 18 BRPM | OXYGEN SATURATION: 98 % | SYSTOLIC BLOOD PRESSURE: 132 MMHG

## 2022-09-26 DIAGNOSIS — S91.105A OPEN WOUND OF SECOND TOE, LEFT, INITIAL ENCOUNTER: Primary | ICD-10-CM

## 2022-09-26 PROCEDURE — 99202 OFFICE O/P NEW SF 15 MIN: CPT

## 2022-09-26 PROCEDURE — 11055 PARING/CUTG B9 HYPRKER LES 1: CPT

## 2022-09-26 RX ORDER — GENTAMICIN SULFATE 1 MG/G
OINTMENT TOPICAL ONCE
OUTPATIENT
Start: 2022-09-26 | End: 2022-09-26

## 2022-09-26 RX ORDER — LIDOCAINE HYDROCHLORIDE 40 MG/ML
SOLUTION TOPICAL ONCE
OUTPATIENT
Start: 2022-09-26 | End: 2022-09-26

## 2022-09-26 RX ORDER — CLOBETASOL PROPIONATE 0.5 MG/G
OINTMENT TOPICAL ONCE
OUTPATIENT
Start: 2022-09-26 | End: 2022-09-26

## 2022-09-26 RX ORDER — BACITRACIN, NEOMYCIN, POLYMYXIN B 400; 3.5; 5 [USP'U]/G; MG/G; [USP'U]/G
OINTMENT TOPICAL ONCE
OUTPATIENT
Start: 2022-09-26 | End: 2022-09-26

## 2022-09-26 RX ORDER — BACITRACIN ZINC AND POLYMYXIN B SULFATE 500; 1000 [USP'U]/G; [USP'U]/G
OINTMENT TOPICAL ONCE
OUTPATIENT
Start: 2022-09-26 | End: 2022-09-26

## 2022-09-26 RX ORDER — LIDOCAINE HYDROCHLORIDE 20 MG/ML
JELLY TOPICAL ONCE
OUTPATIENT
Start: 2022-09-26 | End: 2022-09-26

## 2022-09-26 RX ORDER — LIDOCAINE 40 MG/G
CREAM TOPICAL ONCE
OUTPATIENT
Start: 2022-09-26 | End: 2022-09-26

## 2022-09-26 RX ORDER — LIDOCAINE 50 MG/G
OINTMENT TOPICAL ONCE
OUTPATIENT
Start: 2022-09-26 | End: 2022-09-26

## 2022-09-26 RX ORDER — GINSENG 100 MG
CAPSULE ORAL ONCE
OUTPATIENT
Start: 2022-09-26 | End: 2022-09-26

## 2022-09-26 RX ORDER — BETAMETHASONE DIPROPIONATE 0.05 %
OINTMENT (GRAM) TOPICAL ONCE
OUTPATIENT
Start: 2022-09-26 | End: 2022-09-26

## 2022-09-26 ASSESSMENT — PAIN DESCRIPTION - ORIENTATION: ORIENTATION: LEFT

## 2022-09-26 ASSESSMENT — PAIN SCALES - GENERAL: PAINLEVEL_OUTOF10: 6

## 2022-09-26 NOTE — PLAN OF CARE
Problem: Wound:  Goal: Will show signs of wound healing; wound closure and no evidence of infection  Description: Will show signs of wound healing; wound closure and no evidence of infection  Outcome: Adequate for Discharge   Pt. Seen today for right second toe callus see AVS for new orders follow up as needed. Care plan reviewed with patient. Patient verbalize understanding of the plan of care and contribute to goal setting.

## 2022-09-26 NOTE — DISCHARGE INSTRUCTIONS
Visit Discharge/Physician Orders: debridement of callus done today  - file down the callus 1-2 times per week gently using a pumice stone or praveen board    Wound Location: left second toe    Follow up visit:  as needed      Keep next scheduled appointment. Please give 24 hour notice if unable to keep appointment. [phone]    If you experience any of the following, please call the Wound Care Service during business hours: Monday through Friday 8:00 am - 4:30 pm  ([phone]). *Increase in pain   *Temperature over 101   *Increase in drainage from your wound or a foul odor   *Uncontrolled swelling   *Need for compression bandage changes due to slippage, breakthrough drainage    If you need medical attention outside of business hours, please contact your Primary Care Doctor or go to the nearest emergency room.

## 2022-09-26 NOTE — PROGRESS NOTES
[id number] [address] and Procedure Note      [address] [facility] RECORD NUMBER:  [id number]  AGE: 54 y.o. GENDER: female  : 1970  EPISODE DATE:  2022    Subjective:     Chief Complaint   Patient presents with    Wound Check     Left foot         HISTORY of PRESENT ILLNESS HPI     Palmer Carmona is a 54 y.o. female who has been seen in the past for outside issues as well as a left distal second toe wound. Patient presents today with excessive callus to the distal aspect left second toe. Patient continues to have hammertoe deformity which is a large contributing factor to this. At previous visit patient was given a crest pad was instructed to obtain additional crest pads to help offload this area. Patient does have hammertoe deformities to bilateral lower extremities. Callus was pared back in office without issue. No redness or drainage of note. No wound was found. I did encourage patient to obtain either a pumice stone or emery board going forward to do daily if not couple times a week maintenance to this ongoing callused area. Patient will follow back up with us again on an as-needed basis. History of Wound Context: Wound to the distal aspect left second toe x1 month.   Wound/Ulcer Pain Timing/Severity: none  Quality of pain: N/A  Severity:  0 / 10   Modifying Factors: None  Associated Signs/Symptoms: edema, erythema and drainage        PAST MEDICAL HISTORY        Diagnosis Date    Asthma     Bipolar 1 disorder (HCC)     Blood circulation, collateral     CAD (coronary artery disease)     Curvature of spine     Diabetes mellitus (HCC)     DVT (deep venous thrombosis) ([facility])     Factor 5 Leiden mutation, heterozygous (HCC)     GERD (gastroesophageal reflux disease)     HTN, goal below 130/80     Hx of blood clots     PE x3 and DVT x3    Hx-TIA (transient ischemic attack)     Hyperlipidemia     PE (pulmonary embolism)     RA (rheumatoid arthritis) [facility] Unspecified cerebral artery occlusion with cerebral infarction        PAST SURGICAL HISTORY    Past Surgical History:   Procedure Laterality Date    CARDIAC CATHETERIZATION  2015    Heart caths    CHOLECYSTECTOMY  1992    CORONARY ANGIOPLASTY WITH STENT PLACEMENT      FOOT DEBRIDEMENT Right 2019    FOOT DEBRIDEMENT INCISION AND DRAINAGE performed by Ramos Thornton DPM at [place] [address] ARTHROSCOPY  &    LIPOMA RESECTION      TONSILLECTOMY      TUBAL LIGATION      TYMPANOSTOMY TUBE PLACEMENT         FAMILY HISTORY    Family History   Problem Relation Age of Onset    COPD Mother     Other Mother     Cancer Mother     High Blood Pressure Father     Heart Disease Father     Mental Illness Sister     Mental Illness Brother     Mental Illness Sister     Mental Illness Brother        SOCIAL HISTORY    Social History     Tobacco Use    Smoking status: Former     Packs/day: 0.50     Types: Cigarettes     Quit date: 2005     Years since quittin.2     Passive exposure: Never    Smokeless tobacco: Never   Vaping Use    Vaping Use: Never used   Substance Use Topics    Alcohol use: No    Drug use: No       ALLERGIES    Allergies   Allergen Reactions    Codeine Hives     + Nausea vomiting    Demerol      vomiting    Ultram [Tramadol Hcl] Other (See Comments)     Dizziness     Percocet [Oxycodone-Acetaminophen] Nausea And Vomiting    Toradol [Ketorolac Tromethamine] Rash       MEDICATIONS    Current Outpatient Medications on File Prior to Encounter   Medication Sig Dispense Refill    predniSONE (DELTASONE) 20 MG tablet 40 mg daily for 3 days, 20 mg daily for 3 days, 10 mg daily for 3 days 10 tablet 0    megestrol (MEGACE) 40 MG tablet Take 1 tablet by mouth 2 times daily 60 tablet 1    metoprolol succinate (TOPROL XL) 25 MG extended release tablet Take 1.5 tablets by mouth daily 135 tablet 1    atorvastatin (LIPITOR) 80 MG tablet Take 1 tablet by mouth at bedtime 90 tablet 2    clopidogrel (PLAVIX) 75 MG tablet Take 1 tablet by mouth daily 90 tablet 2    Blood Pressure KIT 1 kit by Does not apply route as needed (PRN) 1 kit 0    nitroGLYCERIN (NITROSTAT) 0.4 MG SL tablet up to max of 3 total doses. If no relief after 1 dose, call 911. (Patient not taking: Reported on 9/26/2022) 25 tablet 3    XARELTO 20 MG TABS tablet take 1 tablet by mouth once daily      JANUVIA 100 MG tablet take 1 tablet by mouth once daily      gabapentin (NEURONTIN) 600 MG tablet take 1 tablet by mouth twice a day      acetaminophen (TYLENOL) 325 MG tablet Take 2 tablets by mouth every 4 hours as needed for Pain 120 tablet 3    blood glucose test strips (TRUETRACK TEST) strip 1 each by In Vitro route 2 times daily As needed. 100 each 0     No current facility-administered medications on file prior to encounter. REVIEW OF SYSTEMS    Pertinent items are noted in HPI. Objective:      /65   Pulse 68   Temp 97.3 °F (36.3 °C) (Infrared)   Resp 18   LMP 06/21/2018   SpO2 98%     Wt Readings from Last 3 Encounters:   09/15/22 (!) 336 lb (152.4 kg)   08/29/22 (!) 336 lb (152.4 kg)   06/23/22 (!) 309 lb (140.2 kg)       PHYSICAL EXAM    General Appearance: Alert and oriented to person, place and time, well developed and well- nourished, in no acute distress. Skin: Patient is found to have excessive callus with an underlying wound to the distal aspect of this left second toe. Callus was removed in office today. No wounds noted. No redness or drainage of note. Patient denies pain to the site. Vascular: DP and PT pulses are faintly palpable to the left lower extremity. Skin temperature warm to cool from proximal tibia to distal digits of the left lower extremity. Cap refill time is intact. Minimal swelling noted to the distal aspect of left second toe. Patient states that a few weeks ago toe was quite a bit larger prior to her being on antibiotics.     Neurovascular: Epicritic and protopathic sensations are grossly diminished.     \          Wound 03/14/22 Toe (Comment  which one) Left unstageable (Active)   Wound Image   09/26/22 0902   Dressing Status Other (Comment) 09/26/22 0902   Wound Cleansed Not Cleansed 09/26/22 0902   Dressing/Treatment Betadine swabs/povidone iodine 03/28/22 1057   Offloading for Diabetic Foot Ulcers Offloading not required 03/28/22 1057   Wound Length (cm) 1 cm 03/28/22 1057   Wound Width (cm) 1.5 cm 03/28/22 1057   Wound Depth (cm) 0 cm 03/28/22 1057   Wound Surface Area (cm^2) 1.5 cm^2 03/28/22 1057   Wound Volume (cm^3) 0 cm^3 03/28/22 1057   Wound Assessment Dry;Devitalized tissue 09/26/22 0902   Drainage Amount None 09/26/22 0902   Drainage Description Serosanguinous 03/28/22 1057   Odor None 09/26/22 0902   Kathleen-wound Assessment Hyperkeratosis (callous) 09/26/22 0902   Margins Attached edges 09/26/22 0902   Wound Thickness Description not for Pressure Injury Partial thickness 09/26/22 0902   Number of days: 196       LABS       CBC:   Lab Results   Component Value Date/Time    WBC 6.6 09/15/2022 11:25 AM    HGB 11.2 09/15/2022 11:25 AM    HCT 35.0 09/15/2022 11:25 AM    MCV 86.6 09/15/2022 11:25 AM     09/15/2022 11:25 AM     BMP:   Lab Results   Component Value Date/Time     09/15/2022 11:25 AM    K 4.3 09/15/2022 11:25 AM    K 4.2 08/28/2022 06:24 AM     09/15/2022 11:25 AM    CO2 17 09/15/2022 11:25 AM    BUN 7 09/15/2022 11:25 AM    CREATININE 0.5 09/15/2022 11:25 AM     PT/INR:   Lab Results   Component Value Date/Time    PROTIME 2.38 11/22/2011 01:34 PM    INR 0.99 03/14/2022 05:26 AM     Prealbumin: No results found for: PREALBUMIN  Albumin:  Lab Results   Component Value Date/Time    LABALBU 4.3 09/15/2022 11:25 AM     Sed Rate:  Lab Results   Component Value Date/Time    SEDRATE 22 01/23/2022 05:49 PM     CRP:   Lab Results   Component Value Date/Time    CRP 1.22 01/23/2022 05:49 PM     Micro:   Lab Results   Component Value Date/Time    BC No growth-preliminaryNo growth 09/06/2019 02:08 PM    BC No growth-preliminaryNo growth 09/06/2019 02:08 PM      Hemoglobin A1C:   Lab Results   Component Value Date/Time    LABA1C 8.4 09/28/2019 12:26 AM       Assessment:     Ulcer Identification:  Ulcer Type: diabetic and pressure  Contributing Factors: diabetes, chronic pressure and obesity    Wound:  pressure    Depth of Diabetic/Pressure/Non Pressure Ulcers or Wound:  Diabetic ulcer, limited to breakdown of skin    Patient Active Problem List   Diagnosis Code    Chest pain R07.9    History of pulmonary embolism Z86.711    Hyperlipidemia with target LDL less than 70 E78.5    HTN, goal below 130/80 I10    Pulmonary embolus ([facility]) I26.99    Morbid obesity ([facility]) E66.01    Bilateral pulmonary embolism ([facility]) I26.99    DVT (deep venous thrombosis) ([facility]) I82.409    Acute right hip pain M25.551    Physical deconditioning X86.62    Metabolic acidosis V92.3    Moderate to severe pulmonary hypertension ([facility]) I27.20    Type 2 diabetes mellitus with hyperglycemia, without long-term current use of insulin ([facility]) E11.65    Open wound of right foot S91.301A    Cellulitis L03.90    Leukocytosis D72.829    Diabetic foot ulcer associated with type 2 diabetes mellitus, with fat layer exposed ([facility]) E11.621, L97.502    Cellulitis of foot, right L03.115    Chronic anticoagulation Z79.01    History of recurrent deep vein thrombosis (DVT) Z86.718    Factor 5 Leiden mutation, heterozygous ([facility]) D68.51    History of CVA (cerebrovascular accident) Z86.73    Noncompliance Z91.19    CAD in native artery I25.10    CAD S/P percutaneous coronary angioplasty I25.10, Z98.61    Abnormal stress test R94.39    Open wound of second toe, left, initial encounter S91.105A    Syncope and collapse R55    Menorrhagia N92.0    Cervical polyp N84.1       Procedure Note  45234: Paring or cutting of a benign hyperkeratotic lesion. A tissue nipper was used to remove excessive callus buildup to the distal aspect of the left second toe. Callus was removed down to level of healthy dermal tissue. No bleeding noted. Patient tolerated callus removal well. Plan:     Patient examined and evaluated today. Patient was found to have excessive callus to the distal aspect of the left second toe that was pared down in office. No bleeding or drainage noted. No actual wound was observed today. Patient was instructed to obtain a pumice stone or emery board going forward to do daily if not couple times a week maintenance to this area to help keep the level of callus down. Patient was also encouraged to start using an offloading crest pad again. Going forward, patient will follow up with us on an as-needed basis. Treatment: No orders of the defined types were placed in this encounter. Antibiotics: No    Follow up: As needed    Please see attached Discharge Instructions    Written patient dismissal instructions given to patient and signed by patient or POA. Discharge Instructions         Visit Discharge/Physician Orders: debridement of callus done today  - file down the callus 1-2 times per week gently using a pumice stone or praveen board    Wound Location: left second toe    Follow up visit:  as needed      Keep next scheduled appointment. Please give 24 hour notice if unable to keep appointment. [phone]    If you experience any of the following, please call the Wound Care Service during business hours: Monday through Friday 8:00 am - 4:30 pm  ([phone]). *Increase in pain   *Temperature over 101   *Increase in drainage from your wound or a foul odor   *Uncontrolled swelling   *Need for compression bandage changes due to slippage, breakthrough drainage    If you need medical attention outside of business hours, please contact your Primary Care Doctor or go to the nearest emergency room.             Electronically signed by SHANNON Villela CNP on 9/26/2022 at 9:47 AM

## 2022-09-28 ENCOUNTER — HOSPITAL ENCOUNTER (OUTPATIENT)
Dept: NON INVASIVE DIAGNOSTICS | Age: 52
Discharge: HOME | End: 2022-09-28
Payer: COMMERCIAL

## 2022-09-28 DIAGNOSIS — I20.8 ANGINA OF EFFORT (HCC): ICD-10-CM

## 2022-09-28 PROCEDURE — 78452 HT MUSCLE IMAGE SPECT MULT: CPT

## 2022-09-28 PROCEDURE — 6360000002 HC RX W HCPCS

## 2022-09-28 PROCEDURE — A9500 TC99M SESTAMIBI: HCPCS | Performed by: INTERNAL MEDICINE

## 2022-09-28 PROCEDURE — 3430000000 HC RX DIAGNOSTIC RADIOPHARMACEUTICAL: Performed by: INTERNAL MEDICINE

## 2022-09-28 PROCEDURE — 93017 CV STRESS TEST TRACING ONLY: CPT | Performed by: INTERNAL MEDICINE

## 2022-09-28 RX ADMIN — Medication 8.6 MILLICURIE: at 10:50

## 2022-09-28 RX ADMIN — Medication 29 MILLICURIE: at 12:00

## 2022-09-29 ENCOUNTER — TELEPHONE (OUTPATIENT)
Dept: CARDIOLOGY CLINIC | Age: 52
End: 2022-09-29

## 2022-09-29 NOTE — TELEPHONE ENCOUNTER
Result note for Stress test, lexiscan  ----- Message from Ashely Yin PA-C sent at 9/29/2022  7:13 AM EDT -----  Cath with enedelia.

## 2022-09-29 NOTE — TELEPHONE ENCOUNTER
Patient called back and wanting Dr. Aidan Hoff to look at her stress test as a second opinion. I informed patient that she needs to see Dr. Aidan Hoff in office first.   Appt scheduled.

## 2022-10-06 ENCOUNTER — OFFICE VISIT (OUTPATIENT)
Dept: CARDIOLOGY CLINIC | Age: 52
End: 2022-10-06
Payer: COMMERCIAL

## 2022-10-06 VITALS
WEIGHT: 293 LBS | HEART RATE: 84 BPM | HEIGHT: 68 IN | SYSTOLIC BLOOD PRESSURE: 142 MMHG | BODY MASS INDEX: 44.41 KG/M2 | DIASTOLIC BLOOD PRESSURE: 76 MMHG

## 2022-10-06 DIAGNOSIS — E78.01 FAMILIAL HYPERCHOLESTEROLEMIA: ICD-10-CM

## 2022-10-06 DIAGNOSIS — I10 PRIMARY HYPERTENSION: ICD-10-CM

## 2022-10-06 DIAGNOSIS — I25.118 CORONARY ARTERY DISEASE INVOLVING NATIVE CORONARY ARTERY OF NATIVE HEART WITH OTHER FORM OF ANGINA PECTORIS (HCC): Primary | ICD-10-CM

## 2022-10-06 PROCEDURE — G8428 CUR MEDS NOT DOCUMENT: HCPCS | Performed by: NUCLEAR MEDICINE

## 2022-10-06 PROCEDURE — 99214 OFFICE O/P EST MOD 30 MIN: CPT | Performed by: NUCLEAR MEDICINE

## 2022-10-06 PROCEDURE — 1036F TOBACCO NON-USER: CPT | Performed by: NUCLEAR MEDICINE

## 2022-10-06 PROCEDURE — G8417 CALC BMI ABV UP PARAM F/U: HCPCS | Performed by: NUCLEAR MEDICINE

## 2022-10-06 PROCEDURE — G8484 FLU IMMUNIZE NO ADMIN: HCPCS | Performed by: NUCLEAR MEDICINE

## 2022-10-06 NOTE — PROGRESS NOTES
[address] [facility]  [address]  [address]  [place] [address]  Dept: [phone]  Dept Fax: [phone]  Loc: [phone]    Visit Date: 10/6/2022    Nabila Espinoza is a 54 y.o. female who presents todayfor:  Chief Complaint   Patient presents with    Follow-up    Hypertension    Coronary Artery Disease    Hyperlipidemia   Had Melhem   Had stents of the circ and PLV  Lately had stress test   Apical ischemia   Was supposed to have cath with melhem   Wanted a second opinion   Does have dyspnea  No chest pain   Dm for a while  Going for D and C and ablation   BP is stable   No dizziness  No syncope  BS is fair   Some palpitation     HPI:  HPI  Past Medical History:   Diagnosis Date    Asthma     Bipolar 1 disorder (HCC)     Blood circulation, collateral     CAD (coronary artery disease)     Curvature of spine     Diabetes mellitus ([address]     DVT (deep venous thrombosis) ([address]     Factor 5 Leiden mutation, heterozygous ([address]     GERD (gastroesophageal reflux disease)     HTN, goal below 130/80     Hx of blood clots     PE x3 and DVT x3    Hx-TIA (transient ischemic attack)     Hyperlipidemia     PE (pulmonary embolism)     RA (rheumatoid arthritis) ([address]     Unspecified cerebral artery occlusion with cerebral infarction       Past Surgical History:   Procedure Laterality Date    CARDIAC CATHETERIZATION  feb 2015    Heart caths    CHOLECYSTECTOMY  1992    CORONARY ANGIOPLASTY WITH STENT PLACEMENT      FOOT DEBRIDEMENT Right 9/30/2019    FOOT DEBRIDEMENT INCISION AND DRAINAGE performed by Kenneth Matos DPM at [address] ARTHROSCOPY  2007&2010    LIPOMA RESECTION      TONSILLECTOMY      TUBAL LIGATION  2003    TYMPANOSTOMY TUBE PLACEMENT       Family History   Problem Relation Age of Onset    COPD Mother     Other Mother     Cancer Mother     High Blood Pressure Father     Heart Disease Father     Mental Illness Sister     Mental Illness Brother Mental Illness Sister     Mental Illness Brother      Social History     Tobacco Use    Smoking status: Former     Packs/day: 2.00     Years: 14.00     Pack years: 28.00     Types: Cigarettes     Quit date:      Years since quittin.7     Passive exposure: Never    Smokeless tobacco: Never   Substance Use Topics    Alcohol use: No      Current Outpatient Medications   Medication Sig Dispense Refill    predniSONE (DELTASONE) 20 MG tablet 40 mg daily for 3 days, 20 mg daily for 3 days, 10 mg daily for 3 days 10 tablet 0    megestrol (MEGACE) 40 MG tablet Take 1 tablet by mouth 2 times daily 60 tablet 1    metoprolol succinate (TOPROL XL) 25 MG extended release tablet Take 1.5 tablets by mouth daily 135 tablet 1    atorvastatin (LIPITOR) 80 MG tablet Take 1 tablet by mouth at bedtime 90 tablet 2    clopidogrel (PLAVIX) 75 MG tablet Take 1 tablet by mouth daily 90 tablet 2    Blood Pressure KIT 1 kit by Does not apply route as needed (PRN) 1 kit 0    nitroGLYCERIN (NITROSTAT) 0.4 MG SL tablet up to max of 3 total doses. If no relief after 1 dose, call 911. 25 tablet 3    XARELTO 20 MG TABS tablet take 1 tablet by mouth once daily      JANUVIA 100 MG tablet take 1 tablet by mouth once daily      gabapentin (NEURONTIN) 600 MG tablet take 1 tablet by mouth twice a day      acetaminophen (TYLENOL) 325 MG tablet Take 2 tablets by mouth every 4 hours as needed for Pain 120 tablet 3    blood glucose test strips (TRUETRACK TEST) strip 1 each by In Vitro route 2 times daily As needed. 100 each 0     No current facility-administered medications for this visit.      Allergies   Allergen Reactions    Codeine Hives     + Nausea vomiting    Demerol      vomiting    Ultram [Tramadol Hcl] Other (See Comments)     Dizziness     Percocet [Oxycodone-Acetaminophen] Nausea And Vomiting    Toradol [Ketorolac Tromethamine] Rash     Health Maintenance   Topic Date Due    COVID-19 Vaccine (1) Never done    Pneumococcal 0-64 years Vaccine Borderline stress test   High amina patient  Borderline symptoms  Cath or not ???  Will be a high risk off of thinners   Continue risk factor modification and medical management  Thank you for allowing me to participate in the care of your patient. Please don't hesitate to contact me regarding any further issues related to the patient care      Orders Placed:  No orders of the defined types were placed in this encounter. Medications Prescribed:  No orders of the defined types were placed in this encounter. Discussed use, benefit, and side effects of prescribed medications. All patient questions answered. Pt voicedunderstanding. Instructed to continue current medications, diet and exercise. Continue risk factor modification and medical management. Patient agreed with treatment plan. Follow up as directed.     Electronically signedby Shi Montes MD on 10/6/2022 at 2:52 PM

## 2022-10-15 ENCOUNTER — HOSPITAL ENCOUNTER (OUTPATIENT)
Dept: CT IMAGING | Age: 52
Discharge: HOME | End: 2022-10-15
Payer: COMMERCIAL

## 2022-10-15 DIAGNOSIS — S09.90XD UNSPECIFIED INJURY OF HEAD, SUBSEQUENT ENCOUNTER: ICD-10-CM

## 2022-10-15 PROCEDURE — 70450 CT HEAD/BRAIN W/O DYE: CPT

## 2022-10-19 ENCOUNTER — TELEPHONE (OUTPATIENT)
Dept: CARDIOLOGY CLINIC | Age: 52
End: 2022-10-19

## 2022-10-19 NOTE — TELEPHONE ENCOUNTER
Pre op Risk Assessment    Procedure dental work-possible extractions, fillings or root canal with local anesthesia  Physician [facility]  Date of surgery/procedure TBD    Last OV 10/6/2022  Last Stress 9/28/2022  Last Echo Limited 9/7/2022  Last Cath 3/14/2022  Last Stent 3/14/2022  Is patient on blood thinners Plavix, Xarelto  Hold Meds/how many days     I called and talked to [facility] and they are aware unable to hold Plavix until 3/2023 unless emergent. I talked to patient who is on Xarelto for blood clots-she is aware that clearance needs to come for PCP. Dr. Parris Orosco patient clear without holding Plavix? PCP to address Xarelto.

## 2022-11-07 ENCOUNTER — HOSPITAL ENCOUNTER (OUTPATIENT)
Age: 52
Discharge: HOME | End: 2022-11-07
Payer: COMMERCIAL

## 2022-11-07 LAB
ERYTHROCYTE [DISTWIDTH] IN BLOOD BY AUTOMATED COUNT: 14.9 % (ref 11.5–14.5)
ERYTHROCYTE [DISTWIDTH] IN BLOOD BY AUTOMATED COUNT: 43.5 FL (ref 35–45)
HCT VFR BLD CALC: 36.4 % (ref 37–47)
HEMOGLOBIN: 11.4 GM/DL (ref 12–16)
MCH RBC QN AUTO: 25.1 PG (ref 26–33)
MCHC RBC AUTO-ENTMCNC: 31.3 GM/DL (ref 32.2–35.5)
MCV RBC AUTO: 80 FL (ref 81–99)
PLATELET # BLD: 255 THOU/MM3 (ref 130–400)
PMV BLD AUTO: 10.1 FL (ref 9.4–12.4)
RBC # BLD: 4.55 MILL/MM3 (ref 4.2–5.4)
WBC # BLD: 5.9 THOU/MM3 (ref 4.8–10.8)

## 2022-11-07 PROCEDURE — 36415 COLL VENOUS BLD VENIPUNCTURE: CPT

## 2022-11-07 PROCEDURE — 85027 COMPLETE CBC AUTOMATED: CPT

## 2022-11-07 NOTE — TELEPHONE ENCOUNTER
Patient states surgeon wants her to hold Plavix for 3 days for urgent hysteroscopy, D&C, and ablation next week? Patient states she can't wait until December due to increase bleeding.      Patient last stent was march 2022

## 2022-11-11 ENCOUNTER — APPOINTMENT (OUTPATIENT)
Dept: GENERAL RADIOLOGY | Age: 52
DRG: 517 | End: 2022-11-11
Payer: COMMERCIAL

## 2022-11-11 ENCOUNTER — ANESTHESIA EVENT (OUTPATIENT)
Dept: OPERATING ROOM | Age: 52
DRG: 517 | End: 2022-11-11
Payer: COMMERCIAL

## 2022-11-11 ENCOUNTER — HOSPITAL ENCOUNTER (INPATIENT)
Age: 52
LOS: 5 days | Discharge: HOME | DRG: 517 | End: 2022-11-16
Attending: EMERGENCY MEDICINE | Admitting: STUDENT IN AN ORGANIZED HEALTH CARE EDUCATION/TRAINING PROGRAM
Payer: COMMERCIAL

## 2022-11-11 DIAGNOSIS — N92.1 MENORRHAGIA WITH IRREGULAR CYCLE: ICD-10-CM

## 2022-11-11 DIAGNOSIS — N93.9 VAGINAL BLEEDING: Primary | ICD-10-CM

## 2022-11-11 DIAGNOSIS — D64.9 ANEMIA, UNSPECIFIED TYPE: ICD-10-CM

## 2022-11-11 LAB
ABO: NORMAL
ANION GAP SERPL CALCULATED.3IONS-SCNC: 13 MEQ/L (ref 8–16)
ANTIBODY SCREEN: NORMAL
BASOPHILS # BLD: 0.5 %
BASOPHILS ABSOLUTE: 0 THOU/MM3 (ref 0–0.1)
BUN BLDV-MCNC: 8 MG/DL (ref 7–22)
CALCIUM SERPL-MCNC: 8.5 MG/DL (ref 8.5–10.5)
CHLORIDE BLD-SCNC: 99 MEQ/L (ref 98–111)
CO2: 20 MEQ/L (ref 23–33)
CREAT SERPL-MCNC: 0.6 MG/DL (ref 0.4–1.2)
EKG ATRIAL RATE: 95 BPM
EKG P AXIS: 62 DEGREES
EKG P-R INTERVAL: 174 MS
EKG Q-T INTERVAL: 374 MS
EKG QRS DURATION: 86 MS
EKG QTC CALCULATION (BAZETT): 469 MS
EKG R AXIS: 9 DEGREES
EKG T AXIS: 70 DEGREES
EKG VENTRICULAR RATE: 95 BPM
EOSINOPHIL # BLD: 0.1 %
EOSINOPHILS ABSOLUTE: 0 THOU/MM3 (ref 0–0.4)
ERYTHROCYTE [DISTWIDTH] IN BLOOD BY AUTOMATED COUNT: 15.2 % (ref 11.5–14.5)
ERYTHROCYTE [DISTWIDTH] IN BLOOD BY AUTOMATED COUNT: 44.5 FL (ref 35–45)
GFR SERPL CREATININE-BSD FRML MDRD: > 60 ML/MIN/1.73M2
GLUCOSE BLD-MCNC: 245 MG/DL (ref 70–108)
GLUCOSE BLD-MCNC: 300 MG/DL (ref 70–108)
GLUCOSE BLD-MCNC: 304 MG/DL (ref 70–108)
GLUCOSE BLD-MCNC: 325 MG/DL (ref 70–108)
HCT VFR BLD CALC: 22.2 % (ref 37–47)
HCT VFR BLD CALC: 23.3 % (ref 37–47)
HCT VFR BLD CALC: 23.8 % (ref 37–47)
HCT VFR BLD CALC: 23.8 % (ref 37–47)
HEMOGLOBIN: 7.1 GM/DL (ref 12–16)
HEMOGLOBIN: 7.3 GM/DL (ref 12–16)
HEMOGLOBIN: 7.4 GM/DL (ref 12–16)
HEMOGLOBIN: 7.7 GM/DL (ref 12–16)
IMMATURE GRANS (ABS): 0.04 THOU/MM3 (ref 0–0.07)
IMMATURE GRANULOCYTES: 0.5 %
INR BLD: 1.38 (ref 0.85–1.13)
LYMPHOCYTES # BLD: 13.4 %
LYMPHOCYTES ABSOLUTE: 1.1 THOU/MM3 (ref 1–4.8)
MCH RBC QN AUTO: 25.2 PG (ref 26–33)
MCHC RBC AUTO-ENTMCNC: 31.3 GM/DL (ref 32.2–35.5)
MCV RBC AUTO: 80.3 FL (ref 81–99)
MONOCYTES # BLD: 4.1 %
MONOCYTES ABSOLUTE: 0.3 THOU/MM3 (ref 0.4–1.3)
NUCLEATED RED BLOOD CELLS: 0 /100 WBC
OSMOLALITY CALCULATION: 275.4 MOSMOL/KG (ref 275–300)
PLATELET # BLD: 321 THOU/MM3 (ref 130–400)
PMV BLD AUTO: 10.5 FL (ref 9.4–12.4)
POTASSIUM REFLEX MAGNESIUM: 3.7 MEQ/L (ref 3.5–5.2)
RBC # BLD: 2.9 MILL/MM3 (ref 4.2–5.4)
RH FACTOR: NORMAL
SEG NEUTROPHILS: 81.4 %
SEGMENTED NEUTROPHILS ABSOLUTE COUNT: 6.5 THOU/MM3 (ref 1.8–7.7)
SODIUM BLD-SCNC: 132 MEQ/L (ref 135–145)
T4 FREE: 1.2 NG/DL (ref 0.93–1.76)
TROPONIN T: < 0.01 NG/ML
TSH SERPL DL<=0.05 MIU/L-ACNC: 1.69 UIU/ML (ref 0.4–4.2)
WBC # BLD: 8 THOU/MM3 (ref 4.8–10.8)

## 2022-11-11 PROCEDURE — 84484 ASSAY OF TROPONIN QUANT: CPT

## 2022-11-11 PROCEDURE — 96361 HYDRATE IV INFUSION ADD-ON: CPT

## 2022-11-11 PROCEDURE — 2580000003 HC RX 258: Performed by: STUDENT IN AN ORGANIZED HEALTH CARE EDUCATION/TRAINING PROGRAM

## 2022-11-11 PROCEDURE — 85025 COMPLETE CBC W/AUTO DIFF WBC: CPT

## 2022-11-11 PROCEDURE — 36415 COLL VENOUS BLD VENIPUNCTURE: CPT

## 2022-11-11 PROCEDURE — 99285 EMERGENCY DEPT VISIT HI MDM: CPT

## 2022-11-11 PROCEDURE — 86923 COMPATIBILITY TEST ELECTRIC: CPT

## 2022-11-11 PROCEDURE — 99223 1ST HOSP IP/OBS HIGH 75: CPT | Performed by: STUDENT IN AN ORGANIZED HEALTH CARE EDUCATION/TRAINING PROGRAM

## 2022-11-11 PROCEDURE — 36430 TRANSFUSION BLD/BLD COMPNT: CPT

## 2022-11-11 PROCEDURE — 86900 BLOOD TYPING SEROLOGIC ABO: CPT

## 2022-11-11 PROCEDURE — 6370000000 HC RX 637 (ALT 250 FOR IP): Performed by: STUDENT IN AN ORGANIZED HEALTH CARE EDUCATION/TRAINING PROGRAM

## 2022-11-11 PROCEDURE — 86901 BLOOD TYPING SEROLOGIC RH(D): CPT

## 2022-11-11 PROCEDURE — 2060000000 HC ICU INTERMEDIATE R&B

## 2022-11-11 PROCEDURE — 85014 HEMATOCRIT: CPT

## 2022-11-11 PROCEDURE — 2580000003 HC RX 258: Performed by: EMERGENCY MEDICINE

## 2022-11-11 PROCEDURE — 80048 BASIC METABOLIC PNL TOTAL CA: CPT

## 2022-11-11 PROCEDURE — 93005 ELECTROCARDIOGRAM TRACING: CPT | Performed by: EMERGENCY MEDICINE

## 2022-11-11 PROCEDURE — 84439 ASSAY OF FREE THYROXINE: CPT

## 2022-11-11 PROCEDURE — 84443 ASSAY THYROID STIM HORMONE: CPT

## 2022-11-11 PROCEDURE — 71045 X-RAY EXAM CHEST 1 VIEW: CPT

## 2022-11-11 PROCEDURE — 96360 HYDRATION IV INFUSION INIT: CPT

## 2022-11-11 PROCEDURE — P9016 RBC LEUKOCYTES REDUCED: HCPCS

## 2022-11-11 PROCEDURE — 85610 PROTHROMBIN TIME: CPT

## 2022-11-11 PROCEDURE — 82948 REAGENT STRIP/BLOOD GLUCOSE: CPT

## 2022-11-11 PROCEDURE — 85018 HEMOGLOBIN: CPT

## 2022-11-11 PROCEDURE — 6370000000 HC RX 637 (ALT 250 FOR IP): Performed by: OBSTETRICS & GYNECOLOGY

## 2022-11-11 PROCEDURE — 86850 RBC ANTIBODY SCREEN: CPT

## 2022-11-11 PROCEDURE — 93010 ELECTROCARDIOGRAM REPORT: CPT | Performed by: NUCLEAR MEDICINE

## 2022-11-11 RX ORDER — ONDANSETRON 2 MG/ML
4 INJECTION INTRAMUSCULAR; INTRAVENOUS EVERY 6 HOURS PRN
Status: DISCONTINUED | OUTPATIENT
Start: 2022-11-11 | End: 2022-11-16 | Stop reason: HOSPADM

## 2022-11-11 RX ORDER — INSULIN LISPRO 100 [IU]/ML
0-4 INJECTION, SOLUTION INTRAVENOUS; SUBCUTANEOUS NIGHTLY
Status: DISCONTINUED | OUTPATIENT
Start: 2022-11-11 | End: 2022-11-16 | Stop reason: HOSPADM

## 2022-11-11 RX ORDER — 0.9 % SODIUM CHLORIDE 0.9 %
1000 INTRAVENOUS SOLUTION INTRAVENOUS ONCE
Status: COMPLETED | OUTPATIENT
Start: 2022-11-11 | End: 2022-11-11

## 2022-11-11 RX ORDER — METOPROLOL SUCCINATE 25 MG/1
37.5 TABLET, EXTENDED RELEASE ORAL DAILY
Status: DISCONTINUED | OUTPATIENT
Start: 2022-11-11 | End: 2022-11-16 | Stop reason: HOSPADM

## 2022-11-11 RX ORDER — SODIUM CHLORIDE 9 MG/ML
INJECTION, SOLUTION INTRAVENOUS CONTINUOUS
Status: DISCONTINUED | OUTPATIENT
Start: 2022-11-11 | End: 2022-11-14

## 2022-11-11 RX ORDER — ACETAMINOPHEN 650 MG/1
650 SUPPOSITORY RECTAL EVERY 6 HOURS PRN
Status: DISCONTINUED | OUTPATIENT
Start: 2022-11-11 | End: 2022-11-16 | Stop reason: HOSPADM

## 2022-11-11 RX ORDER — INSULIN GLARGINE 100 [IU]/ML
8 INJECTION, SOLUTION SUBCUTANEOUS NIGHTLY
Status: DISCONTINUED | OUTPATIENT
Start: 2022-11-11 | End: 2022-11-14

## 2022-11-11 RX ORDER — DEXTROSE MONOHYDRATE 100 MG/ML
INJECTION, SOLUTION INTRAVENOUS CONTINUOUS PRN
Status: DISCONTINUED | OUTPATIENT
Start: 2022-11-11 | End: 2022-11-16 | Stop reason: HOSPADM

## 2022-11-11 RX ORDER — MEGESTROL ACETATE 40 MG/1
60 TABLET ORAL 2 TIMES DAILY
Status: DISCONTINUED | OUTPATIENT
Start: 2022-11-11 | End: 2022-11-11

## 2022-11-11 RX ORDER — ATORVASTATIN CALCIUM 80 MG/1
80 TABLET, FILM COATED ORAL NIGHTLY
Status: DISCONTINUED | OUTPATIENT
Start: 2022-11-11 | End: 2022-11-16 | Stop reason: HOSPADM

## 2022-11-11 RX ORDER — SODIUM CHLORIDE 9 MG/ML
INJECTION, SOLUTION INTRAVENOUS PRN
Status: DISCONTINUED | OUTPATIENT
Start: 2022-11-11 | End: 2022-11-16 | Stop reason: HOSPADM

## 2022-11-11 RX ORDER — SODIUM CHLORIDE 9 MG/ML
INJECTION, SOLUTION INTRAVENOUS PRN
Status: DISCONTINUED | OUTPATIENT
Start: 2022-11-11 | End: 2022-11-14 | Stop reason: SDUPTHER

## 2022-11-11 RX ORDER — ACETAMINOPHEN 325 MG/1
650 TABLET ORAL EVERY 6 HOURS PRN
Status: DISCONTINUED | OUTPATIENT
Start: 2022-11-11 | End: 2022-11-16 | Stop reason: HOSPADM

## 2022-11-11 RX ORDER — INSULIN LISPRO 100 [IU]/ML
0-8 INJECTION, SOLUTION INTRAVENOUS; SUBCUTANEOUS
Status: DISCONTINUED | OUTPATIENT
Start: 2022-11-11 | End: 2022-11-16 | Stop reason: HOSPADM

## 2022-11-11 RX ORDER — SODIUM CHLORIDE 0.9 % (FLUSH) 0.9 %
5-40 SYRINGE (ML) INJECTION EVERY 12 HOURS SCHEDULED
Status: DISCONTINUED | OUTPATIENT
Start: 2022-11-11 | End: 2022-11-16 | Stop reason: HOSPADM

## 2022-11-11 RX ORDER — SODIUM CHLORIDE 0.9 % (FLUSH) 0.9 %
5-40 SYRINGE (ML) INJECTION PRN
Status: DISCONTINUED | OUTPATIENT
Start: 2022-11-11 | End: 2022-11-16 | Stop reason: HOSPADM

## 2022-11-11 RX ORDER — MEGESTROL ACETATE 40 MG/1
100 TABLET ORAL 3 TIMES DAILY
Status: DISCONTINUED | OUTPATIENT
Start: 2022-11-11 | End: 2022-11-15

## 2022-11-11 RX ORDER — ONDANSETRON 4 MG/1
4 TABLET, ORALLY DISINTEGRATING ORAL EVERY 8 HOURS PRN
Status: DISCONTINUED | OUTPATIENT
Start: 2022-11-11 | End: 2022-11-16 | Stop reason: HOSPADM

## 2022-11-11 RX ADMIN — ACETAMINOPHEN 650 MG: 325 TABLET ORAL at 21:32

## 2022-11-11 RX ADMIN — ATORVASTATIN CALCIUM 80 MG: 80 TABLET, FILM COATED ORAL at 23:08

## 2022-11-11 RX ADMIN — SODIUM CHLORIDE: 9 INJECTION, SOLUTION INTRAVENOUS at 14:16

## 2022-11-11 RX ADMIN — METOPROLOL SUCCINATE 37.5 MG: 25 TABLET, FILM COATED, EXTENDED RELEASE ORAL at 23:07

## 2022-11-11 RX ADMIN — SODIUM CHLORIDE 1000 ML: 9 INJECTION, SOLUTION INTRAVENOUS at 09:52

## 2022-11-11 RX ADMIN — SODIUM CHLORIDE: 9 INJECTION, SOLUTION INTRAVENOUS at 21:18

## 2022-11-11 RX ADMIN — INSULIN GLARGINE 8 UNITS: 100 INJECTION, SOLUTION SUBCUTANEOUS at 21:18

## 2022-11-11 RX ADMIN — INSULIN LISPRO 2 UNITS: 100 INJECTION, SOLUTION INTRAVENOUS; SUBCUTANEOUS at 17:39

## 2022-11-11 RX ADMIN — INSULIN LISPRO 4 UNITS: 100 INJECTION, SOLUTION INTRAVENOUS; SUBCUTANEOUS at 21:18

## 2022-11-11 RX ADMIN — MEGESTROL ACETATE 100 MG: 40 TABLET ORAL at 20:30

## 2022-11-11 ASSESSMENT — ENCOUNTER SYMPTOMS
VOMITING: 0
RHINORRHEA: 0
WHEEZING: 0
EYE PAIN: 0
DIARRHEA: 0
SHORTNESS OF BREATH: 0
EYE REDNESS: 0

## 2022-11-11 ASSESSMENT — PAIN SCALES - GENERAL: PAINLEVEL_OUTOF10: 5

## 2022-11-11 NOTE — ED NOTES
Pt continues restful on cart- VSS- consents for blood transfusions signed.       Tacos Wren RN  11/11/22 1735

## 2022-11-11 NOTE — ED NOTES
Pt continues restful on cart w/eyes closed. Wakes to name being called- states feeling well at this time. VSS.  Blood transfusing w/o difficulty     Kishan Grandchild, ARNOLD  11/11/22 2987

## 2022-11-11 NOTE — ED PROVIDER NOTES
[address] [facility]  EMERGENCY MEDICINE ATTENDING ATTESTATION      Evaluation of Rupesh Salas. Case discussed and care plan developed with resident physician. I agree with the resident physician documentation and plan as documented by him, except if my documentation differs. Patient seen, interviewed and examined by me. I reviewed the medical, surgical, family and social history, medications and allergies. I have reviewed the nursing documentation. I have reviewed the patient's vital signs and are normal per my interpretation. Body mass index is 45.31 kg/m². Pulsoxymetry is normal per my interpretation. Brief H&P   Patient c/o significant vaginal bleeding requiring her to use 3 large bags of large pads in the last couple of days. Patient is currently on Plavix and Xarelto due to factor V [place] and CAD, pending to get an endometrial ablation next Monday, 3 days from today. As instructed, she did not take Xarelto or Plavix today in preparation for her surgery but states she is feeling very low energy, lightheadedness every time she moves and continues bleeding. Patient last.  Before this episode of bleeding was longer than 6 months ago. Physical exam is notable for well appearing, pale conjunctiva's, oral pale, otherwise nonfocal exam.  Pelvic exam deferred for now. Medical Decision Making   MDM:   Vaginal bleeding on anticoagulation  Symptomatic anemia  Plan:   IV line, labs  There is a significant drop in hemoglobin, will discuss case with OB for admission today versus transfusion and continuing with plan for her procedure on Monday  Observation in the ED while awaiting results    Please see the resident physician completed note for final disposition except as documented on this attestation. I have reviewed and interpreted all available lab, radiology and ekg results available at the moment.   Diagnosis, treatment and disposition plans were discussed and agreed upon by patient. This transcription was electronically signed. It was dictated by use of voice recognition software and electronically transcribed. The transcription may contain errors not detected in proofreading.      I performed direct supervision and was present for the critical portion following procedures: None  Critical care time on this case: None    Electronically signed by Kan Chirinos MD on 11/11/22 at 10:15 AM EVA Chirinos MD  11/11/22 0255

## 2022-11-11 NOTE — ED NOTES
Pt appears restful on ER cart watching TV and using her cell phone. No acute distress noted, VSS.       Kyle Krishnamurthy RN  11/11/22 8053

## 2022-11-11 NOTE — H&P
History & Physical       Patient: Joss Rausch  YOB: 1970    MRN: [id number]     Acct: [de-identified]    PCP: SHANNON Verdin CNP    Date of Admission: 11/11/2022    Date of Service: Patient seen / examined on 11/11/22 and admitted to Inpatient with expected LOS greater than two midnights due to medical therapy. ASSESSMENT / PLAN:    Vaginal Bleeding, with acute normocytic anemia: Pt reports heavy bleeding x 9 days. Patient was last admitted for similar complaints on 8/22. Patient reports she was due to get an ablation done a few days with her OB. However became very symptomatic with dizziness, lightheadedness, and palpitations. denies any syncope. Patient arrived with a hemoglobin of 7.3 with no known history of anemia. Received 1 unit of packed red blood cells in the ED. Patient is on Xarelto and Plavix daily. We will hold Xarelto and Plavix. H&H checks every 6 hours. Transfuse if hemoglobin is below 7. Maintain telemetry. Consult OB/GYN for consideration of ablation inpatient. Hx CAD, s/p PCI: [facility] 3/14/22  with PCI to Lcx, OM1, RDPA. On Plavix and Xarelto; will hold Plavix and Xarelto due to 1. Patient denies any chest pain currently. EKG shows no acute changes. Essential HTN: BP overall stable. Hold home med currently due to #1. Continue to monitor closely. Factor 5 Leiden mutation: Hx of PE and DVT. Hold Xarelto currently due to #1    Pseudohyponatremia: Na 132. Corrected Na after hyperglycemia is 136    Non Insulin Dependent Type 2 DM: Hold home oral agents; Medium dose SSI; accu checks; Carb control diet. Obesity: BMI 48.35 kg/m2. Chief Complaint:  vaginal bleeding    History of Present Illness:  Joss Rausch is a 50 y.o. female who presents to the emergency department for evaluation of profuse vaginal bleeding. She reports that over the past 3 days she has gone through 3 boxes of 48 pads. She has been passing multiple large clots.   Patient was admitted this past August for profuse vaginal bleeding and had received some blood transfusions, she was told she had fibroid tumor. This coming Monday she is scheduled for a uterine ablation, she was trying to hold out until then. Patient is also complaining of substernal, nonradiating, mild chest pain whenever she moves. Patient reports feeling lightheaded as well as dizzy. The patient has no other acute complaints at this time. Past Medical History:    Past Medical History:   Diagnosis Date    Asthma     Bipolar 1 disorder ([facility] [address]     Blood circulation, collateral     CAD (coronary artery disease)     Curvature of spine     Diabetes mellitus (HCC)     DVT (deep venous thrombosis) (HCC)     Factor 5 Leiden mutation, heterozygous (HCC)     GERD (gastroesophageal reflux disease)     HTN, goal below 130/80     Hx of blood clots     PE x3 and DVT x3    Hx-TIA (transient ischemic attack)     Hyperlipidemia     PE (pulmonary embolism)     RA (rheumatoid arthritis) ([facility] [address]     Unspecified cerebral artery occlusion with cerebral infarction      Past Surgical History:    Past Surgical History:   Procedure Laterality Date    CARDIAC CATHETERIZATION  feb 2015    Heart caths    CHOLECYSTECTOMY  1992    CORONARY ANGIOPLASTY WITH STENT PLACEMENT      FOOT DEBRIDEMENT Right 9/30/2019    FOOT DEBRIDEMENT INCISION AND DRAINAGE performed by Mya Obregon DPM at [place] [address] ARTHROSCOPY  2007&2010    LIPOMA RESECTION      TONSILLECTOMY      TUBAL LIGATION  2003    TYMPANOSTOMY TUBE PLACEMENT        Medications Prior to Admission:   No current facility-administered medications on file prior to encounter.      Current Outpatient Medications on File Prior to Encounter   Medication Sig Dispense Refill    predniSONE (DELTASONE) 20 MG tablet 40 mg daily for 3 days, 20 mg daily for 3 days, 10 mg daily for 3 days 10 tablet 0    megestrol (MEGACE) 40 MG tablet Take 1 tablet by mouth 2 times daily 60 tablet 1    metoprolol succinate (TOPROL XL) 25 MG extended release tablet Take 1.5 tablets by mouth daily 135 tablet 1    atorvastatin (LIPITOR) 80 MG tablet Take 1 tablet by mouth at bedtime 90 tablet 2    clopidogrel (PLAVIX) 75 MG tablet Take 1 tablet by mouth daily 90 tablet 2    Blood Pressure KIT 1 kit by Does not apply route as needed (PRN) 1 kit 0    nitroGLYCERIN (NITROSTAT) 0.4 MG SL tablet up to max of 3 total doses. If no relief after 1 dose, call 911. 25 tablet 3    XARELTO 20 MG TABS tablet take 1 tablet by mouth once daily      JANUVIA 100 MG tablet take 1 tablet by mouth once daily      gabapentin (NEURONTIN) 600 MG tablet take 1 tablet by mouth twice a day      acetaminophen (TYLENOL) 325 MG tablet Take 2 tablets by mouth every 4 hours as needed for Pain 120 tablet 3    blood glucose test strips (TRUETRACK TEST) strip 1 each by In Vitro route 2 times daily As needed.  100 each 0     Allergies:   Codeine, Demerol, Ultram [tramadol hcl], Percocet [oxycodone-acetaminophen], and Toradol [ketorolac tromethamine]    Social History:   Social History     Socioeconomic History    Marital status:      Spouse name: Not on file    Number of children: 3    Years of education: Not on file    Highest education level: Not on file   Occupational History    Not on file   Tobacco Use    Smoking status: Former     Packs/day: 2.00     Years: 14.00     Pack years: 28.00     Types: Cigarettes     Quit date:      Years since quittin.8     Passive exposure: Never    Smokeless tobacco: Never   Vaping Use    Vaping Use: Never used   Substance and Sexual Activity    Alcohol use: No    Drug use: No    Sexual activity: Not on file   Other Topics Concern    Not on file   Social History Narrative    Not on file     Social Determinants of Health     Financial Resource Strain: Not on file   Food Insecurity: Not on file   Transportation Needs: Not on file   Physical Activity: Not on file   Stress: Not on file   Social Connections: Not on file   Intimate Partner Violence: Not on file   Housing Stability: Not on file     Family History:    Family History   Problem Relation Age of Onset    COPD Mother     Other Mother     Cancer Mother     High Blood Pressure Father     Heart Disease Father     Mental Illness Sister     Mental Illness Brother     Mental Illness Sister     Mental Illness Brother      REVIEW OF SYSTEMS:  A 14-point ROS was obtained and negative, with the exception of pertinent positives as listed below:    PHYSICAL EXAM:  Vitals:    11/11/22 1135 11/11/22 1211 11/11/22 1333 11/11/22 1405   BP: 131/66 124/61 126/72 (!) 145/73   Pulse: 99 94 81 98   Resp: 22 20 20 22   Temp: 98.2 °F (36.8 °C) 98.3 °F (36.8 °C)     TempSrc: Oral Oral     SpO2: 100% 99% 100% 100%   Weight:       Height:         General appearance: Alert / well-appearing Cooperative. NAD. Pale appearing  HEENT:  Normocephalic / atraumatic. PERRL. EOM intact. Conjunctivae pallor  Neck: Supple. No JVD. Respiratory: Normal respiratory effort on RA. CTAB. No wheezes / rales / rhonchi. Cardiovascular: RRR. Normal S1/S2. No murmurs / rubs / gallops. Abdomen: Soft / non-tender / non-distended. BS present. Musculoskeletal: No cyanosis or edema. Skin: Warm / dry. Normal turgor. Neurologic: A/O x 3. Speech normal. Answers questions appropriately. CN intact. No obvious focal neurologic deficits. Psychiatric: Thought content / judgment / insight appear appropriate. Capillary refill: Brisk bilaterally. Peripheral pulses: +2 bilaterally.     Labs:   Results for orders placed or performed during the hospital encounter of 11/11/22   CBC with Auto Differential   Result Value Ref Range    WBC 8.0 4.8 - 10.8 thou/mm3    RBC 2.90 (L) 4.20 - 5.40 mill/mm3    Hemoglobin 7.3 (L) 12.0 - 16.0 gm/dl    Hematocrit 23.3 (L) 37.0 - 47.0 %    MCV 80.3 (L) 81.0 - 99.0 fL    MCH 25.2 (L) 26.0 - 33.0 pg    MCHC 31.3 (L) 32.2 - 35.5 gm/dl    RDW-CV 15.2 (H) 11.5 - 14.5 %    RDW-SD 44.5 35.0 - 45.0 fL Right-sided headache. COMPARISON: CT head dated 3/10/2022. TECHNIQUE: Noncontrast 5 mm axial images were obtained through the brain. Sagittal and coronal reconstructions were created. All CT scans at this facility use dose modulation, iterative reconstruction, and/or weight-based dosing when appropriate to reduce radiation dose to as low as reasonably achievable. FINDINGS: The ventricles, cisterns and sulci are symmetric and normal in size and configuration. Gray-white matter differentiation appears grossly preserved. No intracranial hemorrhage, mass effect or midline shift is identified. The calvarium appears intact. Orbits are unremarkable. Visualized paranasal sinuses are clear. Mastoid air cells are clear. No evidence of acute intracranial abnormality. **This report has been created using voice recognition software. It may contain minor errors which are inherent in voice recognition technology. ** Final report electronically signed by Dr. Kelsi Villalobos MD on 10/15/2022 10:06 AM    XR CHEST PORTABLE    Result Date: 11/11/2022  PROCEDURE: XR CHEST PORTABLE CLINICAL INFORMATION: 79-year-old female with generalized chest pain. Shortness of breath. COMPARISON: Radiograph 9/15/2022. TECHNIQUE: AP upright view of the chest was obtained. FINDINGS: The lungs are clear. The cardiac silhouette and pulmonary vasculature are within normal limits. There is no significant pleural effusion or pneumothorax. Visualized portions of the upper abdomen are within normal limits. The osseous structures are intact. No acute fractures or suspicious osseous lesions. There is no acute intrathoracic process. **This report has been created using voice recognition software. It may contain minor errors which are inherent in voice recognition technology. ** Final report electronically signed by Dr Evita Londono on 11/11/2022 9:42 AM    FEN/GI/DVT:  IVF: NS @ 100  Electrolytes: Monitor and replace per protocols  Diet: Diabetic  GI PPX: No  DVT Prophylaxis: SCDs    CODE STATUS:  Full    Thank you SHANNON Bejarano CNP for the opportunity to be involved in this patient's care.     Electronically signed by Shawn Uriarte DO on 11/11/2022 at 2:35 PM

## 2022-11-11 NOTE — ED NOTES
Pt transported to 4B10 by cart in stable condition. Called and informed 4B that the patient was on their way to the unit.       Jenny Moreno, SARAH  22/86/00 0150

## 2022-11-11 NOTE — ED PROVIDER NOTES
[facility] ENCOUNTER          Pt Name: Bharti Martinez  MRN: [id number]  [place] 1970  Date of evaluation: 11/11/2022  Treating Resident Physician: Gunnar Spatz, DO  Supervising Physician: Martinez Ji MD    History obtained from the patient. CHIEF COMPLAINT       Chief Complaint   Patient presents with    Vaginal Bleeding     States heavy bleeding x 9 days     Dizziness           HISTORY OF PRESENT ILLNESS    HPI  Bharti Martinez is a 50 y.o. female who presents to the emergency department for evaluation of profuse vaginal bleeding. She reports that over the past 3 days she has gone through 3 boxes of 48 pads. She has been passing multiple large clots. Patient was admitted this past August for profuse vaginal bleeding and had received some blood transfusions, she was told she had fibroid tumor. This coming Monday she is scheduled for a uterine ablation, she was trying to hold out until then. Patient is also complaining of substernal, nonradiating, mild chest pain whenever she moves. Patient reports feeling lightheaded as well as dizzy. The patient has no other acute complaints at this time. REVIEW OF SYSTEMS   Review of Systems   Constitutional:  Positive for fatigue. Negative for chills and fever. HENT:  Negative for congestion and rhinorrhea. Eyes:  Negative for pain and redness. Respiratory:  Negative for shortness of breath and wheezing. Cardiovascular:  Positive for chest pain and leg swelling. Negative for palpitations. Gastrointestinal:  Negative for diarrhea and vomiting. Genitourinary:  Negative for difficulty urinating and dysuria. Musculoskeletal:  Negative for gait problem and joint swelling. Skin:  Positive for pallor. Negative for rash. Neurological:  Positive for dizziness and light-headedness. Negative for facial asymmetry and speech difficulty.        PAST MEDICAL AND SURGICAL HISTORY     Past Medical History:   Diagnosis Date    Asthma     Bipolar 1 disorder ([facility] [address]     Blood circulation, collateral     CAD (coronary artery disease)     Curvature of spine     Diabetes mellitus (HCC)     DVT (deep venous thrombosis) (HCC)     Factor 5 Leiden mutation, heterozygous (HCC)     GERD (gastroesophageal reflux disease)     HTN, goal below 130/80     Hx of blood clots     PE x3 and DVT x3    Hx-TIA (transient ischemic attack)     Hyperlipidemia     PE (pulmonary embolism)     RA (rheumatoid arthritis) ([facility] [address]     Unspecified cerebral artery occlusion with cerebral infarction      Past Surgical History:   Procedure Laterality Date    CARDIAC CATHETERIZATION  feb 2015    Heart caths    CHOLECYSTECTOMY  1992    CORONARY ANGIOPLASTY WITH STENT PLACEMENT      FOOT DEBRIDEMENT Right 9/30/2019    FOOT DEBRIDEMENT INCISION AND DRAINAGE performed by Maribeth Lawson DPM at [place] [address] ARTHROSCOPY  2007&2010    LIPOMA RESECTION      TONSILLECTOMY      TUBAL LIGATION  2003    TYMPANOSTOMY TUBE PLACEMENT           MEDICATIONS     Current Facility-Administered Medications:     0.9 % sodium chloride infusion, , IntraVENous, PRN, Meri Alfred, DO    sodium chloride flush 0.9 % injection 5-40 mL, 5-40 mL, IntraVENous, 2 times per day, Yana Bux, DO    sodium chloride flush 0.9 % injection 5-40 mL, 5-40 mL, IntraVENous, PRN, Yana Bux, DO    0.9 % sodium chloride infusion, , IntraVENous, PRN, Yana Bux, DO    ondansetron (ZOFRAN-ODT) disintegrating tablet 4 mg, 4 mg, Oral, Q8H PRN **OR** ondansetron (ZOFRAN) injection 4 mg, 4 mg, IntraVENous, Q6H PRN, Yana Bux, DO    acetaminophen (TYLENOL) tablet 650 mg, 650 mg, Oral, Q6H PRN **OR** acetaminophen (TYLENOL) suppository 650 mg, 650 mg, Rectal, Q6H PRN, Yana Bux, DO    Current Outpatient Medications:     predniSONE (DELTASONE) 20 MG tablet, 40 mg daily for 3 days, 20 mg daily for 3 days, 10 mg daily for 3 days, Disp: 10 tablet, Rfl: 0    megestrol (MEGACE) 40 MG tablet, Take 1 tablet by mouth 2 times daily, Disp: 60 tablet, Rfl: 1    metoprolol succinate (TOPROL XL) 25 MG extended release tablet, Take 1.5 tablets by mouth daily, Disp: 135 tablet, Rfl: 1    atorvastatin (LIPITOR) 80 MG tablet, Take 1 tablet by mouth at bedtime, Disp: 90 tablet, Rfl: 2    clopidogrel (PLAVIX) 75 MG tablet, Take 1 tablet by mouth daily, Disp: 90 tablet, Rfl: 2    Blood Pressure KIT, 1 kit by Does not apply route as needed (PRN), Disp: 1 kit, Rfl: 0    nitroGLYCERIN (NITROSTAT) 0.4 MG SL tablet, up to max of 3 total doses. If no relief after 1 dose, call 911., Disp: 25 tablet, Rfl: 3    XARELTO 20 MG TABS tablet, take 1 tablet by mouth once daily, Disp: , Rfl:     JANUVIA 100 MG tablet, take 1 tablet by mouth once daily, Disp: , Rfl:     gabapentin (NEURONTIN) 600 MG tablet, take 1 tablet by mouth twice a day, Disp: , Rfl:     acetaminophen (TYLENOL) 325 MG tablet, Take 2 tablets by mouth every 4 hours as needed for Pain, Disp: 120 tablet, Rfl: 3    blood glucose test strips (TRUETRACK TEST) strip, 1 each by In Vitro route 2 times daily As needed. , Disp: 100 each, Rfl: 0      SOCIAL HISTORY     Social History     Social History Narrative    Not on file     Social History     Tobacco Use    Smoking status: Former     Packs/day: 2.00     Years: 14.00     Pack years: 28.00     Types: Cigarettes     Quit date:      Years since quittin.8     Passive exposure: Never    Smokeless tobacco: Never   Vaping Use    Vaping Use: Never used   Substance Use Topics    Alcohol use: No    Drug use: No         ALLERGIES     Allergies   Allergen Reactions    Codeine Hives     + Nausea vomiting    Demerol      vomiting    Ultram [Tramadol Hcl] Other (See Comments)     Dizziness     Percocet [Oxycodone-Acetaminophen] Nausea And Vomiting    Toradol [Ketorolac Tromethamine] Rash         FAMILY HISTORY     Family History   Problem Relation Age of Onset    COPD Mother     Other Mother     Cancer Mother     High Blood Pressure Father     Heart Disease Father     Mental Illness Sister     Mental Illness Brother     Mental Illness Sister     Mental Illness Brother          PREVIOUS RECORDS   Previous records reviewed:  Last seen on 9/15/2022 for neck pain or shortness of breath . PHYSICAL EXAM     ED Triage Vitals [11/11/22 0842]   BP Temp Temp Source Heart Rate Resp SpO2 Height Weight   128/71 98 °F (36.7 °C) Oral (!) 107 18 100 % 5' 8\" (1.727 m) 298 lb (135.2 kg)     Initial vital signs and nursing assessment reviewed and abnormal from heart rate 107 . Body mass index is 45.31 kg/m². Pulsoximetry is normal per my interpretation. Additional Vital Signs:  Vitals:    11/11/22 1211   BP: 124/61   Pulse: 94   Resp: 20   Temp: 98.3 °F (36.8 °C)   SpO2: 99%       Physical Exam  Vitals and nursing note reviewed. Constitutional:       General: She is not in acute distress. Appearance: She is not ill-appearing or toxic-appearing. Comments: Is fair skinned, but still appears pale   HENT:      Head: Normocephalic and atraumatic. Right Ear: External ear normal.      Left Ear: External ear normal.      Nose: Nose normal. No congestion. Mouth/Throat:      Mouth: Mucous membranes are moist.      Pharynx: Oropharynx is clear. Eyes:      Conjunctiva/sclera: Conjunctivae normal.   Cardiovascular:      Rate and Rhythm: Regular rhythm. Tachycardia present. Pulses: Normal pulses. Heart sounds: Normal heart sounds. No murmur heard. No friction rub. No gallop. Pulmonary:      Effort: Pulmonary effort is normal. No respiratory distress. Breath sounds: Normal breath sounds. No wheezing. Abdominal:      General: There is no distension. Palpations: Abdomen is soft. Tenderness: There is no abdominal tenderness. There is no guarding. Genitourinary:     Comments: Blood pooling in the posterior vaginal vault. Was unable to visualize cervical os.   Musculoskeletal:         General: Normal range of motion. Cervical back: Normal range of motion and neck supple. No tenderness. Comments: Nonpitting bilateral lower extremity edema with some chronic venous stasis changes in the left lower extremity   Lymphadenopathy:      Cervical: No cervical adenopathy. Skin:     General: Skin is warm and dry. Capillary Refill: Capillary refill takes 2 to 3 seconds. Neurological:      General: No focal deficit present. Mental Status: She is alert and oriented to person, place, and time. Cranial Nerves: No cranial nerve deficit. Sensory: No sensory deficit. Motor: No weakness. Psychiatric:         Mood and Affect: Mood normal.           MEDICAL DECISION MAKING   Initial Assessment:   Patient is a pale appearing 70-year-old female with history of DM, DVT/PE, factor V, TIA, CAD on Xarelto and Eliquis who presents with profuse vaginal bleeding x3 days. I think this patient is sick, she looks anemic. Over the past 4 days she has had a 4.1 drop in her hemoglobin. Given her hemoglobin of 7.3 and being symptomatic and still actively bleeding I think she could use a blood transfusion. Initially patient was mildly tachycardic, but her blood pressures have been stable throughout. I suspect her bleeding is coming from her fibroid tumor, with Xarelto exacerbating it, along with her norethindrone use yesterday. Ddx: Vaginal bleeding, Xarelto use, fibroids, Factor V, polyps, anemia, uterine CA  Plan:   Labs. Type and screen  IVF  1 unit of blood  Pelvic exam to look for active bleeding. She was pooling and clotted blood in her posterior vaginal vault. I was unable to visualize the cervical os. Upon using swabs to clear up the blood, she was repooling blood. So I am of the opinion that she is actively bleeding. I used approximately 5 swabs and there was still blood left.   Sought OBGYN advice        ED RESULTS   Laboratory results:  Labs Reviewed   CBC WITH AUTO DIFFERENTIAL - Abnormal; Notable for the following components:       Result Value    RBC 2.90 (*)     Hemoglobin 7.3 (*)     Hematocrit 23.3 (*)     MCV 80.3 (*)     MCH 25.2 (*)     MCHC 31.3 (*)     RDW-CV 15.2 (*)     Monocytes Absolute 0.3 (*)     All other components within normal limits   BASIC METABOLIC PANEL W/ REFLEX TO MG FOR LOW K - Abnormal; Notable for the following components:    Sodium 132 (*)     CO2 20 (*)     Glucose 325 (*)     All other components within normal limits   PROTIME-INR - Abnormal; Notable for the following components:    INR 1.38 (*)     All other components within normal limits   GLOMERULAR FILTRATION RATE, ESTIMATED   T4, FREE   TSH   TROPONIN   ANION GAP   OSMOLALITY   HEMOGLOBIN AND HEMATOCRIT   HEMOGLOBIN AND HEMATOCRIT   TYPE AND SCREEN   PREPARE RBC (CROSSMATCH)    Narrative:     [id number]     issued       Radiologic studies results:  XR CHEST PORTABLE   Final Result   There is no acute intrathoracic process. **This report has been created using voice recognition software. It may contain minor errors which are inherent in voice recognition technology. **      Final report electronically signed by Dr Ann Gonzalez on 11/11/2022 9:42 AM          ED Medications administered this visit:   Medications   0.9 % sodium chloride infusion (has no administration in time range)   sodium chloride flush 0.9 % injection 5-40 mL (has no administration in time range)   sodium chloride flush 0.9 % injection 5-40 mL (has no administration in time range)   0.9 % sodium chloride infusion (has no administration in time range)   ondansetron (ZOFRAN-ODT) disintegrating tablet 4 mg (has no administration in time range)     Or   ondansetron (ZOFRAN) injection 4 mg (has no administration in time range)   acetaminophen (TYLENOL) tablet 650 mg (has no administration in time range)     Or   acetaminophen (TYLENOL) suppository 650 mg (has no administration in time range)   0.9 % sodium chloride bolus (0 mLs IntraVENous Stopped 11/11/22 1128)         ED COURSE     ED Course as of 11/11/22 1226   Fri Nov 11, 2022   1059 Spoke with Dr Kari Arias who recommended giving the Pt 1 unit of blood, admitting, and increasing the dose of Megace to 60 bid   [AC]      ED Course User Index  [AC] Indigo Barrett DO              MEDICATION CHANGES     New Prescriptions    No medications on file         FINAL DISPOSITION     Final diagnoses:   Vaginal bleeding   Anemia, unspecified type     Condition: condition: poor  Dispo: Admit to hospitalist      This transcription was electronically signed. Parts of this transcriptions may have been dictated by use of voice recognition software and electronically transcribed, and parts may have been transcribed with the assistance of an ED scribe. The transcription may contain errors not detected in proofreading. Please refer to my supervising physician's documentation if my documentation differs.     Electronically Signed: Indigo Barrett DO, 11/11/22, 12:26 PM        Indigo Barrett DO  Resident  11/11/22 122

## 2022-11-11 NOTE — ED TRIAGE NOTES
Pt to rm 06 per intake c/o heavy vaginal bleeding x 9 days- scheduled for uterine ablation Monday. Reports heart racing, dizzy/lightheaded and CP w/exertion- she was at [facility] 2 days ago for same. Pt states she has gone through 4 bags of pads using 2 at a time since onset.

## 2022-11-11 NOTE — ED NOTES
Pt assisted up to use BSC- HR up to 145 but quickly resolved once pt back in bed.       Brandie Ngo, RN  11/11/22 7144

## 2022-11-11 NOTE — ED NOTES
Pelvic exam complete- pt tolerated well. Noted that pt pads again saturated(using 2 at a time).       Esau Car RN  11/11/22 5618

## 2022-11-11 NOTE — ED NOTES
ED to inpatient nurses report    Chief Complaint   Patient presents with    Vaginal Bleeding     States heavy bleeding x 9 days     Dizziness      Present to ED from home  LOC: alert and orientated to name, place, date  Vital signs   Vitals:    11/11/22 1135 11/11/22 1211 11/11/22 1333 11/11/22 1405   BP: 131/66 124/61 126/72 (!) 145/73   Pulse: 99 94 81 98   Resp: 22 20 20 22   Temp: 98.2 °F (36.8 °C) 98.3 °F (36.8 °C)     TempSrc: Oral Oral     SpO2: 100% 99% 100% 100%   Weight:       Height:          Oxygen Baseline RA    Current needs required RA  Bipap/Cpap No  LDAs:   Peripheral IV 11/11/22 Right Antecubital (Active)   Site Assessment Clean, dry & intact 11/11/22 1334   Line Status Infusing 11/11/22 1334     Mobility: w/assistance d/t dizziness when standing. I have only allowed her to stand and pivot to use BSC- pt wont tolerate a bedpan d/t weight. Pending ED orders: none  Present condition: Restful on cart watching TV. Denies needs or concerns. Pt is NPO but ice is OK.      C-SSRS Risk of Suicide: No Risk  Swallow Screening    Preferred Language: English     Electronically signed by Osiris Dang RN on 11/11/2022 at 3:14 PM       Nina Cruz RN  11/11/22 7236

## 2022-11-12 LAB
ANION GAP SERPL CALCULATED.3IONS-SCNC: 11 MEQ/L (ref 8–16)
APTT: 27.3 SECONDS (ref 22–38)
BUN BLDV-MCNC: 8 MG/DL (ref 7–22)
CALCIUM SERPL-MCNC: 8.2 MG/DL (ref 8.5–10.5)
CHLORIDE BLD-SCNC: 103 MEQ/L (ref 98–111)
CO2: 21 MEQ/L (ref 23–33)
CREAT SERPL-MCNC: 0.5 MG/DL (ref 0.4–1.2)
GFR SERPL CREATININE-BSD FRML MDRD: > 60 ML/MIN/1.73M2
GLUCOSE BLD-MCNC: 194 MG/DL (ref 70–108)
GLUCOSE BLD-MCNC: 212 MG/DL (ref 70–108)
GLUCOSE BLD-MCNC: 217 MG/DL (ref 70–108)
GLUCOSE BLD-MCNC: 220 MG/DL (ref 70–108)
GLUCOSE BLD-MCNC: 228 MG/DL (ref 70–108)
GLUCOSE BLD-MCNC: 232 MG/DL (ref 70–108)
HCT VFR BLD CALC: 22.4 % (ref 37–47)
HCT VFR BLD CALC: 23.4 % (ref 37–47)
HCT VFR BLD CALC: 24.3 % (ref 37–47)
HCT VFR BLD CALC: 24.7 % (ref 37–47)
HEMOGLOBIN: 7 GM/DL (ref 12–16)
HEMOGLOBIN: 7.8 GM/DL (ref 12–16)
HEMOGLOBIN: 8 GM/DL (ref 12–16)
HEMOGLOBIN: 8.1 GM/DL (ref 12–16)
IRON: 37 UG/DL (ref 50–170)
POTASSIUM SERPL-SCNC: 3.8 MEQ/L (ref 3.5–5.2)
SODIUM BLD-SCNC: 135 MEQ/L (ref 135–145)
TOTAL IRON BINDING CAPACITY: 321 UG/DL (ref 171–450)

## 2022-11-12 PROCEDURE — 6370000000 HC RX 637 (ALT 250 FOR IP): Performed by: STUDENT IN AN ORGANIZED HEALTH CARE EDUCATION/TRAINING PROGRAM

## 2022-11-12 PROCEDURE — 36430 TRANSFUSION BLD/BLD COMPNT: CPT

## 2022-11-12 PROCEDURE — 85730 THROMBOPLASTIN TIME PARTIAL: CPT

## 2022-11-12 PROCEDURE — 80048 BASIC METABOLIC PNL TOTAL CA: CPT

## 2022-11-12 PROCEDURE — 85014 HEMATOCRIT: CPT

## 2022-11-12 PROCEDURE — 2580000003 HC RX 258: Performed by: STUDENT IN AN ORGANIZED HEALTH CARE EDUCATION/TRAINING PROGRAM

## 2022-11-12 PROCEDURE — 83540 ASSAY OF IRON: CPT

## 2022-11-12 PROCEDURE — 85018 HEMOGLOBIN: CPT

## 2022-11-12 PROCEDURE — 82948 REAGENT STRIP/BLOOD GLUCOSE: CPT

## 2022-11-12 PROCEDURE — 99233 SBSQ HOSP IP/OBS HIGH 50: CPT | Performed by: STUDENT IN AN ORGANIZED HEALTH CARE EDUCATION/TRAINING PROGRAM

## 2022-11-12 PROCEDURE — 6370000000 HC RX 637 (ALT 250 FOR IP): Performed by: OBSTETRICS & GYNECOLOGY

## 2022-11-12 PROCEDURE — 2060000000 HC ICU INTERMEDIATE R&B

## 2022-11-12 PROCEDURE — 36415 COLL VENOUS BLD VENIPUNCTURE: CPT

## 2022-11-12 PROCEDURE — 83550 IRON BINDING TEST: CPT

## 2022-11-12 RX ORDER — SODIUM CHLORIDE 9 MG/ML
INJECTION, SOLUTION INTRAVENOUS PRN
Status: DISCONTINUED | OUTPATIENT
Start: 2022-11-12 | End: 2022-11-14 | Stop reason: SDUPTHER

## 2022-11-12 RX ADMIN — MEGESTROL ACETATE 40 MG: 40 TABLET ORAL at 08:32

## 2022-11-12 RX ADMIN — METOPROLOL SUCCINATE 37.5 MG: 25 TABLET, FILM COATED, EXTENDED RELEASE ORAL at 08:32

## 2022-11-12 RX ADMIN — MEGESTROL ACETATE 100 MG: 40 TABLET ORAL at 12:00

## 2022-11-12 RX ADMIN — ATORVASTATIN CALCIUM 80 MG: 80 TABLET, FILM COATED ORAL at 20:36

## 2022-11-12 RX ADMIN — MEGESTROL ACETATE 100 MG: 40 TABLET ORAL at 20:36

## 2022-11-12 RX ADMIN — INSULIN GLARGINE 8 UNITS: 100 INJECTION, SOLUTION SUBCUTANEOUS at 20:54

## 2022-11-12 RX ADMIN — MEGESTROL ACETATE 100 MG: 40 TABLET ORAL at 03:34

## 2022-11-12 RX ADMIN — ACETAMINOPHEN 650 MG: 325 TABLET ORAL at 21:04

## 2022-11-12 RX ADMIN — INSULIN LISPRO 2 UNITS: 100 INJECTION, SOLUTION INTRAVENOUS; SUBCUTANEOUS at 18:03

## 2022-11-12 RX ADMIN — INSULIN LISPRO 2 UNITS: 100 INJECTION, SOLUTION INTRAVENOUS; SUBCUTANEOUS at 09:26

## 2022-11-12 RX ADMIN — INSULIN LISPRO 2 UNITS: 100 INJECTION, SOLUTION INTRAVENOUS; SUBCUTANEOUS at 13:01

## 2022-11-12 RX ADMIN — SODIUM CHLORIDE: 9 INJECTION, SOLUTION INTRAVENOUS at 11:47

## 2022-11-12 RX ADMIN — SODIUM CHLORIDE: 9 INJECTION, SOLUTION INTRAVENOUS at 20:41

## 2022-11-12 ASSESSMENT — PAIN DESCRIPTION - LOCATION
LOCATION: BACK
LOCATION: BACK

## 2022-11-12 ASSESSMENT — PAIN DESCRIPTION - PAIN TYPE
TYPE: CHRONIC PAIN
TYPE: CHRONIC PAIN

## 2022-11-12 ASSESSMENT — PAIN - FUNCTIONAL ASSESSMENT
PAIN_FUNCTIONAL_ASSESSMENT: ACTIVITIES ARE NOT PREVENTED
PAIN_FUNCTIONAL_ASSESSMENT: ACTIVITIES ARE NOT PREVENTED

## 2022-11-12 ASSESSMENT — PAIN SCALES - GENERAL
PAINLEVEL_OUTOF10: 0
PAINLEVEL_OUTOF10: 8
PAINLEVEL_OUTOF10: 5

## 2022-11-12 ASSESSMENT — PAIN DESCRIPTION - ONSET
ONSET: ON-GOING
ONSET: ON-GOING

## 2022-11-12 ASSESSMENT — PAIN DESCRIPTION - FREQUENCY
FREQUENCY: CONTINUOUS
FREQUENCY: CONTINUOUS

## 2022-11-12 ASSESSMENT — PAIN DESCRIPTION - DESCRIPTORS
DESCRIPTORS: ACHING
DESCRIPTORS: ACHING

## 2022-11-12 ASSESSMENT — PAIN DESCRIPTION - ORIENTATION
ORIENTATION: LOWER
ORIENTATION: LOWER

## 2022-11-12 NOTE — SIGNIFICANT EVENT
Brief note:  Hemoglobin dropping despite blood transfusion. Therefore, ordered 2 units of packed red blood cells.       Wilda Regan MD, MPH, [facility]

## 2022-11-12 NOTE — CONSULTS
Department of Obstetrics and Gynecology   History & Physical    Pt Name: Lu Day  MRN: [id number] Gagan #: [de-identified]  YOB: 1970    HPI: The patient is a 50 y.o.  female  who presented to the ER c/o heavy bleeding and feeling like she was going to pass out. This has happened in the past and she was set up for surgical management for December. The bleeding has been controlled with megace 40 mg BID. In the last week she had it increased to 80 mg BID. But her bleeding continued to be heavy despite the increase in her medication. She continues to feel dizzy but similar to when she came in. Her bleeding has slowed slightly but still feels she is bleeding heavy. She has received 1 unit of pRBCs is getting the second currently. Allergies:     Allergies as of 2022 - Fully Reviewed 2022   Allergen Reaction Noted    Codeine Hives 2019    Demerol  2012    Ultram [tramadol hcl] Other (See Comments) 2019    Percocet [oxycodone-acetaminophen] Nausea And Vomiting 10/22/2020    Toradol [ketorolac tromethamine] Rash 2019       Medications:    Current Facility-Administered Medications:     0.9 % sodium chloride infusion, , IntraVENous, PRN, Immanuel Contreras, DO    sodium chloride flush 0.9 % injection 5-40 mL, 5-40 mL, IntraVENous, 2 times per day, Yana Bux, DO    sodium chloride flush 0.9 % injection 5-40 mL, 5-40 mL, IntraVENous, PRN, Yana Bux, DO    0.9 % sodium chloride infusion, , IntraVENous, PRN, Yana Bux, DO, Last Rate: 25 mL/hr at 22 1416, New Bag at 22 1416    ondansetron (ZOFRAN-ODT) disintegrating tablet 4 mg, 4 mg, Oral, Q8H PRN **OR** ondansetron (ZOFRAN) injection 4 mg, 4 mg, IntraVENous, Q6H PRN, Yana Bux, DO    acetaminophen (TYLENOL) tablet 650 mg, 650 mg, Oral, Q6H PRN **OR** acetaminophen (TYLENOL) suppository 650 mg, 650 mg, Rectal, Q6H PRN, Yana Bux, DO    glucose chewable tablet 16 g, 4 tablet, Oral, PRN, Yana Bux, DO dextrose bolus 10% 125 mL, 125 mL, IntraVENous, PRN **OR** dextrose bolus 10% 250 mL, 250 mL, IntraVENous, PRN, Yana Bux, DO    glucagon (rDNA) injection 1 mg, 1 mg, SubCUTAneous, PRN, Yana Bux, DO    dextrose 10 % infusion, , IntraVENous, Continuous PRN, Yana Bux, DO    insulin lispro (HUMALOG) injection vial 0-8 Units, 0-8 Units, SubCUTAneous, TID WC, Yana Bux, DO, 2 Units at 22 1739    insulin lispro (HUMALOG) injection vial 0-4 Units, 0-4 Units, SubCUTAneous, Nightly, Yana Bux, DO    0.9 % sodium chloride infusion, , IntraVENous, PRN, Yana Bux, DO    0.9 % sodium chloride infusion, , IntraVENous, Continuous, Brendaaníbal Sun MD    insulin glargine (LANTUS) injection vial 8 Units, 8 Units, SubCUTAneous, Nightly, Brendaaníbal Sun MD    megestrol (MEGACE) tablet 100 mg, 100 mg, Oral, TID, Abisai López MD    OB History: -3 SVDs    Gyn History: Denies h/o abnormal pap smear, h/o STDs.      Past Medical History:   Past Medical History:   Diagnosis Date    Asthma     Bipolar 1 disorder ([facility] [address]     Blood circulation, collateral     CAD (coronary artery disease)     Curvature of spine     Diabetes mellitus (HCC)     DVT (deep venous thrombosis) ([facility])     Factor 5 Leiden mutation, heterozygous (HCC)     GERD (gastroesophageal reflux disease)     HTN, goal below 130/80     Hx of blood clots     PE x3 and DVT x3    Hx-TIA (transient ischemic attack)     Hyperlipidemia     PE (pulmonary embolism)     RA (rheumatoid arthritis) ([facility] [address]     Unspecified cerebral artery occlusion with cerebral infarction        Past Surgical History:   Past Surgical History:   Procedure Laterality Date    CARDIAC CATHETERIZATION  2015    Heart caths    CHOLECYSTECTOMY      CORONARY ANGIOPLASTY WITH STENT PLACEMENT      FOOT DEBRIDEMENT Right 2019    FOOT DEBRIDEMENT INCISION AND DRAINAGE performed by Laisha Mistry DPM at [place] [address] ARTHROSCOPY  &    LIPOMA RESECTION      TONSILLECTOMY      TUBAL LIGATION      TYMPANOSTOMY TUBE PLACEMENT         Social History:   Social History     Tobacco Use   Smoking Status Former    Packs/day: 2.00    Years: 14.00    Pack years: 28.00    Types: Cigarettes    Quit date:     Years since quittin.8    Passive exposure: Never   Smokeless Tobacco Never        Family History: Noncontributory; Denies h/o cancer. ROS:  Negative except as stated in HPI    PE:  Vitals:    22 1706   BP: 136/75   Pulse: (!) 107   Resp: 16   Temp: (!) 96.3 °F (35.7 °C)   SpO2: 100%       General: well nourished, well developed, in no acute distress, pale, morbidly obese  Resp: breathing unlabored  Abdomen: Nontender, no rebound, no guarding  Pelvic: chux full. Weighed on L&D 148g    Labs:   Lab Results   Component Value Date    WBC 8.0 2022    HGB 7.1 (L) 2022    HCT 22.2 (L) 2022    MCV 80.3 (L) 2022     2022     Lab Results   Component Value Date    INR 1.38 (H) 2022    INR 0.99 2022    INR 1.09 2022    PROTIME 2.38 (H) 2011    PROTIME 1.36 (H) 10/17/2011    PROTIME 2.24 (H) 2011         Imagin/2022 u/s:   Uterus - 12.1 x 7.3 x 6.4 cm   Endometrium - 3.2 cm    Right Ovary - 3.7 x 2.7 x 2.9 cm    Left Ovary - not visualized            The uterus measures 12.1 x 7.3 x 6.4 cm in size. There is abnormal endometrial thickening which measures 3.2 cm in thickness. This could represent endometrial carcinoma. GYN consult would be helpful for further evaluation. There is a cyst  in the region of the cervix measuring 12 x 10 x 8 mm in size. .        There is an anechoic area in the right ovary measuring 4 x 3.1 x 3.7 cm in size. .       The left ovary was not clearly seen. There is no free fluid within the pelvis. .           Impression       1. Abnormal echogenicity within the endometrium which measures 3.2 cm in thickness. Endometrial carcinoma cannot be ruled out.  GYN consult would be helpful for better evaluation. 2. Cyst  in the region of the cervix measuring 12 x 10 x 8 mm in size. 3. Anechoic area in the right ovary measuring 4 x 2.1 x 3.7 cm in size. 4. The left ovary was not clearly visualized. 5. There is no definite free fluid within the pelvis. Assessment: 50 y.o.  female with abnormal uterine bleeding, anemia, morbid obesity    Plan:   Discussed plan in detail. Discussed that she has significant medical history and comorbidities that make her a poor surgical candidate and at risk for significant CV events, and complications. Discussed endometrial ablation would not likely control her bleeding due to the size of the uterus and would make challenging in the future to biopsy if there was a concern for endometrial hyperplasia or cancer. Discussed with her comorbidities she she at higher risk for endometrial cancer. Recommended medical management specifically a mirena IUD to help protect her uterus from future pathology and treat her bleeding. Discussed a hysteroscopy D&C to confirm benign pathology and place the IUD at the same time. She currently has surgery scheduled for Monday. Discussed if the megace does not control her bleeding, we may need to move the surgery up. Her last INR was elevated but not in therapeutic range. This can put her at more risk for bleeding during the surgery. She is in agreement to have the IUD placed instead of the ablation. Will increase her megace to 100 mg TID and give the dose now. Will weight her pads and monitor her bleeding closely. She will likely need another unit of blood. She ate dinner recently. Will keep NPO now until her bleeding improves in case she needs to go to the OR sooner.        Jorge Butts MD  7:45 PM  2022

## 2022-11-12 NOTE — PROGRESS NOTES
Called for update. 2.5 hrs with her pad and it weighed 55g. Bleeding is continuing to slow down. Next blood draw is around 1pm. And next dose of 100 mg of megace is due at noon. Continue close monitoring and pad counts.      Evan Julien MD  11:18 AM  11/12/2022

## 2022-11-12 NOTE — PROGRESS NOTES
S: she states she is feeling better. She thinks the bleeding is slightly less. She did pass 1 clot in the toilet over night. O:  Vitals:    22 0815   BP: 117/60   Pulse: 77   Resp: 18   Temp: 98.3 °F (36.8 °C)   SpO2: 100%     Gen: her cheeks are more gonzález and less pale, no acute distress  Ext gen: pad has been on for 2.5 hrs. Weight of pad was 96 g  Overnight pad weight 75g (from 11-6 am)    A: 49 yo  HD#2 with abnormal uterine bleeding, anemia  P:  Her bleeding is less than yesterday where it was 150 cc in about 2 hours it is now <100 in about 2-3 hrs. Her vitals are improved-her HR was in the 100s and now in the 70-80s. Her face has improved color. She is also feeling better. We may need to continue to transfuse while awaiting her bleeding to slow and allowing the anticoagulants to stop working prior to taking her to surgery to avoid increased bleeding from surgical intervention. Her hgb currently is 8.1  Continue megace 100 mg TID. One of the tablets was found in her bed from the 4 oclock dose so that will be given now and then another 100mg is due at noon. Since admission she has received now 2 doses of megace. Appreciate continued medical management.      Marcel Jimenez MD  8:33 AM  2022

## 2022-11-12 NOTE — PROGRESS NOTES
S: feels like the bleeding is slowing down. Her dizziness is improving. She felt better getting up to the bedside commode than last time and denies any clots when up. Her last day of xarelto and plavix was yesterday. She did not take it today. She took her dose of megace this Am of 80 mg and has not received any more megace since admission until now a dose of 100 mg    O:   Vitals:    22 1945   BP: 139/62   Pulse: 97   Resp: 17   Temp: 97.8 °F (36.6 °C)   SpO2: 100%     Gen: no acute distress  Pelvic: pad just placed scan blood on it. Chux in the trash can appears to be at least 1/2 the amount of the first pad     A: 49 yo  HD#1 with abnormal uterine bleeding, anemia  P:  Megace 100 mg TID  Pad counts-notify MD with worsening bleeding  Keep npo for now. If bleeding doesn't slow down will consider surgery sooner. Hope to be off anticoagulants prior to surgery for 2-3 days. H&h in about 30 minutes. Will likely need another 1-2 units of blood. Continue medical management.      Jorge Butts MD  8:57 PM  2022

## 2022-11-12 NOTE — PROGRESS NOTES
Hospitalist Progress Note    Patient:  Ailyn Zuniga    YOB: 1970  Unit/Bed:4B-10/010-A  MRN: [id number]    Acct: [de-identified]   PCP: SHANNON Coffey CNP    Date of Admission: 11/11/2022      Assessment/Plan:  Acute blood loss anemia without hemorrhagic shock secondary to AUB. In total received 4 units of PRBC. Q 6 H&H. We will monitor for any transfusion related reactions, continue 100 cc normal saline. On Megace 100 3 times daily as per OB/GYN. Tentative plan for Endometrial ablation on Monday. CAD s/p recent PCI to OM, Circumflex and posterior descending. At home on Xarelto and Plavix. Currently AC/AP on hold Given the bleeding. Continue Toprol-XL, Lipitor. Prior history of DVT and PE secondary to coagulopathy factor V Leyden mutation. Add Xarelto on home. Currently on hold. EPC cuffs for DVT prophylaxis. Non-insulin-dependent type 2 diabetes mellitus. Elevated. At home on metformin and Jardiance. We will add Lantus 10 units. SSI, hypoglycemia protocol. Expected discharge date:  Depending on clinical course     Disposition: Likely home  [x] Home  [] TCU  [] Rehab  [] Psych  [] SNF  [] [facility]  [] Other-    ===================================================================      Chief Complaint: vaginal bleeding leading to lightheadedness     Hospital Course:   From H&P    11/12  Ailyn Zuniga is a 50 y.o. female who presents to the emergency department for evaluation of profuse vaginal bleeding. She reports that over the past 3 days she has gone through 3 boxes of 48 pads. She has been passing multiple large clots. Patient was admitted this past August for profuse vaginal bleeding and had received some blood transfusions, she was told she had fibroid tumor. This coming Monday she is scheduled for a uterine ablation, she was trying to hold out until then.   Patient is also complaining of substernal, nonradiating, mild chest pain whenever she moves. Patient reports feeling lightheaded as well as dizzy. The patient has no other acute complaints at this time. Patient seen and examined. Overnight continue to bleed, hemoglobin dropped to 7  Patient denied any abdominal pain, nausea, vomiting, fever, chills  Despite bleeding she remained hemodynamically and vitally stable, will keep an eye on blood pressure. Normal tachycardic. Saturating well 100% SPO2. ROS: reviewed complete ROS unchanged unless otherwise stated in hospital course/subjective portion. Medications:  Reviewed    Infusion Medications    sodium chloride      sodium chloride      sodium chloride      sodium chloride 25 mL/hr at 11/11/22 1416    dextrose      sodium chloride      sodium chloride 100 mL/hr at 11/12/22 1147     Scheduled Medications    sodium chloride flush  5-40 mL IntraVENous 2 times per day    insulin lispro  0-8 Units SubCUTAneous TID WC    insulin lispro  0-4 Units SubCUTAneous Nightly    insulin glargine  8 Units SubCUTAneous Nightly    megestrol  100 mg Oral TID    atorvastatin  80 mg Oral Nightly    metoprolol succinate  37.5 mg Oral Daily     PRN Meds: sodium chloride, sodium chloride, sodium chloride, sodium chloride flush, sodium chloride, ondansetron **OR** ondansetron, acetaminophen **OR** acetaminophen, glucose, dextrose bolus **OR** dextrose bolus, glucagon (rDNA), dextrose, sodium chloride        Intake/Output Summary (Last 24 hours) at 11/12/2022 1327  Last data filed at 11/12/2022 1050  Gross per 24 hour   Intake 1370.67 ml   Output 542 ml   Net 828.67 ml       Exam:  BP (!) 117/58   Pulse 80   Temp 98 °F (36.7 °C) (Oral)   Resp 16   Ht 5' 8\" (1.727 m)   Wt 298 lb (135.2 kg)   LMP 06/21/2018   SpO2 100%   BMI 45.31 kg/m²     General: No distress, appears sick. Eyes:  PERRL. Pale conjunctiva  HENT: Head normal appearing. Nares normal. Oral mucosa moist.  Hearing intact. Neck: Supple, with full range of motion. Trachea midline.   No gross JVD appreciated. Respiratory:  Normal effort. Clear to auscultation, without rales or wheezes or rhonchi. Cardiovascular: Normal rate, regular rhythm with normal S1/S2 without murmurs. No lower extremity edema. Abdomen: Soft, non-tender, non-distended with normal bowel sounds. Musculoskeletal: No joint swelling or tenderness. Normal tone. No abnormal movements. Skin: Warm and dry. No rashes or lesions. Neurologic:  No focal sensory/motor deficits in the upper or lower extremities. Cranial nerves:  grossly non-focal 2-12. Psychiatric: Alert and oriented, normal insight and thought content. Capillary Refill: Brisk,> 3 seconds. Peripheral Pulses: +2 palpable, equal bilaterally. Labs:   Recent Labs     11/11/22  0913 11/11/22  1430 11/11/22  2041 11/12/22  0004 11/12/22  0658   WBC 8.0  --   --   --   --    HGB 7.3*   < > 7.7* 7.0* 8.1*   HCT 23.3*   < > 23.8* 22.4* 24.3*     --   --   --   --     < > = values in this interval not displayed. Recent Labs     11/11/22  0913 11/12/22  0658   * 135   K 3.7 3.8   CL 99 103   CO2 20* 21*   BUN 8 8   CREATININE 0.6 0.5   CALCIUM 8.5 8.2*     No results for input(s): AST, ALT, BILIDIR, BILITOT, ALKPHOS in the last 72 hours. Recent Labs     11/11/22 0913   INR 1.38*     No results for input(s): Aleksandra Hollow in the last 72 hours. No results for input(s): PROCAL in the last 72 hours. Lab Results   Component Value Date/Time    NITRU NEGATIVE 09/09/2015 04:20 PM    WBCUA 5-10 09/09/2015 04:20 PM    WBCUA 2-4 10/17/2011 06:05 PM    BACTERIA NONE 09/09/2015 04:20 PM    RBCUA 0-2 09/09/2015 04:20 PM    BLOODU MODERATE 09/09/2015 04:20 PM    SPECGRAV 1.007 09/09/2015 04:20 PM    GLUCOSEU >= 1000 03/14/2015 02:28 PM       Radiology (48 hours):  XR CHEST PORTABLE    Result Date: 11/11/2022  There is no acute intrathoracic process. **This report has been created using voice recognition software.  It may contain minor errors which are inherent in voice recognition technology. ** Final report electronically signed by Dr Mark Liu on 11/11/2022 9:42 AM       DVT prophylaxis:    [] Lovenox  [x] SCDs  [] SQ Heparin  [] Encourage ambulation   [] Already on Anticoagulation       Diet: ADULT DIET;  Regular; 4 carb choices (60 gm/meal)  Code Status: Full Code  PT/OT: Ordered  Tele: Not on telemetry  IVF: 100 cc normal saline    Electronically signed by Don Ward MD on 11/12/2022 at 1:27 PM

## 2022-11-12 NOTE — PROGRESS NOTES
Physician Progress Note      Ibeth Varma  CSN #:                  [id number]  :                       1970  ADMIT DATE:       2022 8:38 AM  DISCH DATE:  RESPONDING  PROVIDER #:        Zuleika Grey MD          QUERY TEXT:    Pt admitted with vaginal bleeding and has symptomatic acute anemia documented. If possible, please make selection below, document in progress notes and   discharge summary further specificity regarding the acuity and type of anemia:    The medical record reflects the following:  Risk Factors: vaginal bleeding with Plavix and Xarelto at home which she takes   for CAD and factor V Leyden mutation  Clinical Indicators: dizziness, tachycardia, Hgb 7.4,  7.7  8.1  Treatment: admission, imaging, labs, OB/GYN consult for treatment of   underlying vaginal bleeding. Thank you ,  Uday Connelly RN, BSN, [facility]  Clinical   P: [phone]  F: [phone]  Options provided:  -- Anemia due to acute blood loss  -- Other - I will add my own diagnosis  -- Disagree - Not applicable / Not valid  -- Disagree - Clinically unable to determine / Unknown  -- Refer to Clinical Documentation Reviewer    PROVIDER RESPONSE TEXT:    This patient has acute blood loss anemia. Query created by: Shannon Chinchilla on 2022 8:41 AM      Electronically signed by:   Zuleika Grey MD 2022 1:26 PM

## 2022-11-13 LAB
ANION GAP SERPL CALCULATED.3IONS-SCNC: 11 MEQ/L (ref 8–16)
BASOPHILS # BLD: 0.6 %
BASOPHILS ABSOLUTE: 0 THOU/MM3 (ref 0–0.1)
BUN BLDV-MCNC: 9 MG/DL (ref 7–22)
CALCIUM SERPL-MCNC: 7.9 MG/DL (ref 8.5–10.5)
CHLORIDE BLD-SCNC: 105 MEQ/L (ref 98–111)
CO2: 21 MEQ/L (ref 23–33)
CREAT SERPL-MCNC: 0.6 MG/DL (ref 0.4–1.2)
EOSINOPHIL # BLD: 1.6 %
EOSINOPHILS ABSOLUTE: 0.1 THOU/MM3 (ref 0–0.4)
ERYTHROCYTE [DISTWIDTH] IN BLOOD BY AUTOMATED COUNT: 15.8 % (ref 11.5–14.5)
ERYTHROCYTE [DISTWIDTH] IN BLOOD BY AUTOMATED COUNT: 47.7 FL (ref 35–45)
GFR SERPL CREATININE-BSD FRML MDRD: > 60 ML/MIN/1.73M2
GLUCOSE BLD-MCNC: 172 MG/DL (ref 70–108)
GLUCOSE BLD-MCNC: 181 MG/DL (ref 70–108)
GLUCOSE BLD-MCNC: 191 MG/DL (ref 70–108)
GLUCOSE BLD-MCNC: 229 MG/DL (ref 70–108)
GLUCOSE BLD-MCNC: 231 MG/DL (ref 70–108)
HCT VFR BLD CALC: 21.1 % (ref 37–47)
HCT VFR BLD CALC: 23.5 % (ref 37–47)
HCT VFR BLD CALC: 26.7 % (ref 37–47)
HEMOGLOBIN: 6.7 GM/DL (ref 12–16)
HEMOGLOBIN: 7.5 GM/DL (ref 12–16)
HEMOGLOBIN: 8.9 GM/DL (ref 12–16)
IMMATURE GRANS (ABS): 0.05 THOU/MM3 (ref 0–0.07)
IMMATURE GRANULOCYTES: 0.6 %
LYMPHOCYTES # BLD: 29 %
LYMPHOCYTES ABSOLUTE: 2.2 THOU/MM3 (ref 1–4.8)
MCH RBC QN AUTO: 26.3 PG (ref 26–33)
MCHC RBC AUTO-ENTMCNC: 31.9 GM/DL (ref 32.2–35.5)
MCV RBC AUTO: 82.5 FL (ref 81–99)
MONOCYTES # BLD: 6.2 %
MONOCYTES ABSOLUTE: 0.5 THOU/MM3 (ref 0.4–1.3)
NUCLEATED RED BLOOD CELLS: 1 /100 WBC
PLATELET # BLD: 230 THOU/MM3 (ref 130–400)
PMV BLD AUTO: 10.2 FL (ref 9.4–12.4)
POTASSIUM REFLEX MAGNESIUM: 3.6 MEQ/L (ref 3.5–5.2)
RBC # BLD: 2.85 MILL/MM3 (ref 4.2–5.4)
SEG NEUTROPHILS: 62 %
SEGMENTED NEUTROPHILS ABSOLUTE COUNT: 4.8 THOU/MM3 (ref 1.8–7.7)
SODIUM BLD-SCNC: 137 MEQ/L (ref 135–145)
WBC # BLD: 7.7 THOU/MM3 (ref 4.8–10.8)

## 2022-11-13 PROCEDURE — 85018 HEMOGLOBIN: CPT

## 2022-11-13 PROCEDURE — 6370000000 HC RX 637 (ALT 250 FOR IP): Performed by: STUDENT IN AN ORGANIZED HEALTH CARE EDUCATION/TRAINING PROGRAM

## 2022-11-13 PROCEDURE — 80048 BASIC METABOLIC PNL TOTAL CA: CPT

## 2022-11-13 PROCEDURE — 82948 REAGENT STRIP/BLOOD GLUCOSE: CPT

## 2022-11-13 PROCEDURE — 6370000000 HC RX 637 (ALT 250 FOR IP): Performed by: OBSTETRICS & GYNECOLOGY

## 2022-11-13 PROCEDURE — 2580000003 HC RX 258: Performed by: STUDENT IN AN ORGANIZED HEALTH CARE EDUCATION/TRAINING PROGRAM

## 2022-11-13 PROCEDURE — 99232 SBSQ HOSP IP/OBS MODERATE 35: CPT | Performed by: STUDENT IN AN ORGANIZED HEALTH CARE EDUCATION/TRAINING PROGRAM

## 2022-11-13 PROCEDURE — 36430 TRANSFUSION BLD/BLD COMPNT: CPT

## 2022-11-13 PROCEDURE — 85025 COMPLETE CBC W/AUTO DIFF WBC: CPT

## 2022-11-13 PROCEDURE — 2060000000 HC ICU INTERMEDIATE R&B

## 2022-11-13 PROCEDURE — 85014 HEMATOCRIT: CPT

## 2022-11-13 PROCEDURE — 36415 COLL VENOUS BLD VENIPUNCTURE: CPT

## 2022-11-13 RX ORDER — SODIUM CHLORIDE 9 MG/ML
INJECTION, SOLUTION INTRAVENOUS PRN
Status: DISCONTINUED | OUTPATIENT
Start: 2022-11-13 | End: 2022-11-14 | Stop reason: SDUPTHER

## 2022-11-13 RX ADMIN — MEGESTROL ACETATE 100 MG: 40 TABLET ORAL at 20:31

## 2022-11-13 RX ADMIN — ATORVASTATIN CALCIUM 80 MG: 80 TABLET, FILM COATED ORAL at 20:31

## 2022-11-13 RX ADMIN — MEGESTROL ACETATE 100 MG: 40 TABLET ORAL at 14:21

## 2022-11-13 RX ADMIN — SODIUM CHLORIDE: 9 INJECTION, SOLUTION INTRAVENOUS at 23:47

## 2022-11-13 RX ADMIN — INSULIN GLARGINE 8 UNITS: 100 INJECTION, SOLUTION SUBCUTANEOUS at 20:32

## 2022-11-13 RX ADMIN — SODIUM CHLORIDE, PRESERVATIVE FREE 10 ML: 5 INJECTION INTRAVENOUS at 08:53

## 2022-11-13 RX ADMIN — INSULIN LISPRO 2 UNITS: 100 INJECTION, SOLUTION INTRAVENOUS; SUBCUTANEOUS at 12:29

## 2022-11-13 RX ADMIN — MEGESTROL ACETATE 100 MG: 40 TABLET ORAL at 08:53

## 2022-11-13 RX ADMIN — METOPROLOL SUCCINATE 37.5 MG: 25 TABLET, FILM COATED, EXTENDED RELEASE ORAL at 08:53

## 2022-11-13 RX ADMIN — ACETAMINOPHEN 650 MG: 325 TABLET ORAL at 08:49

## 2022-11-13 RX ADMIN — SODIUM CHLORIDE: 9 INJECTION, SOLUTION INTRAVENOUS at 06:30

## 2022-11-13 ASSESSMENT — PAIN SCALES - GENERAL
PAINLEVEL_OUTOF10: 8
PAINLEVEL_OUTOF10: 0

## 2022-11-13 ASSESSMENT — PAIN DESCRIPTION - LOCATION: LOCATION: BACK

## 2022-11-13 ASSESSMENT — PAIN DESCRIPTION - DESCRIPTORS: DESCRIPTORS: THROBBING

## 2022-11-13 NOTE — PROGRESS NOTES
Hospitalist Progress Note      Patient:  Joss Rausch    Unit/Bed:4B-10/010-A  YOB: 1970  MRN: [id number]   Acct: [de-identified]   PCP: SHANNON Verdin CNP  Date of Admission: 11/11/2022    Assessment/Plan:    Vaginal Bleeding, with acute normocytic anemia: Pt reports heavy bleeding x 9 days. Patient was last admitted for similar complaints on 8/22. Patient reports she was due to get an ablation done a few days with her OB. However became very symptomatic with dizziness, lightheadedness, and palpitations. denies any syncope. Patient arrived with a hemoglobin of 7.3 with no known history of anemia. Received 1 unit of packed red blood cells in the ED. Patient is on Xarelto and Plavix daily. We will hold Xarelto and Plavix. H&H checks every 6 hours. Transfuse if hemoglobin is below 7. Maintain telemetry. Plan for Hysteroscopy D&C Mirena IUD placement. Continue Megace 100 mg TID per OB    Acute blood loss anemia: secondary to #1. Will treat as above. Hgb today 6.7. Patient continues to have bleeding however slightly improved. Will transfuse further 2 units. Hx CAD, s/p PCI: [facility] 3/14/22  with PCI to Lcx, OM1, RDPA. On Plavix and Xarelto; will hold Plavix and Xarelto due to 1. Patient denies any chest pain currently. EKG shows no acute changes. Essential HTN: BP overall stable. Hold home med currently due to #1. Continue to monitor closely. Factor 5 Leiden mutation: Hx of PE and DVT. Hold Xarelto currently due to #1     Pseudohyponatremia: Resolved; Na 132. Corrected Na after hyperglycemia is 136     Non Insulin Dependent Type 2 DM: Hold home oral agents; lantus 10 units + Medium dose SSI; accu checks; Carb control diet. Obesity: BMI 48.35 kg/m2.        Chief Complaint: vaginal bleeding    Initial H and P:-    Joss Rausch is a 50 y.o. female who presents to the emergency department for evaluation of profuse vaginal bleeding. She reports that over the past 3 days she has gone through 3 boxes of 48 pads. She has been passing multiple large clots. Patient was admitted this past August for profuse vaginal bleeding and had received some blood transfusions, she was told she had fibroid tumor. This coming Monday she is scheduled for a uterine ablation, she was trying to hold out until then. Patient is also complaining of substernal, nonradiating, mild chest pain whenever she moves. Patient reports feeling lightheaded as well as dizzy. The patient has no other acute complaints at this time    Subjective (past 24 hours):   Patient seen and examined by bedside. Feeling slightly better. Passed 1 clot in the toilet overnight. Past medical history, family history, social history and allergies reviewed again and is unchanged since admission. ROS (12 point review of systems completed. Pertinent positives noted.  Otherwise ROS is negative)     Medications:  Reviewed    Infusion Medications    sodium chloride      sodium chloride      sodium chloride      sodium chloride      sodium chloride      sodium chloride 25 mL/hr at 11/11/22 1416    dextrose      sodium chloride      sodium chloride 100 mL/hr at 11/13/22 0630     Scheduled Medications    sodium chloride flush  5-40 mL IntraVENous 2 times per day    insulin lispro  0-8 Units SubCUTAneous TID WC    insulin lispro  0-4 Units SubCUTAneous Nightly    insulin glargine  8 Units SubCUTAneous Nightly    megestrol  100 mg Oral TID    atorvastatin  80 mg Oral Nightly    metoprolol succinate  37.5 mg Oral Daily     PRN Meds: sodium chloride, sodium chloride, sodium chloride, sodium chloride, sodium chloride, sodium chloride flush, sodium chloride, ondansetron **OR** ondansetron, acetaminophen **OR** acetaminophen, glucose, dextrose bolus **OR** dextrose bolus, glucagon (rDNA), dextrose, sodium chloride      Intake/Output Summary (Last 24 hours) at 11/13/2022 5041  Last data filed at 11/13/2022 0946  Gross per 24 hour   Intake 1340 ml   Output 2591 ml   Net -1251 ml       Diet:  ADULT DIET; Regular; 4 carb choices (60 gm/meal)  Diet NPO Exceptions are: Ice Chips, Sips of Water with Meds  Diet NPO Exceptions are: Sips of Water with Meds    Exam:  BP (!) 115/53   Pulse 75   Temp 98.2 °F (36.8 °C) (Oral)   Resp 20   Ht 5' 8\" (1.727 m)   Wt 298 lb (135.2 kg)   LMP 06/21/2018   SpO2 98%   BMI 45.31 kg/m²   General appearance: No apparent distress, appears stated age and cooperative. HEENT: Pupils equal, round, and reactive to light. Conjunctiva pallor  Neck: Supple, with full range of motion. No jugular venous distention. Trachea midline. Respiratory:  Normal respiratory effort. Clear to auscultation, bilaterally without Rales/Wheezes/Rhonchi. Cardiovascular: Regular rate and rhythm with normal S1/S2 without murmurs, rubs or gallops. Abdomen: Soft, non-tender, non-distended with normal bowel sounds. Musculoskeletal: passive and active ROM x 4 extremities. Skin: Skin color, texture, turgor normal.  No rashes or lesions. Neurologic:  Neurovascularly intact without any focal sensory/motor deficits. Cranial nerves: II-XII intact, grossly non-focal.  Psychiatric: Alert and oriented, thought content appropriate, normal insight  Capillary Refill: Brisk,< 3 seconds   Peripheral Pulses: +2 palpable, equal bilaterally     Labs:   Recent Labs     11/11/22  0913 11/11/22  1430 11/12/22  1840 11/13/22  0101 11/13/22  0725   WBC 8.0  --   --  7.7  --    HGB 7.3*   < > 7.8* 7.5* 6.7*   HCT 23.3*   < > 23.4* 23.5* 21.1*     --   --  230  --     < > = values in this interval not displayed. Recent Labs     11/11/22  0913 11/12/22  0658 11/13/22  0101   * 135 137   K 3.7 3.8 3.6   CL 99 103 105   CO2 20* 21* 21*   BUN 8 8 9   CREATININE 0.6 0.5 0.6   CALCIUM 8.5 8.2* 7.9*     No results for input(s): AST, ALT, BILIDIR, BILITOT, ALKPHOS in the last 72 hours.   Recent Labs 11/11/22  0913   INR 1.38*     No results for input(s): Zandra Montoya in the last 72 hours. Microbiology:    Blood culture #1:   Lab Results   Component Value Date/Time    BC No growth-preliminaryNo growth 09/06/2019 02:08 PM    BC No growth-preliminaryNo growth 09/06/2019 02:08 PM       Blood culture #2:No results found for: Solomon Ho    Organism:  Lab Results   Component Value Date/Time    ORG Staphylococcus aureus 09/06/2019 06:00 PM    ORG Morganella morganii ssp. sibonii 09/06/2019 06:00 PM         Lab Results   Component Value Date/Time    LABGRAM  09/06/2019 06:00 PM     Rare segmented neutrophils observed. Rare epithelial cells observed. Many gram positive cocci occurring singly and in pairs. Many gram negative bacilli. Few gram positive bacilli. MRSA culture only:No results found for: [facility]    Urine culture: No results found for: LABURIN    Respiratory culture: No results found for: CULTRESP    Aerobic and Anaerobic :  Lab Results   Component Value Date/Time    LABAERO  09/06/2019 06:00 PM     Culture also yielded heavy growth of gram positive bacillimost consistent with Corynebacterium species. LABAERO  09/06/2019 06:00 PM     heavy growthIn the treatment of gram positive infections, GENTAMICINshould be CONSIDERED a SYNERGYSTIC agent ONLY. Ciprofloxacin and Levofloxacin, regardless of in vitrosensitivity, should not be used for staphylococcal infectionsother than uncomplicated lower UTIs. Moxifloxacin, regardless of in vitro sensitivity, shouldnot be used for staphylococcal infections. LABAERO moderate growth 09/06/2019 06:00 PM     Lab Results   Component Value Date/Time    LABANAE  09/06/2019 06:00 PM     Culture yielded heavy mixed growth which included anaerobicgram positive cocci. If a true mixed aerobic and anaerobicinfection is suspected, then broad spectrum empiricantibiotic therapy is indicated and should include coveragefor anaerobic organisms.        Urinalysis:      Lab Results Component Value Date/Time    NITRU NEGATIVE 09/09/2015 04:20 PM    WBCUA 5-10 09/09/2015 04:20 PM    WBCUA 2-4 10/17/2011 06:05 PM    BACTERIA NONE 09/09/2015 04:20 PM    RBCUA 0-2 09/09/2015 04:20 PM    BLOODU MODERATE 09/09/2015 04:20 PM    SPECGRAV 1.007 09/09/2015 04:20 PM    GLUCOSEU >= 1000 03/14/2015 02:28 PM       Radiology:  XR CHEST PORTABLE   Final Result   There is no acute intrathoracic process. **This report has been created using voice recognition software. It may contain minor errors which are inherent in voice recognition technology. **      Final report electronically signed by Dr Arabella Meraz on 11/11/2022 9:42 AM        XR CHEST PORTABLE    Result Date: 11/11/2022  PROCEDURE: XR CHEST PORTABLE CLINICAL INFORMATION: 79-year-old female with generalized chest pain. Shortness of breath. COMPARISON: Radiograph 9/15/2022. TECHNIQUE: AP upright view of the chest was obtained. FINDINGS: The lungs are clear. The cardiac silhouette and pulmonary vasculature are within normal limits. There is no significant pleural effusion or pneumothorax. Visualized portions of the upper abdomen are within normal limits. The osseous structures are intact. No acute fractures or suspicious osseous lesions. There is no acute intrathoracic process. **This report has been created using voice recognition software. It may contain minor errors which are inherent in voice recognition technology. ** Final report electronically signed by Dr Arabella Meraz on 11/11/2022 9:42 AM      Electronically signed by Timmy Osorio DO on 11/13/2022 at 11:47 AM

## 2022-11-13 NOTE — PROGRESS NOTES
HD#3    S: she is not feeling great today. She is dizzy and lightheaded. Bleeding continues to improve. Pad this morning with minimal drainage. Continue to weigh pads. O:   98.1 (36.7) 20 75 127/59     Hgb 6.7 this AM     Gen: no acute distress, pale  Ext gen: pad has been on for approx 1 hr. Minimal drainage          A: 49 yo  HD#3 with abnormal uterine bleeding, anemia  P:  Her bleeding has improved since admission and increasing megace to 100mg TID. Primary team is planning transfusion today which I support and also recommended to the pt. Plan is for 2units PRBC. Will continue to monitor bleeding and serial H/H.  2.   Plan for surgical intervention tomorrow. We had a detailed discussion about risks and benefits and why D&C with Mirena was   the        recommended tx for her situation. After the discussion she verbalized understanding and consented to Hysteroscopy D&C Mirena IUD placement under US guidance. Discussed the we expect Dr. Seema Martínez to be back tomorrow and I will be there to assist. However if Dr. Seema Martínez is not available I will be preforming her surgery. All questions answered. I would recommend observation for at least 24hrs after surgery to monitor her bleeding and Hgb. Will discuss with primary team.    Continue megace 100 mg TID. Appreciate continued medical management. Call with any questions.

## 2022-11-13 NOTE — PLAN OF CARE
Problem: Chronic Conditions and Co-morbidities  Goal: Patient's chronic conditions and co-morbidity symptoms are monitored and maintained or improved  Outcome: Progressing  Flowsheets (Taken 11/12/2022 2029)  Care Plan - Patient's Chronic Conditions and Co-Morbidity Symptoms are Monitored and Maintained or Improved:   Monitor and assess patient's chronic conditions and comorbid symptoms for stability, deterioration, or improvement   Collaborate with multidisciplinary team to address chronic and comorbid conditions and prevent exacerbation or deterioration   Update acute care plan with appropriate goals if chronic or comorbid symptoms are exacerbated and prevent overall improvement and discharge     Problem: Discharge Planning  Goal: Discharge to home or other facility with appropriate resources  Outcome: Progressing  Flowsheets (Taken 11/12/2022 2029)  Discharge to home or other facility with appropriate resources:   Identify barriers to discharge with patient and caregiver   Identify discharge learning needs (meds, wound care, etc)     Problem: Safety - Adult  Goal: Free from fall injury  Outcome: Progressing  Flowsheets (Taken 11/12/2022 2029)  Free From Fall Injury: Instruct family/caregiver on patient safety     Problem: Pain  Goal: Verbalizes/displays adequate comfort level or baseline comfort level  Outcome: Progressing  Flowsheets (Taken 11/12/2022 2029)  Verbalizes/displays adequate comfort level or baseline comfort level:   Encourage patient to monitor pain and request assistance   Assess pain using appropriate pain scale   Administer analgesics based on type and severity of pain and evaluate response   Implement non-pharmacological measures as appropriate and evaluate response   Consider cultural and social influences on pain and pain management   Notify Licensed Independent Practitioner if interventions unsuccessful or patient reports new pain       Care plan reviewed with patient.  Patient verbalized understanding of the plan of care and contributed to goal setting.

## 2022-11-14 ENCOUNTER — ANESTHESIA (OUTPATIENT)
Dept: OPERATING ROOM | Age: 52
DRG: 517 | End: 2022-11-14
Payer: COMMERCIAL

## 2022-11-14 LAB
ABO: NORMAL
ANION GAP SERPL CALCULATED.3IONS-SCNC: 10 MEQ/L (ref 8–16)
ANTIBODY SCREEN: NORMAL
BUN BLDV-MCNC: 7 MG/DL (ref 7–22)
CALCIUM SERPL-MCNC: 8.2 MG/DL (ref 8.5–10.5)
CHLORIDE BLD-SCNC: 106 MEQ/L (ref 98–111)
CO2: 22 MEQ/L (ref 23–33)
CREAT SERPL-MCNC: 0.6 MG/DL (ref 0.4–1.2)
GFR SERPL CREATININE-BSD FRML MDRD: > 60 ML/MIN/1.73M2
GLUCOSE BLD-MCNC: 174 MG/DL (ref 70–108)
GLUCOSE BLD-MCNC: 280 MG/DL (ref 70–108)
GLUCOSE BLD-MCNC: 285 MG/DL (ref 70–108)
GLUCOSE BLD-MCNC: 341 MG/DL (ref 70–108)
HCT VFR BLD CALC: 24.3 % (ref 37–47)
HCT VFR BLD CALC: 26.4 % (ref 37–47)
HCT VFR BLD CALC: 27.4 % (ref 37–47)
HEMOGLOBIN: 8 GM/DL (ref 12–16)
HEMOGLOBIN: 8.7 GM/DL (ref 12–16)
HEMOGLOBIN: 9 GM/DL (ref 12–16)
POTASSIUM SERPL-SCNC: 3.5 MEQ/L (ref 3.5–5.2)
RH FACTOR: NORMAL
SODIUM BLD-SCNC: 138 MEQ/L (ref 135–145)

## 2022-11-14 PROCEDURE — 85018 HEMOGLOBIN: CPT

## 2022-11-14 PROCEDURE — 2580000003 HC RX 258: Performed by: STUDENT IN AN ORGANIZED HEALTH CARE EDUCATION/TRAINING PROGRAM

## 2022-11-14 PROCEDURE — 82948 REAGENT STRIP/BLOOD GLUCOSE: CPT

## 2022-11-14 PROCEDURE — 0UDB8ZZ EXTRACTION OF ENDOMETRIUM, VIA NATURAL OR ARTIFICIAL OPENING ENDOSCOPIC: ICD-10-PCS | Performed by: STUDENT IN AN ORGANIZED HEALTH CARE EDUCATION/TRAINING PROGRAM

## 2022-11-14 PROCEDURE — 6370000000 HC RX 637 (ALT 250 FOR IP): Performed by: OBSTETRICS & GYNECOLOGY

## 2022-11-14 PROCEDURE — 3700000000 HC ANESTHESIA ATTENDED CARE: Performed by: STUDENT IN AN ORGANIZED HEALTH CARE EDUCATION/TRAINING PROGRAM

## 2022-11-14 PROCEDURE — 99232 SBSQ HOSP IP/OBS MODERATE 35: CPT | Performed by: STUDENT IN AN ORGANIZED HEALTH CARE EDUCATION/TRAINING PROGRAM

## 2022-11-14 PROCEDURE — 6370000000 HC RX 637 (ALT 250 FOR IP): Performed by: STUDENT IN AN ORGANIZED HEALTH CARE EDUCATION/TRAINING PROGRAM

## 2022-11-14 PROCEDURE — 0UH97HZ INSERTION OF CONTRACEPTIVE DEVICE INTO UTERUS, VIA NATURAL OR ARTIFICIAL OPENING: ICD-10-PCS | Performed by: STUDENT IN AN ORGANIZED HEALTH CARE EDUCATION/TRAINING PROGRAM

## 2022-11-14 PROCEDURE — 36415 COLL VENOUS BLD VENIPUNCTURE: CPT

## 2022-11-14 PROCEDURE — 7100000000 HC PACU RECOVERY - FIRST 15 MIN: Performed by: STUDENT IN AN ORGANIZED HEALTH CARE EDUCATION/TRAINING PROGRAM

## 2022-11-14 PROCEDURE — 3700000001 HC ADD 15 MINUTES (ANESTHESIA): Performed by: STUDENT IN AN ORGANIZED HEALTH CARE EDUCATION/TRAINING PROGRAM

## 2022-11-14 PROCEDURE — 86901 BLOOD TYPING SEROLOGIC RH(D): CPT

## 2022-11-14 PROCEDURE — 80048 BASIC METABOLIC PNL TOTAL CA: CPT

## 2022-11-14 PROCEDURE — 86900 BLOOD TYPING SEROLOGIC ABO: CPT

## 2022-11-14 PROCEDURE — 97116 GAIT TRAINING THERAPY: CPT

## 2022-11-14 PROCEDURE — 6360000002 HC RX W HCPCS: Performed by: STUDENT IN AN ORGANIZED HEALTH CARE EDUCATION/TRAINING PROGRAM

## 2022-11-14 PROCEDURE — 97166 OT EVAL MOD COMPLEX 45 MIN: CPT

## 2022-11-14 PROCEDURE — 97162 PT EVAL MOD COMPLEX 30 MIN: CPT

## 2022-11-14 PROCEDURE — 2500000003 HC RX 250 WO HCPCS: Performed by: NURSE ANESTHETIST, CERTIFIED REGISTERED

## 2022-11-14 PROCEDURE — 3600000002 HC SURGERY LEVEL 2 BASE: Performed by: STUDENT IN AN ORGANIZED HEALTH CARE EDUCATION/TRAINING PROGRAM

## 2022-11-14 PROCEDURE — 6370000000 HC RX 637 (ALT 250 FOR IP): Performed by: NURSE ANESTHETIST, CERTIFIED REGISTERED

## 2022-11-14 PROCEDURE — 3600000012 HC SURGERY LEVEL 2 ADDTL 15MIN: Performed by: STUDENT IN AN ORGANIZED HEALTH CARE EDUCATION/TRAINING PROGRAM

## 2022-11-14 PROCEDURE — 97530 THERAPEUTIC ACTIVITIES: CPT

## 2022-11-14 PROCEDURE — 6360000002 HC RX W HCPCS: Performed by: NURSE ANESTHETIST, CERTIFIED REGISTERED

## 2022-11-14 PROCEDURE — 2060000000 HC ICU INTERMEDIATE R&B

## 2022-11-14 PROCEDURE — 85014 HEMATOCRIT: CPT

## 2022-11-14 PROCEDURE — 86850 RBC ANTIBODY SCREEN: CPT

## 2022-11-14 PROCEDURE — 7100000001 HC PACU RECOVERY - ADDTL 15 MIN: Performed by: STUDENT IN AN ORGANIZED HEALTH CARE EDUCATION/TRAINING PROGRAM

## 2022-11-14 PROCEDURE — 2709999900 HC NON-CHARGEABLE SUPPLY: Performed by: STUDENT IN AN ORGANIZED HEALTH CARE EDUCATION/TRAINING PROGRAM

## 2022-11-14 PROCEDURE — 88305 TISSUE EXAM BY PATHOLOGIST: CPT

## 2022-11-14 RX ORDER — LIDOCAINE HYDROCHLORIDE 20 MG/ML
INJECTION, SOLUTION EPIDURAL; INFILTRATION; INTRACAUDAL; PERINEURAL PRN
Status: DISCONTINUED | OUTPATIENT
Start: 2022-11-14 | End: 2022-11-14 | Stop reason: SDUPTHER

## 2022-11-14 RX ORDER — FENTANYL CITRATE 50 UG/ML
INJECTION, SOLUTION INTRAMUSCULAR; INTRAVENOUS PRN
Status: DISCONTINUED | OUTPATIENT
Start: 2022-11-14 | End: 2022-11-14 | Stop reason: SDUPTHER

## 2022-11-14 RX ORDER — ONDANSETRON 2 MG/ML
4 INJECTION INTRAMUSCULAR; INTRAVENOUS EVERY 6 HOURS PRN
Status: DISCONTINUED | OUTPATIENT
Start: 2022-11-14 | End: 2022-11-14 | Stop reason: SDUPTHER

## 2022-11-14 RX ORDER — SCOLOPAMINE TRANSDERMAL SYSTEM 1 MG/1
PATCH, EXTENDED RELEASE TRANSDERMAL PRN
Status: DISCONTINUED | OUTPATIENT
Start: 2022-11-14 | End: 2022-11-14 | Stop reason: SDUPTHER

## 2022-11-14 RX ORDER — IBUPROFEN 800 MG/1
800 TABLET ORAL EVERY 8 HOURS PRN
Status: DISCONTINUED | OUTPATIENT
Start: 2022-11-14 | End: 2022-11-16 | Stop reason: HOSPADM

## 2022-11-14 RX ORDER — SODIUM CHLORIDE 0.9 % (FLUSH) 0.9 %
5-40 SYRINGE (ML) INJECTION EVERY 12 HOURS SCHEDULED
Status: DISCONTINUED | OUTPATIENT
Start: 2022-11-14 | End: 2022-11-16 | Stop reason: HOSPADM

## 2022-11-14 RX ORDER — ONDANSETRON 2 MG/ML
INJECTION INTRAMUSCULAR; INTRAVENOUS PRN
Status: DISCONTINUED | OUTPATIENT
Start: 2022-11-14 | End: 2022-11-14 | Stop reason: SDUPTHER

## 2022-11-14 RX ORDER — POTASSIUM CHLORIDE 20 MEQ/1
40 TABLET, EXTENDED RELEASE ORAL ONCE
Status: COMPLETED | OUTPATIENT
Start: 2022-11-14 | End: 2022-11-14

## 2022-11-14 RX ORDER — SODIUM CHLORIDE 0.9 % (FLUSH) 0.9 %
5-40 SYRINGE (ML) INJECTION PRN
Status: DISCONTINUED | OUTPATIENT
Start: 2022-11-14 | End: 2022-11-16 | Stop reason: HOSPADM

## 2022-11-14 RX ORDER — PROPOFOL 10 MG/ML
INJECTION, EMULSION INTRAVENOUS PRN
Status: DISCONTINUED | OUTPATIENT
Start: 2022-11-14 | End: 2022-11-14 | Stop reason: SDUPTHER

## 2022-11-14 RX ORDER — ACETAMINOPHEN 325 MG/1
650 TABLET ORAL EVERY 4 HOURS PRN
Status: DISCONTINUED | OUTPATIENT
Start: 2022-11-14 | End: 2022-11-14 | Stop reason: SDUPTHER

## 2022-11-14 RX ORDER — ONDANSETRON 4 MG/1
4 TABLET, ORALLY DISINTEGRATING ORAL EVERY 8 HOURS PRN
Status: DISCONTINUED | OUTPATIENT
Start: 2022-11-14 | End: 2022-11-14 | Stop reason: SDUPTHER

## 2022-11-14 RX ORDER — INSULIN GLARGINE 100 [IU]/ML
10 INJECTION, SOLUTION SUBCUTANEOUS NIGHTLY
Status: DISCONTINUED | OUTPATIENT
Start: 2022-11-14 | End: 2022-11-16 | Stop reason: HOSPADM

## 2022-11-14 RX ORDER — HYDROCODONE BITARTRATE AND ACETAMINOPHEN 5; 325 MG/1; MG/1
1 TABLET ORAL EVERY 4 HOURS PRN
Status: DISCONTINUED | OUTPATIENT
Start: 2022-11-14 | End: 2022-11-16 | Stop reason: HOSPADM

## 2022-11-14 RX ORDER — SODIUM CHLORIDE 9 MG/ML
INJECTION, SOLUTION INTRAVENOUS PRN
Status: DISCONTINUED | OUTPATIENT
Start: 2022-11-14 | End: 2022-11-16 | Stop reason: HOSPADM

## 2022-11-14 RX ORDER — HYDROCODONE BITARTRATE AND ACETAMINOPHEN 5; 325 MG/1; MG/1
2 TABLET ORAL EVERY 4 HOURS PRN
Status: DISCONTINUED | OUTPATIENT
Start: 2022-11-14 | End: 2022-11-16 | Stop reason: HOSPADM

## 2022-11-14 RX ORDER — DEXAMETHASONE SODIUM PHOSPHATE 10 MG/ML
INJECTION, EMULSION INTRAMUSCULAR; INTRAVENOUS PRN
Status: DISCONTINUED | OUTPATIENT
Start: 2022-11-14 | End: 2022-11-14 | Stop reason: SDUPTHER

## 2022-11-14 RX ORDER — SODIUM CHLORIDE, SODIUM LACTATE, POTASSIUM CHLORIDE, CALCIUM CHLORIDE 600; 310; 30; 20 MG/100ML; MG/100ML; MG/100ML; MG/100ML
INJECTION, SOLUTION INTRAVENOUS SEE ADMIN INSTRUCTIONS
Status: DISCONTINUED | OUTPATIENT
Start: 2022-11-14 | End: 2022-11-15

## 2022-11-14 RX ADMIN — POTASSIUM CHLORIDE 40 MEQ: 1500 TABLET, EXTENDED RELEASE ORAL at 17:11

## 2022-11-14 RX ADMIN — HYDROCODONE BITARTRATE AND ACETAMINOPHEN 1 TABLET: 5; 325 TABLET ORAL at 15:20

## 2022-11-14 RX ADMIN — SODIUM CHLORIDE, PRESERVATIVE FREE 10 ML: 5 INJECTION INTRAVENOUS at 20:24

## 2022-11-14 RX ADMIN — MEGESTROL ACETATE 100 MG: 40 TABLET ORAL at 09:44

## 2022-11-14 RX ADMIN — MEGESTROL ACETATE 100 MG: 40 TABLET ORAL at 20:17

## 2022-11-14 RX ADMIN — Medication 100 MG: at 07:27

## 2022-11-14 RX ADMIN — ONDANSETRON 4 MG: 2 INJECTION INTRAMUSCULAR; INTRAVENOUS at 07:35

## 2022-11-14 RX ADMIN — PROPOFOL 200 MG: 10 INJECTION, EMULSION INTRAVENOUS at 07:27

## 2022-11-14 RX ADMIN — METOPROLOL SUCCINATE 37.5 MG: 25 TABLET, FILM COATED, EXTENDED RELEASE ORAL at 09:44

## 2022-11-14 RX ADMIN — SCOPALAMINE 1 PATCH: 1 PATCH, EXTENDED RELEASE TRANSDERMAL at 07:21

## 2022-11-14 RX ADMIN — ACETAMINOPHEN 650 MG: 325 TABLET ORAL at 09:03

## 2022-11-14 RX ADMIN — INSULIN LISPRO 4 UNITS: 100 INJECTION, SOLUTION INTRAVENOUS; SUBCUTANEOUS at 20:23

## 2022-11-14 RX ADMIN — INSULIN LISPRO 4 UNITS: 100 INJECTION, SOLUTION INTRAVENOUS; SUBCUTANEOUS at 17:12

## 2022-11-14 RX ADMIN — HYDROCODONE BITARTRATE AND ACETAMINOPHEN 1 TABLET: 5; 325 TABLET ORAL at 21:12

## 2022-11-14 RX ADMIN — INSULIN GLARGINE 10 UNITS: 100 INJECTION, SOLUTION SUBCUTANEOUS at 21:23

## 2022-11-14 RX ADMIN — INSULIN LISPRO 4 UNITS: 100 INJECTION, SOLUTION INTRAVENOUS; SUBCUTANEOUS at 12:36

## 2022-11-14 RX ADMIN — FENTANYL CITRATE 25 MCG: 50 INJECTION, SOLUTION INTRAMUSCULAR; INTRAVENOUS at 07:27

## 2022-11-14 RX ADMIN — MEGESTROL ACETATE 100 MG: 40 TABLET ORAL at 15:21

## 2022-11-14 RX ADMIN — ATORVASTATIN CALCIUM 80 MG: 80 TABLET, FILM COATED ORAL at 20:17

## 2022-11-14 RX ADMIN — DEXAMETHASONE SODIUM PHOSPHATE 8 MG: 10 INJECTION, EMULSION INTRAMUSCULAR; INTRAVENOUS at 07:35

## 2022-11-14 ASSESSMENT — PAIN SCALES - GENERAL
PAINLEVEL_OUTOF10: 10
PAINLEVEL_OUTOF10: 3
PAINLEVEL_OUTOF10: 0
PAINLEVEL_OUTOF10: 0
PAINLEVEL_OUTOF10: 5
PAINLEVEL_OUTOF10: 10
PAINLEVEL_OUTOF10: 8

## 2022-11-14 ASSESSMENT — PAIN DESCRIPTION - DESCRIPTORS
DESCRIPTORS: THROBBING
DESCRIPTORS: ACHING
DESCRIPTORS: THROBBING
DESCRIPTORS: SORE;SHARP

## 2022-11-14 ASSESSMENT — PAIN DESCRIPTION - PAIN TYPE: TYPE: SURGICAL PAIN

## 2022-11-14 ASSESSMENT — PAIN DESCRIPTION - LOCATION
LOCATION: BACK

## 2022-11-14 ASSESSMENT — PAIN DESCRIPTION - ORIENTATION
ORIENTATION: MID
ORIENTATION: MID

## 2022-11-14 ASSESSMENT — PAIN DESCRIPTION - ONSET: ONSET: ON-GOING

## 2022-11-14 ASSESSMENT — PAIN DESCRIPTION - FREQUENCY: FREQUENCY: CONTINUOUS

## 2022-11-14 ASSESSMENT — PAIN - FUNCTIONAL ASSESSMENT: PAIN_FUNCTIONAL_ASSESSMENT: ACTIVITIES ARE NOT PREVENTED

## 2022-11-14 NOTE — PROGRESS NOTES
Hospitalist Progress Note      Patient:  Carina Sosa    Unit/Bed:4B-10/010-A  YOB: 1970  MRN: [id number]   Acct: [de-identified]   PCP: SHANNON Holliday CNP  Date of Admission: 11/11/2022    Assessment/Plan:    Vaginal Bleeding, with acute normocytic anemia: Pt reports heavy bleeding x 9 days. Patient was last admitted for similar complaints on 8/22. Patient reports she was due to get an ablation done a few days with her OB. However became very symptomatic with dizziness, lightheadedness, and palpitations. denies any syncope. Patient arrived with a hemoglobin of 7.3 with no known history of anemia. Received 1 unit of packed red blood cells in the ED. Patient is on Xarelto and Plavix daily. We will hold Xarelto and Plavix. H&H checks every 6 hours. Transfuse if hemoglobin is below 7. Maintain telemetry. Plan for Hysteroscopy D&C Mirena IUD placement today. Continue Megace 100 mg TID per OB    Acute blood loss anemia: secondary to #1. Will treat as above. Received 2 units of PRBC on 11/13 due to Hgb 6.7. Repeat hgb stable; will likely be stabilized further after planned Hysteroscopy D&C and Mirena IUD placement. Continue to monitor with hgb checks     Hx CAD, s/p PCI: [facility] 3/14/22  with PCI to Lcx, OM1, RDPA. On Plavix and Xarelto; will hold Plavix and Xarelto due to 1. Patient denies any chest pain currently. EKG shows no acute changes. Essential HTN: BP overall stable. Hold home med currently due to #1. Continue to monitor closely. Factor 5 Leiden mutation: Hx of PE and DVT. Hold Xarelto currently due to #1. Likely can resume xarelto tomorrow (11/15)     Pseudohyponatremia: Resolved; Na 132. Corrected Na after hyperglycemia is 136     Non Insulin Dependent Type 2 DM: Hold home oral agents; lantus 10 units + Medium dose SSI; accu checks; Carb control diet. Obesity: BMI 48.35 kg/m2.        Chief Complaint: vaginal bleeding    Initial H and P:-    Marvin Salmeron is a 50 y.o. female who presents to the emergency department for evaluation of profuse vaginal bleeding. She reports that over the past 3 days she has gone through 3 boxes of 48 pads. She has been passing multiple large clots. Patient was admitted this past August for profuse vaginal bleeding and had received some blood transfusions, she was told she had fibroid tumor. This coming Monday she is scheduled for a uterine ablation, she was trying to hold out until then. Patient is also complaining of substernal, nonradiating, mild chest pain whenever she moves. Patient reports feeling lightheaded as well as dizzy. The patient has no other acute complaints at this time    Subjective (past 24 hours):   Patient seen and examined by bedside. plan for D&C hysteroscopy and mirena placement today       Past medical history, family history, social history and allergies reviewed again and is unchanged since admission. ROS (12 point review of systems completed. Pertinent positives noted.  Otherwise ROS is negative)     Medications:  Reviewed    Infusion Medications    lactated ringers      sodium chloride      sodium chloride Stopped (11/11/22 2058)    dextrose      sodium chloride Stopped (11/14/22 0807)     Scheduled Medications    sodium chloride flush  5-40 mL IntraVENous 2 times per day    sodium chloride flush  5-40 mL IntraVENous 2 times per day    insulin lispro  0-8 Units SubCUTAneous TID WC    insulin lispro  0-4 Units SubCUTAneous Nightly    insulin glargine  8 Units SubCUTAneous Nightly    megestrol  100 mg Oral TID    atorvastatin  80 mg Oral Nightly    metoprolol succinate  37.5 mg Oral Daily     PRN Meds: sodium chloride flush, sodium chloride, ibuprofen, HYDROcodone 5 mg - acetaminophen **OR** HYDROcodone 5 mg - acetaminophen, sodium chloride flush, sodium chloride, ondansetron **OR** ondansetron, acetaminophen **OR** acetaminophen, glucose, dextrose bolus **OR** dextrose bolus, glucagon (rDNA), dextrose      Intake/Output Summary (Last 24 hours) at 11/14/2022 1052  Last data filed at 11/14/2022 1026  Gross per 24 hour   Intake 6521.39 ml   Output 1488 ml   Net 5033.39 ml         Diet:  ADULT DIET; Regular    Exam:  /65   Pulse 80   Temp 98.4 °F (36.9 °C) (Oral)   Resp 20   Ht 5' 8\" (1.727 m)   Wt 298 lb (135.2 kg)   LMP 06/21/2018   SpO2 98%   BMI 45.31 kg/m²   General appearance: No apparent distress, appears stated age and cooperative. HEENT: Pupils equal, round, and reactive to light. Conjunctiva pallor  Neck: Supple, with full range of motion. No jugular venous distention. Trachea midline. Respiratory:  Normal respiratory effort. Clear to auscultation, bilaterally without Rales/Wheezes/Rhonchi. Cardiovascular: Regular rate and rhythm with normal S1/S2 without murmurs, rubs or gallops. Abdomen: Soft, non-tender, non-distended with normal bowel sounds. Musculoskeletal: passive and active ROM x 4 extremities. Skin: Skin color, texture, turgor normal.  No rashes or lesions. Neurologic:  Neurovascularly intact without any focal sensory/motor deficits. Cranial nerves: II-XII intact, grossly non-focal.  Psychiatric: Alert and oriented, thought content appropriate, normal insight  Capillary Refill: Brisk,< 3 seconds   Peripheral Pulses: +2 palpable, equal bilaterally     Labs:   Recent Labs     11/13/22  0101 11/13/22  0725 11/13/22  1902 11/14/22  0131 11/14/22  1006   WBC 7.7  --   --   --   --    HGB 7.5*   < > 8.9* 8.0* 9.0*   HCT 23.5*   < > 26.7* 24.3* 27.4*     --   --   --   --     < > = values in this interval not displayed. Recent Labs     11/12/22  0658 11/13/22  0101 11/14/22  0131    137 138   K 3.8 3.6 3.5    105 106   CO2 21* 21* 22*   BUN 8 9 7   CREATININE 0.5 0.6 0.6   CALCIUM 8.2* 7.9* 8.2*       No results for input(s): AST, ALT, BILIDIR, BILITOT, ALKPHOS in the last 72 hours.   No results for input(s): INR in the last 72 hours. No results for input(s): Francisca Wang in the last 72 hours. Microbiology:    Blood culture #1:   Lab Results   Component Value Date/Time    BC No growth-preliminaryNo growth 09/06/2019 02:08 PM    BC No growth-preliminaryNo growth 09/06/2019 02:08 PM       Blood culture #2:No results found for: Michael Mohamud    Organism:  Lab Results   Component Value Date/Time    ORG Staphylococcus aureus 09/06/2019 06:00 PM    ORG Morganella morganii ssp. sibonii 09/06/2019 06:00 PM         Lab Results   Component Value Date/Time    LABGRAM  09/06/2019 06:00 PM     Rare segmented neutrophils observed. Rare epithelial cells observed. Many gram positive cocci occurring singly and in pairs. Many gram negative bacilli. Few gram positive bacilli. MRSA culture only:No results found for: [facility]    Urine culture: No results found for: LABURIN    Respiratory culture: No results found for: CULTRESP    Aerobic and Anaerobic :  Lab Results   Component Value Date/Time    LABAERO  09/06/2019 06:00 PM     Culture also yielded heavy growth of gram positive bacillimost consistent with Corynebacterium species. LABAERO  09/06/2019 06:00 PM     heavy growthIn the treatment of gram positive infections, GENTAMICINshould be CONSIDERED a SYNERGYSTIC agent ONLY. Ciprofloxacin and Levofloxacin, regardless of in vitrosensitivity, should not be used for staphylococcal infectionsother than uncomplicated lower UTIs. Moxifloxacin, regardless of in vitro sensitivity, shouldnot be used for staphylococcal infections. LABAERO moderate growth 09/06/2019 06:00 PM     Lab Results   Component Value Date/Time    LABANAE  09/06/2019 06:00 PM     Culture yielded heavy mixed growth which included anaerobicgram positive cocci. If a true mixed aerobic and anaerobicinfection is suspected, then broad spectrum empiricantibiotic therapy is indicated and should include coveragefor anaerobic organisms.        Urinalysis:      Lab Results Component Value Date/Time    NITRU NEGATIVE 09/09/2015 04:20 PM    WBCUA 5-10 09/09/2015 04:20 PM    WBCUA 2-4 10/17/2011 06:05 PM    BACTERIA NONE 09/09/2015 04:20 PM    RBCUA 0-2 09/09/2015 04:20 PM    BLOODU MODERATE 09/09/2015 04:20 PM    SPECGRAV 1.007 09/09/2015 04:20 PM    GLUCOSEU >= 1000 03/14/2015 02:28 PM       Radiology:  XR CHEST PORTABLE   Final Result   There is no acute intrathoracic process. **This report has been created using voice recognition software. It may contain minor errors which are inherent in voice recognition technology. **      Final report electronically signed by Dr Vika Merchant on 11/11/2022 9:42 AM        XR CHEST PORTABLE    Result Date: 11/11/2022  PROCEDURE: XR CHEST PORTABLE CLINICAL INFORMATION: 53-year-old female with generalized chest pain. Shortness of breath. COMPARISON: Radiograph 9/15/2022. TECHNIQUE: AP upright view of the chest was obtained. FINDINGS: The lungs are clear. The cardiac silhouette and pulmonary vasculature are within normal limits. There is no significant pleural effusion or pneumothorax. Visualized portions of the upper abdomen are within normal limits. The osseous structures are intact. No acute fractures or suspicious osseous lesions. There is no acute intrathoracic process. **This report has been created using voice recognition software. It may contain minor errors which are inherent in voice recognition technology. ** Final report electronically signed by Dr Vika Merchant on 11/11/2022 9:42 AM      Electronically signed by Zeyad Davidson DO on 11/14/2022 at 10:52 AM

## 2022-11-14 NOTE — CARE COORDINATION
Case Management Assessment  Initial Evaluation    Date/Time of Evaluation: 11/14/2022 2:18 PM  Assessment Completed by: Daily Yen RN    If patient is discharged prior to next notation, then this note serves as note for discharge by case management. Patient Name: Aris Mercado                   YOB: 1970  Diagnosis: Vaginal bleeding [N93.9]  Anemia, unspecified type [D64.9]                   Date / Time: 11/11/2022  8:38 AM    Patient Admission Status: Inpatient     Current PCP: SHANNON Killian CNP  PCP verified by CM? Yes    Chart Reviewed: Yes      Patient Orientation: Alert and Oriented    Patient Cognition: Alert  History Provided by: Patient    Hospitalization in the last 30 days (Readmission):  No    If yes, Readmission Assessment in  Navigator will be completed. Advance Directives:     Code Status: Full Code     Primary Decision Maker: none,none - Parent - [phone]    Discharge Planning  Patient lives with: Family Members (Son and his girlfriend staying with her) Type of Home: House   Primary Caregiver: Self  Patient Support Systems include: Children, Family Members   Current Financial resources: Medicaid  Current community resources: Other (Comment) (none)  Current services prior to admission: None   Type of Home Care services:  None    ADLS  Prior functional level: Independent in ADLs/IADLs  Current functional level: Independent in ADLs/IADLs      Family can provide assistance at DC: Yes  Would you like Case Management to discuss the discharge plan with any other family members/significant others, and if so, who?  No  Plans to Return to Present Housing: Yes  Other Identified Issues/Barriers to RETURNING to current housing: none  Potential Assistance needed at discharge: [facility]  Patient expects to discharge to: [address] [place] [address] for transportation at discharge: Family    Financial  Payor: [facility]  [address] / Plan: Lily Mcnamara PLAN / Product Type: *No Product type* /     Does insurance require precert for SNF: Yes    Potential assistance Purchasing Medications: No  Meds-to-Beds request:        [id number] - [place], [address] [address]  Phone: [phone] Fax: [phone]      Factors facilitating achievement of predicted outcomes: Family support, Motivated, Cooperative, and Pleasant    Barriers to discharge: Medical complications and Medication managment    Additional Case Management Notes: Presented to ED with c/o profuse vaginal bleeding for 3 days and passing multiple large clots. Was here in August for profuse vaginal bleeding, received blood transfusions, and told she had fibroid tumor. She was scheduled for a uterine ablation on 22. Patient also c/o substernal, nonradiating, mild chest pain whenever she moves, feels lightheaded, and dizzy. On xarelto and plavix at home. Hgb 7.3. Admitted to . OB/GYN consulted. Received a total of 6 PRBC since admission. Hysteroscopy D&C Mirena IUD placement today. Afebrile. NSR. On room air. Ox4. PT/OT. Hospitalist and OB/GYN following. Telemetry, SCDs. IVF, lipitor, lantus, SSI, megace, toprol xl. Hgb 7.4 - down to 6.7 - now 9. Iron 37. Procedure:    CXR: No acute findings   Hysteroscopy, D & C, Mirena IUD placement under ultrasound guidance    The Plan for Transition of Care is related to the following treatment goals of Vaginal bleeding [N93.9]  Anemia, unspecified type [D64.9]    Patient Goals/Plan/Treatment Preferences: Home with son and his girlfriend staying. Rolling walker ordered. Denies any further needs. Declines HH. When attempted to confirm HCPOA's listed she states her ex- is  and the alternate listed is not anyone she wants making decisions for her. She would like to complete new HCPOA paperwork while she is here; Spiritual Care consulted.      Transportation/Food Security/Housekeeping Addressed: No issues identified.      Eileen Guzman RN  Case Management Department

## 2022-11-14 NOTE — OP NOTE
Gyn Service    Operative Report        Pt Name: Yelitza Boone  MRN: [id number] [place] #: [de-identified]  YOB: 1970  Procedure Performed By: Hans Lopez DO      Pre-operative Diagnosis:  MENOMETORRHAGIA    Post-operative Diagnosis:  SAME    Procedure:  Hysteroscopy, D & C, Mirena IUD placement under ultrasound guidance    Surgeon:  Hans Lopez DO    Assisstant: Rian Harris MD     Anesthesia:  General    Estimated blood loss: 200 ml of clot from the uterus    Findings:  Ut sound to 13 cm. Cervix descends to level of introitus. Uterine cavity full of clot. Complications:  NONE    Specimens: Uterine curettings    Procedure:  After appropriate consents were signed the patient was taken to the operating room and general anesthesia was administered. The patient was then prepped and draped in the normal sterile fashion. Bladder was drained with a red rubber catheter. She was placed in dorsal lithotomy position with legs supported by stirrups. A weighted speculum was then placed in the vaginal canal. A right angle retractor was used to visualize the cervix. Double tooth tenaculum was used to grasp the anterior lip of cervix. The cervix was then serially dilated to accommodate the hysteroscope. The hysteroscope was then assembled and white balanced. The hysteroscope was then inserted using normal saline as distending media. Examination of the uterine cavity was performed with the above-stated findings. The hysteroscope was then removed. A sharp curette was inserted and all surfaces of the uterine cavity were curettaged until gritty texture was felt. Ultrasound guidance was utilized to visualize the endometrial lining during curettage. The uterine cavity was curettaged until a thin endometrial lining was visualized. Large amount of tissue and clot was obtained. The endometrial curettings were then sent to pathology for further evaluation.  Under ultrasound guidance the Mirena IUD was inserted to the level of the fundus and deployed. The IUD strings were trimmed to about 3cm. At this point, all instruments were removed from the vagina. Good hemostasis was noted. At the end of the procedure, all instrument, needle and sponge counts were noted to be correct ×2. The patient tolerated the procedure well and was transferred to recovery room in a stable and satisfactory condition. Recommend inpatient monitoring for 24hrs post-procedure and restarting anticoagulants on POD #1.     Rosa Perez DO  11/14/2022 8:13 AM

## 2022-11-14 NOTE — ANESTHESIA POSTPROCEDURE EVALUATION
Department of Anesthesiology  Postprocedure Note    Patient: Nabila Espinoza  MRN: [id number]  YOB: 1970  Date of evaluation: 11/14/2022      Procedure Summary     Date: 11/14/22 Room / Location: [address] Remigio Pavon    Anesthesia Start: Christiano Chavez Anesthesia Stop: 21     Procedure: Hysteroscopy D&C, Mirena placement Diagnosis:       Menorrhagia with irregular cycle      (Menorrhagia with irregular cycle [N92.1])    Surgeons: Maria Esther Hardy DO Responsible Provider: Tamia Lema MD    Anesthesia Type: general ASA Status: 4          Anesthesia Type: No value filed.     Sam Phase I: Sam Score: 10    Sam Phase II:        Anesthesia Post Evaluation    Patient location during evaluation: PACU  Patient participation: complete - patient participated  Level of consciousness: awake  Airway patency: patent  Nausea & Vomiting: no vomiting and nausea  Complications: no  Cardiovascular status: hemodynamically stable  Respiratory status: acceptable and nasal cannula  Hydration status: stable

## 2022-11-14 NOTE — PROGRESS NOTES
6856 Patient to PACU, alert. Resp easy and unlabored. Patient stating pain is 10/10 in back but refuses pain medication. 0882 Patient resting in bed, states pain is 10/10. Refuses any pain medication at this time. Resp easy and unlabored. 0130 Report given to [facility]. Patient resting in bed eating ice chips. 3989 Patient meets criteria for discharge from PACU at this time.      7621 Transported to  in stable condition

## 2022-11-14 NOTE — PROGRESS NOTES
56yo admitted for acute blood loss anemia with vaginal bleeding. Pt feeling better this AM. Had less bleeding overnight. Discussed plan for hysteroscopy, D&C, IUD placement with patient. R/B/A discussed with patient and consent signed. All questions answered.     Vitals:    11/14/22 0308   BP: 125/60   Pulse: 74   Resp: 18   Temp: 98.4 °F (36.9 °C)   SpO2: 98%     PE:  General: NAD, A&O    Assessment:  - Acute blood loss anemia with vaginal bleeding    Plan:  - To OR for hysteroscopy, D&C, Mirena IUD placement  - Pt has been off blood thinners since 11/11  - Will plan to monitor for 24hrs s/p surgery

## 2022-11-14 NOTE — CARE COORDINATION
Anthony Hernandez was evaluated today and a DME order was entered for a wheeled walker because she requires this to successfully complete daily living tasks of ambulating. A wheeled walker is necessary due to the patient's unsteady gait, upper body weakness, and inability to  an ambulation device; and she can ambulate only by pushing a walker instead of a lesser assistive device such as a cane, crutch, or standard walker. The need for this equipment was discussed with the patient and she understands and is in agreement. 51 year old male with hx of obesity who presents with hypertension. Labs wnl and EKG without ischemic changes or signs of LVH. Pt given amlodipine with BP improvement.

## 2022-11-14 NOTE — PROGRESS NOTES
[facility]  INPATIENT PHYSICAL THERAPY  EVALUATION  [facility] CVICU 4B - [id number]    Time In: 1616  Time Out: 1505  Timed Code Treatment Minutes: 9 Minutes  Minutes: 17          Date: 2022  Patient Name: Rupesh Salas,  Gender:  female        MRN: [id number]  : 1970  (50 y.o.)      Referring Practitioner: Saul Knutson MD  Diagnosis: vaginal bleeding  Additional Pertinent Hx: Per H&P : Rupesh Salas is a 50 y.o. female who presents to the emergency department for evaluation of profuse vaginal bleeding. She reports that over the past 3 days she has gone through 3 boxes of 48 pads. She has been passing multiple large clots. Patient was admitted this past August for profuse vaginal bleeding and had received some blood transfusions, she was told she had fibroid tumor. This coming Monday she is scheduled for a uterine ablation, she was trying to hold out until then. Patient is also complaining of substernal, nonradiating, mild chest pain whenever she moves. Patient reports feeling lightheaded as well as dizzy. The patient has no other acute complaints at this time. Pt is s/p Hysteroscopy, D & C, Mirena IUD placement under ultrasound guidance DOS . Restrictions/Precautions:  Restrictions/Precautions: General Precautions, Fall Risk    Subjective:  Chart Reviewed: Yes  Patient assessed for rehabilitation services?: Yes  Family / Caregiver Present: No  Subjective: RN approved session. Pt pleasantly agrees for PT evaluation. Pt states she would like a RW with wheels, and continued PT. We discussed HH versus OP, she is going to think about this. Pt denies concern with stair, denies stair trial this date. General:  Overall Orientation Status: Within Functional Limits  Vision: Impaired  Vision Exceptions: Wears glasses at all times  Hearing: Within functional limits     Pain: yes, L LE from buttock to her ankle, RN aware. Did ask for a heating pad following session.  Discussed with the pt, likely positional from her operation. Vitals: Heart Rate:  with mobility    Social/Functional History:    Lives With: Alone (son and his girlfriend will be moving in with her at dischage; both work 6 am-6 pm)  Type of Home: [address]: One level  Home Access: Stairs to enter with rails  Entrance Stairs - Number of Steps: 5  Home Equipment: Walker, standard, Wheelchair-manual     Bathroom Shower/Tub: Tub/Shower unit  Bathroom Toilet: Standard  Bathroom Equipment: Tub transfer bench (although does not have tub transfer bench put together yet)    Receives Help From: Family  ADL Assistance: Independent  Homemaking Assistance: Independent  Ambulation Assistance: Independent  Transfer Assistance: Independent    Active : Yes     Additional Comments: Pt reported has had limited ability to complete ADL/IADL for last ~1.5 weeks before admission due to fatigue/bleeding. Pt does not use AD for mobility prior to admission aside from right before admit. Pts niece is planning to stay with her to assist upon discharge while her son and his girlfriend is working.     OBJECTIVE:  Range of Motion:  Bilateral Lower Extremity: WFL    Strength:  Right Lower Extremity: WFL 4+/5 all joints  Left Lower Extremity: Impaired - Hip flexion 3/5, knee extension 4/5, knee flexion 4/5, DF 4/5, PF 4/5    Balance:  Static Sitting Balance:  Supervision  Dynamic Sitting Balance: Supervision  Static Standing Balance: Stand By Assistance, Contact Guard Assistance  Dynamic Standing Balance: Contact Guard Assistance    Bed Mobility:  Not Tested    Transfers:  Sit to Stand: Stand By Assistance, [employer], cues for hand placement, with verbal cues  Stand to Sit:Stand By Assistance, [employer], cues for hand placement  Cues to fully approach the chair and to bring RW back prior to reaching for the arm rests    Ambulation:  Stand By Assistance, [employer], with verbal cues , with increased time for completion  Distance: 140'  Surface: Level Tile  Device:Rolling Walker  Gait Deviations: Forward Flexed Posture, Slow Marifer, Decreased Step Length on Right, Decreased Weight Shift Left, Wide Base of Support, Mild Path Deviations, and Unsteady Gait  Pt noted to ambulate with RW too far anteriorly, improved with cues to bring this closer to her. Pt also with minor circumduction at R LE, noted to catch this on the RW with ambulation x3 times, requiring CGA to recover this. Cues to increase attention to this. Pt with minor antalgic gait pattern with decreased weight shift L LE. Exercise:none this session     Functional Outcome Measures: Completed  AM-PAC Inpatient Mobility Raw Score : 18  AM-PAC Inpatient T-Scale Score : 43.63    ASSESSMENT:  Activity Tolerance:  Patient tolerance of  treatment: good. Pt tolerated mobility well, required cues and CGA intermittently with mobility with RW for improved safety and stability. She would benefit from continued therapy and increased assist.    Treatment Initiated: Treatment and education initiated within context of evaluation. Evaluation time included review of current medical information, gathering information related to past medical, social and functional history, completion of standardized testing, formal and informal observation of tasks, assessment of data and development of plan of care and goals. Treatment time included skilled education and facilitation of tasks to increase safety and independence with functional mobility for improved independence and quality of life. Assessment: Body Structures, Functions, Activity Limitations Requiring Skilled Therapeutic Intervention: Decreased functional mobility , Decreased body mechanics, Decreased strength, Decreased balance, Decreased endurance  Assessment: This patient is a 50 y.o. who presents with vaginal bleeding.  This is a decline from the patient's baseline status of indep with mobility and intermittent assist from her family. The patient is observed to have deficits in strength, balance, activity tolerance, and safety awareness and would benefit from skilled PT services to progress functional mobility, safety awareness, and to decrease overall risk of falls. Therapy Prognosis: Good    Requires PT Follow-Up: Yes    Discharge Recommendations:  Discharge Recommendations: Home with Home health PT    Patient Education:      . Patient Education  Education Given To: Patient  Education Provided: Role of Therapy, Plan of Care, Transfer Training, Equipment  Education Outcome: Verbalized understanding, Demonstrated understanding, Continued education needed       Equipment Recommendations:  Equipment Needed: Yes (RW)    Plan:  Current Treatment Recommendations: Strengthening, Balance training, Functional mobility training, Transfer training, Endurance training, Neuromuscular re-education, Gait training, Safety education & training, Patient/Caregiver education & training, Therapeutic activities, Equipment evaluation, education, & procurement, Home exercise program  General Plan:  (3-5x GM)    Goals:  Patient Goals : none stated  Short Term Goals  Time Frame for Short Term Goals: by hospital d/c  Short Term Goal 1: Pt to complete supine <->sit indep for ease getting in and out of bed  Short Term Goal 2: Pt to complete sit <->stand with RW indep for safety with transfers  Short Term Goal 3: Pt to ambulate >=150' mod I with RW for progressive mobility in her environment  Short Term Goal 4: Pt to ascend/descend 5 steps with uni rail with S for home entry  Long Term Goals  Time Frame for Long Term Goals : NA due to short ELOS    Following session, patient left in safe position with all fall risk precautions in place.

## 2022-11-14 NOTE — PROGRESS NOTES
[facility]  INPATIENT OCCUPATIONAL THERAPY  [facility] CVICU 4B  EVALUATION    Time:   Time In: 7914  Time Out: 1152  Timed Code Treatment Minutes: 15 Minutes  Minutes: 29          Date: 2022  Patient Name: Cade Cisneros,   Gender: female      MRN: [id number]  : 1970  (50 y.o.)  Referring Practitioner: Indigo Faith MD  Diagnosis: vaginal bleeding  Additional Pertinent Hx: Per H&P: Cade Cisneros is a 50 y.o. female who presents to the emergency department for evaluation of profuse vaginal bleeding. She reports that over the past 3 days she has gone through 3 boxes of 48 pads. She has been passing multiple large clots. Patient was admitted this past August for profuse vaginal bleeding and had received some blood transfusions, she was told she had fibroid tumor. This coming Monday she is scheduled for a uterine ablation, she was trying to hold out until then. Patient is also complaining of substernal, nonradiating, mild chest pain whenever she moves. Patient reports feeling lightheaded as well as dizzy. Pt s/p Hysteroscopy, D & C, Mirena IUD placement under ultrasound guidance on 22. Restrictions/Precautions:  Restrictions/Precautions: General Precautions, Fall Risk    Subjective  Chart Reviewed: Yes, Orders, Progress Notes, History and Physical, Operative Notes  Patient assessed for rehabilitation services?: Yes  Family / Caregiver Present: No    Subjective: Pt seated in bedside chair upon arrival, agreeable to OT session. pt c/o BLEs being sore and \"not like they were before surgery\" although wanting to go for a walk. Pain: Pt c/o pain in BLEs, L>R. Does no worsen with activity.      Vitals: Vitals not assessed per clinical judgement, see nursing flowsheet    Social/Functional History:  Lives With: Alone (son and his girlfriend will be moving in with her at dischage; both work 6 am-6 pm)  Type of Home:  [address]: One level  Home Access: Stairs to enter with bathroom. Education provided re: placing tub transfer bench lengthwise in shower when not in use to increase accessibility/maneuverability in bathroom, in addition potential modifications for shower curtain/placing towels around legs of tub bench. Discussed locations to purchase alternative shower chair option if needed. Pt verbalized understanding of all. Activity Tolerance:  Patient tolerance of  treatment: good. Assessment:  Assessment: Pt presents requiring increased assistance for ADLs, transfers, and functional mobility compared to PLOF. Pt will continue to benefit from OT services to improve independence with these tasks, in addition to overall strength/endurance to facilitate return to PLOF. Performance deficits / Impairments: Decreased functional mobility , Decreased ADL status, Decreased endurance, Decreased balance, Decreased high-level IADLs  Prognosis: Good  REQUIRES OT FOLLOW-UP: Yes  Decision Making: Medium Complexity    Treatment Initiated: Treatment and education initiated within context of evaluation. Evaluation time included review of current medical information, gathering information related to past medical, social and functional history, completion of standardized testing, formal and informal observation of tasks, assessment of data and development of plan of care and goals. Treatment time included skilled education and facilitation of tasks to increase safety and independence with ADL's for improved functional independence and quality of life.     Discharge Recommendations:  Home with assist PRN    Patient Education:     Patient Education  Education Given To: Patient  Education Provided: Role of Therapy, Plan of Care, ADL Adaptive Strategies (increasing activity, walking to/from bathroom and in hallways with staff)  Education Method: Demonstration, Verbal  Barriers to Learning: None  Education Outcome: Verbalized understanding, Demonstrated understanding    Equipment Recommendations:  Equipment Needed: No    Plan:  Times Per Week: 3-5x  Current Treatment Recommendations: Balance training, Functional mobility training, Endurance training, Patient/Caregiver education & training, Equipment evaluation, education, & procurement, Self-Care / ADL, Home management training. See long-term goal time frame for expected duration of plan of care. If no long-term goals established, a short length of stay is anticipated. Goals:     Short Term Goals  Time Frame for Short Term Goals: by discharge  Short Term Goal 1: Pt will increase activity tolerance for functional mobility household distances with MI in prep for ADL/IADL tasks. Short Term Goal 2: Pt will complete BADL/light IADL tasks with MI to increase independence with self care/homemaking tasks. Short Term Goal 3: Pt will tolerate standing >5 minutes wih MI in prep for sinkside grooming/IADL completion. Following session, patient left in safe position with all fall risk precautions in place.

## 2022-11-14 NOTE — PROGRESS NOTES
Advance Care Planning     Advance Care Planning Inpatient Note  [facility] Department    Today's Date: 11/14/2022  Unit: [facility]    Received request from patient. Upon review of chart and communication with care team, patient's decision making abilities are not in question. . Patient was/were present in the room during visit. Goals of ACP Conversation:  Discuss advance care planning documents    Health Care Decision Makers:       Primary Decision Maker: Humza Arango Child - [phone]    Primary Decision Maker: none,none - Parent - [phone]    Secondary Decision Maker: Anna Moran - Brother/Sister - [phone]  Summary:  [address] wanted to update her decision makers. There is an un-named decision maker in the medical record that needs to be deleted. Advance Care Planning Documents (Patient Wishes):  Healthcare Power of /Advance Directive Appointment of [address]     Assessment:  Kristine Sena named her brother Berenice Dominguez as her new alternate decision maker and shared that Berenice Dominguez will most likely be with her Daughter [place] and helping her if there is a time when Vipin Mazariegos would need to make decisions. Interventions:  Provided education on documents for clarity and greater understanding  Discussed and provided education on state decision maker hierarchy  Assisted in the completion of documents according to patient's wishes at this time  Encouraged ongoing ACP conversation with future decision makers and loved ones    Care Preferences Communicated:   Ventilation:   If the patient, in their present state of health, suddenly became very ill and unable to breathe on their own,     the patient would desire the use of a ventilator (breathing machine). If their health worsens and it becomes clear that the change of recovery is unlikely,     the patient would NOT desire the use of a ventilator (breathing machine).     Resuscitation:  In the event the patient's heart stopped as a result of an underlying serious health condition, the patient communicates a preference for      resuscitative attempts (CPR). Outcomes/Plan:  ACP Discussion: Completed  New advance directive completed.   Returned original document(s) to patient, as well as copies for distribution to appointed agents    Electronically signed by Addie Lewis [facility] on 11/14/2022 at 6:08 PM

## 2022-11-14 NOTE — ANESTHESIA PRE PROCEDURE
Department of Anesthesiology  Preprocedure Note       Name:  Cynthia Vallejo   Age:  50 y.o.  :  1970                                          MRN:  [id number]         Date:  2022      Surgeon: Fortino Coffman):  Faviola Kaba DO    Procedure: Procedure(s):  Hysteroscopy D&C MICHAEL    Medications prior to admission:   Prior to Admission medications    Medication Sig Start Date End Date Taking? Authorizing Provider   predniSONE (DELTASONE) 20 MG tablet 40 mg daily for 3 days, 20 mg daily for 3 days, 10 mg daily for 3 days  Patient not taking: Reported on 2022 9/15/22   Carlos Manuel Muñoz MD   megestrol (MEGACE) 40 MG tablet Take 1 tablet by mouth 2 times daily 22   Hailee Hernández MD   metoprolol succinate (TOPROL XL) 25 MG extended release tablet Take 1.5 tablets by mouth daily 22   Howie Hollis PA-C   atorvastatin (LIPITOR) 80 MG tablet Take 1 tablet by mouth at bedtime 22   Wisam Lang MD   clopidogrel (PLAVIX) 75 MG tablet Take 1 tablet by mouth daily 22   Wisam Lang MD   Blood Pressure KIT 1 kit by Does not apply route as needed (PRN) 3/30/22   Howie Hollis PA-C   nitroGLYCERIN (NITROSTAT) 0.4 MG SL tablet up to max of 3 total doses. If no relief after 1 dose, call 911. Patient not taking: Reported on 2022 3/15/22   Howie Plane, PA-C   XARELTO 20 MG TABS tablet take 1 tablet by mouth once daily 22   Historical Provider, MD   JANUVIA 100 MG tablet take 1 tablet by mouth once daily 22   Historical Provider, MD   gabapentin (NEURONTIN) 600 MG tablet take 1 tablet by mouth twice a day  Patient not taking: Reported on 2022   Historical Provider, MD   acetaminophen (TYLENOL) 325 MG tablet Take 2 tablets by mouth every 4 hours as needed for Pain 19   Esperanza Nova MD   blood glucose test strips (TRUETRACK TEST) strip 1 each by In Vitro route 2 times daily As needed.  3/15/19   Rani Barron MD       Current medications:    Current Facility-Administered Medications   Medication Dose Route Frequency Provider Last Rate Last Admin    sodium chloride flush 0.9 % injection 5-40 mL  5-40 mL IntraVENous 2 times per day Yana Bux, DO   10 mL at 11/13/22 0853    sodium chloride flush 0.9 % injection 5-40 mL  5-40 mL IntraVENous PRN Yana Bux, DO        0.9 % sodium chloride infusion   IntraVENous PRN Yana Bux, DO   Stopped at 11/11/22 2058    ondansetron (ZOFRAN-ODT) disintegrating tablet 4 mg  4 mg Oral Q8H PRN Yana Bux, DO        Or    ondansetron (ZOFRAN) injection 4 mg  4 mg IntraVENous Q6H PRN Yana Bux, DO        acetaminophen (TYLENOL) tablet 650 mg  650 mg Oral Q6H PRN Yana Bux, DO   650 mg at 11/13/22 0849    Or    acetaminophen (TYLENOL) suppository 650 mg  650 mg Rectal Q6H PRN Yana Bux, DO        glucose chewable tablet 16 g  4 tablet Oral PRN Yana Bux, DO        dextrose bolus 10% 125 mL  125 mL IntraVENous PRN Yana Bux, DO        Or    dextrose bolus 10% 250 mL  250 mL IntraVENous PRN Yana Bux, DO        glucagon (rDNA) injection 1 mg  1 mg SubCUTAneous PRN Yana Bux, DO        dextrose 10 % infusion   IntraVENous Continuous PRN Yana Bux, DO        insulin lispro (HUMALOG) injection vial 0-8 Units  0-8 Units SubCUTAneous TID WC Yana Bux, DO   2 Units at 11/13/22 1229    insulin lispro (HUMALOG) injection vial 0-4 Units  0-4 Units SubCUTAneous Nightly Yana Bux, DO   4 Units at 11/11/22 2118    0.9 % sodium chloride infusion   IntraVENous Continuous Marina Chambers  mL/hr at 11/13/22 2347 New Bag at 11/13/22 2347    insulin glargine (LANTUS) injection vial 8 Units  8 Units SubCUTAneous Nightly Marina Chambers MD   8 Units at 11/13/22 2032    megestrol (MEGACE) tablet 100 mg  100 mg Oral TID Robi Lambert MD   100 mg at 11/13/22 2031    atorvastatin (LIPITOR) tablet 80 mg  80 mg Oral Nightly Ashlee Jones MD   80 mg at 11/13/22 2031    metoprolol succinate (TOPROL XL) extended release tablet 37.5 mg  37.5 mg Oral Daily Lori Waldron MD   37.5 mg at 11/13/22 6052       Allergies:     Allergies   Allergen Reactions    Codeine Hives     + Nausea vomiting    Demerol      vomiting    Ultram [Tramadol Hcl] Other (See Comments)     Dizziness     Percocet [Oxycodone-Acetaminophen] Nausea And Vomiting    Toradol [Ketorolac Tromethamine] Rash       Problem List:    Patient Active Problem List   Diagnosis Code    Chest pain R07.9    History of pulmonary embolism Z86.711    Hyperlipidemia with target LDL less than 70 E78.5    HTN, goal below 130/80 I10    Pulmonary embolus (HCC) I26.99    Morbid obesity ([facility] E66.01    Bilateral pulmonary embolism (HCC) I26.99    DVT (deep venous thrombosis) ([facility]) I82.409    Acute right hip pain M25.551    Physical deconditioning D33.01    Metabolic acidosis S95.31    Moderate to severe pulmonary hypertension ([facility]) I27.20    Type 2 diabetes mellitus with hyperglycemia, without long-term current use of insulin ([facility]) E11.65    Open wound of right foot S91.301A    Cellulitis L03.90    Leukocytosis D72.829    Diabetic foot ulcer associated with type 2 diabetes mellitus, with fat layer exposed ([facility]) E11.621, L97.502    Cellulitis of foot, right L03.115    Chronic anticoagulation Z79.01    History of recurrent deep vein thrombosis (DVT) Z86.718    Factor 5 Leiden mutation, heterozygous ([facility] D68.51    History of CVA (cerebrovascular accident) Z80.78    Noncompliance Z91.199    CAD in native artery I25.10    CAD S/P percutaneous coronary angioplasty I25.10, Z98.61    Abnormal stress test R94.39    Open wound of second toe, left, initial encounter S91.105A    Syncope and collapse R55    Menorrhagia N92.0    Cervical polyp N84.1    Vaginal bleeding N93.9       Past Medical History:        Diagnosis Date    Asthma     Bipolar 1 disorder ([facility]     Blood circulation, collateral     CAD (coronary artery disease)     Curvature of spine  Diabetes mellitus ([facility]     D[facility] venous thrombosis) ([facility]     Factor 5 Leiden mutation, heterozygous ([facility]     GERD (gastroesophageal reflux disease)     HTN, goal below 130/80     Hx of blood clots     PE x3 and DVT x3    Hx-TIA (transient ischemic attack)     Hyperlipidemia     PE (pulmonary embolism)     RA (rheumatoid arthritis) ([facility]     Unspecified cerebral artery occlusion with cerebral infarction        Past Surgical History:        Procedure Laterality Date    CARDIAC CATHETERIZATION  2015    Heart caths    CHOLECYSTECTOMY      CORONARY ANGIOPLASTY WITH STENT PLACEMENT      FOOT DEBRIDEMENT Right 2019    FOOT DEBRIDEMENT INCISION AND DRAINAGE performed by Sandrine Ceron DPM at [address] [place] [address] ARTHROSCOPY  &    LIPOMA RESECTION      TONSILLECTOMY      TUBAL LIGATION      TYMPANOSTOMY TUBE PLACEMENT         Social History:    Social History     Tobacco Use    Smoking status: Former     Packs/day: 2.00     Years: 14.00     Pack years: 28.00     Types: Cigarettes     Quit date:      Years since quittin.8     Passive exposure: Never    Smokeless tobacco: Never   Substance Use Topics    Alcohol use:  No                                Counseling given: Not Answered      Vital Signs (Current):   Vitals:    22 1644 22 1946 22 2347 22 0308   BP: 127/62 120/61 (!) 124/58 125/60   Pulse: 86 78 75 74   Resp: 20 16 16 18   Temp:  98.2 °F (36.8 °C) 98.2 °F (36.8 °C) 98.4 °F (36.9 °C)   TempSrc:  Oral Oral Oral   SpO2: 99% 98% 100% 98%   Weight:       Height:                                                  BP Readings from Last 3 Encounters:   22 125/60   10/06/22 (!) 142/76   22 132/65       NPO Status:                                                                                 BMI:   Wt Readings from Last 3 Encounters:   22 298 lb (135.2 kg)   10/06/22 (!) 309 lb (140.2 kg)   09/15/22 (!) 336 lb (152.4 kg) Body mass index is 45.31 kg/m².     CBC:   Lab Results   Component Value Date/Time    WBC 7.7 11/13/2022 01:01 AM    RBC 2.85 11/13/2022 01:01 AM    RBC 4.29 02/06/2012 02:02 PM    HGB 8.0 11/14/2022 01:31 AM    HCT 24.3 11/14/2022 01:31 AM    MCV 82.5 11/13/2022 01:01 AM    RDW 14.9 09/08/2017 03:42 PM     11/13/2022 01:01 AM       CMP:   Lab Results   Component Value Date/Time     11/14/2022 01:31 AM    K 3.5 11/14/2022 01:31 AM    K 3.6 11/13/2022 01:01 AM     11/14/2022 01:31 AM    CO2 22 11/14/2022 01:31 AM    BUN 7 11/14/2022 01:31 AM    CREATININE 0.6 11/14/2022 01:31 AM    LABGLOM >60 11/14/2022 01:31 AM    GLUCOSE 174 11/14/2022 01:31 AM    PROT 7.2 09/15/2022 11:25 AM    CALCIUM 8.2 11/14/2022 01:31 AM    BILITOT 0.3 09/15/2022 11:25 AM    ALKPHOS 84 09/15/2022 11:25 AM    AST 14 09/15/2022 11:25 AM    ALT 9 09/15/2022 11:25 AM       POC Tests:   Recent Labs     11/13/22 1954   POCGLU 231*       Coags:   Lab Results   Component Value Date/Time    PROTIME 2.38 11/22/2011 01:34 PM    INR 1.38 11/11/2022 09:13 AM    APTT 27.3 11/12/2022 06:58 AM       HCG (If Applicable):   Lab Results   Component Value Date    PREGTESTUR NEGATIVE 03/14/2015    PREGSERUM NEGATIVE 09/09/2015        ABGs: No results found for: PHART, PO2ART, YAA9BWD, MUI9YFP, BEART, D4JLUCUU     Type & Screen (If Applicable):  Lab Results   Component Value Date    LABRH POS 11/11/2022       Drug/Infectious Status (If Applicable):  No results found for: HIV, HEPCAB    COVID-19 Screening (If Applicable):   Lab Results   Component Value Date/Time    COVID19 NOT  DETECTED 09/15/2022 01:48 PM           Anesthesia Evaluation    Airway: Mallampati: II  TM distance: >3 FB   Neck ROM: full  Mouth opening: > = 3 FB   Dental:          Pulmonary:   (+) decreased breath sounds asthma:                            Cardiovascular:    (+) hypertension:, CAD:,         Rhythm: regular                      Neuro/Psych:   (+) CVA:, psychiatric history:            GI/Hepatic/Renal:   (+) GERD:,           Endo/Other:    (+) Diabetes, : arthritis:., .                 Abdominal:   (+) obese,           Vascular: Other Findings:           Anesthesia Plan      general     ASA 4       Induction: intravenous. MIPS: Postoperative opioids intended and Prophylactic antiemetics administered. Anesthetic plan and risks discussed with patient and child/children. Plan discussed with CRNA.                     Robert Starkey MD   11/14/2022

## 2022-11-15 LAB
ANION GAP SERPL CALCULATED.3IONS-SCNC: 8 MEQ/L (ref 8–16)
BASOPHILS # BLD: 0.3 %
BASOPHILS ABSOLUTE: 0 THOU/MM3 (ref 0–0.1)
BUN BLDV-MCNC: 7 MG/DL (ref 7–22)
CALCIUM SERPL-MCNC: 8.8 MG/DL (ref 8.5–10.5)
CHLORIDE BLD-SCNC: 104 MEQ/L (ref 98–111)
CO2: 25 MEQ/L (ref 23–33)
CREAT SERPL-MCNC: 0.5 MG/DL (ref 0.4–1.2)
EOSINOPHIL # BLD: 0.5 %
EOSINOPHILS ABSOLUTE: 0 THOU/MM3 (ref 0–0.4)
ERYTHROCYTE [DISTWIDTH] IN BLOOD BY AUTOMATED COUNT: 16.3 % (ref 11.5–14.5)
ERYTHROCYTE [DISTWIDTH] IN BLOOD BY AUTOMATED COUNT: 50.4 FL (ref 35–45)
GFR SERPL CREATININE-BSD FRML MDRD: > 60 ML/MIN/1.73M2
GLUCOSE BLD-MCNC: 173 MG/DL (ref 70–108)
GLUCOSE BLD-MCNC: 194 MG/DL (ref 70–108)
GLUCOSE BLD-MCNC: 210 MG/DL (ref 70–108)
GLUCOSE BLD-MCNC: 232 MG/DL (ref 70–108)
GLUCOSE BLD-MCNC: 244 MG/DL (ref 70–108)
HCT VFR BLD CALC: 25.7 % (ref 37–47)
HEMOGLOBIN: 8.3 GM/DL (ref 12–16)
IMMATURE GRANS (ABS): 0.03 THOU/MM3 (ref 0–0.07)
IMMATURE GRANULOCYTES: 0.5 %
LYMPHOCYTES # BLD: 19.1 %
LYMPHOCYTES ABSOLUTE: 1.1 THOU/MM3 (ref 1–4.8)
MCH RBC QN AUTO: 27.8 PG (ref 26–33)
MCHC RBC AUTO-ENTMCNC: 32.3 GM/DL (ref 32.2–35.5)
MCV RBC AUTO: 86 FL (ref 81–99)
MONOCYTES # BLD: 7.1 %
MONOCYTES ABSOLUTE: 0.4 THOU/MM3 (ref 0.4–1.3)
NUCLEATED RED BLOOD CELLS: 0 /100 WBC
PLATELET # BLD: 187 THOU/MM3 (ref 130–400)
PMV BLD AUTO: 10.4 FL (ref 9.4–12.4)
POTASSIUM SERPL-SCNC: 4.5 MEQ/L (ref 3.5–5.2)
RBC # BLD: 2.99 MILL/MM3 (ref 4.2–5.4)
SEG NEUTROPHILS: 72.5 %
SEGMENTED NEUTROPHILS ABSOLUTE COUNT: 4.2 THOU/MM3 (ref 1.8–7.7)
SODIUM BLD-SCNC: 137 MEQ/L (ref 135–145)
WBC # BLD: 5.8 THOU/MM3 (ref 4.8–10.8)

## 2022-11-15 PROCEDURE — 80048 BASIC METABOLIC PNL TOTAL CA: CPT

## 2022-11-15 PROCEDURE — 6370000000 HC RX 637 (ALT 250 FOR IP): Performed by: STUDENT IN AN ORGANIZED HEALTH CARE EDUCATION/TRAINING PROGRAM

## 2022-11-15 PROCEDURE — 2060000000 HC ICU INTERMEDIATE R&B

## 2022-11-15 PROCEDURE — 97530 THERAPEUTIC ACTIVITIES: CPT

## 2022-11-15 PROCEDURE — 85025 COMPLETE CBC W/AUTO DIFF WBC: CPT

## 2022-11-15 PROCEDURE — 2580000003 HC RX 258: Performed by: STUDENT IN AN ORGANIZED HEALTH CARE EDUCATION/TRAINING PROGRAM

## 2022-11-15 PROCEDURE — 82948 REAGENT STRIP/BLOOD GLUCOSE: CPT

## 2022-11-15 PROCEDURE — 36415 COLL VENOUS BLD VENIPUNCTURE: CPT

## 2022-11-15 PROCEDURE — 99232 SBSQ HOSP IP/OBS MODERATE 35: CPT | Performed by: STUDENT IN AN ORGANIZED HEALTH CARE EDUCATION/TRAINING PROGRAM

## 2022-11-15 PROCEDURE — 97535 SELF CARE MNGMENT TRAINING: CPT

## 2022-11-15 RX ORDER — CLOPIDOGREL BISULFATE 75 MG/1
75 TABLET ORAL DAILY
Status: DISCONTINUED | OUTPATIENT
Start: 2022-11-15 | End: 2022-11-16 | Stop reason: HOSPADM

## 2022-11-15 RX ADMIN — INSULIN GLARGINE 10 UNITS: 100 INJECTION, SOLUTION SUBCUTANEOUS at 21:32

## 2022-11-15 RX ADMIN — INSULIN LISPRO 2 UNITS: 100 INJECTION, SOLUTION INTRAVENOUS; SUBCUTANEOUS at 08:31

## 2022-11-15 RX ADMIN — CLOPIDOGREL BISULFATE 75 MG: 75 TABLET ORAL at 09:48

## 2022-11-15 RX ADMIN — SODIUM CHLORIDE, PRESERVATIVE FREE 10 ML: 5 INJECTION INTRAVENOUS at 21:32

## 2022-11-15 RX ADMIN — HYDROCODONE BITARTRATE AND ACETAMINOPHEN 1 TABLET: 5; 325 TABLET ORAL at 05:51

## 2022-11-15 RX ADMIN — ATORVASTATIN CALCIUM 80 MG: 80 TABLET, FILM COATED ORAL at 22:50

## 2022-11-15 RX ADMIN — SODIUM CHLORIDE, PRESERVATIVE FREE 10 ML: 5 INJECTION INTRAVENOUS at 08:32

## 2022-11-15 RX ADMIN — METOPROLOL SUCCINATE 37.5 MG: 25 TABLET, FILM COATED, EXTENDED RELEASE ORAL at 08:29

## 2022-11-15 RX ADMIN — RIVAROXABAN 20 MG: 20 TABLET, FILM COATED ORAL at 09:48

## 2022-11-15 RX ADMIN — HYDROCODONE BITARTRATE AND ACETAMINOPHEN 1 TABLET: 5; 325 TABLET ORAL at 22:50

## 2022-11-15 RX ADMIN — INSULIN LISPRO 2 UNITS: 100 INJECTION, SOLUTION INTRAVENOUS; SUBCUTANEOUS at 13:09

## 2022-11-15 ASSESSMENT — PAIN DESCRIPTION - LOCATION
LOCATION: LEG
LOCATION: LEG

## 2022-11-15 ASSESSMENT — PAIN DESCRIPTION - ORIENTATION
ORIENTATION: RIGHT;LEFT;LOWER
ORIENTATION: RIGHT;LEFT;LOWER

## 2022-11-15 ASSESSMENT — PAIN DESCRIPTION - DESCRIPTORS
DESCRIPTORS: ACHING
DESCRIPTORS: ACHING

## 2022-11-15 ASSESSMENT — PAIN SCALES - GENERAL
PAINLEVEL_OUTOF10: 7
PAINLEVEL_OUTOF10: 7
PAINLEVEL_OUTOF10: 5
PAINLEVEL_OUTOF10: 5

## 2022-11-15 NOTE — PROGRESS NOTES
Subjective:  53yo admitted for acute blood loss anemia with vaginal bleeding. POD#1 s/p hysteroscopy, D&C, Mirena IUD placement. Pt feeling well this morning. Says she only had spotting overnight. Hgb has been pretty stable over last 24hrs. Objective:  Vitals:    11/15/22 0822   BP: (!) 124/59   Pulse: 67   Resp: 20   Temp: 97.9 °F (36.6 °C)   SpO2: 97%     Hgb 8.3 this AM, was 8.7 yesterday evening. PE  Gen: NAD, A&C  Minimal spotting noted on peripad this AM    Assessment:  - Acute blood loss anemia with vaginal bleeding  - POD#1 s/p hysteroscopy. D&C, Mirena IUD placement    Plan:  - Bleeding much improved and hgb stable  - Megace discontinued  - Primary team planning to restart anticoagulation today  - As long as bleeding remains small to minimal amount, pt stable for d/c from GYN standpoint. Pt was instructed to follow up outpatient in 1wk after discharge.

## 2022-11-15 NOTE — CARE COORDINATION
11/15/22, 2:10 PM EST    DISCHARGE ON GOING [address] [place] [address] day: 4  Location: -10/010-A Reason for admit: Vaginal bleeding [N93.9]  Anemia, unspecified type [D64.9]   Procedure:   11/14 Hysteroscopy, D & C, Mirena IUD placement under ultrasound guidance    Barriers to Discharge: POD #1. Only spotting overnight. Xarelto and Plavix restarted today; keep overnight to monitor how does on these meds. Plan to discharge tomorrow if remains stable. Afebrile. NSR. On room air. Ox4. PT/OT. Hospitalist and OB/GYN following. Telemetry, SCDs. Lipitor, plavix, prn norco, lantus, SSI, toprol xl, xarelto. Hgb 8.3. PCP: SHANNON Kam CNP  Readmission Risk Score: 19.1%  Patient Goals/Plan/Treatment Preferences: Home with son and his girlfriend staying. Denies needs, declines HH. SW following.

## 2022-11-15 NOTE — PLAN OF CARE
Problem: Chronic Conditions and Co-morbidities  Goal: Patient's chronic conditions and co-morbidity symptoms are monitored and maintained or improved  Outcome: Progressing  Flowsheets  Taken 11/15/2022 1736 by Kath Swift RN  Care Plan - Patient's Chronic Conditions and Co-Morbidity Symptoms are Monitored and Maintained or Improved:   Monitor and assess patient's chronic conditions and comorbid symptoms for stability, deterioration, or improvement   Collaborate with multidisciplinary team to address chronic and comorbid conditions and prevent exacerbation or deterioration   Update acute care plan with appropriate goals if chronic or comorbid symptoms are exacerbated and prevent overall improvement and discharge  Taken 11/15/2022 0800 by Bert Enriquez 34 - Patient's Chronic Conditions and Co-Morbidity Symptoms are Monitored and Maintained or Improved: Monitor and assess patient's chronic conditions and comorbid symptoms for stability, deterioration, or improvement     Problem: Discharge Planning  Goal: Discharge to home or other facility with appropriate resources  Outcome: Progressing  Flowsheets  Taken 11/15/2022 1736 by Kath Swift RN  Discharge to home or other facility with appropriate resources:   Identify barriers to discharge with patient and caregiver   Identify discharge learning needs (meds, wound care, etc)   Arrange for needed discharge resources and transportation as appropriate   Refer to discharge planning if patient needs post-hospital services based on physician order or complex needs related to functional status, cognitive ability or social support system  Taken 11/15/2022 0800 by Wesley Patel RN  Discharge to home or other facility with appropriate resources:   Identify barriers to discharge with patient and caregiver   Identify discharge learning needs (meds, wound care, etc)   Refer to discharge planning if patient needs post-hospital services based on physician order or complex needs related to functional status, cognitive ability or social support system     Problem: Safety - Adult  Goal: Free from fall injury  Outcome: Progressing  Flowsheets (Taken 11/15/2022 1736)  Free From Fall Injury:   Based on caregiver fall risk screen, instruct family/caregiver to ask for assistance with transferring infant if caregiver noted to have fall risk factors   Instruct family/caregiver on patient safety     Problem: Pain  Goal: Verbalizes/displays adequate comfort level or baseline comfort level  Outcome: Progressing  Flowsheets (Taken 11/15/2022 1736)  Verbalizes/displays adequate comfort level or baseline comfort level:   Encourage patient to monitor pain and request assistance   Assess pain using appropriate pain scale   Administer analgesics based on type and severity of pain and evaluate response   Notify Licensed Independent Practitioner if interventions unsuccessful or patient reports new pain     Problem: ABCDS Injury Assessment  Goal: Absence of physical injury  Outcome: Progressing  Flowsheets (Taken 11/15/2022 1736)  Absence of Physical Injury: Implement safety measures based on patient assessment   Care plan reviewed with patient. Patient verbalize understanding of the plan of care and contribute to goal setting.

## 2022-11-15 NOTE — DISCHARGE INSTRUCTIONS
DILATATION & CURETTAGE AND/OR HYSTEROSCOPY   DISCHARGE INSTRUCTIONS FOR  PATIENTS AND FAMILY  [facility]  [phone]  [address] Tammy Villaseñor  [facility], [address]    1)  If you experience pain in your surgical area, take Tylenol, or the pain medication prescribed by your doctor. Some pain medications are very irritating when taken on an empty stomach, so try to take the medication with a light meal or a glass of milk. 2)  If you have any fever, chills, bleeding, or uncontrollable pain or any other problems, notify your surgeon. 3)  Other instructions:   Pain:  Ibuprofen every 6 hours as needed, call if no relief. Activity:  Take it easy for 1-2 days   Exercise:  None for 1 week   Sex, douching, tampons:  None for 1 week   Shower: In 24 hours   Tub bath, swimming:  None for 1 week   Appointment:  Call for an appointment in 1 week if appointment not already made.      Tami Freire DO 11/15/2022 9:43 AM

## 2022-11-15 NOTE — PROGRESS NOTES
Hospitalist Progress Note      Patient:  Kary Cummings    Unit/Bed:4B-10/010-A  YOB: 1970  MRN: [id number]   Acct: [de-identified]   PCP: SHANNON Phelps CNP  Date of Admission: 11/11/2022    Assessment/Plan:    Vaginal Bleeding, with acute normocytic anemia: Pt reports heavy bleeding x 9 days. Patient was last admitted for similar complaints on 8/22. Patient reports she was due to get an ablation done a few days with her OB. However became very symptomatic with dizziness, lightheadedness, and palpitations. denies any syncope. Patient arrived with a hemoglobin of 7.3 with no known history of anemia. Received 1 unit of packed red blood cells in the ED. Patient is on Xarelto and Plavix daily. Transfuse if hemoglobin is below 7. Maintain telemetry. Megace discontinued by OB; Resume Xarelto and Plavix and monitor hgb. If hgb and bleeding remains stable tomorrow, can likely discharge meghann. #POD #1 s/p Hysteroscopy, D&C, Mirena IUD Placement: Due to #1. Patient doing well and has had minimal bleeding. Likely discharge tomorrow if hgb stabalized    Acute blood loss anemia: secondary to #1. Will treat as above. Received 2 units of PRBC on 11/13 due to Hgb 6.7. Hgb checks; Hx CAD, s/p PCI: C 3/14/22  with PCI to Lcx, OM1, RDPA. On Plavix and Xarelto;  Patient denies any chest pain currently. EKG shows no acute changes. Resume plavix on 11/15     Essential HTN: BP overall stable. Continue to monitor closely. BP meds resumed     Factor 5 Leiden mutation: Hx of PE and DVT. Will resume Xarelto on 11/15     Pseudohyponatremia: Resolved; Na 132. Corrected Na after hyperglycemia is 136     Non Insulin Dependent Type 2 DM: Hold home oral agents; lantus 10 units + Medium dose SSI; accu checks; Carb control diet. Obesity: BMI 48.35 kg/m2.        Chief Complaint: vaginal bleeding    Initial H and P:-    Kary Cummings is a 50 y.o. female who presents to the emergency department for evaluation of profuse vaginal bleeding. She reports that over the past 3 days she has gone through 3 boxes of 48 pads. She has been passing multiple large clots. Patient was admitted this past August for profuse vaginal bleeding and had received some blood transfusions, she was told she had fibroid tumor. This coming Monday she is scheduled for a uterine ablation, she was trying to hold out until then. Patient is also complaining of substernal, nonradiating, mild chest pain whenever she moves. Patient reports feeling lightheaded as well as dizzy. The patient has no other acute complaints at this time    Subjective (past 24 hours):   Patient seen and examined by bedside. Has had minimal bleeding overnight. Hgb stable. Feels overall better       Past medical history, family history, social history and allergies reviewed again and is unchanged since admission. ROS (12 point review of systems completed. Pertinent positives noted.  Otherwise ROS is negative)     Medications:  Reviewed    Infusion Medications    sodium chloride      sodium chloride Stopped (11/11/22 2058)    dextrose       Scheduled Medications    rivaroxaban  20 mg Oral Daily with breakfast    clopidogrel  75 mg Oral Daily    sodium chloride flush  5-40 mL IntraVENous 2 times per day    insulin glargine  10 Units SubCUTAneous Nightly    sodium chloride flush  5-40 mL IntraVENous 2 times per day    insulin lispro  0-8 Units SubCUTAneous TID WC    insulin lispro  0-4 Units SubCUTAneous Nightly    atorvastatin  80 mg Oral Nightly    metoprolol succinate  37.5 mg Oral Daily     PRN Meds: sodium chloride flush, sodium chloride, ibuprofen, HYDROcodone 5 mg - acetaminophen **OR** HYDROcodone 5 mg - acetaminophen, sodium chloride flush, sodium chloride, ondansetron **OR** ondansetron, acetaminophen **OR** acetaminophen, glucose, dextrose bolus **OR** dextrose bolus, glucagon (rDNA), dextrose      Intake/Output Summary (Last 24 hours) at 11/15/2022 1135  Last data filed at 11/15/2022 0835  Gross per 24 hour   Intake 720 ml   Output 2600 ml   Net -1880 ml         Diet:  ADULT DIET; Regular    Exam:  BP (!) 124/59   Pulse 67   Temp 97.9 °F (36.6 °C) (Oral)   Resp 20   Ht 5' 8\" (1.727 m)   Wt 298 lb (135.2 kg)   LMP 06/21/2018   SpO2 97%   BMI 45.31 kg/m²   General appearance: No apparent distress, appears stated age and cooperative. HEENT: Pupils equal, round, and reactive to light. Conjunctiva pallor  Neck: Supple, with full range of motion. No jugular venous distention. Trachea midline. Respiratory:  Normal respiratory effort. Clear to auscultation, bilaterally without Rales/Wheezes/Rhonchi. Cardiovascular: Regular rate and rhythm with normal S1/S2 without murmurs, rubs or gallops. Abdomen: Soft, non-tender, non-distended with normal bowel sounds. Musculoskeletal: passive and active ROM x 4 extremities. Skin: Skin color, texture, turgor normal.  No rashes or lesions. Neurologic:  Neurovascularly intact without any focal sensory/motor deficits. Cranial nerves: II-XII intact, grossly non-focal.  Psychiatric: Alert and oriented, thought content appropriate, normal insight  Capillary Refill: Brisk,< 3 seconds   Peripheral Pulses: +2 palpable, equal bilaterally     Labs:   Recent Labs     11/13/22  0101 11/13/22  0725 11/14/22  1006 11/14/22  1750 11/15/22  0503   WBC 7.7  --   --   --  5.8   HGB 7.5*   < > 9.0* 8.7* 8.3*   HCT 23.5*   < > 27.4* 26.4* 25.7*     --   --   --  187    < > = values in this interval not displayed. Recent Labs     11/13/22  0101 11/14/22  0131 11/15/22  0817    138 137   K 3.6 3.5 4.5    106 104   CO2 21* 22* 25   BUN 9 7 7   CREATININE 0.6 0.6 0.5   CALCIUM 7.9* 8.2* 8.8       No results for input(s): AST, ALT, BILIDIR, BILITOT, ALKPHOS in the last 72 hours. No results for input(s): INR in the last 72 hours.     No results for input(s): Grady Anna in the last 72 hours. Microbiology:    Blood culture #1:   Lab Results   Component Value Date/Time    BC No growth-preliminaryNo growth 09/06/2019 02:08 PM    BC No growth-preliminaryNo growth 09/06/2019 02:08 PM       Blood culture #2:No results found for: Jessica Mathew    Organism:  Lab Results   Component Value Date/Time    ORG Staphylococcus aureus 09/06/2019 06:00 PM    ORG Morganella morganii ssp. sibonii 09/06/2019 06:00 PM         Lab Results   Component Value Date/Time    LABGRAM  09/06/2019 06:00 PM     Rare segmented neutrophils observed. Rare epithelial cells observed. Many gram positive cocci occurring singly and in pairs. Many gram negative bacilli. Few gram positive bacilli. MRSA culture only:No results found for: [facility]    Urine culture: No results found for: LABURIN    Respiratory culture: No results found for: CULTRESP    Aerobic and Anaerobic :  Lab Results   Component Value Date/Time    LABAERO  09/06/2019 06:00 PM     Culture also yielded heavy growth of gram positive bacillimost consistent with Corynebacterium species. LABAERO  09/06/2019 06:00 PM     heavy growthIn the treatment of gram positive infections, GENTAMICINshould be CONSIDERED a SYNERGYSTIC agent ONLY. Ciprofloxacin and Levofloxacin, regardless of in vitrosensitivity, should not be used for staphylococcal infectionsother than uncomplicated lower UTIs. Moxifloxacin, regardless of in vitro sensitivity, shouldnot be used for staphylococcal infections. LABAERO moderate growth 09/06/2019 06:00 PM     Lab Results   Component Value Date/Time    LABANAE  09/06/2019 06:00 PM     Culture yielded heavy mixed growth which included anaerobicgram positive cocci. If a true mixed aerobic and anaerobicinfection is suspected, then broad spectrum empiricantibiotic therapy is indicated and should include coveragefor anaerobic organisms.        Urinalysis:      Lab Results   Component Value Date/Time    NITRU NEGATIVE 09/09/2015 04:20 PM    WBCUA 5-10 09/09/2015 04:20 PM    WBCUA 2-4 10/17/2011 06:05 PM    BACTERIA NONE 09/09/2015 04:20 PM    RBCUA 0-2 09/09/2015 04:20 PM    BLOODU MODERATE 09/09/2015 04:20 PM    SPECGRAV 1.007 09/09/2015 04:20 PM    GLUCOSEU >= 1000 03/14/2015 02:28 PM       Radiology:  XR CHEST PORTABLE   Final Result   There is no acute intrathoracic process. **This report has been created using voice recognition software. It may contain minor errors which are inherent in voice recognition technology. **      Final report electronically signed by Dr Pierre Rene on 11/11/2022 9:42 AM        XR CHEST PORTABLE    Result Date: 11/11/2022  PROCEDURE: XR CHEST PORTABLE CLINICAL INFORMATION: 61-year-old female with generalized chest pain. Shortness of breath. COMPARISON: Radiograph 9/15/2022. TECHNIQUE: AP upright view of the chest was obtained. FINDINGS: The lungs are clear. The cardiac silhouette and pulmonary vasculature are within normal limits. There is no significant pleural effusion or pneumothorax. Visualized portions of the upper abdomen are within normal limits. The osseous structures are intact. No acute fractures or suspicious osseous lesions. There is no acute intrathoracic process. **This report has been created using voice recognition software. It may contain minor errors which are inherent in voice recognition technology. ** Final report electronically signed by Dr Pierre Rene on 11/11/2022 9:42 AM      Electronically signed by Aminah Cotton DO on 11/15/2022 at 11:35 AM

## 2022-11-15 NOTE — PROGRESS NOTES
[facility]  [facility] CVICU 4B  Occupational Therapy  Daily Note  Time:   Time In: 1567  Time Out: 3544  Timed Code Treatment Minutes: 23 Minutes  Minutes: 23        Date: 11/15/2022  Patient Name: Aram Putnam,   Gender: female      Room: -10/010-A  MRN: [id number]  : 1970  (50 y.o.)  Referring Practitioner: Maira Mccray MD  Diagnosis: vaginal bleeding  Additional Pertinent Hx: Per H&P: Aram Putnam is a 50 y.o. female who presents to the emergency department for evaluation of profuse vaginal bleeding. She reports that over the past 3 days she has gone through 3 boxes of 48 pads. She has been passing multiple large clots. Patient was admitted this past August for profuse vaginal bleeding and had received some blood transfusions, she was told she had fibroid tumor. This coming Monday she is scheduled for a uterine ablation, she was trying to hold out until then. Patient is also complaining of substernal, nonradiating, mild chest pain whenever she moves. Patient reports feeling lightheaded as well as dizzy. Pt s/p Hysteroscopy, D & C, Mirena IUD placement under ultrasound guidance on 22. Restrictions/Precautions:  Restrictions/Precautions: General Precautions, Fall Risk     SUBJECTIVE: Pt approached sitting in recliner, pleasant and agreeable to therapy    PAIN: no pain reported    Vitals: Vitals not assessed per clinical judgement, see nursing flowsheet    COGNITION: WFL    ADL:   Lower Extremity Dressing: Stand By Assistance. Pt threading underwear while seated in recliner and able to stand with CGA to pull underwear up. Pt dependent for donning socks while seated due to increased LE swelling . BALANCE:  Sitting Balance:  Independent. Seated in recliner  Standing Balance: Stand By Assistance. With RW    BED MOBILITY:  Not Tested    TRANSFERS:  Sit to Stand:  Supervision. From recliner  Stand to Sit: Supervision.  To recliner    FUNCTIONAL MOBILITY:  Assistive Device: Rolling Walker  Assist Level:  Contact Guard Assistance. Distance:  Around room to pack belongings and up/down hallway. Pt moving at slower pace. ADDITIONAL ACTIVITIES:  Pt completed homemaking task of item retrieval. Pt required to ambulate around room with RW, reach outside YANG to  various items and pack them into bags. PT SBA throughout and demonstrated good dynamic balance. Pt able to reach into cabinets of various heights and pick clothes off floor with no LOB. Pt SBA throughout task. Pt requiring 1 safety cue. Pt completed task to increase strength, endurance with dynamic standing and indep with ADLs/IADLs prior to discharge. ASSESSMENT:  Activity Tolerance:  Patient tolerance of  treatment: good. Discharge Recommendations: Home with assist as needed  Equipment Recommendations: Equipment Needed: No  Plan: Times Per Week: 3-5x  Current Treatment Recommendations: Balance training, Functional mobility training, Endurance training, Patient/Caregiver education & training, Equipment evaluation, education, & procurement, Self-Care / ADL, Home management training    Patient Education  Patient Education: Role of OT, Plan of Care, ADL's, IADL's, and Reviewed Prior Education    Goals  Short Term Goals  Time Frame for Short Term Goals: by discharge  Short Term Goal 1: Pt will increase activity tolerance for functional mobility household distances with MI in prep for ADL/IADL tasks. Short Term Goal 2: Pt will complete BADL/light IADL tasks with MI to increase independence with self care/homemaking tasks. Short Term Goal 3: Pt will tolerate standing >5 minutes wih MI in prep for sinkside grooming/IADL completion. Following session, patient left in safe position with all fall risk precautions in place.

## 2022-11-16 VITALS
BODY MASS INDEX: 44.41 KG/M2 | DIASTOLIC BLOOD PRESSURE: 58 MMHG | RESPIRATION RATE: 18 BRPM | WEIGHT: 293 LBS | TEMPERATURE: 97.6 F | OXYGEN SATURATION: 100 % | HEIGHT: 68 IN | SYSTOLIC BLOOD PRESSURE: 122 MMHG | HEART RATE: 66 BPM

## 2022-11-16 LAB
ANION GAP SERPL CALCULATED.3IONS-SCNC: 12 MEQ/L (ref 8–16)
BUN BLDV-MCNC: 9 MG/DL (ref 7–22)
CALCIUM SERPL-MCNC: 8.7 MG/DL (ref 8.5–10.5)
CHLORIDE BLD-SCNC: 104 MEQ/L (ref 98–111)
CO2: 23 MEQ/L (ref 23–33)
CREAT SERPL-MCNC: 0.6 MG/DL (ref 0.4–1.2)
ERYTHROCYTE [DISTWIDTH] IN BLOOD BY AUTOMATED COUNT: 16.8 % (ref 11.5–14.5)
ERYTHROCYTE [DISTWIDTH] IN BLOOD BY AUTOMATED COUNT: 53.2 FL (ref 35–45)
GFR SERPL CREATININE-BSD FRML MDRD: > 60 ML/MIN/1.73M2
GLUCOSE BLD-MCNC: 129 MG/DL (ref 70–108)
GLUCOSE BLD-MCNC: 138 MG/DL (ref 70–108)
GLUCOSE BLD-MCNC: 143 MG/DL (ref 70–108)
GLUCOSE BLD-MCNC: 148 MG/DL (ref 70–108)
HCT VFR BLD CALC: 26 % (ref 37–47)
HEMOGLOBIN: 8.2 GM/DL (ref 12–16)
MCH RBC QN AUTO: 28 PG (ref 26–33)
MCHC RBC AUTO-ENTMCNC: 31.5 GM/DL (ref 32.2–35.5)
MCV RBC AUTO: 88.7 FL (ref 81–99)
PLATELET # BLD: 220 THOU/MM3 (ref 130–400)
PMV BLD AUTO: 10.2 FL (ref 9.4–12.4)
POTASSIUM SERPL-SCNC: 4.5 MEQ/L (ref 3.5–5.2)
RBC # BLD: 2.93 MILL/MM3 (ref 4.2–5.4)
SODIUM BLD-SCNC: 139 MEQ/L (ref 135–145)
WBC # BLD: 5.6 THOU/MM3 (ref 4.8–10.8)

## 2022-11-16 PROCEDURE — 82948 REAGENT STRIP/BLOOD GLUCOSE: CPT

## 2022-11-16 PROCEDURE — 99239 HOSP IP/OBS DSCHRG MGMT >30: CPT | Performed by: HOSPITALIST

## 2022-11-16 PROCEDURE — 36415 COLL VENOUS BLD VENIPUNCTURE: CPT

## 2022-11-16 PROCEDURE — 2580000003 HC RX 258: Performed by: STUDENT IN AN ORGANIZED HEALTH CARE EDUCATION/TRAINING PROGRAM

## 2022-11-16 PROCEDURE — 85027 COMPLETE CBC AUTOMATED: CPT

## 2022-11-16 PROCEDURE — 6370000000 HC RX 637 (ALT 250 FOR IP): Performed by: STUDENT IN AN ORGANIZED HEALTH CARE EDUCATION/TRAINING PROGRAM

## 2022-11-16 PROCEDURE — 80048 BASIC METABOLIC PNL TOTAL CA: CPT

## 2022-11-16 RX ORDER — SODIUM CHLORIDE 0.9 % (FLUSH) 0.9 %
5-40 SYRINGE (ML) INJECTION PRN
Status: DISCONTINUED | OUTPATIENT
Start: 2022-11-16 | End: 2022-11-16 | Stop reason: HOSPADM

## 2022-11-16 RX ORDER — SODIUM CHLORIDE 0.9 % (FLUSH) 0.9 %
5-40 SYRINGE (ML) INJECTION EVERY 12 HOURS SCHEDULED
Status: DISCONTINUED | OUTPATIENT
Start: 2022-11-16 | End: 2022-11-16 | Stop reason: HOSPADM

## 2022-11-16 RX ORDER — SODIUM CHLORIDE 9 MG/ML
INJECTION, SOLUTION INTRAVENOUS PRN
Status: DISCONTINUED | OUTPATIENT
Start: 2022-11-16 | End: 2022-11-16 | Stop reason: HOSPADM

## 2022-11-16 RX ORDER — DOXYCYCLINE HYCLATE 100 MG
100 TABLET ORAL 2 TIMES DAILY
Qty: 14 TABLET | Refills: 0 | Status: SHIPPED | OUTPATIENT
Start: 2022-11-16 | End: 2022-11-23

## 2022-11-16 RX ORDER — MORPHINE SULFATE 2 MG/ML
1 INJECTION, SOLUTION INTRAMUSCULAR; INTRAVENOUS EVERY 5 MIN PRN
Status: DISCONTINUED | OUTPATIENT
Start: 2022-11-16 | End: 2022-11-16 | Stop reason: HOSPADM

## 2022-11-16 RX ORDER — HYDRALAZINE HYDROCHLORIDE 20 MG/ML
10 INJECTION INTRAMUSCULAR; INTRAVENOUS
Status: DISCONTINUED | OUTPATIENT
Start: 2022-11-16 | End: 2022-11-16 | Stop reason: HOSPADM

## 2022-11-16 RX ORDER — FENTANYL CITRATE 50 UG/ML
50 INJECTION, SOLUTION INTRAMUSCULAR; INTRAVENOUS EVERY 5 MIN PRN
Status: DISCONTINUED | OUTPATIENT
Start: 2022-11-16 | End: 2022-11-16 | Stop reason: HOSPADM

## 2022-11-16 RX ADMIN — ACETAMINOPHEN 650 MG: 325 TABLET ORAL at 08:12

## 2022-11-16 RX ADMIN — METOPROLOL SUCCINATE 37.5 MG: 25 TABLET, FILM COATED, EXTENDED RELEASE ORAL at 08:14

## 2022-11-16 RX ADMIN — CLOPIDOGREL BISULFATE 75 MG: 75 TABLET ORAL at 08:14

## 2022-11-16 RX ADMIN — RIVAROXABAN 20 MG: 20 TABLET, FILM COATED ORAL at 08:14

## 2022-11-16 RX ADMIN — SODIUM CHLORIDE, PRESERVATIVE FREE 10 ML: 5 INJECTION INTRAVENOUS at 08:15

## 2022-11-16 ASSESSMENT — PAIN DESCRIPTION - ONSET: ONSET: ON-GOING

## 2022-11-16 ASSESSMENT — PAIN DESCRIPTION - ORIENTATION
ORIENTATION: LEFT;POSTERIOR
ORIENTATION: LEFT;POSTERIOR

## 2022-11-16 ASSESSMENT — PAIN SCALES - GENERAL
PAINLEVEL_OUTOF10: 0
PAINLEVEL_OUTOF10: 3
PAINLEVEL_OUTOF10: 7

## 2022-11-16 ASSESSMENT — PAIN DESCRIPTION - FREQUENCY: FREQUENCY: CONTINUOUS

## 2022-11-16 ASSESSMENT — PAIN DESCRIPTION - DESCRIPTORS
DESCRIPTORS: SHARP
DESCRIPTORS: SHARP

## 2022-11-16 ASSESSMENT — PAIN DESCRIPTION - LOCATION
LOCATION: LEG
LOCATION: LEG

## 2022-11-16 ASSESSMENT — PAIN - FUNCTIONAL ASSESSMENT: PAIN_FUNCTIONAL_ASSESSMENT: ACTIVITIES ARE NOT PREVENTED

## 2022-11-16 NOTE — PROGRESS NOTES
Subjective:  51yo admitted for acute blood loss anemia with vaginal bleeding. POD#2 s/p hysteroscopy, D&C, Mirena IUD placement. Pt feeling well this morning. Says she had some bleeding yesterday when she went to the restroom once and then has not had bleeding since. Hgb has been stable over last 24hrs. Discussed benign pathology findings as well as findings of chronic endometritis. Will discharge home with script for Doxycycline for 14 days. Objective:  Vitals:    11/16/22 0812   BP: (!) 122/58   Pulse: 66   Resp:    Temp:    SpO2:         Hgb 8.2 this AM, was 8.3 yesterday morning     PE  Gen: NAD, A&O     Assessment:  - Acute blood loss anemia with vaginal bleeding  - POD#2 s/p hysteroscopy. D&C, Mirena IUD placement     Plan:  - Bleeding much improved and hgb stable  - Megace discontinued  - Primary team restarted anticoagulation yesterday  - Stable for discharge from GYN standpoint. Pt instructed to follow up in about 1wk.

## 2022-11-16 NOTE — FLOWSHEET NOTE
11/16/22 1016   Safe Environment   Safety Measures Other (comment)  (Virtual nurse round complete)   Via audio, conversation heard at bedside. Did not attempt to interrupt at this time.

## 2022-11-16 NOTE — CARE COORDINATION
11/16/22, 9:59 AM EST    Patient goals/plan/ treatment preferences discussed by  and . Patient goals/plan/ treatment preferences reviewed with patient/ family. Patient/ family verbalize understanding of discharge plan and are in agreement with goal/plan/treatment preferences. Understanding was demonstrated using the teach back method. AVS provided by RN at time of discharge, which includes all necessary medical information pertaining to the patients current course of illness, treatment, post-discharge goals of care, and treatment preferences. Discharging home with with son and his girlfriend. DME to deliver new walker. Denies needs.      Services At/After Discharge: DME

## 2022-11-16 NOTE — PLAN OF CARE
Problem: Chronic Conditions and Co-morbidities  Goal: Patient's chronic conditions and co-morbidity symptoms are monitored and maintained or improved  11/16/2022 0014 by Krissy Nolan RN  Outcome: Progressing  Flowsheets (Taken 11/15/2022 2129)  Care Plan - Patient's Chronic Conditions and Co-Morbidity Symptoms are Monitored and Maintained or Improved:   Monitor and assess patient's chronic conditions and comorbid symptoms for stability, deterioration, or improvement   Collaborate with multidisciplinary team to address chronic and comorbid conditions and prevent exacerbation or deterioration   Update acute care plan with appropriate goals if chronic or comorbid symptoms are exacerbated and prevent overall improvement and discharge  11/15/2022 1736 by Corrine Delacruz RN  Outcome: Progressing  Flowsheets  Taken 11/15/2022 1736 by Bert Mar 34 - Patient's Chronic Conditions and Co-Morbidity Symptoms are Monitored and Maintained or Improved:   Monitor and assess patient's chronic conditions and comorbid symptoms for stability, deterioration, or improvement   Collaborate with multidisciplinary team to address chronic and comorbid conditions and prevent exacerbation or deterioration   Update acute care plan with appropriate goals if chronic or comorbid symptoms are exacerbated and prevent overall improvement and discharge  Taken 11/15/2022 0800 by Bert Camara 34 - Patient's Chronic Conditions and Co-Morbidity Symptoms are Monitored and Maintained or Improved: Monitor and assess patient's chronic conditions and comorbid symptoms for stability, deterioration, or improvement     Problem: Discharge Planning  Goal: Discharge to home or other facility with appropriate resources  11/16/2022 0014 by Krissy Nolan RN  Outcome: Progressing  Flowsheets (Taken 11/15/2022 2129)  Discharge to home or other facility with appropriate resources:   Identify barriers to discharge with patient and caregiver   Arrange for needed discharge resources and transportation as appropriate   Identify discharge learning needs (meds, wound care, etc)   Arrange for interpreters to assist at discharge as needed   Refer to discharge planning if patient needs post-hospital services based on physician order or complex needs related to functional status, cognitive ability or social support system  11/15/2022 1736 by Amy Valdovinos RN  Outcome: Progressing  Flowsheets  Taken 11/15/2022 1736 by Amy Valdovinos RN  Discharge to home or other facility with appropriate resources:   Identify barriers to discharge with patient and caregiver   Identify discharge learning needs (meds, wound care, etc)   Arrange for needed discharge resources and transportation as appropriate   Refer to discharge planning if patient needs post-hospital services based on physician order or complex needs related to functional status, cognitive ability or social support system  Taken 11/15/2022 0800 by Lakeshia Patterson RN  Discharge to home or other facility with appropriate resources:   Identify barriers to discharge with patient and caregiver   Identify discharge learning needs (meds, wound care, etc)   Refer to discharge planning if patient needs post-hospital services based on physician order or complex needs related to functional status, cognitive ability or social support system     Problem: Safety - Adult  Goal: Free from fall injury  11/16/2022 0014 by Michelle Schneider RN  Outcome: Progressing  Flowsheets (Taken 11/16/2022 0014)  Free From Fall Injury: Instruct family/caregiver on patient safety  11/15/2022 1736 by Amy Valdovinos RN  Outcome: Progressing  Flowsheets (Taken 11/15/2022 1736)  Free From Fall Injury:   Based on caregiver fall risk screen, instruct family/caregiver to ask for assistance with transferring infant if caregiver noted to have fall risk factors   Instruct family/caregiver on patient safety     Problem: Pain  Goal: Verbalizes/displays adequate comfort level or baseline comfort level  11/16/2022 0014 by Chandan Dhaliwal RN  Outcome: Progressing  Flowsheets (Taken 11/16/2022 0014)  Verbalizes/displays adequate comfort level or baseline comfort level:   Encourage patient to monitor pain and request assistance   Assess pain using appropriate pain scale   Administer analgesics based on type and severity of pain and evaluate response   Implement non-pharmacological measures as appropriate and evaluate response   Consider cultural and social influences on pain and pain management   Notify Licensed Independent Practitioner if interventions unsuccessful or patient reports new pain  11/15/2022 1736 by Kath Swift RN  Outcome: Progressing  Flowsheets (Taken 11/15/2022 1736)  Verbalizes/displays adequate comfort level or baseline comfort level:   Encourage patient to monitor pain and request assistance   Assess pain using appropriate pain scale   Administer analgesics based on type and severity of pain and evaluate response   Notify Licensed Independent Practitioner if interventions unsuccessful or patient reports new pain     Problem: ABCDS Injury Assessment  Goal: Absence of physical injury  11/16/2022 0014 by Chandan Dhaliwal RN  Outcome: Progressing  Flowsheets (Taken 11/16/2022 0014)  Absence of Physical Injury: Implement safety measures based on patient assessment  11/15/2022 1736 by Kath Swift RN  Outcome: Progressing  Flowsheets (Taken 11/15/2022 1736)  Absence of Physical Injury: Implement safety measures based on patient assessment   Care plan reviewed with patient. Patient verbalized understanding of the plan of care and contribute to goal setting.

## 2022-11-16 NOTE — DISCHARGE SUMMARY
Hospital Medicine Discharge Summary      Patient Identification:   Marvin Salmeron   : 1970  MRN: [id number]   Account: [de-identified]      Patient's PCP: SHANNON Coronel CNP    Admit Date: 2022     Discharge Date: 2022      Admitting Physician: No admitting provider for patient encounter. Discharge Physician: Chad Garg DO     Discharge Diagnoses:    Vaginal Bleeding, with acute normocytic anemia: Pt reports heavy bleeding x 9 days. Patient was last admitted for similar complaints on . Patient reports she was due to get an ablation done a few days with her OB. However became very symptomatic with dizziness, lightheadedness, and palpitations. denies any syncope. Patient arrived with a hemoglobin of 7.3 with no known history of anemia. Received 1 unit of packed red blood cells in the ED. Patient is on Xarelto and Plavix daily. Transfuse if hemoglobin is below 7. Maintain telemetry. Megace discontinued by OB; Resume Xarelto and Plavix and monitor hgb. If hgb and bleeding remains stable tomorrow, can likely discharge meghann. #POD #1 s/p Hysteroscopy, D&C, Mirena IUD Placement: Due to #1. Patient doing well and has had minimal bleeding. Likely discharge tomorrow if hgb stabalized     Acute on Chronic blood loss anemia: secondary to #1. Will treat as above. Received 2 units of PRBC on  due to Hgb 6.7. Hgb checks; Hx CAD, s/p PCI: C 3/14/22  with PCI to Lcx, OM1, RDPA. On Plavix and Xarelto;  Patient denies any chest pain currently. EKG shows no acute changes. Resume plavix on 11/15     Essential HTN: BP overall stable. Continue to monitor closely. BP meds resumed     Factor 5 Leiden mutation: Hx of PE and DVT. Will resume Xarelto on 11/15     Pseudohyponatremia: Resolved; Na 132. Corrected Na after hyperglycemia is 136     Non Insulin Dependent Type 2 DM: Hold home oral agents; lantus 10 units + Medium dose SSI; accu checks; Carb control diet.       Obesity: BMI 48.35 kg/m2. The patient was seen and examined on day of discharge and this discharge summary is in conjunction with any daily progress note from day of discharge. Hospital Course:     Cade Cisneros is a 50 y.o. female who presents to the emergency department for evaluation of profuse vaginal bleeding. She reports that over the past 3 days she has gone through 3 boxes of 48 pads. She has been passing multiple large clots. Patient was admitted this past August for profuse vaginal bleeding and had received some blood transfusions, she was told she had fibroid tumor. This coming Monday she is scheduled for a uterine ablation, she was trying to hold out until then. Patient is also complaining of substernal, nonradiating, mild chest pain whenever she moves. Patient reports feeling lightheaded as well as dizzy. Patient received a total of 6 blood transfusions during hospitalization. OB/GYN was consulted during hospitalization who recommended patient undergo a hysteroscopy with D&C along with Mirena IUD placement. Her Xarelto and Plavix were held during this time. Patient understood the risks with recent cath and holding Plavix however agreed. Hysteroscopy D&C and IUD placement was done on 11/14/2022. Her bleeding was stable patient's Plavix and Xarelto were resumed on postop day 1. Patient's hemoglobin and labs were stable upon discharge. Patient will follow up with OB in 1 week after discharge with CBC.        Exam:     Vitals:  Vitals:    11/15/22 2320 11/16/22 0340 11/16/22 0730 11/16/22 0812   BP:  129/60 (!) 122/58 (!) 122/58   Pulse:  77 68 66   Resp: 18 18 18 18   Temp:  97.7 °F (36.5 °C) 97.6 °F (36.4 °C)    TempSrc:  Oral Oral    SpO2:  97% 100%    Weight:  298 lb (135.2 kg)     Height:         Weight: Weight: 298 lb (135.2 kg)     24 hour intake/output:  Intake/Output Summary (Last 24 hours) at 11/16/2022 1429  Last data filed at 11/16/2022 1021  Gross per 24 hour   Intake 1250 ml   Output 0 ml   Net 1250 ml         General appearance:  No apparent distress, appears stated age and cooperative. HEENT:  Normal cephalic, atraumatic without obvious deformity. Pupils equal, round, and reactive to light. Extra ocular muscles intact. Conjunctivae/corneas clear. Neck: Supple, with full range of motion. No jugular venous distention. Trachea midline. Respiratory:  Normal respiratory effort. Clear to auscultation, bilaterally without Rales/Wheezes/Rhonchi. Cardiovascular:  Regular rate and rhythm with normal S1/S2 without murmurs, rubs or gallops. Abdomen: Soft, non-tender, non-distended with normal bowel sounds. Musculoskeletal:  No clubbing, cyanosis or edema bilaterally. Full range of motion without deformity. Skin: Skin color, texture, turgor normal.  No rashes or lesions. Neurologic:  Neurovascularly intact without any focal sensory/motor deficits. Cranial nerves: II-XII intact, grossly non-focal.  Psychiatric:  Alert and oriented, thought content appropriate, normal insight  Capillary Refill: Brisk,< 3 seconds   Peripheral Pulses: +2 palpable, equal bilaterally       Labs: For convenience and continuity at follow-up the following most recent labs are provided:      CBC:    Lab Results   Component Value Date/Time    WBC 5.6 11/16/2022 03:54 AM    HGB 8.2 11/16/2022 03:54 AM    HCT 26.0 11/16/2022 03:54 AM     11/16/2022 03:54 AM       Renal:    Lab Results   Component Value Date/Time     11/16/2022 03:54 AM    K 4.5 11/16/2022 03:54 AM    K 3.6 11/13/2022 01:01 AM     11/16/2022 03:54 AM    CO2 23 11/16/2022 03:54 AM    BUN 9 11/16/2022 03:54 AM    CREATININE 0.6 11/16/2022 03:54 AM    CALCIUM 8.7 11/16/2022 03:54 AM         Significant Diagnostic Studies    Radiology:   XR CHEST PORTABLE   Final Result   There is no acute intrathoracic process. **This report has been created using voice recognition software.  It may contain minor errors which are inherent in voice recognition technology. **      Final report electronically signed by Dr Ronaldo Mosley on 11/11/2022 9:42 AM             Consults:     IP CONSULT TO OB GYN  IP CONSULT TO SPIRITUAL SERVICES    Disposition: Home  Condition at Discharge: Stable    Code Status:  Full Code     Patient Instructions:    Discharge lab work: CBC  Activity: activity as tolerated  Diet: ADULT DIET; Regular      Follow-up visits:   Diana Lopez DO [address]  [phone]    Follow up on 12/21/2022  your appointment time is at 10:00a, Please arrive 15mins early, Bring insurance card & Photo ID, co-pay, medication bottles & completed forms. Caitlin Kauffman, APRN - [facility]  [address]  [phone]    Schedule an appointment as soon as possible for a visit on 12/2/2022  your appointment time is at 9:00a, Please arrive 15mins early, Bring insurance card & Photo ID, co-pay, medication bottles & completed forms. Discharge Medications:        Medication List        START taking these medications      doxycycline hyclate 100 MG tablet  Commonly known as: VIBRA-TABS  Take 1 tablet by mouth 2 times daily for 7 days            CONTINUE taking these medications      acetaminophen 325 MG tablet  Commonly known as: TYLENOL  Take 2 tablets by mouth every 4 hours as needed for Pain     atorvastatin 80 MG tablet  Commonly known as: LIPITOR  Take 1 tablet by mouth at bedtime     blood glucose test strips strip  Commonly known as: TrueTrack Test  1 each by In Vitro route 2 times daily As needed.      Blood Pressure Kit  1 kit by Does not apply route as needed (PRN)     clopidogrel 75 MG tablet  Commonly known as: PLAVIX  Take 1 tablet by mouth daily     Januvia 100 MG tablet  Generic drug: SITagliptin     megestrol 40 MG tablet  Commonly known as: MEGACE  Take 1 tablet by mouth 2 times daily     metoprolol succinate 25 MG extended release tablet  Commonly known as: TOPROL XL  Take 1.5 tablets by mouth daily Xarelto 20 MG Tabs tablet  Generic drug: rivaroxaban            STOP taking these medications      predniSONE 20 MG tablet  Commonly known as: Emmie Lion your doctor about these medications      gabapentin 600 MG tablet  Commonly known as: NEURONTIN     nitroGLYCERIN 0.4 MG SL tablet  Commonly known as: NITROSTAT  up to max of 3 total doses. If no relief after 1 dose, call 911. Where to Get Your Medications        These medications were sent to [address] [place] , [address] [facility]  [address] [facility], [address] [place] [address] [address] [address]      Phone: [phone]   doxycycline hyclate 100 MG tablet         Time Spent on discharge is more than 30 minutes in the examination, evaluation, counseling and review of medications and discharge plan. Signed: Thank you SHANNON Gage CNP for the opportunity to be involved in this patient's care.     Electronically signed by Sariah Buitrago DO on 11/16/2022 at 2:29 PM

## 2022-11-21 ENCOUNTER — HOSPITAL ENCOUNTER (OUTPATIENT)
Age: 52
Discharge: HOME | End: 2022-11-21
Payer: COMMERCIAL

## 2022-11-21 LAB
HCT VFR BLD CALC: 31.4 % (ref 37–47)
HEMOGLOBIN: 9.8 GM/DL (ref 12–16)

## 2022-11-21 PROCEDURE — 85014 HEMATOCRIT: CPT

## 2022-11-21 PROCEDURE — 36415 COLL VENOUS BLD VENIPUNCTURE: CPT

## 2022-11-21 PROCEDURE — 85018 HEMOGLOBIN: CPT

## 2022-12-02 ENCOUNTER — HOSPITAL ENCOUNTER (OUTPATIENT)
Age: 52
Setting detail: SPECIMEN
Discharge: HOME | End: 2022-12-02

## 2022-12-02 ENCOUNTER — TRANSCRIBE ORDERS (OUTPATIENT)
Dept: ADMINISTRATIVE | Age: 52
End: 2022-12-02

## 2022-12-02 DIAGNOSIS — Z12.31 ENCOUNTER FOR SCREENING MAMMOGRAM FOR MALIGNANT NEOPLASM OF BREAST: Primary | ICD-10-CM

## 2022-12-02 LAB
BILIRUBIN URINE: ABNORMAL
CASTS UA: ABNORMAL /LPF (ref 0–2)
CASTS UA: ABNORMAL /LPF (ref 0–2)
COLOR: ABNORMAL
EPITHELIAL CELLS UA: ABNORMAL /HPF (ref 0–5)
GLUCOSE URINE: ABNORMAL
KETONES, URINE: ABNORMAL
LEUKOCYTE ESTERASE, URINE: ABNORMAL
MUCUS: ABNORMAL
NITRITE, URINE: NEGATIVE
PH UA: 5 (ref 5–8)
PROTEIN UA: ABNORMAL
RBC UA: ABNORMAL /HPF (ref 0–2)
SPECIFIC GRAVITY UA: 1.02 (ref 1–1.03)
TURBIDITY: ABNORMAL
URINE HGB: ABNORMAL
UROBILINOGEN, URINE: NORMAL
WBC UA: ABNORMAL /HPF (ref 0–5)

## 2022-12-03 ENCOUNTER — HOSPITAL ENCOUNTER (EMERGENCY)
Age: 52
Discharge: HOME | End: 2022-12-03
Attending: EMERGENCY MEDICINE
Payer: COMMERCIAL

## 2022-12-03 VITALS
DIASTOLIC BLOOD PRESSURE: 63 MMHG | SYSTOLIC BLOOD PRESSURE: 117 MMHG | TEMPERATURE: 97.5 F | HEART RATE: 79 BPM | RESPIRATION RATE: 18 BRPM | OXYGEN SATURATION: 100 %

## 2022-12-03 DIAGNOSIS — D64.9 CHRONIC ANEMIA: ICD-10-CM

## 2022-12-03 DIAGNOSIS — N92.1 MENOMETRORRHAGIA: Primary | ICD-10-CM

## 2022-12-03 LAB
ALBUMIN SERPL-MCNC: 4.2 G/DL (ref 3.5–5.1)
ALP BLD-CCNC: 64 U/L (ref 38–126)
ALT SERPL-CCNC: 11 U/L (ref 11–66)
ANION GAP SERPL CALCULATED.3IONS-SCNC: 12 MEQ/L (ref 8–16)
APTT: 33.3 SECONDS (ref 22–38)
AST SERPL-CCNC: 11 U/L (ref 5–40)
BASOPHILS # BLD: 0.6 %
BASOPHILS ABSOLUTE: 0 THOU/MM3 (ref 0–0.1)
BILIRUB SERPL-MCNC: 0.4 MG/DL (ref 0.3–1.2)
BUN BLDV-MCNC: 12 MG/DL (ref 7–22)
CALCIUM SERPL-MCNC: 8.7 MG/DL (ref 8.5–10.5)
CHLORIDE BLD-SCNC: 101 MEQ/L (ref 98–111)
CO2: 22 MEQ/L (ref 23–33)
CREAT SERPL-MCNC: 0.5 MG/DL (ref 0.4–1.2)
CULTURE: ABNORMAL
EOSINOPHIL # BLD: 2.3 %
EOSINOPHILS ABSOLUTE: 0.1 THOU/MM3 (ref 0–0.4)
ERYTHROCYTE [DISTWIDTH] IN BLOOD BY AUTOMATED COUNT: 15.6 % (ref 11.5–14.5)
ERYTHROCYTE [DISTWIDTH] IN BLOOD BY AUTOMATED COUNT: 45.5 FL (ref 35–45)
GFR SERPL CREATININE-BSD FRML MDRD: > 60 ML/MIN/1.73M2
GLUCOSE BLD-MCNC: 277 MG/DL (ref 70–108)
HCT VFR BLD CALC: 30 % (ref 37–47)
HEMOGLOBIN: 9.2 GM/DL (ref 12–16)
IMMATURE GRANS (ABS): 0.01 THOU/MM3 (ref 0–0.07)
IMMATURE GRANULOCYTES: 0.2 %
INR BLD: 1.2 (ref 0.85–1.13)
LYMPHOCYTES # BLD: 24.7 %
LYMPHOCYTES ABSOLUTE: 1.2 THOU/MM3 (ref 1–4.8)
MCH RBC QN AUTO: 24.8 PG (ref 26–33)
MCHC RBC AUTO-ENTMCNC: 30.7 GM/DL (ref 32.2–35.5)
MCV RBC AUTO: 80.9 FL (ref 81–99)
MONOCYTES # BLD: 6.8 %
MONOCYTES ABSOLUTE: 0.3 THOU/MM3 (ref 0.4–1.3)
NUCLEATED RED BLOOD CELLS: 0 /100 WBC
OSMOLALITY CALCULATION: 279.8 MOSMOL/KG (ref 275–300)
PLATELET # BLD: 293 THOU/MM3 (ref 130–400)
PMV BLD AUTO: 9.8 FL (ref 9.4–12.4)
POTASSIUM SERPL-SCNC: 3.6 MEQ/L (ref 3.5–5.2)
RBC # BLD: 3.71 MILL/MM3 (ref 4.2–5.4)
SEG NEUTROPHILS: 65.4 %
SEGMENTED NEUTROPHILS ABSOLUTE COUNT: 3.1 THOU/MM3 (ref 1.8–7.7)
SODIUM BLD-SCNC: 135 MEQ/L (ref 135–145)
SPECIMEN DESCRIPTION: ABNORMAL
TOTAL PROTEIN: 6.4 G/DL (ref 6.1–8)
WBC # BLD: 4.7 THOU/MM3 (ref 4.8–10.8)

## 2022-12-03 PROCEDURE — 99283 EMERGENCY DEPT VISIT LOW MDM: CPT

## 2022-12-03 PROCEDURE — 80053 COMPREHEN METABOLIC PANEL: CPT

## 2022-12-03 PROCEDURE — 85730 THROMBOPLASTIN TIME PARTIAL: CPT

## 2022-12-03 PROCEDURE — 85025 COMPLETE CBC W/AUTO DIFF WBC: CPT

## 2022-12-03 PROCEDURE — 85610 PROTHROMBIN TIME: CPT

## 2022-12-03 PROCEDURE — 36415 COLL VENOUS BLD VENIPUNCTURE: CPT

## 2022-12-03 ASSESSMENT — PAIN - FUNCTIONAL ASSESSMENT
PAIN_FUNCTIONAL_ASSESSMENT: NONE - DENIES PAIN

## 2022-12-04 NOTE — ED NOTES
Pt updated on POC at this time. Pt resting comfortably in bed. Pt denies pain at this time.        Hien Huynh RN  12/03/22 8878

## 2022-12-04 NOTE — ED PROVIDER NOTES
[facility]  EMERGENCY DEPARTMENT ENCOUNTER      PATIENT NAME: Aram Putnam  MRN: [id number]  : 1970  [place]: 12/3/2022  PROVIDER: Yimi Calderon MD      CHIEF COMPLAINT       Chief Complaint   Patient presents with    Vaginal Bleeding       Patient is seen and evaluated in a timely fashion. Nurses Notes are reviewed and I agree except as noted in the HPI. HISTORY OF PRESENT ILLNESS    Aram Putnam is a 50 y.o. female who presents to Emergency Department with Vaginal Bleeding     Patient presents for evaluation of worsening vaginal bleeding over last 2 days. Past medical history remarkable for Menometrorrhagia, admitted on 2022, requiring transfusion, and later (2022) hysteroscopy, D&C, Mirena IUD placement. Of note patient is Xarelto and Plavix both. Patient had factor V Leiden and multiple DVT and PE. Patient also had PCI to mid circumflex, DAGOBERTO, and right posterior descending artery on 3/14/2022. Patient states she passed multiple blood clots today. She feels dizzy. She has no chest pain. No shortness of breath. She has no abdominal pain. This HPI was provided by patient.      REVIEW OF SYSTEMS   Ten-point review of systems is negative except those documented in above HPI including constitutional, HEENT, respiratory, cardiovascular, gastrointestinal, genitourinary, musculoskeletal, skin, neurological, hematological and behavioral.      PAST MEDICAL HISTORY    has a past medical history of Asthma, Bipolar 1 disorder ([address] Blood circulation, collateral, CAD (coronary artery disease), Curvature of spine, Diabetes mellitus ([address] DVT (deep venous thrombosis) ([address] Factor 5 Leiden mutation, heterozygous ([address] GERD (gastroesophageal reflux disease), HTN, goal below 130/80, Hx of blood clots, Hx-TIA (transient ischemic attack), Hyperlipidemia, PE (pulmonary embolism), RA (rheumatoid arthritis) ([address] and Unspecified cerebral artery occlusion with cerebral infarction. SURGICAL HISTORY      has a past surgical history that includes Tubal ligation (2003); Cholecystectomy (1992); Knee arthroscopy (2007&2010); Tympanostomy tube placement; Tonsillectomy; Cardiac catheterization (feb 2015); Foot Debridement (Right, 9/30/2019); Coronary angioplasty with stent; lipoma resection; and Dilation and curettage of uterus (N/A, 11/14/2022). CURRENT MEDICATIONS       Discharge Medication List as of 12/3/2022  9:32 PM        CONTINUE these medications which have NOT CHANGED    Details   megestrol (MEGACE) 40 MG tablet Take 1 tablet by mouth 2 times daily, Disp-60 tablet, R-1Normal      metoprolol succinate (TOPROL XL) 25 MG extended release tablet Take 1.5 tablets by mouth daily, Disp-135 tablet, R-1Normal      atorvastatin (LIPITOR) 80 MG tablet Take 1 tablet by mouth at bedtime, Disp-90 tablet, R-2Normal      clopidogrel (PLAVIX) 75 MG tablet Take 1 tablet by mouth daily, Disp-90 tablet, R-2Normal      Blood Pressure KIT PRN Starting Wed 3/30/2022, Disp-1 kit, R-0, Print      nitroGLYCERIN (NITROSTAT) 0.4 MG SL tablet up to max of 3 total doses. If no relief after 1 dose, call 911., Disp-25 tablet, R-3Normal      XARELTO 20 MG TABS tablet take 1 tablet by mouth once daily, DAWHistorical Med      JANUVIA 100 MG tablet take 1 tablet by mouth once daily, DAWHistorical Med      gabapentin (NEURONTIN) 600 MG tablet take 1 tablet by mouth twice a dayHistorical Med      acetaminophen (TYLENOL) 325 MG tablet Take 2 tablets by mouth every 4 hours as needed for Pain, Disp-120 tablet, R-3OTC      blood glucose test strips (TRUETRACK TEST) strip 2 TIMES DAILY Starting Fri 3/15/2019, Disp-100 each, R-0, NormalAs needed. ALLERGIES     is allergic to codeine, demerol, ultram [tramadol hcl], percocet [oxycodone-acetaminophen], and toradol [ketorolac tromethamine]. FAMILY HISTORY     She indicated that her mother is alive. She indicated that her father is alive.    family history includes COPD in her mother; Cancer in her mother; Heart Disease in her father; High Blood Pressure in her father; Mental Illness in her brother, brother, sister, and sister; Other in her mother. SOCIAL HISTORY      reports that she quit smoking about 19 years ago. Her smoking use included cigarettes. She has a 28.00 pack-year smoking history. She has never been exposed to tobacco smoke. She has never used smokeless tobacco. She reports that she does not drink alcohol and does not use drugs. PHYSICAL EXAM      oral temperature is 97.5 °F (36.4 °C). Her blood pressure is 117/63 and her pulse is 79. Her respiration is 18 and oxygen saturation is 100%. Physical Exam  Vitals and nursing note reviewed. Constitutional:       Appearance: She is well-developed. She is not diaphoretic. HENT:      Head: Normocephalic and atraumatic. Nose: Nose normal.   Eyes:      General: No scleral icterus. Right eye: No discharge. Left eye: No discharge. Conjunctiva/sclera: Conjunctivae normal.      Pupils: Pupils are equal, round, and reactive to light. Neck:      Vascular: No JVD. Trachea: No tracheal deviation. Cardiovascular:      Rate and Rhythm: Normal rate and regular rhythm. Heart sounds: Normal heart sounds. No murmur heard. No friction rub. No gallop. Pulmonary:      Effort: Pulmonary effort is normal. No respiratory distress. Breath sounds: Normal breath sounds. No stridor. No wheezing or rales. Chest:      Chest wall: No tenderness. Abdominal:      General: Bowel sounds are normal. There is no distension. Palpations: Abdomen is soft. There is no mass. Tenderness: There is no abdominal tenderness. There is no guarding or rebound. Hernia: No hernia is present. Comments: No lower abdominal tenderness. Genitourinary:     Comments:  exam shows clotted blood, no active blood flowing. Musculoskeletal:         General: No tenderness or deformity. Cervical back: Normal range of motion and neck supple. Lymphadenopathy:      Cervical: No cervical adenopathy. Skin:     General: Skin is warm and dry. Capillary Refill: Capillary refill takes less than 2 seconds. Coloration: Skin is not pale. Findings: No erythema or rash. Neurological:      Mental Status: She is alert and oriented to person, place, and time. Cranial Nerves: No cranial nerve deficit. Sensory: No sensory deficit. Motor: No abnormal muscle tone. Coordination: Coordination normal.      Deep Tendon Reflexes: Reflexes normal.   Psychiatric:         Behavior: Behavior normal.         Thought Content: Thought content normal.         Judgment: Judgment normal.     ANCILLARY TEST RESULTS   EKG:    Interpreted by me  Not indicated    LAB RESULTS:  Results for orders placed or performed during the hospital encounter of 12/03/22   CBC with Auto Differential   Result Value Ref Range    WBC 4.7 (L) 4.8 - 10.8 thou/mm3    RBC 3.71 (L) 4.20 - 5.40 mill/mm3    Hemoglobin 9.2 (L) 12.0 - 16.0 gm/dl    Hematocrit 30.0 (L) 37.0 - 47.0 %    MCV 80.9 (L) 81.0 - 99.0 fL    MCH 24.8 (L) 26.0 - 33.0 pg    MCHC 30.7 (L) 32.2 - 35.5 gm/dl    RDW-CV 15.6 (H) 11.5 - 14.5 %    RDW-SD 45.5 (H) 35.0 - 45.0 fL    Platelets 948 113 - 734 thou/mm3    MPV 9.8 9.4 - 12.4 fL    Seg Neutrophils 65.4 %    Lymphocytes 24.7 %    Monocytes 6.8 %    Eosinophils 2.3 %    Basophils 0.6 %    Immature Granulocytes 0.2 %    Segs Absolute 3.1 1.8 - 7.7 thou/mm3    Lymphocytes Absolute 1.2 1.0 - 4.8 thou/mm3    Monocytes Absolute 0.3 (L) 0.4 - 1.3 thou/mm3    Eosinophils Absolute 0.1 0.0 - 0.4 thou/mm3    Basophils Absolute 0.0 0.0 - 0.1 thou/mm3    Immature Grans (Abs) 0.01 0.00 - 0.07 thou/mm3    nRBC 0 /100 wbc   Comprehensive Metabolic Panel   Result Value Ref Range    Glucose 277 (H) 70 - 108 mg/dL    Creatinine 0.5 0.4 - 1.2 mg/dL    BUN 12 7 - 22 mg/dL    Sodium 135 135 - 145 meq/L    Potassium 3.6 3.5 - 5.2 meq/L    Chloride 101 98 - 111 meq/L    CO2 22 (L) 23 - 33 meq/L    Calcium 8.7 8.5 - 10.5 mg/dL    AST 11 5 - 40 U/L    Alkaline Phosphatase 64 38 - 126 U/L    Total Protein 6.4 6.1 - 8.0 g/dL    Albumin 4.2 3.5 - 5.1 g/dL    Total Bilirubin 0.4 0.3 - 1.2 mg/dL    ALT 11 11 - 66 U/L   APTT   Result Value Ref Range    aPTT 33.3 22.0 - 38.0 seconds   Protime-INR   Result Value Ref Range    INR 1.20 (H) 0.85 - 1.13   Glomerular Filtration Rate, Estimated   Result Value Ref Range    Est, Glom Filt Rate >60 >60 ml/min/1.73m2   Anion Gap   Result Value Ref Range    Anion Gap 12.0 8.0 - 16.0 meq/L   Osmolality   Result Value Ref Range    Osmolality Calc 279.8 275.0 - 300.0 mOsmol/kg       RADIOLOGY REPORTS  No orders to display       ED COURSE AND MEDICAL DECISION MAKING (MDM)     DDx: Dysfunctional uterine bleeding, medication side effect, acute on chronic anemia  PLAN: Basic labs, OB/GYN consult    ED Course as of 12/03/22 2150   Sat Dec 03, 2022   2115 OB ()  is consulted [ADRIAN]      ED Course User Index  [ADRIAN] Paulina Piña MD       MDM:   Patient presents with heavy vaginal bleeding. Past medical history remarkable for menometrorrhagia. Patient has chronic anemia. Patient no abdominal pain. Patient has history of DVT/PE and recent PCI. Patient cannot stop taking Xarelto and Plavix. Labs are reassuring. Hemoglobin 9.2 at her baseline. On reevaluation, patient has stable hemodynamics. Discussed with OB on-call, we both feel admission is not indicated at this time. Not suggesting restarting Megace as this episode of heavy bleeding may be her cycle. Discharged in stable condition. Call OB/GYN in 2 days to schedule a appointment.     Medications - No data to display    Vitals:    12/03/22 1903 12/03/22 2004 12/03/22 2111   BP: 136/67 (!) 149/62 127/69   Pulse: 84 89 79   Resp: 16 18 18   Temp: 97.5 °F (36.4 °C)     TempSrc: Oral     SpO2: 100% 100% 100%       PROCEDURE   None    CONSULT None    CRITICAL CARE TIME   None    FINAL IMPRESSION AND DISPOSITION      1. Menometrorrhagia    2.  Chronic anemia        DISPOSITION Decision To Discharge 12/03/2022 09:31:29 PM    PATIENT REFERRED TO:  DO Alden Piedra [address]  [phone]    In 2 days  Call Monday to schedule an appointment  DISCHARGE MEDICATIONS:  Discharge Medication List as of 12/3/2022  9:32 PM        (Please note that portions of this note were completed with a voice recognition program.  Efforts were made to edit the dictations but occasionally words aremis-transcribed.)  MD Mekhi Burks MD  12/03/22 9660

## 2022-12-04 NOTE — ED NOTES
Pt up to commode at this time. Pt replaced pad due to vaginal bleeding. Pt returned to cot. PT updated on POC.       Hien Huynh RN  12/03/22 2005

## 2022-12-04 NOTE — ED TRIAGE NOTES
Pt to ED for c/o vaginal bleeding. Pt reports that she has a history of requiring blood transfusions due to increased vaginal bleeding. Pt report that last blood transfusion was x2 weeks ago.

## 2022-12-12 ENCOUNTER — HOSPITAL ENCOUNTER (EMERGENCY)
Age: 52
Discharge: HOME | End: 2022-12-12
Payer: COMMERCIAL

## 2022-12-12 VITALS
TEMPERATURE: 97.8 F | HEIGHT: 68 IN | HEART RATE: 78 BPM | SYSTOLIC BLOOD PRESSURE: 134 MMHG | WEIGHT: 290 LBS | OXYGEN SATURATION: 100 % | BODY MASS INDEX: 43.95 KG/M2 | DIASTOLIC BLOOD PRESSURE: 65 MMHG

## 2022-12-12 DIAGNOSIS — J06.9 VIRAL UPPER RESPIRATORY TRACT INFECTION: Primary | ICD-10-CM

## 2022-12-12 PROCEDURE — 99213 OFFICE O/P EST LOW 20 MIN: CPT

## 2022-12-12 PROCEDURE — 99213 OFFICE O/P EST LOW 20 MIN: CPT | Performed by: NURSE PRACTITIONER

## 2022-12-12 RX ORDER — BENZONATATE 100 MG/1
200 CAPSULE ORAL 3 TIMES DAILY PRN
Qty: 30 CAPSULE | Refills: 0 | Status: SHIPPED | OUTPATIENT
Start: 2022-12-12

## 2022-12-12 RX ORDER — ALBUTEROL SULFATE 90 UG/1
2 AEROSOL, METERED RESPIRATORY (INHALATION) 4 TIMES DAILY PRN
Qty: 54 G | Refills: 1 | Status: SHIPPED | OUTPATIENT
Start: 2022-12-12

## 2022-12-12 ASSESSMENT — ENCOUNTER SYMPTOMS
BLOOD IN STOOL: 0
ABDOMINAL PAIN: 0
EYE PAIN: 0
DIARRHEA: 0
CONSTIPATION: 0
COUGH: 1
SHORTNESS OF BREATH: 0
WHEEZING: 0
RHINORRHEA: 0
VOMITING: 0
NAUSEA: 0

## 2022-12-12 ASSESSMENT — PAIN - FUNCTIONAL ASSESSMENT: PAIN_FUNCTIONAL_ASSESSMENT: NONE - DENIES PAIN

## 2022-12-12 NOTE — ED PROVIDER NOTES
[phone]       Chief Complaint   Patient presents with    Cough        Nurses Notes reviewed and I agree except as noted in the HPI. HISTORY OF PRESENT ILLNESS   Nahomy Grier is a 50 y.o. female who presents to urgent care with complaint of cough and chest congestion that started 9 days ago. He states that several members in her household have tested positive for influenza A over the last 4 weeks. Patient denies being tested. Patient denies other symptoms including chest pain, shortness of breath, nausea, vomiting, diarrhea or fever. Patient does state that she has been taking Robitussin for her symptoms. REVIEW OF SYSTEMS     Review of Systems   Constitutional:  Negative for appetite change, chills, fatigue, fever and unexpected weight change. HENT:  Positive for congestion. Negative for ear pain and rhinorrhea. Eyes:  Negative for pain and visual disturbance. Respiratory:  Positive for cough. Negative for shortness of breath and wheezing. Cardiovascular:  Negative for chest pain, palpitations and leg swelling. Gastrointestinal:  Negative for abdominal pain, blood in stool, constipation, diarrhea, nausea and vomiting. Genitourinary:  Negative for dysuria, frequency and hematuria. Musculoskeletal:  Negative for arthralgias, joint swelling and neck stiffness. Skin:  Negative for rash. Neurological:  Negative for dizziness, syncope, weakness, light-headedness and headaches. Hematological:  Does not bruise/bleed easily.      PAST MEDICAL HISTORY         Diagnosis Date    Asthma     Bipolar 1 disorder ([facility] [address]     Blood circulation, collateral     CAD (coronary artery disease)     Curvature of spine     Diabetes mellitus (HCC)     DVT (deep venous thrombosis) (HCC)     Factor 5 Leiden mutation, heterozygous (HCC)     GERD (gastroesophageal reflux disease)     HTN, goal below 130/80     Hx of blood clots     PE x3 and DVT x3 Hx-TIA (transient ischemic attack)     Hyperlipidemia     PE (pulmonary embolism)     RA (rheumatoid arthritis) ([facility] [address]     Unspecified cerebral artery occlusion with cerebral infarction        SURGICAL HISTORY     Patient  has a past surgical history that includes Tubal ligation (2003); Cholecystectomy (1992); Knee arthroscopy (2007&2010); Tympanostomy tube placement; Tonsillectomy; Cardiac catheterization (feb 2015); Foot Debridement (Right, 9/30/2019); Coronary angioplasty with stent; lipoma resection; and Dilation and curettage of uterus (N/A, 11/14/2022). CURRENT MEDICATIONS       Previous Medications    ACETAMINOPHEN (TYLENOL) 325 MG TABLET    Take 2 tablets by mouth every 4 hours as needed for Pain    ATORVASTATIN (LIPITOR) 80 MG TABLET    Take 1 tablet by mouth at bedtime    BLOOD GLUCOSE TEST STRIPS (TRUETRACK TEST) STRIP    1 each by In Vitro route 2 times daily As needed. BLOOD PRESSURE KIT    1 kit by Does not apply route as needed (PRN)    CLOPIDOGREL (PLAVIX) 75 MG TABLET    Take 1 tablet by mouth daily    GABAPENTIN (NEURONTIN) 600 MG TABLET    take 1 tablet by mouth twice a day    JANUVIA 100 MG TABLET    take 1 tablet by mouth once daily    MEGESTROL (MEGACE) 40 MG TABLET    Take 1 tablet by mouth 2 times daily    METOPROLOL SUCCINATE (TOPROL XL) 25 MG EXTENDED RELEASE TABLET    Take 1.5 tablets by mouth daily    NITROGLYCERIN (NITROSTAT) 0.4 MG SL TABLET    up to max of 3 total doses. If no relief after 1 dose, call 911. XARELTO 20 MG TABS TABLET    take 1 tablet by mouth once daily       ALLERGIES     Patient is is allergic to codeine, demerol, ultram [tramadol hcl], percocet [oxycodone-acetaminophen], and toradol [ketorolac tromethamine]. FAMILY HISTORY     Patient'sfamily history includes COPD in her mother; Cancer in her mother; Heart Disease in her father; High Blood Pressure in her father; Mental Illness in her brother, brother, sister, and sister; Other in her mother.     SOCIAL HISTORY     Patient  reports that she quit smoking about 19 years ago. Her smoking use included cigarettes. She has a 28.00 pack-year smoking history. She has never been exposed to tobacco smoke. She has never used smokeless tobacco. She reports that she does not drink alcohol and does not use drugs. PHYSICAL EXAM     ED TRIAGE VITALS  BP: 134/65, Temp: 97.8 °F (36.6 °C), Heart Rate: 78,  , SpO2: 100 %  Physical Exam  Vitals and nursing note reviewed. Constitutional:       General: She is not in acute distress. Appearance: She is well-developed. She is not ill-appearing, toxic-appearing or diaphoretic. HENT:      Head: Normocephalic and atraumatic. Nose: No congestion or rhinorrhea. Eyes:      Conjunctiva/sclera: Conjunctivae normal.      Pupils: Pupils are equal, round, and reactive to light. Cardiovascular:      Rate and Rhythm: Normal rate and regular rhythm. Heart sounds: Normal heart sounds. No murmur heard. No gallop. Pulmonary:      Effort: Pulmonary effort is normal. No respiratory distress. Breath sounds: Normal breath sounds. No stridor. No decreased breath sounds, wheezing, rhonchi or rales. Chest:      Chest wall: No tenderness. Musculoskeletal:         General: Normal range of motion. Cervical back: Normal range of motion and neck supple. Skin:     General: Skin is warm and dry. Neurological:      Mental Status: She is alert and oriented to person, place, and time. DIAGNOSTIC RESULTS   Labs:No results found for this visit on 12/12/22. IMAGING:  No orders to display     URGENT CARE COURSE:        MDM      Patient presents to urgent care with complaint of cough and chest congestion that started 9 days ago. Patient symptoms are likely due to a viral illness. We will treat symptoms. Patient to follow-up with primary care.     Directed to go to ER for worsening symptoms, chest pain, inability to keep liquids down, inability to urinate for greater than 8 hours or difficulty breathing. Follow-up with your primary care provider. May take Tylenol or ibuprofen as needed for pain or fever. Increase oral fluid intake. Medications - No data to display  PROCEDURES:    Procedures    FINALIMPRESSION      1.  Viral upper respiratory tract infection        DISPOSITION/PLAN   DISPOSITION Decision To Discharge 12/12/2022 01:28:56 PM    PATIENT REFERRED TO:  SHANNON Albarado CNP  [address]  [phone]    In 2 days    DISCHARGE MEDICATIONS:  New Prescriptions    ALBUTEROL SULFATE HFA (VENTOLIN HFA) 108 (90 BASE) MCG/ACT INHALER    Inhale 2 puffs into the lungs 4 times daily as needed for Wheezing    BENZONATATE (TESSALON PERLES) 100 MG CAPSULE    Take 2 capsules by mouth 3 times daily as needed for Cough    DEXTROMETHORPHAN-GUAIFENESIN (MUCINEX DM)  MG PER EXTENDED RELEASE TABLET    Take 1 tablet by mouth every 12 hours as needed for Cough or Congestion     Current Discharge Medication List          SHANNON Orozco CNP, APRN - CNP  12/12/22 [id number]

## 2022-12-12 NOTE — ED TRIAGE NOTES
Patient ambulated to room with complaint of cough that started 9 days ago.  States she also has chest congestion and has been exposed to Flu A

## 2022-12-15 ENCOUNTER — HOSPITAL ENCOUNTER (OUTPATIENT)
Age: 52
Setting detail: OBSERVATION
Discharge: HOME | End: 2022-12-16
Attending: EMERGENCY MEDICINE | Admitting: STUDENT IN AN ORGANIZED HEALTH CARE EDUCATION/TRAINING PROGRAM
Payer: COMMERCIAL

## 2022-12-15 ENCOUNTER — APPOINTMENT (OUTPATIENT)
Dept: GENERAL RADIOLOGY | Age: 52
End: 2022-12-15
Payer: COMMERCIAL

## 2022-12-15 ENCOUNTER — TELEPHONE (OUTPATIENT)
Dept: CARDIOLOGY CLINIC | Age: 52
End: 2022-12-15

## 2022-12-15 ENCOUNTER — APPOINTMENT (OUTPATIENT)
Dept: CT IMAGING | Age: 52
End: 2022-12-15
Payer: COMMERCIAL

## 2022-12-15 DIAGNOSIS — R07.9 CHEST PAIN, UNSPECIFIED TYPE: Primary | ICD-10-CM

## 2022-12-15 DIAGNOSIS — J10.1 INFLUENZA A: ICD-10-CM

## 2022-12-15 PROBLEM — R07.89 CHEST PRESSURE: Status: ACTIVE | Noted: 2022-12-15

## 2022-12-15 LAB
ALBUMIN SERPL-MCNC: 4 G/DL (ref 3.5–5.1)
ALP BLD-CCNC: 76 U/L (ref 38–126)
ALT SERPL-CCNC: 12 U/L (ref 11–66)
ANION GAP SERPL CALCULATED.3IONS-SCNC: 15 MEQ/L (ref 8–16)
AST SERPL-CCNC: 15 U/L (ref 5–40)
AVERAGE GLUCOSE: 165 MG/DL (ref 70–126)
BASOPHILS # BLD: 0.6 %
BASOPHILS ABSOLUTE: 0 THOU/MM3 (ref 0–0.1)
BILIRUB SERPL-MCNC: 0.3 MG/DL (ref 0.3–1.2)
BILIRUBIN DIRECT: < 0.2 MG/DL (ref 0–0.3)
BUN BLDV-MCNC: 8 MG/DL (ref 7–22)
CALCIUM SERPL-MCNC: 9.3 MG/DL (ref 8.5–10.5)
CHLORIDE BLD-SCNC: 104 MEQ/L (ref 98–111)
CO2: 20 MEQ/L (ref 23–33)
CREAT SERPL-MCNC: 0.6 MG/DL (ref 0.4–1.2)
EKG ATRIAL RATE: 81 BPM
EKG P AXIS: 41 DEGREES
EKG P-R INTERVAL: 176 MS
EKG Q-T INTERVAL: 364 MS
EKG QRS DURATION: 88 MS
EKG QTC CALCULATION (BAZETT): 422 MS
EKG R AXIS: 12 DEGREES
EKG T AXIS: 54 DEGREES
EKG VENTRICULAR RATE: 81 BPM
EOSINOPHIL # BLD: 0.9 %
EOSINOPHILS ABSOLUTE: 0 THOU/MM3 (ref 0–0.4)
ERYTHROCYTE [DISTWIDTH] IN BLOOD BY AUTOMATED COUNT: 15.7 % (ref 11.5–14.5)
ERYTHROCYTE [DISTWIDTH] IN BLOOD BY AUTOMATED COUNT: 44.4 FL (ref 35–45)
GFR SERPL CREATININE-BSD FRML MDRD: > 60 ML/MIN/1.73M2
GLUCOSE BLD-MCNC: 215 MG/DL (ref 70–108)
GLUCOSE BLD-MCNC: 237 MG/DL (ref 70–108)
GLUCOSE BLD-MCNC: 245 MG/DL (ref 70–108)
HBA1C MFR BLD: 7.5 % (ref 4.4–6.4)
HCT VFR BLD CALC: 30.6 % (ref 37–47)
HEMOGLOBIN: 9.2 GM/DL (ref 12–16)
IMMATURE GRANS (ABS): 0.01 THOU/MM3 (ref 0–0.07)
IMMATURE GRANULOCYTES: 0.3 %
INFLUENZA A: DETECTED
INFLUENZA B: NOT DETECTED
LYMPHOCYTES # BLD: 21.6 %
LYMPHOCYTES ABSOLUTE: 0.8 THOU/MM3 (ref 1–4.8)
MCH RBC QN AUTO: 23.5 PG (ref 26–33)
MCHC RBC AUTO-ENTMCNC: 30.1 GM/DL (ref 32.2–35.5)
MCV RBC AUTO: 78.1 FL (ref 81–99)
MONOCYTES # BLD: 4.6 %
MONOCYTES ABSOLUTE: 0.2 THOU/MM3 (ref 0.4–1.3)
NUCLEATED RED BLOOD CELLS: 0 /100 WBC
OSMOLALITY CALCULATION: 283.6 MOSMOL/KG (ref 275–300)
PLATELET # BLD: 276 THOU/MM3 (ref 130–400)
PMV BLD AUTO: 9.7 FL (ref 9.4–12.4)
POTASSIUM REFLEX MAGNESIUM: 4.1 MEQ/L (ref 3.5–5.2)
PRO-BNP: 63.6 PG/ML (ref 0–900)
RBC # BLD: 3.92 MILL/MM3 (ref 4.2–5.4)
SARS-COV-2 RNA, RT PCR: NOT DETECTED
SEG NEUTROPHILS: 72 %
SEGMENTED NEUTROPHILS ABSOLUTE COUNT: 2.5 THOU/MM3 (ref 1.8–7.7)
SODIUM BLD-SCNC: 139 MEQ/L (ref 135–145)
TOTAL PROTEIN: 6.5 G/DL (ref 6.1–8)
TROPONIN T: < 0.01 NG/ML
TROPONIN T: < 0.01 NG/ML
TSH SERPL DL<=0.05 MIU/L-ACNC: 0.61 UIU/ML (ref 0.4–4.2)
WBC # BLD: 3.5 THOU/MM3 (ref 4.8–10.8)

## 2022-12-15 PROCEDURE — 93010 ELECTROCARDIOGRAM REPORT: CPT | Performed by: INTERNAL MEDICINE

## 2022-12-15 PROCEDURE — 36415 COLL VENOUS BLD VENIPUNCTURE: CPT

## 2022-12-15 PROCEDURE — 71275 CT ANGIOGRAPHY CHEST: CPT

## 2022-12-15 PROCEDURE — 84443 ASSAY THYROID STIM HORMONE: CPT

## 2022-12-15 PROCEDURE — 82948 REAGENT STRIP/BLOOD GLUCOSE: CPT

## 2022-12-15 PROCEDURE — 2580000003 HC RX 258: Performed by: STUDENT IN AN ORGANIZED HEALTH CARE EDUCATION/TRAINING PROGRAM

## 2022-12-15 PROCEDURE — G0378 HOSPITAL OBSERVATION PER HR: HCPCS

## 2022-12-15 PROCEDURE — 6360000004 HC RX CONTRAST MEDICATION: Performed by: STUDENT IN AN ORGANIZED HEALTH CARE EDUCATION/TRAINING PROGRAM

## 2022-12-15 PROCEDURE — 85025 COMPLETE CBC W/AUTO DIFF WBC: CPT

## 2022-12-15 PROCEDURE — 99285 EMERGENCY DEPT VISIT HI MDM: CPT

## 2022-12-15 PROCEDURE — 83036 HEMOGLOBIN GLYCOSYLATED A1C: CPT

## 2022-12-15 PROCEDURE — 80076 HEPATIC FUNCTION PANEL: CPT

## 2022-12-15 PROCEDURE — 99219 PR INITIAL OBSERVATION CARE/DAY 50 MINUTES: CPT | Performed by: STUDENT IN AN ORGANIZED HEALTH CARE EDUCATION/TRAINING PROGRAM

## 2022-12-15 PROCEDURE — 84484 ASSAY OF TROPONIN QUANT: CPT

## 2022-12-15 PROCEDURE — 87636 SARSCOV2 & INF A&B AMP PRB: CPT

## 2022-12-15 PROCEDURE — 80048 BASIC METABOLIC PNL TOTAL CA: CPT

## 2022-12-15 PROCEDURE — 83880 ASSAY OF NATRIURETIC PEPTIDE: CPT

## 2022-12-15 PROCEDURE — 71045 X-RAY EXAM CHEST 1 VIEW: CPT

## 2022-12-15 PROCEDURE — 6370000000 HC RX 637 (ALT 250 FOR IP): Performed by: STUDENT IN AN ORGANIZED HEALTH CARE EDUCATION/TRAINING PROGRAM

## 2022-12-15 PROCEDURE — 93005 ELECTROCARDIOGRAM TRACING: CPT | Performed by: EMERGENCY MEDICINE

## 2022-12-15 RX ORDER — METOPROLOL SUCCINATE 25 MG/1
37.5 TABLET, EXTENDED RELEASE ORAL DAILY
Status: DISCONTINUED | OUTPATIENT
Start: 2022-12-16 | End: 2022-12-16 | Stop reason: HOSPADM

## 2022-12-15 RX ORDER — DEXTROSE MONOHYDRATE 100 MG/ML
INJECTION, SOLUTION INTRAVENOUS CONTINUOUS PRN
Status: DISCONTINUED | OUTPATIENT
Start: 2022-12-15 | End: 2022-12-16 | Stop reason: HOSPADM

## 2022-12-15 RX ORDER — ENOXAPARIN SODIUM 150 MG/ML
1 INJECTION SUBCUTANEOUS ONCE
Status: DISCONTINUED | OUTPATIENT
Start: 2022-12-15 | End: 2022-12-15

## 2022-12-15 RX ORDER — AMOXICILLIN 250 MG/1
250 CAPSULE ORAL 3 TIMES DAILY
Status: ON HOLD | COMMUNITY
End: 2022-12-16 | Stop reason: HOSPADM

## 2022-12-15 RX ORDER — ACETAMINOPHEN 325 MG/1
650 TABLET ORAL EVERY 6 HOURS PRN
Status: DISCONTINUED | OUTPATIENT
Start: 2022-12-15 | End: 2022-12-16 | Stop reason: HOSPADM

## 2022-12-15 RX ORDER — SODIUM CHLORIDE 0.9 % (FLUSH) 0.9 %
5-40 SYRINGE (ML) INJECTION EVERY 12 HOURS SCHEDULED
Status: DISCONTINUED | OUTPATIENT
Start: 2022-12-15 | End: 2022-12-16 | Stop reason: HOSPADM

## 2022-12-15 RX ORDER — ONDANSETRON 2 MG/ML
4 INJECTION INTRAMUSCULAR; INTRAVENOUS EVERY 6 HOURS PRN
Status: DISCONTINUED | OUTPATIENT
Start: 2022-12-15 | End: 2022-12-16 | Stop reason: HOSPADM

## 2022-12-15 RX ORDER — INSULIN LISPRO 100 [IU]/ML
0-4 INJECTION, SOLUTION INTRAVENOUS; SUBCUTANEOUS NIGHTLY
Status: DISCONTINUED | OUTPATIENT
Start: 2022-12-15 | End: 2022-12-16 | Stop reason: HOSPADM

## 2022-12-15 RX ORDER — SODIUM CHLORIDE 9 MG/ML
INJECTION, SOLUTION INTRAVENOUS PRN
Status: DISCONTINUED | OUTPATIENT
Start: 2022-12-15 | End: 2022-12-16 | Stop reason: HOSPADM

## 2022-12-15 RX ORDER — SODIUM CHLORIDE 0.9 % (FLUSH) 0.9 %
5-40 SYRINGE (ML) INJECTION PRN
Status: DISCONTINUED | OUTPATIENT
Start: 2022-12-15 | End: 2022-12-16 | Stop reason: HOSPADM

## 2022-12-15 RX ORDER — INSULIN LISPRO 100 [IU]/ML
0-8 INJECTION, SOLUTION INTRAVENOUS; SUBCUTANEOUS
Status: DISCONTINUED | OUTPATIENT
Start: 2022-12-15 | End: 2022-12-16 | Stop reason: HOSPADM

## 2022-12-15 RX ORDER — CLOPIDOGREL BISULFATE 75 MG/1
75 TABLET ORAL DAILY
Status: DISCONTINUED | OUTPATIENT
Start: 2022-12-16 | End: 2022-12-16 | Stop reason: HOSPADM

## 2022-12-15 RX ORDER — GUAIFENESIN/DEXTROMETHORPHAN 100-10MG/5
5 SYRUP ORAL EVERY 4 HOURS PRN
Status: DISCONTINUED | OUTPATIENT
Start: 2022-12-15 | End: 2022-12-16 | Stop reason: HOSPADM

## 2022-12-15 RX ORDER — ONDANSETRON 4 MG/1
4 TABLET, ORALLY DISINTEGRATING ORAL EVERY 8 HOURS PRN
Status: DISCONTINUED | OUTPATIENT
Start: 2022-12-15 | End: 2022-12-16 | Stop reason: HOSPADM

## 2022-12-15 RX ORDER — ACETAMINOPHEN 650 MG/1
650 SUPPOSITORY RECTAL EVERY 6 HOURS PRN
Status: DISCONTINUED | OUTPATIENT
Start: 2022-12-15 | End: 2022-12-16 | Stop reason: HOSPADM

## 2022-12-15 RX ORDER — ATORVASTATIN CALCIUM 80 MG/1
80 TABLET, FILM COATED ORAL NIGHTLY
Status: DISCONTINUED | OUTPATIENT
Start: 2022-12-15 | End: 2022-12-16 | Stop reason: HOSPADM

## 2022-12-15 RX ORDER — POLYETHYLENE GLYCOL 3350 17 G/17G
17 POWDER, FOR SOLUTION ORAL DAILY PRN
Status: DISCONTINUED | OUTPATIENT
Start: 2022-12-15 | End: 2022-12-16 | Stop reason: HOSPADM

## 2022-12-15 RX ADMIN — GUAIFENESIN AND DEXTROMETHORPHAN 5 ML: 100; 10 SYRUP ORAL at 21:41

## 2022-12-15 RX ADMIN — SODIUM CHLORIDE, PRESERVATIVE FREE 10 ML: 5 INJECTION INTRAVENOUS at 20:07

## 2022-12-15 RX ADMIN — INSULIN LISPRO 2 UNITS: 100 INJECTION, SOLUTION INTRAVENOUS; SUBCUTANEOUS at 18:40

## 2022-12-15 RX ADMIN — IOPAMIDOL 80 ML: 755 INJECTION, SOLUTION INTRAVENOUS at 14:56

## 2022-12-15 RX ADMIN — ATORVASTATIN CALCIUM 80 MG: 80 TABLET, FILM COATED ORAL at 22:55

## 2022-12-15 ASSESSMENT — ENCOUNTER SYMPTOMS
SHORTNESS OF BREATH: 1
NAUSEA: 0
SORE THROAT: 0
COUGH: 1
CONSTIPATION: 0
ABDOMINAL DISTENTION: 0
COLOR CHANGE: 0
WHEEZING: 0
CHEST TIGHTNESS: 1
BACK PAIN: 0
DIARRHEA: 0
VOMITING: 0
RHINORRHEA: 0

## 2022-12-15 ASSESSMENT — PAIN DESCRIPTION - LOCATION
LOCATION: CHEST
LOCATION: RIB CAGE

## 2022-12-15 ASSESSMENT — PAIN DESCRIPTION - DESCRIPTORS
DESCRIPTORS: ACHING
DESCRIPTORS: HEAVINESS

## 2022-12-15 ASSESSMENT — PAIN DESCRIPTION - ORIENTATION
ORIENTATION: MID
ORIENTATION: ANTERIOR

## 2022-12-15 ASSESSMENT — PAIN SCALES - GENERAL
PAINLEVEL_OUTOF10: 8
PAINLEVEL_OUTOF10: 6
PAINLEVEL_OUTOF10: 0

## 2022-12-15 ASSESSMENT — PAIN - FUNCTIONAL ASSESSMENT: PAIN_FUNCTIONAL_ASSESSMENT: 0-10

## 2022-12-15 ASSESSMENT — PAIN DESCRIPTION - PAIN TYPE
TYPE: ACUTE PAIN
TYPE: ACUTE PAIN

## 2022-12-15 ASSESSMENT — HEART SCORE: ECG: 1

## 2022-12-15 NOTE — ED NOTES
Pt and vs reassessed. RR easy and unlabored. Pt resting in bed alert and updated on status of IP bed.  Pt denies any needs and is stable at this time     Mateo Rojas RN  12/15/22 [id number]

## 2022-12-15 NOTE — ED NOTES
Pt transported to [facility]. Floor contacted before transport. Pt in stable condition.      Shawna Matute  12/15/22 [id number]

## 2022-12-15 NOTE — ED PROVIDER NOTES
[facility]    EMERGENCY MEDICINE     Pt Name: Deion Nelson  MRN: [id number]  [place] 1970  Date of evaluation: 12/15/2022  Treating Resident Physician: Elvia Cid MD  Supervising Physician: Roselyn Arrington DO    CHIEF COMPLAINT       Chief Complaint   Patient presents with    Chest Pain     History obtained from chart review and the patient. HISTORY OF PRESENT ILLNESS    HPI    Deion Nelson is a 50 y.o. female with a past medical history significant for coronary artery disease, stroke, factor V Leiden mutation, DVT, pulmonary embolism, obesity, hypertension, hyperlipidemia, recent admission for vaginal bleeding , presents to the emergency department for evaluation of feeling unwell for the past 10 days or so. Third feeling unwell about 10 days ago. She does have recent history of recent hospital admission for uncontrolled vaginal bleeding which resulted in a D&C and placement of a Mirena coil. The Mirena coil fell out and since then she has had spotting, she states this has not saturated any pads, and not nearly as much she was having previously. Everyone else in the house is currently sick she was seen in urgent care and and everyone in her house was diagnosed with influenza A. Over the past 2 to 3 days she is felt significantly worse very weak everywhere feels like her limbs are heavy, also stating for the last 2 days she has had a chest pressure and tightness. She reports adherence to her anticoagulation regime of rivaroxaban and clopidogrel. The patient has no other acute complaints at this time. REVIEW OF SYSTEMS   Review of Systems   Constitutional:  Positive for activity change, appetite change and fatigue. Negative for chills, diaphoresis, fever and unexpected weight change. HENT:  Negative for congestion, nosebleeds, rhinorrhea, sneezing and sore throat. Respiratory:  Positive for cough, chest tightness and shortness of breath.  Negative for wheezing. Cardiovascular:  Positive for chest pain. Negative for palpitations and leg swelling. Gastrointestinal:  Negative for abdominal distention, constipation, diarrhea, nausea and vomiting. Genitourinary:  Negative for difficulty urinating, dysuria, frequency, hematuria, urgency, vaginal bleeding and vaginal discharge. Musculoskeletal:  Negative for back pain and neck pain. Skin:  Negative for color change, pallor, rash and wound. Allergic/Immunologic: Negative for environmental allergies and food allergies. Neurological:  Negative for dizziness, syncope, weakness, numbness and headaches. Hematological:  Negative for adenopathy. Does not bruise/bleed easily. Psychiatric/Behavioral:  Negative for agitation and confusion. All other systems reviewed and are negative.       PAST MEDICAL AND SURGICAL HISTORY     Past Medical History:   Diagnosis Date    Asthma     Bipolar 1 disorder ([facility]     Blood circulation, collateral     CAD (coronary artery disease)     Curvature of spine     Diabetes mellitus (HCC)     DVT (deep venous thrombosis) ([facility])     Factor 5 Leiden mutation, heterozygous (HCC)     GERD (gastroesophageal reflux disease)     HTN, goal below 130/80     Hx of blood clots     PE x3 and DVT x3    Hx-TIA (transient ischemic attack)     Hyperlipidemia     PE (pulmonary embolism)     RA (rheumatoid arthritis) ([facility] [address]     Unspecified cerebral artery occlusion with cerebral infarction        Past Surgical History:   Procedure Laterality Date    CARDIAC CATHETERIZATION  feb 2015    Heart caths    CHOLECYSTECTOMY  1992    CORONARY ANGIOPLASTY WITH STENT PLACEMENT      DILATION AND CURETTAGE OF UTERUS N/A 11/14/2022    Hysteroscopy D&C, Mirena placement performed by Donny Fontana DO at [address] [place] [address] 9/30/2019    FOOT DEBRIDEMENT INCISION AND DRAINAGE performed by Maribeth Lawson DPM at [place] [address] ARTHROSCOPY  2007&2010    LIPOMA RESECTION      TONSILLECTOMY TUBAL LIGATION  2003    TYMPANOSTOMY TUBE PLACEMENT         CURRENT MEDICATIONS     Current Discharge Medication List        CONTINUE these medications which have NOT CHANGED    Details   amoxicillin (AMOXIL) 250 MG capsule Take 250 mg by mouth 3 times daily      dextromethorphan-guaiFENesin (MUCINEX DM)  MG per extended release tablet Take 1 tablet by mouth every 12 hours as needed for Cough or Congestion  Qty: 30 tablet, Refills: 0      benzonatate (TESSALON PERLES) 100 MG capsule Take 2 capsules by mouth 3 times daily as needed for Cough  Qty: 30 capsule, Refills: 0      albuterol sulfate HFA (VENTOLIN HFA) 108 (90 Base) MCG/ACT inhaler Inhale 2 puffs into the lungs 4 times daily as needed for Wheezing  Qty: 54 g, Refills: 1      megestrol (MEGACE) 40 MG tablet Take 1 tablet by mouth 2 times daily  Qty: 60 tablet, Refills: 1      metoprolol succinate (TOPROL XL) 25 MG extended release tablet Take 1.5 tablets by mouth daily  Qty: 135 tablet, Refills: 1      atorvastatin (LIPITOR) 80 MG tablet Take 1 tablet by mouth at bedtime  Qty: 90 tablet, Refills: 2      clopidogrel (PLAVIX) 75 MG tablet Take 1 tablet by mouth daily  Qty: 90 tablet, Refills: 2      Blood Pressure KIT 1 kit by Does not apply route as needed (PRN)  Qty: 1 kit, Refills: 0      nitroGLYCERIN (NITROSTAT) 0.4 MG SL tablet up to max of 3 total doses. If no relief after 1 dose, call 911. Qty: 25 tablet, Refills: 3      XARELTO 20 MG TABS tablet take 1 tablet by mouth once daily      JANUVIA 100 MG tablet take 1 tablet by mouth once daily      gabapentin (NEURONTIN) 600 MG tablet take 1 tablet by mouth twice a day      acetaminophen (TYLENOL) 325 MG tablet Take 2 tablets by mouth every 4 hours as needed for Pain  Qty: 120 tablet, Refills: 3      blood glucose test strips (TRUETRACK TEST) strip 1 each by In Vitro route 2 times daily As needed.   Qty: 100 each, Refills: 0             ALLERGIES     Allergies   Allergen Reactions    Codeine Hives + Nausea vomiting    Demerol      vomiting    Ultram [Tramadol Hcl] Other (See Comments)     Dizziness     Percocet [Oxycodone-Acetaminophen] Nausea And Vomiting    Toradol [Ketorolac Tromethamine] Rash       FAMILY HISTORY     Family History   Problem Relation Age of Onset    COPD Mother     Other Mother     Cancer Mother     High Blood Pressure Father     Heart Disease Father     Mental Illness Sister     Mental Illness Brother     Mental Illness Sister     Mental Illness Brother        SOCIAL HISTORY     Social History     Tobacco Use    Smoking status: Former     Packs/day: 2.00     Years: 14.00     Pack years: 28.00     Types: Cigarettes     Quit date:      Years since quittin.9     Passive exposure: Never    Smokeless tobacco: Never   Vaping Use    Vaping Use: Never used   Substance Use Topics    Alcohol use: No    Drug use: No       PHYSICAL EXAM     ED Triage Vitals [12/15/22 1134]   BP Temp Temp Source Heart Rate Resp SpO2 Height Weight   (!) 156/80 97.9 °F (36.6 °C) Oral 79 20 99 % 5' 8\" (1.727 m) 290 lb (131.5 kg)       Initial vital signs and nursing assessment reviewed and normal. Body mass index is 44.09 kg/m². Pulsoximetry is normal per my interpretation. Physical Exam  Vitals and nursing note reviewed. Constitutional:       General: She is not in acute distress. Appearance: Normal appearance. She is normal weight. She is not ill-appearing, toxic-appearing or diaphoretic. HENT:      Head: Normocephalic and atraumatic. Nose: Nose normal.      Mouth/Throat:      Mouth: Mucous membranes are moist.      Pharynx: Oropharynx is clear. Eyes:      Conjunctiva/sclera: Conjunctivae normal.   Cardiovascular:      Rate and Rhythm: Normal rate and regular rhythm. Pulses: Normal pulses. Heart sounds: Normal heart sounds. Pulmonary:      Effort: Pulmonary effort is normal.      Breath sounds: Normal breath sounds. Abdominal:      General: Abdomen is flat.  Bowel sounds are normal.      Palpations: Abdomen is soft. Tenderness: There is no abdominal tenderness. Musculoskeletal:      Cervical back: Normal range of motion. Skin:     General: Skin is warm and dry. Capillary Refill: Capillary refill takes less than 2 seconds. Comments: pale   Neurological:      Mental Status: She is alert and oriented to person, place, and time. Psychiatric:         Mood and Affect: Mood normal.         Behavior: Behavior normal.        FORMAL DIAGNOSTIC RESULTS     RADIOLOGY: Interpretation per the Radiologist below, if available at the time of this note (none if blank):    CTA CHEST W WO CONTRAST   Final Result       1. No evidence of pulmonary embolism. 2. Mild cardiomegaly. Coronary artery and pericardial or calcification. .   3. Thoracic spondylosis. 4. Mild splenomegaly. Punctate calcification in the spleen. 5. Small hiatal hernia. **This report has been created using voice recognition software. It may contain minor errors which are inherent in voice recognition technology. **      Final report electronically signed by DR Consuelo Luo on 12/15/2022 3:13 PM      XR CHEST PORTABLE   Final Result   No acute intrathoracic process. **This report has been created using voice recognition software. It may contain minor errors which are inherent in voice recognition technology. **      Final report electronically signed by Dr Yulia Lazcano on 12/15/2022 12:08 PM          LABS: (none if blank)  Labs Reviewed   COVID-19 & INFLUENZA COMBO - Abnormal; Notable for the following components:       Result Value    INFLUENZA A DETECTED (*)     All other components within normal limits   BASIC METABOLIC PANEL W/ REFLEX TO MG FOR LOW K - Abnormal; Notable for the following components:    CO2 20 (*)     Glucose 237 (*)     All other components within normal limits   CBC WITH AUTO DIFFERENTIAL - Abnormal; Notable for the following components:    WBC 3.5 (*)     RBC 3.92 (*)     Hemoglobin 9.2 (*)     Hematocrit 30.6 (*)     MCV 78.1 (*)     MCH 23.5 (*)     MCHC 30.1 (*)     RDW-CV 15.7 (*)     Lymphocytes Absolute 0.8 (*)     Monocytes Absolute 0.2 (*)     All other components within normal limits   BRAIN NATRIURETIC PEPTIDE   TROPONIN   ANION GAP   GLOMERULAR FILTRATION RATE, ESTIMATED   OSMOLALITY   TROPONIN   TSH WITH REFLEX   HEPATIC FUNCTION PANEL   HEMOGLOBIN A1C   POCT GLUCOSE   POCT GLUCOSE       (Any cultures that may have been sent were not resulted at the time of this patient visit)    MEDICAL DECISION MAKING     1) Number and Complexity of Problems            Problem List This Visit:         Chief Complaint   Patient presents with    Chest Pain          Differential Diagnosis includes (but not limited to): Acute coronary syndrome, pulmonary embolism, anemia, COVID-19, influenza        Diagnoses Considered but I have low suspicion of:   Recurrent vaginal bleeding             Pertinent Comorbid Conditions:    Prior PE and DVT, factor V Leiden deficiency, anticoagulated, recent admission for vaginal bleeding    2)  Data Reviewed (none if left blank)          My Independent interpretations:     EKG:      Nonischemic, sinus rhythm    Imaging: X-ray and CT of the chest reviewed myself no acute pathology observed. Labs:      Reviewed                 Decision Rules/Clinical Scores utilized: Reynaldo Lopez' Criteria for Pulmonary Embolism    RESULT SUMMARY:  4.5 points  Moderate risk group: 16.2% chance of PE in an ED population. Another study assigned scores ? 4 as PE Unlikely and had a 3% incidence of PE.     Another study assigned scores > 4 as PE Likely and had a 28% incidence of PE.      INPUTS:  Clinical signs and symptoms of DVT --> 0 = No  PE is #1 diagnosis OR equally likely --> 3 = Yes  Heart rate > 100 --> 0 = No  Immobilization at least 3 days OR surgery in the previous 4 weeks --> 0 = No  Previous, objectively diagnosed PE or DVT --> 1.5 = Yes  Hemoptysis --> 0 = No  Malignancy w/ treatment within 6 months or palliative --> 0 = No      Heart Score    Heart Score for chest pain patients  History: Moderately Suspicious  ECG: Non-Specifc repolarization disturbance/LBTB/PM  Patient Age: > 39 and < 65 years  *Risk factors for Atherosclerotic disease: Coronary Artery Disease, Obesity, Hypertension, Hypercholesterolemia, Diabetes Mellitus  Risk Factors: > 3 Risk factors or history of atherosclerotic disease*  Troponin: < 1X normal limit  Heart Score Total: 5    HEART Score recommendations:  Score 0 - 3:   <1.7%  risk of MACE over next 6 wks = Outpatient workup  Score 4 - 6: 12-16% risk of MACE over next 6 wks = Admission for observation  Score 7- 10: 50-65% risk of MACE over next 6 wks = Early invasive strategy    MACE: Major Acute Cardiac Event (All Cause Mortality, AMI or revascularization)  Chest Pain in the Emergency Room: A Multicenter Validation of the HEART Score. Kandy BE, Morgan AJ, Tonya ADRIAN, Bernardo TP, [employer], Mast EG, Conrad SH, [facility]. Crit Pathw Cardiol. 2010 Sep; 9(3): 164-169. \"A prospective validation of the HEART score for chest pain patients at the emergency department. \" Int J Cardiol. 2013 Oct 3;168(3):2153-8. doi: 10.1016/j.ijcard. 2013.01.255. Epub 2013 Mar 7. External Documentation Reviewed:         Previous patient encounter documents & history available on EMR was reviewed. See Formal Diagnostic Results above for the lab and radiology tests and orders. 3)  Treatment and Disposition         ED Reassessment: Assessment patient was still having the chest pressure, further discussion at this time also patient disclose that this is worse when she ambulates and improves with rest.  This is concerned this is potentially cardiac in origin. CT of the chest was negative for pulmonary embolism, she does have influenza A currently.   Patient already has anticoagulation on board and she notes adherence to her regime of clopidogrel and rivaroxaban. She was given additional dose of enoxaparin in the emergency department. The hospitalist service was consulted and they will admit the patient. Case discussed with consulting clinician:  Case referred to Hospitalist Service for admission. Shared Decision-Making was performed and disposition discussed with the        Patient/Family and questions answered. Patient agreeable with plan for admission.          Social determinants of health impacting treatment or disposition:  No         Code Status:  Full Code      Summary of Patient Presentation:      MDM  Number of Diagnoses or Management Options  Chest pain, unspecified type: new, needed workup  Influenza A: new, needed workup     Amount and/or Complexity of Data Reviewed  Clinical lab tests: ordered and reviewed  Tests in the radiology section of CPT®: ordered and reviewed  Discussion of test results with the performing providers: no  Decide to obtain previous medical records or to obtain history from someone other than the patient: yes  Review and summarize past medical records: yes  Discuss the patient with other providers: yes  Independent visualization of images, tracings, or specimens: yes    Risk of Complications, Morbidity, and/or Mortality  Presenting problems: moderate  Diagnostic procedures: moderate  Management options: moderate    Patient Progress  Patient progress: stable  /  ED Course as of 12/15/22 1823   Thu Dec 15, 2022   1324 INFLUENZA A(!!): DETECTED [SC]   1327 Hemoglobin Quant(!): 9.2 [SC]   1452 Scanned 3 minutes ago [SC]   [id number] Referral for admission sent to Dr. Kirk Colby ([id number]) via [employer] Text Message.  [SC]      ED Course User Index  [SC] Mendel Curie, MD     Vitals Reviewed:    Vitals:    12/15/22 1611 12/15/22 1714 12/15/22 1733 12/15/22 1752   BP: (!) 153/69 117/60 (!) 110/59 (!) 120/56   Pulse: 70 61 65 67   Resp: 12 17  18   Temp:   97.9 °F (36.6 °C) 97.9 °F (36.6 °C) TempSrc:    Oral   SpO2: 99% 99% 100% 100%   Weight:       Height:           The patient was seen and examined. Appropriate diagnostic testing was performed and results reviewed with the patient. The results of pertinent diagnostic studies and exam findings were discussed. The patients provisional diagnosis and plan of care were discussed with the patient and present family who expressed understanding.  Any medications were reviewed and indications and risks of medications were discussed with the patient /family present. =     ED Medications administered this visit:  (None if blank)  Medications   sodium chloride flush 0.9 % injection 5-40 mL (has no administration in time range)   sodium chloride flush 0.9 % injection 5-40 mL (has no administration in time range)   0.9 % sodium chloride infusion (has no administration in time range)   ondansetron (ZOFRAN-ODT) disintegrating tablet 4 mg (has no administration in time range)     Or   ondansetron (ZOFRAN) injection 4 mg (has no administration in time range)   polyethylene glycol (GLYCOLAX) packet 17 g (has no administration in time range)   acetaminophen (TYLENOL) tablet 650 mg (has no administration in time range)     Or   acetaminophen (TYLENOL) suppository 650 mg (has no administration in time range)   insulin lispro (HUMALOG) injection vial 0-8 Units (has no administration in time range)   insulin lispro (HUMALOG) injection vial 0-4 Units (has no administration in time range)   glucose chewable tablet 16 g (has no administration in time range)   dextrose bolus 10% 125 mL (has no administration in time range)     Or   dextrose bolus 10% 250 mL (has no administration in time range)   glucagon (rDNA) injection 1 mg (has no administration in time range)   dextrose 10 % infusion (has no administration in time range)   guaiFENesin-dextromethorphan (ROBITUSSIN DM) 100-10 MG/5ML syrup 5 mL (has no administration in time range)   iopamidol (ISOVUE-370) 76 % injection 80 mL (80 mLs IntraVENous Given 12/15/22 4418)         PROCEDURES: (None if blank)  Procedures:     CRITICAL CARE: (None if blank)      DISCHARGE PRESCRIPTIONS: (None if blank)  Current Discharge Medication List            FINAL IMPRESSION     Final diagnoses:   Chest pain, unspecified type   Influenza A     1. Chest pain, unspecified type    2. Influenza A        DISPOSITION / PLAN   DISPOSITION Admitted 12/15/2022 04:26:05 PM        This transcription was electronically signed. Parts of this transcriptions may have been dictated by use of voice recognition software and electronically transcribed, and parts may have been transcribed with the assistance of an ED scribe. The transcription may contain errors not detected in proofreading. Please refer to my supervising physician's documentation if my documentation differs.     Electronically Signed: Tomi Nieves MD, 12/15/22, 6:23 PM           Francisco Schilling MD  Resident  12/15/22 2061

## 2022-12-15 NOTE — ED NOTES
Pt and vs reassessed. RR easy and unlabored. Pt resting in bed alert. Hospital provider at bedside assessing and updating pt. lovenox held at this time per Dr. Zoe Jacques verbal order.  Pt stable at this time       Alison Delacruz, [facility]  12/15/22 5398

## 2022-12-15 NOTE — ED NOTES
Patient updated on POC. Patient family at bedside. Patient provided some testing results. VS as noted. No needs voiced.       Isabel Sy RN  12/15/22 3615

## 2022-12-15 NOTE — ED NOTES
Pt and vs reassessed. RR easy and unlabored. Pt ambulated to the bathroom without complication and is now resting in bed alert. Pt updated on POC and denies any other needs.  Pt stable at this time     Selwyn Corado RN  12/15/22 0475

## 2022-12-15 NOTE — H&P
History & Physical      Patient: Ailyn Zuniga  YOB: 1970    MRN: [id number]  Acct: [de-identified]    PCP: SHANNON Coffey CNP    Date of Admission: 12/15/2022    Date of Service: Patient seen / examined on 12/15/22 and admitted to Observation with expected LOS less than two midnights due to medical therapy. ASSESSMENT / PLAN:  Influenza A - (+) by PCR. Onset of symptoms started 10 days ago and worsened over last 3 days with increased shortness of breath and worsening cough. - Offered Tamiflu - patient declined  - Supportive care  - Robitussin DM for cough  - Encourage deep breathing  - Acapella    Chest pressure - Reproducible on palpation across the sternum, second third and fourth intercostal spaces bilaterally, and epigastrium. Onset was 3 days ago, intermittent, worse with cough and inspiration, located in the lower sternal epigastric region, radiating to bilateral chest, arms and hands/. EKG without ST or T wave abnormality, initial troponin <0.010.  - Repeat troponin now  - Continuous telemetry    Multivessel CAD - s/p PCI and XENIA (3/2022) to mid-circumflex artery, OM1 and right posterior descending artery. ECHO 8/26/22 EF=55-60% without LV dysfunction. Stress test 9/28/2022 negative. Follows with Dr. Adonis Bella outpatient.  - Resume Toprol XL and Plavix    Factor V Leiden - Noted with Hx of DVT x3 and PE x3. CTA of cheat today (12/15/22) (-) for PE. Has IVC filter and takes Xarelto at home reporting excellent compliance. - Resume Xarelto    NIDDM2 - Uncontrolled. A1c=8.4% (9/28/2019). Home regimen Januvia. - A1c lab  - Hold Januvia  - Medium dose SSI, hypoglycemia protocol, POCT glucose  - Resume gabapentin for neuropathic pain    Microcytic anemia - Hgb at baseline without s/s of bleeding.     Primary HTN - Controlled    HLD - Resume high dose statin      Chief Complaint: Chest pressure    History of Present Illness:  Ailyn Zuniga is a 50 y.o. female with PMHx of factor V Leiden, DVT x3, PE x3, multivessel CAD, NIDDM 2, anemia, hypertension, and hyperlipidemia who presented to [facility] with complaint of dyspnea on exertion and chest pressure that have worsened over the past 3 days. She reports that over the last 24 hours the chest pressure began to radiate towards her bilateral arms causing numbness and tingling down both arms to the tips of the fingers bilaterally. She describes the chest pressure as heavy, intermittent, and worse with coughing and deep inspiration. The associated symptom of cough began approximately 10 days ago with fatigue and malaise. Patient denies nausea, vomiting, diaphoresis, jaw or neck pain with this chest pressure. She denies fever, chills, headache, lightheadedness, change in vision, rhinorrhea, congestion, sore throat, cough, hemoptysis, palpitations, PND, abdominal pain, nausea, vomiting, hematemesis, change in bowel/bladder habits, hematochezia, hematuria, weakness, difficulty with ambulation, or known exposure to sick contacts. ED course: Upon arrival to the emergency department the patient underwent CTA of the chest without evidence of pulmonary embolism. EKG completed demonstrating normal sinus rhythm without ST or T wave abnormalities. Initial troponin was negative. The patient was admitted to the hospital service for further care and management. Please see A&P above for additional information.     Past Medical History:  Past Medical History:   Diagnosis Date    Asthma     Bipolar 1 disorder ([facility] [address]     Blood circulation, collateral     CAD (coronary artery disease)     Curvature of spine     Diabetes mellitus (HCC)     DVT (deep venous thrombosis) (HCC)     Factor 5 Leiden mutation, heterozygous (HCC)     GERD (gastroesophageal reflux disease)     HTN, goal below 130/80     Hx of blood clots     PE x3 and DVT x3    Hx-TIA (transient ischemic attack)     Hyperlipidemia     PE (pulmonary embolism)     RA (rheumatoid arthritis) ([facility] [address]     Unspecified cerebral artery occlusion with cerebral infarction        Past Surgical History:  Past Surgical History:   Procedure Laterality Date    CARDIAC CATHETERIZATION  feb 2015    Heart caths    CHOLECYSTECTOMY  1992    CORONARY ANGIOPLASTY WITH STENT PLACEMENT      DILATION AND CURETTAGE OF UTERUS N/A 11/14/2022    Hysteroscopy D&C, Mirena placement performed by Lukas Joyner DO at [address] [place] [address] Right 9/30/2019    FOOT DEBRIDEMENT INCISION AND DRAINAGE performed by Mariana Terjo DPM at [place] [address] ARTHROSCOPY  2007&2010    LIPOMA RESECTION      TONSILLECTOMY      TUBAL LIGATION  2003    TYMPANOSTOMY TUBE PLACEMENT         Medications Prior to Admission:  No current facility-administered medications on file prior to encounter. Current Outpatient Medications on File Prior to Encounter   Medication Sig Dispense Refill    amoxicillin (AMOXIL) 250 MG capsule Take 250 mg by mouth 3 times daily      dextromethorphan-guaiFENesin (MUCINEX DM)  MG per extended release tablet Take 1 tablet by mouth every 12 hours as needed for Cough or Congestion 30 tablet 0    benzonatate (TESSALON PERLES) 100 MG capsule Take 2 capsules by mouth 3 times daily as needed for Cough 30 capsule 0    albuterol sulfate HFA (VENTOLIN HFA) 108 (90 Base) MCG/ACT inhaler Inhale 2 puffs into the lungs 4 times daily as needed for Wheezing 54 g 1    megestrol (MEGACE) 40 MG tablet Take 1 tablet by mouth 2 times daily 60 tablet 1    metoprolol succinate (TOPROL XL) 25 MG extended release tablet Take 1.5 tablets by mouth daily 135 tablet 1    atorvastatin (LIPITOR) 80 MG tablet Take 1 tablet by mouth at bedtime 90 tablet 2    clopidogrel (PLAVIX) 75 MG tablet Take 1 tablet by mouth daily 90 tablet 2    Blood Pressure KIT 1 kit by Does not apply route as needed (PRN) 1 kit 0    nitroGLYCERIN (NITROSTAT) 0.4 MG SL tablet up to max of 3 total doses. If no relief after 1 dose, call 911.  (Patient not taking: No sig reported) 25 tablet 3    XARELTO 20 MG TABS tablet take 1 tablet by mouth once daily      JANUVIA 100 MG tablet take 1 tablet by mouth once daily      gabapentin (NEURONTIN) 600 MG tablet take 1 tablet by mouth twice a day (Patient not taking: No sig reported)      acetaminophen (TYLENOL) 325 MG tablet Take 2 tablets by mouth every 4 hours as needed for Pain 120 tablet 3    blood glucose test strips (TRUETRACK TEST) strip 1 each by In Vitro route 2 times daily As needed.  100 each 0       Allergies:  Codeine, Demerol, Ultram [tramadol hcl], Percocet [oxycodone-acetaminophen], and Toradol [ketorolac tromethamine]    Social History:  Social History     Socioeconomic History    Marital status:      Spouse name: Not on file    Number of children: 3    Years of education: Not on file    Highest education level: Not on file   Occupational History    Not on file   Tobacco Use    Smoking status: Former     Packs/day: 2.00     Years: 14.00     Pack years: 28.00     Types: Cigarettes     Quit date:      Years since quittin.9     Passive exposure: Never    Smokeless tobacco: Never   Vaping Use    Vaping Use: Never used   Substance and Sexual Activity    Alcohol use: No    Drug use: No    Sexual activity: Not on file   Other Topics Concern    Not on file   Social History Narrative    Not on file     Social Determinants of Health     Financial Resource Strain: Not on file   Food Insecurity: Not on file   Transportation Needs: Not on file   Physical Activity: Not on file   Stress: Not on file   Social Connections: Not on file   Intimate Partner Violence: Not on file   Housing Stability: Not on file       Family History:   Family History   Problem Relation Age of Onset    COPD Mother     Other Mother     Cancer Mother     High Blood Pressure Father     Heart Disease Father     Mental Illness Sister     Mental Illness Brother     Mental Illness Sister     Mental Illness Brother        REVIEW OF General: No focal deficit present. Mental Status: She is alert and oriented to person, place, and time. Mental status is at baseline. GCS: GCS eye subscore is 4. GCS verbal subscore is 5. GCS motor subscore is 6. Cranial Nerves: Cranial nerves 2-12 are intact. Sensory: Sensation is intact. Motor: Motor function is intact. Coordination: Coordination is intact. Gait: Gait is intact. Psychiatric:         Attention and Perception: Attention and perception normal.         Mood and Affect: Mood and affect normal.         Speech: Speech normal.         Behavior: Behavior normal. Behavior is cooperative. Thought Content:  Thought content normal.         Cognition and Memory: Cognition and memory normal.         Judgment: Judgment normal.         Labs:  Results for orders placed or performed during the hospital encounter of 12/15/22   COVID-19 & Influenza Combo    Specimen: Nasopharyngeal Swab   Result Value Ref Range    SARS-CoV-2 RNA, RT PCR NOT DETECTED NOT DETECTED    INFLUENZA A DETECTED (AA) NOT DETECTED    INFLUENZA B NOT DETECTED NOT DETECTED   Basic Metabolic Panel w/ Reflex to MG   Result Value Ref Range    Sodium 139 135 - 145 meq/L    Potassium reflex Magnesium 4.1 3.5 - 5.2 meq/L    Chloride 104 98 - 111 meq/L    CO2 20 (L) 23 - 33 meq/L    Glucose 237 (H) 70 - 108 mg/dL    BUN 8 7 - 22 mg/dL    Creatinine 0.6 0.4 - 1.2 mg/dL    Calcium 9.3 8.5 - 10.5 mg/dL   Brain Natriuretic Peptide   Result Value Ref Range    Pro-BNP 63.6 0.0 - 900.0 pg/mL   CBC with Auto Differential   Result Value Ref Range    WBC 3.5 (L) 4.8 - 10.8 thou/mm3    RBC 3.92 (L) 4.20 - 5.40 mill/mm3    Hemoglobin 9.2 (L) 12.0 - 16.0 gm/dl    Hematocrit 30.6 (L) 37.0 - 47.0 %    MCV 78.1 (L) 81.0 - 99.0 fL    MCH 23.5 (L) 26.0 - 33.0 pg    MCHC 30.1 (L) 32.2 - 35.5 gm/dl    RDW-CV 15.7 (H) 11.5 - 14.5 %    RDW-SD 44.4 35.0 - 45.0 fL    Platelets 149 481 - 410 thou/mm3    MPV 9.7 9.4 - 12.4 fL    Seg Neutrophils 72.0 %    Lymphocytes 21.6 %    Monocytes 4.6 %    Eosinophils 0.9 %    Basophils 0.6 %    Immature Granulocytes 0.3 %    Segs Absolute 2.5 1.8 - 7.7 thou/mm3    Lymphocytes Absolute 0.8 (L) 1.0 - 4.8 thou/mm3    Monocytes Absolute 0.2 (L) 0.4 - 1.3 thou/mm3    Eosinophils Absolute 0.0 0.0 - 0.4 thou/mm3    Basophils Absolute 0.0 0.0 - 0.1 thou/mm3    Immature Grans (Abs) 0.01 0.00 - 0.07 thou/mm3    nRBC 0 /100 wbc   Troponin   Result Value Ref Range    Troponin T < 0.010 ng/ml   Anion Gap   Result Value Ref Range    Anion Gap 15.0 8.0 - 16.0 meq/L   Glomerular Filtration Rate, Estimated   Result Value Ref Range    Est, Glom Filt Rate >60 >60 ml/min/1.73m2   Osmolality   Result Value Ref Range    Osmolality Calc 283.6 275.0 - 300.0 mOsmol/kg   EKG 12 Lead   Result Value Ref Range    Ventricular Rate 81 BPM    Atrial Rate 81 BPM    P-R Interval 176 ms    QRS Duration 88 ms    Q-T Interval 364 ms    QTc Calculation (Bazett) 422 ms    P Axis 41 degrees    R Axis 12 degrees    T Axis 54 degrees       EKG / Radiology:    EKG:  Reviewed by me --    CXR:  Reviewed by me --    CTA CHEST W WO CONTRAST    Result Date: 12/15/2022  PROCEDURE: CTA CHEST W WO CONTRAST CLINICAL INFORMATION: Influenza A infection, Hx of Factor V Leiden, PE, DVT. COMPARISON: CTA  of the chest dated 15th of September 2022. Chest x-ray obtained on the same day. TECHNIQUE: 3 mm axial images were obtained through the chest after the administration of IV contrast.  A non-contrast localizer was obtained. 3D reconstructions were performed on the scanner to include MIP coronal and sagittal images through the chest. Isovue was the intravenous contrast utilized. All CT scans at this facility use dose modulation, iterative reconstruction, and/or weight-based dosing when appropriate to reduce radiation dose to as low as reasonably achievable. FINDINGS: There is adequate opacification of the pulmonary arterial system.  No pulmonary emboli are present. There is atherosclerotic calcification in the aortic arch. . There is mild cardiomegaly. There is coronary artery and pericardial calcification. . There is no pericardial or pleural effusion. There is no mediastinal, axillary or hilar adenopathy. The lungs are clear. There are no infiltrates. There is thoracic spondylosis. .  There is mild splenomegaly. There is punctate calcification in the spleen. There is a small hiatal hernia. .      1. No evidence of pulmonary embolism. 2. Mild cardiomegaly. Coronary artery and pericardial or calcification. . 3. Thoracic spondylosis. 4. Mild splenomegaly. Punctate calcification in the spleen. 5. Small hiatal hernia. **This report has been created using voice recognition software. It may contain minor errors which are inherent in voice recognition technology. ** Final report electronically signed by DR Tracie Daniel on 12/15/2022 3:13 PM    XR CHEST PORTABLE    Result Date: 12/15/2022  PROCEDURE: XR CHEST PORTABLE CLINICAL INFORMATION: Chest pain. COMPARISON: Chest x-ray 11/11/2022. TECHNIQUE: AP portable chest radiograph performed. FINDINGS: Lines/tubes: None. Heart/mediastinum: The heart size is normal. The pulmonary vascularity is unremarkable. Lungs: Low lung volumes are present. No focal consolidation, pleural effusion, or pneumothorax is observed. Bones: The visualized skeletal structures appear intact. No acute intrathoracic process. **This report has been created using voice recognition software. It may contain minor errors which are inherent in voice recognition technology. ** Final report electronically signed by Dr Alta Sanchez on 12/15/2022 12:08 PM      FEN/GI/DVT:  IVF: None  Electrolytes: Monitor and replace per protocols  Diet: Diabetic  GI PPX: No  DVT Prophylaxis: SCDs    CODE STATUS:  Full    Active Hospital Problems    Diagnosis Date Noted    Chest pressure [R07.89] 12/15/2022     Priority: Medium     Thank you Juan Manuel Ortega, APRN - CNP for the opportunity to be involved in this patient's care.     Electronically signed by Aristeo Merchant DO, MBA on 12/15/2022 at 4:27 PM

## 2022-12-15 NOTE — ED NOTES
Pt and vs reassessed. RR easy and unlabored. Pt resting in bed alert and denies any needs.  Pt stable at this time     Miguelina Bonilla RN  12/15/22 [id number]

## 2022-12-15 NOTE — ED NOTES
Assumed care at this time. Received report from [facility]. Pt resting in bed alert and vs assessed. Lab at bedside to get blood work.  Pt denies any needs and is stable at this time     Trisha Brantley RN  12/15/22 0153

## 2022-12-15 NOTE — ED NOTES
ED to inpatient nurses report    Chief Complaint   Patient presents with    Chest Pain      Present to ED from home  LOC: alert and orientated to name, place, date  Vital signs   Vitals:    12/15/22 1357 12/15/22 1457 12/15/22 1512 12/15/22 1611   BP: 128/60 121/63 128/60 (!) 153/69   Pulse: 65 74 70 70   Resp: 18 16 16 12   Temp:       TempSrc:       SpO2: 98% 100% 99% 99%   Weight:       Height:          Oxygen Baseline 99%    Current needs required RA   LDAs:   Peripheral IV 12/15/22 Left Forearm (Active)   Site Assessment Clean, dry & intact 12/15/22 1611   Line Status Normal saline locked 12/15/22 1611   Phlebitis Assessment No symptoms 12/15/22 1611   Infiltration Assessment 0 12/15/22 1611   Dressing Status Clean, dry & intact 12/15/22 1512   Dressing Type Transparent 12/15/22 1351   Dressing Intervention New 12/15/22 1351     Mobility: Independent  Pending ED orders: none  Present condition: stable    C-SSRS Risk of Suicide: No Risk  Swallow Screening Pass  Preferred Language: [place]     Electronically signed by Amanda Moss, RN on 12/15/2022 at 4:27 PM       Amanda Moss, [facility]  12/15/22 7010

## 2022-12-16 VITALS
OXYGEN SATURATION: 99 % | SYSTOLIC BLOOD PRESSURE: 128 MMHG | RESPIRATION RATE: 17 BRPM | WEIGHT: 288 LBS | TEMPERATURE: 97.7 F | DIASTOLIC BLOOD PRESSURE: 62 MMHG | HEIGHT: 68 IN | HEART RATE: 64 BPM | BODY MASS INDEX: 43.65 KG/M2

## 2022-12-16 LAB
ANION GAP SERPL CALCULATED.3IONS-SCNC: 15 MEQ/L (ref 8–16)
BUN BLDV-MCNC: 8 MG/DL (ref 7–22)
CALCIUM SERPL-MCNC: 9 MG/DL (ref 8.5–10.5)
CHLORIDE BLD-SCNC: 105 MEQ/L (ref 98–111)
CO2: 20 MEQ/L (ref 23–33)
CREAT SERPL-MCNC: 0.5 MG/DL (ref 0.4–1.2)
ERYTHROCYTE [DISTWIDTH] IN BLOOD BY AUTOMATED COUNT: 15.7 % (ref 11.5–14.5)
ERYTHROCYTE [DISTWIDTH] IN BLOOD BY AUTOMATED COUNT: 43.4 FL (ref 35–45)
GFR SERPL CREATININE-BSD FRML MDRD: > 60 ML/MIN/1.73M2
GLUCOSE BLD-MCNC: 146 MG/DL (ref 70–108)
GLUCOSE BLD-MCNC: 156 MG/DL (ref 70–108)
GLUCOSE BLD-MCNC: 195 MG/DL (ref 70–108)
HCT VFR BLD CALC: 29.3 % (ref 37–47)
HEMOGLOBIN: 8.8 GM/DL (ref 12–16)
MCH RBC QN AUTO: 23 PG (ref 26–33)
MCHC RBC AUTO-ENTMCNC: 30 GM/DL (ref 32.2–35.5)
MCV RBC AUTO: 76.5 FL (ref 81–99)
PLATELET # BLD: 276 THOU/MM3 (ref 130–400)
PMV BLD AUTO: 10.1 FL (ref 9.4–12.4)
POTASSIUM REFLEX MAGNESIUM: 4 MEQ/L (ref 3.5–5.2)
RBC # BLD: 3.83 MILL/MM3 (ref 4.2–5.4)
SODIUM BLD-SCNC: 140 MEQ/L (ref 135–145)
WBC # BLD: 3.8 THOU/MM3 (ref 4.8–10.8)

## 2022-12-16 PROCEDURE — 99217 PR OBSERVATION CARE DISCHARGE MANAGEMENT: CPT | Performed by: STUDENT IN AN ORGANIZED HEALTH CARE EDUCATION/TRAINING PROGRAM

## 2022-12-16 PROCEDURE — 94669 MECHANICAL CHEST WALL OSCILL: CPT

## 2022-12-16 PROCEDURE — 80048 BASIC METABOLIC PNL TOTAL CA: CPT

## 2022-12-16 PROCEDURE — 6370000000 HC RX 637 (ALT 250 FOR IP): Performed by: STUDENT IN AN ORGANIZED HEALTH CARE EDUCATION/TRAINING PROGRAM

## 2022-12-16 PROCEDURE — G0378 HOSPITAL OBSERVATION PER HR: HCPCS

## 2022-12-16 PROCEDURE — 36415 COLL VENOUS BLD VENIPUNCTURE: CPT

## 2022-12-16 PROCEDURE — 85027 COMPLETE CBC AUTOMATED: CPT

## 2022-12-16 PROCEDURE — 82948 REAGENT STRIP/BLOOD GLUCOSE: CPT

## 2022-12-16 PROCEDURE — 94760 N-INVAS EAR/PLS OXIMETRY 1: CPT

## 2022-12-16 RX ADMIN — METOPROLOL SUCCINATE 37.5 MG: 25 TABLET, EXTENDED RELEASE ORAL at 08:05

## 2022-12-16 RX ADMIN — CLOPIDOGREL BISULFATE 75 MG: 75 TABLET ORAL at 08:05

## 2022-12-16 RX ADMIN — RIVAROXABAN 20 MG: 20 TABLET, FILM COATED ORAL at 08:05

## 2022-12-16 NOTE — CARE COORDINATION
Case Management Assessment  Initial Evaluation    Date/Time of Evaluation: 12/16/2022 12:37 PM  Assessment Completed by: Willie Monzon RN    If patient is discharged prior to next notation, then this note serves as note for discharge by case management. Patient Name: Nabila Espinoza                   YOB: 1970  Diagnosis: Chest pressure [R07.89]  Chest pain, unspecified type [R07.9]                   Date / Time: 12/15/2022 11:29 AM  Location: [address] [place] [address]     Patient Admission Status: Observation   Readmission Risk (Low < 19, Mod (19-27), High > 27): Readmission Risk Score: 18    Current PCP: SHANNON Jensen CNP  PCP verified by CM? Yes    Chart Reviewed: Yes      History Provided by: Patient  Patient Orientation: Alert and Oriented    Patient Cognition: Alert    Hospitalization in the last 30 days (Readmission):  Yes    If yes, Readmission Assessment in CM Navigator will be completed. Advance Directives:      Code Status: Full Code   Patient's Primary Decision Maker is: Named in Scanned ACP Document    Primary Decision MakerSevero Lyons - Child - [phone]    Primary Decision Maker: none,none - Parent - [phone]    Secondary Decision Maker: Daphne Krystal - Brother/Sister - [phone]    Discharge Planning:    Patient lives with: Family Members Type of Home: House  Primary Care Giver: Self  Patient Support Systems include: Children   Current Financial resources: Medicaid  Current community resources:    Current services prior to admission: Durable Medical Equipment            Current DME: [facility]            Type of Home Care services:  None    ADLS  Prior functional level: Independent in ADLs/IADLs  Current functional level: Independent in ADLs/IADLs    Family can provide assistance at DC: Yes  Would you like Case Management to discuss the discharge plan with any other family members/significant others, and if so, who?  No  Plans to Return to Present Housing: Yes  Other Identified Issues/Barriers to RETURNING to current housing: None  Potential Assistance needed at discharge: N/A            Potential DME:    Patient expects to discharge to: [facility] for transportation at discharge: [address]: [address] [facility]  [address] / Plan: [facility] [address] / Product Type: *No Product type* /     Does insurance require precert for SNF: Yes    Potential assistance Purchasing Medications:    Meds-to-Beds request:        [facility] [id number] [address] [address]  Phone: [phone] Fax: [phone]      Notes:    Factors facilitating achievement of predicted outcomes: Motivated, Cooperative, and Pleasant    Barriers to discharge: Medical complications and Medication managment    Additional Case Management Notes: Pt admitted through ER with c/o chest pain and cough. Tele, on room air,     Procedure: 12/15: CTA chest:   1. No evidence of pulmonary embolism. 2. Mild cardiomegaly. Coronary artery and pericardial or calcification. .   3. Thoracic spondylosis. 4. Mild splenomegaly. Punctate calcification in the spleen. 5. Small hiatal hernia       The Plan for Transition of Care is related to the following treatment goals of Chest pressure [R07.89]  Chest pain, unspecified type [R07.9]    Patient Goals/Plan/Treatment Preferences: Spoke with pt. She lives home with family and plans to return. Verified PCP and insurance. Declines DME need for discharge. Will continue to follow for home going needs. Transportation/Food Security/Housekeeping Addressed: No issues identified.      Daryn Santos RN  Case Management Department

## 2022-12-16 NOTE — DISCHARGE SUMMARY
Discharge Summary     Patient Identification:  Aris Mercado  : 1970  MRN: [id number]   Account: [de-identified]     Admit date: 12/15/2022  Discharge date: 22   Attending provider: Sarah Aranda DO        Primary care provider: SHANNON Killian - CNP     Discharge Diagnoses:   Principal Problem:    Chest pressure  Resolved Problems:    * No resolved hospital problems. *    Chest pressure/pain, likely noncardiogenic: Most likely musculoskeletal 2/2 history of coughing with patient's influenza A. Patient is not having any typical chest pain symptoms. Troponin x2 negative. EKG shows normal sinus rhythm with no signs of ischemia. Chest x-ray shows no acute thoracic process and CTA no evidence of PE. On physical exam patient has tenderness to palpation of third intercostal space on the right side. Influenza A : (+) by PCR. Onset of symptoms started 13 days ago and worsened over last 2 days with chest pressure and worsening cough. - Patient is out of the window for Tamiflu. - Supportive care  - Robitussin DM for cough  - Encourage deep breathing  - Acapella      Multivessel CAD - s/p PCI and XENIA (3/2022) to mid-circumflex artery, OM1 and right posterior descending artery. ECHO 22 EF=55-60% without LV dysfunction. Stress test 2022 negative. Follows with Dr. Kim Jones outpatient. - Continue Toprol XL and Plavix  Factor V Leiden - Noted with Hx of DVT x3 and PE x3. CTA of cheat today (12/15/22) (-) for PE. Has IVC filter and takes Xarelto at home reporting excellent compliance. - Resume Xarelto  NIDDM2 - Uncontrolled. A1c=8.4% (2019). Home regimen Januvia, held.   - A1c, 7.5  - Medium dose SSI, hypoglycemia protocol, POCT glucose  - Resume gabapentin for neuropathic pain  Microcytic anemia - Hgb at baseline without s/s of bleeding.  -Continue monitor with CBC  Primary HTN - Controlled  -Toprol XL  Hyperlipidemia  -Lipitor 80 mg     Hospital Course:   Aris Mercado is a 50 y.o. female with PMHx of factor V Leiden, DVT x3, PE x3, multivessel CAD, NIDDM 2, anemia, hypertension, and hyperlipidemia who presented to [address] [place] [address] with complaint of dyspnea on exertion and chest pressure that have worsened over the past 3 days. She reports that over the last 24 hours the chest pressure began to radiate towards her bilateral arms causing numbness and tingling down both arms to the tips of the fingers bilaterally. She describes the chest pressure as heavy, intermittent, and worse with coughing and deep inspiration. The associated symptom of cough began approximately 10 days ago with fatigue and malaise. Patient denies nausea, vomiting, diaphoresis, jaw or neck pain with this chest pressure. She denies fever, chills, headache, lightheadedness, change in vision, rhinorrhea, congestion, sore throat, cough, hemoptysis, palpitations, PND, abdominal pain, nausea, vomiting, hematemesis, change in bowel/bladder habits, hematochezia, hematuria, weakness, difficulty with ambulation, or known exposure to sick contacts. ED course: Upon arrival to the emergency department the patient underwent CTA of the chest without evidence of pulmonary embolism. EKG completed demonstrating normal sinus rhythm without ST or T wave abnormalities. Initial troponin was negative. Subjective: Past 24 hours  Patient is evaluated bedside this morning. Vital signs stable. She is afebrile. Patient reports her chest pain/chest pressure has improved she is not having it currently. She denies shortness of breath. Patient does have a cough but is nonproductive. Patient reports she tested positive for the flu 13 days ago. It has been going around her family with kids and grandkids having similar symptoms. She denies fever, chills, headache, dizziness, weakness, chest pain, palpitations, shortness of breath. She still has a nonproductive cough. No abdominal pain nausea, vomiting or swelling in her legs.   Patient was offered Tamiflu in the ER and she denies this. She states she has been given the Acapella and she will try to use this 3 times an hour doing it 10 times. Discharge Medications:     Medication List        CONTINUE taking these medications      acetaminophen 325 MG tablet  Commonly known as: TYLENOL  Take 2 tablets by mouth every 4 hours as needed for Pain     albuterol sulfate  (90 Base) MCG/ACT inhaler  Commonly known as: Ventolin HFA  Inhale 2 puffs into the lungs 4 times daily as needed for Wheezing     atorvastatin 80 MG tablet  Commonly known as: LIPITOR  Take 1 tablet by mouth at bedtime     benzonatate 100 MG capsule  Commonly known as: Tessalon Perles  Take 2 capsules by mouth 3 times daily as needed for Cough     blood glucose test strips strip  Commonly known as: TrueTrack Test  1 each by In Vitro route 2 times daily As needed. Blood Pressure Kit  1 kit by Does not apply route as needed (PRN)     clopidogrel 75 MG tablet  Commonly known as: PLAVIX  Take 1 tablet by mouth daily     dextromethorphan-guaiFENesin  MG per extended release tablet  Commonly known as: MUCINEX DM  Take 1 tablet by mouth every 12 hours as needed for Cough or Congestion     Januvia 100 MG tablet  Generic drug: SITagliptin     megestrol 40 MG tablet  Commonly known as: MEGACE  Take 1 tablet by mouth 2 times daily     metoprolol succinate 25 MG extended release tablet  Commonly known as: TOPROL XL  Take 1.5 tablets by mouth daily     Xarelto 20 MG Tabs tablet  Generic drug: rivaroxaban            STOP taking these medications      amoxicillin 250 MG capsule  Commonly known as: AMOXIL            ASK your doctor about these medications      gabapentin 600 MG tablet  Commonly known as: NEURONTIN     nitroGLYCERIN 0.4 MG SL tablet  Commonly known as: NITROSTAT  up to max of 3 total doses. If no relief after 1 dose, call 911. Patient Instructions:      Diet: ADULT DIET;  Regular; 4 carb choices (60 gm/meal) Code Status: Full Code    Follow-up visits:   Aj Ramirez, APRN - CNP  [address] [facility] [address]  [phone]    Follow up on 1/6/2023  Follow up appointment January 6, 2023 at [address] [place] [address]  [phone]    Follow up in 1 week(s)  Hospital follow-up     Examination:  Vitals:  Vitals:    12/16/22 0554 12/16/22 0757 12/16/22 0808 12/16/22 1140   BP:  (!) 112/53  128/62   Pulse:  68  64   Resp:  18  17   Temp:  97.4 °F (36.3 °C)  97.7 °F (36.5 °C)   TempSrc:  Oral  Oral   SpO2:  96% 99% 99%   Weight: 288 lb (130.6 kg)      Height:         Weight: Weight: 288 lb (130.6 kg)     24 hour intake/output:  Intake/Output Summary (Last 24 hours) at 12/16/2022 1321  Last data filed at 12/16/2022 1140  Gross per 24 hour   Intake 180 ml   Output --   Net 180 ml       General appearance - alert, well appearing, and in no distress, oriented to person, place, and time, overweight, and acyanotic, in no respiratory distress  Chest - clear to auscultation, no wheezes, rales or rhonchi, symmetric air entry, no tachypnea, retractions or cyanosis, chest wall tenderness noted third intercostal space on the right side  Heart - normal rate, regular rhythm, normal S1, S2, no murmurs, rubs, clicks or gallops  Abdomen - soft, nontender, nondistended, no masses or organomegaly  Obese: Yes; Protuberant: Yes   Neurological - alert, oriented, normal speech, no focal findings or movement disorder noted, cranial nerves II through XII intact  Extremities - peripheral pulses normal, no pedal edema, no clubbing or cyanosis  Skin - normal coloration and turgor, no rashes, no suspicious skin lesions noted    Significant Diagnostics:   Radiology: CTA CHEST W WO CONTRAST    Result Date: 12/15/2022  PROCEDURE: CTA CHEST W WO CONTRAST CLINICAL INFORMATION: Influenza A infection, Hx of Factor V Leiden, PE, DVT. COMPARISON: CTA  of the chest dated 15th of September 2022.  Chest x-ray obtained on the same day. TECHNIQUE: 3 mm axial images were obtained through the chest after the administration of IV contrast.  A non-contrast localizer was obtained. 3D reconstructions were performed on the scanner to include MIP coronal and sagittal images through the chest. Isovue was the intravenous contrast utilized. All CT scans at this facility use dose modulation, iterative reconstruction, and/or weight-based dosing when appropriate to reduce radiation dose to as low as reasonably achievable. FINDINGS: There is adequate opacification of the pulmonary arterial system. No pulmonary emboli are present. There is atherosclerotic calcification in the aortic arch. . There is mild cardiomegaly. There is coronary artery and pericardial calcification. . There is no pericardial or pleural effusion. There is no mediastinal, axillary or hilar adenopathy. The lungs are clear. There are no infiltrates. There is thoracic spondylosis. .  There is mild splenomegaly. There is punctate calcification in the spleen. There is a small hiatal hernia. .      1. No evidence of pulmonary embolism. 2. Mild cardiomegaly. Coronary artery and pericardial or calcification. . 3. Thoracic spondylosis. 4. Mild splenomegaly. Punctate calcification in the spleen. 5. Small hiatal hernia. **This report has been created using voice recognition software. It may contain minor errors which are inherent in voice recognition technology. ** Final report electronically signed by DR Lucas Yates on 12/15/2022 3:13 PM    XR CHEST PORTABLE    Result Date: 12/15/2022  PROCEDURE: XR CHEST PORTABLE CLINICAL INFORMATION: Chest pain. COMPARISON: Chest x-ray 11/11/2022. TECHNIQUE: AP portable chest radiograph performed. FINDINGS: Lines/tubes: None. Heart/mediastinum: The heart size is normal. The pulmonary vascularity is unremarkable. Lungs: Low lung volumes are present. No focal consolidation, pleural effusion, or pneumothorax is observed. Bones:  The visualized skeletal structures appear intact. No acute intrathoracic process. **This report has been created using voice recognition software. It may contain minor errors which are inherent in voice recognition technology. ** Final report electronically signed by Dr Radha Vásquez on 12/15/2022 12:08 PM    Labs:   Recent Results (from the past 72 hour(s))   EKG 12 Lead    Collection Time: 12/15/22 11:32 AM   Result Value Ref Range    Ventricular Rate 81 BPM    Atrial Rate 81 BPM    P-R Interval 176 ms    QRS Duration 88 ms    Q-T Interval 364 ms    QTc Calculation (Bazett) 422 ms    P Axis 41 degrees    R Axis 12 degrees    T Axis 54 degrees   COVID-19 & Influenza Combo    Collection Time: 12/15/22 12:20 PM    Specimen: Nasopharyngeal Swab   Result Value Ref Range    SARS-CoV-2 RNA, RT PCR NOT DETECTED NOT DETECTED    INFLUENZA A DETECTED (AA) NOT DETECTED    INFLUENZA B NOT DETECTED NOT DETECTED   Basic Metabolic Panel w/ Reflex to MG    Collection Time: 12/15/22  1:15 PM   Result Value Ref Range    Sodium 139 135 - 145 meq/L    Potassium reflex Magnesium 4.1 3.5 - 5.2 meq/L    Chloride 104 98 - 111 meq/L    CO2 20 (L) 23 - 33 meq/L    Glucose 237 (H) 70 - 108 mg/dL    BUN 8 7 - 22 mg/dL    Creatinine 0.6 0.4 - 1.2 mg/dL    Calcium 9.3 8.5 - 10.5 mg/dL   Brain Natriuretic Peptide    Collection Time: 12/15/22  1:15 PM   Result Value Ref Range    Pro-BNP 63.6 0.0 - 900.0 pg/mL   CBC with Auto Differential    Collection Time: 12/15/22  1:15 PM   Result Value Ref Range    WBC 3.5 (L) 4.8 - 10.8 thou/mm3    RBC 3.92 (L) 4.20 - 5.40 mill/mm3    Hemoglobin 9.2 (L) 12.0 - 16.0 gm/dl    Hematocrit 30.6 (L) 37.0 - 47.0 %    MCV 78.1 (L) 81.0 - 99.0 fL    MCH 23.5 (L) 26.0 - 33.0 pg    MCHC 30.1 (L) 32.2 - 35.5 gm/dl    RDW-CV 15.7 (H) 11.5 - 14.5 %    RDW-SD 44.4 35.0 - 45.0 fL    Platelets 461 137 - 082 thou/mm3    MPV 9.7 9.4 - 12.4 fL    Seg Neutrophils 72.0 %    Lymphocytes 21.6 %    Monocytes 4.6 %    Eosinophils 0.9 %    Basophils 0.6 % Immature Granulocytes 0.3 %    Segs Absolute 2.5 1.8 - 7.7 thou/mm3    Lymphocytes Absolute 0.8 (L) 1.0 - 4.8 thou/mm3    Monocytes Absolute 0.2 (L) 0.4 - 1.3 thou/mm3    Eosinophils Absolute 0.0 0.0 - 0.4 thou/mm3    Basophils Absolute 0.0 0.0 - 0.1 thou/mm3    Immature Grans (Abs) 0.01 0.00 - 0.07 thou/mm3    nRBC 0 /100 wbc   Troponin    Collection Time: 12/15/22  1:15 PM   Result Value Ref Range    Troponin T < 0.010 ng/ml   TSH with Reflex    Collection Time: 12/15/22  1:15 PM   Result Value Ref Range    TSH 0.607 0.400 - 4.200 uIU/mL   Hepatic Function Panel    Collection Time: 12/15/22  1:15 PM   Result Value Ref Range    Albumin 4.0 3.5 - 5.1 g/dL    Total Bilirubin 0.3 0.3 - 1.2 mg/dL    Bilirubin, Direct <0.2 0.0 - 0.3 mg/dL    Alkaline Phosphatase 76 38 - 126 U/L    AST 15 5 - 40 U/L    ALT 12 11 - 66 U/L    Total Protein 6.5 6.1 - 8.0 g/dL   Anion Gap    Collection Time: 12/15/22  1:15 PM   Result Value Ref Range    Anion Gap 15.0 8.0 - 16.0 meq/L   Glomerular Filtration Rate, Estimated    Collection Time: 12/15/22  1:15 PM   Result Value Ref Range    Est, Glom Filt Rate >60 >60 ml/min/1.73m2   Osmolality    Collection Time: 12/15/22  1:15 PM   Result Value Ref Range    Osmolality Calc 283.6 275.0 - 300.0 mOsmol/kg   Hemoglobin A1C    Collection Time: 12/15/22  1:15 PM   Result Value Ref Range    Hemoglobin A1C 7.5 (H) 4.4 - 6.4 %    AVERAGE GLUCOSE 165 (H) 70 - 126 mg/dL   Troponin    Collection Time: 12/15/22  4:37 PM   Result Value Ref Range    Troponin T < 0.010 ng/ml   POCT glucose    Collection Time: 12/15/22  6:11 PM   Result Value Ref Range    POC Glucose 215 (H) 70 - 108 mg/dl   POCT glucose    Collection Time: 12/15/22  8:06 PM   Result Value Ref Range    POC Glucose 245 (H) 70 - 108 mg/dl   CBC    Collection Time: 12/16/22  8:13 AM   Result Value Ref Range    WBC 3.8 (L) 4.8 - 10.8 thou/mm3    RBC 3.83 (L) 4.20 - 5.40 mill/mm3    Hemoglobin 8.8 (L) 12.0 - 16.0 gm/dl    Hematocrit 29.3 (L) 37.0 - 47.0 %    MCV 76.5 (L) 81.0 - 99.0 fL    MCH 23.0 (L) 26.0 - 33.0 pg    MCHC 30.0 (L) 32.2 - 35.5 gm/dl    RDW-CV 15.7 (H) 11.5 - 14.5 %    RDW-SD 43.4 35.0 - 45.0 fL    Platelets 913 432 - 542 thou/mm3    MPV 10.1 9.4 - 12.4 fL   Basic Metabolic Panel w/ Reflex to MG    Collection Time: 12/16/22  8:13 AM   Result Value Ref Range    Sodium 140 135 - 145 meq/L    Potassium reflex Magnesium 4.0 3.5 - 5.2 meq/L    Chloride 105 98 - 111 meq/L    CO2 20 (L) 23 - 33 meq/L    Glucose 146 (H) 70 - 108 mg/dL    BUN 8 7 - 22 mg/dL    Creatinine 0.5 0.4 - 1.2 mg/dL    Calcium 9.0 8.5 - 10.5 mg/dL   Anion Gap    Collection Time: 12/16/22  8:13 AM   Result Value Ref Range    Anion Gap 15.0 8.0 - 16.0 meq/L   Glomerular Filtration Rate, Estimated    Collection Time: 12/16/22  8:13 AM   Result Value Ref Range    Est, Glom Filt Rate >60 >60 ml/min/1.73m2   POCT glucose    Collection Time: 12/16/22  8:21 AM   Result Value Ref Range    POC Glucose 156 (H) 70 - 108 mg/dl   POCT glucose    Collection Time: 12/16/22 12:36 PM   Result Value Ref Range    POC Glucose 195 (H) 70 - 108 mg/dl     Discharge condition: stable  Disposition: Home  Time spent on discharge: 30 minutes    Electronically signed by Griselda Fall DO PGY-1 on 12/16/2022 at 1:21 PM   This case was discussed with attending, Dr. Marlin Jacobo DO

## 2022-12-20 ENCOUNTER — HOSPITAL ENCOUNTER (OUTPATIENT)
Age: 52
Setting detail: SPECIMEN
Discharge: HOME | End: 2022-12-20

## 2022-12-22 LAB
CULTURE: ABNORMAL
SPECIMEN DESCRIPTION: ABNORMAL

## 2023-01-01 ENCOUNTER — HOSPITAL ENCOUNTER (INPATIENT)
Age: 53
LOS: 2 days | Discharge: ANOTHER HOSPITAL | DRG: 532 | End: 2023-01-03
Attending: FAMILY MEDICINE | Admitting: STUDENT IN AN ORGANIZED HEALTH CARE EDUCATION/TRAINING PROGRAM
Payer: COMMERCIAL

## 2023-01-01 DIAGNOSIS — N93.9 VAGINAL BLEEDING: Primary | ICD-10-CM

## 2023-01-01 DIAGNOSIS — Z79.01 ON ANTICOAGULANT THERAPY: ICD-10-CM

## 2023-01-01 DIAGNOSIS — D68.51 FACTOR 5 LEIDEN MUTATION, HETEROZYGOUS (HCC): ICD-10-CM

## 2023-01-01 DIAGNOSIS — D62 ANEMIA DUE TO BLOOD LOSS, ACUTE: ICD-10-CM

## 2023-01-01 PROBLEM — D64.9 SYMPTOMATIC ANEMIA: Status: ACTIVE | Noted: 2023-01-01

## 2023-01-01 LAB
ABO: NORMAL
ALBUMIN SERPL-MCNC: 3.9 G/DL (ref 3.5–5.1)
ALP BLD-CCNC: 78 U/L (ref 38–126)
ALT SERPL-CCNC: 8 U/L (ref 11–66)
ANION GAP SERPL CALCULATED.3IONS-SCNC: 15 MEQ/L (ref 8–16)
ANTIBODY SCREEN: NORMAL
APTT: 34.7 SECONDS (ref 22–38)
AST SERPL-CCNC: 8 U/L (ref 5–40)
BASOPHILIA: ABNORMAL
BASOPHILS # BLD: 0.8 %
BASOPHILS ABSOLUTE: 0 THOU/MM3 (ref 0–0.1)
BILIRUB SERPL-MCNC: 0.3 MG/DL (ref 0.3–1.2)
BUN BLDV-MCNC: 8 MG/DL (ref 7–22)
CALCIUM SERPL-MCNC: 8.7 MG/DL (ref 8.5–10.5)
CHLORIDE BLD-SCNC: 101 MEQ/L (ref 98–111)
CO2: 20 MEQ/L (ref 23–33)
CREAT SERPL-MCNC: 0.7 MG/DL (ref 0.4–1.2)
EOSINOPHIL # BLD: 1.7 %
EOSINOPHILS ABSOLUTE: 0.1 THOU/MM3 (ref 0–0.4)
ERYTHROCYTE [DISTWIDTH] IN BLOOD BY AUTOMATED COUNT: 15.9 % (ref 11.5–14.5)
ERYTHROCYTE [DISTWIDTH] IN BLOOD BY AUTOMATED COUNT: 43.2 FL (ref 35–45)
GFR SERPL CREATININE-BSD FRML MDRD: > 60 ML/MIN/1.73M2
GLUCOSE BLD-MCNC: 270 MG/DL (ref 70–108)
HCT VFR BLD CALC: 25.2 % (ref 37–47)
HEMOGLOBIN: 7.2 GM/DL (ref 12–16)
HYPOCHROMIA: PRESENT
IMMATURE GRANS (ABS): 0.02 THOU/MM3 (ref 0–0.07)
IMMATURE GRANULOCYTES: 0.4 %
INR BLD: 1.92 (ref 0.85–1.13)
LYMPHOCYTES # BLD: 19 %
LYMPHOCYTES ABSOLUTE: 1 THOU/MM3 (ref 1–4.8)
MCH RBC QN AUTO: 21.5 PG (ref 26–33)
MCHC RBC AUTO-ENTMCNC: 28.6 GM/DL (ref 32.2–35.5)
MCV RBC AUTO: 75.2 FL (ref 81–99)
MONOCYTES # BLD: 6.4 %
MONOCYTES ABSOLUTE: 0.3 THOU/MM3 (ref 0.4–1.3)
NUCLEATED RED BLOOD CELLS: 0 /100 WBC
OSMOLALITY CALCULATION: 279.8 MOSMOL/KG (ref 275–300)
PLATELET # BLD: 312 THOU/MM3 (ref 130–400)
PMV BLD AUTO: 10.3 FL (ref 9.4–12.4)
POTASSIUM REFLEX MAGNESIUM: 3.8 MEQ/L (ref 3.5–5.2)
RBC # BLD: 3.35 MILL/MM3 (ref 4.2–5.4)
RH FACTOR: NORMAL
SCAN OF BLOOD SMEAR: NORMAL
SEG NEUTROPHILS: 71.7 %
SEGMENTED NEUTROPHILS ABSOLUTE COUNT: 3.7 THOU/MM3 (ref 1.8–7.7)
SODIUM BLD-SCNC: 136 MEQ/L (ref 135–145)
TOTAL PROTEIN: 6.6 G/DL (ref 6.1–8)
TROPONIN T: < 0.01 NG/ML
WBC # BLD: 5.2 THOU/MM3 (ref 4.8–10.8)

## 2023-01-01 PROCEDURE — 6370000000 HC RX 637 (ALT 250 FOR IP): Performed by: INTERNAL MEDICINE

## 2023-01-01 PROCEDURE — 86850 RBC ANTIBODY SCREEN: CPT

## 2023-01-01 PROCEDURE — 2580000003 HC RX 258: Performed by: INTERNAL MEDICINE

## 2023-01-01 PROCEDURE — 2500000003 HC RX 250 WO HCPCS: Performed by: INTERNAL MEDICINE

## 2023-01-01 PROCEDURE — 93005 ELECTROCARDIOGRAM TRACING: CPT | Performed by: NURSE PRACTITIONER

## 2023-01-01 PROCEDURE — 86901 BLOOD TYPING SEROLOGIC RH(D): CPT

## 2023-01-01 PROCEDURE — 36415 COLL VENOUS BLD VENIPUNCTURE: CPT

## 2023-01-01 PROCEDURE — 80053 COMPREHEN METABOLIC PANEL: CPT

## 2023-01-01 PROCEDURE — 99285 EMERGENCY DEPT VISIT HI MDM: CPT

## 2023-01-01 PROCEDURE — 86900 BLOOD TYPING SEROLOGIC ABO: CPT

## 2023-01-01 PROCEDURE — 84484 ASSAY OF TROPONIN QUANT: CPT

## 2023-01-01 PROCEDURE — 85730 THROMBOPLASTIN TIME PARTIAL: CPT

## 2023-01-01 PROCEDURE — 1200000000 HC SEMI PRIVATE

## 2023-01-01 PROCEDURE — 85025 COMPLETE CBC W/AUTO DIFF WBC: CPT

## 2023-01-01 PROCEDURE — P9016 RBC LEUKOCYTES REDUCED: HCPCS

## 2023-01-01 PROCEDURE — 86923 COMPATIBILITY TEST ELECTRIC: CPT

## 2023-01-01 PROCEDURE — 85610 PROTHROMBIN TIME: CPT

## 2023-01-01 RX ORDER — POLYETHYLENE GLYCOL 3350 17 G/17G
17 POWDER, FOR SOLUTION ORAL DAILY PRN
Status: DISCONTINUED | OUTPATIENT
Start: 2023-01-01 | End: 2023-01-03 | Stop reason: HOSPADM

## 2023-01-01 RX ORDER — SODIUM CHLORIDE 0.9 % (FLUSH) 0.9 %
5-40 SYRINGE (ML) INJECTION EVERY 12 HOURS SCHEDULED
Status: DISCONTINUED | OUTPATIENT
Start: 2023-01-01 | End: 2023-01-03 | Stop reason: HOSPADM

## 2023-01-01 RX ORDER — ATORVASTATIN CALCIUM 80 MG/1
80 TABLET, FILM COATED ORAL NIGHTLY
Status: DISCONTINUED | OUTPATIENT
Start: 2023-01-01 | End: 2023-01-03 | Stop reason: HOSPADM

## 2023-01-01 RX ORDER — MEGESTROL ACETATE 40 MG/1
40 TABLET ORAL 2 TIMES DAILY
Status: DISCONTINUED | OUTPATIENT
Start: 2023-01-01 | End: 2023-01-02

## 2023-01-01 RX ORDER — SODIUM CHLORIDE 0.9 % (FLUSH) 0.9 %
10 SYRINGE (ML) INJECTION PRN
Status: DISCONTINUED | OUTPATIENT
Start: 2023-01-01 | End: 2023-01-03 | Stop reason: HOSPADM

## 2023-01-01 RX ORDER — FENTANYL CITRATE 50 UG/ML
INJECTION, SOLUTION INTRAMUSCULAR; INTRAVENOUS
Status: DISCONTINUED
Start: 2023-01-01 | End: 2023-01-01 | Stop reason: WASHOUT

## 2023-01-01 RX ORDER — METOPROLOL SUCCINATE 25 MG/1
25 TABLET, EXTENDED RELEASE ORAL DAILY
Status: DISCONTINUED | OUTPATIENT
Start: 2023-01-02 | End: 2023-01-03 | Stop reason: HOSPADM

## 2023-01-01 RX ORDER — MEGESTROL ACETATE 40 MG/1
40 TABLET ORAL 3 TIMES DAILY
Status: DISCONTINUED | OUTPATIENT
Start: 2023-01-01 | End: 2023-01-01 | Stop reason: ALTCHOICE

## 2023-01-01 RX ORDER — TRANEXAMIC ACID 10 MG/ML
1000 INJECTION, SOLUTION INTRAVENOUS ONCE
Status: COMPLETED | OUTPATIENT
Start: 2023-01-01 | End: 2023-01-01

## 2023-01-01 RX ORDER — ACETAMINOPHEN 325 MG/1
650 TABLET ORAL EVERY 6 HOURS PRN
Status: DISCONTINUED | OUTPATIENT
Start: 2023-01-01 | End: 2023-01-03 | Stop reason: HOSPADM

## 2023-01-01 RX ORDER — ONDANSETRON 2 MG/ML
4 INJECTION INTRAMUSCULAR; INTRAVENOUS EVERY 6 HOURS PRN
Status: DISCONTINUED | OUTPATIENT
Start: 2023-01-01 | End: 2023-01-03 | Stop reason: HOSPADM

## 2023-01-01 RX ORDER — ONDANSETRON 4 MG/1
4 TABLET, ORALLY DISINTEGRATING ORAL EVERY 8 HOURS PRN
Status: DISCONTINUED | OUTPATIENT
Start: 2023-01-01 | End: 2023-01-03 | Stop reason: HOSPADM

## 2023-01-01 RX ORDER — ACETAMINOPHEN 650 MG/1
650 SUPPOSITORY RECTAL EVERY 6 HOURS PRN
Status: DISCONTINUED | OUTPATIENT
Start: 2023-01-01 | End: 2023-01-03 | Stop reason: HOSPADM

## 2023-01-01 RX ORDER — SODIUM CHLORIDE 9 MG/ML
INJECTION, SOLUTION INTRAVENOUS PRN
Status: DISCONTINUED | OUTPATIENT
Start: 2023-01-01 | End: 2023-01-03 | Stop reason: HOSPADM

## 2023-01-01 RX ORDER — TRANEXAMIC ACID 100 MG/ML
15 INJECTION, SOLUTION INTRAVENOUS ONCE
Status: DISCONTINUED | OUTPATIENT
Start: 2023-01-01 | End: 2023-01-01

## 2023-01-01 RX ADMIN — SODIUM CHLORIDE, PRESERVATIVE FREE 10 ML: 5 INJECTION INTRAVENOUS at 22:29

## 2023-01-01 RX ADMIN — TRANEXAMIC ACID 1000 MG: 10 INJECTION, SOLUTION INTRAVENOUS at 20:05

## 2023-01-01 RX ADMIN — MEGESTROL ACETATE 40 MG: 40 TABLET ORAL at 22:26

## 2023-01-01 RX ADMIN — ATORVASTATIN CALCIUM 80 MG: 80 TABLET, FILM COATED ORAL at 22:27

## 2023-01-01 ASSESSMENT — ENCOUNTER SYMPTOMS
RHINORRHEA: 0
EYE PAIN: 0
COLOR CHANGE: 0
NAUSEA: 0
DIARRHEA: 0
SHORTNESS OF BREATH: 0
CONSTIPATION: 0
EYE DISCHARGE: 0
VOMITING: 0
ABDOMINAL DISTENTION: 0
WHEEZING: 0
SORE THROAT: 0
COUGH: 0

## 2023-01-01 NOTE — ED NOTES
Patient in bed and talking with family on the phone. Patient provided education regarding current situation with pt plan of care. Patient expresses need for water. Patient VSS and patient does not appear to be in any current distress at this time. Will continue to monitor. Call light within reach.        Nino Curiel RN  01/01/23 3008

## 2023-01-01 NOTE — ED PROVIDER NOTES
EMERGENCY MEDICINE     Pt Name: Quang Hunt  MRN: [id number]  [place] 1970  Date of evaluation: 1/1/2023  Treating Resident Physician: Belén Burns MD  Supervising Physician: Liliana Cornejo MD     CHIEF COMPLAINT       Chief Complaint   Patient presents with    Vaginal Bleeding     History obtained from the patient. HISTORY OF PRESENT ILLNESS    HPI    Quang Hunt is a 50 y.o. female with PMHX significant for factor 5 Leiden mutation, PE, CAD, CVA and leiomyoma presents to the emergency department for evaluation of vaginal bleeding. States that she has had vaginal bleeding for the last 4 days. Today she became lightheaded and short of breath so came to the ED for evaluation. Patient states she has had similar symptoms in the past requiring a blood fusion in April and October of this year. Patient states she had been on hormone therapy and then had a Mirena placed approximately a month and a half ago. States that when the Mirena was placed she was told to stop hormone therapy. Patient has since stated that the Mirena has came out . States that she was post to have a hysterectomy at the [facility] due to high risk of complication secondary to anticoagulation but has not had this scheduled as of yet. The patient has no other acute complaints at this time. REVIEW OF SYSTEMS   Review of Systems   Constitutional:  Negative for fatigue and fever. HENT:  Negative for ear pain, rhinorrhea and sore throat. Eyes:  Negative for pain and discharge. Respiratory:  Negative for cough, shortness of breath and wheezing. Cardiovascular:  Negative for chest pain, palpitations and leg swelling. Gastrointestinal:  Negative for abdominal distention, constipation, diarrhea, nausea and vomiting. Endocrine: Negative for polydipsia and polyuria. Genitourinary:  Positive for vaginal bleeding. Negative for difficulty urinating and dysuria. Musculoskeletal:  Negative for arthralgias. Skin:  Negative for color change, pallor and rash. Neurological:  Negative for dizziness, seizures, syncope, weakness and numbness. Psychiatric/Behavioral:  Negative for agitation and confusion.         PAST MEDICAL AND SURGICAL HISTORY     Past Medical History:   Diagnosis Date    Asthma     Bipolar 1 disorder ([facility]     Blood circulation, collateral     CAD (coronary artery disease)     Curvature of spine     Diabetes mellitus (HCC)     DVT (deep venous thrombosis) (HCC)     Factor 5 Leiden mutation, heterozygous (HCC)     GERD (gastroesophageal reflux disease)     HTN, goal below 130/80     Hx of blood clots     PE x3 and DVT x3    Hx-TIA (transient ischemic attack)     Hyperlipidemia     PE (pulmonary embolism)     RA (rheumatoid arthritis) ([facility] [address]     Unspecified cerebral artery occlusion with cerebral infarction        Past Surgical History:   Procedure Laterality Date    CARDIAC CATHETERIZATION  feb 2015    Heart caths    CHOLECYSTECTOMY  1992    CORONARY ANGIOPLASTY WITH STENT PLACEMENT      DILATION AND CURETTAGE OF UTERUS N/A 11/14/2022    Hysteroscopy D&C, Mirena placement performed by Wes Machado DO at [address] [facility] Right 9/30/2019    FOOT DEBRIDEMENT INCISION AND DRAINAGE performed by Humaira Rust DPM at [address] [place] [address]  2007&2010    LIPOMA RESECTION      TONSILLECTOMY      TUBAL LIGATION  2003    TYMPANOSTOMY TUBE PLACEMENT         CURRENT MEDICATIONS     Current Discharge Medication List        CONTINUE these medications which have NOT CHANGED    Details   benzonatate (TESSALON PERLES) 100 MG capsule Take 2 capsules by mouth 3 times daily as needed for Cough  Qty: 30 capsule, Refills: 0      albuterol sulfate HFA (VENTOLIN HFA) 108 (90 Base) MCG/ACT inhaler Inhale 2 puffs into the lungs 4 times daily as needed for Wheezing  Qty: 54 g, Refills: 1      megestrol (MEGACE) 40 MG tablet Take 1 tablet by mouth 2 times daily  Qty: 60 tablet, Refills: 1      metoprolol succinate (TOPROL XL) 25 MG extended release tablet Take 1.5 tablets by mouth daily  Qty: 135 tablet, Refills: 1      atorvastatin (LIPITOR) 80 MG tablet Take 1 tablet by mouth at bedtime  Qty: 90 tablet, Refills: 2      clopidogrel (PLAVIX) 75 MG tablet Take 1 tablet by mouth daily  Qty: 90 tablet, Refills: 2      Blood Pressure KIT 1 kit by Does not apply route as needed (PRN)  Qty: 1 kit, Refills: 0      nitroGLYCERIN (NITROSTAT) 0.4 MG SL tablet up to max of 3 total doses. If no relief after 1 dose, call 911. Qty: 25 tablet, Refills: 3      XARELTO 20 MG TABS tablet take 1 tablet by mouth once daily      JANUVIA 100 MG tablet take 1 tablet by mouth once daily      gabapentin (NEURONTIN) 600 MG tablet take 1 tablet by mouth twice a day      acetaminophen (TYLENOL) 325 MG tablet Take 2 tablets by mouth every 4 hours as needed for Pain  Qty: 120 tablet, Refills: 3      blood glucose test strips (TRUETRACK TEST) strip 1 each by In Vitro route 2 times daily As needed. Qty: 100 each, Refills: 0             ALLERGIES     Allergies   Allergen Reactions    Codeine Hives     + Nausea vomiting    Demerol      vomiting    Ultram [Tramadol Hcl] Other (See Comments)     Dizziness     Percocet [Oxycodone-Acetaminophen] Nausea And Vomiting    Toradol [Ketorolac Tromethamine] Rash       FAMILY HISTORY     Family History   Problem Relation Age of Onset    COPD Mother     Other Mother     Cancer Mother     High Blood Pressure Father     Heart Disease Father     Mental Illness Sister     Mental Illness Brother     Mental Illness Sister     Mental Illness Brother        SOCIAL HISTORY     Social History     Tobacco Use    Smoking status: Former     Packs/day: 2.00     Years: 14.00     Pack years: 28.00     Types: Cigarettes     Quit date:      Years since quittin.0     Passive exposure: Never    Smokeless tobacco: Never   Vaping Use    Vaping Use: Never used   Substance Use Topics    Alcohol use: No    Drug use:  No PHYSICAL EXAM     ED Triage Vitals [01/01/23 1450]   BP Temp Temp Source Heart Rate Resp SpO2 Height Weight   132/71 97.6 °F (36.4 °C) Oral (!) 101 20 100 % 5' 8\" (1.727 m) 284 lb (128.8 kg)       Initial vital signs and nursing assessment reviewed and normal. Body mass index is 43.18 kg/m². Pulsoximetry is normal per my interpretation. Physical Exam  Constitutional:       Appearance: Normal appearance. HENT:      Head: Normocephalic. Right Ear: External ear normal.      Left Ear: External ear normal.      Nose: Nose normal.      Mouth/Throat:      Mouth: Mucous membranes are moist.      Pharynx: Oropharynx is clear. Eyes:      Extraocular Movements: Extraocular movements intact. Conjunctiva/sclera: Conjunctivae normal.      Pupils: Pupils are equal, round, and reactive to light. Cardiovascular:      Rate and Rhythm: Normal rate and regular rhythm. Pulses: Normal pulses. Heart sounds: Normal heart sounds. Pulmonary:      Effort: Pulmonary effort is normal.      Breath sounds: Normal breath sounds. Abdominal:      General: Bowel sounds are normal.      Palpations: Abdomen is soft. Genitourinary:     Comments: Patient has bright red blood in vaginal vault. Patient cervix is closed. No CMT or adnexal tenderness noted. Musculoskeletal:         General: Normal range of motion. Cervical back: Normal range of motion and neck supple. Skin:     General: Skin is warm and dry. Capillary Refill: Capillary refill takes less than 2 seconds. Neurological:      General: No focal deficit present. Mental Status: She is alert and oriented to person, place, and time. Psychiatric:         Mood and Affect: Mood normal.         Behavior: Behavior normal.          FORMAL DIAGNOSTIC RESULTS     RADIOLOGY: Interpretation per the Radiologist below, if available at the time of this note (none if blank):     No orders to display       LABS: (none if blank)  Labs Reviewed   CBC WITH AUTO DIFFERENTIAL - Abnormal; Notable for the following components:       Result Value    RBC 3.35 (*)     Hemoglobin 7.2 (*)     Hematocrit 25.2 (*)     MCV 75.2 (*)     MCH 21.5 (*)     MCHC 28.6 (*)     RDW-CV 15.9 (*)     Monocytes Absolute 0.3 (*)     All other components within normal limits   COMPREHENSIVE METABOLIC PANEL W/ REFLEX TO MG FOR LOW K - Abnormal; Notable for the following components:    Glucose 270 (*)     CO2 20 (*)     ALT 8 (*)     All other components within normal limits   PROTIME-INR - Abnormal; Notable for the following components:    INR 1.92 (*)     All other components within normal limits   TROPONIN   APTT   ANION GAP   GLOMERULAR FILTRATION RATE, ESTIMATED   OSMOLALITY   SCAN OF BLOOD SMEAR   COMPREHENSIVE METABOLIC PANEL W/ REFLEX TO MG FOR LOW K   CBC WITH AUTO DIFFERENTIAL   TYPE AND SCREEN       (Any cultures that may have been sent were not resulted at the time of this patient visit)    MEDICAL DECISION MAKING     1) Number and Complexity of Problems            Problem List This Visit:         Chief Complaint   Patient presents with    Vaginal Bleeding            Differential Diagnosis includes (but not limited to):  Vaginal bleeding, anemia, dysfunctional uterine bleeding, leiomyoma, uterine cancer, complication secondary to anticoagulation. Pertinent Comorbid Conditions:    5 Leiden disorder, previous history of DVT and PE. 2)  Data Reviewed (none if left blank)          My Independent interpretations:     EKG: Incomplete right bundle branch block      Labs:     Patient noted with anemia hemoglobin of 7.2 and 25.2 hematocrit. Patient has also elevated INR of 1.92 slightly below  therapeutic range. External Documentation Reviewed:         Previous patient encounter documents & history available on EMR was reviewed    . See Formal Diagnostic Results above for the lab and radiology tests and orders.     3)  Treatment and Disposition         ED Reassessment: Patient does state that when she is at rest she no longer feels lightheaded or short of breath. Case discussed with consulting clinician: Dr. Kwabena lOmos on-call for Dr. Tino Landis and he recommended mission to [facility] team for management of anticoagulation and anemia and he will see in consult. He recommended also that patient get Megace 40 mg 3 times daily. Shared Decision-Making was performed and disposition discussed with the patient and questions answered         Social determinants of health impacting treatment or disposition: none          Code Status:  Full       Summary of Patient Presentation:      [facility]    Vitals Reviewed:    Vitals:    01/01/23 1603 01/01/23 1658 01/01/23 1800 01/01/23 1945   BP: 123/65 (!) 114/58  (!) 116/57   Pulse: 88 89 86 76   Resp: 23 19 24 18   Temp:    98.1 °F (36.7 °C)   TempSrc:    Oral   SpO2: 100% 100% 97% 100%   Weight:       Height:           The patient was seen and examined. Appropriate diagnostic testing was performed and results reviewed with the patient. The results of pertinent diagnostic studies and exam findings were discussed. The patients provisional diagnosis and plan of care were discussed with the patient and present family who expressed understanding. Any medications were reviewed and indications and risks of medications were discussed with the patient /family present.  Strict verbal and written return precautions, instructions and appropriate follow-up provided to  the patient     ED Medications administered this visit:  (None if blank)  Medications   atorvastatin (LIPITOR) tablet 80 mg (80 mg Oral Given 1/1/23 2227)   megestrol (MEGACE) tablet 40 mg (40 mg Oral Given 1/1/23 2226)   metoprolol succinate (TOPROL XL) extended release tablet 25 mg (has no administration in time range)   sodium chloride flush 0.9 % injection 5-40 mL (10 mLs IntraVENous Given 1/1/23 2229)   sodium chloride flush 0.9 % injection 10 mL (has no administration in time range)   0.9 % sodium chloride infusion (has no administration in time range)   ondansetron (ZOFRAN-ODT) disintegrating tablet 4 mg (has no administration in time range)     Or   ondansetron (ZOFRAN) injection 4 mg (has no administration in time range)   polyethylene glycol (GLYCOLAX) packet 17 g (has no administration in time range)   acetaminophen (TYLENOL) tablet 650 mg (has no administration in time range)     Or   acetaminophen (TYLENOL) suppository 650 mg (has no administration in time range)   tranexamic acid-NaCl IVPB premix 1,000 mg (0 mg IntraVENous Stopped 1/1/23 2020)               DISCHARGE PRESCRIPTIONS: (None if blank)  Current Discharge Medication List            FINAL IMPRESSION     Final diagnoses:   Vaginal bleeding   Anemia due to blood loss, acute   On anticoagulant therapy   Factor 5 Leiden mutation, heterozygous ([address]     1. Vaginal bleeding    2. Anemia due to blood loss, acute    3. On anticoagulant therapy    4. Factor 5 Leiden mutation, heterozygous ([facility] [address]        DISPOSITION / PLAN   DISPOSITION Admitted 01/01/2023 06:38:30 PM            This transcription was electronically signed. Parts of this transcriptions may have been dictated by use of voice recognition software and electronically transcribed, and parts may have been transcribed with the assistance of an ED scribe. The transcription may contain errors not detected in proofreading. Please refer to my supervising physician's documentation if my documentation differs.     Electronically Signed: Marilee Linares MD, 01/01/23, 11:45 PM         Marilee Linares MD  Resident  01/01/23 2541       Marilee Linares MD  Resident  01/01/23 3396

## 2023-01-01 NOTE — ED NOTES
Pt to the ED via self. Patient presents with complaints of vaginal bleeding on and off for the last 4 days. Patient states that she is starting to feel more fatigued today rather than the other days. Patient is alert and oriented x 4. Respirations are regular and unlabored. Patient provided blanket. Call light within reach.      Chase Mcfadden RN  01/01/23 6052

## 2023-01-01 NOTE — ED NOTES
ED to inpatient nurses report    Chief Complaint   Patient presents with    Vaginal Bleeding      Present to ED from home  LOC: alert and orientated to name, place, date  Vital signs   Vitals:    01/01/23 1450 01/01/23 1603 01/01/23 1658 01/01/23 1800   BP: 132/71 123/65 (!) 114/58    Pulse: (!) 101 88 89 86   Resp: 20 23 19 24   Temp: 97.6 °F (36.4 °C)      TempSrc: Oral      SpO2: 100% 100% 100% 97%   Weight: 284 lb (128.8 kg)      Height: 5' 8\" (1.727 m)         Oxygen Baseline RA    Current needs required RA Bipap/Cpap NO    LDAs:   Peripheral IV 01/01/23 Left Forearm (Active)   Site Assessment Clean, dry & intact 01/01/23 1500   Line Status Blood return noted;Normal saline locked; Flushed 01/01/23 1500   Dressing Status Clean, dry & intact 01/01/23 1500   Dressing Type Securing device;Transparent 01/01/23 1500       Peripheral IV 01/01/23 Left Antecubital (Active)   Site Assessment Clean, dry & intact 01/01/23 1603   Line Status Blood return noted;Normal saline locked;Specimen collected; Flushed 01/01/23 1603   Dressing Status Clean, dry & intact 01/01/23 1603   Dressing Type Securing device;Transparent 01/01/23 1603     Mobility: Requires assistance * 1  Pending ED orders: Complete  Present condition: Stable    C-SSRS Risk of Suicide: No Risk  Swallow Screening    Preferred Language: [place]     Electronically signed by Dionicio Glez RN on 1/1/2023 at 6:31 PM       Dionicio Glez [facility]  01/01/23 6863

## 2023-01-01 NOTE — PROGRESS NOTES
Patient admitted to 4A Room 15 in a wheelchair and from ED. IV site free of s/s of infection or infiltration. Vital signs obtained. Assessment and data collection initiated. Oriented to room. Policies and procedures for 4A explained. All questions answered with no further questions at this time. Fall prevention and safety brochure discussed with patient. 2 person skin check completed.

## 2023-01-02 VITALS
OXYGEN SATURATION: 100 % | SYSTOLIC BLOOD PRESSURE: 130 MMHG | HEART RATE: 85 BPM | DIASTOLIC BLOOD PRESSURE: 70 MMHG | TEMPERATURE: 98.1 F | BODY MASS INDEX: 43.04 KG/M2 | RESPIRATION RATE: 18 BRPM | HEIGHT: 68 IN | WEIGHT: 284 LBS

## 2023-01-02 LAB
ALBUMIN SERPL-MCNC: 3.4 G/DL (ref 3.5–5.1)
ALP BLD-CCNC: 61 U/L (ref 38–126)
ALT SERPL-CCNC: 7 U/L (ref 11–66)
ANION GAP SERPL CALCULATED.3IONS-SCNC: 12 MEQ/L (ref 8–16)
AST SERPL-CCNC: 8 U/L (ref 5–40)
BASOPHILS # BLD: 0.6 %
BASOPHILS ABSOLUTE: 0 THOU/MM3 (ref 0–0.1)
BILIRUB SERPL-MCNC: 0.3 MG/DL (ref 0.3–1.2)
BUN BLDV-MCNC: 7 MG/DL (ref 7–22)
CALCIUM SERPL-MCNC: 8.6 MG/DL (ref 8.5–10.5)
CHLORIDE BLD-SCNC: 105 MEQ/L (ref 98–111)
CO2: 21 MEQ/L (ref 23–33)
CREAT SERPL-MCNC: 0.5 MG/DL (ref 0.4–1.2)
EKG ATRIAL RATE: 86 BPM
EKG P AXIS: 47 DEGREES
EKG P-R INTERVAL: 170 MS
EKG Q-T INTERVAL: 354 MS
EKG QRS DURATION: 94 MS
EKG QTC CALCULATION (BAZETT): 423 MS
EKG R AXIS: 20 DEGREES
EKG T AXIS: 41 DEGREES
EKG VENTRICULAR RATE: 86 BPM
EOSINOPHIL # BLD: 1.9 %
EOSINOPHILS ABSOLUTE: 0.1 THOU/MM3 (ref 0–0.4)
ERYTHROCYTE [DISTWIDTH] IN BLOOD BY AUTOMATED COUNT: 15.9 % (ref 11.5–14.5)
ERYTHROCYTE [DISTWIDTH] IN BLOOD BY AUTOMATED COUNT: 42.8 FL (ref 35–45)
GFR SERPL CREATININE-BSD FRML MDRD: > 60 ML/MIN/1.73M2
GLUCOSE BLD-MCNC: 158 MG/DL (ref 70–108)
HCT VFR BLD CALC: 20.5 % (ref 37–47)
HCT VFR BLD CALC: 26.1 % (ref 37–47)
HEMOGLOBIN: 5.9 GM/DL (ref 12–16)
HEMOGLOBIN: 8 GM/DL (ref 12–16)
HYPOCHROMIA: PRESENT
IMMATURE GRANS (ABS): 0.01 THOU/MM3 (ref 0–0.07)
IMMATURE GRANULOCYTES: 0.2 %
LYMPHOCYTES # BLD: 24.1 %
LYMPHOCYTES ABSOLUTE: 1.3 THOU/MM3 (ref 1–4.8)
MCH RBC QN AUTO: 21.1 PG (ref 26–33)
MCHC RBC AUTO-ENTMCNC: 28.8 GM/DL (ref 32.2–35.5)
MCV RBC AUTO: 73.5 FL (ref 81–99)
MONOCYTES # BLD: 9.3 %
MONOCYTES ABSOLUTE: 0.5 THOU/MM3 (ref 0.4–1.3)
NUCLEATED RED BLOOD CELLS: 0 /100 WBC
PLATELET # BLD: 233 THOU/MM3 (ref 130–400)
PMV BLD AUTO: 10.4 FL (ref 9.4–12.4)
POTASSIUM REFLEX MAGNESIUM: 3.6 MEQ/L (ref 3.5–5.2)
RBC # BLD: 2.79 MILL/MM3 (ref 4.2–5.4)
SEG NEUTROPHILS: 63.9 %
SEGMENTED NEUTROPHILS ABSOLUTE COUNT: 3.4 THOU/MM3 (ref 1.8–7.7)
SODIUM BLD-SCNC: 138 MEQ/L (ref 135–145)
TOTAL PROTEIN: 5.3 G/DL (ref 6.1–8)
WBC # BLD: 5.3 THOU/MM3 (ref 4.8–10.8)

## 2023-01-02 PROCEDURE — 6370000000 HC RX 637 (ALT 250 FOR IP): Performed by: INTERNAL MEDICINE

## 2023-01-02 PROCEDURE — 85025 COMPLETE CBC W/AUTO DIFF WBC: CPT

## 2023-01-02 PROCEDURE — 85018 HEMOGLOBIN: CPT

## 2023-01-02 PROCEDURE — 36415 COLL VENOUS BLD VENIPUNCTURE: CPT

## 2023-01-02 PROCEDURE — 85014 HEMATOCRIT: CPT

## 2023-01-02 PROCEDURE — 1200000003 HC TELEMETRY R&B

## 2023-01-02 PROCEDURE — 80053 COMPREHEN METABOLIC PANEL: CPT

## 2023-01-02 PROCEDURE — 36430 TRANSFUSION BLD/BLD COMPNT: CPT

## 2023-01-02 PROCEDURE — 6370000000 HC RX 637 (ALT 250 FOR IP): Performed by: STUDENT IN AN ORGANIZED HEALTH CARE EDUCATION/TRAINING PROGRAM

## 2023-01-02 PROCEDURE — 2580000003 HC RX 258: Performed by: INTERNAL MEDICINE

## 2023-01-02 RX ORDER — MEGESTROL ACETATE 40 MG/1
80 TABLET ORAL 2 TIMES DAILY
Status: DISCONTINUED | OUTPATIENT
Start: 2023-01-02 | End: 2023-01-03 | Stop reason: HOSPADM

## 2023-01-02 RX ORDER — SODIUM CHLORIDE 9 MG/ML
INJECTION, SOLUTION INTRAVENOUS PRN
Status: DISCONTINUED | OUTPATIENT
Start: 2023-01-02 | End: 2023-01-03 | Stop reason: HOSPADM

## 2023-01-02 RX ADMIN — SODIUM CHLORIDE, PRESERVATIVE FREE 10 ML: 5 INJECTION INTRAVENOUS at 09:13

## 2023-01-02 RX ADMIN — MEGESTROL ACETATE 40 MG: 40 TABLET ORAL at 09:10

## 2023-01-02 RX ADMIN — MEGESTROL ACETATE 80 MG: 40 TABLET ORAL at 21:38

## 2023-01-02 RX ADMIN — ATORVASTATIN CALCIUM 80 MG: 80 TABLET, FILM COATED ORAL at 21:38

## 2023-01-02 RX ADMIN — SODIUM CHLORIDE, PRESERVATIVE FREE 10 ML: 5 INJECTION INTRAVENOUS at 09:14

## 2023-01-02 RX ADMIN — SODIUM CHLORIDE, PRESERVATIVE FREE 10 ML: 5 INJECTION INTRAVENOUS at 21:38

## 2023-01-02 ASSESSMENT — PAIN DESCRIPTION - ORIENTATION: ORIENTATION: LOWER

## 2023-01-02 ASSESSMENT — PAIN - FUNCTIONAL ASSESSMENT: PAIN_FUNCTIONAL_ASSESSMENT: ACTIVITIES ARE NOT PREVENTED

## 2023-01-02 ASSESSMENT — PAIN SCALES - WONG BAKER: WONGBAKER_NUMERICALRESPONSE: 2

## 2023-01-02 ASSESSMENT — PAIN DESCRIPTION - LOCATION: LOCATION: HEAD;BACK

## 2023-01-02 ASSESSMENT — PAIN SCALES - GENERAL
PAINLEVEL_OUTOF10: 5
PAINLEVEL_OUTOF10: 2

## 2023-01-02 ASSESSMENT — PAIN DESCRIPTION - DESCRIPTORS: DESCRIPTORS: ACHING;SHARP;THROBBING

## 2023-01-02 NOTE — PROGRESS NOTES
Received pt to rom [id number] at this time. Pt A&Ox4. Able to ambulate to and from bathroom with minimal problem. Denies SOB, dizziness and lightheaded at this time. No concerns voiced at this time.

## 2023-01-02 NOTE — PROGRESS NOTES
Provider given critical of h/h at [facility]. Put orders for blood transfusion to be given and that day shift provider will be up to do consent. Pt asleep upon assessing. Woke up easily and denies any symptoms at this time.

## 2023-01-02 NOTE — PLAN OF CARE
Problem: Discharge Planning  Goal: Discharge to home or other facility with appropriate resources  Outcome: Progressing  Flowsheets (Taken 1/2/2023 0009)  Discharge to home or other facility with appropriate resources:   Identify barriers to discharge with patient and caregiver   Arrange for needed discharge resources and transportation as appropriate   Identify discharge learning needs (meds, wound care, etc)     Problem: Safety - Adult  Goal: Free from fall injury  Outcome: Progressing  Flowsheets (Taken 1/2/2023 0009)  Free From Fall Injury:   Instruct family/caregiver on patient safety   Based on caregiver fall risk screen, instruct family/caregiver to ask for assistance with transferring infant if caregiver noted to have fall risk factors       Care plan reviewed with patient. Patient verbalize understanding of the plan of care and contribute to goal setting.

## 2023-01-02 NOTE — PROGRESS NOTES
Progress Note    Patient:  Chuyita Hernandez  YOB: 1970  Date of Service: 1/2/2023  MRN: [id number]   Acct:  [de-identified]   Primary Care Physician: SHANNON Pearson CNP    Chief Complaint: Lightheaded dyspnea presyncopal  Disposition likely transfer to tertiary care center. History of Present Illness:   History obtained from the patient. The patient is a 50 y.o. female who presents with this patient presents with 4 days of copious vaginal bleeding the patient states she has been passing clots and essentially has had high-volume vaginal bleeding and yearly had an episode of syncope on standing and walking approximately 5 feet today at approximately 1130 hours in the morning, she also had mild dyspnea during this episode of ambulating approximately 5 feet, the patient states that her intrauterine device is fallen out approximately a month ago and has had persistent bleeding since that is gradually been worsening she states she has been oral Megace at 80 mg/day with out improvement in vaginal bleeding, the patient appears to been hospitalized in November 2022 with a similar complaint she appears on initial chart review to have had well over 9 units PRBC total and transfusions in 2022 likely secondary to vaginal bleeding, she appears to be on oral Xarelto secondary to prior history of pulmonary embolisms in 2005, 2013 and 2015 she also appears to be on Plavix she has taken both these medications this morning, plan of care is to admit the patient for symptomatic anemia and vaginal bleeding or menorrhagia, and follow OB/GYN recommendations  OB/GYN consulted       1/2/2023  Patient was seen and examined  No acute issues overnight however her hemoglobin dropped to 5.9, patient will receive 2 units of PRBC, consent was obtained. Patient is still feeling lightheaded. Denied any syncopal episode. Saturating well on room air.   Respiratory rate 17, T-max 98.2, blood pressure 133/63, pulse 80.  Discussed with OB/GYN      Past Medical History:        Diagnosis Date    Asthma     Bipolar 1 disorder ([facility] [address]     Blood circulation, collateral     CAD (coronary artery disease)     Curvature of spine     Diabetes mellitus (HCC)     DVT (deep venous thrombosis) (HCC)     Factor 5 Leiden mutation, heterozygous (HCC)     GERD (gastroesophageal reflux disease)     HTN, goal below 130/80     Hx of blood clots     PE x3 and DVT x3    Hx-TIA (transient ischemic attack)     Hyperlipidemia     PE (pulmonary embolism)     RA (rheumatoid arthritis) ([facility] [address]     Unspecified cerebral artery occlusion with cerebral infarction        Past Surgical History:        Procedure Laterality Date    CARDIAC CATHETERIZATION  feb 2015    Heart caths    CHOLECYSTECTOMY  1992    CORONARY ANGIOPLASTY WITH STENT PLACEMENT      DILATION AND CURETTAGE OF UTERUS N/A 11/14/2022    Hysteroscopy D&C, Mirena placement performed by Jef Uriarte DO at [address] [facility] 9/30/2019    FOOT DEBRIDEMENT INCISION AND DRAINAGE performed by Laron Choudhary DPM at [place] [address] ARTHROSCOPY  2007&2010    LIPOMA RESECTION      TONSILLECTOMY      TUBAL LIGATION  2003    TYMPANOSTOMY TUBE PLACEMENT         Home Medications:   No current facility-administered medications on file prior to encounter.      Current Outpatient Medications on File Prior to Encounter   Medication Sig Dispense Refill    benzonatate (TESSALON PERLES) 100 MG capsule Take 2 capsules by mouth 3 times daily as needed for Cough 30 capsule 0    albuterol sulfate HFA (VENTOLIN HFA) 108 (90 Base) MCG/ACT inhaler Inhale 2 puffs into the lungs 4 times daily as needed for Wheezing 54 g 1    megestrol (MEGACE) 40 MG tablet Take 1 tablet by mouth 2 times daily 60 tablet 1    metoprolol succinate (TOPROL XL) 25 MG extended release tablet Take 1.5 tablets by mouth daily 135 tablet 1    atorvastatin (LIPITOR) 80 MG tablet Take 1 tablet by mouth at bedtime 90 tablet 2    clopidogrel (PLAVIX) 75 MG tablet Take 1 tablet by mouth daily 90 tablet 2    Blood Pressure KIT 1 kit by Does not apply route as needed (PRN) 1 kit 0    nitroGLYCERIN (NITROSTAT) 0.4 MG SL tablet up to max of 3 total doses. If no relief after 1 dose, call 911. (Patient not taking: No sig reported) 25 tablet 3    XARELTO 20 MG TABS tablet take 1 tablet by mouth once daily      JANUVIA 100 MG tablet take 1 tablet by mouth once daily      gabapentin (NEURONTIN) 600 MG tablet take 1 tablet by mouth twice a day (Patient not taking: No sig reported)      acetaminophen (TYLENOL) 325 MG tablet Take 2 tablets by mouth every 4 hours as needed for Pain 120 tablet 3    blood glucose test strips (TRUETRACK TEST) strip 1 each by In Vitro route 2 times daily As needed. 100 each 0       Allergies:  Codeine, Demerol, Ultram [tramadol hcl], Percocet [oxycodone-acetaminophen], and Toradol [ketorolac tromethamine]    Social History:    reports that she quit smoking about 20 years ago. Her smoking use included cigarettes. She has a 28.00 pack-year smoking history. She has never been exposed to tobacco smoke. She has never used smokeless tobacco. She reports that she does not drink alcohol and does not use drugs. Family History:       Problem Relation Age of Onset    COPD Mother     Other Mother     Cancer Mother     High Blood Pressure Father     Heart Disease Father     Mental Illness Sister     Mental Illness Brother     Mental Illness Sister     Mental Illness Brother        Review of systems:  Constitutional: no fever, no night sweats, no fatigue  Head: no headache, no head injury, no migranes. Eye: no blurring of vision, no double vision.   Ears: no hearing difficulty, no tinnitus  Mouth/throat: no ulceration, dental caries, dysphagia  Lungs: no cough, no shortness of breath, no wheeze  CVS: no palpitation, no chest pain, no shortness of breath  GI: no abdominal pain, no nausea , no vomiting, no constipation  NICK: no dysuria, frequency and urgency, no hematuria, no kidney stones  Musculoskeletal: no joint pain, swelling , stiffness  Endocrine: no polyuria, polydypsia, no cold or heat intolerence  Hematology: no anemia, no easy brusing or bleeding, no hx of clotting disorder  Dermatology: no skin rash, no eczema, no prurities,  Psychiatry: no depression, no anxiety,no panic attacks, no suicide ideation  Neurology: no syncope, no seizures, no numbness or tingling of hands, no numbness or tingling of feet, no paresis    14 point review of systems completed, all other than noted above are negative. Vitals:   Vitals:    01/02/23 1315   BP: 133/63   Pulse: 82   Resp: 17   Temp: 98.2 °F (36.8 °C)   SpO2: 99%      BMI: Body mass index is 43.18 kg/m². Exam:  Physical Examination: General appearance - alert, awake appears to be in no acute distress  HEENT: Atraumatic normocephalic,no JVD, trachea is midline  Neck - supple, no significant adenopathy, no JVD, or carotid bruits  Chest - Bilateral air entry, no wheezes, crackles or rhonchi. Non tender to palpation of chest wall  Heart - S1S2 RRR, no murmurs or gallops  Abdomen - Soft, non tender non distended. Normoactive bowel sounds  Neurological - II-XII grossly intact, no neurological deficits  Musculoskeletal - 5/5 power upper and lower extremities equal bilaterally.   Full ROM of all limbs  Extremities - no edema, no cyanosis or clubbing  Skin - normal coloration and turgor, no rashes, no suspicious skin lesions noted  Genitourinary exam deferred patient at the time my physical survey was having active vaginal bleeding at bedside commode      Review of Labs and Diagnostic Testing:  CBC:   Recent Labs     01/01/23  1555 01/02/23  0531   WBC 5.2 5.3   HGB 7.2* 5.9*    233       BMP:    Recent Labs     01/01/23  1555 01/02/23  0531    138   K 3.8 3.6    105   CO2 20* 21*   BUN 8 7   CREATININE 0.7 0.5   GLUCOSE 270* 158*       Calcium:  Recent Labs     01/02/23  0531 CALCIUM 8.6       Ionized Calcium:No results for input(s): IONCA in the last 72 hours. Magnesium:No results for input(s): MG in the last 72 hours. Phosphorus:No results for input(s): PHOS in the last 72 hours. BNP:No results for input(s): BNP in the last 72 hours. Glucose:No results for input(s): POCGLU in the last 72 hours. HgbA1C: No results for input(s): LABA1C in the last 72 hours. INR:   Recent Labs     01/01/23  1555   INR 1.92*       Hepatic:   Recent Labs     01/02/23  0531   ALKPHOS 61   ALT 7*   AST 8   PROT 5.3*   BILITOT 0.3   LABALBU 3.4*       Amylase and Lipase:No results for input(s): LACTA, AMYLASE in the last 72 hours. Lactic Acid: No results for input(s): LACTA in the last 72 hours. Troponin:   Recent Labs     01/01/23  1555   TROPONINT < 0.010       BNP: No results for input(s): BNP in the last 72 hours. Lipids: No results for input(s): CHOL, TRIG, HDL, LDL, LDLCALC in the last 72 hours. ABGs:   Lab Results   Component Value Date/Time    PH 7.43 03/14/2015 03:51 PM    PCO2 28 03/14/2015 03:51 PM    PO2 69 03/14/2015 03:51 PM    HCO3 18 03/14/2015 03:51 PM    O2SAT 94 03/14/2015 03:51 PM       Radiology:     No results found. EKG:  Telemetry does not show any arrhythmia EKG pending      Principal Problem:    Vaginal bleeding  Active Problems:    Symptomatic anemia  Resolved Problems:    * No resolved hospital problems. *      Assessment and Plan:  Vaginal bleeding with menorrhagia, status post one-time dose of transonic acid. Continue Megace. Discussed with OB/GYN. Status post recent Mirena placement, hysteroscopy and D&C. Recommending transfer to the [facility] given the history of factor V Leiden mutation. Acute on chronic blood loss anemia secondary to vaginal bleeding and symptomatic anemia with near episode of syncope, blood pressure stable at this point of the time. Hemoglobin this morning less than 8. Transfuse 2 unit of PRBC.     Coronary artery disease with recent stent placement in 2022 I require her hemoglobin to be at or above 8 g/dL, her baseline starting in August appears to be between 7 to 8 g/dL  Factor V Leiden mutation with history of PE and DVT. At home on Xarelto. Will hold for now. Significant coronary disease multivessel with cardiac catheterization as documented in past medical history. In the light of acute blood loss we will hold Plavix at this point of the time. Will discuss with the patient regarding the high risk. Morbid obesity with BMI over 43 kg meter squared      DVT prophylaxis: [] Lovenox                                 [] SCDs                                 [] SQ Heparin                                 [x] Encourage ambulation, low risk for DVT, no chemical or mechanical prophylaxis necessary              [] Already on Anticoagulation            This transcription was electronically signed. Parts of this transcriptions may have been dictated by use of voice recognition software and electronically transcribed, The transcription may contain errors not detected in proofreading. Carlos Hayden MD  Internal medicine, Hospitalist Service   [facility].

## 2023-01-02 NOTE — PROGRESS NOTES
Patient is in bed awake, vitals within normal limits, 2 units rbc infused per physician orders. Infusion tolerated well, new iv line inserted to left hand.

## 2023-01-02 NOTE — CONSULTS
[address] [place] [address]                                  CONSULTATION    PATIENT NAME: Benedicto Chiang                     :        1970  MED REC NO:   [id number]                           ROOM:       0024  ACCOUNT NO:   [de-identified]                           ADMIT DATE: 2023  PROVIDER:     Ramo Harvey. MARCELLE Osullivan People:  2023    Consult to Dr. Mirtha Callejas. CHIEF COMPLAINT:  Menorrhagia, acute blood loss. HISTORY OF PRESENT ILLNESS:  The patient is a 70-year-old   female, who has known menorrhagia. The patient is a patient of Dr. Galileo Diaz, has been seen in the office for this, has been tried on  Megace, recently had a hysteroscopy, D and C, and IUD placed in 2022. The IUD done was expelled. She was restarted on her Megace  approximately two weeks ago and was referred to [place] [address] on 2022  for definitive therapy including possible interventional radiology with  uterine artery embolization versus hysterectomy secondary to her  multiple comorbidities. Her appointment with [facility] is on 2023. The  patient represents to the emergency room with worsening vaginal  bleeding. On presentation, she had a hemoglobin of approximately 7.8. She was started to be transfused and was given Megace. She was then  admitted, has continued to have vaginal bleeding. Recent hemoglobin was  approximately 6. Secondary to this, we were consulted. PAST MEDICAL HISTORY:  She has a history of congestive heart failure,  type 2 diabetes, history of DVT with PE, factor V Leiden, and obesity. She had a TIA. PAST SURGICAL HISTORY:  She has had a [place] filter placed, heart  cath, knee surgery, polypectomy, tubal ligation, cholecystectomy,  hysteroscopy, D and C with IUD placement. MEDICATIONS:  Per her GYN, she is on Megace 40 mg b.i.d.   Other  medications, the patient takes numerous cardiac meds as well as Januvia,  metoprolol, nitroglycerin, and Xarelto. ALLERGIES:  SHE HAS ALLERGIES TO TYLENOL NO. 3, UNKNOWN REACTION. FAMILY HISTORY:  Noncontributory. SOCIAL HISTORY:  Former smoker. Denies illegal drugs or alcohol. She  is . PHYSICAL EXAMINATION:  VITAL SIGNS:  The patient is 5 feet 8 inches, 295 pounds. HEENT:  Normocephalic and atraumatic. HEART:  Regular rate and rhythm. LUNGS:  Clear. ABDOMEN:  Soft. PELVIC:  Deferred. ASSESSMENT AND PLAN:  The patient has menorrhagia with numerous  comorbidities, was referred to [facility] for definitive therapy, but  her appointment is not until 01/17/2023. Secondary to her continued  bleeding, it is recommended that she be transferred to the tertiary care  center in case of the possible need for surgical intervention or  interventional radiology intervention. I will continue Megace 40 mg  t.i.d. The patient states that her bleeding has stopped. The patient  did receive TXA earlier. When stable, I would transfer her to the  tertiary care center at [facility] for further treatment. If you have any further questions, you can either contact myself or Dr. Toñito Martínez.         Pato Aceves M.D.    D: 01/02/2023 12:30:48       T: 01/02/2023 15:20:28     YUE/PETROS  Job#: [id number]     Doc#: [id number]    CC:

## 2023-01-02 NOTE — PLAN OF CARE
Problem: Discharge Planning  Goal: Discharge to home or other facility with appropriate resources  Outcome: Progressing  Flowsheets (Taken 1/2/2023 1125 by Michelle Leroy RN)  Discharge to home or other facility with appropriate resources: Identify barriers to discharge with patient and caregiver     Problem: Safety - Adult  Goal: Free from fall injury  Outcome: Progressing     Problem: Pain  Goal: Verbalizes/displays adequate comfort level or baseline comfort level  Outcome: Progressing

## 2023-01-02 NOTE — PLAN OF CARE
Placed order for 2 units of blood due to 5.9 hgb on am labs  Consent will need to be completed by dayshift provider - was not performed upon admission

## 2023-01-03 NOTE — PROGRESS NOTES
Patient states she spoke to daughter, Joey Laws, about transfer to [facility]. All questions answered at this time.

## 2023-01-03 NOTE — PLAN OF CARE
Problem: Discharge Planning  Goal: Discharge to home or other facility with appropriate resources  1/2/2023 2341 by Dunia Laws RN  Outcome: Progressing  Flowsheets (Taken 1/2/2023 2341)  Discharge to home or other facility with appropriate resources:   Identify barriers to discharge with patient and caregiver   Arrange for needed discharge resources and transportation as appropriate  1/2/2023 1607 by Gertrudis Rubin RN  Outcome: Progressing  Flowsheets (Taken 1/2/2023 1125 by Gigi Morales RN)  Discharge to home or other facility with appropriate resources: Identify barriers to discharge with patient and caregiver     Problem: Safety - Adult  Goal: Free from fall injury  1/2/2023 2341 by Dunia Laws RN  Outcome: Progressing  Flowsheets (Taken 1/2/2023 2341)  Free From Fall Injury:   Salmoncrystal Owusu family/caregiver on patient safety   Based on caregiver fall risk screen, instruct family/caregiver to ask for assistance with transferring infant if caregiver noted to have fall risk factors  1/2/2023 1607 by Gertrudis Rubin RN  Outcome: Progressing     Problem: Pain  Goal: Verbalizes/displays adequate comfort level or baseline comfort level  1/2/2023 2341 by Dunia Laws RN  Outcome: Progressing  Flowsheets (Taken 1/2/2023 2341)  Verbalizes/displays adequate comfort level or baseline comfort level:   Encourage patient to monitor pain and request assistance   Administer analgesics based on type and severity of pain and evaluate response   Consider cultural and social influences on pain and pain management   Notify Licensed Independent Practitioner if interventions unsuccessful or patient reports new pain   Implement non-pharmacological measures as appropriate and evaluate response   Assess pain using appropriate pain scale  1/2/2023 1607 by Gertrudis Rubin RN  Outcome: Progressing

## 2023-01-03 NOTE — PROGRESS NOTES
1113: Report given to Laisha Cueva at [facility]. Patient to be transferred to [facility] to room 241. Transportation being set up. Patient aware and voiced no concerns at this time.

## 2023-01-03 NOTE — DISCHARGE SUMMARY
Hospital Medicine Discharge Summary      Patient Identification:   Luzmaria Hernández   : 1970  MRN: [id number]   Account: [de-identified]   Patient's PCP: SHANNON Esposito CNP    Admit Date: 2023   Discharge Date: 1/3/2023      Admitting Physician: Bradford Sylvester MD  Discharge Physician: Bradford Sylvester MD       Discharge Diagnoses:  Vaginal bleeding with menorrhagia, status post one-time dose of transonic acid. Continue Megace. Discussed with OB/GYN. Status post recent Mirena placement, hysteroscopy and D&C. Recommending transfer to the [facility] given the history of factor V Leiden mutation. Patient was transferred to [facility]. Acute on chronic blood loss anemia secondary to vaginal bleeding and symptomatic anemia with near episode of syncope, blood pressure stable at this point of the time. Hemoglobin this morning less than 8. Received 2 units of PRBC. Hemoglobin stable at the time of transfer. Coronary artery disease with recent stent placement in  I require her hemoglobin to be at or above 8 g/dL, her baseline starting in August appears to be between 7 to 8 g/dL  Factor V Leiden mutation with history of PE and DVT. At home on Xarelto. Will hold for now. Significant coronary disease multivessel with cardiac catheterization as documented in past medical history. In the light of acute blood loss we will hold Plavix at this point of the time. Will discuss with the patient regarding the high risk. Morbid obesity with BMI over 43 kg meter squared    Hospital Course:   Patient with above-mentioned comorbidities presented with vaginal bleeding impeding hemorrhagic shock, dizziness, presyncopal episode received 2 units of PRBC and fluid boluses. OB/GYN was consulted, recommended transfer to tertiary care center for hysterectomy given the multiple comorbidities. At the time of transfer patient was hemodynamically and vitally stable. Significant Diagnostic Studies    Labs:  For convenience and continuity at follow-up the following most recent labs are provided:  CBC:    Lab Results   Component Value Date/Time    WBC 5.3 01/02/2023 05:31 AM    HGB 8.0 01/02/2023 07:04 PM    HCT 26.1 01/02/2023 07:04 PM     01/02/2023 05:31 AM     Renal:    Lab Results   Component Value Date/Time     01/02/2023 05:31 AM    K 3.6 01/02/2023 05:31 AM     01/02/2023 05:31 AM    CO2 21 01/02/2023 05:31 AM    BUN 7 01/02/2023 05:31 AM    CREATININE 0.5 01/02/2023 05:31 AM    CALCIUM 8.6 01/02/2023 05:31 AM       Radiology:   No orders to display          Consults:   IP CONSULT TO OB GYN      Disposition:  [facility]  Condition at Discharge: Stable    Code Status:  Prior     Patient Instructions:    Discharge lab work: none   Activity: activity as tolerated  Diet: No diet orders on file      Follow-up visits:   No follow-up provider specified. Discharge Medications:        Medication List        CONTINUE taking these medications      Blood Pressure Kit  1 kit by Does not apply route as needed (PRN)            ASK your doctor about these medications      acetaminophen 325 MG tablet  Commonly known as: TYLENOL  Take 2 tablets by mouth every 4 hours as needed for Pain     albuterol sulfate  (90 Base) MCG/ACT inhaler  Commonly known as: Ventolin HFA  Inhale 2 puffs into the lungs 4 times daily as needed for Wheezing     atorvastatin 80 MG tablet  Commonly known as: LIPITOR  Take 1 tablet by mouth at bedtime     benzonatate 100 MG capsule  Commonly known as: Tessalon Perles  Take 2 capsules by mouth 3 times daily as needed for Cough     blood glucose test strips strip  Commonly known as: TrueTrack Test  1 each by In Vitro route 2 times daily As needed.      clopidogrel 75 MG tablet  Commonly known as: PLAVIX  Take 1 tablet by mouth daily     gabapentin 600 MG tablet  Commonly known as: NEURONTIN     Januvia 100 MG tablet  Generic drug: SITagliptin     megestrol 40 MG tablet  Commonly known as: MEGACE  Take 1 tablet by mouth 2 times daily     metoprolol succinate 25 MG extended release tablet  Commonly known as: TOPROL XL  Take 1.5 tablets by mouth daily     nitroGLYCERIN 0.4 MG SL tablet  Commonly known as: NITROSTAT  up to max of 3 total doses. If no relief after 1 dose, call 911. Xarelto 20 MG Tabs tablet  Generic drug: rivaroxaban                         Time Spent on discharge is 40 minutes in the examination, evaluation, counseling and review of medications and discharge plan. Thank you SHANNON Connors - SAKSHI for the opportunity to be involved in this patient's care.       Signed:    Electronically signed by Ana Plasencia MD on 1/3/23 at 4:30 PM EST

## 2023-01-10 RX ORDER — ATORVASTATIN CALCIUM 80 MG/1
80 TABLET, FILM COATED ORAL NIGHTLY
Qty: 90 TABLET | Refills: 2 | Status: SHIPPED | OUTPATIENT
Start: 2023-01-10

## 2023-01-19 RX ORDER — CLOPIDOGREL BISULFATE 75 MG/1
TABLET ORAL
Qty: 90 TABLET | Refills: 3 | Status: SHIPPED | OUTPATIENT
Start: 2023-01-19

## 2023-01-27 ENCOUNTER — HOSPITAL ENCOUNTER (OUTPATIENT)
Age: 53
Setting detail: SPECIMEN
Discharge: HOME | End: 2023-01-27

## 2023-01-27 LAB
HCT VFR BLD CALC: 31.6 % (ref 36.3–47.1)
HEMOGLOBIN: 9.1 G/DL (ref 11.9–15.1)

## 2023-02-05 ENCOUNTER — HOSPITAL ENCOUNTER (EMERGENCY)
Age: 53
Discharge: HOME | End: 2023-02-05
Payer: COMMERCIAL

## 2023-02-05 VITALS
BODY MASS INDEX: 43.65 KG/M2 | DIASTOLIC BLOOD PRESSURE: 65 MMHG | OXYGEN SATURATION: 98 % | WEIGHT: 288 LBS | RESPIRATION RATE: 20 BRPM | HEIGHT: 68 IN | SYSTOLIC BLOOD PRESSURE: 151 MMHG | TEMPERATURE: 97.2 F | HEART RATE: 78 BPM

## 2023-02-05 DIAGNOSIS — E11.628 DIABETIC INFECTION OF LEFT FOOT (HCC): Primary | ICD-10-CM

## 2023-02-05 DIAGNOSIS — L08.9 DIABETIC INFECTION OF LEFT FOOT (HCC): Primary | ICD-10-CM

## 2023-02-05 PROCEDURE — 99213 OFFICE O/P EST LOW 20 MIN: CPT

## 2023-02-05 PROCEDURE — 99213 OFFICE O/P EST LOW 20 MIN: CPT | Performed by: NURSE PRACTITIONER

## 2023-02-05 RX ORDER — HYDROMORPHONE HYDROCHLORIDE 2 MG/1
2 TABLET ORAL
COMMUNITY

## 2023-02-05 RX ORDER — CEPHALEXIN 500 MG/1
500 CAPSULE ORAL 4 TIMES DAILY
Qty: 40 CAPSULE | Refills: 0 | Status: SHIPPED | OUTPATIENT
Start: 2023-02-05 | End: 2023-02-15

## 2023-02-05 RX ORDER — DOXYCYCLINE HYCLATE 100 MG/1
100 CAPSULE ORAL 2 TIMES DAILY
Qty: 20 CAPSULE | Refills: 0 | Status: SHIPPED | OUTPATIENT
Start: 2023-02-05 | End: 2023-02-15

## 2023-02-05 ASSESSMENT — PAIN - FUNCTIONAL ASSESSMENT
PAIN_FUNCTIONAL_ASSESSMENT: ACTIVITIES ARE NOT PREVENTED
PAIN_FUNCTIONAL_ASSESSMENT: 0-10

## 2023-02-05 ASSESSMENT — PAIN DESCRIPTION - FREQUENCY: FREQUENCY: CONTINUOUS

## 2023-02-05 ASSESSMENT — ENCOUNTER SYMPTOMS
COUGH: 0
NAUSEA: 0
CONSTIPATION: 0
RHINORRHEA: 0
SHORTNESS OF BREATH: 0
VOMITING: 0
DIARRHEA: 0
ABDOMINAL PAIN: 0
WHEEZING: 0
EYE PAIN: 0
BLOOD IN STOOL: 0

## 2023-02-05 ASSESSMENT — PAIN DESCRIPTION - ORIENTATION: ORIENTATION: LEFT

## 2023-02-05 ASSESSMENT — PAIN DESCRIPTION - DESCRIPTORS: DESCRIPTORS: ACHING

## 2023-02-05 ASSESSMENT — PAIN DESCRIPTION - PAIN TYPE: TYPE: ACUTE PAIN

## 2023-02-05 ASSESSMENT — PAIN DESCRIPTION - ONSET: ONSET: GRADUAL

## 2023-02-05 ASSESSMENT — PAIN DESCRIPTION - LOCATION: LOCATION: TOE (COMMENT WHICH ONE)

## 2023-02-05 ASSESSMENT — PAIN SCALES - GENERAL: PAINLEVEL_OUTOF10: 3

## 2023-02-05 NOTE — ED NOTES
PT GIVEN DISCHARGE INSTRUCTIONS, VERBALIZES UNDERSTANDING. PT ASSESSMENT UNCHANGED, DISCHARGED IN STABLE CONDITION.          Jordan Stark RN  02/05/23 [id number]

## 2023-02-05 NOTE — ED PROVIDER NOTES
[id number]       Chief Complaint   Patient presents with    Wound Check     Wound on left 2nd toe seeping        Nurses Notes reviewed and I agree except as noted in the HPI. HISTORY OF PRESENT ILLNESS   Lenore Kc is a 50 y.o. female who presents to urgent care with complaint of left second toe wound that is draining foul-smelling drainage. Patient states the wound started about a week ago. She does states she has diabetic neuropathy in that foot. She does have decreased sensation noted in that foot as well. The wound is erythematous on the distal aspect of the second toe on the left foot. She has callusing noted on the bottom of that toe as well. There is open area that is draining foul-smelling purulent material.  Patient denies other symptoms including chest pain, shortness of breath or fever. REVIEW OF SYSTEMS     Review of Systems   Constitutional:  Negative for appetite change, chills, fatigue, fever and unexpected weight change. HENT:  Negative for ear pain and rhinorrhea. Eyes:  Negative for pain and visual disturbance. Respiratory:  Negative for cough, shortness of breath and wheezing. Cardiovascular:  Negative for chest pain, palpitations and leg swelling. Gastrointestinal:  Negative for abdominal pain, blood in stool, constipation, diarrhea, nausea and vomiting. Genitourinary:  Negative for dysuria, frequency and hematuria. Musculoskeletal:  Negative for arthralgias, joint swelling and neck stiffness. Skin:  Positive for wound. Negative for rash. Neurological:  Negative for dizziness, syncope, weakness, light-headedness and headaches. Hematological:  Does not bruise/bleed easily.      PAST MEDICAL HISTORY         Diagnosis Date    Asthma     Bipolar 1 disorder ([facility]     Blood circulation, collateral     CAD (coronary artery disease)     Curvature of spine     Diabetes mellitus ([facility]     DVT (deep venous thrombosis) ([facility]     Factor 5 Leiden mutation, heterozygous ([facility]     GERD (gastroesophageal reflux disease)     HTN, goal below 130/80     Hx of blood clots     PE x3 and DVT x3    Hx-TIA (transient ischemic attack)     Hyperlipidemia     PE (pulmonary embolism)     RA (rheumatoid arthritis) ([facility]     Unspecified cerebral artery occlusion with cerebral infarction        SURGICAL HISTORY     Patient  has a past surgical history that includes Tubal ligation (2003); Cholecystectomy (1992); Knee arthroscopy (2007&2010); Tympanostomy tube placement; Tonsillectomy; Cardiac catheterization (02/2015); Foot Debridement (Right, 09/30/2019); Coronary angioplasty with stent; lipoma resection; Dilation and curettage of uterus (N/A, 11/14/2022); and Endometrial ablation. CURRENT MEDICATIONS       Previous Medications    ACETAMINOPHEN (TYLENOL) 325 MG TABLET    Take 2 tablets by mouth every 4 hours as needed for Pain    ALBUTEROL SULFATE HFA (VENTOLIN HFA) 108 (90 BASE) MCG/ACT INHALER    Inhale 2 puffs into the lungs 4 times daily as needed for Wheezing    ATORVASTATIN (LIPITOR) 80 MG TABLET    take 1 tablet by mouth at bedtime    BLOOD GLUCOSE TEST STRIPS (TRUETRACK TEST) STRIP    1 each by In Vitro route 2 times daily As needed. BLOOD PRESSURE KIT    1 kit by Does not apply route as needed (PRN)    CLOPIDOGREL (PLAVIX) 75 MG TABLET    take 1 tablet by mouth once daily    GABAPENTIN (NEURONTIN) 600 MG TABLET    take 1 tablet by mouth twice a day    HYDROMORPHONE (DILAUDID) 2 MG TABLET    Take 2 mg by mouth every 3 hours as needed for Pain. JANUVIA 100 MG TABLET    take 1 tablet by mouth once daily    MEGESTROL (MEGACE) 40 MG TABLET    Take 1 tablet by mouth 2 times daily    METOPROLOL SUCCINATE (TOPROL XL) 25 MG EXTENDED RELEASE TABLET    Take 1.5 tablets by mouth daily    NITROGLYCERIN (NITROSTAT) 0.4 MG SL TABLET    up to max of 3 total doses. If no relief after 1 dose, call 911.     XARELTO 20 MG TABS TABLET    take 1 tablet by mouth once daily       ALLERGIES     Patient is is allergic to codeine, demerol, ultram [tramadol hcl], percocet [oxycodone-acetaminophen], and toradol [ketorolac tromethamine]. FAMILY HISTORY     Patient'sfamily history includes COPD in her mother; Cancer in her mother; Heart Disease in her father; High Blood Pressure in her father; Mental Illness in her brother, brother, sister, and sister; Other in her mother. SOCIAL HISTORY     Patient  reports that she quit smoking about 20 years ago. Her smoking use included cigarettes. She has a 28.00 pack-year smoking history. She has never been exposed to tobacco smoke. She has never used smokeless tobacco. She reports that she does not drink alcohol and does not use drugs. PHYSICAL EXAM     ED TRIAGE VITALS  BP: (!) 151/65, Temp: 97.2 °F (36.2 °C), Heart Rate: 78, Resp: 20, SpO2: 98 %  Physical Exam  Vitals and nursing note reviewed. Constitutional:       Appearance: She is not diaphoretic. HENT:      Head: Normocephalic and atraumatic. Right Ear: External ear normal.      Left Ear: External ear normal.   Eyes:      Conjunctiva/sclera: Conjunctivae normal.   Cardiovascular:      Pulses:           Dorsalis pedis pulses are 2+ on the left side. Posterior tibial pulses are 2+ on the left side. Pulmonary:      Effort: Pulmonary effort is normal. No respiratory distress. Abdominal:      General: There is no distension. Musculoskeletal:      Cervical back: Normal range of motion. Feet:    Feet:      Left foot:      Skin integrity: Skin breakdown and erythema present. Skin:     General: Skin is warm and dry. Neurological:      Mental Status: She is alert. DIAGNOSTIC RESULTS   Labs:No results found for this visit on 02/05/23. IMAGING:  No orders to display     URGENT CARE COURSE:        MDM      Patient presents to urgent care with complaint of left second toe wound that is draining foul-smelling drainage.   Patient has diabetic neuropathy and an open wound on her second left toe. We will treat this with doxycycline and Keflex due to risk of MRSA. Patient to follow-up with her podiatrist in the next 2 days. Go to ER for worsening symptoms, chest pain, shortness of breath,  inability to keep liquids down, inability to urinate for greater than 8 hours or difficulty breathing. Follow-up with your primary care provider. Return to urgent care or go to your primary care provider is you notice pus-like drainage. Medications - No data to display  PROCEDURES:    Procedures    FINALIMPRESSION      1.  Diabetic infection of left foot [facility])        DISPOSITION/PLAN   DISPOSITION Decision To Discharge 02/05/2023 02:52:58 PM    PATIENT REFERRED TO:  Follow up with your podiatrist in 2 days        DISCHARGE MEDICATIONS:  New Prescriptions    CEPHALEXIN (KEFLEX) 500 MG CAPSULE    Take 1 capsule by mouth 4 times daily for 10 days    DOXYCYCLINE HYCLATE (VIBRAMYCIN) 100 MG CAPSULE    Take 1 capsule by mouth 2 times daily for 10 days     Current Discharge Medication List          SHANNON Cartagena - SHANNON Vu CNP  02/05/23 [id number]

## 2023-02-28 RX ORDER — DOXYCYCLINE HYCLATE 100 MG/1
CAPSULE ORAL
Qty: 20 CAPSULE | Refills: 0 | OUTPATIENT
Start: 2023-02-28

## 2023-02-28 RX ORDER — CEPHALEXIN 500 MG/1
CAPSULE ORAL
Qty: 40 CAPSULE | Refills: 0 | OUTPATIENT
Start: 2023-02-28

## 2023-03-03 ENCOUNTER — HOSPITAL ENCOUNTER (OUTPATIENT)
Age: 53
Setting detail: SPECIMEN
Discharge: HOME | End: 2023-03-03

## 2023-03-03 LAB
HCT VFR BLD AUTO: 37.7 % (ref 36.3–47.1)
HGB BLD-MCNC: 10.4 G/DL (ref 11.9–15.1)
MCH RBC QN AUTO: 20.1 PG (ref 25.2–33.5)
MCHC RBC AUTO-ENTMCNC: 27.6 G/DL (ref 28.4–34.8)
MCV RBC AUTO: 72.9 FL (ref 82.6–102.9)
NRBC AUTOMATED: 0 PER 100 WBC
PDW BLD-RTO: 18.9 % (ref 11.8–14.4)
PLATELET # BLD AUTO: 350 K/UL (ref 138–453)
PMV BLD AUTO: 10.4 FL (ref 8.1–13.5)
RBC # BLD: 5.17 M/UL (ref 3.95–5.11)
WBC # BLD AUTO: 7.6 K/UL (ref 3.5–11.3)

## 2023-03-04 LAB
CHOLEST SERPL-MCNC: 133 MG/DL
CHOLESTEROL/HDL RATIO: 3.9
HDLC SERPL-MCNC: 34 MG/DL
LDLC SERPL CALC-MCNC: 75 MG/DL (ref 0–130)
TRIGL SERPL-MCNC: 121 MG/DL

## 2023-03-12 RX ORDER — METOPROLOL SUCCINATE 25 MG/1
TABLET, EXTENDED RELEASE ORAL
Qty: 135 TABLET | Refills: 0 | Status: SHIPPED | OUTPATIENT
Start: 2023-03-12

## 2023-04-05 ENCOUNTER — TELEPHONE (OUTPATIENT)
Dept: CARDIOLOGY CLINIC | Age: 53
End: 2023-04-05

## 2023-04-05 ENCOUNTER — HOSPITAL ENCOUNTER (INPATIENT)
Age: 53
LOS: 9 days | Discharge: ANOTHER HOSPITAL | DRG: 951 | End: 2023-04-14
Attending: INTERNAL MEDICINE | Admitting: INTERNAL MEDICINE
Payer: COMMERCIAL

## 2023-04-05 ENCOUNTER — APPOINTMENT (OUTPATIENT)
Dept: ULTRASOUND IMAGING | Age: 53
DRG: 951 | End: 2023-04-05
Payer: COMMERCIAL

## 2023-04-05 DIAGNOSIS — N93.8 DUB (DYSFUNCTIONAL UTERINE BLEEDING): Primary | ICD-10-CM

## 2023-04-05 DIAGNOSIS — E13.621 DIABETIC ULCER OF TOE OF LEFT FOOT ASSOCIATED WITH DIABETES MELLITUS OF OTHER TYPE, UNSPECIFIED ULCER STAGE (HCC): ICD-10-CM

## 2023-04-05 DIAGNOSIS — L97.529 DIABETIC ULCER OF TOE OF LEFT FOOT ASSOCIATED WITH DIABETES MELLITUS OF OTHER TYPE, UNSPECIFIED ULCER STAGE (HCC): ICD-10-CM

## 2023-04-05 PROBLEM — D50.0 IRON DEFICIENCY ANEMIA DUE TO CHRONIC BLOOD LOSS: Status: ACTIVE | Noted: 2023-04-05

## 2023-04-05 PROBLEM — N92.0: Status: ACTIVE | Noted: 2023-04-05

## 2023-04-05 PROBLEM — D68.9: Status: ACTIVE | Noted: 2023-04-05

## 2023-04-05 LAB
% INHIBITION AA: 0 %
% INHIBITION ADP: 2.1 %
AA % AGGREGATION: 100 %
ABO: NORMAL
ADP PERCENT AGGREGATION: 97.9 %
ALBUMIN SERPL BCG-MCNC: 4.2 G/DL (ref 3.5–5.1)
ALP SERPL-CCNC: 82 U/L (ref 38–126)
ALT SERPL W/O P-5'-P-CCNC: 12 U/L (ref 11–66)
AMORPH SED URNS QL MICRO: ABNORMAL
ANION GAP SERPL CALC-SCNC: 13 MEQ/L (ref 8–16)
ANTIBODY SCREEN: NORMAL
APTT PPP: 38.7 SECONDS (ref 22–38)
AST SERPL-CCNC: 12 U/L (ref 5–40)
BACTERIA URNS QL MICRO: ABNORMAL /HPF
BILIRUB SERPL-MCNC: 0.4 MG/DL (ref 0.3–1.2)
BILIRUB UR QL STRIP.AUTO: ABNORMAL
BUN SERPL-MCNC: 9 MG/DL (ref 7–22)
CALCIUM SERPL-MCNC: 9.5 MG/DL (ref 8.5–10.5)
CASTS #/AREA URNS LPF: ABNORMAL /LPF
CASTS 2: ABNORMAL /LPF
CFT BLD TEG: 1.3 MINUTES (ref 0.8–2.1)
CHARACTER UR: ABNORMAL
CHLORIDE SERPL-SCNC: 100 MEQ/L (ref 98–111)
CLOT ANGLE BLD TEG: 28.4 MM (ref 15–32)
CLOT ANGLE BLD TEG: 70.5 DEG (ref 63–78)
CLOT INIT BLD TEG: 7.8 MINUTES (ref 4.6–9.1)
CLOT INIT P HPASE BLD TEG: 5.9 MINUTES (ref 4.3–8.3)
CO2 SERPL-SCNC: 24 MEQ/L (ref 23–33)
COLOR: ABNORMAL
CREAT SERPL-MCNC: 0.5 MG/DL (ref 0.4–1.2)
CRYSTALS URNS MICRO: ABNORMAL
DEPRECATED RDW RBC AUTO: 52.2 FL (ref 35–45)
EPITHELIAL CELLS, UA: ABNORMAL /HPF
ERYTHROCYTE [DISTWIDTH] IN BLOOD BY AUTOMATED COUNT: 21.3 % (ref 11.5–14.5)
FERRITIN SERPL IA-MCNC: 8 NG/ML (ref 10–291)
FUNCT FIBRINOGEN LEVEL: 518.2 MG/DL (ref 278–581)
GFR SERPL CREATININE-BSD FRML MDRD: > 60 ML/MIN/1.73M2
GLUCOSE BLD STRIP.AUTO-MCNC: 265 MG/DL (ref 70–108)
GLUCOSE SERPL-MCNC: 243 MG/DL (ref 70–108)
GLUCOSE UR QL STRIP.AUTO: 500 MG/DL
HCT VFR BLD AUTO: 29.3 % (ref 37–47)
HCT VFR BLD AUTO: 33.2 % (ref 37–47)
HGB BLD-MCNC: 8.6 GM/DL (ref 12–16)
HGB BLD-MCNC: 9.6 GM/DL (ref 12–16)
HGB UR QL STRIP.AUTO: ABNORMAL
ICTOTEST: NEGATIVE
INR PPP: 2.09 (ref 0.85–1.13)
IRON SATN MFR SERPL: 5 % (ref 20–50)
IRON SERPL-MCNC: 25 UG/DL (ref 50–170)
KETONES UR QL STRIP.AUTO: NEGATIVE
MA AA BLD RES TEG: 68.2 MM (ref 51–71)
MA ACTF BLD RES TEG: 19.6 MM (ref 2–19)
MA ADP BLD RES TEG: 66.6 MM (ref 45–69)
MA KAOLIN P HPASE BLD RES TEG: 67.6 MM (ref 53–68)
MCF BLD TEG: 65.5 MM (ref 52–69)
MCF.EXTRINSIC BLD ROTEM: 67.7 MM (ref 52–70)
MCH RBC QN AUTO: 20.1 PG (ref 26–33)
MCHC RBC AUTO-ENTMCNC: 28.9 GM/DL (ref 32.2–35.5)
MCV RBC AUTO: 69.6 FL (ref 81–99)
MISCELLANEOUS 2: ABNORMAL
MUCOUS THREADS URNS QL MICRO: ABNORMAL
NITRITE UR QL STRIP: NEGATIVE
PH UR STRIP.AUTO: 5.5 [PH] (ref 5–9)
PLATELET # BLD AUTO: 303 THOU/MM3 (ref 130–400)
PMV BLD AUTO: 9.9 FL (ref 9.4–12.4)
POTASSIUM SERPL-SCNC: 4.4 MEQ/L (ref 3.5–5.2)
PROT SERPL-MCNC: 6.9 G/DL (ref 6.1–8)
PROT UR STRIP.AUTO-MCNC: 100 MG/DL
RBC # BLD AUTO: 4.77 MILL/MM3 (ref 4.2–5.4)
RBC URINE: > 200 /HPF
RENAL EPI CELLS #/AREA URNS HPF: ABNORMAL /[HPF]
RH FACTOR: NORMAL
SODIUM SERPL-SCNC: 137 MEQ/L (ref 135–145)
SP GR UR REFRACT.AUTO: 1.02 (ref 1–1.03)
TIBC SERPL-MCNC: 489 UG/DL (ref 171–450)
TROPONIN T: < 0.01 NG/ML
UROBILINOGEN, URINE: 0.2 EU/DL (ref 0–1)
WBC # BLD AUTO: 6.5 THOU/MM3 (ref 4.8–10.8)
WBC #/AREA URNS HPF: ABNORMAL /HPF
WBC #/AREA URNS HPF: ABNORMAL /[HPF]
YEAST LIKE FUNGI URNS QL MICRO: ABNORMAL

## 2023-04-05 PROCEDURE — 85730 THROMBOPLASTIN TIME PARTIAL: CPT

## 2023-04-05 PROCEDURE — 36415 COLL VENOUS BLD VENIPUNCTURE: CPT

## 2023-04-05 PROCEDURE — 85347 COAGULATION TIME ACTIVATED: CPT

## 2023-04-05 PROCEDURE — 86900 BLOOD TYPING SEROLOGIC ABO: CPT

## 2023-04-05 PROCEDURE — 85384 FIBRINOGEN ACTIVITY: CPT

## 2023-04-05 PROCEDURE — 2580000003 HC RX 258: Performed by: INTERNAL MEDICINE

## 2023-04-05 PROCEDURE — 85027 COMPLETE CBC AUTOMATED: CPT

## 2023-04-05 PROCEDURE — 6360000002 HC RX W HCPCS: Performed by: INTERNAL MEDICINE

## 2023-04-05 PROCEDURE — 81001 URINALYSIS AUTO W/SCOPE: CPT

## 2023-04-05 PROCEDURE — 2580000003 HC RX 258: Performed by: NURSE PRACTITIONER

## 2023-04-05 PROCEDURE — 86901 BLOOD TYPING SEROLOGIC RH(D): CPT

## 2023-04-05 PROCEDURE — 83550 IRON BINDING TEST: CPT

## 2023-04-05 PROCEDURE — 85610 PROTHROMBIN TIME: CPT

## 2023-04-05 PROCEDURE — 99285 EMERGENCY DEPT VISIT HI MDM: CPT

## 2023-04-05 PROCEDURE — 80053 COMPREHEN METABOLIC PANEL: CPT

## 2023-04-05 PROCEDURE — 85018 HEMOGLOBIN: CPT

## 2023-04-05 PROCEDURE — 86923 COMPATIBILITY TEST ELECTRIC: CPT

## 2023-04-05 PROCEDURE — 76830 TRANSVAGINAL US NON-OB: CPT

## 2023-04-05 PROCEDURE — 82728 ASSAY OF FERRITIN: CPT

## 2023-04-05 PROCEDURE — 83540 ASSAY OF IRON: CPT

## 2023-04-05 PROCEDURE — 86850 RBC ANTIBODY SCREEN: CPT

## 2023-04-05 PROCEDURE — 2580000003 HC RX 258: Performed by: STUDENT IN AN ORGANIZED HEALTH CARE EDUCATION/TRAINING PROGRAM

## 2023-04-05 PROCEDURE — 6360000002 HC RX W HCPCS: Performed by: NURSE PRACTITIONER

## 2023-04-05 PROCEDURE — 93005 ELECTROCARDIOGRAM TRACING: CPT | Performed by: NURSE PRACTITIONER

## 2023-04-05 PROCEDURE — 82948 REAGENT STRIP/BLOOD GLUCOSE: CPT

## 2023-04-05 PROCEDURE — 99222 1ST HOSP IP/OBS MODERATE 55: CPT | Performed by: INTERNAL MEDICINE

## 2023-04-05 PROCEDURE — 85576 BLOOD PLATELET AGGREGATION: CPT

## 2023-04-05 PROCEDURE — 85014 HEMATOCRIT: CPT

## 2023-04-05 PROCEDURE — P9016 RBC LEUKOCYTES REDUCED: HCPCS

## 2023-04-05 PROCEDURE — 84484 ASSAY OF TROPONIN QUANT: CPT

## 2023-04-05 PROCEDURE — 96365 THER/PROPH/DIAG IV INF INIT: CPT

## 2023-04-05 PROCEDURE — 2500000003 HC RX 250 WO HCPCS: Performed by: STUDENT IN AN ORGANIZED HEALTH CARE EDUCATION/TRAINING PROGRAM

## 2023-04-05 PROCEDURE — 1200000003 HC TELEMETRY R&B

## 2023-04-05 PROCEDURE — 96366 THER/PROPH/DIAG IV INF ADDON: CPT

## 2023-04-05 RX ORDER — TRANEXAMIC ACID 10 MG/ML
1000 INJECTION, SOLUTION INTRAVENOUS ONCE
Status: DISCONTINUED | OUTPATIENT
Start: 2023-04-05 | End: 2023-04-05 | Stop reason: SDUPTHER

## 2023-04-05 RX ORDER — ONDANSETRON 2 MG/ML
4 INJECTION INTRAMUSCULAR; INTRAVENOUS EVERY 6 HOURS PRN
Status: DISCONTINUED | OUTPATIENT
Start: 2023-04-05 | End: 2023-04-14 | Stop reason: HOSPADM

## 2023-04-05 RX ORDER — INSULIN LISPRO 100 [IU]/ML
0-8 INJECTION, SOLUTION INTRAVENOUS; SUBCUTANEOUS
Status: DISCONTINUED | OUTPATIENT
Start: 2023-04-05 | End: 2023-04-06

## 2023-04-05 RX ORDER — POTASSIUM CHLORIDE 20 MEQ/1
40 TABLET, EXTENDED RELEASE ORAL PRN
Status: DISCONTINUED | OUTPATIENT
Start: 2023-04-05 | End: 2023-04-14 | Stop reason: HOSPADM

## 2023-04-05 RX ORDER — POLYETHYLENE GLYCOL 3350 17 G/17G
17 POWDER, FOR SOLUTION ORAL DAILY PRN
Status: DISCONTINUED | OUTPATIENT
Start: 2023-04-05 | End: 2023-04-14 | Stop reason: HOSPADM

## 2023-04-05 RX ORDER — POTASSIUM CHLORIDE 7.45 MG/ML
10 INJECTION INTRAVENOUS PRN
Status: DISCONTINUED | OUTPATIENT
Start: 2023-04-05 | End: 2023-04-14 | Stop reason: HOSPADM

## 2023-04-05 RX ORDER — SODIUM CHLORIDE 0.9 % (FLUSH) 0.9 %
5-40 SYRINGE (ML) INJECTION EVERY 12 HOURS SCHEDULED
Status: DISCONTINUED | OUTPATIENT
Start: 2023-04-05 | End: 2023-04-14 | Stop reason: HOSPADM

## 2023-04-05 RX ORDER — MAGNESIUM SULFATE IN WATER 40 MG/ML
2000 INJECTION, SOLUTION INTRAVENOUS PRN
Status: DISCONTINUED | OUTPATIENT
Start: 2023-04-05 | End: 2023-04-14 | Stop reason: HOSPADM

## 2023-04-05 RX ORDER — INSULIN LISPRO 100 [IU]/ML
0-4 INJECTION, SOLUTION INTRAVENOUS; SUBCUTANEOUS NIGHTLY
Status: DISCONTINUED | OUTPATIENT
Start: 2023-04-05 | End: 2023-04-06

## 2023-04-05 RX ORDER — ACETAMINOPHEN 325 MG/1
650 TABLET ORAL EVERY 6 HOURS PRN
Status: DISCONTINUED | OUTPATIENT
Start: 2023-04-05 | End: 2023-04-14 | Stop reason: HOSPADM

## 2023-04-05 RX ORDER — ONDANSETRON 4 MG/1
4 TABLET, ORALLY DISINTEGRATING ORAL EVERY 8 HOURS PRN
Status: DISCONTINUED | OUTPATIENT
Start: 2023-04-05 | End: 2023-04-14 | Stop reason: HOSPADM

## 2023-04-05 RX ORDER — SODIUM CHLORIDE 9 MG/ML
INJECTION, SOLUTION INTRAVENOUS PRN
Status: DISCONTINUED | OUTPATIENT
Start: 2023-04-05 | End: 2023-04-14 | Stop reason: HOSPADM

## 2023-04-05 RX ORDER — SODIUM CHLORIDE 0.9 % (FLUSH) 0.9 %
5-40 SYRINGE (ML) INJECTION PRN
Status: DISCONTINUED | OUTPATIENT
Start: 2023-04-05 | End: 2023-04-14 | Stop reason: HOSPADM

## 2023-04-05 RX ORDER — DEXTROSE MONOHYDRATE 100 MG/ML
INJECTION, SOLUTION INTRAVENOUS CONTINUOUS PRN
Status: DISCONTINUED | OUTPATIENT
Start: 2023-04-05 | End: 2023-04-14 | Stop reason: HOSPADM

## 2023-04-05 RX ORDER — ACETAMINOPHEN 650 MG/1
650 SUPPOSITORY RECTAL EVERY 6 HOURS PRN
Status: DISCONTINUED | OUTPATIENT
Start: 2023-04-05 | End: 2023-04-14 | Stop reason: HOSPADM

## 2023-04-05 RX ORDER — DULAGLUTIDE 0.75 MG/.5ML
INJECTION, SOLUTION SUBCUTANEOUS
COMMUNITY
Start: 2023-03-27

## 2023-04-05 RX ADMIN — IRON SUCROSE 200 MG: 20 INJECTION, SOLUTION INTRAVENOUS at 19:59

## 2023-04-05 RX ADMIN — TRANEXAMIC ACID 1000 MG: 1 INJECTION, SOLUTION INTRAVENOUS at 18:02

## 2023-04-05 RX ADMIN — SODIUM CHLORIDE, PRESERVATIVE FREE 10 ML: 5 INJECTION INTRAVENOUS at 19:43

## 2023-04-05 RX ADMIN — CEFTRIAXONE SODIUM 1000 MG: 1 INJECTION, POWDER, FOR SOLUTION INTRAMUSCULAR; INTRAVENOUS at 12:47

## 2023-04-05 ASSESSMENT — PAIN SCALES - GENERAL
PAINLEVEL_OUTOF10: 3
PAINLEVEL_OUTOF10: 0
PAINLEVEL_OUTOF10: 4
PAINLEVEL_OUTOF10: 2

## 2023-04-05 ASSESSMENT — PAIN DESCRIPTION - ORIENTATION: ORIENTATION: LOWER

## 2023-04-05 ASSESSMENT — PAIN - FUNCTIONAL ASSESSMENT
PAIN_FUNCTIONAL_ASSESSMENT: 0-10

## 2023-04-05 ASSESSMENT — PAIN DESCRIPTION - LOCATION: LOCATION: ABDOMEN

## 2023-04-05 NOTE — TELEPHONE ENCOUNTER
Pt LM on VM wanting us to let Dr Yaakov Vargas know to come up to see her as she has been admitted. She also wanted a stress test to clear her for a hysterectomy in [place]. Called pt back. Advised that Dr Tamar Barker was on call if they do consult cardiology. If not, advised to call our office after discharge and make an appt for pre op clearance. Pt voiced understanding.

## 2023-04-06 PROBLEM — N93.8 DUB (DYSFUNCTIONAL UTERINE BLEEDING): Status: ACTIVE | Noted: 2023-04-06

## 2023-04-06 LAB
ANION GAP SERPL CALC-SCNC: 12 MEQ/L (ref 8–16)
ANISOCYTOSIS BLD QL SMEAR: PRESENT
BASOPHILS ABSOLUTE: 0 THOU/MM3 (ref 0–0.1)
BASOPHILS NFR BLD AUTO: 0.7 %
BUN SERPL-MCNC: 8 MG/DL (ref 7–22)
CALCIUM SERPL-MCNC: 9.1 MG/DL (ref 8.5–10.5)
CHLORIDE SERPL-SCNC: 103 MEQ/L (ref 98–111)
CO2 SERPL-SCNC: 23 MEQ/L (ref 23–33)
CREAT SERPL-MCNC: 0.5 MG/DL (ref 0.4–1.2)
DEPRECATED RDW RBC AUTO: 52.2 FL (ref 35–45)
EOSINOPHIL NFR BLD AUTO: 1.4 %
EOSINOPHILS ABSOLUTE: 0.1 THOU/MM3 (ref 0–0.4)
ERYTHROCYTE [DISTWIDTH] IN BLOOD BY AUTOMATED COUNT: 21.3 % (ref 11.5–14.5)
GFR SERPL CREATININE-BSD FRML MDRD: > 60 ML/MIN/1.73M2
GLUCOSE BLD STRIP.AUTO-MCNC: 167 MG/DL (ref 70–108)
GLUCOSE BLD STRIP.AUTO-MCNC: 182 MG/DL (ref 70–108)
GLUCOSE BLD STRIP.AUTO-MCNC: 207 MG/DL (ref 70–108)
GLUCOSE BLD STRIP.AUTO-MCNC: 268 MG/DL (ref 70–108)
GLUCOSE SERPL-MCNC: 173 MG/DL (ref 70–108)
HCT VFR BLD AUTO: 28 % (ref 37–47)
HCT VFR BLD AUTO: 30 % (ref 37–47)
HCT VFR BLD AUTO: 30.8 % (ref 37–47)
HCT VFR BLD AUTO: 32.3 % (ref 37–47)
HGB BLD-MCNC: 8.4 GM/DL (ref 12–16)
HGB BLD-MCNC: 8.7 GM/DL (ref 12–16)
HGB BLD-MCNC: 9 GM/DL (ref 12–16)
HGB BLD-MCNC: 9.4 GM/DL (ref 12–16)
IMM GRANULOCYTES # BLD AUTO: 0.02 THOU/MM3 (ref 0–0.07)
IMM GRANULOCYTES NFR BLD AUTO: 0.4 %
LYMPHOCYTES ABSOLUTE: 1.7 THOU/MM3 (ref 1–4.8)
LYMPHOCYTES NFR BLD AUTO: 29.3 %
MCH RBC QN AUTO: 20.2 PG (ref 26–33)
MCHC RBC AUTO-ENTMCNC: 29.2 GM/DL (ref 32.2–35.5)
MCV RBC AUTO: 69.1 FL (ref 81–99)
MICROCYTES BLD QL SMEAR: PRESENT
MONOCYTES ABSOLUTE: 0.5 THOU/MM3 (ref 0.4–1.3)
MONOCYTES NFR BLD AUTO: 9.2 %
NEUTROPHILS NFR BLD AUTO: 59 %
NRBC BLD AUTO-RTO: 0 /100 WBC
PLATELET # BLD AUTO: 291 THOU/MM3 (ref 130–400)
PLATELET BLD QL SMEAR: ADEQUATE
PMV BLD AUTO: 10 FL (ref 9.4–12.4)
POLYCHROMASIA BLD QL SMEAR: ABNORMAL
POTASSIUM SERPL-SCNC: 4.1 MEQ/L (ref 3.5–5.2)
RBC # BLD AUTO: 4.46 MILL/MM3 (ref 4.2–5.4)
SCAN OF BLOOD SMEAR: NORMAL
SEGMENTED NEUTROPHILS ABSOLUTE COUNT: 3.4 THOU/MM3 (ref 1.8–7.7)
SODIUM SERPL-SCNC: 138 MEQ/L (ref 135–145)
WBC # BLD AUTO: 5.7 THOU/MM3 (ref 4.8–10.8)

## 2023-04-06 PROCEDURE — 99233 SBSQ HOSP IP/OBS HIGH 50: CPT | Performed by: INTERNAL MEDICINE

## 2023-04-06 PROCEDURE — 80048 BASIC METABOLIC PNL TOTAL CA: CPT

## 2023-04-06 PROCEDURE — 36415 COLL VENOUS BLD VENIPUNCTURE: CPT

## 2023-04-06 PROCEDURE — 2580000003 HC RX 258: Performed by: INTERNAL MEDICINE

## 2023-04-06 PROCEDURE — 85018 HEMOGLOBIN: CPT

## 2023-04-06 PROCEDURE — 6370000000 HC RX 637 (ALT 250 FOR IP): Performed by: OBSTETRICS & GYNECOLOGY

## 2023-04-06 PROCEDURE — 6370000000 HC RX 637 (ALT 250 FOR IP): Performed by: STUDENT IN AN ORGANIZED HEALTH CARE EDUCATION/TRAINING PROGRAM

## 2023-04-06 PROCEDURE — 1200000003 HC TELEMETRY R&B

## 2023-04-06 PROCEDURE — 99223 1ST HOSP IP/OBS HIGH 75: CPT | Performed by: INTERNAL MEDICINE

## 2023-04-06 PROCEDURE — 82948 REAGENT STRIP/BLOOD GLUCOSE: CPT

## 2023-04-06 PROCEDURE — 2580000003 HC RX 258: Performed by: STUDENT IN AN ORGANIZED HEALTH CARE EDUCATION/TRAINING PROGRAM

## 2023-04-06 PROCEDURE — 6360000002 HC RX W HCPCS: Performed by: INTERNAL MEDICINE

## 2023-04-06 PROCEDURE — 6370000000 HC RX 637 (ALT 250 FOR IP): Performed by: INTERNAL MEDICINE

## 2023-04-06 PROCEDURE — 93010 ELECTROCARDIOGRAM REPORT: CPT | Performed by: NUCLEAR MEDICINE

## 2023-04-06 PROCEDURE — 85025 COMPLETE CBC W/AUTO DIFF WBC: CPT

## 2023-04-06 PROCEDURE — 85014 HEMATOCRIT: CPT

## 2023-04-06 RX ORDER — INSULIN LISPRO 100 [IU]/ML
0-4 INJECTION, SOLUTION INTRAVENOUS; SUBCUTANEOUS NIGHTLY
Status: DISCONTINUED | OUTPATIENT
Start: 2023-04-06 | End: 2023-04-14 | Stop reason: HOSPADM

## 2023-04-06 RX ORDER — OXYCODONE HYDROCHLORIDE AND ACETAMINOPHEN 5; 325 MG/1; MG/1
1 TABLET ORAL EVERY 4 HOURS PRN
Status: DISCONTINUED | OUTPATIENT
Start: 2023-04-06 | End: 2023-04-14 | Stop reason: HOSPADM

## 2023-04-06 RX ORDER — ALOGLIPTIN 25 MG/1
25 TABLET, FILM COATED ORAL DAILY
Status: DISCONTINUED | OUTPATIENT
Start: 2023-04-06 | End: 2023-04-14 | Stop reason: HOSPADM

## 2023-04-06 RX ORDER — METOPROLOL SUCCINATE 25 MG/1
37.5 TABLET, EXTENDED RELEASE ORAL DAILY
Status: DISCONTINUED | OUTPATIENT
Start: 2023-04-06 | End: 2023-04-14 | Stop reason: HOSPADM

## 2023-04-06 RX ORDER — INSULIN LISPRO 100 [IU]/ML
0-4 INJECTION, SOLUTION INTRAVENOUS; SUBCUTANEOUS
Status: DISCONTINUED | OUTPATIENT
Start: 2023-04-06 | End: 2023-04-11

## 2023-04-06 RX ORDER — ATORVASTATIN CALCIUM 40 MG/1
40 TABLET, FILM COATED ORAL NIGHTLY
Status: DISCONTINUED | OUTPATIENT
Start: 2023-04-06 | End: 2023-04-10

## 2023-04-06 RX ORDER — MEGESTROL ACETATE 40 MG/1
80 TABLET ORAL 2 TIMES DAILY
Status: DISCONTINUED | OUTPATIENT
Start: 2023-04-06 | End: 2023-04-14 | Stop reason: HOSPADM

## 2023-04-06 RX ORDER — INSULIN GLARGINE 100 [IU]/ML
10 INJECTION, SOLUTION SUBCUTANEOUS DAILY
Status: DISCONTINUED | OUTPATIENT
Start: 2023-04-06 | End: 2023-04-11

## 2023-04-06 RX ORDER — FAMOTIDINE 20 MG/1
20 TABLET, FILM COATED ORAL 2 TIMES DAILY
Status: DISCONTINUED | OUTPATIENT
Start: 2023-04-06 | End: 2023-04-14 | Stop reason: HOSPADM

## 2023-04-06 RX ADMIN — INSULIN LISPRO 2 UNITS: 100 INJECTION, SOLUTION INTRAVENOUS; SUBCUTANEOUS at 12:36

## 2023-04-06 RX ADMIN — OXYCODONE AND ACETAMINOPHEN 1 TABLET: 5; 325 TABLET ORAL at 21:27

## 2023-04-06 RX ADMIN — FAMOTIDINE 20 MG: 20 TABLET, FILM COATED ORAL at 15:39

## 2023-04-06 RX ADMIN — INSULIN GLARGINE 10 UNITS: 100 INJECTION, SOLUTION SUBCUTANEOUS at 12:36

## 2023-04-06 RX ADMIN — ACETAMINOPHEN 650 MG: 325 TABLET ORAL at 04:19

## 2023-04-06 RX ADMIN — ATORVASTATIN CALCIUM 40 MG: 40 TABLET, FILM COATED ORAL at 21:27

## 2023-04-06 RX ADMIN — OXYCODONE AND ACETAMINOPHEN 1 TABLET: 5; 325 TABLET ORAL at 16:17

## 2023-04-06 RX ADMIN — ALOGLIPTIN 25 MG: 25 TABLET, FILM COATED ORAL at 12:36

## 2023-04-06 RX ADMIN — SODIUM CHLORIDE, PRESERVATIVE FREE 10 ML: 5 INJECTION INTRAVENOUS at 08:42

## 2023-04-06 RX ADMIN — MEGESTROL ACETATE 80 MG: 40 TABLET ORAL at 21:27

## 2023-04-06 RX ADMIN — FAMOTIDINE 20 MG: 20 TABLET, FILM COATED ORAL at 21:27

## 2023-04-06 RX ADMIN — IRON SUCROSE 200 MG: 20 INJECTION, SOLUTION INTRAVENOUS at 18:44

## 2023-04-06 RX ADMIN — SODIUM CHLORIDE, PRESERVATIVE FREE 10 ML: 5 INJECTION INTRAVENOUS at 21:27

## 2023-04-06 RX ADMIN — METOPROLOL SUCCINATE 37.5 MG: 25 TABLET, EXTENDED RELEASE ORAL at 15:39

## 2023-04-06 ASSESSMENT — PAIN DESCRIPTION - DESCRIPTORS
DESCRIPTORS: ACHING
DESCRIPTORS: SHARP
DESCRIPTORS: ACHING

## 2023-04-06 ASSESSMENT — PAIN SCALES - GENERAL
PAINLEVEL_OUTOF10: 10
PAINLEVEL_OUTOF10: 0
PAINLEVEL_OUTOF10: 8
PAINLEVEL_OUTOF10: 9

## 2023-04-06 ASSESSMENT — PAIN DESCRIPTION - DIRECTION: RADIATING_TOWARDS: LOW AND MID BACK

## 2023-04-06 ASSESSMENT — PAIN DESCRIPTION - ORIENTATION
ORIENTATION: LOWER

## 2023-04-06 ASSESSMENT — PAIN - FUNCTIONAL ASSESSMENT
PAIN_FUNCTIONAL_ASSESSMENT: ACTIVITIES ARE NOT PREVENTED

## 2023-04-06 ASSESSMENT — PAIN DESCRIPTION - FREQUENCY: FREQUENCY: CONTINUOUS

## 2023-04-06 ASSESSMENT — PAIN DESCRIPTION - PAIN TYPE: TYPE: ACUTE PAIN

## 2023-04-06 ASSESSMENT — PAIN DESCRIPTION - LOCATION
LOCATION: ABDOMEN
LOCATION: ABDOMEN;BACK
LOCATION: ABDOMEN;BACK

## 2023-04-06 ASSESSMENT — PAIN DESCRIPTION - ONSET: ONSET: ON-GOING

## 2023-04-07 LAB
GLUCOSE BLD STRIP.AUTO-MCNC: 146 MG/DL (ref 70–108)
GLUCOSE BLD STRIP.AUTO-MCNC: 168 MG/DL (ref 70–108)
GLUCOSE BLD STRIP.AUTO-MCNC: 180 MG/DL (ref 70–108)
GLUCOSE BLD STRIP.AUTO-MCNC: 211 MG/DL (ref 70–108)
HCT VFR BLD AUTO: 28.3 % (ref 37–47)
HCT VFR BLD AUTO: 29.3 % (ref 37–47)
HCT VFR BLD AUTO: 29.8 % (ref 37–47)
HGB BLD-MCNC: 8.4 GM/DL (ref 12–16)
HGB BLD-MCNC: 8.6 GM/DL (ref 12–16)
HGB BLD-MCNC: 8.8 GM/DL (ref 12–16)
TROPONIN T: < 0.01 NG/ML

## 2023-04-07 PROCEDURE — 99233 SBSQ HOSP IP/OBS HIGH 50: CPT | Performed by: INTERNAL MEDICINE

## 2023-04-07 PROCEDURE — 93010 ELECTROCARDIOGRAM REPORT: CPT | Performed by: NUCLEAR MEDICINE

## 2023-04-07 PROCEDURE — 6370000000 HC RX 637 (ALT 250 FOR IP): Performed by: INTERNAL MEDICINE

## 2023-04-07 PROCEDURE — 6360000002 HC RX W HCPCS: Performed by: INTERNAL MEDICINE

## 2023-04-07 PROCEDURE — 82948 REAGENT STRIP/BLOOD GLUCOSE: CPT

## 2023-04-07 PROCEDURE — 85014 HEMATOCRIT: CPT

## 2023-04-07 PROCEDURE — 99232 SBSQ HOSP IP/OBS MODERATE 35: CPT | Performed by: STUDENT IN AN ORGANIZED HEALTH CARE EDUCATION/TRAINING PROGRAM

## 2023-04-07 PROCEDURE — 6370000000 HC RX 637 (ALT 250 FOR IP): Performed by: OBSTETRICS & GYNECOLOGY

## 2023-04-07 PROCEDURE — 2580000003 HC RX 258: Performed by: INTERNAL MEDICINE

## 2023-04-07 PROCEDURE — 84484 ASSAY OF TROPONIN QUANT: CPT

## 2023-04-07 PROCEDURE — 36415 COLL VENOUS BLD VENIPUNCTURE: CPT

## 2023-04-07 PROCEDURE — 2580000003 HC RX 258: Performed by: STUDENT IN AN ORGANIZED HEALTH CARE EDUCATION/TRAINING PROGRAM

## 2023-04-07 PROCEDURE — 85018 HEMOGLOBIN: CPT

## 2023-04-07 PROCEDURE — 1200000003 HC TELEMETRY R&B

## 2023-04-07 PROCEDURE — 93005 ELECTROCARDIOGRAM TRACING: CPT | Performed by: INTERNAL MEDICINE

## 2023-04-07 RX ADMIN — SODIUM CHLORIDE, PRESERVATIVE FREE 10 ML: 5 INJECTION INTRAVENOUS at 20:20

## 2023-04-07 RX ADMIN — OXYCODONE AND ACETAMINOPHEN 1 TABLET: 5; 325 TABLET ORAL at 15:20

## 2023-04-07 RX ADMIN — MEGESTROL ACETATE 80 MG: 40 TABLET ORAL at 09:23

## 2023-04-07 RX ADMIN — ALOGLIPTIN 25 MG: 25 TABLET, FILM COATED ORAL at 09:23

## 2023-04-07 RX ADMIN — INSULIN GLARGINE 10 UNITS: 100 INJECTION, SOLUTION SUBCUTANEOUS at 09:22

## 2023-04-07 RX ADMIN — OXYCODONE AND ACETAMINOPHEN 1 TABLET: 5; 325 TABLET ORAL at 09:22

## 2023-04-07 RX ADMIN — IRON SUCROSE 200 MG: 20 INJECTION, SOLUTION INTRAVENOUS at 18:21

## 2023-04-07 RX ADMIN — SODIUM CHLORIDE, PRESERVATIVE FREE 10 ML: 5 INJECTION INTRAVENOUS at 09:27

## 2023-04-07 RX ADMIN — OXYCODONE AND ACETAMINOPHEN 1 TABLET: 5; 325 TABLET ORAL at 03:34

## 2023-04-07 RX ADMIN — ATORVASTATIN CALCIUM 40 MG: 40 TABLET, FILM COATED ORAL at 20:20

## 2023-04-07 RX ADMIN — FAMOTIDINE 20 MG: 20 TABLET, FILM COATED ORAL at 20:20

## 2023-04-07 RX ADMIN — FAMOTIDINE 20 MG: 20 TABLET, FILM COATED ORAL at 09:23

## 2023-04-07 RX ADMIN — MEGESTROL ACETATE 80 MG: 40 TABLET ORAL at 20:20

## 2023-04-07 ASSESSMENT — PAIN SCALES - GENERAL
PAINLEVEL_OUTOF10: 4
PAINLEVEL_OUTOF10: 8
PAINLEVEL_OUTOF10: 8
PAINLEVEL_OUTOF10: 5
PAINLEVEL_OUTOF10: 8

## 2023-04-07 ASSESSMENT — PAIN - FUNCTIONAL ASSESSMENT
PAIN_FUNCTIONAL_ASSESSMENT: ACTIVITIES ARE NOT PREVENTED

## 2023-04-07 ASSESSMENT — PAIN DESCRIPTION - DESCRIPTORS
DESCRIPTORS: ACHING
DESCRIPTORS: SHARP
DESCRIPTORS: ACHING
DESCRIPTORS: ACHING

## 2023-04-07 ASSESSMENT — PAIN DESCRIPTION - ORIENTATION
ORIENTATION: LOWER
ORIENTATION: MID

## 2023-04-07 ASSESSMENT — PAIN DESCRIPTION - PAIN TYPE: TYPE: ACUTE PAIN

## 2023-04-07 ASSESSMENT — PAIN DESCRIPTION - LOCATION
LOCATION: ABDOMEN;BACK

## 2023-04-08 LAB
ANION GAP SERPL CALC-SCNC: 11 MEQ/L (ref 8–16)
BUN SERPL-MCNC: 8 MG/DL (ref 7–22)
CALCIUM SERPL-MCNC: 8.7 MG/DL (ref 8.5–10.5)
CHLORIDE SERPL-SCNC: 106 MEQ/L (ref 98–111)
CO2 SERPL-SCNC: 22 MEQ/L (ref 23–33)
CREAT SERPL-MCNC: 0.5 MG/DL (ref 0.4–1.2)
GFR SERPL CREATININE-BSD FRML MDRD: > 60 ML/MIN/1.73M2
GLUCOSE BLD STRIP.AUTO-MCNC: 141 MG/DL (ref 70–108)
GLUCOSE BLD STRIP.AUTO-MCNC: 184 MG/DL (ref 70–108)
GLUCOSE BLD STRIP.AUTO-MCNC: 201 MG/DL (ref 70–108)
GLUCOSE BLD STRIP.AUTO-MCNC: 218 MG/DL (ref 70–108)
GLUCOSE SERPL-MCNC: 168 MG/DL (ref 70–108)
HCT VFR BLD AUTO: 26.4 % (ref 37–47)
HCT VFR BLD AUTO: 30.3 % (ref 37–47)
HCT VFR BLD AUTO: 34.3 % (ref 37–47)
HGB BLD-MCNC: 7.7 GM/DL (ref 12–16)
HGB BLD-MCNC: 9.1 GM/DL (ref 12–16)
HGB BLD-MCNC: 9.8 GM/DL (ref 12–16)
POTASSIUM SERPL-SCNC: 3.8 MEQ/L (ref 3.5–5.2)
SODIUM SERPL-SCNC: 139 MEQ/L (ref 135–145)

## 2023-04-08 PROCEDURE — 6370000000 HC RX 637 (ALT 250 FOR IP): Performed by: INTERNAL MEDICINE

## 2023-04-08 PROCEDURE — 99233 SBSQ HOSP IP/OBS HIGH 50: CPT | Performed by: INTERNAL MEDICINE

## 2023-04-08 PROCEDURE — 82948 REAGENT STRIP/BLOOD GLUCOSE: CPT

## 2023-04-08 PROCEDURE — 1200000003 HC TELEMETRY R&B

## 2023-04-08 PROCEDURE — 36430 TRANSFUSION BLD/BLD COMPNT: CPT

## 2023-04-08 PROCEDURE — 80048 BASIC METABOLIC PNL TOTAL CA: CPT

## 2023-04-08 PROCEDURE — 85018 HEMOGLOBIN: CPT

## 2023-04-08 PROCEDURE — 2580000003 HC RX 258: Performed by: STUDENT IN AN ORGANIZED HEALTH CARE EDUCATION/TRAINING PROGRAM

## 2023-04-08 PROCEDURE — 6370000000 HC RX 637 (ALT 250 FOR IP): Performed by: OBSTETRICS & GYNECOLOGY

## 2023-04-08 PROCEDURE — 85014 HEMATOCRIT: CPT

## 2023-04-08 PROCEDURE — 36415 COLL VENOUS BLD VENIPUNCTURE: CPT

## 2023-04-08 RX ORDER — SODIUM CHLORIDE 9 MG/ML
INJECTION, SOLUTION INTRAVENOUS PRN
Status: DISCONTINUED | OUTPATIENT
Start: 2023-04-08 | End: 2023-04-14 | Stop reason: HOSPADM

## 2023-04-08 RX ADMIN — INSULIN LISPRO 1 UNITS: 100 INJECTION, SOLUTION INTRAVENOUS; SUBCUTANEOUS at 12:10

## 2023-04-08 RX ADMIN — ATORVASTATIN CALCIUM 40 MG: 40 TABLET, FILM COATED ORAL at 20:21

## 2023-04-08 RX ADMIN — MEGESTROL ACETATE 80 MG: 40 TABLET ORAL at 20:21

## 2023-04-08 RX ADMIN — OXYCODONE AND ACETAMINOPHEN 1 TABLET: 5; 325 TABLET ORAL at 20:22

## 2023-04-08 RX ADMIN — INSULIN GLARGINE 10 UNITS: 100 INJECTION, SOLUTION SUBCUTANEOUS at 08:58

## 2023-04-08 RX ADMIN — OXYCODONE AND ACETAMINOPHEN 1 TABLET: 5; 325 TABLET ORAL at 00:42

## 2023-04-08 RX ADMIN — ALOGLIPTIN 25 MG: 25 TABLET, FILM COATED ORAL at 08:56

## 2023-04-08 RX ADMIN — FAMOTIDINE 20 MG: 20 TABLET, FILM COATED ORAL at 20:21

## 2023-04-08 RX ADMIN — SODIUM CHLORIDE, PRESERVATIVE FREE 10 ML: 5 INJECTION INTRAVENOUS at 08:57

## 2023-04-08 RX ADMIN — MEGESTROL ACETATE 80 MG: 40 TABLET ORAL at 08:56

## 2023-04-08 RX ADMIN — SODIUM CHLORIDE, PRESERVATIVE FREE 10 ML: 5 INJECTION INTRAVENOUS at 20:26

## 2023-04-08 RX ADMIN — FAMOTIDINE 20 MG: 20 TABLET, FILM COATED ORAL at 08:56

## 2023-04-08 RX ADMIN — OXYCODONE AND ACETAMINOPHEN 1 TABLET: 5; 325 TABLET ORAL at 05:02

## 2023-04-08 ASSESSMENT — PAIN DESCRIPTION - PAIN TYPE: TYPE: ACUTE PAIN

## 2023-04-08 ASSESSMENT — PAIN DESCRIPTION - DESCRIPTORS
DESCRIPTORS: ACHING
DESCRIPTORS: ACHING;SHARP
DESCRIPTORS: CRAMPING

## 2023-04-08 ASSESSMENT — PAIN DESCRIPTION - LOCATION
LOCATION: ABDOMEN
LOCATION: BACK
LOCATION: ABDOMEN;BACK

## 2023-04-08 ASSESSMENT — PAIN SCALES - GENERAL
PAINLEVEL_OUTOF10: 6
PAINLEVEL_OUTOF10: 9
PAINLEVEL_OUTOF10: 8
PAINLEVEL_OUTOF10: 7
PAINLEVEL_OUTOF10: 8
PAINLEVEL_OUTOF10: 8

## 2023-04-08 ASSESSMENT — PAIN DESCRIPTION - ORIENTATION
ORIENTATION: LOWER
ORIENTATION: LOWER
ORIENTATION: LOWER;MID

## 2023-04-08 ASSESSMENT — PAIN DESCRIPTION - ONSET: ONSET: ON-GOING

## 2023-04-08 ASSESSMENT — PAIN - FUNCTIONAL ASSESSMENT
PAIN_FUNCTIONAL_ASSESSMENT: PREVENTS OR INTERFERES SOME ACTIVE ACTIVITIES AND ADLS
PAIN_FUNCTIONAL_ASSESSMENT: PREVENTS OR INTERFERES SOME ACTIVE ACTIVITIES AND ADLS

## 2023-04-08 ASSESSMENT — PAIN DESCRIPTION - FREQUENCY: FREQUENCY: CONTINUOUS

## 2023-04-09 LAB
DEPRECATED RDW RBC AUTO: 57.5 FL (ref 35–45)
ERYTHROCYTE [DISTWIDTH] IN BLOOD BY AUTOMATED COUNT: 22.3 % (ref 11.5–14.5)
GLUCOSE BLD STRIP.AUTO-MCNC: 138 MG/DL (ref 70–108)
GLUCOSE BLD STRIP.AUTO-MCNC: 194 MG/DL (ref 70–108)
GLUCOSE BLD STRIP.AUTO-MCNC: 196 MG/DL (ref 70–108)
GLUCOSE BLD STRIP.AUTO-MCNC: 215 MG/DL (ref 70–108)
HCT VFR BLD AUTO: 28.1 % (ref 37–47)
HCT VFR BLD AUTO: 29.6 % (ref 37–47)
HCT VFR BLD AUTO: 30.5 % (ref 37–47)
HCT VFR BLD AUTO: 32.3 % (ref 37–47)
HGB BLD-MCNC: 8 GM/DL (ref 12–16)
HGB BLD-MCNC: 8.2 GM/DL (ref 12–16)
HGB BLD-MCNC: 8.9 GM/DL (ref 12–16)
HGB BLD-MCNC: 9.3 GM/DL (ref 12–16)
MCH RBC QN AUTO: 21.5 PG (ref 26–33)
MCHC RBC AUTO-ENTMCNC: 28.5 GM/DL (ref 32.2–35.5)
MCV RBC AUTO: 75.5 FL (ref 81–99)
PLATELET # BLD AUTO: 263 THOU/MM3 (ref 130–400)
PMV BLD AUTO: 9.7 FL (ref 9.4–12.4)
RBC # BLD AUTO: 3.72 MILL/MM3 (ref 4.2–5.4)
WBC # BLD AUTO: 6.1 THOU/MM3 (ref 4.8–10.8)

## 2023-04-09 PROCEDURE — 6370000000 HC RX 637 (ALT 250 FOR IP): Performed by: INTERNAL MEDICINE

## 2023-04-09 PROCEDURE — 2580000003 HC RX 258: Performed by: STUDENT IN AN ORGANIZED HEALTH CARE EDUCATION/TRAINING PROGRAM

## 2023-04-09 PROCEDURE — 85018 HEMOGLOBIN: CPT

## 2023-04-09 PROCEDURE — 85014 HEMATOCRIT: CPT

## 2023-04-09 PROCEDURE — 6370000000 HC RX 637 (ALT 250 FOR IP): Performed by: OBSTETRICS & GYNECOLOGY

## 2023-04-09 PROCEDURE — 85027 COMPLETE CBC AUTOMATED: CPT

## 2023-04-09 PROCEDURE — 82948 REAGENT STRIP/BLOOD GLUCOSE: CPT

## 2023-04-09 PROCEDURE — 36415 COLL VENOUS BLD VENIPUNCTURE: CPT

## 2023-04-09 PROCEDURE — 99232 SBSQ HOSP IP/OBS MODERATE 35: CPT | Performed by: INTERNAL MEDICINE

## 2023-04-09 PROCEDURE — 1200000003 HC TELEMETRY R&B

## 2023-04-09 RX ADMIN — METOPROLOL SUCCINATE 37.5 MG: 25 TABLET, EXTENDED RELEASE ORAL at 08:37

## 2023-04-09 RX ADMIN — FAMOTIDINE 20 MG: 20 TABLET, FILM COATED ORAL at 19:58

## 2023-04-09 RX ADMIN — ALOGLIPTIN 25 MG: 25 TABLET, FILM COATED ORAL at 08:37

## 2023-04-09 RX ADMIN — SODIUM CHLORIDE, PRESERVATIVE FREE 10 ML: 5 INJECTION INTRAVENOUS at 08:37

## 2023-04-09 RX ADMIN — INSULIN GLARGINE 10 UNITS: 100 INJECTION, SOLUTION SUBCUTANEOUS at 08:37

## 2023-04-09 RX ADMIN — ATORVASTATIN CALCIUM 40 MG: 40 TABLET, FILM COATED ORAL at 19:58

## 2023-04-09 RX ADMIN — MEGESTROL ACETATE 80 MG: 40 TABLET ORAL at 19:57

## 2023-04-09 RX ADMIN — MEGESTROL ACETATE 80 MG: 40 TABLET ORAL at 08:37

## 2023-04-09 RX ADMIN — SODIUM CHLORIDE, PRESERVATIVE FREE 10 ML: 5 INJECTION INTRAVENOUS at 19:58

## 2023-04-09 RX ADMIN — OXYCODONE AND ACETAMINOPHEN 1 TABLET: 5; 325 TABLET ORAL at 22:44

## 2023-04-09 RX ADMIN — FAMOTIDINE 20 MG: 20 TABLET, FILM COATED ORAL at 08:37

## 2023-04-09 RX ADMIN — OXYCODONE AND ACETAMINOPHEN 1 TABLET: 5; 325 TABLET ORAL at 01:55

## 2023-04-09 ASSESSMENT — PAIN DESCRIPTION - LOCATION
LOCATION: BACK
LOCATION: ABDOMEN;BACK

## 2023-04-09 ASSESSMENT — PAIN SCALES - GENERAL
PAINLEVEL_OUTOF10: 9
PAINLEVEL_OUTOF10: 5
PAINLEVEL_OUTOF10: 5
PAINLEVEL_OUTOF10: 9

## 2023-04-09 ASSESSMENT — PAIN DESCRIPTION - DESCRIPTORS
DESCRIPTORS: DISCOMFORT
DESCRIPTORS: ACHING;DISCOMFORT

## 2023-04-10 ENCOUNTER — APPOINTMENT (OUTPATIENT)
Dept: CARDIAC CATH/INVASIVE PROCEDURES | Age: 53
DRG: 951 | End: 2023-04-10
Payer: COMMERCIAL

## 2023-04-10 LAB
ABO: NORMAL
ANION GAP SERPL CALC-SCNC: 12 MEQ/L (ref 8–16)
ANION GAP SERPL CALC-SCNC: 13 MEQ/L (ref 8–16)
ANTIBODY SCREEN: NORMAL
APTT PPP: 30.1 SECONDS (ref 22–38)
BUN SERPL-MCNC: 11 MG/DL (ref 7–22)
BUN SERPL-MCNC: 9 MG/DL (ref 7–22)
CALCIUM SERPL-MCNC: 8.9 MG/DL (ref 8.5–10.5)
CALCIUM SERPL-MCNC: 8.9 MG/DL (ref 8.5–10.5)
CHLORIDE SERPL-SCNC: 105 MEQ/L (ref 98–111)
CHLORIDE SERPL-SCNC: 106 MEQ/L (ref 98–111)
CO2 SERPL-SCNC: 21 MEQ/L (ref 23–33)
CO2 SERPL-SCNC: 23 MEQ/L (ref 23–33)
CREAT SERPL-MCNC: 0.4 MG/DL (ref 0.4–1.2)
CREAT SERPL-MCNC: 0.5 MG/DL (ref 0.4–1.2)
DEPRECATED RDW RBC AUTO: 56.9 FL (ref 35–45)
ERYTHROCYTE [DISTWIDTH] IN BLOOD BY AUTOMATED COUNT: 24 % (ref 11.5–14.5)
GFR SERPL CREATININE-BSD FRML MDRD: > 60 ML/MIN/1.73M2
GFR SERPL CREATININE-BSD FRML MDRD: > 60 ML/MIN/1.73M2
GLUCOSE BLD STRIP.AUTO-MCNC: 142 MG/DL (ref 70–108)
GLUCOSE BLD STRIP.AUTO-MCNC: 144 MG/DL (ref 70–108)
GLUCOSE BLD STRIP.AUTO-MCNC: 164 MG/DL (ref 70–108)
GLUCOSE BLD STRIP.AUTO-MCNC: 204 MG/DL (ref 70–108)
GLUCOSE BLD STRIP.AUTO-MCNC: 204 MG/DL (ref 70–108)
GLUCOSE SERPL-MCNC: 136 MG/DL (ref 70–108)
GLUCOSE SERPL-MCNC: 154 MG/DL (ref 70–108)
HCT VFR BLD AUTO: 26.7 % (ref 37–47)
HCT VFR BLD AUTO: 27.9 % (ref 37–47)
HCT VFR BLD AUTO: 30.1 % (ref 37–47)
HGB BLD-MCNC: 7.9 GM/DL (ref 12–16)
HGB BLD-MCNC: 8.1 GM/DL (ref 12–16)
HGB BLD-MCNC: 8.9 GM/DL (ref 12–16)
INR PPP: 1.08 (ref 0.85–1.13)
MCH RBC QN AUTO: 21.8 PG (ref 26–33)
MCHC RBC AUTO-ENTMCNC: 29 GM/DL (ref 32.2–35.5)
MCV RBC AUTO: 75 FL (ref 81–99)
PLATELET # BLD AUTO: 275 THOU/MM3 (ref 130–400)
PMV BLD AUTO: 9.6 FL (ref 9.4–12.4)
POTASSIUM SERPL-SCNC: 4.1 MEQ/L (ref 3.5–5.2)
POTASSIUM SERPL-SCNC: 4.2 MEQ/L (ref 3.5–5.2)
RBC # BLD AUTO: 3.72 MILL/MM3 (ref 4.2–5.4)
RH FACTOR: NORMAL
SODIUM SERPL-SCNC: 139 MEQ/L (ref 135–145)
SODIUM SERPL-SCNC: 141 MEQ/L (ref 135–145)
WBC # BLD AUTO: 5.6 THOU/MM3 (ref 4.8–10.8)

## 2023-04-10 PROCEDURE — 6360000004 HC RX CONTRAST MEDICATION: Performed by: INTERNAL MEDICINE

## 2023-04-10 PROCEDURE — 82948 REAGENT STRIP/BLOOD GLUCOSE: CPT

## 2023-04-10 PROCEDURE — 6360000002 HC RX W HCPCS

## 2023-04-10 PROCEDURE — 86901 BLOOD TYPING SEROLOGIC RH(D): CPT

## 2023-04-10 PROCEDURE — 93458 L HRT ARTERY/VENTRICLE ANGIO: CPT

## 2023-04-10 PROCEDURE — 36415 COLL VENOUS BLD VENIPUNCTURE: CPT

## 2023-04-10 PROCEDURE — 86900 BLOOD TYPING SEROLOGIC ABO: CPT

## 2023-04-10 PROCEDURE — 6370000000 HC RX 637 (ALT 250 FOR IP): Performed by: INTERNAL MEDICINE

## 2023-04-10 PROCEDURE — 36245 INS CATH ABD/L-EXT ART 1ST: CPT

## 2023-04-10 PROCEDURE — 2580000003 HC RX 258: Performed by: INTERNAL MEDICINE

## 2023-04-10 PROCEDURE — 85018 HEMOGLOBIN: CPT

## 2023-04-10 PROCEDURE — B2151ZZ FLUOROSCOPY OF LEFT HEART USING LOW OSMOLAR CONTRAST: ICD-10-PCS | Performed by: INTERNAL MEDICINE

## 2023-04-10 PROCEDURE — 1200000003 HC TELEMETRY R&B

## 2023-04-10 PROCEDURE — C1894 INTRO/SHEATH, NON-LASER: HCPCS

## 2023-04-10 PROCEDURE — 85610 PROTHROMBIN TIME: CPT

## 2023-04-10 PROCEDURE — C1769 GUIDE WIRE: HCPCS

## 2023-04-10 PROCEDURE — 4A023N7 MEASUREMENT OF CARDIAC SAMPLING AND PRESSURE, LEFT HEART, PERCUTANEOUS APPROACH: ICD-10-PCS | Performed by: INTERNAL MEDICINE

## 2023-04-10 PROCEDURE — 86850 RBC ANTIBODY SCREEN: CPT

## 2023-04-10 PROCEDURE — 85027 COMPLETE CBC AUTOMATED: CPT

## 2023-04-10 PROCEDURE — 85014 HEMATOCRIT: CPT

## 2023-04-10 PROCEDURE — 99233 SBSQ HOSP IP/OBS HIGH 50: CPT | Performed by: INTERNAL MEDICINE

## 2023-04-10 PROCEDURE — 2580000003 HC RX 258: Performed by: NURSE PRACTITIONER

## 2023-04-10 PROCEDURE — 36430 TRANSFUSION BLD/BLD COMPNT: CPT

## 2023-04-10 PROCEDURE — B4101ZZ FLUOROSCOPY OF ABDOMINAL AORTA USING LOW OSMOLAR CONTRAST: ICD-10-PCS | Performed by: INTERNAL MEDICINE

## 2023-04-10 PROCEDURE — 86923 COMPATIBILITY TEST ELECTRIC: CPT

## 2023-04-10 PROCEDURE — 6370000000 HC RX 637 (ALT 250 FOR IP): Performed by: STUDENT IN AN ORGANIZED HEALTH CARE EDUCATION/TRAINING PROGRAM

## 2023-04-10 PROCEDURE — P9016 RBC LEUKOCYTES REDUCED: HCPCS

## 2023-04-10 PROCEDURE — 93458 L HRT ARTERY/VENTRICLE ANGIO: CPT | Performed by: INTERNAL MEDICINE

## 2023-04-10 PROCEDURE — 75710 ARTERY X-RAYS ARM/LEG: CPT

## 2023-04-10 PROCEDURE — B41G1ZZ FLUOROSCOPY OF LEFT LOWER EXTREMITY ARTERIES USING LOW OSMOLAR CONTRAST: ICD-10-PCS | Performed by: INTERNAL MEDICINE

## 2023-04-10 PROCEDURE — 93567 NJX CAR CTH SPRVLV AORTGRPHY: CPT | Performed by: INTERNAL MEDICINE

## 2023-04-10 PROCEDURE — B2111ZZ FLUOROSCOPY OF MULTIPLE CORONARY ARTERIES USING LOW OSMOLAR CONTRAST: ICD-10-PCS | Performed by: INTERNAL MEDICINE

## 2023-04-10 PROCEDURE — 93010 ELECTROCARDIOGRAM REPORT: CPT | Performed by: INTERNAL MEDICINE

## 2023-04-10 PROCEDURE — 6370000000 HC RX 637 (ALT 250 FOR IP): Performed by: OBSTETRICS & GYNECOLOGY

## 2023-04-10 PROCEDURE — 85730 THROMBOPLASTIN TIME PARTIAL: CPT

## 2023-04-10 PROCEDURE — 6370000000 HC RX 637 (ALT 250 FOR IP): Performed by: NURSE PRACTITIONER

## 2023-04-10 PROCEDURE — 80048 BASIC METABOLIC PNL TOTAL CA: CPT

## 2023-04-10 PROCEDURE — 93005 ELECTROCARDIOGRAM TRACING: CPT | Performed by: NURSE PRACTITIONER

## 2023-04-10 PROCEDURE — 2500000003 HC RX 250 WO HCPCS

## 2023-04-10 RX ORDER — SODIUM CHLORIDE 9 MG/ML
INJECTION, SOLUTION INTRAVENOUS CONTINUOUS
Status: DISCONTINUED | OUTPATIENT
Start: 2023-04-10 | End: 2023-04-10 | Stop reason: SDUPTHER

## 2023-04-10 RX ORDER — MEGESTROL ACETATE 40 MG/1
40 TABLET ORAL 2 TIMES DAILY
Status: DISCONTINUED | OUTPATIENT
Start: 2023-04-10 | End: 2023-04-10 | Stop reason: ALTCHOICE

## 2023-04-10 RX ORDER — SODIUM CHLORIDE 9 MG/ML
INJECTION, SOLUTION INTRAVENOUS PRN
Status: DISCONTINUED | OUTPATIENT
Start: 2023-04-10 | End: 2023-04-14 | Stop reason: HOSPADM

## 2023-04-10 RX ORDER — NITROGLYCERIN 0.4 MG/1
0.4 TABLET SUBLINGUAL EVERY 5 MIN PRN
Status: DISCONTINUED | OUTPATIENT
Start: 2023-04-10 | End: 2023-04-14 | Stop reason: HOSPADM

## 2023-04-10 RX ORDER — SODIUM CHLORIDE 9 MG/ML
INJECTION, SOLUTION INTRAVENOUS CONTINUOUS
Status: DISCONTINUED | OUTPATIENT
Start: 2023-04-10 | End: 2023-04-11

## 2023-04-10 RX ORDER — SODIUM CHLORIDE 0.9 % (FLUSH) 0.9 %
5-40 SYRINGE (ML) INJECTION EVERY 12 HOURS SCHEDULED
Status: DISCONTINUED | OUTPATIENT
Start: 2023-04-10 | End: 2023-04-14 | Stop reason: HOSPADM

## 2023-04-10 RX ORDER — ATORVASTATIN CALCIUM 80 MG/1
80 TABLET, FILM COATED ORAL NIGHTLY
Status: DISCONTINUED | OUTPATIENT
Start: 2023-04-10 | End: 2023-04-10

## 2023-04-10 RX ORDER — ASPIRIN 325 MG
325 TABLET ORAL ONCE
Status: COMPLETED | OUTPATIENT
Start: 2023-04-10 | End: 2023-04-10

## 2023-04-10 RX ORDER — SODIUM CHLORIDE 0.9 % (FLUSH) 0.9 %
5-40 SYRINGE (ML) INJECTION PRN
Status: DISCONTINUED | OUTPATIENT
Start: 2023-04-10 | End: 2023-04-14 | Stop reason: HOSPADM

## 2023-04-10 RX ORDER — ALBUTEROL SULFATE 90 UG/1
2 AEROSOL, METERED RESPIRATORY (INHALATION) 4 TIMES DAILY PRN
Status: DISCONTINUED | OUTPATIENT
Start: 2023-04-10 | End: 2023-04-14 | Stop reason: HOSPADM

## 2023-04-10 RX ORDER — METOPROLOL SUCCINATE 25 MG/1
40 TABLET, EXTENDED RELEASE ORAL DAILY
Status: DISCONTINUED | OUTPATIENT
Start: 2023-04-10 | End: 2023-04-10 | Stop reason: SDUPTHER

## 2023-04-10 RX ORDER — CLOPIDOGREL BISULFATE 75 MG/1
75 TABLET ORAL DAILY
Status: DISCONTINUED | OUTPATIENT
Start: 2023-04-10 | End: 2023-04-14 | Stop reason: HOSPADM

## 2023-04-10 RX ORDER — ATORVASTATIN CALCIUM 40 MG/1
40 TABLET, FILM COATED ORAL NIGHTLY
Status: DISCONTINUED | OUTPATIENT
Start: 2023-04-10 | End: 2023-04-14 | Stop reason: HOSPADM

## 2023-04-10 RX ADMIN — INSULIN LISPRO 1 UNITS: 100 INJECTION, SOLUTION INTRAVENOUS; SUBCUTANEOUS at 17:14

## 2023-04-10 RX ADMIN — METOPROLOL SUCCINATE 37.5 MG: 25 TABLET, EXTENDED RELEASE ORAL at 08:02

## 2023-04-10 RX ADMIN — IOPAMIDOL 125 ML: 755 INJECTION, SOLUTION INTRAVENOUS at 15:00

## 2023-04-10 RX ADMIN — SODIUM CHLORIDE: 9 INJECTION, SOLUTION INTRAVENOUS at 08:52

## 2023-04-10 RX ADMIN — ATORVASTATIN CALCIUM 40 MG: 40 TABLET, FILM COATED ORAL at 20:30

## 2023-04-10 RX ADMIN — SODIUM CHLORIDE: 9 INJECTION, SOLUTION INTRAVENOUS at 14:23

## 2023-04-10 RX ADMIN — ACETAMINOPHEN 650 MG: 325 TABLET ORAL at 23:54

## 2023-04-10 RX ADMIN — ASPIRIN 325 MG: 325 TABLET ORAL at 09:53

## 2023-04-10 RX ADMIN — SODIUM CHLORIDE: 9 INJECTION, SOLUTION INTRAVENOUS at 20:18

## 2023-04-10 RX ADMIN — FAMOTIDINE 20 MG: 20 TABLET, FILM COATED ORAL at 20:30

## 2023-04-10 RX ADMIN — MEGESTROL ACETATE 80 MG: 40 TABLET ORAL at 23:13

## 2023-04-10 ASSESSMENT — PAIN DESCRIPTION - ORIENTATION: ORIENTATION: RIGHT

## 2023-04-10 ASSESSMENT — PAIN DESCRIPTION - DESCRIPTORS: DESCRIPTORS: ACHING

## 2023-04-10 ASSESSMENT — PAIN DESCRIPTION - LOCATION: LOCATION: WRIST

## 2023-04-10 ASSESSMENT — PAIN SCALES - GENERAL
PAINLEVEL_OUTOF10: 8
PAINLEVEL_OUTOF10: 0

## 2023-04-10 NOTE — CARE COORDINATION
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 Heterogeneous, ill-defined and prominent endometrial stripe. Differential considerations include adenomyosis, submucosal fibroids and endometrial carcinoma. 2. 2.1 cm fibroid in the posterior myometrium       Procedure:   4/10/2023 :Cath (LHC, Aortogram and left lower extremity peripheral angiogram)   Barriers to Discharge: H/H 7.9/26.7-Patient transfused with 1 unit PRBC's. OB/GYN, Podiatry, and Cardiology following, Xarelto and Plavix continue to be on hold, IV fluids, DM management, prn Tylenol and Zofran, Magnesium and Potassium replacement protocol, H/H q 8 hr., NPO, daily weights, SCD's, up as tolerated. PCP: SHANNON Jon - CNP  Readmission Risk Score: 15.1%  Patient Goals/Plan/Treatment Preferences: Christina Ortiz is from home alone. She was scheduled for her toe to be amputated this Friday prior to her admission. She is scheduled for a hysterectomy in May by Dr. Lew Bartlett On[facility] at [facility]

## 2023-04-10 NOTE — BRIEF OP NOTE
[address] [address]  Sedation/Analgesia Post Sedation Record    Pt Name: Essie Kayser  Account number: [de-identified]  MRN: [id number]  YOB: 1970  Procedure Performed By: Mary Burgess MD MD   Primary Care Physician: Braulio Diane, APRN - CNP  Date: 4/10/2023    POST-PROCEDURE    Physicians/Assistants: Mary Burgess MD MD     Procedure Performed:Cath (LHC, Aortogram and left lower extremity peripheral angiogram)      Sedation/Anesthesia: Versed/ Fentanyl and 2% xylocaine local anesthesia. Estimated Blood Loss: < 50 ml. Specimens Removed: None         Disposition of Specimen: N/A        Complications: No Immediate Complications. Post-procedure Diagnosis/Findings:       Patent right PDA stent   Patent LCX stent   Chronic total occlusion of the OM at prior intervention site, vessel fills via left to left collaterals  Intermediate stenosis of the mid LAD and diffuse small vessel disease of the distal LAD  No significant PAD on left lower extremity peripheral angiogram     Recommendations:  Bed rest for the next 4 hours  Access site care  Chronic total occlusion of the OM at prior intervention site, vessel fills via left to left collaterals. No clinical evidence for ACS. Patient denies chest pain  Medical therapy is recommended  Aggressive risk factor modification for CAD  Monitor on telemetry   High intensity statin therapy  Need to resume antiplatelet therapy as soon as possible, after bleeding resolves (has been held since admission due to hemorrhagic anemia)  Monitor H/H, keep Hgb > 8  Due to recurrent vaginal bleeding, hysterectomy is planned. Patient has moderate cardiac risk. Patient accepts the risk of the planned procedure and understands the possibility of willie-operative cardiac event and wishes to proceed with the procedure  Nonhealing left second toe ulcer.  No significant PAD on left lower extremity peripheral angiogram. Podiatry is following      Above findings and

## 2023-04-10 NOTE — FLOWSHEET NOTE
04/10/23 1216   Treatment Team Notification   Reason for Communication Review case   Team Member Name Scott Davis   Treatment Team Role Consulting Provider   Method of Communication Secure Message   Response No new orders   Notification Time [phone]     Per request, notified patient was taken to cath lab and will be returning to room 8A18.

## 2023-04-10 NOTE — PROCEDURES
[address] [id number]                            CARDIAC CATHETERIZATION    PATIENT NAME: Shelly Horton                     :        1970  MED REC NO:   [id number]                           ROOM:       0001  ACCOUNT NO:   [de-identified]                           ADMIT DATE: 2023  PROVIDER:     Ba Villalobos MD    DATE OF PROCEDURE:  04/10/2023    INDICATION:  This is a 53-year-old female patient with past medical  history that includes coronary artery disease, prior PCI and stenting. She has been evaluated as outpatient by Kimberly Edwards, physician  assistant, for chest pain. Stress test was abnormal.  Left cardiac  catheterization was recommended. However, the patient refused at that  time and wanted to seek a second medical opinion. She reports  intermittent chest pains. The patient unfortunately also had  interruption in her antiplatelet therapy during this hospitalization  where the patient had evidence for vaginal bleeding and hysterectomy is  planned. The patient has hemorrhagic anemia and has required blood  transfusion. She was seen, evaluated by Cardiology for recurrent chest  pain, coronary artery disease, and preoperative cardiac risk assessment  in setting of abnormal stress test.  The patient also has a nonhealing  ulcer involving the left second toe. She was seen by Podiatry, and she  is being considered for possible amputation. PROCEDURES PERFORMED:  1. Left cardiac catheterization with selective coronary angiogram.  2.  Left ventriculogram.  3.  Abdominal aortogram.  4.  Selective angiogram of the left lower extremity. DESCRIPTION OF THE PROCEDURE:  After informed consent, the patient was  brought to the cardiac catheterization room. She was prepped and draped  in a sterile fashion. 2% lidocaine was injected in the skin and  subcutaneous tissue overlying the right radial artery.   Under

## 2023-04-10 NOTE — H&P
History & Physical    Patient:  Tristen Matthew  YOB: 1970  Date of Service: 1/1/2023  MRN: [id number]   Acct:  [de-identified]   Primary Care Physician: SHANNON Alatorre CNP    Chief Complaint: Lightheaded dyspnea presyncopal    History of Present Illness:   History obtained from the patient.     The patient is a 50 y.o. female who presents with this patient presents with 4 days of copious vaginal bleeding the patient states she has been passing clots and essentially has had high-volume vaginal bleeding and yearly had an episode of syncope on standing and walking approximately 5 feet today at approximately 1130 hours in the morning, she also had mild dyspnea during this episode of ambulating approximately 5 feet, the patient states that her intrauterine device is fallen out approximately a month ago and has had persistent bleeding since that is gradually been worsening she states she has been oral Megace at 80 mg/day with out improvement in vaginal bleeding, the patient appears to been hospitalized in November 2022 with a similar complaint she appears on initial chart review to have had well over 9 units PRBC total and transfusions in 2022 likely secondary to vaginal bleeding, she appears to be on oral Xarelto secondary to prior history of pulmonary embolisms in 2005, 2013 and 2015 she also appears to be on Plavix she has taken both these medications this morning, plan of care is to admit the patient for symptomatic anemia and vaginal bleeding or menorrhagia, and follow OB/GYN recommendations  OB/GYN consulted from ER      Past Medical History:        Diagnosis Date    Asthma     Bipolar 1 disorder ([address]     Blood circulation, collateral     CAD (coronary artery disease)     Curvature of spine     Diabetes mellitus ([address]     DVT (deep venous thrombosis) ([address]     Factor 5 Leiden mutation, heterozygous ([address]     GERD (gastroesophageal reflux disease)     HTN, goal below 130/80     Hx of blood clots     PE x3 and DVT x3    Hx-TIA (transient ischemic attack)     Hyperlipidemia     PE (pulmonary embolism)     RA (rheumatoid arthritis) (HCC)     Unspecified cerebral artery occlusion with cerebral infarction    Discharge from hospital 12/17/2022 secondary muscle skeletal pain  Influenza A December 2022  Multivessel coronary artery disease with PCI and drug-eluting stent March 2022 to mid circumflex artery OM1 and right posterior descending artery, previous EF of 55 to 60% August 2022  Factor V Leiden and chronic anticoagulation last pulmonary embolism 2015 previous to that 2013  Morbid obesity with BMI over 42 kg meter squared  Diabetes mellitus type 2  Menorrhagia and vaginal bleeding November and August 2022 as received approximately an estimated 10 units PRBC 2022 secondary to vaginal bleeding  Mirena IUD placement 2022  Chronic anemia likely iron deficiency and chronic vaginal bleed    Past Surgical History:        Procedure Laterality Date    CARDIAC CATHETERIZATION  feb 2015    Heart caths    [address] OF UTERUS N/A 11/14/2022    Hysteroscopy D&C, Mirena placement performed by Kasey Rene DO at [address] [facility] 9/30/2019    FOOT DEBRIDEMENT INCISION AND DRAINAGE performed by Alida Jimenez DPM at [place] [address] ARTHROSCOPY  2007&2010    LIPOMA RESECTION      TONSILLECTOMY      TUBAL LIGATION  2003    TYMPANOSTOMY TUBE PLACEMENT         Home Medications:   No current facility-administered medications on file prior to encounter.      Current Outpatient Medications on File Prior to Encounter   Medication Sig Dispense Refill    benzonatate (TESSALON PERLES) 100 MG capsule Take 2 capsules by mouth 3 times daily as needed for Cough 30 capsule 0    albuterol sulfate HFA (VENTOLIN HFA) 108 (90 Base) MCG/ACT inhaler Inhale 2 puffs into the lungs 4 times daily as needed for Wheezing 54 g 1    megestrol (MEGACE) 40 MG tablet Take 1 tablet by mouth 2 times daily 60 tablet 1    metoprolol succinate (TOPROL XL) 25 MG extended release tablet Take 1.5 tablets by mouth daily 135 tablet 1    atorvastatin (LIPITOR) 80 MG tablet Take 1 tablet by mouth at bedtime 90 tablet 2    clopidogrel (PLAVIX) 75 MG tablet Take 1 tablet by mouth daily 90 tablet 2    Blood Pressure KIT 1 kit by Does not apply route as needed (PRN) 1 kit 0    nitroGLYCERIN (NITROSTAT) 0.4 MG SL tablet up to max of 3 total doses. If no relief after 1 dose, call 911. (Patient not taking: No sig reported) 25 tablet 3    XARELTO 20 MG TABS tablet take 1 tablet by mouth once daily      JANUVIA 100 MG tablet take 1 tablet by mouth once daily      gabapentin (NEURONTIN) 600 MG tablet take 1 tablet by mouth twice a day (Patient not taking: No sig reported)      acetaminophen (TYLENOL) 325 MG tablet Take 2 tablets by mouth every 4 hours as needed for Pain 120 tablet 3    blood glucose test strips (TRUETRACK TEST) strip 1 each by In Vitro route 2 times daily As needed. 100 each 0       Allergies:  Codeine, Demerol, Ultram [tramadol hcl], Percocet [oxycodone-acetaminophen], and Toradol [ketorolac tromethamine]    Social History:    reports that she quit smoking about 20 years ago. Her smoking use included cigarettes. She has a 28.00 pack-year smoking history. She has never been exposed to tobacco smoke. She has never used smokeless tobacco. She reports that she does not drink alcohol and does not use drugs. Family History:       Problem Relation Age of Onset    COPD Mother     Other Mother     Cancer Mother     High Blood Pressure Father     Heart Disease Father     Mental Illness Sister     Mental Illness Brother     Mental Illness Sister     Mental Illness Brother        Review of systems:  Constitutional: no fever, no night sweats, no fatigue  Head: no headache, no head injury, no migranes. Eye: no blurring of vision, no double vision.   Ears: no hearing difficulty, no tinnitus  Mouth/throat: no ulceration, dental caries, dysphagia  Lungs: no cough, no shortness of breath, no wheeze  CVS: no palpitation, no chest pain, no shortness of breath  GI: no abdominal pain, no nausea , no vomiting, no constipation  NICK: no dysuria, frequency and urgency, no hematuria, no kidney stones  Musculoskeletal: no joint pain, swelling , stiffness  Endocrine: no polyuria, polydypsia, no cold or heat intolerence  Hematology: no anemia, no easy brusing or bleeding, no hx of clotting disorder  Dermatology: no skin rash, no eczema, no prurities,  Psychiatry: no depression, no anxiety,no panic attacks, no suicide ideation  Neurology: no syncope, no seizures, no numbness or tingling of hands, no numbness or tingling of feet, no paresis    14 point review of systems completed, all other than noted above are negative. Vitals:   Vitals:    01/01/23 1800   BP:    Pulse: 86   Resp: 24   Temp:    SpO2: 97%      BMI: Body mass index is 43.18 kg/m². Exam:  Physical Examination: General appearance - alert, awake appears to be in no acute distress  HEENT: Atraumatic normocephalic,no JVD, trachea is midline  Neck - supple, no significant adenopathy, no JVD, or carotid bruits  Chest - Bilateral air entry, no wheezes, crackles or rhonchi. Non tender to palpation of chest wall  Heart - S1S2 RRR, no murmurs or gallops  Abdomen - Soft, non tender non distended. Normoactive bowel sounds  Neurological - II-XII grossly intact, no neurological deficits  Musculoskeletal - 5/5 power upper and lower extremities equal bilaterally.   Full ROM of all limbs  Extremities - no edema, no cyanosis or clubbing  Skin - normal coloration and turgor, no rashes, no suspicious skin lesions noted  Genitourinary exam deferred patient at the time my physical survey was having active vaginal bleeding at bedside commode      Review of Labs and Diagnostic Testing:  CBC:   Recent Labs     01/01/23  1555   WBC 5. 2   HGB 7.2*        BMP:    Recent Labs     01/01/23  1555      K 3.8      CO2 20*   BUN 8   CREATININE 0.7   GLUCOSE 270*     Calcium:  Recent Labs     01/01/23  1555   CALCIUM 8.7     Ionized Calcium:No results for input(s): IONCA in the last 72 hours. Magnesium:No results for input(s): MG in the last 72 hours. Phosphorus:No results for input(s): PHOS in the last 72 hours. BNP:No results for input(s): BNP in the last 72 hours. Glucose:No results for input(s): POCGLU in the last 72 hours. HgbA1C: No results for input(s): LABA1C in the last 72 hours. INR:   Recent Labs     01/01/23  1555   INR 1.92*     Hepatic:   Recent Labs     01/01/23  1555   ALKPHOS 78   ALT 8*   AST 8   PROT 6.6   BILITOT 0.3   LABALBU 3.9     Amylase and Lipase:No results for input(s): LACTA, AMYLASE in the last 72 hours. Lactic Acid: No results for input(s): LACTA in the last 72 hours. Troponin:   Recent Labs     01/01/23  1555   TROPONINT < 0.010     BNP: No results for input(s): BNP in the last 72 hours. Lipids: No results for input(s): CHOL, TRIG, HDL, LDL, LDLCALC in the last 72 hours. ABGs:   Lab Results   Component Value Date/Time    PH 7.43 03/14/2015 03:51 PM    PCO2 28 03/14/2015 03:51 PM    PO2 69 03/14/2015 03:51 PM    HCO3 18 03/14/2015 03:51 PM    O2SAT 94 03/14/2015 03:51 PM       Radiology:     No results found. EKG:  Telemetry does not show any arrhythmia EKG pending      Principal Problem:    Vaginal bleeding  Active Problems:    Symptomatic anemia  Resolved Problems:    * No resolved hospital problems.  *      Assessment and Plan:  Vaginal bleeding with menorrhagia, the patient appears to be bleeding profusely from vaginal vault with clots I have ordered tranexaminic acid as a one-time IV dose at 1 g to help attenuate vaginal blood clot formation and ultimately decrease degree of blood loss, I will continue the patient's Megace orally and follow OB recommendations the patient may require focal ablation, or she may require replacement of Mirena IUD.   Acute blood loss anemia secondary to vaginal bleeding and symptomatic anemia with near episode of syncope, the patient's blood pressure medications must be held, she require type and screen and she may require at least 1 to 2 units of PRBC transfusion she has a history of coronary artery disease with recent stent placement in 2022 I require her hemoglobin to be at or above 8 g/dL, her baseline starting in August appears to be between 7 to 8 g/dL  Chronic anticoagulation with Xarelto secondary to hypercoagulable disorder with factor V Leiden clearly must withhold her Xarelto secondary to acute blood loss anemia symptomatic anemia and underlying coronary disease I will also have to hold her Plavix I suspect I will hold her Xarelto for 1 to 2 days and Plavix for 2 to 3 days after monitor this patient since she has had a drug-eluting stent placed in 2022  Coronary disease multivessel with cardiac catheterization as documented in past medical history  Morbid obesity with BMI over 43 kg meter squared      DVT prophylaxis: [] Lovenox                                 [] SCDs                                 [] SQ Heparin                                 [x] Encourage ambulation, low risk for DVT, no chemical or mechanical prophylaxis necessary              [] Already on Anticoagulation                Anticipated Disposition upon discharge: [x] Home                                                                         [] Home with Home Health                                                                         [] [facility]                                                                         [] [address]          Electronically signed by Azucena Abreu MD on 1/1/2023 at 7:12 PM Email

## 2023-04-10 NOTE — PLAN OF CARE
Problem: Pain  Goal: Verbalizes/displays adequate comfort level or baseline comfort level  Outcome: Progressing  Flowsheets (Taken 4/10/2023 1250)  Verbalizes/displays adequate comfort level or baseline comfort level: Encourage patient to monitor pain and request assistance     Problem: Skin/Tissue Integrity  Goal: Absence of new skin breakdown  Description: 1. Monitor for areas of redness and/or skin breakdown  2. Assess vascular access sites hourly  3. Every 4-6 hours minimum:  Change oxygen saturation probe site  4. Every 4-6 hours:  If on nasal continuous positive airway pressure, respiratory therapy assess nares and determine need for appliance change or resting period.   Outcome: Progressing     Problem: Safety - Adult  Goal: Free from fall injury  Outcome: Progressing  Flowsheets (Taken 4/10/2023 1250)  Free From Fall Injury: Instruct family/caregiver on patient safety     Problem: Chronic Conditions and Co-morbidities  Goal: Patient's chronic conditions and co-morbidity symptoms are monitored and maintained or improved  Outcome: Progressing  Flowsheets  Taken 4/10/2023 [address] - Patient's Chronic Conditions and Co-Morbidity Symptoms are Monitored and Maintained or Improved: Monitor and assess patient's chronic conditions and comorbid symptoms for stability, deterioration, or improvement  Taken 4/10/2023 1250  Care Plan - Patient's Chronic Conditions and Co-Morbidity Symptoms are Monitored and Maintained or Improved: Monitor and assess patient's chronic conditions and comorbid symptoms for stability, deterioration, or improvement     Problem: Discharge Planning  Goal: Discharge to home or other facility with appropriate resources  Outcome: Progressing  Flowsheets  Taken 4/10/2023 1650  Discharge to home or other facility with appropriate resources: Identify barriers to discharge with patient and caregiver  Taken 4/10/2023 1250  Discharge to home or other facility with appropriate resources:

## 2023-04-11 PROBLEM — Z98.890 S/P CARDIAC CATH: Status: ACTIVE | Noted: 2023-04-11

## 2023-04-11 PROBLEM — L97.929 NONHEALING ULCER OF LEFT LOWER LEG (HCC): Status: ACTIVE | Noted: 2023-04-11

## 2023-04-11 LAB
GLUCOSE BLD STRIP.AUTO-MCNC: 135 MG/DL (ref 70–108)
GLUCOSE BLD STRIP.AUTO-MCNC: 158 MG/DL (ref 70–108)
GLUCOSE BLD STRIP.AUTO-MCNC: 164 MG/DL (ref 70–108)
GLUCOSE BLD STRIP.AUTO-MCNC: 261 MG/DL (ref 70–108)
HCT VFR BLD AUTO: 28.6 % (ref 37–47)
HGB BLD-MCNC: 8.6 GM/DL (ref 12–16)

## 2023-04-11 PROCEDURE — 6370000000 HC RX 637 (ALT 250 FOR IP): Performed by: OBSTETRICS & GYNECOLOGY

## 2023-04-11 PROCEDURE — 2580000003 HC RX 258: Performed by: STUDENT IN AN ORGANIZED HEALTH CARE EDUCATION/TRAINING PROGRAM

## 2023-04-11 PROCEDURE — 3700000000 HC ANESTHESIA ATTENDED CARE: Performed by: PODIATRIST

## 2023-04-11 PROCEDURE — 3600000002 HC SURGERY LEVEL 2 BASE: Performed by: PODIATRIST

## 2023-04-11 PROCEDURE — 85014 HEMATOCRIT: CPT

## 2023-04-11 PROCEDURE — 88305 TISSUE EXAM BY PATHOLOGIST: CPT

## 2023-04-11 PROCEDURE — 88311 DECALCIFY TISSUE: CPT

## 2023-04-11 PROCEDURE — 3600000012 HC SURGERY LEVEL 2 ADDTL 15MIN: Performed by: PODIATRIST

## 2023-04-11 PROCEDURE — 99232 SBSQ HOSP IP/OBS MODERATE 35: CPT | Performed by: NURSE PRACTITIONER

## 2023-04-11 PROCEDURE — 85018 HEMOGLOBIN: CPT

## 2023-04-11 PROCEDURE — 82948 REAGENT STRIP/BLOOD GLUCOSE: CPT

## 2023-04-11 PROCEDURE — 0Y6S0Z0 DETACHMENT AT LEFT 2ND TOE, COMPLETE, OPEN APPROACH: ICD-10-PCS | Performed by: PODIATRIST

## 2023-04-11 PROCEDURE — 6360000002 HC RX W HCPCS: Performed by: STUDENT IN AN ORGANIZED HEALTH CARE EDUCATION/TRAINING PROGRAM

## 2023-04-11 PROCEDURE — 6370000000 HC RX 637 (ALT 250 FOR IP): Performed by: INTERNAL MEDICINE

## 2023-04-11 PROCEDURE — 6370000000 HC RX 637 (ALT 250 FOR IP): Performed by: STUDENT IN AN ORGANIZED HEALTH CARE EDUCATION/TRAINING PROGRAM

## 2023-04-11 PROCEDURE — 2580000003 HC RX 258: Performed by: INTERNAL MEDICINE

## 2023-04-11 PROCEDURE — 36415 COLL VENOUS BLD VENIPUNCTURE: CPT

## 2023-04-11 PROCEDURE — 3700000001 HC ADD 15 MINUTES (ANESTHESIA): Performed by: PODIATRIST

## 2023-04-11 PROCEDURE — 2500000003 HC RX 250 WO HCPCS: Performed by: PODIATRIST

## 2023-04-11 PROCEDURE — 1200000003 HC TELEMETRY R&B

## 2023-04-11 PROCEDURE — 2709999900 HC NON-CHARGEABLE SUPPLY: Performed by: PODIATRIST

## 2023-04-11 PROCEDURE — 99233 SBSQ HOSP IP/OBS HIGH 50: CPT | Performed by: INTERNAL MEDICINE

## 2023-04-11 RX ORDER — DEXTROSE, SODIUM CHLORIDE, SODIUM LACTATE, POTASSIUM CHLORIDE, AND CALCIUM CHLORIDE 5; .6; .31; .03; .02 G/100ML; G/100ML; G/100ML; G/100ML; G/100ML
INJECTION, SOLUTION INTRAVENOUS CONTINUOUS
Status: DISCONTINUED | OUTPATIENT
Start: 2023-04-11 | End: 2023-04-12

## 2023-04-11 RX ORDER — INSULIN LISPRO 100 [IU]/ML
0-4 INJECTION, SOLUTION INTRAVENOUS; SUBCUTANEOUS
Status: DISCONTINUED | OUTPATIENT
Start: 2023-04-12 | End: 2023-04-14 | Stop reason: HOSPADM

## 2023-04-11 RX ORDER — INSULIN GLARGINE 100 [IU]/ML
10 INJECTION, SOLUTION SUBCUTANEOUS DAILY
Status: CANCELLED | OUTPATIENT
Start: 2023-04-11

## 2023-04-11 RX ORDER — INSULIN LISPRO 100 [IU]/ML
0-4 INJECTION, SOLUTION INTRAVENOUS; SUBCUTANEOUS EVERY 6 HOURS
Status: DISCONTINUED | OUTPATIENT
Start: 2023-04-11 | End: 2023-04-11

## 2023-04-11 RX ORDER — BUPIVACAINE HYDROCHLORIDE 2.5 MG/ML
INJECTION, SOLUTION EPIDURAL; INFILTRATION; INTRACAUDAL PRN
Status: DISCONTINUED | OUTPATIENT
Start: 2023-04-11 | End: 2023-04-11 | Stop reason: ALTCHOICE

## 2023-04-11 RX ORDER — INSULIN GLARGINE 100 [IU]/ML
6 INJECTION, SOLUTION SUBCUTANEOUS DAILY
Status: DISCONTINUED | OUTPATIENT
Start: 2023-04-11 | End: 2023-04-12

## 2023-04-11 RX ORDER — SODIUM CHLORIDE 9 MG/ML
INJECTION, SOLUTION INTRAVENOUS CONTINUOUS
Status: DISCONTINUED | OUTPATIENT
Start: 2023-04-11 | End: 2023-04-11

## 2023-04-11 RX ADMIN — ALOGLIPTIN 25 MG: 25 TABLET, FILM COATED ORAL at 08:34

## 2023-04-11 RX ADMIN — METOPROLOL SUCCINATE 37.5 MG: 25 TABLET, EXTENDED RELEASE ORAL at 08:34

## 2023-04-11 RX ADMIN — ATORVASTATIN CALCIUM 40 MG: 40 TABLET, FILM COATED ORAL at 21:15

## 2023-04-11 RX ADMIN — SODIUM CHLORIDE, SODIUM LACTATE, POTASSIUM CHLORIDE, CALCIUM CHLORIDE AND DEXTROSE MONOHYDRATE: 5; 600; 310; 30; 20 INJECTION, SOLUTION INTRAVENOUS at 23:54

## 2023-04-11 RX ADMIN — ACETAMINOPHEN 650 MG: 325 TABLET ORAL at 10:52

## 2023-04-11 RX ADMIN — MEGESTROL ACETATE 80 MG: 40 TABLET ORAL at 21:15

## 2023-04-11 RX ADMIN — FAMOTIDINE 20 MG: 20 TABLET, FILM COATED ORAL at 08:34

## 2023-04-11 RX ADMIN — FAMOTIDINE 20 MG: 20 TABLET, FILM COATED ORAL at 21:16

## 2023-04-11 RX ADMIN — SODIUM CHLORIDE, SODIUM LACTATE, POTASSIUM CHLORIDE, CALCIUM CHLORIDE AND DEXTROSE MONOHYDRATE: 5; 600; 310; 30; 20 INJECTION, SOLUTION INTRAVENOUS at 08:30

## 2023-04-11 RX ADMIN — MEGESTROL ACETATE 80 MG: 40 TABLET ORAL at 08:34

## 2023-04-11 RX ADMIN — CEFAZOLIN 3000 MG: 10 INJECTION, POWDER, FOR SOLUTION INTRAVENOUS at 17:51

## 2023-04-11 RX ADMIN — INSULIN GLARGINE 6 UNITS: 100 INJECTION, SOLUTION SUBCUTANEOUS at 08:34

## 2023-04-11 NOTE — OP NOTE
[address]    [place], [place]  RECORD OF OPERATION    Name Tara Hernandez                                                             : 1970  MEDICAL RECORD NO. [id number]    DATE: 2023    Surgeon: Kody Knutson DPM, JONATHON    Assistant: Jordyn gonzalez pgy III    Pre-operative Diagnosis:    1. Osteomyelitis, left second digit    Post-operative Diagnosis:    Same    Procedure:    1. Disarticulation amputation left second digit at the metatarsophalangeal joint    Anesthesia: MAC, local consisting of 10 cc half percent bupivacaine plain    Hemostasis: Pressure dressing    Estimated Blood Loss: Less than 5 cc    Materials: None    Injectables: None    Condition: Stable    Complications: none     Specimens: Soft tissue specimen obtained sent to microbiology for aerobic anaerobic culture and sensitivity with Gram stain. INDICATIONS:    Patient is a pleasant 82-year-old female well-known to our practice had been following with us for conservative localized wound care. Patient suffers from underlying pressure induced ulcers to the distal aspect of her left second toe as well as subfifth metatarsal phalangeal joint. Interval x-rays at her 2023 visit showed osteolysis of the distal phalanx of the left second toe significant for direct extension osteomyelitis. Patient had been scheduled for surgical intervention 2023 in the form of disarticulation amputation of left second toe. In the interval patient was admitted to the ER for extensive menorrhalgia. Patient was seen bedside as an in-house consult and discussed need for surgical intervention during her inpatient stay. All questions concerns regarding perioperative management including benefits risk complications and alternatives to procedure were discussed in detail. Patient was seen pre-operatively. Consent was reviewed and signed, operative limb marked. All questions and concerns regarding the case were addressed.  The

## 2023-04-11 NOTE — CARE COORDINATION
Patient transferred to [address] [place] [address] post heart cath. Hand-off to [address] [facility], Festus Sales.  Electronically signed by Amber Bose RN on 4/11/23 at 7:19 AM EDT

## 2023-04-11 NOTE — BRIEF OP NOTE
Brief Postoperative Note      Patient: Carito Kinney  YOB: 1970  MRN: [id number]    Date of Procedure: 4/11/2023    Pre-Op Diagnosis: Diabetic ulcer of toe of left foot associated with diabetes mellitus of other type, unspecified ulcer stage ([facility] [E13.621, L97.529]    Post-Op Diagnosis: Same       Procedure(s):  Left 2nd Digit Amputation    Surgeon(s):  Hiren Ibanez DPM    Assistant:  * No surgical staff found *    Anesthesia: Monitor Anesthesia Care    Estimated Blood Loss (mL): Minimal    Complications: None    Specimens:   ID Type Source Tests Collected by Time Destination   A : left 2nd toe Tissue Toe SURGICAL PATHOLOGY Hiren Ibanez DPM 4/11/2023 1631        Implants:  * No implants in log *      Drains: * No LDAs found *    Findings: direct extension osteomyelitis, left second digit     Electronically signed by Hiren Ibanez DPM on 4/11/2023 at 4:43 PM

## 2023-04-11 NOTE — H&P
Department of Surgery  H&P Interval Note  History and Physical was reviewed pre-operatively, with no interval changes to note prior to scheduled surgical intervention. Patient was assessed, all questions/concerns regarding willie-operative management were addressed to patient satisfaction. Operative site was marked prior to transportation to the operative room. Logan Dickerson D.P.M.
agent: [x]Midazolam []Meperidine []Sublimaze []Morphine  []Diazepam  []Other:     ASA Classification:  []1 [x]2 []3 []4 []5   Class 1: A normal healthy patient  Class 2: Pt with mild to moderate systemic disease  Class 3: Severe systemic disease or disturbance  Class 4: Severe systemic disorders that are already life threatening. Class 5: Moribund pt with little chances of survival, for more than 24 hours. Mallampati I Airway Classification:   []1 []2 [x]3 []4     [x]Pre-procedure diagnostic studies complete and results available. Comment:    [x]Previous sedation/anesthesia experiences assessed. Comment:    [x]The patient is an appropriate candidate to undergo the planned procedure sedation and anesthesia. (Refer to nursing sedation/analgesia documentation record)  [x]Formulation and discussion of sedation/procedure plan, risks, and expectations with patient and/or responsible adult completed. [x]Patient examined immediately prior to the procedure.  (Refer to nursing sedation/analgesia documentation record)      Rodney Olivarez MD, [facility]    Electronically signed 4/10/2023 at 11:53 AM
Question:   When Needed? Answer:   When Available    TYPE AND SCREEN     Specimen is valid for 3 days - nurse to verify valid specimen     Standing Status:   Standing     Number of Occurrences:   1    ADMIT TO INPATIENT     Standing Status:   Standing     Number of Occurrences:   1     Order Specific Question:   Discharge Plan:     Answer:   Home with Office Follow-up     Order Specific Question:   Telemetry/Cardiac Monitoring Required? Answer:   Yes    Transfer patient post-procedure     Post Left Heart Cath     Standing Status:   Standing     Number of Occurrences:   1     Order Specific Question:   Preferred Department     Answer:   8b/3b     ASSESSMENT AND PLAN:  1. Osteomyelitis, left foot 2nd digit  - Pt. is a 50 y.o. female   - Patient was examined and evaluated  - WBC 5.6, afebrile  -Ancef 2 g ordered preoperatively  -Anticoagulation per primary and cardiology, operative intervention can proceed with or without anticoagulation present  -Applied dressing to left second digit consisting of Betadine, gauze, Kerlix  -Discussed with patient that she is on the schedule for left second digit amputation in the main OR at 1700 on 4/11/2023  - N.p.o. at midnight  - Procedure consent  - Hospitalist to provide surgical risk stratification  - Postoperatively, patient will be able to weight-bear as tolerated in the surgical shoe. - Podiatry will continue to follow    DISPO: OR 4/11/2023 at 1700 for left second digit amputation    Thanks for the opportunity to participate in this patient's care. Rema Jeffrey DPM DPM    I saw and evaluated the patient independently bedside. Please refer to resident physicians note for further details. Discussed with resident assessment and findings, I agree with plan as documented.      Vijay Jeong, [facility]

## 2023-04-12 LAB
GLUCOSE BLD STRIP.AUTO-MCNC: 168 MG/DL (ref 70–108)
GLUCOSE BLD STRIP.AUTO-MCNC: 171 MG/DL (ref 70–108)
GLUCOSE BLD STRIP.AUTO-MCNC: 182 MG/DL (ref 70–108)
GLUCOSE BLD STRIP.AUTO-MCNC: 270 MG/DL (ref 70–108)
HCT VFR BLD AUTO: 29.9 % (ref 37–47)
HGB BLD-MCNC: 8.9 GM/DL (ref 12–16)

## 2023-04-12 PROCEDURE — 36415 COLL VENOUS BLD VENIPUNCTURE: CPT

## 2023-04-12 PROCEDURE — 1200000003 HC TELEMETRY R&B

## 2023-04-12 PROCEDURE — 6360000002 HC RX W HCPCS: Performed by: INTERNAL MEDICINE

## 2023-04-12 PROCEDURE — 82948 REAGENT STRIP/BLOOD GLUCOSE: CPT

## 2023-04-12 PROCEDURE — 2580000003 HC RX 258: Performed by: INTERNAL MEDICINE

## 2023-04-12 PROCEDURE — 85018 HEMOGLOBIN: CPT

## 2023-04-12 PROCEDURE — 6370000000 HC RX 637 (ALT 250 FOR IP): Performed by: STUDENT IN AN ORGANIZED HEALTH CARE EDUCATION/TRAINING PROGRAM

## 2023-04-12 PROCEDURE — 85014 HEMATOCRIT: CPT

## 2023-04-12 PROCEDURE — 2580000003 HC RX 258: Performed by: STUDENT IN AN ORGANIZED HEALTH CARE EDUCATION/TRAINING PROGRAM

## 2023-04-12 PROCEDURE — 99232 SBSQ HOSP IP/OBS MODERATE 35: CPT | Performed by: INTERNAL MEDICINE

## 2023-04-12 RX ORDER — INSULIN GLARGINE 100 [IU]/ML
10 INJECTION, SOLUTION SUBCUTANEOUS DAILY
Status: DISCONTINUED | OUTPATIENT
Start: 2023-04-13 | End: 2023-04-14 | Stop reason: HOSPADM

## 2023-04-12 RX ADMIN — METOPROLOL SUCCINATE 37.5 MG: 25 TABLET, EXTENDED RELEASE ORAL at 08:25

## 2023-04-12 RX ADMIN — SODIUM CHLORIDE, PRESERVATIVE FREE 10 ML: 5 INJECTION INTRAVENOUS at 20:50

## 2023-04-12 RX ADMIN — OXYCODONE AND ACETAMINOPHEN 1 TABLET: 5; 325 TABLET ORAL at 21:36

## 2023-04-12 RX ADMIN — OXYCODONE AND ACETAMINOPHEN 1 TABLET: 5; 325 TABLET ORAL at 17:36

## 2023-04-12 RX ADMIN — MEGESTROL ACETATE 80 MG: 40 TABLET ORAL at 20:50

## 2023-04-12 RX ADMIN — FAMOTIDINE 20 MG: 20 TABLET, FILM COATED ORAL at 20:50

## 2023-04-12 RX ADMIN — MEGESTROL ACETATE 80 MG: 40 TABLET ORAL at 08:25

## 2023-04-12 RX ADMIN — ATORVASTATIN CALCIUM 40 MG: 40 TABLET, FILM COATED ORAL at 20:50

## 2023-04-12 RX ADMIN — OXYCODONE AND ACETAMINOPHEN 1 TABLET: 5; 325 TABLET ORAL at 00:07

## 2023-04-12 RX ADMIN — INSULIN GLARGINE 6 UNITS: 100 INJECTION, SOLUTION SUBCUTANEOUS at 08:29

## 2023-04-12 RX ADMIN — FAMOTIDINE 20 MG: 20 TABLET, FILM COATED ORAL at 08:25

## 2023-04-12 RX ADMIN — ALOGLIPTIN 25 MG: 25 TABLET, FILM COATED ORAL at 08:25

## 2023-04-12 RX ADMIN — IRON SUCROSE 100 MG: 20 INJECTION, SOLUTION INTRAVENOUS at 13:45

## 2023-04-12 ASSESSMENT — PAIN SCALES - GENERAL
PAINLEVEL_OUTOF10: 9
PAINLEVEL_OUTOF10: 5
PAINLEVEL_OUTOF10: 9

## 2023-04-12 ASSESSMENT — PAIN DESCRIPTION - DESCRIPTORS: DESCRIPTORS: THROBBING

## 2023-04-12 ASSESSMENT — PAIN DESCRIPTION - ORIENTATION
ORIENTATION: LEFT
ORIENTATION: LEFT

## 2023-04-12 ASSESSMENT — PAIN DESCRIPTION - LOCATION: LOCATION: TOE (COMMENT WHICH ONE)

## 2023-04-13 LAB
GLUCOSE BLD STRIP.AUTO-MCNC: 149 MG/DL (ref 70–108)
GLUCOSE BLD STRIP.AUTO-MCNC: 162 MG/DL (ref 70–108)
GLUCOSE BLD STRIP.AUTO-MCNC: 174 MG/DL (ref 70–108)
GLUCOSE BLD STRIP.AUTO-MCNC: 185 MG/DL (ref 70–108)
HCT VFR BLD AUTO: 31.3 % (ref 37–47)
HGB BLD-MCNC: 9.3 GM/DL (ref 12–16)

## 2023-04-13 PROCEDURE — 6370000000 HC RX 637 (ALT 250 FOR IP): Performed by: STUDENT IN AN ORGANIZED HEALTH CARE EDUCATION/TRAINING PROGRAM

## 2023-04-13 PROCEDURE — 36415 COLL VENOUS BLD VENIPUNCTURE: CPT

## 2023-04-13 PROCEDURE — 2580000003 HC RX 258: Performed by: STUDENT IN AN ORGANIZED HEALTH CARE EDUCATION/TRAINING PROGRAM

## 2023-04-13 PROCEDURE — 1200000003 HC TELEMETRY R&B

## 2023-04-13 PROCEDURE — 85014 HEMATOCRIT: CPT

## 2023-04-13 PROCEDURE — 6370000000 HC RX 637 (ALT 250 FOR IP): Performed by: INTERNAL MEDICINE

## 2023-04-13 PROCEDURE — 85018 HEMOGLOBIN: CPT

## 2023-04-13 PROCEDURE — 6360000002 HC RX W HCPCS: Performed by: INTERNAL MEDICINE

## 2023-04-13 PROCEDURE — 82948 REAGENT STRIP/BLOOD GLUCOSE: CPT

## 2023-04-13 PROCEDURE — 2580000003 HC RX 258: Performed by: INTERNAL MEDICINE

## 2023-04-13 PROCEDURE — 99232 SBSQ HOSP IP/OBS MODERATE 35: CPT | Performed by: INTERNAL MEDICINE

## 2023-04-13 RX ADMIN — FAMOTIDINE 20 MG: 20 TABLET, FILM COATED ORAL at 08:27

## 2023-04-13 RX ADMIN — IRON SUCROSE 100 MG: 20 INJECTION, SOLUTION INTRAVENOUS at 12:28

## 2023-04-13 RX ADMIN — ALOGLIPTIN 25 MG: 25 TABLET, FILM COATED ORAL at 08:27

## 2023-04-13 RX ADMIN — METOPROLOL SUCCINATE 37.5 MG: 25 TABLET, EXTENDED RELEASE ORAL at 08:27

## 2023-04-13 RX ADMIN — OXYCODONE AND ACETAMINOPHEN 1 TABLET: 5; 325 TABLET ORAL at 03:51

## 2023-04-13 RX ADMIN — SODIUM CHLORIDE, PRESERVATIVE FREE 5 ML: 5 INJECTION INTRAVENOUS at 20:08

## 2023-04-13 RX ADMIN — INSULIN GLARGINE 10 UNITS: 100 INJECTION, SOLUTION SUBCUTANEOUS at 08:28

## 2023-04-13 RX ADMIN — SODIUM CHLORIDE, PRESERVATIVE FREE 10 ML: 5 INJECTION INTRAVENOUS at 08:33

## 2023-04-13 RX ADMIN — FAMOTIDINE 20 MG: 20 TABLET, FILM COATED ORAL at 20:03

## 2023-04-13 RX ADMIN — MEGESTROL ACETATE 80 MG: 40 TABLET ORAL at 20:03

## 2023-04-13 RX ADMIN — SODIUM CHLORIDE, PRESERVATIVE FREE 10 ML: 5 INJECTION INTRAVENOUS at 08:27

## 2023-04-13 RX ADMIN — ATORVASTATIN CALCIUM 40 MG: 40 TABLET, FILM COATED ORAL at 20:12

## 2023-04-13 RX ADMIN — SODIUM CHLORIDE, PRESERVATIVE FREE 5 ML: 5 INJECTION INTRAVENOUS at 20:09

## 2023-04-13 RX ADMIN — OXYCODONE AND ACETAMINOPHEN 1 TABLET: 5; 325 TABLET ORAL at 18:26

## 2023-04-13 RX ADMIN — MEGESTROL ACETATE 80 MG: 40 TABLET ORAL at 08:27

## 2023-04-13 ASSESSMENT — PAIN DESCRIPTION - ORIENTATION
ORIENTATION: LEFT
ORIENTATION: LEFT

## 2023-04-13 ASSESSMENT — PAIN SCALES - WONG BAKER: WONGBAKER_NUMERICALRESPONSE: 6

## 2023-04-13 ASSESSMENT — PAIN SCALES - GENERAL
PAINLEVEL_OUTOF10: 10
PAINLEVEL_OUTOF10: 7

## 2023-04-13 ASSESSMENT — PAIN DESCRIPTION - DESCRIPTORS: DESCRIPTORS: SHOOTING;SHARP

## 2023-04-13 ASSESSMENT — PAIN DESCRIPTION - LOCATION
LOCATION: FOOT
LOCATION: FOOT;TOE (COMMENT WHICH ONE)

## 2023-04-13 NOTE — PLAN OF CARE
Problem: Pain  Goal: Verbalizes/displays adequate comfort level or baseline comfort level  4/13/2023 1958 by Shay Salazar RN  Flowsheets (Taken 4/13/2023 1958)  Verbalizes/displays adequate comfort level or baseline comfort level:   Encourage patient to monitor pain and request assistance   Administer analgesics based on type and severity of pain and evaluate response  4/13/2023 1318 by Zee Crowe RN  Outcome: Progressing     Problem: Skin/Tissue Integrity  Goal: Absence of new skin breakdown  Description: 1. Monitor for areas of redness and/or skin breakdown  2. Assess vascular access sites hourly  3. Every 4-6 hours minimum:  Change oxygen saturation probe site  4. Every 4-6 hours:  If on nasal continuous positive airway pressure, respiratory therapy assess nares and determine need for appliance change or resting period.   4/13/2023 1318 by Zee Crowe RN  Outcome: Progressing

## 2023-04-13 NOTE — PLAN OF CARE
Problem: Pain  Goal: Verbalizes/displays adequate comfort level or baseline comfort level  4/13/2023 1318 by Quoc Horne RN  Outcome: Progressing  4/13/2023 0138 by Ger Elizabeth RN  Outcome: Progressing     Problem: Skin/Tissue Integrity  Goal: Absence of new skin breakdown  Description: 1. Monitor for areas of redness and/or skin breakdown  2. Assess vascular access sites hourly  3. Every 4-6 hours minimum:  Change oxygen saturation probe site  4. Every 4-6 hours:  If on nasal continuous positive airway pressure, respiratory therapy assess nares and determine need for appliance change or resting period.   4/13/2023 1318 by Quoc Horne RN  Outcome: Progressing  4/13/2023 0138 by Ger Elizabeth RN  Outcome: Progressing     Problem: Safety - Adult  Goal: Free from fall injury  4/13/2023 1318 by Quoc Horne RN  Outcome: Progressing  4/13/2023 0138 by Ger Elizabeth RN  Outcome: Progressing     Problem: Chronic Conditions and Co-morbidities  Goal: Patient's chronic conditions and co-morbidity symptoms are monitored and maintained or improved  4/13/2023 1318 by Quoc Horne RN  Outcome: Progressing  4/13/2023 0138 by Ger Elizabeth RN  Outcome: Progressing     Problem: Discharge Planning  Goal: Discharge to home or other facility with appropriate resources  4/13/2023 1318 by Quoc Horne RN  Outcome: Progressing  4/13/2023 0138 by Ger Elizabeth RN  Outcome: Progressing     Problem: Cardiovascular - Adult  Goal: Maintains optimal cardiac output and hemodynamic stability  4/13/2023 1318 by Quoc Horne RN  Outcome: Progressing  4/13/2023 0138 by Ger Elizabeth RN  Outcome: Progressing  Goal: Absence of cardiac dysrhythmias or at baseline  4/13/2023 1318 by Quoc Horne RN  Outcome: Progressing  4/13/2023 0138 by Ger Elizabeth RN  Outcome: Progressing     Problem: Skin/Tissue Integrity - Adult  Goal: Skin integrity remains intact  4/13/2023 1318 by Quoc Horne RN  Outcome:

## 2023-04-13 NOTE — PLAN OF CARE
Problem: Pain  Goal: Verbalizes/displays adequate comfort level or baseline comfort level  Outcome: Progressing     Problem: Skin/Tissue Integrity  Goal: Absence of new skin breakdown  Description: 1. Monitor for areas of redness and/or skin breakdown  2. Assess vascular access sites hourly  3. Every 4-6 hours minimum:  Change oxygen saturation probe site  4. Every 4-6 hours:  If on nasal continuous positive airway pressure, respiratory therapy assess nares and determine need for appliance change or resting period.   Outcome: Progressing     Problem: Safety - Adult  Goal: Free from fall injury  Outcome: Progressing     Problem: Chronic Conditions and Co-morbidities  Goal: Patient's chronic conditions and co-morbidity symptoms are monitored and maintained or improved  Outcome: Progressing     Problem: Discharge Planning  Goal: Discharge to home or other facility with appropriate resources  Outcome: Progressing     Problem: Cardiovascular - Adult  Goal: Maintains optimal cardiac output and hemodynamic stability  Outcome: Progressing  Goal: Absence of cardiac dysrhythmias or at baseline  Outcome: Progressing     Problem: Skin/Tissue Integrity - Adult  Goal: Skin integrity remains intact  Outcome: Progressing  Flowsheets (Taken 4/12/2023 2050)  Skin Integrity Remains Intact: Monitor for areas of redness and/or skin breakdown  Goal: Incisions, wounds, or drain sites healing without S/S of infection  Outcome: Progressing  Goal: Oral mucous membranes remain intact  Outcome: Progressing     Problem: Musculoskeletal - Adult  Goal: Return mobility to safest level of function  Outcome: Progressing  Goal: Maintain proper alignment of affected body part  Outcome: Progressing  Goal: Return ADL status to a safe level of function  Outcome: Progressing     Problem: Infection - Adult  Goal: Absence of infection at discharge  Outcome: Progressing  Goal: Absence of infection during hospitalization  Outcome: Progressing  Goal:

## 2023-04-13 NOTE — CARE COORDINATION
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 Differential considerations include adenomyosis, submucosal fibroids and endometrial carcinoma. 2.1 cm fibroid in the posterior myometrium. 4/10 Cardiac cath: Patent right PDA stent. Patent LCX stent. Chronic total occlusion of the OM at prior intervention site, vessel fills via left to left collaterals. Intermediate stenosis of the mid LAD and diffuse small vessel disease of the distal LAD. No significant PAD on left lower extremity peripheral angiogram   4/11 Left 2nd Digit Amputation  Barriers to Discharge: Hospitalist, Podiatry, and Gynecology following. POD 2 L 2nd toe amputation. Holding Plavix and Xarelto. SCDs ordered. Regular carb control diet. Monitoring for bleeding. 04/13/23 05:55   Hemoglobin Quant 9.3 (L)   Hematocrit 31.3 (L)   PCP: SHANNON Kc CNP  Readmission Risk Score: 17.9%  Patient Goals/Plan/Treatment Preferences: From home alone. Transfer to [facility] was initiated yesterday.

## 2023-04-14 VITALS
OXYGEN SATURATION: 99 % | TEMPERATURE: 97.7 F | WEIGHT: 281 LBS | DIASTOLIC BLOOD PRESSURE: 64 MMHG | HEIGHT: 68 IN | RESPIRATION RATE: 18 BRPM | SYSTOLIC BLOOD PRESSURE: 108 MMHG | HEART RATE: 59 BPM | BODY MASS INDEX: 42.59 KG/M2

## 2023-04-14 LAB
GLUCOSE BLD STRIP.AUTO-MCNC: 148 MG/DL (ref 70–108)
HCT VFR BLD AUTO: 31.1 % (ref 37–47)
HGB BLD-MCNC: 9.1 GM/DL (ref 12–16)

## 2023-04-14 PROCEDURE — 36415 COLL VENOUS BLD VENIPUNCTURE: CPT

## 2023-04-14 PROCEDURE — 6370000000 HC RX 637 (ALT 250 FOR IP): Performed by: STUDENT IN AN ORGANIZED HEALTH CARE EDUCATION/TRAINING PROGRAM

## 2023-04-14 PROCEDURE — 6370000000 HC RX 637 (ALT 250 FOR IP): Performed by: INTERNAL MEDICINE

## 2023-04-14 PROCEDURE — 82948 REAGENT STRIP/BLOOD GLUCOSE: CPT

## 2023-04-14 PROCEDURE — 85014 HEMATOCRIT: CPT

## 2023-04-14 PROCEDURE — 85018 HEMOGLOBIN: CPT

## 2023-04-14 PROCEDURE — 2580000003 HC RX 258: Performed by: STUDENT IN AN ORGANIZED HEALTH CARE EDUCATION/TRAINING PROGRAM

## 2023-04-14 RX ADMIN — SODIUM CHLORIDE, PRESERVATIVE FREE 10 ML: 5 INJECTION INTRAVENOUS at 09:24

## 2023-04-14 RX ADMIN — INSULIN GLARGINE 10 UNITS: 100 INJECTION, SOLUTION SUBCUTANEOUS at 09:23

## 2023-04-14 RX ADMIN — FAMOTIDINE 20 MG: 20 TABLET, FILM COATED ORAL at 09:23

## 2023-04-14 RX ADMIN — METOPROLOL SUCCINATE 37.5 MG: 25 TABLET, EXTENDED RELEASE ORAL at 09:24

## 2023-04-14 RX ADMIN — ALOGLIPTIN 25 MG: 25 TABLET, FILM COATED ORAL at 09:24

## 2023-04-14 RX ADMIN — MEGESTROL ACETATE 80 MG: 40 TABLET ORAL at 09:24

## 2023-04-14 RX ADMIN — OXYCODONE AND ACETAMINOPHEN 1 TABLET: 5; 325 TABLET ORAL at 05:05

## 2023-04-14 RX ADMIN — ONDANSETRON 4 MG: 4 TABLET, ORALLY DISINTEGRATING ORAL at 05:05

## 2023-04-14 RX ADMIN — SODIUM CHLORIDE, PRESERVATIVE FREE 10 ML: 5 INJECTION INTRAVENOUS at 09:29

## 2023-04-14 NOTE — ACP (ADVANCE CARE PLANNING)
4/14/23, 9:37 AM EDT    Patient goals/plan/ treatment preferences discussed by  and . Patient goals/plan/ treatment preferences reviewed with patient/ family. Patient/ family verbalize understanding of discharge plan and are in agreement with goal/plan/treatment preferences. Understanding was demonstrated using the teach back method. AVS provided by RN at time of discharge, which includes all necessary medical information pertaining to the patients current course of illness, treatment, post-discharge goals of care, and treatment preferences.      Services At/After Discharge: Sarah Rodriguez

## 2023-04-14 NOTE — ANESTHESIA PRE-OP
Nurse to nurse given to Yo at [facility].  did retrieve patients taser from security and is now in patients possession.

## 2023-04-14 NOTE — PROGRESS NOTES
attempted to visit with patient and she was asleep in her bed. Prayers were shared at her doorway on her behalf. Spiritual health remains available.
Assessment and Plan:        Chest pressure:  cath stents patent; no need for intervention  dm:  hold insulin as npo  Menorrhagia- on provera. No bleeding past 24 hr.  H/h pending  Blood loss anemia- monitor. Toe infection- amputation today; circulation good. Will need to restart asa as soon as feasible    CC:  vaginal bleeding, chest pressure  HPI: pt with cad, prior stent, presents with menorrhagia and chest pressure. Also has infected toe, scheduled for amputation as well as hysterectomy. Exsmoker. Diabetic.  ROS (12 point review of systems completed. Pertinent positives noted.  Otherwise ROS is negative) :     PMH:  Per HPI  SHX:  , exsmoker  FHX: copd, cancer  Allergies: See above    Medications:     sodium chloride      sodium chloride      sodium chloride      sodium chloride      sodium chloride      sodium chloride      dextrose        [Held by provider] clopidogrel  75 mg Oral Daily    [Held by provider] rivaroxaban  20 mg Oral Daily    sodium chloride flush  5-40 mL IntraVENous 2 times per day    sodium chloride flush  5-40 mL IntraVENous 2 times per day    atorvastatin  40 mg Oral Nightly    ceFAZolin  2,000 mg IntraVENous On Call to OR    megestrol  80 mg Oral BID    alogliptin  25 mg Oral Daily    insulin glargine  10 Units SubCUTAneous Daily    insulin lispro  0-4 Units SubCUTAneous TID WC    insulin lispro  0-4 Units SubCUTAneous Nightly    famotidine  20 mg Oral BID    metoprolol succinate  37.5 mg Oral Daily    sodium chloride flush  5-40 mL IntraVENous 2 times per day       Vital Signs:   /64   Pulse 75   Temp 98.6 °F (37 °C) (Oral)   Resp 18   Ht 5' 8\" (1.727 m)   Wt 295 lb 3.1 oz (133.9 kg)   LMP 06/21/2018   SpO2 99%   BMI 44.88 kg/m²      Intake/Output Summary (Last 24 hours) at 4/11/2023 0643  Last data filed at 4/10/2023 1507  Gross per 24 hour   Intake 670 ml   Output --   Net 670 ml        Physical Examination: General appearance - chronically ill
Assessment and Plan:        Chest pressure:  cath stents patent; no need for intervention  dm:  monitor; back on insulin  Menorrhagia- on provera. Still passing clots. H/h stable. Await [facility] transfer. Blood loss anemia- monitor. Toe infection- amputation 4.11.  no ambulation. Hx dvt/PE; in view of bleeding, unable to put back on anticoagulants; scds ordered. ? Filter if off anticoagulation prolonged period? CC:  vaginal bleeding, chest pressure  HPI: pt with cad, prior stent, presents with menorrhagia and chest pressure. Also has infected toe, had amputation, await transfer to [facility] for hxectomy. Exsmoker. Diabetic.  ROS (12 point review of systems completed. Pertinent positives noted.  Otherwise ROS is negative) :     PMH:  Per HPI  SHX:  , exsmoker  FHX: copd, cancer  Allergies: See above    Medications:     sodium chloride      sodium chloride      sodium chloride      sodium chloride      sodium chloride      dextrose        insulin glargine  10 Units SubCUTAneous Daily    iron sucrose  100 mg IntraVENous Q24H    insulin lispro  0-4 Units SubCUTAneous TID WC    [Held by provider] clopidogrel  75 mg Oral Daily    [Held by provider] rivaroxaban  20 mg Oral Daily    sodium chloride flush  5-40 mL IntraVENous 2 times per day    sodium chloride flush  5-40 mL IntraVENous 2 times per day    atorvastatin  40 mg Oral Nightly    megestrol  80 mg Oral BID    alogliptin  25 mg Oral Daily    insulin lispro  0-4 Units SubCUTAneous Nightly    famotidine  20 mg Oral BID    metoprolol succinate  37.5 mg Oral Daily    sodium chloride flush  5-40 mL IntraVENous 2 times per day       Vital Signs:   BP (!) 112/57   Pulse 65   Temp 98 °F (36.7 °C) (Oral)   Resp 16   Ht 5' 8\" (1.727 m)   Wt 298 lb 8.1 oz (135.4 kg)   LMP 06/21/2018   SpO2 100%   BMI 45.39 kg/m²      Intake/Output Summary (Last 24 hours) at 4/13/2023 1001  Last data filed at 4/13/2023 0421  Gross per 24 hour   Intake 525 ml   Output 1250
Assessment and Plan:        Chest pressure:  cath today; start IV fluids  dm:  hold insulin as npo  Menorrhagia- on provera  Blood loss anemia- monitor. She is still passing blood; hgb 7.9; will give one unit    CC:  vaginal bleeding, chest pressure  HPI: pt with cad, prior stent, presents with menorrhagia and chest pressure. Also has infected toe, scheduled for amputation at [address] [place] [address] as well as hysterectomy. Exsmoker. Diabetic.  ROS (12 point review of systems completed. Pertinent positives noted.  Otherwise ROS is negative) :     PMH:  Per HPI  SHX:  , exsmoker  FHX: copd, cancer  Allergies: See above    Medications:     sodium chloride      sodium chloride      sodium chloride      dextrose        megestrol  80 mg Oral BID    alogliptin  25 mg Oral Daily    insulin glargine  10 Units SubCUTAneous Daily    insulin lispro  0-4 Units SubCUTAneous TID WC    insulin lispro  0-4 Units SubCUTAneous Nightly    famotidine  20 mg Oral BID    atorvastatin  40 mg Oral Nightly    metoprolol succinate  37.5 mg Oral Daily    sodium chloride flush  5-40 mL IntraVENous 2 times per day       Vital Signs:   /72   Pulse 79   Temp 97.2 °F (36.2 °C) (Oral)   Resp 18   Ht 5' 8\" (1.727 m)   Wt 295 lb 3.1 oz (133.9 kg)   LMP 06/21/2018   SpO2 100%   BMI 44.88 kg/m²      Intake/Output Summary (Last 24 hours) at 4/10/2023 0740  Last data filed at 4/9/2023 2246  Gross per 24 hour   Intake 1620 ml   Output --   Net 1620 ml        Physical Examination: General appearance - chronically ill appearing  Mental status - alert, oriented to person, place, and time  Neck - supple, no significant adenopathy, no JVD, or carotid bruits  Chest - clear to auscultation, no wheezes, rales or rhonchi, symmetric air entry  Heart - normal rate, regular rhythm, normal S1, S2, no murmurs, rubs, clicks or gallops  Abdomen - soft, nontender, nondistended, no masses or organomegaly  Neurological - alert, oriented, normal speech, no focal
Blood and procedure consent obtained and signed with this RN. Placed in front of chart.
Called cath lab and notified them that patient's blood had been started.
Comprehensive Nutrition Assessment    Type and Reason for Visit:  Initial, Wound, RD Nutrition Re-Screen/LOS    Nutrition Recommendations/Plan:   Consider MVI to assist in wound healing efforts. Wound healing ONS: Yao BID. Continue current diet. Malnutrition Assessment:  Malnutrition Status:  No malnutrition (04/14/23 1004)    Context:  Acute Illness     Findings of the 6 clinical characteristics of malnutrition:  Energy Intake:  No significant decrease in energy intake  Weight Loss:  No significant weight loss (Intentional weight loss reported)     Body Fat Loss:  No significant body fat loss     Muscle Mass Loss:  No significant muscle mass loss    Fluid Accumulation:  No significant fluid accumulation     Strength:  Not Performed    Nutrition Assessment:     Pt. nutritionally compromised AEB wounds. At risk for further nutrition compromise r/t increased nutrient needs for wound healing, s/p 4/11/23: left second toe amputation d/t osteomyelitis, admit with menorrhagia d/t blood coagulation disorder, and underlying medical condition (hx: factor 5 leiden mutation, asthma, PE, DVT, RA, bipolar, GERD, HLD). Nutrition Related Findings:    Pt. Report/Treatments/Miscellaneous: Patient seen earlier this morning, reports her appetite was good prior to admit and now. Intentional weight loss since 2020 reported. Discussed Yao use to assist in wound healing. Awaiting transfer to [facility].   GI Status: reported BM yesterday  Pertinent Labs: 12/15/22: HgbA1c 7.5%. 4/13/23: Chemstick 149, 162, 174, 185  Pertinent Meds: lipitor, lantus, humalog, megace 80 mg     Wound Type: Surgical Incision (4/11/23: Left 2nd Digit Amputation)       Current Nutrition Intake & Therapies:    Average Meal Intake: %, 26-50%     ADULT DIET; Regular; 3 carb choices (45 gm/meal)  ADULT ORAL NUTRITION SUPPLEMENT; Breakfast, Dinner;  Wound Healing Oral Supplement    Anthropometric Measures:  Height: 5' 8\" (172.7 cm)  Ideal
I updated Dr. Sydni Acevedo (Payton Murillo Da Emanuel 1975) with status of patient and needed transfused over the weekend. We discussed stress test results and plan for toe amputation, possibly tomorrow. We discussed possible transfer to [address] [place] [address] depending on her bleeding status after the amputation.
Patient is awake and laying in bed. Patient is A&O x4 and calm. Speech is clear and hearing is intact. Patient reports no pain currently. At [place] [address], vitals were as follows: BP= 118/62, P= 81, R= 18, T= 97.7 (oral), and SPO2= 100. Pupils are round and reactive to light and constrict consensually and directly in a brisk manner. Sclera are white bilaterally. Conjunctiva are pink and moist bilaterally. Patient is wearing glasses. Moderate strength in hand grasp and in pedal push and pull. Breathing is deep and unlabored. Lungs sounds are clear anteriorly and posteriorly, bilaterally. Heart tones are regular with clear S1 and S2 noted. No murmurs noted. No neck vein distention. No carotid bruit bilaterally. Left and right radial pulses are 2+ and regular. Left and right pedal pulses and left and right posterior tibial pulses are 1+ and regular. Cap refill is less than 3 seconds on fingers and toes. No edema present. Oral mucosa is pink and moist. Teeth are in good repair. Abdomen is soft and round. Bowel sounds are active in all 4 quadrants. Patient is voiding urine with blood clots due to vaginal bleeding. Skin is smooth, dry, and warm. Good turgor, no tenting. Wound on left foot, second toe from infection. Patient states that she is having it amputated on Friday. Full ROM in upper and lower extremities, good strength. Patient ambulates with no mobility device. Patient family is in room with her as she waits to go down for heart cath this morning. Call light within reach, bed alarm on.
Patient taken to cath lab at this time. Blood is infusing at 150 mL/hr with about 50 mLs left in bag. Family at beside and bringing patient belongings with them. Patient is aware she will be sent to new room after procedure.
Podiatric Progress Note  eLnore Kc  Subjective :   4/12/2023  Patient seen bedside today on behalf of Dr. Dwain Jones. Patient appeared pleasant, was oriented to person, place and time and in no acute distress. Patient is 1 day status post left second digit amputation at metatarsophalangeal joint (DOS 4/11/2023). Patient states that she did not sleep well overnight. She started having some throbbing pains overnight in her left foot. Patient states that she has been taking the pain medication and that that helps. She believes she will be transferred to [facility] sometime in the next day or 2. Patient denies any N/V/F/C/CP or SOB. Patient has no further pedal concerns at this point in time.      Current Medications:    Current Facility-Administered Medications: dextrose 5 % in lactated ringers infusion, , IntraVENous, Continuous  insulin glargine (LANTUS) injection vial 6 Units, 6 Units, SubCUTAneous, Daily  insulin lispro (HUMALOG) injection vial 0-4 Units, 0-4 Units, SubCUTAneous, TID WC  albuterol sulfate HFA (PROVENTIL;VENTOLIN;PROAIR) 108 (90 Base) MCG/ACT inhaler 2 puff, 2 puff, Inhalation, 4x Daily PRN  [Held by provider] clopidogrel (PLAVIX) tablet 75 mg, 75 mg, Oral, Daily  [Held by provider] rivaroxaban (XARELTO) tablet 20 mg, 20 mg, Oral, Daily  sodium chloride flush 0.9 % injection 5-40 mL, 5-40 mL, IntraVENous, 2 times per day  sodium chloride flush 0.9 % injection 5-40 mL, 5-40 mL, IntraVENous, PRN  0.9 % sodium chloride infusion, , IntraVENous, PRN  nitroGLYCERIN (NITROSTAT) SL tablet 0.4 mg, 0.4 mg, SubLINGual, Q5 Min PRN  0.9 % sodium chloride infusion, , IntraVENous, PRN  sodium chloride flush 0.9 % injection 5-40 mL, 5-40 mL, IntraVENous, 2 times per day  sodium chloride flush 0.9 % injection 5-40 mL, 5-40 mL, IntraVENous, PRN  0.9 % sodium chloride infusion, , IntraVENous, PRN  atorvastatin (LIPITOR) tablet 40 mg, 40 mg, Oral, Nightly  0.9 % sodium chloride infusion, , IntraVENous,
Pt refused SEDs.
Pt refused telemetry.
Pt states bleeding remains the same and passing around 4 clots daily. Vitals:    04/10/23 0745   BP: 118/62   Pulse: 81   Resp: 18   Temp: 97.7 °F (36.5 °C)   SpO2: 100%     CBC this am 5.6>8.1/27.9<275    Gen: Pt resting comfortably in bed    51yo   Menorrhagia- hgb 8.1 this morning, has ranged from 7s to 9s. Will be getting her second unit of RBCs today prior to heart cath. Pt reports plan for toe amputation on Friday. Will update Dr. Viji Chino today on patient status.
Pt states bleeding remains the same, is passing several clots daily. States she still doesn't feel well and notices decreased energy. Vitals:    04/12/23 0723   BP: (!) 110/58   Pulse: 66   Resp: 18   Temp: 98.2 °F (36.8 °C)   SpO2: 99%       Gen: Pt sitting at bedside    53yo   Menorrhagia- will get H/H this morning. Once that returns will reach out to Dr. Lew Bartlett for his recommendations for transfer.
Pt states bleeding remains the same. Vitals:    04/11/23 0825   BP: 125/72   Pulse: 80   Resp: 18   Temp: 98.3 °F (36.8 °C)   SpO2: 99%       Gen: pt sitting in bed, no distress    Menorrhagia- Disc with Dr. Tovar Shoulder, disc need for transfusion due to bleeding. Plan for transfer to [facility] after toe amputation if patient continues with vaginal bleeding. Disc will eval for this tomorrow.
Pt up in the shower during rounds. Nursing states her vaginal bleeding has been the same overnight. She is asymtomatic. Her Vital signs are stable. Pulse 70-80s. BP 110s-120/50-60s. She is due for another hgb at noon today. Last hgb was 8. 2.  continue current plan of care.      Iliana Rico MD  12:57 AM  4/10/2023
Report called to RN on 8A
Report called to nurse, Radha, on 8A at this time.
Examination: General appearance - chronically ill appearing  Mental status - alert, oriented to person, place, and time  Neck - supple, no significant adenopathy, no JVD, or carotid bruits  Chest - clear to auscultation, no wheezes, rales or rhonchi, symmetric air entry  Heart - normal rate, regular rhythm, normal S1, S2, no murmurs, rubs, clicks or gallops  Abdomen - soft, nontender, nondistended, no masses or organomegaly  Neurological - alert, oriented, normal speech, no focal findings or movement disorder noted  Musculoskeletal - no muscular tenderness noted  Extremities - leg wrapped  Skin - normal coloration and turgor, no rashes, no suspicious skin lesions noted    Data: (All radiographs, tracings, PFTs, and imaging are personally viewed and interpreted unless otherwise noted).    Reviewed labs, images      Electronically signed by Sudha Bateman MD on 4/12/2023 at 10:55 AM
Cath:   HEMODYNAMICS:  Left ventricular end-diastolic pressure 22 mmHg. No significant  pressure gradient across the aortic valve upon pullback. LEFT VENTRICULOGRAM:  No regional wall motion abnormality. Ejection  fraction was estimated at 55%. ABDOMINAL AORTOGRAM:  Patent distal abdominal aorta. No aneurysmal  dilation. Common iliac, external iliac and internal iliac arteries are  patent on both right and left side. SELECTIVE ANGIOGRAM OF THE LEFT LOWER EXTREMITY:  Patent SFA, profunda  femoris, popliteal artery with a three-vessel runoff below the knee. No  significant obstructive PAD noted. CORONARY ANGIOGRAM:  1. Left main coronary artery, patent, gives rise to left circumflex and  left anterior descending arteries. 2.  Left anterior descending artery, proximal segment has 20 to 30%  stenosis. Mid segment has 40 to 50% stenosis. The distal segment is a  relatively small caliber vessel with moderate to severe diffuse disease,  stable when compared to previous study. 3.  Left circumflex artery, proximal segment has 20 to 30% stenosis. Stent in mid segment is patent. Distal segment with luminal  irregularities. OM1 has chronic total occlusion at the previous  intervention site; however, the vessel fills via left-to-left  collaterals. 4  Right coronary artery, dominant vessel, proximal segment has luminal  irregularities,  mid segment is patent, distal segment with luminal  irregularities. Patent intervention site in the right posterior  descending artery. PLB is a relatively small vessel with mild diffuse  disease. MEDICATIONS:  See MAR. COMPLICATIONS:  None. ESTIMATED BLOOD LOSS:  Less than 50 mL. ACCESS:  Right radial artery access. Vasc Band was applied. Hemostasis  was achieved. IMPRESSION:  1. Patent intervention site in the right posterior descending artery. 2.  Patent intervention site in the left circumflex artery.   3.  Chronic total occlusion of the

## 2023-04-17 NOTE — DISCHARGE SUMMARY
Discharge Summary    Patient left the hospital prior to my ability to examine, DC order had already been in place. Please see progress notes from previous days for treatment plan. She has been transferred to [facility] for continued uterine bleeding / menorrhagia - which is where the patient's established OB-GYN resides. Patient had been maintained off of [facility], however it is worth noting she does have an infrarenal IVC filter.

## 2023-04-23 LAB
EKG ATRIAL RATE: 67 BPM
EKG ATRIAL RATE: 70 BPM
EKG ATRIAL RATE: 72 BPM
EKG P AXIS: 24 DEGREES
EKG P AXIS: 27 DEGREES
EKG P AXIS: 30 DEGREES
EKG P-R INTERVAL: 182 MS
EKG P-R INTERVAL: 190 MS
EKG P-R INTERVAL: 194 MS
EKG Q-T INTERVAL: 386 MS
EKG Q-T INTERVAL: 394 MS
EKG Q-T INTERVAL: 394 MS
EKG QRS DURATION: 92 MS
EKG QRS DURATION: 94 MS
EKG QRS DURATION: 98 MS
EKG QTC CALCULATION (BAZETT): 416 MS
EKG QTC CALCULATION (BAZETT): 416 MS
EKG QTC CALCULATION (BAZETT): 431 MS
EKG R AXIS: 12 DEGREES
EKG R AXIS: 18 DEGREES
EKG R AXIS: 6 DEGREES
EKG T AXIS: 34 DEGREES
EKG T AXIS: 42 DEGREES
EKG T AXIS: 43 DEGREES
EKG VENTRICULAR RATE: 67 BPM
EKG VENTRICULAR RATE: 70 BPM
EKG VENTRICULAR RATE: 72 BPM

## 2023-06-11 ENCOUNTER — APPOINTMENT (OUTPATIENT)
Dept: GENERAL RADIOLOGY | Age: 53
DRG: 710 | End: 2023-06-11
Payer: COMMERCIAL

## 2023-06-11 ENCOUNTER — HOSPITAL ENCOUNTER (INPATIENT)
Age: 53
LOS: 5 days | Discharge: HOME HEALTH | DRG: 710 | End: 2023-06-16
Attending: EMERGENCY MEDICINE | Admitting: SURGERY
Payer: COMMERCIAL

## 2023-06-11 DIAGNOSIS — A41.9 SEPSIS SECONDARY TO UTI (HCC): Primary | ICD-10-CM

## 2023-06-11 DIAGNOSIS — E11.65 POORLY CONTROLLED DIABETES MELLITUS (HCC): ICD-10-CM

## 2023-06-11 DIAGNOSIS — N39.0 SEPSIS SECONDARY TO UTI (HCC): Primary | ICD-10-CM

## 2023-06-11 DIAGNOSIS — L08.9 TOE INFECTION: ICD-10-CM

## 2023-06-11 DIAGNOSIS — M86.9 OSTEOMYELITIS OF THIRD TOE OF RIGHT FOOT (HCC): ICD-10-CM

## 2023-06-11 LAB
ALBUMIN SERPL BCG-MCNC: 3.9 G/DL (ref 3.5–5.1)
ALP SERPL-CCNC: 112 U/L (ref 38–126)
ALT SERPL W/O P-5'-P-CCNC: 9 U/L (ref 11–66)
ANION GAP SERPL CALC-SCNC: 18 MEQ/L (ref 8–16)
APTT PPP: 32.5 SECONDS (ref 22–38)
AST SERPL-CCNC: 15 U/L (ref 5–40)
BACTERIA URNS QL MICRO: ABNORMAL /HPF
BASOPHILS ABSOLUTE: 0 THOU/MM3 (ref 0–0.1)
BASOPHILS NFR BLD AUTO: 0.5 %
BILIRUB CONJ SERPL-MCNC: < 0.2 MG/DL (ref 0–0.3)
BILIRUB SERPL-MCNC: 0.4 MG/DL (ref 0.3–1.2)
BILIRUB UR QL STRIP.AUTO: NEGATIVE
BUN SERPL-MCNC: 13 MG/DL (ref 7–22)
CALCIUM SERPL-MCNC: 8.8 MG/DL (ref 8.5–10.5)
CASTS #/AREA URNS LPF: ABNORMAL /LPF
CASTS 2: ABNORMAL /LPF
CHARACTER UR: ABNORMAL
CHLORIDE SERPL-SCNC: 97 MEQ/L (ref 98–111)
CO2 SERPL-SCNC: 21 MEQ/L (ref 23–33)
COLOR: YELLOW
CREAT SERPL-MCNC: 0.6 MG/DL (ref 0.4–1.2)
CRYSTALS URNS MICRO: ABNORMAL
DEPRECATED RDW RBC AUTO: 58.7 FL (ref 35–45)
EKG ATRIAL RATE: 103 BPM
EKG P AXIS: 39 DEGREES
EKG P-R INTERVAL: 180 MS
EKG Q-T INTERVAL: 340 MS
EKG QRS DURATION: 102 MS
EKG QTC CALCULATION (BAZETT): 445 MS
EKG R AXIS: 11 DEGREES
EKG T AXIS: 70 DEGREES
EKG VENTRICULAR RATE: 103 BPM
EOSINOPHIL NFR BLD AUTO: 0.1 %
EOSINOPHILS ABSOLUTE: 0 THOU/MM3 (ref 0–0.4)
EPITHELIAL CELLS, UA: ABNORMAL /HPF
ERYTHROCYTE [DISTWIDTH] IN BLOOD BY AUTOMATED COUNT: 19.9 % (ref 11.5–14.5)
FLUAV RNA RESP QL NAA+PROBE: NOT DETECTED
FLUBV RNA RESP QL NAA+PROBE: NOT DETECTED
GFR SERPL CREATININE-BSD FRML MDRD: > 60 ML/MIN/1.73M2
GLUCOSE SERPL-MCNC: 209 MG/DL (ref 70–108)
GLUCOSE UR QL STRIP.AUTO: NEGATIVE MG/DL
HCT VFR BLD AUTO: 36.7 % (ref 37–47)
HGB BLD-MCNC: 11.4 GM/DL (ref 12–16)
HGB UR QL STRIP.AUTO: ABNORMAL
IMM GRANULOCYTES # BLD AUTO: 0.07 THOU/MM3 (ref 0–0.07)
IMM GRANULOCYTES NFR BLD AUTO: 0.9 %
INR PPP: 1.59 (ref 0.85–1.13)
KETONES UR QL STRIP.AUTO: ABNORMAL
LACTATE SERPL-SCNC: 2.4 MMOL/L (ref 0.5–2)
LIPASE SERPL-CCNC: 19.6 U/L (ref 5.6–51.3)
LYMPHOCYTES ABSOLUTE: 0.1 THOU/MM3 (ref 1–4.8)
LYMPHOCYTES NFR BLD AUTO: 1.4 %
MAGNESIUM SERPL-MCNC: 1.5 MG/DL (ref 1.6–2.4)
MCH RBC QN AUTO: 25.3 PG (ref 26–33)
MCHC RBC AUTO-ENTMCNC: 31.1 GM/DL (ref 32.2–35.5)
MCV RBC AUTO: 81.4 FL (ref 81–99)
MISCELLANEOUS 2: ABNORMAL
MONOCYTES ABSOLUTE: 0 THOU/MM3 (ref 0.4–1.3)
MONOCYTES NFR BLD AUTO: 0.3 %
MUCOUS THREADS URNS QL MICRO: ABNORMAL
NEUTROPHILS NFR BLD AUTO: 96.8 %
NITRITE UR QL STRIP: POSITIVE
NRBC BLD AUTO-RTO: 0 /100 WBC
NT-PROBNP SERPL IA-MCNC: 89.8 PG/ML (ref 0–124)
OSMOLALITY SERPL CALC.SUM OF ELEC: 278.2 MOSMOL/KG (ref 275–300)
PH UR STRIP.AUTO: 6 [PH] (ref 5–9)
PLATELET # BLD AUTO: 199 THOU/MM3 (ref 130–400)
PMV BLD AUTO: 9.4 FL (ref 9.4–12.4)
POTASSIUM SERPL-SCNC: 3.5 MEQ/L (ref 3.5–5.2)
PROCALCITONIN SERPL IA-MCNC: 14.63 NG/ML (ref 0.01–0.09)
PROT SERPL-MCNC: 6.1 G/DL (ref 6.1–8)
PROT UR STRIP.AUTO-MCNC: 30 MG/DL
RBC # BLD AUTO: 4.51 MILL/MM3 (ref 4.2–5.4)
RBC URINE: > 100 /HPF
RENAL EPI CELLS #/AREA URNS HPF: ABNORMAL /[HPF]
SARS-COV-2 RNA RESP QL NAA+PROBE: NOT DETECTED
SEGMENTED NEUTROPHILS ABSOLUTE COUNT: 7.5 THOU/MM3 (ref 1.8–7.7)
SODIUM SERPL-SCNC: 136 MEQ/L (ref 135–145)
SP GR UR REFRACT.AUTO: 1.01 (ref 1–1.03)
T4 FREE SERPL-MCNC: 1.06 NG/DL (ref 0.93–1.76)
TROPONIN T: < 0.01 NG/ML
TSH SERPL DL<=0.005 MIU/L-ACNC: 0.83 UIU/ML (ref 0.4–4.2)
UROBILINOGEN, URINE: 1 EU/DL (ref 0–1)
WBC # BLD AUTO: 7.7 THOU/MM3 (ref 4.8–10.8)
WBC #/AREA URNS HPF: > 100 /HPF
WBC #/AREA URNS HPF: ABNORMAL /[HPF]
YEAST LIKE FUNGI URNS QL MICRO: ABNORMAL

## 2023-06-11 PROCEDURE — 2580000003 HC RX 258: Performed by: PSYCHIATRY & NEUROLOGY

## 2023-06-11 PROCEDURE — 84484 ASSAY OF TROPONIN QUANT: CPT

## 2023-06-11 PROCEDURE — 83880 ASSAY OF NATRIURETIC PEPTIDE: CPT

## 2023-06-11 PROCEDURE — 71045 X-RAY EXAM CHEST 1 VIEW: CPT

## 2023-06-11 PROCEDURE — 36415 COLL VENOUS BLD VENIPUNCTURE: CPT

## 2023-06-11 PROCEDURE — 93010 ELECTROCARDIOGRAM REPORT: CPT | Performed by: INTERNAL MEDICINE

## 2023-06-11 PROCEDURE — 2000000000 HC ICU R&B

## 2023-06-11 PROCEDURE — 83690 ASSAY OF LIPASE: CPT

## 2023-06-11 PROCEDURE — 80048 BASIC METABOLIC PNL TOTAL CA: CPT

## 2023-06-11 PROCEDURE — 81001 URINALYSIS AUTO W/SCOPE: CPT

## 2023-06-11 PROCEDURE — 83605 ASSAY OF LACTIC ACID: CPT

## 2023-06-11 PROCEDURE — 96361 HYDRATE IV INFUSION ADD-ON: CPT

## 2023-06-11 PROCEDURE — 84439 ASSAY OF FREE THYROXINE: CPT

## 2023-06-11 PROCEDURE — 80076 HEPATIC FUNCTION PANEL: CPT

## 2023-06-11 PROCEDURE — 85730 THROMBOPLASTIN TIME PARTIAL: CPT

## 2023-06-11 PROCEDURE — 84145 PROCALCITONIN (PCT): CPT

## 2023-06-11 PROCEDURE — 96360 HYDRATION IV INFUSION INIT: CPT

## 2023-06-11 PROCEDURE — 87077 CULTURE AEROBIC IDENTIFY: CPT

## 2023-06-11 PROCEDURE — 6360000002 HC RX W HCPCS: Performed by: PSYCHIATRY & NEUROLOGY

## 2023-06-11 PROCEDURE — 99223 1ST HOSP IP/OBS HIGH 75: CPT | Performed by: STUDENT IN AN ORGANIZED HEALTH CARE EDUCATION/TRAINING PROGRAM

## 2023-06-11 PROCEDURE — 87086 URINE CULTURE/COLONY COUNT: CPT

## 2023-06-11 PROCEDURE — 87186 SC STD MICRODIL/AGAR DIL: CPT

## 2023-06-11 PROCEDURE — 6370000000 HC RX 637 (ALT 250 FOR IP): Performed by: PSYCHIATRY & NEUROLOGY

## 2023-06-11 PROCEDURE — 73630 X-RAY EXAM OF FOOT: CPT

## 2023-06-11 PROCEDURE — 87636 SARSCOV2 & INF A&B AMP PRB: CPT

## 2023-06-11 PROCEDURE — 85610 PROTHROMBIN TIME: CPT

## 2023-06-11 PROCEDURE — 82533 TOTAL CORTISOL: CPT

## 2023-06-11 PROCEDURE — 93005 ELECTROCARDIOGRAM TRACING: CPT | Performed by: EMERGENCY MEDICINE

## 2023-06-11 PROCEDURE — 87040 BLOOD CULTURE FOR BACTERIA: CPT

## 2023-06-11 PROCEDURE — 99285 EMERGENCY DEPT VISIT HI MDM: CPT

## 2023-06-11 PROCEDURE — 85025 COMPLETE CBC W/AUTO DIFF WBC: CPT

## 2023-06-11 PROCEDURE — 83735 ASSAY OF MAGNESIUM: CPT

## 2023-06-11 PROCEDURE — 2580000003 HC RX 258: Performed by: EMERGENCY MEDICINE

## 2023-06-11 PROCEDURE — 84443 ASSAY THYROID STIM HORMONE: CPT

## 2023-06-11 RX ORDER — ACETAMINOPHEN 325 MG/1
650 TABLET ORAL ONCE
Status: COMPLETED | OUTPATIENT
Start: 2023-06-11 | End: 2023-06-11

## 2023-06-11 RX ORDER — 0.9 % SODIUM CHLORIDE 0.9 %
30 INTRAVENOUS SOLUTION INTRAVENOUS ONCE
Status: COMPLETED | OUTPATIENT
Start: 2023-06-11 | End: 2023-06-12

## 2023-06-11 RX ORDER — 0.9 % SODIUM CHLORIDE 0.9 %
30 INTRAVENOUS SOLUTION INTRAVENOUS ONCE
Status: DISCONTINUED | OUTPATIENT
Start: 2023-06-11 | End: 2023-06-11

## 2023-06-11 RX ADMIN — VANCOMYCIN HYDROCHLORIDE 2500 MG: 1 INJECTION, POWDER, LYOPHILIZED, FOR SOLUTION INTRAVENOUS at 23:25

## 2023-06-11 RX ADMIN — ACETAMINOPHEN 650 MG: 325 TABLET ORAL at 22:30

## 2023-06-11 RX ADMIN — SODIUM CHLORIDE 1917 ML: 9 INJECTION, SOLUTION INTRAVENOUS at 21:47

## 2023-06-11 RX ADMIN — PIPERACILLIN AND TAZOBACTAM 4500 MG: 4; .5 INJECTION, POWDER, LYOPHILIZED, FOR SOLUTION INTRAVENOUS at 23:54

## 2023-06-11 ASSESSMENT — PAIN DESCRIPTION - FREQUENCY: FREQUENCY: CONTINUOUS

## 2023-06-11 ASSESSMENT — PAIN - FUNCTIONAL ASSESSMENT
PAIN_FUNCTIONAL_ASSESSMENT: 0-10
PAIN_FUNCTIONAL_ASSESSMENT: 0-10

## 2023-06-11 ASSESSMENT — PAIN DESCRIPTION - ORIENTATION: ORIENTATION: MID

## 2023-06-11 ASSESSMENT — PAIN DESCRIPTION - PAIN TYPE: TYPE: ACUTE PAIN

## 2023-06-11 ASSESSMENT — PAIN DESCRIPTION - LOCATION
LOCATION: HEAD
LOCATION: CHEST
LOCATION: HEAD

## 2023-06-11 ASSESSMENT — PAIN SCALES - GENERAL
PAINLEVEL_OUTOF10: 3
PAINLEVEL_OUTOF10: 7
PAINLEVEL_OUTOF10: 7

## 2023-06-11 ASSESSMENT — PAIN DESCRIPTION - DESCRIPTORS: DESCRIPTORS: ACHING

## 2023-06-11 ASSESSMENT — PAIN DESCRIPTION - ONSET: ONSET: ON-GOING

## 2023-06-12 ENCOUNTER — APPOINTMENT (OUTPATIENT)
Dept: CT IMAGING | Age: 53
DRG: 710 | End: 2023-06-12
Payer: COMMERCIAL

## 2023-06-12 ENCOUNTER — APPOINTMENT (OUTPATIENT)
Dept: GENERAL RADIOLOGY | Age: 53
DRG: 710 | End: 2023-06-12
Payer: COMMERCIAL

## 2023-06-12 ENCOUNTER — APPOINTMENT (OUTPATIENT)
Dept: MRI IMAGING | Age: 53
DRG: 710 | End: 2023-06-12
Payer: COMMERCIAL

## 2023-06-12 PROBLEM — R65.21 SEPTIC SHOCK (HCC): Status: ACTIVE | Noted: 2023-06-12

## 2023-06-12 PROBLEM — N30.90 CYSTITIS: Status: ACTIVE | Noted: 2023-06-12

## 2023-06-12 PROBLEM — Z87.39 HISTORY OF RHEUMATOID ARTHRITIS: Status: ACTIVE | Noted: 2023-06-12

## 2023-06-12 PROBLEM — E11.69 DIABETES MELLITUS TYPE 2 IN OBESE (HCC): Status: ACTIVE | Noted: 2017-02-27

## 2023-06-12 PROBLEM — Z86.79 HISTORY OF CAD (CORONARY ARTERY DISEASE): Status: ACTIVE | Noted: 2023-06-12

## 2023-06-12 PROBLEM — E66.9 DIABETES MELLITUS TYPE 2 IN OBESE (HCC): Status: ACTIVE | Noted: 2017-02-27

## 2023-06-12 PROBLEM — A41.9 SEPTIC SHOCK (HCC): Status: ACTIVE | Noted: 2023-06-12

## 2023-06-12 PROBLEM — N39.0 SEPSIS SECONDARY TO UTI (HCC): Status: ACTIVE | Noted: 2023-06-11

## 2023-06-12 PROBLEM — J45.20 MILD INTERMITTENT ASTHMA WITHOUT COMPLICATION: Status: ACTIVE | Noted: 2023-06-12

## 2023-06-12 LAB
ALBUMIN SERPL BCG-MCNC: 3.8 G/DL (ref 3.5–5.1)
ALP SERPL-CCNC: 87 U/L (ref 38–126)
ALT SERPL W/O P-5'-P-CCNC: 12 U/L (ref 11–66)
ANION GAP SERPL CALC-SCNC: 16 MEQ/L (ref 8–16)
AST SERPL-CCNC: 15 U/L (ref 5–40)
BACTERIA: ABNORMAL
BILIRUB SERPL-MCNC: 0.4 MG/DL (ref 0.3–1.2)
BILIRUB UR QL STRIP: NEGATIVE
BUN SERPL-MCNC: 10 MG/DL (ref 7–22)
CALCIUM SERPL-MCNC: 8.4 MG/DL (ref 8.5–10.5)
CASTS #/AREA URNS LPF: ABNORMAL /LPF
CASTS #/AREA URNS LPF: ABNORMAL /LPF
CHARACTER UR: ABNORMAL
CHARCOAL URNS QL MICRO: ABNORMAL
CHLORIDE SERPL-SCNC: 99 MEQ/L (ref 98–111)
CO2 SERPL-SCNC: 20 MEQ/L (ref 23–33)
COLOR UR: YELLOW
CORTIS SERPL-MCNC: 55.4 UG/DL
CREAT SERPL-MCNC: 0.7 MG/DL (ref 0.4–1.2)
CRYSTALS URNS QL MICRO: ABNORMAL
DEPRECATED MEAN GLUCOSE BLD GHB EST-ACNC: 171 MG/DL (ref 70–126)
EPITHELIAL CELLS, UA: ABNORMAL /HPF
GFR SERPL CREATININE-BSD FRML MDRD: > 60 ML/MIN/1.73M2
GLUCOSE BLD STRIP.AUTO-MCNC: 199 MG/DL (ref 70–108)
GLUCOSE BLD STRIP.AUTO-MCNC: 200 MG/DL (ref 70–108)
GLUCOSE BLD STRIP.AUTO-MCNC: 211 MG/DL (ref 70–108)
GLUCOSE BLD STRIP.AUTO-MCNC: 217 MG/DL (ref 70–108)
GLUCOSE BLD STRIP.AUTO-MCNC: 245 MG/DL (ref 70–108)
GLUCOSE SERPL-MCNC: 239 MG/DL (ref 70–108)
GLUCOSE UR QL STRIP.AUTO: 250 MG/DL
HBA1C MFR BLD HPLC: 7.7 % (ref 4.4–6.4)
HGB UR QL STRIP.AUTO: NEGATIVE
KETONES UR QL STRIP.AUTO: NEGATIVE
LACTATE SERPL-SCNC: 3 MMOL/L (ref 0.5–2)
LACTIC ACID, SEPSIS: 2 MMOL/L (ref 0.5–1.9)
LACTIC ACID, SEPSIS: 2 MMOL/L (ref 0.5–1.9)
LACTIC ACID, SEPSIS: 2.2 MMOL/L (ref 0.5–1.9)
LACTIC ACID, SEPSIS: 2.4 MMOL/L (ref 0.5–1.9)
LACTIC ACID, SEPSIS: 2.6 MMOL/L (ref 0.5–1.9)
LACTIC ACID, SEPSIS: 2.8 MMOL/L (ref 0.5–1.9)
LACTIC ACID, SEPSIS: 3.8 MMOL/L (ref 0.5–1.9)
LACTIC ACID, SEPSIS: 4 MMOL/L (ref 0.5–1.9)
LEUKOCYTE ESTERASE UR QL STRIP.AUTO: ABNORMAL
MAGNESIUM SERPL-MCNC: 1.8 MG/DL (ref 1.6–2.4)
MRSA DNA SPEC QL NAA+PROBE: NEGATIVE
NITRITE UR QL STRIP.AUTO: NEGATIVE
OSMOLALITY SERPL CALC.SUM OF ELEC: 276.9 MOSMOL/KG (ref 275–300)
PH UR STRIP.AUTO: 6 [PH] (ref 5–9)
POTASSIUM SERPL-SCNC: 4 MEQ/L (ref 3.5–5.2)
PROT SERPL-MCNC: 6.2 G/DL (ref 6.1–8)
PROT UR STRIP.AUTO-MCNC: ABNORMAL MG/DL
RBC #/AREA URNS HPF: ABNORMAL /HPF
RENAL EPI CELLS #/AREA URNS HPF: ABNORMAL /[HPF]
SODIUM SERPL-SCNC: 135 MEQ/L (ref 135–145)
SPECIFIC GRAVITY UA: 1.03 (ref 1–1.03)
TROPONIN T: < 0.01 NG/ML
TROPONIN T: < 0.01 NG/ML
UROBILINOGEN, URINE: 1 EU/DL (ref 0–1)
WBC #/AREA URNS HPF: ABNORMAL /HPF
YEAST LIKE FUNGI URNS QL MICRO: ABNORMAL

## 2023-06-12 PROCEDURE — 2580000003 HC RX 258: Performed by: STUDENT IN AN ORGANIZED HEALTH CARE EDUCATION/TRAINING PROGRAM

## 2023-06-12 PROCEDURE — 87147 CULTURE TYPE IMMUNOLOGIC: CPT

## 2023-06-12 PROCEDURE — 83605 ASSAY OF LACTIC ACID: CPT

## 2023-06-12 PROCEDURE — 2580000003 HC RX 258: Performed by: NURSE PRACTITIONER

## 2023-06-12 PROCEDURE — 71275 CT ANGIOGRAPHY CHEST: CPT

## 2023-06-12 PROCEDURE — 81001 URINALYSIS AUTO W/SCOPE: CPT

## 2023-06-12 PROCEDURE — 6360000002 HC RX W HCPCS: Performed by: NURSE PRACTITIONER

## 2023-06-12 PROCEDURE — 36415 COLL VENOUS BLD VENIPUNCTURE: CPT

## 2023-06-12 PROCEDURE — 73718 MRI LOWER EXTREMITY W/O DYE: CPT

## 2023-06-12 PROCEDURE — 84484 ASSAY OF TROPONIN QUANT: CPT

## 2023-06-12 PROCEDURE — 87075 CULTR BACTERIA EXCEPT BLOOD: CPT

## 2023-06-12 PROCEDURE — 87186 SC STD MICRODIL/AGAR DIL: CPT

## 2023-06-12 PROCEDURE — 6370000000 HC RX 637 (ALT 250 FOR IP): Performed by: STUDENT IN AN ORGANIZED HEALTH CARE EDUCATION/TRAINING PROGRAM

## 2023-06-12 PROCEDURE — 82948 REAGENT STRIP/BLOOD GLUCOSE: CPT

## 2023-06-12 PROCEDURE — 87641 MR-STAPH DNA AMP PROBE: CPT

## 2023-06-12 PROCEDURE — 87070 CULTURE OTHR SPECIMN AEROBIC: CPT

## 2023-06-12 PROCEDURE — 87086 URINE CULTURE/COLONY COUNT: CPT

## 2023-06-12 PROCEDURE — 2580000003 HC RX 258

## 2023-06-12 PROCEDURE — 94761 N-INVAS EAR/PLS OXIMETRY MLT: CPT

## 2023-06-12 PROCEDURE — 83036 HEMOGLOBIN GLYCOSYLATED A1C: CPT

## 2023-06-12 PROCEDURE — 99233 SBSQ HOSP IP/OBS HIGH 50: CPT | Performed by: INTERNAL MEDICINE

## 2023-06-12 PROCEDURE — 6370000000 HC RX 637 (ALT 250 FOR IP)

## 2023-06-12 PROCEDURE — 83735 ASSAY OF MAGNESIUM: CPT

## 2023-06-12 PROCEDURE — 6360000002 HC RX W HCPCS: Performed by: STUDENT IN AN ORGANIZED HEALTH CARE EDUCATION/TRAINING PROGRAM

## 2023-06-12 PROCEDURE — 2500000003 HC RX 250 WO HCPCS: Performed by: STUDENT IN AN ORGANIZED HEALTH CARE EDUCATION/TRAINING PROGRAM

## 2023-06-12 PROCEDURE — 87077 CULTURE AEROBIC IDENTIFY: CPT

## 2023-06-12 PROCEDURE — APPSS60 APP SPLIT SHARED TIME 46-60 MINUTES: Performed by: NURSE PRACTITIONER

## 2023-06-12 PROCEDURE — 74176 CT ABD & PELVIS W/O CONTRAST: CPT

## 2023-06-12 PROCEDURE — 1200000003 HC TELEMETRY R&B

## 2023-06-12 PROCEDURE — 80053 COMPREHEN METABOLIC PANEL: CPT

## 2023-06-12 PROCEDURE — 6360000004 HC RX CONTRAST MEDICATION: Performed by: SURGERY

## 2023-06-12 PROCEDURE — 87205 SMEAR GRAM STAIN: CPT

## 2023-06-12 RX ORDER — ALBUTEROL SULFATE 90 UG/1
2 AEROSOL, METERED RESPIRATORY (INHALATION) 4 TIMES DAILY PRN
Status: DISCONTINUED | OUTPATIENT
Start: 2023-06-12 | End: 2023-06-16 | Stop reason: HOSPADM

## 2023-06-12 RX ORDER — SODIUM CHLORIDE 9 MG/ML
INJECTION, SOLUTION INTRAVENOUS PRN
Status: DISCONTINUED | OUTPATIENT
Start: 2023-06-12 | End: 2023-06-16 | Stop reason: HOSPADM

## 2023-06-12 RX ORDER — ACETAMINOPHEN 325 MG/1
650 TABLET ORAL EVERY 6 HOURS PRN
Status: DISCONTINUED | OUTPATIENT
Start: 2023-06-12 | End: 2023-06-16 | Stop reason: HOSPADM

## 2023-06-12 RX ORDER — ONDANSETRON 4 MG/1
4 TABLET, ORALLY DISINTEGRATING ORAL EVERY 8 HOURS PRN
Status: DISCONTINUED | OUTPATIENT
Start: 2023-06-12 | End: 2023-06-12

## 2023-06-12 RX ORDER — NOREPINEPHRINE BITARTRATE 0.06 MG/ML
2-100 INJECTION, SOLUTION INTRAVENOUS CONTINUOUS
Status: DISCONTINUED | OUTPATIENT
Start: 2023-06-12 | End: 2023-06-12

## 2023-06-12 RX ORDER — NOREPINEPHRINE BITARTRATE 0.06 MG/ML
1-100 INJECTION, SOLUTION INTRAVENOUS CONTINUOUS
Status: DISCONTINUED | OUTPATIENT
Start: 2023-06-12 | End: 2023-06-12

## 2023-06-12 RX ORDER — ONDANSETRON 2 MG/ML
4 INJECTION INTRAMUSCULAR; INTRAVENOUS EVERY 6 HOURS PRN
Status: DISCONTINUED | OUTPATIENT
Start: 2023-06-12 | End: 2023-06-16 | Stop reason: HOSPADM

## 2023-06-12 RX ORDER — FERROUS SULFATE 325(65) MG
325 TABLET ORAL
Status: ON HOLD | COMMUNITY

## 2023-06-12 RX ORDER — SODIUM CHLORIDE 0.9 % (FLUSH) 0.9 %
10 SYRINGE (ML) INJECTION EVERY 12 HOURS SCHEDULED
Status: DISCONTINUED | OUTPATIENT
Start: 2023-06-12 | End: 2023-06-13 | Stop reason: SDUPTHER

## 2023-06-12 RX ORDER — 0.9 % SODIUM CHLORIDE 0.9 %
1000 INTRAVENOUS SOLUTION INTRAVENOUS ONCE
Status: COMPLETED | OUTPATIENT
Start: 2023-06-12 | End: 2023-06-12

## 2023-06-12 RX ORDER — METOPROLOL SUCCINATE 25 MG/1
37.5 TABLET, EXTENDED RELEASE ORAL DAILY
Status: DISCONTINUED | OUTPATIENT
Start: 2023-06-12 | End: 2023-06-16 | Stop reason: HOSPADM

## 2023-06-12 RX ORDER — INSULIN LISPRO 100 [IU]/ML
0-4 INJECTION, SOLUTION INTRAVENOUS; SUBCUTANEOUS
Status: DISCONTINUED | OUTPATIENT
Start: 2023-06-12 | End: 2023-06-16 | Stop reason: HOSPADM

## 2023-06-12 RX ORDER — ONDANSETRON 4 MG/1
4 TABLET, ORALLY DISINTEGRATING ORAL EVERY 8 HOURS PRN
Status: DISCONTINUED | OUTPATIENT
Start: 2023-06-12 | End: 2023-06-16 | Stop reason: HOSPADM

## 2023-06-12 RX ORDER — FAMOTIDINE 20 MG/1
20 TABLET, FILM COATED ORAL 2 TIMES DAILY
Status: DISCONTINUED | OUTPATIENT
Start: 2023-06-12 | End: 2023-06-16 | Stop reason: HOSPADM

## 2023-06-12 RX ORDER — POLYETHYLENE GLYCOL 3350 17 G/17G
17 POWDER, FOR SOLUTION ORAL DAILY PRN
Status: DISCONTINUED | OUTPATIENT
Start: 2023-06-12 | End: 2023-06-16 | Stop reason: HOSPADM

## 2023-06-12 RX ORDER — ACETAMINOPHEN 325 MG/1
650 TABLET ORAL EVERY 6 HOURS PRN
Status: DISCONTINUED | OUTPATIENT
Start: 2023-06-12 | End: 2023-06-12

## 2023-06-12 RX ORDER — ACETAMINOPHEN 650 MG/1
650 SUPPOSITORY RECTAL EVERY 6 HOURS PRN
Status: DISCONTINUED | OUTPATIENT
Start: 2023-06-12 | End: 2023-06-16 | Stop reason: HOSPADM

## 2023-06-12 RX ORDER — ENOXAPARIN SODIUM 100 MG/ML
30 INJECTION SUBCUTANEOUS 2 TIMES DAILY
Status: DISCONTINUED | OUTPATIENT
Start: 2023-06-12 | End: 2023-06-12

## 2023-06-12 RX ORDER — ONDANSETRON 2 MG/ML
4 INJECTION INTRAMUSCULAR; INTRAVENOUS EVERY 6 HOURS PRN
Status: DISCONTINUED | OUTPATIENT
Start: 2023-06-12 | End: 2023-06-12

## 2023-06-12 RX ORDER — HYDROCODONE BITARTRATE AND ACETAMINOPHEN 5; 325 MG/1; MG/1
2 TABLET ORAL EVERY 4 HOURS PRN
Status: DISCONTINUED | OUTPATIENT
Start: 2023-06-12 | End: 2023-06-16 | Stop reason: HOSPADM

## 2023-06-12 RX ORDER — SODIUM CHLORIDE, SODIUM LACTATE, POTASSIUM CHLORIDE, CALCIUM CHLORIDE 600; 310; 30; 20 MG/100ML; MG/100ML; MG/100ML; MG/100ML
INJECTION, SOLUTION INTRAVENOUS CONTINUOUS
Status: DISCONTINUED | OUTPATIENT
Start: 2023-06-12 | End: 2023-06-16 | Stop reason: HOSPADM

## 2023-06-12 RX ORDER — CLOPIDOGREL BISULFATE 75 MG/1
75 TABLET ORAL DAILY
Status: DISCONTINUED | OUTPATIENT
Start: 2023-06-12 | End: 2023-06-16 | Stop reason: HOSPADM

## 2023-06-12 RX ORDER — SODIUM CHLORIDE 9 MG/ML
INJECTION, SOLUTION INTRAVENOUS PRN
Status: DISCONTINUED | OUTPATIENT
Start: 2023-06-12 | End: 2023-06-12

## 2023-06-12 RX ORDER — SODIUM CHLORIDE 0.9 % (FLUSH) 0.9 %
5-40 SYRINGE (ML) INJECTION EVERY 12 HOURS SCHEDULED
Status: DISCONTINUED | OUTPATIENT
Start: 2023-06-12 | End: 2023-06-16 | Stop reason: HOSPADM

## 2023-06-12 RX ORDER — SODIUM CHLORIDE 0.9 % (FLUSH) 0.9 %
10 SYRINGE (ML) INJECTION PRN
Status: DISCONTINUED | OUTPATIENT
Start: 2023-06-12 | End: 2023-06-13 | Stop reason: SDUPTHER

## 2023-06-12 RX ORDER — ATORVASTATIN CALCIUM 80 MG/1
80 TABLET, FILM COATED ORAL NIGHTLY
Status: DISCONTINUED | OUTPATIENT
Start: 2023-06-12 | End: 2023-06-16 | Stop reason: HOSPADM

## 2023-06-12 RX ORDER — SODIUM CHLORIDE 0.9 % (FLUSH) 0.9 %
5-40 SYRINGE (ML) INJECTION PRN
Status: DISCONTINUED | OUTPATIENT
Start: 2023-06-12 | End: 2023-06-16 | Stop reason: HOSPADM

## 2023-06-12 RX ORDER — ACETAMINOPHEN 500 MG
1000 TABLET ORAL EVERY 6 HOURS PRN
Status: ON HOLD | COMMUNITY

## 2023-06-12 RX ORDER — ACETAMINOPHEN 650 MG/1
650 SUPPOSITORY RECTAL EVERY 6 HOURS PRN
Status: DISCONTINUED | OUTPATIENT
Start: 2023-06-12 | End: 2023-06-12

## 2023-06-12 RX ORDER — IBUPROFEN 600 MG/1
1 TABLET ORAL PRN
Status: DISCONTINUED | OUTPATIENT
Start: 2023-06-12 | End: 2023-06-16 | Stop reason: HOSPADM

## 2023-06-12 RX ORDER — POLYETHYLENE GLYCOL 3350 17 G/17G
17 POWDER, FOR SOLUTION ORAL DAILY PRN
Status: DISCONTINUED | OUTPATIENT
Start: 2023-06-12 | End: 2023-06-12

## 2023-06-12 RX ORDER — DEXTROSE MONOHYDRATE 100 MG/ML
INJECTION, SOLUTION INTRAVENOUS CONTINUOUS PRN
Status: DISCONTINUED | OUTPATIENT
Start: 2023-06-12 | End: 2023-06-16 | Stop reason: HOSPADM

## 2023-06-12 RX ORDER — HYDROCODONE BITARTRATE AND ACETAMINOPHEN 5; 325 MG/1; MG/1
1 TABLET ORAL EVERY 4 HOURS PRN
Status: DISCONTINUED | OUTPATIENT
Start: 2023-06-12 | End: 2023-06-16 | Stop reason: HOSPADM

## 2023-06-12 RX ORDER — INSULIN LISPRO 100 [IU]/ML
0-4 INJECTION, SOLUTION INTRAVENOUS; SUBCUTANEOUS NIGHTLY
Status: DISCONTINUED | OUTPATIENT
Start: 2023-06-12 | End: 2023-06-16 | Stop reason: HOSPADM

## 2023-06-12 RX ORDER — SODIUM CHLORIDE 9 MG/ML
INJECTION, SOLUTION INTRAVENOUS CONTINUOUS
Status: DISCONTINUED | OUTPATIENT
Start: 2023-06-12 | End: 2023-06-12

## 2023-06-12 RX ORDER — 0.9 % SODIUM CHLORIDE 0.9 %
500 INTRAVENOUS SOLUTION INTRAVENOUS ONCE
Status: COMPLETED | OUTPATIENT
Start: 2023-06-12 | End: 2023-06-12

## 2023-06-12 RX ADMIN — PIPERACILLIN AND TAZOBACTAM 4500 MG: 4; .5 INJECTION, POWDER, LYOPHILIZED, FOR SOLUTION INTRAVENOUS at 16:17

## 2023-06-12 RX ADMIN — INSULIN LISPRO 1 UNITS: 100 INJECTION, SOLUTION INTRAVENOUS; SUBCUTANEOUS at 07:57

## 2023-06-12 RX ADMIN — INSULIN LISPRO 1 UNITS: 100 INJECTION, SOLUTION INTRAVENOUS; SUBCUTANEOUS at 12:50

## 2023-06-12 RX ADMIN — SODIUM CHLORIDE 1000 ML: 9 INJECTION, SOLUTION INTRAVENOUS at 01:11

## 2023-06-12 RX ADMIN — Medication 1250 MG: at 23:10

## 2023-06-12 RX ADMIN — IOPAMIDOL 80 ML: 755 INJECTION, SOLUTION INTRAVENOUS at 04:45

## 2023-06-12 RX ADMIN — RIVAROXABAN 20 MG: 20 TABLET, FILM COATED ORAL at 08:01

## 2023-06-12 RX ADMIN — SODIUM CHLORIDE: 9 INJECTION, SOLUTION INTRAVENOUS at 01:11

## 2023-06-12 RX ADMIN — FAMOTIDINE 20 MG: 20 TABLET ORAL at 07:56

## 2023-06-12 RX ADMIN — ATORVASTATIN CALCIUM 80 MG: 80 TABLET, FILM COATED ORAL at 23:03

## 2023-06-12 RX ADMIN — ACETAMINOPHEN 650 MG: 325 TABLET ORAL at 06:01

## 2023-06-12 RX ADMIN — HYDROCODONE BITARTRATE AND ACETAMINOPHEN 2 TABLET: 5; 325 TABLET ORAL at 22:01

## 2023-06-12 RX ADMIN — Medication 5 MCG/MIN: at 04:06

## 2023-06-12 RX ADMIN — SODIUM CHLORIDE 500 ML: 9 INJECTION, SOLUTION INTRAVENOUS at 03:58

## 2023-06-12 RX ADMIN — Medication 1250 MG: at 11:51

## 2023-06-12 RX ADMIN — FAMOTIDINE 20 MG: 20 TABLET ORAL at 22:01

## 2023-06-12 RX ADMIN — SODIUM CHLORIDE, POTASSIUM CHLORIDE, SODIUM LACTATE AND CALCIUM CHLORIDE: 600; 310; 30; 20 INJECTION, SOLUTION INTRAVENOUS at 16:14

## 2023-06-12 RX ADMIN — HYDROCODONE BITARTRATE AND ACETAMINOPHEN 2 TABLET: 5; 325 TABLET ORAL at 08:39

## 2023-06-12 RX ADMIN — PIPERACILLIN AND TAZOBACTAM 3375 MG: 3; .375 INJECTION, POWDER, LYOPHILIZED, FOR SOLUTION INTRAVENOUS at 08:41

## 2023-06-12 RX ADMIN — SODIUM CHLORIDE, POTASSIUM CHLORIDE, SODIUM LACTATE AND CALCIUM CHLORIDE: 600; 310; 30; 20 INJECTION, SOLUTION INTRAVENOUS at 05:26

## 2023-06-12 RX ADMIN — CLOPIDOGREL BISULFATE 75 MG: 75 TABLET ORAL at 07:56

## 2023-06-12 ASSESSMENT — ENCOUNTER SYMPTOMS
NAUSEA: 0
ABDOMINAL PAIN: 1
PHOTOPHOBIA: 0
BACK PAIN: 0
VOMITING: 0
COLOR CHANGE: 0
SORE THROAT: 0
SHORTNESS OF BREATH: 0
CHEST TIGHTNESS: 0
TROUBLE SWALLOWING: 0

## 2023-06-12 ASSESSMENT — PAIN DESCRIPTION - LOCATION
LOCATION: PELVIS;FLANK
LOCATION: HEAD

## 2023-06-12 ASSESSMENT — PAIN DESCRIPTION - PAIN TYPE: TYPE: ACUTE PAIN

## 2023-06-12 ASSESSMENT — PAIN DESCRIPTION - ORIENTATION: ORIENTATION: RIGHT;LEFT

## 2023-06-12 ASSESSMENT — PAIN - FUNCTIONAL ASSESSMENT
PAIN_FUNCTIONAL_ASSESSMENT: NONE - DENIES PAIN
PAIN_FUNCTIONAL_ASSESSMENT: ACTIVITIES ARE NOT PREVENTED
PAIN_FUNCTIONAL_ASSESSMENT: 0-10
PAIN_FUNCTIONAL_ASSESSMENT: ACTIVITIES ARE NOT PREVENTED

## 2023-06-12 ASSESSMENT — PAIN DESCRIPTION - FREQUENCY: FREQUENCY: INTERMITTENT

## 2023-06-12 ASSESSMENT — PAIN DESCRIPTION - ONSET: ONSET: PROGRESSIVE

## 2023-06-12 ASSESSMENT — PAIN DESCRIPTION - DESCRIPTORS: DESCRIPTORS: STABBING;PRESSURE

## 2023-06-12 ASSESSMENT — PAIN SCALES - GENERAL
PAINLEVEL_OUTOF10: 8
PAINLEVEL_OUTOF10: 3
PAINLEVEL_OUTOF10: 9

## 2023-06-12 NOTE — ED PROVIDER NOTES
PATIENT NAME: Minerva Kwok  MRN: [id number]  : 1970  [place]: 2023    I performed a history and physical examination of the patient and discussed management with the Resident. I reviewed the Resident's note and agree with the documented findings and plan of care. Any areas of disagreement are noted on the chart. I was personally present for the key portions of any procedures and have documented in the chart those procedures where I was not present during the key portions. I have reviewed the emergency nurses triage note and agree with the chief complaint, past medical history, past surgical history, allergies, medications, social and family history as documented unless otherwise noted below. MEDICAL DEDISION MAKING (MDM)     Minerva Kwok is a 50 y.o. female who presents to Emergency Department with Fever and Shortness of Breath     A 27-year-old female with past medical history of asthma, bipolar disorder, CAD, diabetes, DVT, factor V Leiden mutation, GERD, obesity, PE, on Xarelto anticoagulation, RA, diabetic foot ulcer, recent hysterectomy () recent left second toe amputation (2014) presents to ED for evaluation of headache, tachycardia, fatigue, and fever. Symptoms started from 3:30 in the afternoon. Patient states she checked her heart which was 185 then. Patient felt dizzy also and temperature was 102.8 then. Patient also felt increasing shortness of breath. On arrival at ED, no fever but patient was tachycardic. Initial BP was 90/55. Patient complained moderate to severe headache. No neck pain. She complains of epigastric and lower chest pain. No diarrhea. No urine symptoms. Chronic right middle toe swelling and pain. Exam: Temperature 97.9, pulse 109, blood pressure 90/55. AAOx3. Cardiopulmonary exam unremarkable. Abdomen is soft. Neurologically intact. Left second toe status post amputation changes.   Right middle toe has erythema swelling and the
[place] ENCOUNTER          Pt Name: Minerva Kwok  MRN: [id number]  [place] 1970  Date of evaluation: 6/11/2023  Treating Resident Physician: Karol Mendieta DO  Supervising Physician: Dr. Alva Segovia MD    History obtained from chart review and the patient. CHIEF COMPLAINT       Chief Complaint   Patient presents with    Fever    Shortness of Breath           HISTORY OF PRESENT ILLNESS    HPI  Minerva Kwok is a 50 y.o. female PMH of asthma, factor V Leiden mutation, type 2 diabetes, DVT and PE, who presents to the emergency department for evaluation of fever, SOB, palpitations. Patient states that she had a hysterectomy at [facility] in 4/17/2023. Miguel Mason Patient was instructed not to have sexual intercourse until 6/11/2023, but patient stated that she had sexual intercourse on 6/4/2023, and she has been having spotting ever since. Patient stated that she will started 6/11 at 3:30 PM, states that it felt uncontrolled. Patient took her temperature around 4:30 PM which stated the temperature was 102.8. Patient states that shortness of breath started on 6/11 as well, but did not exert herself because she felt too lightheaded to stand up. Patient also stated she felt palpitations today. Patient states that she had the symptoms with prior severe infection. Patient states she has a headache that is characterized as a band over her forehead. Patient states she has dysuria, and decreased urine amount. She also has pelvic pain for a week. The patient has no other acute complaints at this time.          PAST MEDICAL AND SURGICAL HISTORY     Past Medical History:   Diagnosis Date    Asthma     Bipolar 1 disorder ([facility] [address]     Blood circulation, collateral     CAD (coronary artery disease)     Curvature of spine     Diabetes mellitus (HCC)     DVT (deep venous thrombosis) ([facility])     Factor 5 Leiden mutation, heterozygous ([facility] [address]
proofreading. Please refer to my supervising physician's documentation if my documentation differs.     Electronically Signed: Kathy Quiles DO, 06/11/23, 8:42 PM

## 2023-06-12 NOTE — H&P
Hospitalist - History & Physical      Patient: Priscilla Resendiz    Unit/Bed:05/005A  YOB: 1970  MRN: [id number]   Acct: [de-identified]   PCP: SHANNON Santacruz CNP    Date of Service: Pt seen/examined on 06/11/23  and Admitted to Inpatient with expected LOS greater than two midnights due to medical therapy. Chief Complaint:  dysuria, suprapubic pain, incomplete bladder emptying and myalgias. Assessment and Plan:-  Septic shock likely secondary to UTI: SIRS 2/4 criteria met (tachycardia, fever of 102. 8) with source of infection (UA with many bacteria, large blood, positive nitrite and LE) and possible infection of right middle toe as noted below. Lactic acid of 2.4 and procalcitonin of 14.63. Received 30 cc/kg fluid resuscitation, despite of that BP went down to 75/39. Transferred to ICU. Norepinephrine ordered. On zosyn and vancomycin for broad spectrum coverage. Follow up urine and blood cultures. New RSR pattern on V1: will get CTA to rule out PE causing sinus tachycardia, lactic acidosis and hypotension, given recent surgery on 4/17/23. R middle toe swelling: X ray showed generalized forefoot swelling, no soft tissue gas or evidence of OM. Will get podiatry to look at the site as well as she has history of recurrent OM (possibly needs MRI). HAGMA: due to above. On IVF. Will monitor. History of CAD s/p cath and stent placement on 3/14/22 (2 XENIA): Last TTE on 8/22: EF of 55-60%. Stress test negative on 9/28/22. Continue home plavix. Last cath on 4/10/23: patent intervention site in the right posterior descending artery, patent intervention site in the left circumflex artery, chronic total occlusion of the first marginal branch at the prior intervention site with vessel receiving left to left collaterals, intermediate stenosis of the mid segment of the LAD, diffuse small vessel disease of the distal LAD. Will trend troponin given epigastric pain earlier, initial troponin negative.

## 2023-06-12 NOTE — ED NOTES
Als collected. Pt resting in bed with eyes closed. Pt respirations easy and unlabored.      Jerardo Cooper RN  06/12/23 4421

## 2023-06-12 NOTE — FLOWSHEET NOTE
Patient arrived to unit from ED via cart. Patient ambulated by self to ICU bed and placed on continuous ICU bedside monitor. Patient admitted for Poorly controlled diabetes mellitus ([facility] [address] [E11.65]  Sepsis secondary to UTI ([facility] [address] [A41.9, N39.0]  Toe infection [L08.9]  Septic shock ([facility] [address] [A41.9, R65.21]  Sepsis ([facility] [address] [A41.9]. Vitals obtained. See flowsheets. Patient's IV access includes 22G RFA and RH. Current infusions and rates of infusion include levophed @5mcg/min. Assessment completed by [facility] RN. Two nurse skin assessment completed by [facility] and [facility] Rn. See flowsheets for assessment details. Policies and procedures of ICU able to be explained to patient at this time. No family present with pt at this time. All questions posed by  patient answered at this time.

## 2023-06-12 NOTE — ED NOTES
Pt to CT at this time.  This RN will take pt up to ICU directly after CT scan     Bebe ARNOLD Mcnally  06/12/23 5074

## 2023-06-12 NOTE — ED TRIAGE NOTES
Presents to the ED with c/o a fever and shortness of breath. Pt states she had a fever around 1530 today measuring 102.8 degrees. Pt state she took 2 500mg tylenol tablets. Pt states she started experiencing the shortness of breathe round the same time. Pt states that he middle toe on her right foot is infected and that she thinks that is why she has a fever and SOB. Pt states the same thing happened on her left foot and she had to have that toe amputated.

## 2023-06-12 NOTE — ED NOTES
Dr. Jaky Lauren notified of pt's last two set of vitals regarding pt blood pressure. Dr. Jaky Lauren states she is going to put in an order for levophed and contact ICU.      Lauri Traore RN  06/12/23 9261

## 2023-06-12 NOTE — CARE COORDINATION
Case Management Assessment  Initial Evaluation    Date/Time of Evaluation: 6/12/2023 12:13 PM  Assessment Completed by: Odalys Guzman RN    If patient is discharged prior to next notation, then this note serves as note for discharge by case management. Patient Name: Marlon Gordon                   YOB: 1970  Diagnosis: Poorly controlled diabetes mellitus ([facility] [address] [E11.65]  Sepsis secondary to UTI ([facility] [address] [A41.9, N39.0]  Toe infection [L08.9]  Septic shock ([facility] [address] [A41.9, R65.21]  Sepsis ([facility] [address] [A41.9]                   Date / Time: 6/11/2023  8:24 PM  Location: [address] [place] [address]     Patient Admission Status: Inpatient   Readmission Risk Low 0-14, Mod 15-19), High > 20: Readmission Risk Score: 23.2    Current PCP: SHANNON Montanez CNP  PCP verified by CM? Yes    Chart Reviewed: Yes      History Provided by: Patient  Patient Orientation: Alert and Oriented    Patient Cognition: Alert    Hospitalization in the last 30 days (Readmission):  No    If yes, Readmission Assessment in CM Navigator will be completed. Advance Directives:      Code Status: Full Code   Patient's Primary Decision Maker is: Named in [address] [place] [address]    Primary Decision MakeDee Deejim Stone Child - [phone]    Secondary Decision Maker: Gertrude Luzma - Brother/Sister - [phone]    Discharge Planning:    Patient lives with: Alone Type of Home: Trailer/Mobile Home  Primary Care Giver: Self  Patient Support Systems include: Children, Family Members   Current Financial resources: Medicaid  Current community resources: None  Current services prior to admission: None            Current DME:              Type of Home Care services:  None    ADLS  Prior functional level: Independent in ADLs/IADLs  Current functional level: Independent in ADLs/IADLs    Family can provide assistance at DC: Yes  Would you like Case Management to discuss the discharge plan with any other family members/significant others, and if so, who?  No  Plans

## 2023-06-12 NOTE — PLAN OF CARE
Problem: Chronic Conditions and Co-morbidities  Goal: Patient's chronic conditions and co-morbidity symptoms are monitored and maintained or improved  Outcome: Progressing  Flowsheets (Taken 6/12/2023 1336)  Care Plan - Patient's Chronic Conditions and Co-Morbidity Symptoms are Monitored and Maintained or Improved:   Monitor and assess patient's chronic conditions and comorbid symptoms for stability, deterioration, or improvement   Collaborate with multidisciplinary team to address chronic and comorbid conditions and prevent exacerbation or deterioration   Update acute care plan with appropriate goals if chronic or comorbid symptoms are exacerbated and prevent overall improvement and discharge  Note: Pt improving, still on antibiotics, will continue to monitor     Problem: Safety - Adult  Goal: Free from fall injury  Outcome: Progressing  Note: Pt remains free from falls     Problem: Discharge Planning  Goal: Discharge to home or other facility with appropriate resources  Recent Flowsheet Documentation  Taken 6/12/2023 1334 by Vamsi Grayson RN  Discharge to home or other facility with appropriate resources:   Identify barriers to discharge with patient and caregiver   Arrange for needed discharge resources and transportation as appropriate   Identify discharge learning needs (meds, wound care, etc)   Refer to discharge planning if patient needs post-hospital services based on physician order or complex needs related to functional status, cognitive ability or social support system

## 2023-06-12 NOTE — CONSULTS
Units SubCUTAneous TID     insulin lispro  0-4 Units SubCUTAneous Nightly    sodium chloride flush  5-40 mL IntraVENous 2 times per day    piperacillin-tazobactam  3,375 mg IntraVENous Q6H    sodium chloride flush  10 mL IntraVENous 2 times per day    famotidine  20 mg Oral BID    rivaroxaban  20 mg Oral Daily    vancomycin (VANCOCIN) intermittent dosing (placeholder)   Other RX Placeholder    vancomycin  1,250 mg IntraVENous Q12H     Continuous Infusions:   dextrose      sodium chloride      lactated ringers IV soln 150 mL/hr at 06/12/23 0526       PHYSICAL EXAMINATION:  T:  98.6. P:  97. RR:  17. B/P:  133/97. FiO2:  0. O2 Sat:  100. I/O:  0  Body mass index is 42.44 kg/m². GCS:  15  General:   Acute on chronically ill-appearing white female  HEENT:  normocephalic and atraumatic. No scleral icterus. PERR  Neck: supple. No Thyromegaly. Lungs: clear to auscultation. No retractions  Cardiac: RRR. No JVD. Abdomen: soft. + tender. Extremities:  No clubbing, cyanosis, or edema x 4. Redness to right middle toe  Vasculature: capillary refill < 3 seconds. Palpable dorsalis pedis pulses. Skin:  warm and dry. Psych:  Alert and oriented x3. Affect appropriate  Lymph:  No supraclavicular adenopathy. Neurologic:  No focal deficit. No seizures. Data: (All radiographs, tracings, PFTs, and imaging are personally viewed and interpreted unless otherwise noted). Sodium 135, potassium 4.0, chloride 99, CO2 20, BUN 10, creatinine 0.7, anion gap 16.0, glucose 239, lactic acid 4.0. WBC 7.7, hemoglobin 11.4, hematocrit 36.7, platelet count 184  Telemetry shows NSR   CTA chest 6/12/2023 reports no PE no pleural effusion or consolidation. Right foot x-ray 6/11/2023 reports swelling no soft tissue gas. Echocardiogram 8/26/2022 reports ejection fraction 55 to 60%, normal LV function. Cardiac catheterization 4/10/2023 reports patent posterior descending artery. Patent left circumflex.   These were previously
independently bedside 06/14/23 . Please refer to resident physicians note for further details. Discussed with resident assessment and findings, I agree with plan as documented.      Karen Green, [facility]

## 2023-06-12 NOTE — ED NOTES
ED to inpatient nurses report    Chief Complaint   Patient presents with    Fever    Shortness of Breath      Present to ED from home  LOC: alert and orientated to name, place, date  Vital signs   Vitals:    06/11/23 2209 06/11/23 2250 06/11/23 2340 06/12/23 0009   BP: 113/80 (!) 97/49 (!) 92/50 (!) 96/50   Pulse: (!) 102 96 98 96   Resp: 20 17 19 17   Temp:  97.9 °F (36.6 °C)     TempSrc:  Oral     SpO2: 95% 97% 96% 97%   Weight:          Oxygen Baseline room air    Current needs required none Bipap/Cpap No  LDAs:   Peripheral IV 06/11/23 Posterior;Right Forearm (Active)   Site Assessment Clean, dry & intact 06/11/23 2040   Line Status Flushed 06/11/23 2040   Phlebitis Assessment No symptoms 06/11/23 2040   Infiltration Assessment 0 06/11/23 2040   Dressing Status New dressing applied;Clean, dry & intact 06/11/23 2040   Dressing Type Transparent 06/11/23 2040   Dressing Intervention New 06/11/23 2040     Mobility: Requires assistance * 1  Pending ED orders: none  Present condition: stable      Electronically signed by Mynor Slaughter RN on 6/12/2023 at 12:17 AM        Mynor Slaughter RN  06/12/23 0017

## 2023-06-13 LAB
ALBUMIN SERPL BCG-MCNC: 3.5 G/DL (ref 3.5–5.1)
ALP SERPL-CCNC: 74 U/L (ref 38–126)
ALT SERPL W/O P-5'-P-CCNC: 12 U/L (ref 11–66)
ANION GAP SERPL CALC-SCNC: 14 MEQ/L (ref 8–16)
AST SERPL-CCNC: 14 U/L (ref 5–40)
BACTERIA UR CULT: ABNORMAL
BACTERIA UR CULT: ABNORMAL
BASOPHILS ABSOLUTE: 0 THOU/MM3 (ref 0–0.1)
BASOPHILS NFR BLD AUTO: 0.8 %
BILIRUB SERPL-MCNC: 0.2 MG/DL (ref 0.3–1.2)
BUN SERPL-MCNC: 7 MG/DL (ref 7–22)
CALCIUM SERPL-MCNC: 8.4 MG/DL (ref 8.5–10.5)
CHLORIDE SERPL-SCNC: 105 MEQ/L (ref 98–111)
CO2 SERPL-SCNC: 22 MEQ/L (ref 23–33)
CREAT SERPL-MCNC: 0.4 MG/DL (ref 0.4–1.2)
DEPRECATED RDW RBC AUTO: 63.7 FL (ref 35–45)
EOSINOPHIL NFR BLD AUTO: 0.9 %
EOSINOPHILS ABSOLUTE: 0 THOU/MM3 (ref 0–0.4)
ERYTHROCYTE [DISTWIDTH] IN BLOOD BY AUTOMATED COUNT: 20.3 % (ref 11.5–14.5)
GFR SERPL CREATININE-BSD FRML MDRD: > 60 ML/MIN/1.73M2
GLUCOSE BLD STRIP.AUTO-MCNC: 132 MG/DL (ref 70–108)
GLUCOSE BLD STRIP.AUTO-MCNC: 172 MG/DL (ref 70–108)
GLUCOSE BLD STRIP.AUTO-MCNC: 193 MG/DL (ref 70–108)
GLUCOSE BLD STRIP.AUTO-MCNC: 197 MG/DL (ref 70–108)
GLUCOSE SERPL-MCNC: 126 MG/DL (ref 70–108)
HCT VFR BLD AUTO: 34 % (ref 37–47)
HGB BLD-MCNC: 9.9 GM/DL (ref 12–16)
IMM GRANULOCYTES # BLD AUTO: 0.02 THOU/MM3 (ref 0–0.07)
IMM GRANULOCYTES NFR BLD AUTO: 0.4 %
LACTIC ACID, SEPSIS: 1.2 MMOL/L (ref 0.5–1.9)
LACTIC ACID, SEPSIS: 1.4 MMOL/L (ref 0.5–1.9)
LYMPHOCYTES ABSOLUTE: 0.7 THOU/MM3 (ref 1–4.8)
LYMPHOCYTES NFR BLD AUTO: 13.5 %
MCH RBC QN AUTO: 24.7 PG (ref 26–33)
MCHC RBC AUTO-ENTMCNC: 29.1 GM/DL (ref 32.2–35.5)
MCV RBC AUTO: 84.8 FL (ref 81–99)
MONOCYTES ABSOLUTE: 0.3 THOU/MM3 (ref 0.4–1.3)
MONOCYTES NFR BLD AUTO: 5.5 %
NEUTROPHILS NFR BLD AUTO: 78.9 %
NRBC BLD AUTO-RTO: 0 /100 WBC
ORGANISM: ABNORMAL
PLATELET # BLD AUTO: 173 THOU/MM3 (ref 130–400)
PMV BLD AUTO: 9.6 FL (ref 9.4–12.4)
POTASSIUM SERPL-SCNC: 3.6 MEQ/L (ref 3.5–5.2)
PROT SERPL-MCNC: 5.5 G/DL (ref 6.1–8)
RBC # BLD AUTO: 4.01 MILL/MM3 (ref 4.2–5.4)
SEGMENTED NEUTROPHILS ABSOLUTE COUNT: 4.2 THOU/MM3 (ref 1.8–7.7)
SODIUM SERPL-SCNC: 141 MEQ/L (ref 135–145)
VANCOMYCIN SERPL-MCNC: 11.5 UG/ML (ref 0.1–39.9)
WBC # BLD AUTO: 5.3 THOU/MM3 (ref 4.8–10.8)

## 2023-06-13 PROCEDURE — 80202 ASSAY OF VANCOMYCIN: CPT

## 2023-06-13 PROCEDURE — 6360000002 HC RX W HCPCS: Performed by: NURSE PRACTITIONER

## 2023-06-13 PROCEDURE — 82948 REAGENT STRIP/BLOOD GLUCOSE: CPT

## 2023-06-13 PROCEDURE — 99233 SBSQ HOSP IP/OBS HIGH 50: CPT | Performed by: FAMILY MEDICINE

## 2023-06-13 PROCEDURE — 2580000003 HC RX 258: Performed by: PHARMACIST

## 2023-06-13 PROCEDURE — 83605 ASSAY OF LACTIC ACID: CPT

## 2023-06-13 PROCEDURE — 2580000003 HC RX 258: Performed by: STUDENT IN AN ORGANIZED HEALTH CARE EDUCATION/TRAINING PROGRAM

## 2023-06-13 PROCEDURE — 1200000003 HC TELEMETRY R&B

## 2023-06-13 PROCEDURE — 2580000003 HC RX 258: Performed by: NURSE PRACTITIONER

## 2023-06-13 PROCEDURE — 36415 COLL VENOUS BLD VENIPUNCTURE: CPT

## 2023-06-13 PROCEDURE — 6370000000 HC RX 637 (ALT 250 FOR IP)

## 2023-06-13 PROCEDURE — 85025 COMPLETE CBC W/AUTO DIFF WBC: CPT

## 2023-06-13 PROCEDURE — 80053 COMPREHEN METABOLIC PANEL: CPT

## 2023-06-13 PROCEDURE — 6360000002 HC RX W HCPCS: Performed by: PHARMACIST

## 2023-06-13 PROCEDURE — 6370000000 HC RX 637 (ALT 250 FOR IP): Performed by: STUDENT IN AN ORGANIZED HEALTH CARE EDUCATION/TRAINING PROGRAM

## 2023-06-13 RX ADMIN — FAMOTIDINE 20 MG: 20 TABLET ORAL at 08:25

## 2023-06-13 RX ADMIN — FAMOTIDINE 20 MG: 20 TABLET ORAL at 22:00

## 2023-06-13 RX ADMIN — RIVAROXABAN 20 MG: 20 TABLET, FILM COATED ORAL at 08:25

## 2023-06-13 RX ADMIN — SODIUM CHLORIDE, PRESERVATIVE FREE 10 ML: 5 INJECTION INTRAVENOUS at 08:25

## 2023-06-13 RX ADMIN — PIPERACILLIN AND TAZOBACTAM 4500 MG: 4; .5 INJECTION, POWDER, LYOPHILIZED, FOR SOLUTION INTRAVENOUS at 16:46

## 2023-06-13 RX ADMIN — ATORVASTATIN CALCIUM 80 MG: 80 TABLET, FILM COATED ORAL at 21:59

## 2023-06-13 RX ADMIN — PIPERACILLIN AND TAZOBACTAM 4500 MG: 4; .5 INJECTION, POWDER, LYOPHILIZED, FOR SOLUTION INTRAVENOUS at 01:04

## 2023-06-13 RX ADMIN — SODIUM CHLORIDE, POTASSIUM CHLORIDE, SODIUM LACTATE AND CALCIUM CHLORIDE: 600; 310; 30; 20 INJECTION, SOLUTION INTRAVENOUS at 12:39

## 2023-06-13 RX ADMIN — Medication 1500 MG: at 16:50

## 2023-06-13 RX ADMIN — SODIUM CHLORIDE, POTASSIUM CHLORIDE, SODIUM LACTATE AND CALCIUM CHLORIDE: 600; 310; 30; 20 INJECTION, SOLUTION INTRAVENOUS at 03:27

## 2023-06-13 RX ADMIN — CLOPIDOGREL BISULFATE 75 MG: 75 TABLET ORAL at 08:25

## 2023-06-13 RX ADMIN — HYDROCODONE BITARTRATE AND ACETAMINOPHEN 1 TABLET: 5; 325 TABLET ORAL at 08:24

## 2023-06-13 RX ADMIN — Medication 1500 MG: at 08:23

## 2023-06-13 RX ADMIN — HYDROCODONE BITARTRATE AND ACETAMINOPHEN 2 TABLET: 5; 325 TABLET ORAL at 21:59

## 2023-06-13 RX ADMIN — PIPERACILLIN AND TAZOBACTAM 4500 MG: 4; .5 INJECTION, POWDER, LYOPHILIZED, FOR SOLUTION INTRAVENOUS at 08:32

## 2023-06-13 ASSESSMENT — PAIN - FUNCTIONAL ASSESSMENT: PAIN_FUNCTIONAL_ASSESSMENT: ACTIVITIES ARE NOT PREVENTED

## 2023-06-13 ASSESSMENT — PAIN SCALES - GENERAL
PAINLEVEL_OUTOF10: 4
PAINLEVEL_OUTOF10: 8

## 2023-06-13 ASSESSMENT — PAIN DESCRIPTION - LOCATION
LOCATION: HEAD;ABDOMEN;LEG
LOCATION: PELVIS

## 2023-06-13 ASSESSMENT — PAIN DESCRIPTION - FREQUENCY: FREQUENCY: INTERMITTENT

## 2023-06-13 ASSESSMENT — PAIN DESCRIPTION - DESCRIPTORS: DESCRIPTORS: ACHING

## 2023-06-13 ASSESSMENT — PAIN DESCRIPTION - PAIN TYPE: TYPE: ACUTE PAIN;SURGICAL PAIN

## 2023-06-13 ASSESSMENT — PAIN DESCRIPTION - ONSET: ONSET: GRADUAL

## 2023-06-13 ASSESSMENT — PAIN DESCRIPTION - ORIENTATION: ORIENTATION: MID

## 2023-06-13 NOTE — CARE COORDINATION
6/13/23, 2:26 PM EDT    DISCHARGE  [facility] Hedy day: 2  Location: -23/023-A Reason for admit: Poorly controlled diabetes mellitus ([facility] [address] [E11.65]  Sepsis secondary to UTI ([facility] [address] [A41.9, N39.0]  Toe infection [L08.9]  Septic shock ([facility] [address] [A41.9, R65.21]  Sepsis ([facility] [address] [A41.9]   Procedure:   6/11 CXR: No acute findings  6/11 XR Right Foot: Generalized forefoot swelling. No soft tissue gas or evidence of osteomyelitis  6/12 CTA Chest: No pulmonary embolism; No consolidation or pleural effusion; Coronary atherosclerosis; Hepatosplenomegaly. Tiny hiatal hernia  6/12 CT Abd/pelvis: No evidence of acute intra-abdominal or intrapelvic abnormality; Nonobstructive nephrolithiasis on the left; Benign-appearing nodular calcification in the right lobe of the liver near the gallbladder fossa. This may represent sequelae of prior infectious or traumatic process. 6/13 MRI right foot: Abnormal MR signal in the distal phalanx of the third digit relating to acute osteomyelitis. Barriers to Discharge: Transferred from ICU. Hospitalist and Podiatry following. Pt has history amputation in April of 2nd left toe. Per RN, Podiatry possible amputation of 3rd right toe this admission. Right toe culture: gram positive cocci, gram negative bacilli. Urine culture: gram negative bacilli. Diabetes management. Pain control. IVF, Zosyn iv q8hr, Vancomycin iv q8hr. PCP: SHANNON Bahena CNP  Readmission Risk Score: 25.2%  Patient Goals/Plan/Treatment Preferences: From home alone. Continue to monitor as care progresses for discharge needs.

## 2023-06-13 NOTE — PLAN OF CARE
Problem: Discharge Planning  Goal: Discharge to home or other facility with appropriate resources  Recent Flowsheet Documentation  Taken 6/13/2023 0800 by Wellington Kinney RN  Discharge to home or other facility with appropriate resources:   Identify barriers to discharge with patient and caregiver   Arrange for needed discharge resources and transportation as appropriate   Identify discharge learning needs (meds, wound care, etc)  6/13/2023 0407 by Shanice Trammell RN  Outcome: Progressing  Flowsheets (Taken 6/13/2023 0407)  Discharge to home or other facility with appropriate resources: Identify barriers to discharge with patient and caregiver     Problem: Chronic Conditions and Co-morbidities  Goal: Patient's chronic conditions and co-morbidity symptoms are monitored and maintained or improved  Recent Flowsheet Documentation  Taken 6/13/2023 0800 by Bert Steel 34 - Patient's Chronic Conditions and Co-Morbidity Symptoms are Monitored and Maintained or Improved:   Monitor and assess patient's chronic conditions and comorbid symptoms for stability, deterioration, or improvement   Collaborate with multidisciplinary team to address chronic and comorbid conditions and prevent exacerbation or deterioration  6/13/2023 0407 by Shanice Trammell RN  Outcome: Progressing  Flowsheets (Taken 6/13/2023 0407)  Care Plan - Patient's Chronic Conditions and Co-Morbidity Symptoms are Monitored and Maintained or Improved: Monitor and assess patient's chronic conditions and comorbid symptoms for stability, deterioration, or improvement     Problem: Safety - Adult  Goal: Free from fall injury  6/13/2023 1013 by Wellington Kinney RN  Outcome: Progressing  Flowsheets (Taken 6/13/2023 0407 by Shanice Trammell RN)  Free From Fall Injury: Instruct family/caregiver on patient safety  Note: Pt remains free from falls  6/13/2023 0407 by Shanice Trammell RN  Outcome: Progressing  Flowsheets (Taken 6/13/2023

## 2023-06-13 NOTE — PLAN OF CARE
Problem: Discharge Planning  Goal: Discharge to home or other facility with appropriate resources  Outcome: Progressing  Flowsheets (Taken 6/13/2023 0407)  Discharge to home or other facility with appropriate resources: Identify barriers to discharge with patient and caregiver     Problem: Chronic Conditions and Co-morbidities  Goal: Patient's chronic conditions and co-morbidity symptoms are monitored and maintained or improved  Outcome: Progressing  Flowsheets (Taken 6/13/2023 0407)  Care Plan - Patient's Chronic Conditions and Co-Morbidity Symptoms are Monitored and Maintained or Improved: Monitor and assess patient's chronic conditions and comorbid symptoms for stability, deterioration, or improvement     Problem: Safety - Adult  Goal: Free from fall injury  Outcome: Progressing  Flowsheets (Taken 6/13/2023 0407)  Free From Fall Injury: Instruct family/caregiver on patient safety     Problem: ABCDS Injury Assessment  Goal: Absence of physical injury  Outcome: Progressing  Flowsheets (Taken 6/13/2023 0407)  Absence of Physical Injury: Implement safety measures based on patient assessment     Problem: Pain  Goal: Verbalizes/displays adequate comfort level or baseline comfort level  Outcome: Progressing  Flowsheets (Taken 6/13/2023 0407)  Verbalizes/displays adequate comfort level or baseline comfort level:   Encourage patient to monitor pain and request assistance   Assess pain using appropriate pain scale     Care plan reviewed with patient. Patient verbalizes understanding of the care plan and contributed to goal setting.

## 2023-06-14 PROBLEM — N39.0 URINARY TRACT INFECTION WITHOUT HEMATURIA: Status: ACTIVE | Noted: 2023-06-14

## 2023-06-14 PROBLEM — E11.9 TYPE 2 DIABETES MELLITUS TREATED WITHOUT INSULIN (HCC): Status: ACTIVE | Noted: 2017-02-27

## 2023-06-14 PROBLEM — M86.9 OSTEOMYELITIS OF THIRD TOE OF RIGHT FOOT (HCC): Status: ACTIVE | Noted: 2023-06-14

## 2023-06-14 PROBLEM — E87.20 LACTIC ACIDOSIS: Status: ACTIVE | Noted: 2023-06-14

## 2023-06-14 PROBLEM — I10 ESSENTIAL HYPERTENSION: Status: ACTIVE | Noted: 2023-06-14

## 2023-06-14 PROBLEM — J45.909 ASTHMA WITHOUT ACUTE EXACERBATION: Status: ACTIVE | Noted: 2023-06-14

## 2023-06-14 PROBLEM — Z90.710 S/P HYSTERECTOMY: Status: ACTIVE | Noted: 2023-06-14

## 2023-06-14 PROBLEM — Z95.5 S/P DRUG ELUTING CORONARY STENT PLACEMENT: Status: ACTIVE | Noted: 2023-06-14

## 2023-06-14 LAB
ALBUMIN SERPL BCG-MCNC: 3.3 G/DL (ref 3.5–5.1)
ALP SERPL-CCNC: 68 U/L (ref 38–126)
ALT SERPL W/O P-5'-P-CCNC: 10 U/L (ref 11–66)
ANION GAP SERPL CALC-SCNC: 11 MEQ/L (ref 8–16)
ANION GAP SERPL CALC-SCNC: 13 MEQ/L (ref 8–16)
AST SERPL-CCNC: 9 U/L (ref 5–40)
BACTERIA SPEC AEROBE CULT: NORMAL
BACTERIA UR CULT: NORMAL
BASOPHILS ABSOLUTE: 0 THOU/MM3 (ref 0–0.1)
BASOPHILS NFR BLD AUTO: 0.7 %
BILIRUB SERPL-MCNC: < 0.2 MG/DL (ref 0.3–1.2)
BUN SERPL-MCNC: 5 MG/DL (ref 7–22)
BUN SERPL-MCNC: 7 MG/DL (ref 7–22)
CALCIUM SERPL-MCNC: 8.5 MG/DL (ref 8.5–10.5)
CALCIUM SERPL-MCNC: 9 MG/DL (ref 8.5–10.5)
CHLORIDE SERPL-SCNC: 106 MEQ/L (ref 98–111)
CHLORIDE SERPL-SCNC: 107 MEQ/L (ref 98–111)
CO2 SERPL-SCNC: 23 MEQ/L (ref 23–33)
CO2 SERPL-SCNC: 25 MEQ/L (ref 23–33)
CREAT SERPL-MCNC: 0.4 MG/DL (ref 0.4–1.2)
CREAT SERPL-MCNC: 0.5 MG/DL (ref 0.4–1.2)
DEPRECATED RDW RBC AUTO: 61.8 FL (ref 35–45)
EOSINOPHIL NFR BLD AUTO: 2.8 %
EOSINOPHILS ABSOLUTE: 0.1 THOU/MM3 (ref 0–0.4)
ERYTHROCYTE [DISTWIDTH] IN BLOOD BY AUTOMATED COUNT: 20.2 % (ref 11.5–14.5)
GFR SERPL CREATININE-BSD FRML MDRD: > 60 ML/MIN/1.73M2
GFR SERPL CREATININE-BSD FRML MDRD: > 60 ML/MIN/1.73M2
GLUCOSE BLD STRIP.AUTO-MCNC: 112 MG/DL (ref 70–108)
GLUCOSE BLD STRIP.AUTO-MCNC: 112 MG/DL (ref 70–108)
GLUCOSE BLD STRIP.AUTO-MCNC: 133 MG/DL (ref 70–108)
GLUCOSE BLD STRIP.AUTO-MCNC: 143 MG/DL (ref 70–108)
GLUCOSE SERPL-MCNC: 114 MG/DL (ref 70–108)
GLUCOSE SERPL-MCNC: 161 MG/DL (ref 70–108)
HCT VFR BLD AUTO: 31.3 % (ref 37–47)
HGB BLD-MCNC: 9.2 GM/DL (ref 12–16)
IMM GRANULOCYTES # BLD AUTO: 0.03 THOU/MM3 (ref 0–0.07)
IMM GRANULOCYTES NFR BLD AUTO: 0.7 %
LYMPHOCYTES ABSOLUTE: 1.1 THOU/MM3 (ref 1–4.8)
LYMPHOCYTES NFR BLD AUTO: 24.5 %
MCH RBC QN AUTO: 24.7 PG (ref 26–33)
MCHC RBC AUTO-ENTMCNC: 29.4 GM/DL (ref 32.2–35.5)
MCV RBC AUTO: 83.9 FL (ref 81–99)
MONOCYTES ABSOLUTE: 0.3 THOU/MM3 (ref 0.4–1.3)
MONOCYTES NFR BLD AUTO: 7.5 %
NEUTROPHILS NFR BLD AUTO: 63.8 %
NRBC BLD AUTO-RTO: 0 /100 WBC
PLATELET # BLD AUTO: 168 THOU/MM3 (ref 130–400)
PMV BLD AUTO: 9.8 FL (ref 9.4–12.4)
POTASSIUM SERPL-SCNC: 3.9 MEQ/L (ref 3.5–5.2)
POTASSIUM SERPL-SCNC: 4 MEQ/L (ref 3.5–5.2)
PROT SERPL-MCNC: 5.6 G/DL (ref 6.1–8)
RBC # BLD AUTO: 3.73 MILL/MM3 (ref 4.2–5.4)
SEGMENTED NEUTROPHILS ABSOLUTE COUNT: 2.7 THOU/MM3 (ref 1.8–7.7)
SODIUM SERPL-SCNC: 142 MEQ/L (ref 135–145)
SODIUM SERPL-SCNC: 143 MEQ/L (ref 135–145)
WBC # BLD AUTO: 4.3 THOU/MM3 (ref 4.8–10.8)

## 2023-06-14 PROCEDURE — 2500000003 HC RX 250 WO HCPCS: Performed by: PODIATRIST

## 2023-06-14 PROCEDURE — 85025 COMPLETE CBC W/AUTO DIFF WBC: CPT

## 2023-06-14 PROCEDURE — 82948 REAGENT STRIP/BLOOD GLUCOSE: CPT

## 2023-06-14 PROCEDURE — 1200000003 HC TELEMETRY R&B

## 2023-06-14 PROCEDURE — 2580000003 HC RX 258: Performed by: NURSE PRACTITIONER

## 2023-06-14 PROCEDURE — 6370000000 HC RX 637 (ALT 250 FOR IP): Performed by: STUDENT IN AN ORGANIZED HEALTH CARE EDUCATION/TRAINING PROGRAM

## 2023-06-14 PROCEDURE — 3600000013 HC SURGERY LEVEL 3 ADDTL 15MIN: Performed by: PODIATRIST

## 2023-06-14 PROCEDURE — 3600000003 HC SURGERY LEVEL 3 BASE: Performed by: PODIATRIST

## 2023-06-14 PROCEDURE — 2709999900 HC NON-CHARGEABLE SUPPLY: Performed by: PODIATRIST

## 2023-06-14 PROCEDURE — 99232 SBSQ HOSP IP/OBS MODERATE 35: CPT | Performed by: INTERNAL MEDICINE

## 2023-06-14 PROCEDURE — 88311 DECALCIFY TISSUE: CPT

## 2023-06-14 PROCEDURE — 80053 COMPREHEN METABOLIC PANEL: CPT

## 2023-06-14 PROCEDURE — 36415 COLL VENOUS BLD VENIPUNCTURE: CPT

## 2023-06-14 PROCEDURE — 6370000000 HC RX 637 (ALT 250 FOR IP)

## 2023-06-14 PROCEDURE — 3700000000 HC ANESTHESIA ATTENDED CARE: Performed by: PODIATRIST

## 2023-06-14 PROCEDURE — 6360000002 HC RX W HCPCS: Performed by: PHARMACIST

## 2023-06-14 PROCEDURE — 0Y6T0Z0 DETACHMENT AT RIGHT 3RD TOE, COMPLETE, OPEN APPROACH: ICD-10-PCS | Performed by: PODIATRIST

## 2023-06-14 PROCEDURE — 3700000001 HC ADD 15 MINUTES (ANESTHESIA): Performed by: PODIATRIST

## 2023-06-14 PROCEDURE — 88305 TISSUE EXAM BY PATHOLOGIST: CPT

## 2023-06-14 PROCEDURE — 6360000002 HC RX W HCPCS: Performed by: NURSE PRACTITIONER

## 2023-06-14 PROCEDURE — 2580000003 HC RX 258: Performed by: PHARMACIST

## 2023-06-14 RX ORDER — BUPIVACAINE HYDROCHLORIDE 5 MG/ML
INJECTION, SOLUTION EPIDURAL; INTRACAUDAL PRN
Status: DISCONTINUED | OUTPATIENT
Start: 2023-06-14 | End: 2023-06-14 | Stop reason: ALTCHOICE

## 2023-06-14 RX ADMIN — PIPERACILLIN AND TAZOBACTAM 4500 MG: 4; .5 INJECTION, POWDER, LYOPHILIZED, FOR SOLUTION INTRAVENOUS at 10:02

## 2023-06-14 RX ADMIN — SODIUM CHLORIDE, POTASSIUM CHLORIDE, SODIUM LACTATE AND CALCIUM CHLORIDE: 600; 310; 30; 20 INJECTION, SOLUTION INTRAVENOUS at 06:00

## 2023-06-14 RX ADMIN — SODIUM CHLORIDE, POTASSIUM CHLORIDE, SODIUM LACTATE AND CALCIUM CHLORIDE: 600; 310; 30; 20 INJECTION, SOLUTION INTRAVENOUS at 19:43

## 2023-06-14 RX ADMIN — ATORVASTATIN CALCIUM 80 MG: 80 TABLET, FILM COATED ORAL at 19:45

## 2023-06-14 RX ADMIN — PIPERACILLIN AND TAZOBACTAM 4500 MG: 4; .5 INJECTION, POWDER, LYOPHILIZED, FOR SOLUTION INTRAVENOUS at 23:47

## 2023-06-14 RX ADMIN — Medication 1500 MG: at 17:21

## 2023-06-14 RX ADMIN — Medication 1500 MG: at 10:02

## 2023-06-14 RX ADMIN — PIPERACILLIN AND TAZOBACTAM 4500 MG: 4; .5 INJECTION, POWDER, LYOPHILIZED, FOR SOLUTION INTRAVENOUS at 02:17

## 2023-06-14 RX ADMIN — FAMOTIDINE 20 MG: 20 TABLET ORAL at 19:45

## 2023-06-14 RX ADMIN — Medication 1500 MG: at 00:20

## 2023-06-14 RX ADMIN — HYDROCODONE BITARTRATE AND ACETAMINOPHEN 2 TABLET: 5; 325 TABLET ORAL at 19:44

## 2023-06-14 RX ADMIN — SODIUM CHLORIDE, POTASSIUM CHLORIDE, SODIUM LACTATE AND CALCIUM CHLORIDE: 600; 310; 30; 20 INJECTION, SOLUTION INTRAVENOUS at 16:07

## 2023-06-14 RX ADMIN — FAMOTIDINE 20 MG: 20 TABLET ORAL at 09:49

## 2023-06-14 RX ADMIN — PIPERACILLIN AND TAZOBACTAM 4500 MG: 4; .5 INJECTION, POWDER, LYOPHILIZED, FOR SOLUTION INTRAVENOUS at 17:39

## 2023-06-14 RX ADMIN — HYDROCODONE BITARTRATE AND ACETAMINOPHEN 2 TABLET: 5; 325 TABLET ORAL at 23:49

## 2023-06-14 RX ADMIN — ACETAMINOPHEN 650 MG: 325 TABLET ORAL at 10:05

## 2023-06-14 ASSESSMENT — PAIN - FUNCTIONAL ASSESSMENT
PAIN_FUNCTIONAL_ASSESSMENT: ACTIVITIES ARE NOT PREVENTED

## 2023-06-14 ASSESSMENT — PAIN DESCRIPTION - DESCRIPTORS
DESCRIPTORS: ACHING;THROBBING
DESCRIPTORS: ACHING
DESCRIPTORS: ACHING
DESCRIPTORS: ACHING;THROBBING

## 2023-06-14 ASSESSMENT — PAIN DESCRIPTION - FREQUENCY
FREQUENCY: CONTINUOUS
FREQUENCY: INTERMITTENT
FREQUENCY: CONTINUOUS

## 2023-06-14 ASSESSMENT — PAIN SCALES - GENERAL
PAINLEVEL_OUTOF10: 2
PAINLEVEL_OUTOF10: 7
PAINLEVEL_OUTOF10: 7
PAINLEVEL_OUTOF10: 0

## 2023-06-14 ASSESSMENT — PAIN DESCRIPTION - ORIENTATION
ORIENTATION: RIGHT
ORIENTATION: RIGHT

## 2023-06-14 ASSESSMENT — PAIN DESCRIPTION - LOCATION
LOCATION: HEAD
LOCATION: FOOT
LOCATION: PELVIS;HEAD
LOCATION: FOOT

## 2023-06-14 ASSESSMENT — PAIN DESCRIPTION - PAIN TYPE
TYPE: SURGICAL PAIN
TYPE: ACUTE PAIN
TYPE: SURGICAL PAIN

## 2023-06-14 ASSESSMENT — PAIN DESCRIPTION - ONSET
ONSET: GRADUAL

## 2023-06-14 NOTE — PLAN OF CARE
Problem: Discharge Planning  Goal: Discharge to home or other facility with appropriate resources  Outcome: Progressing  Flowsheets (Taken 6/14/2023 0551)  Discharge to home or other facility with appropriate resources: Identify barriers to discharge with patient and caregiver     Problem: Chronic Conditions and Co-morbidities  Goal: Patient's chronic conditions and co-morbidity symptoms are monitored and maintained or improved  Outcome: Progressing  Flowsheets (Taken 6/14/2023 0551)  Care Plan - Patient's Chronic Conditions and Co-Morbidity Symptoms are Monitored and Maintained or Improved: Monitor and assess patient's chronic conditions and comorbid symptoms for stability, deterioration, or improvement     Problem: Safety - Adult  Goal: Free from fall injury  Outcome: Progressing  Flowsheets (Taken 6/14/2023 0551)  Free From Fall Injury: Instruct family/caregiver on patient safety     Problem: ABCDS Injury Assessment  Goal: Absence of physical injury  Outcome: Progressing  Flowsheets (Taken 6/14/2023 0551)  Absence of Physical Injury: Implement safety measures based on patient assessment     Problem: Pain  Goal: Verbalizes/displays adequate comfort level or baseline comfort level  Outcome: Progressing  Flowsheets (Taken 6/14/2023 0551)  Verbalizes/displays adequate comfort level or baseline comfort level: Encourage patient to monitor pain and request assistance    Care plan reviewed with patient. Patient verbalizes understanding of the care plan and contributed to goal setting.

## 2023-06-14 NOTE — CARE COORDINATION
6/14/23, 2:28 PM EDT    DISCHARGE  [facility] Hedy day: 3  Location: -23/023-A Reason for admit: Poorly controlled diabetes mellitus ([facility] [address] [E11.65]  Sepsis secondary to UTI ([facility] [address] [A41.9, N39.0]  Toe infection [L08.9]  Septic shock ([facility] [address] [A41.9, R65.21]  Sepsis ([facility] [address] [A41.9]   Procedure:   6/11 CXR: No acute findings  6/11 XR Right Foot: Generalized forefoot swelling. No soft tissue gas or evidence of osteomyelitis  6/12 CTA Chest: No pulmonary embolism; No consolidation or pleural effusion; Coronary atherosclerosis; Hepatosplenomegaly. Tiny hiatal hernia  6/12 CT Abd/pelvis: No evidence of acute intra-abdominal or intrapelvic abnormality; Nonobstructive nephrolithiasis on the left; Benign-appearing nodular calcification in the right lobe of the liver near the gallbladder fossa. This may represent sequelae of prior infectious or traumatic process. 6/13 MRI right foot: Abnormal MR signal in the distal phalanx of the third digit relating to acute osteomyelitis. Barriers to Discharge: Hospitalist and podiatry following. Plan toe amputation at 1800. Wound care. IVF. IV zosyn. IV vanc. Plavix. Lipitor. Xarelto. Pain control. PCP: SHANNON Calderon CNP  Readmission Risk Score: 25.1%  Patient Goals/Plan/Treatment Preferences: Plans home alone. Follow for needs post-op.

## 2023-06-14 NOTE — PLAN OF CARE
Problem: Chronic Conditions and Co-morbidities  Goal: Patient's chronic conditions and co-morbidity symptoms are monitored and maintained or improved  6/14/2023 1242 by Ky Velázquez RN  Outcome: Progressing  Flowsheets (Taken 6/14/2023 0826)  Care Plan - Patient's Chronic Conditions and Co-Morbidity Symptoms are Monitored and Maintained or Improved:   Monitor and assess patient's chronic conditions and comorbid symptoms for stability, deterioration, or improvement   Collaborate with multidisciplinary team to address chronic and comorbid conditions and prevent exacerbation or deterioration   Update acute care plan with appropriate goals if chronic or comorbid symptoms are exacerbated and prevent overall improvement and discharge     Problem: Safety - Adult  Goal: Free from fall injury  6/14/2023 1242 by Ky Velázquez RN  Outcome: Progressing  [address] (Taken 6/14/2023 0551 by Nicole Garza RN)  Free From Fall Injury: Instruct family/caregiver on patient safety   Care plan reviewed with patient. Patient verbalizes understanding of the care plan and contributed to goal setting.

## 2023-06-14 NOTE — OP NOTE
[address]    [place], [place]  RECORD OF OPERATION    Name Ivett Don                                                             : 1970  MEDICAL RECORD NO. [id number]    DATE: 2023    Surgeon: Tashi Prasad DPM    Assistant: Ean Pop, PGY I    Pre-operative Diagnosis:    1. Direct extension osteomyelitis, right third digit    Post-operative Diagnosis:    Same    Procedure:    1. Disarticulation amputation right third digit at metatarsophalangeal joint    Anesthesia: MAC, 16 cc half percent bupivacaine plain    Hemostasis: Pressure dressing, electrocautery Bovie    Estimated Blood Loss: 10 cc    Materials: None    Injectables: None    Condition: Stable    Complications: none     Specimens: Soft tissue and toe specimen was sent to pathology    INDICATIONS:  The patient is a 50 y.o. female well-known to my practice. Patient is a longstanding poorly controlled type II diabetic with history of multiple lesser toe amputations. Patient was admitted through the ER with sepsis and was found to have a new onset wound to the distal aspect of her right third toe. Pulmonary imaging was inconclusive showed some underlying findings of periostitis concerning for osteomyelitis subsequent MRI confirmed osteomyelitis to the distal phalanx. Based on nature location of wound need for surgical intervention in the form of a disarticulation amputation of the right third toe was indicated. All questions concerns regarding perioperative management including benefits risk complications and alternatives to procedure were discussed in detail. Patient was seen preoperatively, consent was reviewed and signed operatively marked. Patient was taken to the operative room placed in supine position administered MAC anesthetic. Foot was scrubbed with Betadine surgical scrub draped in sterile fashion. Patient received 16 cc half percent bupivacaine plain intraoperatively in a Gama field block fashion.

## 2023-06-15 LAB
ANION GAP SERPL CALC-SCNC: 11 MEQ/L (ref 8–16)
BACTERIA SPEC AEROBE CULT: ABNORMAL
BACTERIA SPEC ANAEROBE CULT: ABNORMAL
BASOPHILS ABSOLUTE: 0 THOU/MM3 (ref 0–0.1)
BASOPHILS NFR BLD AUTO: 0.8 %
BUN SERPL-MCNC: 4 MG/DL (ref 7–22)
CALCIUM SERPL-MCNC: 8.5 MG/DL (ref 8.5–10.5)
CHLORIDE SERPL-SCNC: 107 MEQ/L (ref 98–111)
CO2 SERPL-SCNC: 24 MEQ/L (ref 23–33)
CREAT SERPL-MCNC: 0.6 MG/DL (ref 0.4–1.2)
DEPRECATED RDW RBC AUTO: 61 FL (ref 35–45)
EOSINOPHIL NFR BLD AUTO: 2.6 %
EOSINOPHILS ABSOLUTE: 0.1 THOU/MM3 (ref 0–0.4)
ERYTHROCYTE [DISTWIDTH] IN BLOOD BY AUTOMATED COUNT: 20 % (ref 11.5–14.5)
GFR SERPL CREATININE-BSD FRML MDRD: > 60 ML/MIN/1.73M2
GLUCOSE BLD STRIP.AUTO-MCNC: 117 MG/DL (ref 70–108)
GLUCOSE BLD STRIP.AUTO-MCNC: 139 MG/DL (ref 70–108)
GLUCOSE BLD STRIP.AUTO-MCNC: 208 MG/DL (ref 70–108)
GLUCOSE BLD STRIP.AUTO-MCNC: 214 MG/DL (ref 70–108)
GLUCOSE SERPL-MCNC: 115 MG/DL (ref 70–108)
GRAM STN SPEC: ABNORMAL
HCT VFR BLD AUTO: 33.7 % (ref 37–47)
HGB BLD-MCNC: 10.2 GM/DL (ref 12–16)
IMM GRANULOCYTES # BLD AUTO: 0.02 THOU/MM3 (ref 0–0.07)
IMM GRANULOCYTES NFR BLD AUTO: 0.5 %
LYMPHOCYTES ABSOLUTE: 1.1 THOU/MM3 (ref 1–4.8)
LYMPHOCYTES NFR BLD AUTO: 27.7 %
MCH RBC QN AUTO: 25.3 PG (ref 26–33)
MCHC RBC AUTO-ENTMCNC: 30.3 GM/DL (ref 32.2–35.5)
MCV RBC AUTO: 83.6 FL (ref 81–99)
MONOCYTES ABSOLUTE: 0.3 THOU/MM3 (ref 0.4–1.3)
MONOCYTES NFR BLD AUTO: 7 %
NEUTROPHILS NFR BLD AUTO: 61.4 %
NRBC BLD AUTO-RTO: 0 /100 WBC
ORGANISM: ABNORMAL
ORGANISM: ABNORMAL
PLATELET # BLD AUTO: 162 THOU/MM3 (ref 130–400)
PMV BLD AUTO: 9.6 FL (ref 9.4–12.4)
POTASSIUM SERPL-SCNC: 3.9 MEQ/L (ref 3.5–5.2)
RBC # BLD AUTO: 4.03 MILL/MM3 (ref 4.2–5.4)
SEGMENTED NEUTROPHILS ABSOLUTE COUNT: 2.3 THOU/MM3 (ref 1.8–7.7)
SODIUM SERPL-SCNC: 142 MEQ/L (ref 135–145)
WBC # BLD AUTO: 3.8 THOU/MM3 (ref 4.8–10.8)

## 2023-06-15 PROCEDURE — 2580000003 HC RX 258: Performed by: PHARMACIST

## 2023-06-15 PROCEDURE — 2580000003 HC RX 258

## 2023-06-15 PROCEDURE — 6370000000 HC RX 637 (ALT 250 FOR IP): Performed by: STUDENT IN AN ORGANIZED HEALTH CARE EDUCATION/TRAINING PROGRAM

## 2023-06-15 PROCEDURE — 2580000003 HC RX 258: Performed by: STUDENT IN AN ORGANIZED HEALTH CARE EDUCATION/TRAINING PROGRAM

## 2023-06-15 PROCEDURE — 6360000002 HC RX W HCPCS: Performed by: PHARMACIST

## 2023-06-15 PROCEDURE — 6360000002 HC RX W HCPCS

## 2023-06-15 PROCEDURE — 1200000003 HC TELEMETRY R&B

## 2023-06-15 PROCEDURE — 6360000002 HC RX W HCPCS: Performed by: NURSE PRACTITIONER

## 2023-06-15 PROCEDURE — 85025 COMPLETE CBC W/AUTO DIFF WBC: CPT

## 2023-06-15 PROCEDURE — 99232 SBSQ HOSP IP/OBS MODERATE 35: CPT | Performed by: INTERNAL MEDICINE

## 2023-06-15 PROCEDURE — 2580000003 HC RX 258: Performed by: NURSE PRACTITIONER

## 2023-06-15 PROCEDURE — 6370000000 HC RX 637 (ALT 250 FOR IP)

## 2023-06-15 PROCEDURE — 80048 BASIC METABOLIC PNL TOTAL CA: CPT

## 2023-06-15 PROCEDURE — 82948 REAGENT STRIP/BLOOD GLUCOSE: CPT

## 2023-06-15 PROCEDURE — 36415 COLL VENOUS BLD VENIPUNCTURE: CPT

## 2023-06-15 RX ADMIN — HYDROCODONE BITARTRATE AND ACETAMINOPHEN 2 TABLET: 5; 325 TABLET ORAL at 10:13

## 2023-06-15 RX ADMIN — HYDROCODONE BITARTRATE AND ACETAMINOPHEN 2 TABLET: 5; 325 TABLET ORAL at 21:46

## 2023-06-15 RX ADMIN — Medication 1500 MG: at 01:28

## 2023-06-15 RX ADMIN — SODIUM CHLORIDE, POTASSIUM CHLORIDE, SODIUM LACTATE AND CALCIUM CHLORIDE: 600; 310; 30; 20 INJECTION, SOLUTION INTRAVENOUS at 15:23

## 2023-06-15 RX ADMIN — PIPERACILLIN AND TAZOBACTAM 4500 MG: 4; .5 INJECTION, POWDER, LYOPHILIZED, FOR SOLUTION INTRAVENOUS at 08:11

## 2023-06-15 RX ADMIN — SODIUM CHLORIDE, PRESERVATIVE FREE 10 ML: 5 INJECTION INTRAVENOUS at 19:40

## 2023-06-15 RX ADMIN — FAMOTIDINE 20 MG: 20 TABLET ORAL at 08:07

## 2023-06-15 RX ADMIN — PIPERACILLIN AND TAZOBACTAM 4500 MG: 4; .5 INJECTION, POWDER, LYOPHILIZED, FOR SOLUTION INTRAVENOUS at 15:28

## 2023-06-15 RX ADMIN — SODIUM CHLORIDE, PRESERVATIVE FREE 10 ML: 5 INJECTION INTRAVENOUS at 08:07

## 2023-06-15 RX ADMIN — SODIUM CHLORIDE, POTASSIUM CHLORIDE, SODIUM LACTATE AND CALCIUM CHLORIDE: 600; 310; 30; 20 INJECTION, SOLUTION INTRAVENOUS at 05:33

## 2023-06-15 RX ADMIN — INSULIN LISPRO 1 UNITS: 100 INJECTION, SOLUTION INTRAVENOUS; SUBCUTANEOUS at 17:04

## 2023-06-15 RX ADMIN — ACETAMINOPHEN 650 MG: 325 TABLET ORAL at 17:04

## 2023-06-15 RX ADMIN — PIPERACILLIN AND TAZOBACTAM 4500 MG: 4; .5 INJECTION, POWDER, LYOPHILIZED, FOR SOLUTION INTRAVENOUS at 23:21

## 2023-06-15 RX ADMIN — ATORVASTATIN CALCIUM 80 MG: 80 TABLET, FILM COATED ORAL at 19:40

## 2023-06-15 RX ADMIN — FAMOTIDINE 20 MG: 20 TABLET ORAL at 19:40

## 2023-06-15 ASSESSMENT — PAIN SCALES - GENERAL
PAINLEVEL_OUTOF10: 8
PAINLEVEL_OUTOF10: 8
PAINLEVEL_OUTOF10: 7
PAINLEVEL_OUTOF10: 10
PAINLEVEL_OUTOF10: 7
PAINLEVEL_OUTOF10: 8
PAINLEVEL_OUTOF10: 0

## 2023-06-15 ASSESSMENT — PAIN DESCRIPTION - DESCRIPTORS
DESCRIPTORS: ACHING
DESCRIPTORS: STABBING
DESCRIPTORS: SHARP;STABBING

## 2023-06-15 ASSESSMENT — PAIN DESCRIPTION - ORIENTATION
ORIENTATION: RIGHT

## 2023-06-15 ASSESSMENT — PAIN DESCRIPTION - LOCATION
LOCATION: FOOT
LOCATION: HEAD
LOCATION: FOOT

## 2023-06-15 ASSESSMENT — PAIN SCALES - WONG BAKER
WONGBAKER_NUMERICALRESPONSE: 0

## 2023-06-15 ASSESSMENT — PAIN DESCRIPTION - PAIN TYPE
TYPE: SURGICAL PAIN
TYPE: SURGICAL PAIN

## 2023-06-15 ASSESSMENT — PAIN DESCRIPTION - ONSET
ONSET: ON-GOING
ONSET: ON-GOING

## 2023-06-15 ASSESSMENT — PAIN DESCRIPTION - FREQUENCY
FREQUENCY: CONTINUOUS
FREQUENCY: CONTINUOUS

## 2023-06-15 NOTE — PLAN OF CARE
Problem: Discharge Planning  Goal: Discharge to home or other facility with appropriate resources  Outcome: Progressing  Flowsheets (Taken 6/15/2023 0528)  Discharge to home or other facility with appropriate resources:   Identify barriers to discharge with patient and caregiver   Arrange for needed discharge resources and transportation as appropriate     Problem: Chronic Conditions and Co-morbidities  Goal: Patient's chronic conditions and co-morbidity symptoms are monitored and maintained or improved  Outcome: Progressing  Flowsheets (Taken 6/15/2023 0528)  Care Plan - Patient's Chronic Conditions and Co-Morbidity Symptoms are Monitored and Maintained or Improved: Monitor and assess patient's chronic conditions and comorbid symptoms for stability, deterioration, or improvement     Problem: Safety - Adult  Goal: Free from fall injury  Outcome: Progressing  Flowsheets (Taken 6/15/2023 0528)  Free From Fall Injury: Instruct family/caregiver on patient safety     Problem: ABCDS Injury Assessment  Goal: Absence of physical injury  Outcome: Progressing  Flowsheets (Taken 6/15/2023 0528)  Absence of Physical Injury: Implement safety measures based on patient assessment     Problem: Pain  Goal: Verbalizes/displays adequate comfort level or baseline comfort level  Outcome: Progressing  Flowsheets (Taken 6/15/2023 0528)  Verbalizes/displays adequate comfort level or baseline comfort level:   Encourage patient to monitor pain and request assistance   Assess pain using appropriate pain scale     Care plan reviewed with patient. Patient verbalizes understanding of the care plan and contributed to goal setting.

## 2023-06-15 NOTE — CARE COORDINATION
6/15/23, 2:52 PM EDT    DISCHARGE ON GOING [address] [place] [address] day: 4  Location: -23/023-A Reason for admit: Poorly controlled diabetes mellitus ([facility] [E11.65]  Sepsis secondary to UTI ([facility] [A41.9, N39.0]  Toe infection [L08.9]  Septic shock ([facility] [facility] [A41.9, R65.21]  Sepsis ([facility] [facility] [A41.9]   Procedure:   6/11 CXR: No acute findings  6/11 XR Right Foot: Generalized forefoot swelling. No soft tissue gas or evidence of osteomyelitis  6/12 CTA Chest: No pulmonary embolism; No consolidation or pleural effusion; Coronary atherosclerosis; Hepatosplenomegaly. Tiny hiatal hernia  6/12 CT Abd/pelvis: No evidence of acute intra-abdominal or intrapelvic abnormality; Nonobstructive nephrolithiasis on the left; Benign-appearing nodular calcification in the right lobe of the liver near the gallbladder fossa. This may represent sequelae of prior infectious or traumatic process. 6/13 MRI right foot: Abnormal MR signal in the distal phalanx of the third digit relating to acute osteomyelitis. 6/14 OR with Dr. Doug Riedel: Disarticulation amputation right third digit at metatarsophalangeal joint. Barriers to Discharge: Hospitalist and Podiatry following. POD #1. Pain control. IVF. Zosyn iv q8hr. Wound culture: Strep agalactiae, Klebsiella oxytoca and moderate growth of Staph (coag negative). Urine culture: E.Coli. PCP: SHANNON Smyth CNP  Readmission Risk Score: 25.6%  Patient Goals/Plan/Treatment Preferences: Planning home alone. No needs at this time. Monitor.

## 2023-06-16 VITALS
OXYGEN SATURATION: 100 % | RESPIRATION RATE: 18 BRPM | TEMPERATURE: 97.8 F | WEIGHT: 288.8 LBS | HEART RATE: 61 BPM | SYSTOLIC BLOOD PRESSURE: 148 MMHG | BODY MASS INDEX: 43.77 KG/M2 | DIASTOLIC BLOOD PRESSURE: 80 MMHG | HEIGHT: 68 IN

## 2023-06-16 LAB
ANION GAP SERPL CALC-SCNC: 13 MEQ/L (ref 8–16)
BACTERIA BLD AEROBE CULT: NORMAL
BACTERIA BLD AEROBE CULT: NORMAL
BUN SERPL-MCNC: 6 MG/DL (ref 7–22)
CALCIUM SERPL-MCNC: 8.7 MG/DL (ref 8.5–10.5)
CHLORIDE SERPL-SCNC: 103 MEQ/L (ref 98–111)
CO2 SERPL-SCNC: 27 MEQ/L (ref 23–33)
CREAT SERPL-MCNC: 0.5 MG/DL (ref 0.4–1.2)
DEPRECATED RDW RBC AUTO: 59.5 FL (ref 35–45)
ERYTHROCYTE [DISTWIDTH] IN BLOOD BY AUTOMATED COUNT: 19.7 % (ref 11.5–14.5)
GFR SERPL CREATININE-BSD FRML MDRD: > 60 ML/MIN/1.73M2
GLUCOSE BLD STRIP.AUTO-MCNC: 125 MG/DL (ref 70–108)
GLUCOSE SERPL-MCNC: 165 MG/DL (ref 70–108)
HCT VFR BLD AUTO: 32.3 % (ref 37–47)
HGB BLD-MCNC: 9.7 GM/DL (ref 12–16)
MCH RBC QN AUTO: 24.6 PG (ref 26–33)
MCHC RBC AUTO-ENTMCNC: 30 GM/DL (ref 32.2–35.5)
MCV RBC AUTO: 82 FL (ref 81–99)
PLATELET # BLD AUTO: 186 THOU/MM3 (ref 130–400)
PMV BLD AUTO: 9.4 FL (ref 9.4–12.4)
POTASSIUM SERPL-SCNC: 4.2 MEQ/L (ref 3.5–5.2)
RBC # BLD AUTO: 3.94 MILL/MM3 (ref 4.2–5.4)
SODIUM SERPL-SCNC: 143 MEQ/L (ref 135–145)
WBC # BLD AUTO: 3.8 THOU/MM3 (ref 4.8–10.8)

## 2023-06-16 PROCEDURE — 36415 COLL VENOUS BLD VENIPUNCTURE: CPT

## 2023-06-16 PROCEDURE — 99239 HOSP IP/OBS DSCHRG MGMT >30: CPT | Performed by: INTERNAL MEDICINE

## 2023-06-16 PROCEDURE — 80048 BASIC METABOLIC PNL TOTAL CA: CPT

## 2023-06-16 PROCEDURE — 85027 COMPLETE CBC AUTOMATED: CPT

## 2023-06-16 PROCEDURE — 6370000000 HC RX 637 (ALT 250 FOR IP): Performed by: STUDENT IN AN ORGANIZED HEALTH CARE EDUCATION/TRAINING PROGRAM

## 2023-06-16 PROCEDURE — 2580000003 HC RX 258: Performed by: NURSE PRACTITIONER

## 2023-06-16 PROCEDURE — 6370000000 HC RX 637 (ALT 250 FOR IP)

## 2023-06-16 PROCEDURE — 82948 REAGENT STRIP/BLOOD GLUCOSE: CPT

## 2023-06-16 RX ORDER — HYDROCODONE BITARTRATE AND ACETAMINOPHEN 5; 325 MG/1; MG/1
1 TABLET ORAL EVERY 8 HOURS PRN
Qty: 10 TABLET | Refills: 0 | Status: SHIPPED | OUTPATIENT
Start: 2023-06-16 | End: 2023-06-19

## 2023-06-16 RX ADMIN — RIVAROXABAN 20 MG: 20 TABLET, FILM COATED ORAL at 08:57

## 2023-06-16 RX ADMIN — CLOPIDOGREL BISULFATE 75 MG: 75 TABLET ORAL at 08:57

## 2023-06-16 RX ADMIN — FAMOTIDINE 20 MG: 20 TABLET ORAL at 08:57

## 2023-06-16 RX ADMIN — SODIUM CHLORIDE, POTASSIUM CHLORIDE, SODIUM LACTATE AND CALCIUM CHLORIDE: 600; 310; 30; 20 INJECTION, SOLUTION INTRAVENOUS at 03:18

## 2023-06-16 RX ADMIN — HYDROCODONE BITARTRATE AND ACETAMINOPHEN 2 TABLET: 5; 325 TABLET ORAL at 03:18

## 2023-06-16 RX ADMIN — ACETAMINOPHEN 650 MG: 325 TABLET ORAL at 08:58

## 2023-06-16 ASSESSMENT — PAIN SCALES - GENERAL
PAINLEVEL_OUTOF10: 8
PAINLEVEL_OUTOF10: 7

## 2023-06-16 ASSESSMENT — PAIN SCALES - WONG BAKER: WONGBAKER_NUMERICALRESPONSE: 0

## 2023-06-16 ASSESSMENT — PAIN - FUNCTIONAL ASSESSMENT
PAIN_FUNCTIONAL_ASSESSMENT: PREVENTS OR INTERFERES SOME ACTIVE ACTIVITIES AND ADLS
PAIN_FUNCTIONAL_ASSESSMENT: ACTIVITIES ARE NOT PREVENTED

## 2023-06-16 ASSESSMENT — PAIN DESCRIPTION - DESCRIPTORS
DESCRIPTORS: ACHING
DESCRIPTORS: ACHING

## 2023-06-16 ASSESSMENT — PAIN DESCRIPTION - LOCATION
LOCATION: HEAD
LOCATION: FOOT

## 2023-06-16 ASSESSMENT — PAIN DESCRIPTION - ORIENTATION
ORIENTATION: RIGHT
ORIENTATION: MID

## 2023-06-16 NOTE — CARE COORDINATION
6/16/23, 10:59 AM EDT    Patient goals/plan/ treatment preferences discussed by  and . Patient goals/plan/ treatment preferences reviewed with patient/ family. Patient/ family verbalize understanding of discharge plan and are in agreement with goal/plan/treatment preferences. Understanding was demonstrated using the teach back method. AVS provided by RN at time of discharge, which includes all necessary medical information pertaining to the patients current course of illness, treatment, post-discharge goals of care, and treatment preferences. Services At/After Discharge: [facility] discharge home today. HH orders received. Spoke with pt. Post-acute [facility]) provider list was offered to patient. Patient was informed of their freedom to choose [facility] provider. Discussed and offered to show the patient the relevant [facility] Providers quality and resource use measures on Medicare Compare web site via computer based on patient's goals of care and treatment preferences. Questions regarding selection process were answered. Pt prefers whoever is in network with her insurance and declined list. Phone call to [facility]. Spoke with Chapincito Dhaliwal, they are able to accept pt. Updated pt that [facility] would be through [facility].

## 2023-06-16 NOTE — PLAN OF CARE
Problem: Discharge Planning  Goal: Discharge to home or other facility with appropriate resources  6/16/2023 0934 by Sergei Burns RN  Outcome: Progressing  Flowsheets (Taken 6/16/2023 0934)  Discharge to home or other facility with appropriate resources: Identify barriers to discharge with patient and caregiver  Note: Possible d/c today      Problem: Safety - Adult  Goal: Free from fall injury  6/16/2023 0934 by Sergei Burns RN  Outcome: Progressing  Flowsheets  Taken 6/16/2023 0934  Free From Fall Injury: Instruct family/caregiver on patient safety  Taken 6/16/2023 0800  Free From Fall Injury: Instruct family/caregiver on patient safety  Note: Bed locked & in low position, call light in reach, side-rails up x2, bed/chair alarm utilized, non-slip socks on when ambulating, reminded patient to use call light to call for assistance. Problem: Pain  Goal: Verbalizes/displays adequate comfort level or baseline comfort level  6/16/2023 0934 by Sergei Burns RN  Outcome: Progressing  Flowsheets (Taken 6/16/2023 0934)  Verbalizes/displays adequate comfort level or baseline comfort level: Assess pain using appropriate pain scale  Note: Patient educated on pain rating scale of 0-10 and verbalized pain goal of 0. Non-pharmacological measures implemented throughout shift to help achieve mutually met pain goal such as rest, repositioning, and emotional support. Pain rating obtaining prior to pain medications being administered along with assessing patients orientation and arousal and reassessing one hour after administering. Ensuring that pain scale matches with administration scale for medication. Educating patient to continue to notify this RN for changes in their pain or condition. Ongoing assessment and vitals as charted. Care plan reviewed with patient. Patient verbalizes understanding of the care plan and contributed to goal setting.

## 2023-06-16 NOTE — PLAN OF CARE
Problem: Discharge Planning  Goal: Discharge to home or other facility with appropriate resources  Outcome: Progressing  Flowsheets (Taken 6/15/2023 2117)  Discharge to home or other facility with appropriate resources: Identify barriers to discharge with patient and caregiver     Problem: Chronic Conditions and Co-morbidities  Goal: Patient's chronic conditions and co-morbidity symptoms are monitored and maintained or improved  Outcome: Progressing  Flowsheets (Taken 6/15/2023 2117)  Care Plan - Patient's Chronic Conditions and Co-Morbidity Symptoms are Monitored and Maintained or Improved: Monitor and assess patient's chronic conditions and comorbid symptoms for stability, deterioration, or improvement     Problem: Safety - Adult  Goal: Free from fall injury  Outcome: Progressing  Flowsheets (Taken 6/15/2023 2117)  Free From Fall Injury: Instruct family/caregiver on patient safety     Problem: ABCDS Injury Assessment  Goal: Absence of physical injury  Outcome: Progressing  Flowsheets (Taken 6/15/2023 2117)  Absence of Physical Injury: Implement safety measures based on patient assessment     Problem: Pain  Goal: Verbalizes/displays adequate comfort level or baseline comfort level  Outcome: Progressing  Flowsheets (Taken 6/15/2023 2117)  Verbalizes/displays adequate comfort level or baseline comfort level: Encourage patient to monitor pain and request assistance

## 2023-06-16 NOTE — PROGRESS NOTES
[address] [facility] Pharmacokinetic Monitoring Service - Vancomycin     Aram Moses is a 50 y.o. female starting on vancomycin therapy for bloodstream infection x 7 days. Pharmacy consulted by Dr Scooter Fajardo for monitoring and adjustment. Target Concentration: Goal AUC/FREDO 400-600 mg*hr/L    Additional Antimicrobials: Zosyn    Pertinent Laboratory Values: Wt Readings from Last 1 Encounters:   06/11/23 284 lb (128.8 kg)     Temp Readings from Last 1 Encounters:   06/11/23 97.9 °F (36.6 °C) (Oral)     Estimated Creatinine Clearance: 159 mL/min (based on SCr of 0.6 mg/dL). Recent Labs     06/11/23 2109 06/11/23  2110   CREATININE  --  0.6   WBC 7.7  --      Procalcitonin: 14.63    Pertinent Cultures:  Culture Date Source Results   6/11/23 Urine    6/11/23 Blood x 2    6/11/23 MRSA    MRSA Nasal Swab: was ordered by provider, awaiting results.     Plan:  Dosing recommendations based on Bayesian software  Start vancomycin 2500mg x 1 in ED (2325), then  Anticipated AUC of 426 and trough concentration of 10.5 at steady state  Renal labs as indicated   Vancomycin concentration ordered for 6/13 @ 0600   Pharmacy will continue to monitor patient and adjust therapy as indicated    Thank you for the consult,  Gela Soria [facility]  6/12/2023 4:56 AM
CLINICAL PHARMACY: DISCHARGE MED RECONCILIATION/REVIEW    [facility]) Select Patient?: No  Total # of Interventions Recommended: 0   -   Total # Interventions Accepted: 0  Intervention Severity:   - Level 1 Intervention Present?: No   - Level 2 #: 0   - Level 3 #: 0   Time Spent (min): 15    Additional Documentation:
Discussed discharge summary with patient. Instructed patient about medications & follow up appointments. Patient denies any additional questions. Patient was discharged with all belongings. No distress noted. Patient discharged to home with home health. Taken down to the vehicle by RN per wheelchair.
Dr. Neha Velez discuss the case with Dr. Luisa Farias who states the OB/GYN consult does not need to be done at this time. Therefore the consult was canceled.
PROGRESS NOTE      Patient:  [facility]  Unit/Bed:3B-23/023-A  YOB: 1970  MRN: [id number]   Acct: [de-identified]    PCP: SHANNON Carrillo CNP    Date of Admission: 6/11/2023 LOS: 4    Date of Evaluation:  6/15/2023    Anticipated Discharge: pending surgical intervention per podiatry      Assessment/Plan:    Septic shock with hypotension requiring pressure support lactic acidosis, fever secondary to UTI, POA, improved  On admission, 2 out of 4 SIRS criteria met. Patient was tachycardic and febrile with infection source urine versus right middle toe infection  Patient was hypotensive, even after 30 cc/kg fluid resuscitation BP was 75/39. Patient was transferred to ICU for pressure support  UA admission: Trace ketones, large blood protein 30, positive nitrates, large leukocyte esterase, many bacteria, Greater than 100 RBC, greater than 100 WBC  Culture 6/11: E.Coli being treated with zosyn since 6/11, last dose of zosyn due 6/15  UA 6/12 -trace proteins, small leukocytes, 250 glucose, 50-75 WBC, 0 -2 RBC, improved from UA on admission   Lactate 6/12: 4.08(on admission) - 1.2 (6/13)  CTA Chest 6/11: No pulmonary embolism, consolidation, effusion. Stable coronary atherosclerosis, and hepatosplenomegaly  CT Abdomen pelvis wo contrast: Right liver lobe mass, 7 mm calculus superior pole of left kidney and currently 1 mm calculus at the inferior. Contrast from CTA in bladder present  Blood culture no growth to date 48 hours  Foot XR 6/11: Fourth and fifth metatarsal fracture. Large calcaneal spurs. Swelling of forefoot  MRI right foot 6/12: Normal signal in the distal third phalanx relating to acute osteomyelitis  Podiatry consulted, right 3rd digit amputation 6/14. Held anticoagulation, restarted 6/15  -Continue with IV vancomycin started 6/11, discontinued 6/15 due to source of infection being removed.  Zosyn started 6/11, last dose to be given on 6/15    UTI, POA, improving  Likely
Pharmacy Antibiotic Time Adjustment for Surgery    Kemar De Los Santos is a 50 y.o. female scheduled for surgery. Pharmacy will adjust pre-op antibiotic to optimize timing in relation to surgery per P&T approved policy. Weight:  Wt Readings from Last 1 Encounters:   06/14/23 281 lb 15.5 oz (127.9 kg)     Body mass index is 42.87 kg/m².     Plan:   Vancomycin and Zosyn have been re-timed to be given as pre-op antibiotic coverage    Javi Ocampo PharmD, [facility] 6/14/2023 10:50 AM
Pharmacy Medication History Note    List of current medications patient is taking is complete. Source of information: Surescripts and patient    Changes made to medication list:  Medications removed (include reason, ex. therapy complete or physician discontinued):  Gabapentin 600 mg 1 tab po BID- patient not taking anymore  Hydromorphone 2 mg tablet 1 tab po q3h prn pain-was just 5 day supply for a surgery  Januvia 100 mg 1 tab po daily-patient told to stop by doctor    Medications added/doses adjusted:  Acetaminophen 500 mg 2 tab q6h prn pain instead of 325 mg  Ferrous sulfate 325 mg po daily    Other notes (ex. Recent course of antibiotics, Coumadin dosing):  Has not needed to use albuterol inhaler recently  Denies use of other OTC or herbal medications.     Allergies reviewed    Electronically signed by Roberta Phelps on 6/12/2023 at 11:42 AM
Pharmacy Note - Extended Infusion Beta-Lactam Dose Adjustment    Piperacillin/Tazobactam 3375mg Q6h ordered for the treatment of Bloodstream infection. Per [facility] Extended Infusion Beta-Lactam Policy, this will be changed to 4500mg loading dose followed by 4500mg Q8h extended infusion     Estimated Creatinine Clearance: Estimated Creatinine Clearance: 135 mL/min (based on SCr of 0.7 mg/dL). Dialysis Status, FABIOLA, CKD: Normal    BMI: Body mass index is 42.44 kg/m². Rationale for Adjustment: Medication is renally eliminated and demonstrates time-dependent effects on bacterial eradication. Extended-infusion dosing strategy aims to enhance microbiologic and clinical efficacy. Pharmacy will monitor renal function daily and adjust dose as necessary. Please call with any questions.     Thank you,  Rupesh Carmona, PharmD, BCPS, BCCCP  6/12/2023 9:30 AM
Podiatric Progress Note  Joy Keith  Subjective :   6/15/2023  Patient was seen at bedside on behalf of Dr. Sd Delaney. Patient is 1 day s/p right third toe amputation (6/24/23). Patient relates pain to the right foot that is controlled with scheduled Norco. Denies any systemic symptoms currently. No other pedal complaints. 6/13/2023  Patient seen at bedside this morning on behalf of Dr. Sd Delaney. Patient appeared pleasant, was oriented to person, place and time and in no acute distress. Patient endorses minimal pain to the right third toe. She currently denies any N/V/F/C/SOB/CP or bilateral calf tenderness. Patient has no further pedal concerns at this point in time. HISTORY OF PRESENT ILLNESS:    The patient is a 50 y.o. female with significant past medical history of CAD, DM type II, DVT, factor V Leiden mutation, HTN, HLD, and left toe amputation who is being seen at bedside on behalf of Dr Sd Delaney. Patient presented to ED 6/11 with symptoms of septic shock, likely secondary to UTI based on UA. Was last seen by podiatry for left 2nd digit amputation 4/11/23. States that her right 3rd toe wound first appeared about 7 days ago after beginning as a callus. Denies any known causative trauma. States she had prior cardiac cath and stent placement and has patent flow to lower extremities. Admits to some mild nausea, improving. Denies any current vomiting, fever, chills, chest pain, or shortness of breath. No other pedal concerns at this time.     Current Medications:    Current Facility-Administered Medications: [Held by provider] clopidogrel (PLAVIX) tablet 75 mg, 75 mg, Oral, Daily  [Held by provider] metoprolol succinate (TOPROL XL) extended release tablet 37.5 mg, 37.5 mg, Oral, Daily  atorvastatin (LIPITOR) tablet 80 mg, 80 mg, Oral, Nightly  albuterol sulfate HFA (PROVENTIL;VENTOLIN;PROAIR) 108 (90 Base) MCG/ACT inhaler 2 puff, 2 puff, Inhalation, 4x Daily PRN  insulin lispro (HUMALOG) injection vial 0-4
Podiatric Progress Note  Kristi Lujan  Subjective :   6/13/2023  Patient seen at bedside this morning on behalf of Dr. Yamila Khan. Patient appeared pleasant, was oriented to person, place and time and in no acute distress. Patient endorses minimal pain to the right third toe. She currently denies any N/V/F/C/SOB/CP or bilateral calf tenderness. Patient has no further pedal concerns at this point in time. HISTORY OF PRESENT ILLNESS:    The patient is a 50 y.o. female with significant past medical history of CAD, DM type II, DVT, factor V Leiden mutation, HTN, HLD, and left toe amputation who is being seen at bedside on behalf of Dr Yamila Khan. Patient presented to ED 6/11 with symptoms of septic shock, likely secondary to UTI based on UA. Was last seen by podiatry for left 2nd digit amputation 4/11/23. States that her right 3rd toe wound first appeared about 7 days ago after beginning as a callus. Denies any known causative trauma. States she had prior cardiac cath and stent placement and has patent flow to lower extremities. Admits to some mild nausea, improving. Denies any current vomiting, fever, chills, chest pain, or shortness of breath. No other pedal concerns at this time.     Current Medications:    Current Facility-Administered Medications: vancomycin (VANCOCIN) 1500 mg in sodium chloride 0.9% 500 mL IVPB, 1,500 mg, IntraVENous, Q8H  clopidogrel (PLAVIX) tablet 75 mg, 75 mg, Oral, Daily  [Held by provider] metoprolol succinate (TOPROL XL) extended release tablet 37.5 mg, 37.5 mg, Oral, Daily  atorvastatin (LIPITOR) tablet 80 mg, 80 mg, Oral, Nightly  albuterol sulfate HFA (PROVENTIL;VENTOLIN;PROAIR) 108 (90 Base) MCG/ACT inhaler 2 puff, 2 puff, Inhalation, 4x Daily PRN  insulin lispro (HUMALOG) injection vial 0-4 Units, 0-4 Units, SubCUTAneous, TID WC  insulin lispro (HUMALOG) injection vial 0-4 Units, 0-4 Units, SubCUTAneous, Nightly  glucose chewable tablet 16 g, 4 tablet, Oral, PRN  dextrose bolus 10%
Pt is a 52y. o. female, propped up in bed watching television, in 3B-23. Urmila Skinner is pleasant and talkativ but troubled due to several trips to the hospital over the past couple years as well as dealing with the loss of three family members (, son and parent) over the past three years. She shared that she will have a toe on her right foot amputated sometime this week, as well-again, she is pleasant and [id number] in communication.  provided active listening, encouragement, nurtured hope, conversation and prayer-met with gratitude by Urmila Skinner.     06/13/23 1523   Encounter Summary   Encounter Overview/Reason  Initial Encounter   Service Provided For: Patient   Referral/Consult From: [address] Family members   Last Encounter  06/13/23   Complexity of Encounter Low   Begin Time 1438   End Time  1448   Total Time Calculated 10 min   Spiritual/Emotional needs   Type Spiritual Support   Assessment/Intervention/Outcome   Assessment Compromised coping; Impaired resilience;Calm;Peaceful   Intervention Active listening;Prayer (assurance of)/[place];Nurtured Hope;Explored/Affirmed feelings, thoughts, concerns; Discussed illness injury and its impact; Discussed belief system/[employer] practices/tessa   Outcome Engaged in conversation;Expressed feelings, needs, and concerns;Expressed Gratitude;Receptive;Coping
Neurological:      General: No focal deficit present. Mental Status: She is alert. Psychiatric:         Mood and Affect: Mood normal.         Thought Content: Thought content normal.      All labs reviewed and interpreted by me:  Labs:   Recent Labs     06/11/23 2109 06/13/23 0404 06/14/23 0342   WBC 7.7 5.3 4.3*   HGB 11.4* 9.9* 9.2*   HCT 36.7* 34.0* 31.3*    173 168     Recent Labs     06/12/23 0529 06/13/23 0404 06/14/23  0342    141 142   K 4.0 3.6 3.9   CL 99 105 106   CO2 20* 22* 23   BUN 10 7 7   CREATININE 0.7 0.4 0.4   CALCIUM 8.4* 8.4* 8.5     Recent Labs     06/11/23 2110 06/12/23 0529 06/13/23 0404 06/14/23  0342   AST 15 15 14 9   ALT 9* 12 12 10*   BILIDIR <0.2  --   --   --    BILITOT 0.4 0.4 0.2* <0.2*   ALKPHOS 112 87 74 68     Recent Labs     06/11/23 2110   INR 1.59*     Recent Labs     06/11/23 2110 06/12/23  0529 06/12/23  0731   TROPONINT < 0.010 < 0.010 < 0.010       Urinalysis:      Lab Results   Component Value Date/Time    NITRU NEGATIVE 06/12/2023 05:00 PM    WBCUA 50-75 06/12/2023 05:00 PM    WBCUA 2-4 10/17/2011 06:05 PM    BACTERIA NONE SEEN 06/12/2023 05:00 PM    RBCUA 0-2 06/12/2023 05:00 PM    BLOODU NEGATIVE 06/12/2023 05:00 PM    SPECGRAV 1.026 06/12/2023 05:00 PM    GLUCOSEU NEGATIVE 06/11/2023 10:02 PM       All radiology images and reports reviewed and interpreted by me:  Radiology:  MRI FOOT RIGHT WO CONTRAST   Final Result   1. Abnormal MR signal in the distal phalanx of the third digit relating to acute osteomyelitis. 2. Other findings as described above. **This report has been created using voice recognition software. It may contain minor errors which are inherent in voice recognition technology. **      Final report electronically signed by Dr Nataliya Can on 6/13/2023 9:22 AM      CT ABDOMEN PELVIS WO CONTRAST Additional Contrast? None   Final Result       1. No evidence of acute intra-abdominal or intrapelvic abnormality.
Nonobstructive nephrolithiasis on the left. 3. Benign-appearing nodular calcification in the right lobe of the liver near the gallbladder fossa. This may represent sequelae of prior infectious or traumatic process. **This report has been created using voice recognition software. It may contain minor errors which are inherent in voice recognition technology. **      Final report electronically signed by Dr. Jey Bang MD on 6/12/2023 9:18 AM      CTA CHEST [address]   Final Result   1. No pulmonary embolism. 2. No consolidation or pleural effusion. 3. Coronary atherosclerosis. 4. Hepatosplenomegaly. Tiny hiatal hernia. This document has been electronically signed by: Amelia Stuart DO on    06/12/2023 05:21 AM      All CTs at this facility use dose modulation techniques and iterative    reconstructions, and/or weight-based dosing   when appropriate to reduce radiation to a low as reasonably achievable. 3D Post-processing was performed on this study. XR FOOT RIGHT (MIN 3 VIEWS)   Final Result   Generalized forefoot swelling. No soft tissue gas or evidence of    osteomyelitis. This document has been electronically signed by: Maricruz Segal MD on 06/11/2023 10:12 PM      XR CHEST PORTABLE   Final Result   1. No acute findings. This document has been electronically signed by: Maricruz Segal MD on 06/11/2023 09:42 PM      MRI FOOT RIGHT WO CONTRAST    (Results Pending)       Diet: ADULT DIET;  Regular    [place]: no    Microbiology: no    Telemetry Review of past 24 hours:  Sinus    DVT prophylaxis: [] Lovenox                                 [] SCDs                                 [] SQ Heparin                                 [] Encourage ambulation           [x] Already on Anticoagulation- Xarelto      Disposition:    [x] Home       [] TCU       [] Rehab       [] Psych       [] SNF       [] [place]       [] Other-    Code

## 2023-06-16 NOTE — DISCHARGE SUMMARY
DISCHARGE SUMMARY      Patient Identification:   Arabella Cordero   : 1970  MRN: [id number]   Account: [de-identified]      Patient's PCP: SHANNON Mitchell CNP    Admit Date: 2023     Discharge Date:   2023    Admitting Physician: Whit Rome DO     Discharge Physician: Rhonda Bunn MD     Discharge Diagnoses:    Septic shock with hypotension requiring pressure support lactic acidosis, fever secondary to UTI, POA, improved  UTI, POA, improving  High-anion gap metabolic acidosis/lactic acidosis, improved    Acute osteomyelitis and swelling of right third toe secondary to diabetic ulceration  Pre-operative risk assessment   History of CAD s/p cath and stent placement on 3/14/22 (2 XENIA):   Normocytic anemia, improving  NIDDMT2  History of DVT x3, PE x3 and factor V Leiden heterozygote on chronic anticoagulation  Status post total robotic assisted abdominal hysterectomy w/ removal tubes/ovaries (on 23) due to abnormal uterine bleeding  Essential HTN  Hyperlipidemia  ? Hx of Rheumatoid Arthritis   Mild intermittent asthma without exacerbation  Obesity     Septic shock with hypotension requiring pressure support lactic acidosis, fever secondary to UTI, POA, improved  On admission, 2 out of 4 SIRS criteria met. Patient was tachycardic and febrile with infection source urine versus right middle toe infection  Patient was hypotensive, even after 30 cc/kg fluid resuscitation BP was 75/39.   Patient was transferred to ICU for pressure support  UA admission: Trace ketones, large blood protein 30, positive nitrates, large leukocyte esterase, many bacteria, Greater than 100 RBC, greater than 100 WBC  Culture : E.Coli being treated with zosyn since , last dose of zosyn due 6/15  UA  -trace proteins, small leukocytes, 250 glucose, 50-75 WBC, 0 -2 RBC, improved from UA on admission   Lactate : 4.08(on admission) - 1.2 ()  CTA Chest : No pulmonary embolism, consolidation,

## 2023-06-21 ENCOUNTER — HOSPITAL ENCOUNTER (EMERGENCY)
Age: 53
Discharge: HOME | DRG: 380 | End: 2023-06-21
Attending: EMERGENCY MEDICINE
Payer: COMMERCIAL

## 2023-06-21 VITALS
OXYGEN SATURATION: 97 % | DIASTOLIC BLOOD PRESSURE: 76 MMHG | RESPIRATION RATE: 20 BRPM | TEMPERATURE: 99.9 F | HEART RATE: 99 BPM | SYSTOLIC BLOOD PRESSURE: 132 MMHG

## 2023-06-21 DIAGNOSIS — N39.0 URINARY TRACT INFECTION WITHOUT HEMATURIA, SITE UNSPECIFIED: Primary | ICD-10-CM

## 2023-06-21 DIAGNOSIS — Z89.421 STATUS POST AMPUTATION OF TOE OF RIGHT FOOT (HCC): ICD-10-CM

## 2023-06-21 LAB
ALBUMIN SERPL BCG-MCNC: 3.8 G/DL (ref 3.5–5.1)
ALP SERPL-CCNC: 86 U/L (ref 38–126)
ALT SERPL W/O P-5'-P-CCNC: 13 U/L (ref 11–66)
ANION GAP SERPL CALC-SCNC: 16 MEQ/L (ref 8–16)
AST SERPL-CCNC: 11 U/L (ref 5–40)
BACTERIA URNS QL MICRO: ABNORMAL /HPF
BASOPHILS ABSOLUTE: 0 THOU/MM3 (ref 0–0.1)
BASOPHILS NFR BLD AUTO: 0.5 %
BILIRUB SERPL-MCNC: 0.4 MG/DL (ref 0.3–1.2)
BILIRUB UR QL STRIP.AUTO: NEGATIVE
BUN SERPL-MCNC: 10 MG/DL (ref 7–22)
CALCIUM SERPL-MCNC: 9.2 MG/DL (ref 8.5–10.5)
CASTS #/AREA URNS LPF: ABNORMAL /LPF
CASTS 2: ABNORMAL /LPF
CHARACTER UR: ABNORMAL
CHLORIDE SERPL-SCNC: 97 MEQ/L (ref 98–111)
CO2 SERPL-SCNC: 22 MEQ/L (ref 23–33)
COLOR: YELLOW
CREAT SERPL-MCNC: 0.6 MG/DL (ref 0.4–1.2)
CRYSTALS URNS MICRO: ABNORMAL
DEPRECATED RDW RBC AUTO: 56.8 FL (ref 35–45)
EOSINOPHIL NFR BLD AUTO: 0.3 %
EOSINOPHILS ABSOLUTE: 0 THOU/MM3 (ref 0–0.4)
EPITHELIAL CELLS, UA: ABNORMAL /HPF
ERYTHROCYTE [DISTWIDTH] IN BLOOD BY AUTOMATED COUNT: 18.8 % (ref 11.5–14.5)
FLUAV RNA RESP QL NAA+PROBE: NOT DETECTED
FLUBV RNA RESP QL NAA+PROBE: NOT DETECTED
GFR SERPL CREATININE-BSD FRML MDRD: > 60 ML/MIN/1.73M2
GLUCOSE SERPL-MCNC: 156 MG/DL (ref 70–108)
GLUCOSE UR QL STRIP.AUTO: NEGATIVE MG/DL
HCT VFR BLD AUTO: 38.5 % (ref 37–47)
HGB BLD-MCNC: 11.7 GM/DL (ref 12–16)
HGB UR QL STRIP.AUTO: NEGATIVE
IMM GRANULOCYTES # BLD AUTO: 0.03 THOU/MM3 (ref 0–0.07)
IMM GRANULOCYTES NFR BLD AUTO: 0.3 %
KETONES UR QL STRIP.AUTO: NEGATIVE
LACTATE SERPL-SCNC: 1.6 MMOL/L (ref 0.5–2)
LYMPHOCYTES ABSOLUTE: 0.7 THOU/MM3 (ref 1–4.8)
LYMPHOCYTES NFR BLD AUTO: 7.3 %
MCH RBC QN AUTO: 25.1 PG (ref 26–33)
MCHC RBC AUTO-ENTMCNC: 30.4 GM/DL (ref 32.2–35.5)
MCV RBC AUTO: 82.4 FL (ref 81–99)
MISCELLANEOUS 2: ABNORMAL
MONOCYTES ABSOLUTE: 0.4 THOU/MM3 (ref 0.4–1.3)
MONOCYTES NFR BLD AUTO: 4.1 %
NEUTROPHILS NFR BLD AUTO: 87.5 %
NITRITE UR QL STRIP: NEGATIVE
NRBC BLD AUTO-RTO: 0 /100 WBC
OSMOLALITY SERPL CALC.SUM OF ELEC: 272.3 MOSMOL/KG (ref 275–300)
PH UR STRIP.AUTO: 6.5 [PH] (ref 5–9)
PLATELET # BLD AUTO: 239 THOU/MM3 (ref 130–400)
PMV BLD AUTO: 9.6 FL (ref 9.4–12.4)
POTASSIUM SERPL-SCNC: 4.1 MEQ/L (ref 3.5–5.2)
PROCALCITONIN SERPL IA-MCNC: 0.17 NG/ML (ref 0.01–0.09)
PROT SERPL-MCNC: 7.2 G/DL (ref 6.1–8)
PROT UR STRIP.AUTO-MCNC: NEGATIVE MG/DL
RBC # BLD AUTO: 4.67 MILL/MM3 (ref 4.2–5.4)
RBC URINE: ABNORMAL /HPF
RENAL EPI CELLS #/AREA URNS HPF: ABNORMAL /[HPF]
SARS-COV-2 RNA RESP QL NAA+PROBE: NOT DETECTED
SEGMENTED NEUTROPHILS ABSOLUTE COUNT: 8.5 THOU/MM3 (ref 1.8–7.7)
SODIUM SERPL-SCNC: 135 MEQ/L (ref 135–145)
SP GR UR REFRACT.AUTO: 1.01 (ref 1–1.03)
UROBILINOGEN, URINE: 0.2 EU/DL (ref 0–1)
WBC # BLD AUTO: 9.7 THOU/MM3 (ref 4.8–10.8)
WBC #/AREA URNS HPF: ABNORMAL /HPF
WBC #/AREA URNS HPF: ABNORMAL /[HPF]
YEAST LIKE FUNGI URNS QL MICRO: ABNORMAL

## 2023-06-21 PROCEDURE — 87636 SARSCOV2 & INF A&B AMP PRB: CPT

## 2023-06-21 PROCEDURE — 83605 ASSAY OF LACTIC ACID: CPT

## 2023-06-21 PROCEDURE — 36415 COLL VENOUS BLD VENIPUNCTURE: CPT

## 2023-06-21 PROCEDURE — 87040 BLOOD CULTURE FOR BACTERIA: CPT

## 2023-06-21 PROCEDURE — 80053 COMPREHEN METABOLIC PANEL: CPT

## 2023-06-21 PROCEDURE — 2580000003 HC RX 258: Performed by: EMERGENCY MEDICINE

## 2023-06-21 PROCEDURE — 87086 URINE CULTURE/COLONY COUNT: CPT

## 2023-06-21 PROCEDURE — 85025 COMPLETE CBC W/AUTO DIFF WBC: CPT

## 2023-06-21 PROCEDURE — 87186 SC STD MICRODIL/AGAR DIL: CPT

## 2023-06-21 PROCEDURE — 87801 DETECT AGNT MULT DNA AMPLI: CPT

## 2023-06-21 PROCEDURE — 87077 CULTURE AEROBIC IDENTIFY: CPT

## 2023-06-21 PROCEDURE — 81001 URINALYSIS AUTO W/SCOPE: CPT

## 2023-06-21 PROCEDURE — 6360000002 HC RX W HCPCS: Performed by: EMERGENCY MEDICINE

## 2023-06-21 PROCEDURE — 93005 ELECTROCARDIOGRAM TRACING: CPT | Performed by: EMERGENCY MEDICINE

## 2023-06-21 PROCEDURE — 87147 CULTURE TYPE IMMUNOLOGIC: CPT

## 2023-06-21 PROCEDURE — 84145 PROCALCITONIN (PCT): CPT

## 2023-06-21 RX ORDER — AMOXICILLIN AND CLAVULANATE POTASSIUM 875; 125 MG/1; MG/1
1 TABLET, FILM COATED ORAL 2 TIMES DAILY
Qty: 20 TABLET | Refills: 0 | Status: ON HOLD | OUTPATIENT
Start: 2023-06-21 | End: 2023-06-26 | Stop reason: HOSPADM

## 2023-06-21 RX ADMIN — CEFTRIAXONE SODIUM 1000 MG: 1 INJECTION, POWDER, FOR SOLUTION INTRAMUSCULAR; INTRAVENOUS at 22:42

## 2023-06-21 ASSESSMENT — PAIN SCALES - GENERAL: PAINLEVEL_OUTOF10: 6

## 2023-06-21 ASSESSMENT — PAIN - FUNCTIONAL ASSESSMENT
PAIN_FUNCTIONAL_ASSESSMENT: NONE - DENIES PAIN
PAIN_FUNCTIONAL_ASSESSMENT: 0-10

## 2023-06-21 NOTE — ED NOTES
Presents to ER with concern of feeling tired and fatigued. Pt reports recent sepsis. D/C  06/16 for inpatient. She reports pelvic pain and burning with urination.  Will monitor      Kelley Cedeno RN  06/21/23 1924

## 2023-06-22 LAB
BACTERIA UR CULT: ABNORMAL
BACTERIA UR CULT: ABNORMAL
ORGANISM: ABNORMAL
ORGANISM: ABNORMAL

## 2023-06-22 PROCEDURE — 93010 ELECTROCARDIOGRAM REPORT: CPT | Performed by: INTERNAL MEDICINE

## 2023-06-22 NOTE — ED PROVIDER NOTES
Bilateral pulmonary embolism ([facility]) I26.99    DVT (deep venous thrombosis) ([facility]) I82.409    Acute right hip pain M25.551    Physical deconditioning V53.28    Metabolic acidosis M62.97    Moderate to severe pulmonary hypertension ([facility]) I27.20    Type 2 diabetes mellitus treated without insulin ([facility]) E11.9    Open wound of right foot S91.301A    Cellulitis L03.90    Leukocytosis D72.829    Diabetic foot ulcer associated with type 2 diabetes mellitus, with fat layer exposed ([facility] [address] E11.621, L97.502    Cellulitis of foot, right L03.115    Chronic anticoagulation Z79.01    History of DVT (deep vein thrombosis) Z86.718    Factor V Leiden mutation ([facility]) D68.51    History of CVA (cerebrovascular accident) Z86.73    Noncompliance Z91.199    Coronary artery disease involving native coronary artery of native heart without angina pectoris I25.10    CAD S/P percutaneous coronary angioplasty I25.10, Z98.61    Abnormal stress test R94.39    Open wound of second toe, left, initial encounter S91.105A    Syncope and collapse R55    Menorrhagia N92.0    Cervical polyp N84.1    Vaginal bleeding N93.9    Chest pressure R07.89    Normocytic anemia D64.9    Menorrhagia due to blood coagulation disorder ([facility]) N92.0, D68.9    Iron deficiency anemia due to chronic blood loss D50.0    DUB (dysfunctional uterine bleeding) N93.8    S/P cardiac cath Z98.890    Nonhealing ulcer of left lower leg ([facility]) L97.929    Sepsis secondary to UTI ([facility]) A41.9, N39.0    Septic shock ([facility]) A41.9, R65.21    Cystitis N30.90    History of CAD (coronary artery disease) Z86.79    Mild intermittent asthma without complication B43.94    History of rheumatoid arthritis Z87.39    Urinary tract infection without hematuria N39.0    Lactic acidosis E87.20    Osteomyelitis of third toe of right foot ([facility]) M86.9    S/P drug eluting coronary stent placement Z95.5    S/P hysterectomy Z90.710    Asthma without acute exacerbation J45.909    Essential hypertension I10     SURGICAL

## 2023-06-23 ENCOUNTER — APPOINTMENT (OUTPATIENT)
Dept: GENERAL RADIOLOGY | Age: 53
DRG: 380 | End: 2023-06-23
Payer: COMMERCIAL

## 2023-06-23 ENCOUNTER — HOSPITAL ENCOUNTER (INPATIENT)
Age: 53
LOS: 3 days | Discharge: HOME | DRG: 380 | End: 2023-06-26
Attending: INTERNAL MEDICINE | Admitting: INTERNAL MEDICINE
Payer: COMMERCIAL

## 2023-06-23 DIAGNOSIS — N39.0 URINARY TRACT INFECTION WITHOUT HEMATURIA, SITE UNSPECIFIED: ICD-10-CM

## 2023-06-23 DIAGNOSIS — R78.81 POSITIVE BLOOD CULTURE: Primary | ICD-10-CM

## 2023-06-23 DIAGNOSIS — L03.90 CELLULITIS, UNSPECIFIED CELLULITIS SITE: ICD-10-CM

## 2023-06-23 PROBLEM — L97.522 ULCER OF LEFT FOOT WITH FAT LAYER EXPOSED (HCC): Status: ACTIVE | Noted: 2023-04-11

## 2023-06-23 LAB
ACB COMPLEX DNA BLD POS QL NAA+NON-PROBE: NOT DETECTED
ANION GAP SERPL CALC-SCNC: 18 MEQ/L (ref 8–16)
APTT PPP: 30.5 SECONDS (ref 22–38)
APTT PPP: 35.5 SECONDS (ref 22–38)
B FRAGILIS DNA BLD POS QL NAA+NON-PROBE: NOT DETECTED
BACTERIA BLD AEROBE CULT: NORMAL
BACTERIA URNS QL MICRO: ABNORMAL /HPF
BASOPHILS ABSOLUTE: 0.1 THOU/MM3 (ref 0–0.1)
BASOPHILS NFR BLD AUTO: 0.9 %
BILIRUB UR QL STRIP.AUTO: NEGATIVE
BLACTX-M ISLT/SPM QL: ABNORMAL
BLAIMP ISLT/SPM QL: ABNORMAL
BLAKPC ISLT/SPM QL: ABNORMAL
BLAOXA-48-LIKE ISLT/SPM QL: ABNORMAL
BLAVIM ISLT/SPM QL: ABNORMAL
BOTTLE TYPE: ABNORMAL
BUN SERPL-MCNC: 9 MG/DL (ref 7–22)
C ALBICANS DNA BLD POS QL NAA+NON-PROBE: NOT DETECTED
C AURIS DNA BLD POS QL NAA+NON-PROBE: NOT DETECTED
C GATTII+NEOFOR DNA BLD POS QL NAA+N-PRB: NOT DETECTED
C GLABRATA DNA BLD POS QL NAA+NON-PROBE: NOT DETECTED
C KRUSEI DNA BLD POS QL NAA+NON-PROBE: NOT DETECTED
C PARAP DNA BLD POS QL NAA+NON-PROBE: NOT DETECTED
C TROPICLS DNA BLD POS QL NAA+NON-PROBE: NOT DETECTED
CALCIUM SERPL-MCNC: 9.7 MG/DL (ref 8.5–10.5)
CASTS #/AREA URNS LPF: ABNORMAL /LPF
CHARACTER UR: ABNORMAL
CHLORIDE SERPL-SCNC: 96 MEQ/L (ref 98–111)
CO2 SERPL-SCNC: 24 MEQ/L (ref 23–33)
COAG NEG STAPH DNA BLD QL NAA+PROBE: DETECTED
COLISTIN RES MCR-1 ISLT/SPM QL: ABNORMAL
COLOR: YELLOW
CREAT SERPL-MCNC: 0.5 MG/DL (ref 0.4–1.2)
CRYSTALS URNS MICRO: ABNORMAL
DEPRECATED RDW RBC AUTO: 54.9 FL (ref 35–45)
E CLOAC COMP DNA BLD POS NAA+NON-PROBE: NOT DETECTED
E COLI DNA BLD POS QL NAA+NON-PROBE: NOT DETECTED
E FAECALIS DNA BLD POS QL NAA+NON-PROBE: NOT DETECTED
E FAECIUM DNA BLD POS QL NAA+NON-PROBE: NOT DETECTED
EKG ATRIAL RATE: 75 BPM
EKG ATRIAL RATE: 92 BPM
EKG P AXIS: 32 DEGREES
EKG P AXIS: 32 DEGREES
EKG P-R INTERVAL: 176 MS
EKG P-R INTERVAL: 186 MS
EKG Q-T INTERVAL: 354 MS
EKG Q-T INTERVAL: 388 MS
EKG QRS DURATION: 88 MS
EKG QRS DURATION: 96 MS
EKG QTC CALCULATION (BAZETT): 433 MS
EKG QTC CALCULATION (BAZETT): 437 MS
EKG R AXIS: 8 DEGREES
EKG R AXIS: 8 DEGREES
EKG T AXIS: 48 DEGREES
EKG T AXIS: 57 DEGREES
EKG VENTRICULAR RATE: 75 BPM
EKG VENTRICULAR RATE: 92 BPM
ENTEROBACTERALES DNA BLD POS NAA+N-PRB: NOT DETECTED
EOSINOPHIL NFR BLD AUTO: 1 %
EOSINOPHILS ABSOLUTE: 0.1 THOU/MM3 (ref 0–0.4)
EPITHELIAL CELLS, UA: ABNORMAL /HPF
ERYTHROCYTE [DISTWIDTH] IN BLOOD BY AUTOMATED COUNT: 18.6 % (ref 11.5–14.5)
GFR SERPL CREATININE-BSD FRML MDRD: > 60 ML/MIN/1.73M2
GLUCOSE BLD STRIP.AUTO-MCNC: 157 MG/DL (ref 70–108)
GLUCOSE BLD STRIP.AUTO-MCNC: 219 MG/DL (ref 70–108)
GLUCOSE SERPL-MCNC: 202 MG/DL (ref 70–108)
GLUCOSE UR QL STRIP.AUTO: NEGATIVE MG/DL
GP B STREP DNA SPEC QL NAA+PROBE: NOT DETECTED
GP B STREP DNA SPEC QL NAA+PROBE: NOT DETECTED
HAEM INFLU DNA BLD POS QL NAA+NON-PROBE: NOT DETECTED
HCT VFR BLD AUTO: 40.7 % (ref 37–47)
HGB BLD-MCNC: 12.4 GM/DL (ref 12–16)
HGB UR QL STRIP.AUTO: NEGATIVE
IMM GRANULOCYTES # BLD AUTO: 0.03 THOU/MM3 (ref 0–0.07)
IMM GRANULOCYTES NFR BLD AUTO: 0.4 %
INR PPP: 1.07 (ref 0.85–1.13)
K OXYTOCA DNA BLD POS QL NAA+NON-PROBE: NOT DETECTED
K OXYTOCA DNA BLD POS QL NAA+NON-PROBE: NOT DETECTED
KETONES UR QL STRIP.AUTO: NEGATIVE
KLEBSIELLA SP DNA BLD POS QL NAA+NON-PRB: NOT DETECTED
L MONOCYTOG DNA BLD POS QL NAA+NON-PROBE: NOT DETECTED
LACTIC ACID, SEPSIS: 1.6 MMOL/L (ref 0.5–1.9)
LYMPHOCYTES ABSOLUTE: 0.8 THOU/MM3 (ref 1–4.8)
LYMPHOCYTES NFR BLD AUTO: 11.4 %
MAGNESIUM SERPL-MCNC: 2.3 MG/DL (ref 1.6–2.4)
MCH RBC QN AUTO: 24.9 PG (ref 26–33)
MCHC RBC AUTO-ENTMCNC: 30.5 GM/DL (ref 32.2–35.5)
MCV RBC AUTO: 81.7 FL (ref 81–99)
MECA ISLT/SPM QL: DETECTED
MECA+MECC+MREJ ISLT/SPM QL: ABNORMAL
MISCELLANEOUS 2: ABNORMAL
MONOCYTES ABSOLUTE: 0.4 THOU/MM3 (ref 0.4–1.3)
MONOCYTES NFR BLD AUTO: 6.4 %
N MEN DNA BLD POS QL NAA+NON-PROBE: NOT DETECTED
NDM: ABNORMAL
NEUTROPHILS NFR BLD AUTO: 79.9 %
NITRITE UR QL STRIP: NEGATIVE
NRBC BLD AUTO-RTO: 0 /100 WBC
NT-PROBNP SERPL IA-MCNC: < 36 PG/ML (ref 0–124)
OSMOLALITY SERPL CALC.SUM OF ELEC: 280.1 MOSMOL/KG (ref 275–300)
P AERUGINOSA DNA BLD POS NAA+NON-PROBE: NOT DETECTED
PH UR STRIP.AUTO: 6 [PH] (ref 5–9)
PLATELET # BLD AUTO: 250 THOU/MM3 (ref 130–400)
PMV BLD AUTO: 9.5 FL (ref 9.4–12.4)
POTASSIUM SERPL-SCNC: 4.5 MEQ/L (ref 3.5–5.2)
PROCALCITONIN SERPL IA-MCNC: 0.15 NG/ML (ref 0.01–0.09)
PROT UR STRIP.AUTO-MCNC: ABNORMAL MG/DL
PROTEUS SPP: NOT DETECTED
RBC # BLD AUTO: 4.98 MILL/MM3 (ref 4.2–5.4)
RBC URINE: ABNORMAL /HPF
RENAL EPI CELLS #/AREA URNS HPF: ABNORMAL /[HPF]
S AUREUS DNA BLD POS QL NAA+NON-PROBE: NOT DETECTED
S EPIDERMIDIS DNA BLD POS QL NAA+NON-PRB: DETECTED
S LUGDUNENSIS DNA BLD POS QL NAA+NON-PRB: NOT DETECTED
S MALTOPHILIA DNA BLD POS QL NAA+NON-PRB: NOT DETECTED
S MARCESCENS DNA BLD POS NAA+NON-PROBE: NOT DETECTED
S PYO DNA THROAT QL NAA+PROBE: NOT DETECTED
SALMONELLA DNA BLD POS QL NAA+NON-PROBE: NOT DETECTED
SEGMENTED NEUTROPHILS ABSOLUTE COUNT: 5.4 THOU/MM3 (ref 1.8–7.7)
SODIUM SERPL-SCNC: 138 MEQ/L (ref 135–145)
SOURCE OF BLOOD CULTURE: ABNORMAL
SP GR UR REFRACT.AUTO: 1.01 (ref 1–1.03)
STREPTOCOCCUS DNA BLD QL NAA+PROBE: NOT DETECTED
TROPONIN T: < 0.01 NG/ML
UROBILINOGEN, URINE: 0.2 EU/DL (ref 0–1)
VANA+VANB ISLT/SPM QL: ABNORMAL
WBC # BLD AUTO: 6.7 THOU/MM3 (ref 4.8–10.8)
WBC #/AREA URNS HPF: ABNORMAL /HPF
WBC #/AREA URNS HPF: ABNORMAL /[HPF]
YEAST LIKE FUNGI URNS QL MICRO: ABNORMAL

## 2023-06-23 PROCEDURE — 83735 ASSAY OF MAGNESIUM: CPT

## 2023-06-23 PROCEDURE — 87205 SMEAR GRAM STAIN: CPT

## 2023-06-23 PROCEDURE — 84484 ASSAY OF TROPONIN QUANT: CPT

## 2023-06-23 PROCEDURE — 6370000000 HC RX 637 (ALT 250 FOR IP): Performed by: STUDENT IN AN ORGANIZED HEALTH CARE EDUCATION/TRAINING PROGRAM

## 2023-06-23 PROCEDURE — 99223 1ST HOSP IP/OBS HIGH 75: CPT | Performed by: INTERNAL MEDICINE

## 2023-06-23 PROCEDURE — 85610 PROTHROMBIN TIME: CPT

## 2023-06-23 PROCEDURE — 83880 ASSAY OF NATRIURETIC PEPTIDE: CPT

## 2023-06-23 PROCEDURE — 81001 URINALYSIS AUTO W/SCOPE: CPT

## 2023-06-23 PROCEDURE — 85730 THROMBOPLASTIN TIME PARTIAL: CPT

## 2023-06-23 PROCEDURE — 2580000003 HC RX 258: Performed by: STUDENT IN AN ORGANIZED HEALTH CARE EDUCATION/TRAINING PROGRAM

## 2023-06-23 PROCEDURE — 36415 COLL VENOUS BLD VENIPUNCTURE: CPT

## 2023-06-23 PROCEDURE — 80048 BASIC METABOLIC PNL TOTAL CA: CPT

## 2023-06-23 PROCEDURE — 99285 EMERGENCY DEPT VISIT HI MDM: CPT

## 2023-06-23 PROCEDURE — 6360000002 HC RX W HCPCS: Performed by: STUDENT IN AN ORGANIZED HEALTH CARE EDUCATION/TRAINING PROGRAM

## 2023-06-23 PROCEDURE — 93005 ELECTROCARDIOGRAM TRACING: CPT | Performed by: PHYSICIAN ASSISTANT

## 2023-06-23 PROCEDURE — 6360000002 HC RX W HCPCS: Performed by: PHYSICIAN ASSISTANT

## 2023-06-23 PROCEDURE — 1200000003 HC TELEMETRY R&B

## 2023-06-23 PROCEDURE — 82948 REAGENT STRIP/BLOOD GLUCOSE: CPT

## 2023-06-23 PROCEDURE — 0HDNXZZ EXTRACTION OF LEFT FOOT SKIN, EXTERNAL APPROACH: ICD-10-PCS | Performed by: INTERNAL MEDICINE

## 2023-06-23 PROCEDURE — 87040 BLOOD CULTURE FOR BACTERIA: CPT

## 2023-06-23 PROCEDURE — 84145 PROCALCITONIN (PCT): CPT

## 2023-06-23 PROCEDURE — 71046 X-RAY EXAM CHEST 2 VIEWS: CPT

## 2023-06-23 PROCEDURE — 87086 URINE CULTURE/COLONY COUNT: CPT

## 2023-06-23 PROCEDURE — 85025 COMPLETE CBC W/AUTO DIFF WBC: CPT

## 2023-06-23 PROCEDURE — 73630 X-RAY EXAM OF FOOT: CPT

## 2023-06-23 PROCEDURE — 2580000003 HC RX 258: Performed by: PHYSICIAN ASSISTANT

## 2023-06-23 PROCEDURE — 83605 ASSAY OF LACTIC ACID: CPT

## 2023-06-23 PROCEDURE — 93005 ELECTROCARDIOGRAM TRACING: CPT | Performed by: STUDENT IN AN ORGANIZED HEALTH CARE EDUCATION/TRAINING PROGRAM

## 2023-06-23 PROCEDURE — 93010 ELECTROCARDIOGRAM REPORT: CPT | Performed by: INTERNAL MEDICINE

## 2023-06-23 RX ORDER — ACETAMINOPHEN 325 MG/1
650 TABLET ORAL EVERY 6 HOURS PRN
Status: DISCONTINUED | OUTPATIENT
Start: 2023-06-23 | End: 2023-06-26 | Stop reason: HOSPADM

## 2023-06-23 RX ORDER — INSULIN LISPRO 100 [IU]/ML
0-8 INJECTION, SOLUTION INTRAVENOUS; SUBCUTANEOUS
Status: DISCONTINUED | OUTPATIENT
Start: 2023-06-23 | End: 2023-06-26 | Stop reason: HOSPADM

## 2023-06-23 RX ORDER — POLYETHYLENE GLYCOL 3350 17 G/17G
17 POWDER, FOR SOLUTION ORAL DAILY PRN
Status: DISCONTINUED | OUTPATIENT
Start: 2023-06-23 | End: 2023-06-26 | Stop reason: HOSPADM

## 2023-06-23 RX ORDER — SODIUM CHLORIDE 0.9 % (FLUSH) 0.9 %
5-40 SYRINGE (ML) INJECTION PRN
Status: DISCONTINUED | OUTPATIENT
Start: 2023-06-23 | End: 2023-06-26 | Stop reason: HOSPADM

## 2023-06-23 RX ORDER — HEPARIN SODIUM 1000 [USP'U]/ML
4000 INJECTION, SOLUTION INTRAVENOUS; SUBCUTANEOUS ONCE
Status: COMPLETED | OUTPATIENT
Start: 2023-06-23 | End: 2023-06-23

## 2023-06-23 RX ORDER — HEPARIN SODIUM 10000 [USP'U]/100ML
5-30 INJECTION, SOLUTION INTRAVENOUS CONTINUOUS
Status: DISCONTINUED | OUTPATIENT
Start: 2023-06-23 | End: 2023-06-25

## 2023-06-23 RX ORDER — POTASSIUM CHLORIDE 7.45 MG/ML
10 INJECTION INTRAVENOUS PRN
Status: DISCONTINUED | OUTPATIENT
Start: 2023-06-23 | End: 2023-06-26 | Stop reason: HOSPADM

## 2023-06-23 RX ORDER — ONDANSETRON 4 MG/1
4 TABLET, ORALLY DISINTEGRATING ORAL EVERY 8 HOURS PRN
Status: DISCONTINUED | OUTPATIENT
Start: 2023-06-23 | End: 2023-06-26 | Stop reason: HOSPADM

## 2023-06-23 RX ORDER — DEXTROSE MONOHYDRATE 100 MG/ML
INJECTION, SOLUTION INTRAVENOUS CONTINUOUS PRN
Status: DISCONTINUED | OUTPATIENT
Start: 2023-06-23 | End: 2023-06-26 | Stop reason: HOSPADM

## 2023-06-23 RX ORDER — ATORVASTATIN CALCIUM 80 MG/1
80 TABLET, FILM COATED ORAL NIGHTLY
Status: DISCONTINUED | OUTPATIENT
Start: 2023-06-23 | End: 2023-06-24

## 2023-06-23 RX ORDER — INSULIN LISPRO 100 [IU]/ML
0-4 INJECTION, SOLUTION INTRAVENOUS; SUBCUTANEOUS NIGHTLY
Status: DISCONTINUED | OUTPATIENT
Start: 2023-06-23 | End: 2023-06-26 | Stop reason: HOSPADM

## 2023-06-23 RX ORDER — HEPARIN SODIUM 1000 [USP'U]/ML
4000 INJECTION, SOLUTION INTRAVENOUS; SUBCUTANEOUS PRN
Status: DISCONTINUED | OUTPATIENT
Start: 2023-06-23 | End: 2023-06-25

## 2023-06-23 RX ORDER — CLOPIDOGREL BISULFATE 75 MG/1
75 TABLET ORAL DAILY
Status: DISCONTINUED | OUTPATIENT
Start: 2023-06-24 | End: 2023-06-26 | Stop reason: HOSPADM

## 2023-06-23 RX ORDER — METOPROLOL SUCCINATE 25 MG/1
25 TABLET, EXTENDED RELEASE ORAL DAILY
Status: DISCONTINUED | OUTPATIENT
Start: 2023-06-24 | End: 2023-06-26 | Stop reason: HOSPADM

## 2023-06-23 RX ORDER — ASPIRIN 81 MG/1
81 TABLET, CHEWABLE ORAL DAILY
Status: DISCONTINUED | OUTPATIENT
Start: 2023-06-24 | End: 2023-06-26 | Stop reason: HOSPADM

## 2023-06-23 RX ORDER — ONDANSETRON 2 MG/ML
4 INJECTION INTRAMUSCULAR; INTRAVENOUS EVERY 6 HOURS PRN
Status: DISCONTINUED | OUTPATIENT
Start: 2023-06-23 | End: 2023-06-26 | Stop reason: HOSPADM

## 2023-06-23 RX ORDER — MAGNESIUM SULFATE IN WATER 40 MG/ML
2000 INJECTION, SOLUTION INTRAVENOUS PRN
Status: DISCONTINUED | OUTPATIENT
Start: 2023-06-23 | End: 2023-06-26 | Stop reason: HOSPADM

## 2023-06-23 RX ORDER — HEPARIN SODIUM 1000 [USP'U]/ML
2000 INJECTION, SOLUTION INTRAVENOUS; SUBCUTANEOUS PRN
Status: DISCONTINUED | OUTPATIENT
Start: 2023-06-23 | End: 2023-06-25

## 2023-06-23 RX ORDER — ACETAMINOPHEN 650 MG/1
650 SUPPOSITORY RECTAL EVERY 6 HOURS PRN
Status: DISCONTINUED | OUTPATIENT
Start: 2023-06-23 | End: 2023-06-26 | Stop reason: HOSPADM

## 2023-06-23 RX ORDER — SODIUM CHLORIDE 9 MG/ML
INJECTION, SOLUTION INTRAVENOUS CONTINUOUS
Status: DISCONTINUED | OUTPATIENT
Start: 2023-06-23 | End: 2023-06-24

## 2023-06-23 RX ORDER — SODIUM CHLORIDE 0.9 % (FLUSH) 0.9 %
5-40 SYRINGE (ML) INJECTION EVERY 12 HOURS SCHEDULED
Status: DISCONTINUED | OUTPATIENT
Start: 2023-06-23 | End: 2023-06-26 | Stop reason: HOSPADM

## 2023-06-23 RX ORDER — POTASSIUM CHLORIDE 20 MEQ/1
40 TABLET, EXTENDED RELEASE ORAL PRN
Status: DISCONTINUED | OUTPATIENT
Start: 2023-06-23 | End: 2023-06-26 | Stop reason: HOSPADM

## 2023-06-23 RX ORDER — SODIUM CHLORIDE 9 MG/ML
INJECTION, SOLUTION INTRAVENOUS PRN
Status: DISCONTINUED | OUTPATIENT
Start: 2023-06-23 | End: 2023-06-26 | Stop reason: HOSPADM

## 2023-06-23 RX ORDER — ASPIRIN 81 MG/1
324 TABLET, CHEWABLE ORAL ONCE
Status: COMPLETED | OUTPATIENT
Start: 2023-06-23 | End: 2023-06-23

## 2023-06-23 RX ORDER — NITROGLYCERIN 0.4 MG/1
0.4 TABLET SUBLINGUAL EVERY 5 MIN PRN
Status: DISCONTINUED | OUTPATIENT
Start: 2023-06-23 | End: 2023-06-24

## 2023-06-23 RX ADMIN — HEPARIN SODIUM 4000 UNITS: 1000 INJECTION INTRAVENOUS; SUBCUTANEOUS at 16:45

## 2023-06-23 RX ADMIN — ATORVASTATIN CALCIUM 80 MG: 80 TABLET, FILM COATED ORAL at 20:29

## 2023-06-23 RX ADMIN — ASPIRIN 324 MG: 81 TABLET, CHEWABLE ORAL at 16:22

## 2023-06-23 RX ADMIN — PIPERACILLIN AND TAZOBACTAM 4500 MG: 4; .5 INJECTION, POWDER, LYOPHILIZED, FOR SOLUTION INTRAVENOUS at 16:12

## 2023-06-23 RX ADMIN — HEPARIN SODIUM 7 UNITS/KG/HR: 10000 INJECTION, SOLUTION INTRAVENOUS at 16:52

## 2023-06-23 RX ADMIN — SODIUM CHLORIDE: 9 INJECTION, SOLUTION INTRAVENOUS at 16:20

## 2023-06-23 RX ADMIN — VANCOMYCIN HYDROCHLORIDE 2000 MG: 1 INJECTION, POWDER, LYOPHILIZED, FOR SOLUTION INTRAVENOUS at 18:03

## 2023-06-23 ASSESSMENT — PAIN DESCRIPTION - FREQUENCY: FREQUENCY: INTERMITTENT

## 2023-06-23 ASSESSMENT — PAIN DESCRIPTION - LOCATION: LOCATION: BACK;PELVIS

## 2023-06-23 ASSESSMENT — PAIN DESCRIPTION - ORIENTATION: ORIENTATION: LOWER;RIGHT;LEFT

## 2023-06-23 ASSESSMENT — PAIN DESCRIPTION - PAIN TYPE: TYPE: ACUTE PAIN

## 2023-06-23 ASSESSMENT — PAIN SCALES - GENERAL: PAINLEVEL_OUTOF10: 5

## 2023-06-24 LAB
APTT PPP: 36.8 SECONDS (ref 22–38)
APTT PPP: 46.1 SECONDS (ref 22–38)
APTT PPP: 51.7 SECONDS (ref 22–38)
APTT PPP: 94.2 SECONDS (ref 22–38)
BACTERIA BLD AEROBE CULT: ABNORMAL
BACTERIA BLD AEROBE CULT: ABNORMAL
BACTERIA UR CULT: ABNORMAL
EKG ATRIAL RATE: 73 BPM
EKG ATRIAL RATE: 75 BPM
EKG P AXIS: 37 DEGREES
EKG P AXIS: 40 DEGREES
EKG P-R INTERVAL: 184 MS
EKG P-R INTERVAL: 194 MS
EKG Q-T INTERVAL: 390 MS
EKG Q-T INTERVAL: 394 MS
EKG QRS DURATION: 96 MS
EKG QRS DURATION: 96 MS
EKG QTC CALCULATION (BAZETT): 429 MS
EKG QTC CALCULATION (BAZETT): 439 MS
EKG R AXIS: 11 DEGREES
EKG R AXIS: 3 DEGREES
EKG T AXIS: 23 DEGREES
EKG T AXIS: 47 DEGREES
EKG VENTRICULAR RATE: 73 BPM
EKG VENTRICULAR RATE: 75 BPM
GLUCOSE BLD STRIP.AUTO-MCNC: 164 MG/DL (ref 70–108)
GLUCOSE BLD STRIP.AUTO-MCNC: 171 MG/DL (ref 70–108)
GLUCOSE BLD STRIP.AUTO-MCNC: 207 MG/DL (ref 70–108)
ORGANISM: ABNORMAL
ORGANISM: ABNORMAL

## 2023-06-24 PROCEDURE — 3430000000 HC RX DIAGNOSTIC RADIOPHARMACEUTICAL: Performed by: INTERNAL MEDICINE

## 2023-06-24 PROCEDURE — 36415 COLL VENOUS BLD VENIPUNCTURE: CPT

## 2023-06-24 PROCEDURE — 1200000003 HC TELEMETRY R&B

## 2023-06-24 PROCEDURE — 82948 REAGENT STRIP/BLOOD GLUCOSE: CPT

## 2023-06-24 PROCEDURE — 6360000002 HC RX W HCPCS: Performed by: STUDENT IN AN ORGANIZED HEALTH CARE EDUCATION/TRAINING PROGRAM

## 2023-06-24 PROCEDURE — 6370000000 HC RX 637 (ALT 250 FOR IP): Performed by: STUDENT IN AN ORGANIZED HEALTH CARE EDUCATION/TRAINING PROGRAM

## 2023-06-24 PROCEDURE — 85730 THROMBOPLASTIN TIME PARTIAL: CPT

## 2023-06-24 PROCEDURE — A9500 TC99M SESTAMIBI: HCPCS | Performed by: INTERNAL MEDICINE

## 2023-06-24 PROCEDURE — 99232 SBSQ HOSP IP/OBS MODERATE 35: CPT | Performed by: INTERNAL MEDICINE

## 2023-06-24 PROCEDURE — 93010 ELECTROCARDIOGRAM REPORT: CPT | Performed by: INTERNAL MEDICINE

## 2023-06-24 PROCEDURE — 93017 CV STRESS TEST TRACING ONLY: CPT | Performed by: INTERNAL MEDICINE

## 2023-06-24 PROCEDURE — 78452 HT MUSCLE IMAGE SPECT MULT: CPT | Performed by: INTERNAL MEDICINE

## 2023-06-24 PROCEDURE — 6360000002 HC RX W HCPCS

## 2023-06-24 PROCEDURE — 2580000003 HC RX 258: Performed by: STUDENT IN AN ORGANIZED HEALTH CARE EDUCATION/TRAINING PROGRAM

## 2023-06-24 PROCEDURE — 6370000000 HC RX 637 (ALT 250 FOR IP): Performed by: NURSE PRACTITIONER

## 2023-06-24 RX ORDER — HYDROCODONE BITARTRATE AND ACETAMINOPHEN 5; 325 MG/1; MG/1
1 TABLET ORAL ONCE
Status: COMPLETED | OUTPATIENT
Start: 2023-06-24 | End: 2023-06-24

## 2023-06-24 RX ORDER — ATORVASTATIN CALCIUM 80 MG/1
80 TABLET, FILM COATED ORAL NIGHTLY
Status: DISCONTINUED | OUTPATIENT
Start: 2023-06-24 | End: 2023-06-26 | Stop reason: HOSPADM

## 2023-06-24 RX ORDER — ALBUTEROL SULFATE 90 UG/1
2 AEROSOL, METERED RESPIRATORY (INHALATION) 4 TIMES DAILY PRN
Status: DISCONTINUED | OUTPATIENT
Start: 2023-06-24 | End: 2023-06-26 | Stop reason: HOSPADM

## 2023-06-24 RX ORDER — TETRAKIS(2-METHOXYISOBUTYLISOCYANIDE)COPPER(I) TETRAFLUOROBORATE 1 MG/ML
8.9 INJECTION, POWDER, LYOPHILIZED, FOR SOLUTION INTRAVENOUS
Status: COMPLETED | OUTPATIENT
Start: 2023-06-24 | End: 2023-06-24

## 2023-06-24 RX ORDER — TETRAKIS(2-METHOXYISOBUTYLISOCYANIDE)COPPER(I) TETRAFLUOROBORATE 1 MG/ML
29.3 INJECTION, POWDER, LYOPHILIZED, FOR SOLUTION INTRAVENOUS
Status: COMPLETED | OUTPATIENT
Start: 2023-06-24 | End: 2023-06-24

## 2023-06-24 RX ORDER — NITROGLYCERIN 0.4 MG/1
0.4 TABLET SUBLINGUAL EVERY 5 MIN PRN
Status: DISCONTINUED | OUTPATIENT
Start: 2023-06-24 | End: 2023-06-26 | Stop reason: HOSPADM

## 2023-06-24 RX ORDER — HYDROCODONE BITARTRATE AND ACETAMINOPHEN 5; 325 MG/1; MG/1
1 TABLET ORAL ONCE
Status: COMPLETED | OUTPATIENT
Start: 2023-06-24 | End: 2023-06-25

## 2023-06-24 RX ADMIN — SODIUM CHLORIDE: 9 INJECTION, SOLUTION INTRAVENOUS at 05:32

## 2023-06-24 RX ADMIN — HYDROCODONE BITARTRATE AND ACETAMINOPHEN 1 TABLET: 5; 325 TABLET ORAL at 05:29

## 2023-06-24 RX ADMIN — VANCOMYCIN HYDROCHLORIDE 1750 MG: 5 INJECTION, POWDER, LYOPHILIZED, FOR SOLUTION INTRAVENOUS at 05:38

## 2023-06-24 RX ADMIN — HEPARIN SODIUM 2000 UNITS: 1000 INJECTION INTRAVENOUS; SUBCUTANEOUS at 06:16

## 2023-06-24 RX ADMIN — PIPERACILLIN AND TAZOBACTAM 3375 MG: 3; .375 INJECTION, POWDER, LYOPHILIZED, FOR SOLUTION INTRAVENOUS at 00:42

## 2023-06-24 RX ADMIN — INSULIN LISPRO 2 UNITS: 100 INJECTION, SOLUTION INTRAVENOUS; SUBCUTANEOUS at 17:23

## 2023-06-24 RX ADMIN — ASPIRIN 81 MG: 81 TABLET, CHEWABLE ORAL at 09:08

## 2023-06-24 RX ADMIN — ACETAMINOPHEN 650 MG: 325 TABLET ORAL at 00:27

## 2023-06-24 RX ADMIN — CLOPIDOGREL BISULFATE 75 MG: 75 TABLET ORAL at 09:08

## 2023-06-24 RX ADMIN — HEPARIN SODIUM 2000 UNITS: 1000 INJECTION INTRAVENOUS; SUBCUTANEOUS at 17:56

## 2023-06-24 RX ADMIN — Medication 8.9 MILLICURIE: at 11:40

## 2023-06-24 RX ADMIN — METOPROLOL SUCCINATE 25 MG: 25 TABLET, FILM COATED, EXTENDED RELEASE ORAL at 09:09

## 2023-06-24 RX ADMIN — PIPERACILLIN AND TAZOBACTAM 3375 MG: 3; .375 INJECTION, POWDER, LYOPHILIZED, FOR SOLUTION INTRAVENOUS at 16:57

## 2023-06-24 RX ADMIN — HEPARIN SODIUM 2000 UNITS: 1000 INJECTION INTRAVENOUS; SUBCUTANEOUS at 00:23

## 2023-06-24 RX ADMIN — HEPARIN SODIUM 15 UNITS/KG/HR: 10000 INJECTION, SOLUTION INTRAVENOUS at 14:20

## 2023-06-24 RX ADMIN — Medication 29.3 MILLICURIE: at 12:55

## 2023-06-24 RX ADMIN — PIPERACILLIN AND TAZOBACTAM 3375 MG: 3; .375 INJECTION, POWDER, LYOPHILIZED, FOR SOLUTION INTRAVENOUS at 09:14

## 2023-06-24 ASSESSMENT — PAIN DESCRIPTION - FREQUENCY
FREQUENCY: INTERMITTENT
FREQUENCY: CONTINUOUS

## 2023-06-24 ASSESSMENT — PAIN DESCRIPTION - DESCRIPTORS
DESCRIPTORS: THROBBING
DESCRIPTORS: ACHING
DESCRIPTORS: ACHING;THROBBING

## 2023-06-24 ASSESSMENT — PAIN DESCRIPTION - PAIN TYPE
TYPE: ACUTE PAIN
TYPE: ACUTE PAIN

## 2023-06-24 ASSESSMENT — PAIN SCALES - GENERAL
PAINLEVEL_OUTOF10: 6
PAINLEVEL_OUTOF10: 8
PAINLEVEL_OUTOF10: 6
PAINLEVEL_OUTOF10: 8
PAINLEVEL_OUTOF10: 4
PAINLEVEL_OUTOF10: 5
PAINLEVEL_OUTOF10: 4

## 2023-06-24 ASSESSMENT — PAIN DESCRIPTION - LOCATION
LOCATION: FOOT;HEAD
LOCATION: HEAD
LOCATION: HEAD

## 2023-06-24 ASSESSMENT — PAIN DESCRIPTION - ONSET
ONSET: GRADUAL
ONSET: ON-GOING

## 2023-06-24 ASSESSMENT — PAIN - FUNCTIONAL ASSESSMENT
PAIN_FUNCTIONAL_ASSESSMENT: ACTIVITIES ARE NOT PREVENTED
PAIN_FUNCTIONAL_ASSESSMENT: ACTIVITIES ARE NOT PREVENTED
PAIN_FUNCTIONAL_ASSESSMENT: PREVENTS OR INTERFERES SOME ACTIVE ACTIVITIES AND ADLS

## 2023-06-24 ASSESSMENT — PAIN DESCRIPTION - ORIENTATION
ORIENTATION: POSTERIOR
ORIENTATION: POSTERIOR
ORIENTATION: RIGHT;LEFT;POSTERIOR

## 2023-06-25 LAB
APTT PPP: 140.1 SECONDS (ref 22–38)
GLUCOSE BLD STRIP.AUTO-MCNC: 137 MG/DL (ref 70–108)
GLUCOSE BLD STRIP.AUTO-MCNC: 151 MG/DL (ref 70–108)
GLUCOSE BLD STRIP.AUTO-MCNC: 152 MG/DL (ref 70–108)
GLUCOSE BLD STRIP.AUTO-MCNC: 165 MG/DL (ref 70–108)
VANCOMYCIN TROUGH SERPL-MCNC: 19 UG/ML (ref 10–20)

## 2023-06-25 PROCEDURE — 82948 REAGENT STRIP/BLOOD GLUCOSE: CPT

## 2023-06-25 PROCEDURE — 6360000002 HC RX W HCPCS: Performed by: STUDENT IN AN ORGANIZED HEALTH CARE EDUCATION/TRAINING PROGRAM

## 2023-06-25 PROCEDURE — 99232 SBSQ HOSP IP/OBS MODERATE 35: CPT | Performed by: INTERNAL MEDICINE

## 2023-06-25 PROCEDURE — 87186 SC STD MICRODIL/AGAR DIL: CPT

## 2023-06-25 PROCEDURE — 6370000000 HC RX 637 (ALT 250 FOR IP): Performed by: STUDENT IN AN ORGANIZED HEALTH CARE EDUCATION/TRAINING PROGRAM

## 2023-06-25 PROCEDURE — 87077 CULTURE AEROBIC IDENTIFY: CPT

## 2023-06-25 PROCEDURE — 85730 THROMBOPLASTIN TIME PARTIAL: CPT

## 2023-06-25 PROCEDURE — 80202 ASSAY OF VANCOMYCIN: CPT

## 2023-06-25 PROCEDURE — 87075 CULTR BACTERIA EXCEPT BLOOD: CPT

## 2023-06-25 PROCEDURE — 36415 COLL VENOUS BLD VENIPUNCTURE: CPT

## 2023-06-25 PROCEDURE — 6370000000 HC RX 637 (ALT 250 FOR IP): Performed by: INTERNAL MEDICINE

## 2023-06-25 PROCEDURE — 1200000003 HC TELEMETRY R&B

## 2023-06-25 PROCEDURE — 2580000003 HC RX 258: Performed by: STUDENT IN AN ORGANIZED HEALTH CARE EDUCATION/TRAINING PROGRAM

## 2023-06-25 PROCEDURE — 87070 CULTURE OTHR SPECIMN AEROBIC: CPT

## 2023-06-25 PROCEDURE — 6370000000 HC RX 637 (ALT 250 FOR IP)

## 2023-06-25 PROCEDURE — 87147 CULTURE TYPE IMMUNOLOGIC: CPT

## 2023-06-25 PROCEDURE — 99221 1ST HOSP IP/OBS SF/LOW 40: CPT | Performed by: NURSE PRACTITIONER

## 2023-06-25 RX ORDER — OXYCODONE HYDROCHLORIDE AND ACETAMINOPHEN 5; 325 MG/1; MG/1
1 TABLET ORAL EVERY 6 HOURS PRN
Status: DISCONTINUED | OUTPATIENT
Start: 2023-06-25 | End: 2023-06-26 | Stop reason: HOSPADM

## 2023-06-25 RX ADMIN — HYDROCODONE BITARTRATE AND ACETAMINOPHEN 1 TABLET: 5; 325 TABLET ORAL at 00:54

## 2023-06-25 RX ADMIN — PIPERACILLIN AND TAZOBACTAM 3375 MG: 3; .375 INJECTION, POWDER, LYOPHILIZED, FOR SOLUTION INTRAVENOUS at 16:52

## 2023-06-25 RX ADMIN — OXYCODONE AND ACETAMINOPHEN 1 TABLET: 5; 325 TABLET ORAL at 21:35

## 2023-06-25 RX ADMIN — SODIUM CHLORIDE, PRESERVATIVE FREE 10 ML: 5 INJECTION INTRAVENOUS at 21:43

## 2023-06-25 RX ADMIN — METOPROLOL SUCCINATE 25 MG: 25 TABLET, FILM COATED, EXTENDED RELEASE ORAL at 08:56

## 2023-06-25 RX ADMIN — PIPERACILLIN AND TAZOBACTAM 3375 MG: 3; .375 INJECTION, POWDER, LYOPHILIZED, FOR SOLUTION INTRAVENOUS at 08:55

## 2023-06-25 RX ADMIN — CLOPIDOGREL BISULFATE 75 MG: 75 TABLET ORAL at 08:55

## 2023-06-25 RX ADMIN — VANCOMYCIN HYDROCHLORIDE 1750 MG: 5 INJECTION, POWDER, LYOPHILIZED, FOR SOLUTION INTRAVENOUS at 21:43

## 2023-06-25 RX ADMIN — PIPERACILLIN AND TAZOBACTAM 3375 MG: 3; .375 INJECTION, POWDER, LYOPHILIZED, FOR SOLUTION INTRAVENOUS at 00:32

## 2023-06-25 RX ADMIN — VANCOMYCIN HYDROCHLORIDE 1750 MG: 5 INJECTION, POWDER, LYOPHILIZED, FOR SOLUTION INTRAVENOUS at 00:33

## 2023-06-25 RX ADMIN — SODIUM CHLORIDE, PRESERVATIVE FREE 10 ML: 5 INJECTION INTRAVENOUS at 00:29

## 2023-06-25 RX ADMIN — VANCOMYCIN HYDROCHLORIDE 1750 MG: 5 INJECTION, POWDER, LYOPHILIZED, FOR SOLUTION INTRAVENOUS at 09:04

## 2023-06-25 RX ADMIN — HEPARIN SODIUM 18 UNITS/KG/HR: 10000 INJECTION, SOLUTION INTRAVENOUS at 01:50

## 2023-06-25 RX ADMIN — ATORVASTATIN CALCIUM 80 MG: 80 TABLET, FILM COATED ORAL at 00:54

## 2023-06-25 RX ADMIN — ATORVASTATIN CALCIUM 80 MG: 80 TABLET, FILM COATED ORAL at 21:36

## 2023-06-25 RX ADMIN — ASPIRIN 81 MG: 81 TABLET, CHEWABLE ORAL at 08:55

## 2023-06-25 ASSESSMENT — PAIN DESCRIPTION - ORIENTATION
ORIENTATION: RIGHT;LEFT;POSTERIOR
ORIENTATION: RIGHT

## 2023-06-25 ASSESSMENT — PAIN DESCRIPTION - FREQUENCY: FREQUENCY: INTERMITTENT

## 2023-06-25 ASSESSMENT — PAIN SCALES - GENERAL
PAINLEVEL_OUTOF10: 3
PAINLEVEL_OUTOF10: 1
PAINLEVEL_OUTOF10: 7
PAINLEVEL_OUTOF10: 8

## 2023-06-25 ASSESSMENT — PAIN DESCRIPTION - PAIN TYPE: TYPE: ACUTE PAIN

## 2023-06-25 ASSESSMENT — PAIN DESCRIPTION - ONSET: ONSET: GRADUAL

## 2023-06-25 ASSESSMENT — PAIN DESCRIPTION - LOCATION
LOCATION: FOOT;HEAD
LOCATION: FOOT;HEAD

## 2023-06-25 ASSESSMENT — PAIN DESCRIPTION - DESCRIPTORS
DESCRIPTORS: ACHING
DESCRIPTORS: ACHING;THROBBING

## 2023-06-26 VITALS
BODY MASS INDEX: 43.3 KG/M2 | HEART RATE: 78 BPM | WEIGHT: 285.72 LBS | RESPIRATION RATE: 20 BRPM | HEIGHT: 68 IN | OXYGEN SATURATION: 96 % | TEMPERATURE: 97 F | SYSTOLIC BLOOD PRESSURE: 137 MMHG | DIASTOLIC BLOOD PRESSURE: 79 MMHG

## 2023-06-26 LAB
BACTERIA BLD AEROBE CULT: NORMAL
CREAT SERPL-MCNC: 0.5 MG/DL (ref 0.4–1.2)
GFR SERPL CREATININE-BSD FRML MDRD: > 60 ML/MIN/1.73M2
GLUCOSE BLD STRIP.AUTO-MCNC: 146 MG/DL (ref 70–108)
GLUCOSE BLD STRIP.AUTO-MCNC: 174 MG/DL (ref 70–108)

## 2023-06-26 PROCEDURE — 2580000003 HC RX 258: Performed by: STUDENT IN AN ORGANIZED HEALTH CARE EDUCATION/TRAINING PROGRAM

## 2023-06-26 PROCEDURE — 6370000000 HC RX 637 (ALT 250 FOR IP): Performed by: STUDENT IN AN ORGANIZED HEALTH CARE EDUCATION/TRAINING PROGRAM

## 2023-06-26 PROCEDURE — 82565 ASSAY OF CREATININE: CPT

## 2023-06-26 PROCEDURE — 99239 HOSP IP/OBS DSCHRG MGMT >30: CPT | Performed by: INTERNAL MEDICINE

## 2023-06-26 PROCEDURE — 82948 REAGENT STRIP/BLOOD GLUCOSE: CPT

## 2023-06-26 PROCEDURE — 6370000000 HC RX 637 (ALT 250 FOR IP)

## 2023-06-26 PROCEDURE — 6360000002 HC RX W HCPCS: Performed by: STUDENT IN AN ORGANIZED HEALTH CARE EDUCATION/TRAINING PROGRAM

## 2023-06-26 PROCEDURE — 36415 COLL VENOUS BLD VENIPUNCTURE: CPT

## 2023-06-26 RX ORDER — FERROUS SULFATE 325(65) MG
325 TABLET ORAL
Qty: 30 TABLET | Refills: 3 | COMMUNITY
Start: 2023-06-26

## 2023-06-26 RX ORDER — ASPIRIN 81 MG/1
81 TABLET, CHEWABLE ORAL DAILY
Qty: 30 TABLET | Refills: 3 | Status: SHIPPED | OUTPATIENT
Start: 2023-06-26 | End: 2023-06-26 | Stop reason: HOSPADM

## 2023-06-26 RX ADMIN — VANCOMYCIN HYDROCHLORIDE 1750 MG: 5 INJECTION, POWDER, LYOPHILIZED, FOR SOLUTION INTRAVENOUS at 08:23

## 2023-06-26 RX ADMIN — METOPROLOL SUCCINATE 25 MG: 25 TABLET, FILM COATED, EXTENDED RELEASE ORAL at 08:17

## 2023-06-26 RX ADMIN — SODIUM CHLORIDE, PRESERVATIVE FREE 10 ML: 5 INJECTION INTRAVENOUS at 08:18

## 2023-06-26 RX ADMIN — CLOPIDOGREL BISULFATE 75 MG: 75 TABLET ORAL at 08:24

## 2023-06-26 RX ADMIN — ASPIRIN 81 MG: 81 TABLET, CHEWABLE ORAL at 08:24

## 2023-06-26 RX ADMIN — OXYCODONE AND ACETAMINOPHEN 1 TABLET: 5; 325 TABLET ORAL at 05:14

## 2023-06-26 RX ADMIN — PIPERACILLIN AND TAZOBACTAM 3375 MG: 3; .375 INJECTION, POWDER, LYOPHILIZED, FOR SOLUTION INTRAVENOUS at 08:21

## 2023-06-26 RX ADMIN — PIPERACILLIN AND TAZOBACTAM 3375 MG: 3; .375 INJECTION, POWDER, LYOPHILIZED, FOR SOLUTION INTRAVENOUS at 01:10

## 2023-06-26 ASSESSMENT — PAIN DESCRIPTION - ORIENTATION: ORIENTATION: RIGHT;LEFT;POSTERIOR

## 2023-06-26 ASSESSMENT — PAIN DESCRIPTION - ONSET: ONSET: GRADUAL

## 2023-06-26 ASSESSMENT — PAIN - FUNCTIONAL ASSESSMENT: PAIN_FUNCTIONAL_ASSESSMENT: ACTIVITIES ARE NOT PREVENTED

## 2023-06-26 ASSESSMENT — PAIN SCALES - GENERAL: PAINLEVEL_OUTOF10: 5

## 2023-06-26 ASSESSMENT — PAIN DESCRIPTION - DESCRIPTORS: DESCRIPTORS: ACHING

## 2023-06-26 ASSESSMENT — PAIN DESCRIPTION - FREQUENCY: FREQUENCY: INTERMITTENT

## 2023-06-26 ASSESSMENT — PAIN DESCRIPTION - LOCATION: LOCATION: FOOT;HEAD

## 2023-06-28 LAB
BACTERIA BLD AEROBE CULT: NORMAL
BACTERIA BLD AEROBE CULT: NORMAL

## 2023-06-30 LAB
BACTERIA SPEC AEROBE CULT: ABNORMAL
BACTERIA SPEC ANAEROBE CULT: ABNORMAL
GRAM STN SPEC: ABNORMAL
ORGANISM: ABNORMAL

## 2023-07-11 RX ORDER — METOPROLOL SUCCINATE 25 MG/1
37.5 TABLET, EXTENDED RELEASE ORAL DAILY
Qty: 135 TABLET | Refills: 3 | Status: SHIPPED | OUTPATIENT
Start: 2023-07-11

## 2023-07-17 NOTE — PROGRESS NOTES
Physician Progress Note      PATIENT:               Belinda Ross  CSN #:                  [id number]  :                       1970  ADMIT DATE:       2023 10:58 AM  DISCH DATE:        2023 1:30 PM  RESPONDING  PROVIDER #:        Annie Martin TEXT:    Patient admitted with left foot diabetic foot ulcer.  Podiatry consult   states, \"Upon debridement of macerated tissue, underlying wound appears mostly   granular with mild seropurulent drainage. \"  To accurately reflect the   procedure performed, please document if debridement was excisional or   nonexcisional, and the deepest layer of tissue removed as down to and   including: Thank you! Vazquez Gay, RN, BSN, RHIT, CCDS  Clinical   Options provided:  -- Nonexcisional debridement of skin  -- Excisional debridement of skin  -- Nonexcisional debridement of subcutaneous tissue  -- Excisional debridement of subcutaneous tissue  -- Nonexcisional debridement of fascia  -- Excisional debridement of fascia  -- Nonexcisional debridement of muscle  -- Excisional debridement of muscle  -- Nonexcisional debridement of bone  -- Excisional debridement of bone  -- Other - I will add my own diagnosis  -- Disagree - Not applicable / Not valid  -- Disagree - Clinically unable to determine / Unknown  -- Refer to Clinical Documentation Reviewer    PROVIDER RESPONSE TEXT:    Non-excisional debridement of skin was performed of left foot during procedure   on 23.     Query created by: Vazquez Gay on 2023 1:49 PM      Electronically signed by:  Jyothi Mooney 2023 6:43 AM

## 2023-07-25 ENCOUNTER — TELEPHONE (OUTPATIENT)
Dept: CARDIOLOGY CLINIC | Age: 53
End: 2023-07-25

## 2023-07-25 NOTE — TELEPHONE ENCOUNTER
Pt called and stated she needed dental clearance sent to [facility]. Pt stated she was going to have dental procedures, but did not have a date at this time.

## 2023-08-04 ENCOUNTER — HOSPITAL ENCOUNTER (OUTPATIENT)
Age: 53
Setting detail: SPECIMEN
Discharge: HOME | End: 2023-08-04

## 2023-08-05 LAB
MICROORGANISM SPEC CULT: NO GROWTH
SPECIMEN DESCRIPTION: NORMAL

## 2023-08-10 ENCOUNTER — OFFICE VISIT (OUTPATIENT)
Dept: CARDIOLOGY CLINIC | Age: 53
End: 2023-08-10

## 2023-08-10 VITALS
BODY MASS INDEX: 40.47 KG/M2 | HEIGHT: 68 IN | HEART RATE: 79 BPM | WEIGHT: 267 LBS | SYSTOLIC BLOOD PRESSURE: 127 MMHG | DIASTOLIC BLOOD PRESSURE: 75 MMHG

## 2023-08-10 DIAGNOSIS — I25.10 CAD S/P PERCUTANEOUS CORONARY ANGIOPLASTY: Primary | ICD-10-CM

## 2023-08-10 DIAGNOSIS — Z98.61 CAD S/P PERCUTANEOUS CORONARY ANGIOPLASTY: Primary | ICD-10-CM

## 2023-08-10 DIAGNOSIS — I10 ESSENTIAL HYPERTENSION: ICD-10-CM

## 2023-08-10 RX ORDER — DOXYCYCLINE HYCLATE 100 MG/1
100 CAPSULE ORAL 2 TIMES DAILY
COMMUNITY
Start: 2023-08-09

## 2023-08-10 NOTE — PROGRESS NOTES
[facility] PROFESSIONAL SERVICES  [facility] [address]   [address] [facility] [phone]   Dept: [phone]   Dept Fax: [phone]: [phone]      Chief Complaint   Patient presents with    Follow-Up from Hospital     Cardiologist:  Dr. Donte Matta  51 yo female presents for atypical chest pressure. Had stress test, negative. Hx of CAD, patent intervention sites. DM2, UTI, HTN, prior DVT/PE, Factor V leiden. Patient feeling well latley. No further chest pain. Had negative stress test and some other issues at that time. No swelling or weight gain lately. Actually down a little. No n/v. No f/c or dizzy/lightheaded.      General:   No fever, no chills, no weight loss, no fatigue  Pulmonary:    No dyspnea, no wheezing  Cardiac:    Denies recent chest pain   GI:     No nausea or vomiting, no abdominal pain  Neuro:     No dizziness or light headedness  Musculoskeletal:  No recent active issues  Extremities:   No edema      Past Medical History:   Diagnosis Date    Asthma     Bipolar 1 disorder ([address])     Blood circulation, collateral     CAD (coronary artery disease)     CHF (congestive heart failure) ([facility])     Curvature of spine     Cystitis 06/12/2023    Diabetes mellitus ([facility])     DVT (deep venous thrombosis) ([facility])     Factor 5 Leiden mutation, heterozygous ([facility])     GERD (gastroesophageal reflux disease)     HTN, goal below 130/80     Hx of blood clots     PE x3 and DVT x3    Hx-TIA (transient ischemic attack)     Hyperlipidemia     PE (pulmonary embolism)     RA (rheumatoid arthritis) ([facility])     Unspecified cerebral artery occlusion with cerebral infarction        Allergies   Allergen Reactions    Codeine Hives     + Nausea vomiting    Demerol      vomiting    Ultram [Tramadol Hcl] Other (See Comments)     Dizziness     Toradol [Ketorolac Tromethamine] Hallucinations     Rash, vomit       Current Outpatient Medications   Medication Sig Dispense Refill    metoprolol succinate

## 2023-08-10 NOTE — PROGRESS NOTES
Patient presents for a post-hospital follow-up. She has swelling of the lower extremities. The patient denies chest pain, shortness of breath, dizziness, and heart palpitations.

## 2023-11-19 ENCOUNTER — APPOINTMENT (OUTPATIENT)
Dept: GENERAL RADIOLOGY | Age: 53
End: 2023-11-19
Payer: COMMERCIAL

## 2023-11-19 ENCOUNTER — HOSPITAL ENCOUNTER (EMERGENCY)
Age: 53
Discharge: HOME | End: 2023-11-19
Payer: COMMERCIAL

## 2023-11-19 VITALS
HEIGHT: 68 IN | SYSTOLIC BLOOD PRESSURE: 109 MMHG | OXYGEN SATURATION: 100 % | BODY MASS INDEX: 41.98 KG/M2 | DIASTOLIC BLOOD PRESSURE: 73 MMHG | HEART RATE: 76 BPM | TEMPERATURE: 97.5 F | RESPIRATION RATE: 16 BRPM | WEIGHT: 277 LBS

## 2023-11-19 DIAGNOSIS — S22.32XA CLOSED FRACTURE OF ONE RIB OF LEFT SIDE, INITIAL ENCOUNTER: Primary | ICD-10-CM

## 2023-11-19 PROCEDURE — 71101 X-RAY EXAM UNILAT RIBS/CHEST: CPT

## 2023-11-19 PROCEDURE — 99213 OFFICE O/P EST LOW 20 MIN: CPT

## 2023-11-19 PROCEDURE — 99213 OFFICE O/P EST LOW 20 MIN: CPT | Performed by: NURSE PRACTITIONER

## 2023-11-19 ASSESSMENT — PAIN DESCRIPTION - ORIENTATION: ORIENTATION: LEFT

## 2023-11-19 ASSESSMENT — PAIN - FUNCTIONAL ASSESSMENT
PAIN_FUNCTIONAL_ASSESSMENT: 0-10
PAIN_FUNCTIONAL_ASSESSMENT: ACTIVITIES ARE NOT PREVENTED

## 2023-11-19 ASSESSMENT — PAIN DESCRIPTION - FREQUENCY: FREQUENCY: CONTINUOUS

## 2023-11-19 ASSESSMENT — ENCOUNTER SYMPTOMS
TROUBLE SWALLOWING: 0
VOMITING: 0
NAUSEA: 0
ABDOMINAL PAIN: 0
SHORTNESS OF BREATH: 0

## 2023-11-19 ASSESSMENT — PAIN SCALES - GENERAL: PAINLEVEL_OUTOF10: 6

## 2023-11-19 ASSESSMENT — PAIN DESCRIPTION - LOCATION: LOCATION: RIB CAGE

## 2023-11-19 ASSESSMENT — PAIN DESCRIPTION - PAIN TYPE: TYPE: ACUTE PAIN

## 2023-11-19 ASSESSMENT — PAIN DESCRIPTION - ONSET: ONSET: SUDDEN

## 2023-11-19 ASSESSMENT — PAIN DESCRIPTION - DESCRIPTORS: DESCRIPTORS: DISCOMFORT

## 2023-11-19 NOTE — ED TRIAGE NOTES
Pt to urgent care due to rib pain from falling down a few stairs a few days ago. Pt lost her balance and grabbed the railing and her her chest and ribs on the railing fairly hard.

## 2023-11-19 NOTE — ED PROVIDER NOTES
[facility]  Urgent Care Encounter      CHIEF COMPLAINT       Chief Complaint   Patient presents with    Rib Pain (injury)     Felisa Mitchell down stairs       Nurses Notes reviewed and I agree except as noted in the HPI. HISTORY OF PRESENT ILLNESS   Areli Camarena is a 48 y.o. female who presents for evaluation of left side rib injury. Injury occurred 3 days ago. Patient states that she tripped at home and fell. Point of impact was left side ribs. She notes pain, intermittent nature. Rates 6/10. No cough, mopped assist.  No shortness of breath. No improvement with current treatment. REVIEW OF SYSTEMS     Review of Systems   Constitutional:  Negative for fatigue and fever. HENT:  Negative for trouble swallowing. Respiratory:  Negative for shortness of breath. Cardiovascular:  Negative for chest pain and palpitations. Gastrointestinal:  Negative for abdominal pain, nausea and vomiting. Musculoskeletal:  Positive for arthralgias (Left-sided rib). Skin:  Negative for rash and wound. Neurological:  Negative for headaches. PAST MEDICAL HISTORY         Diagnosis Date    Asthma     Bipolar 1 disorder ([address])     Blood circulation, collateral     CAD (coronary artery disease)     CHF (congestive heart failure) ([facility])     Curvature of spine     Cystitis 06/12/2023    Diabetes mellitus ([facility])     DVT (deep venous thrombosis) ([facility])     Factor 5 Leiden mutation, heterozygous ([facility])     GERD (gastroesophageal reflux disease)     HTN, goal below 130/80     Hx of blood clots     PE x3 and DVT x3    Hx-TIA (transient ischemic attack)     Hyperlipidemia     PE (pulmonary embolism)     RA (rheumatoid arthritis) ([address])     Unspecified cerebral artery occlusion with cerebral infarction        SURGICAL HISTORY     Patient  has a past surgical history that includes Tubal ligation (2003); Cholecystectomy (1992); Knee arthroscopy (2007&2010); Tympanostomy tube placement;  Tonsillectomy; Cardiac

## 2023-11-20 ENCOUNTER — TELEPHONE (OUTPATIENT)
Dept: WOUND CARE | Age: 53
End: 2023-11-20

## 2023-11-20 NOTE — TELEPHONE ENCOUNTER
----- Message from [place] Langley sent at 11/20/2023 12:07 PM EST -----  [facility] [phone] MARYA LEFT A VM WANTING TO R/S HER MISSED APPT FROM TODAY

## 2023-11-21 ENCOUNTER — TELEPHONE (OUTPATIENT)
Dept: WOUND CARE | Age: 53
End: 2023-11-21

## 2023-11-21 NOTE — TELEPHONE ENCOUNTER
----- Message from Kan Best sent at 11/21/2023  7:58 AM EST -----  [id number] [phone] RETURNED A MISSED CALL ABOUT R/S APPT.  CALL AFTER 9. NEEDS 3 DAYS NOTICE TO GET A RIDE

## 2023-12-11 ENCOUNTER — HOSPITAL ENCOUNTER (OUTPATIENT)
Dept: WOUND CARE | Age: 53
Discharge: HOME | End: 2023-12-11
Payer: COMMERCIAL

## 2023-12-11 VITALS
RESPIRATION RATE: 16 BRPM | SYSTOLIC BLOOD PRESSURE: 131 MMHG | HEART RATE: 80 BPM | DIASTOLIC BLOOD PRESSURE: 62 MMHG | TEMPERATURE: 98 F | OXYGEN SATURATION: 97 %

## 2023-12-11 DIAGNOSIS — S91.302A OPEN WOUND OF LEFT FOOT, INITIAL ENCOUNTER: ICD-10-CM

## 2023-12-11 DIAGNOSIS — S91.105A OPEN WOUND OF SECOND TOE, LEFT, INITIAL ENCOUNTER: Primary | ICD-10-CM

## 2023-12-11 PROCEDURE — 87077 CULTURE AEROBIC IDENTIFY: CPT

## 2023-12-11 PROCEDURE — 17250 CHEM CAUT OF GRANLTJ TISSUE: CPT

## 2023-12-11 PROCEDURE — 11042 DBRDMT SUBQ TIS 1ST 20SQCM/<: CPT

## 2023-12-11 PROCEDURE — 87205 SMEAR GRAM STAIN: CPT

## 2023-12-11 PROCEDURE — 87147 CULTURE TYPE IMMUNOLOGIC: CPT

## 2023-12-11 PROCEDURE — 87186 SC STD MICRODIL/AGAR DIL: CPT

## 2023-12-11 PROCEDURE — 87070 CULTURE OTHR SPECIMN AEROBIC: CPT

## 2023-12-11 PROCEDURE — 6370000000 HC RX 637 (ALT 250 FOR IP): Performed by: NURSE PRACTITIONER

## 2023-12-11 RX ORDER — TRIAMCINOLONE ACETONIDE 1 MG/G
OINTMENT TOPICAL ONCE
OUTPATIENT
Start: 2023-12-11 | End: 2023-12-11

## 2023-12-11 RX ORDER — LIDOCAINE HYDROCHLORIDE 20 MG/ML
JELLY TOPICAL ONCE
Status: CANCELLED | OUTPATIENT
Start: 2023-12-11 | End: 2023-12-11

## 2023-12-11 RX ORDER — LIDOCAINE 50 MG/G
OINTMENT TOPICAL ONCE
Status: CANCELLED | OUTPATIENT
Start: 2023-12-11 | End: 2023-12-11

## 2023-12-11 RX ORDER — LIDOCAINE 40 MG/G
CREAM TOPICAL ONCE
Status: CANCELLED | OUTPATIENT
Start: 2023-12-11 | End: 2023-12-11

## 2023-12-11 RX ORDER — CLOBETASOL PROPIONATE 0.5 MG/G
OINTMENT TOPICAL ONCE
Status: CANCELLED | OUTPATIENT
Start: 2023-12-11 | End: 2023-12-11

## 2023-12-11 RX ORDER — GENTAMICIN SULFATE 1 MG/G
OINTMENT TOPICAL ONCE
OUTPATIENT
Start: 2023-12-11 | End: 2023-12-11

## 2023-12-11 RX ORDER — LIDOCAINE HYDROCHLORIDE 20 MG/ML
JELLY TOPICAL ONCE
OUTPATIENT
Start: 2023-12-11 | End: 2023-12-11

## 2023-12-11 RX ORDER — BACITRACIN ZINC AND POLYMYXIN B SULFATE 500; 1000 [USP'U]/G; [USP'U]/G
OINTMENT TOPICAL ONCE
OUTPATIENT
Start: 2023-12-11 | End: 2023-12-11

## 2023-12-11 RX ORDER — BACITRACIN ZINC AND POLYMYXIN B SULFATE 500; 1000 [USP'U]/G; [USP'U]/G
OINTMENT TOPICAL ONCE
Status: CANCELLED | OUTPATIENT
Start: 2023-12-11 | End: 2023-12-11

## 2023-12-11 RX ORDER — GENTAMICIN SULFATE 1 MG/G
OINTMENT TOPICAL ONCE
Status: CANCELLED | OUTPATIENT
Start: 2023-12-11 | End: 2023-12-11

## 2023-12-11 RX ORDER — SODIUM CHLOR/HYPOCHLOROUS ACID 0.033 %
SOLUTION, IRRIGATION IRRIGATION ONCE
Status: CANCELLED | OUTPATIENT
Start: 2023-12-11 | End: 2023-12-11

## 2023-12-11 RX ORDER — TRIAMCINOLONE ACETONIDE 1 MG/G
OINTMENT TOPICAL ONCE
Status: CANCELLED | OUTPATIENT
Start: 2023-12-11 | End: 2023-12-11

## 2023-12-11 RX ORDER — BETAMETHASONE DIPROPIONATE 0.5 MG/G
CREAM TOPICAL ONCE
Status: CANCELLED | OUTPATIENT
Start: 2023-12-11 | End: 2023-12-11

## 2023-12-11 RX ORDER — IBUPROFEN 200 MG
TABLET ORAL ONCE
OUTPATIENT
Start: 2023-12-11 | End: 2023-12-11

## 2023-12-11 RX ORDER — IBUPROFEN 200 MG
TABLET ORAL ONCE
Status: CANCELLED | OUTPATIENT
Start: 2023-12-11 | End: 2023-12-11

## 2023-12-11 RX ORDER — BETAMETHASONE DIPROPIONATE 0.5 MG/G
CREAM TOPICAL ONCE
OUTPATIENT
Start: 2023-12-11 | End: 2023-12-11

## 2023-12-11 RX ORDER — HYDROCODONE BITARTRATE AND ACETAMINOPHEN 5; 325 MG/1; MG/1
1 TABLET ORAL EVERY 4 HOURS PRN
Qty: 42 TABLET | Refills: 0 | Status: SHIPPED | OUTPATIENT
Start: 2023-12-11 | End: 2023-12-18

## 2023-12-11 RX ORDER — LIDOCAINE 50 MG/G
OINTMENT TOPICAL ONCE
OUTPATIENT
Start: 2023-12-11 | End: 2023-12-11

## 2023-12-11 RX ORDER — LIDOCAINE HYDROCHLORIDE 40 MG/ML
SOLUTION TOPICAL ONCE
OUTPATIENT
Start: 2023-12-11 | End: 2023-12-11

## 2023-12-11 RX ORDER — GINSENG 100 MG
CAPSULE ORAL ONCE
Status: CANCELLED | OUTPATIENT
Start: 2023-12-11 | End: 2023-12-11

## 2023-12-11 RX ORDER — LIDOCAINE HYDROCHLORIDE 40 MG/ML
SOLUTION TOPICAL ONCE
Status: CANCELLED | OUTPATIENT
Start: 2023-12-11 | End: 2023-12-11

## 2023-12-11 RX ORDER — CLOBETASOL PROPIONATE 0.5 MG/G
OINTMENT TOPICAL ONCE
OUTPATIENT
Start: 2023-12-11 | End: 2023-12-11

## 2023-12-11 RX ORDER — GINSENG 100 MG
CAPSULE ORAL ONCE
OUTPATIENT
Start: 2023-12-11 | End: 2023-12-11

## 2023-12-11 RX ORDER — SODIUM CHLOR/HYPOCHLOROUS ACID 0.033 %
SOLUTION, IRRIGATION IRRIGATION ONCE
OUTPATIENT
Start: 2023-12-11 | End: 2023-12-11

## 2023-12-11 RX ORDER — LIDOCAINE 40 MG/G
CREAM TOPICAL ONCE
OUTPATIENT
Start: 2023-12-11 | End: 2023-12-11

## 2023-12-11 RX ADMIN — SILVER NITRATE APPLICATORS 1 EACH: 25; 75 STICK TOPICAL at 11:21

## 2023-12-11 ASSESSMENT — PAIN DESCRIPTION - DESCRIPTORS: DESCRIPTORS: DISCOMFORT

## 2023-12-11 ASSESSMENT — PAIN DESCRIPTION - LOCATION: LOCATION: FOOT

## 2023-12-11 ASSESSMENT — PAIN DESCRIPTION - ORIENTATION: ORIENTATION: LEFT

## 2023-12-11 ASSESSMENT — PAIN SCALES - GENERAL: PAINLEVEL_OUTOF10: 7

## 2023-12-11 ASSESSMENT — PAIN - FUNCTIONAL ASSESSMENT: PAIN_FUNCTIONAL_ASSESSMENT: ACTIVITIES ARE NOT PREVENTED

## 2023-12-11 NOTE — PATIENT INSTRUCTIONS
Visit Discharge/Physician Orders:  - debridement done today  - Silver nitrate applied today there will be some gray drainage this is normal  - culture taken today we will call if you need an antibiotic  - DH walker shoe given today wear every time you are up  -pain medication sent in today    Home health: [facility] we will send referral    Wound Location: left foot    Dressing orders:     1) Gather wound care supplies and arrange on clean table. 2) Wash your hands with soap and water or use alcohol based hand  for 20 seconds (sing \"Happy Birthday\" twice). 3) Cleanse wounds with normal saline or wound cleanser and gauze. Pat dry with clean gauze. 4) Apply iodoflex to the wound (remove one side of the mesh and apply that side down) cover with ABD pad, roll gauze and coban. Change 3 times per week    Keep all dressings clean & dry. Follow up visit: December 29th at 9:30am     Supplies:    Duration of dressings: 30 days    We have sent your supply order to the following company:  [employer]  If you don't receive the items you were expecting or don't know what the items are that you received, call the company where the order was sent. If you are unable to obtain wound supplies, continue to use the supplies you have available until you are able to reach us. It is most important to keep the wound covered at all times. It is YOUR responsibility to make sure that supplies are re-ordered before you run out. Re-order telephone numbers are included in each package. Keep next scheduled appointment. Please give 24 hour notice if unable to keep appointment. [phone]    If you experience any of the following, please call the Wound Care Service during business hours: Monday through Friday 8:00 am - 4:30 pm  ([phone]).    *Increase in pain   *Temperature over 101   *Increase in drainage from your wound or a foul odor   *Uncontrolled swelling   *Need for compression bandage changes due to slippage,

## 2023-12-11 NOTE — CONSULTS
Essential hypertension I10    Positive blood culture R78.81         Procedure Note  Indications:  Based on my examination of this patient's wound(s)/ulcer(s) today, debridement is required to promote healing and evaluate the wound base. Performed by: SHANNON Ramos CNP    Consent obtained:  Yes  Time out taken:  Yes    Debridement: Excisional Debridement - 96380    Using curette and tissue nippers the wound(s)/ulcer(s) was/were debrided down through and including the removal of epidermis, dermis, and subcutaneous tissue. Devitalized Tissue Debrided:  fibrin, biofilm, slough, and callus    Pre Debridement Measurements:  Are located in the [place]  Documentation Flow Sheet    Wound/Ulcer #: 1    Post Debridement Measurements:  Wound/Ulcer Descriptions are Pre Debridement except measurements:    Wound 12/11/23 Foot Left;Plantar (Active)   Wound Image   12/11/23 1055   Wound Etiology Diabetic 12/11/23 1055   Dressing Status Intact; Old drainage noted 12/11/23 1055   Wound Cleansed Cleansed with saline 12/11/23 1055   Wound Length (cm) 0.9 cm 12/11/23 1055   Wound Width (cm) 1.3 cm 12/11/23 1055   Wound Depth (cm) 0.6 cm 12/11/23 1055   Wound Surface Area (cm^2) 1.17 cm^2 12/11/23 1055   Wound Volume (cm^3) 0.702 cm^3 12/11/23 1055   Undermining Starts ___ O'Clock 12 12/11/23 1055   Undermining Ends___ O'Clock 12 12/11/23 1055   Undermining Maxium Distance (cm) .7 12/11/23 1055   Wound Assessment Granulation tissue;Slough 12/11/23 1055   Drainage Amount Moderate (25-50%) 12/11/23 1055   Drainage Description Serosanguinous 12/11/23 1055   Odor None 12/11/23 1055   Kathleen-wound Assessment Intact; Hyperkeratosis (callous); Dry/flaky 12/11/23 1055   Margins Unattached edges 12/11/23 1055   Wound Thickness Description not for Pressure Injury Full thickness 12/11/23 1055   Number of days: 0     Percent of Wound(s)/Ulcer(s) Debrided: 100%    Total Surface Area Debrided:  1.17 sq cm     Diabetic/Pressure/Non

## 2023-12-11 NOTE — PLAN OF CARE
Problem: Wound:  Goal: Will show signs of wound healing; wound closure and no evidence of infection  Description: Will show signs of wound healing; wound closure and no evidence of infection  Outcome: Not Progressing   Pt. Seen today for left foot wound see AVS for new orders. Follow up in 2-3 weeks. Care plan reviewed with patient. Patient verbalize understanding of the plan of care and contribute to goal setting.

## 2023-12-13 LAB
BACTERIA SPEC AEROBE CULT: ABNORMAL
GRAM STN SPEC: ABNORMAL
ORGANISM: ABNORMAL
ORGANISM: ABNORMAL

## 2023-12-14 ENCOUNTER — TELEPHONE (OUTPATIENT)
Dept: WOUND CARE | Age: 53
End: 2023-12-14

## 2023-12-14 RX ORDER — AMOXICILLIN AND CLAVULANATE POTASSIUM 875; 125 MG/1; MG/1
1 TABLET, FILM COATED ORAL 2 TIMES DAILY
Qty: 28 TABLET | Refills: 0 | Status: SHIPPED | OUTPATIENT
Start: 2023-12-14 | End: 2023-12-28

## 2023-12-14 NOTE — TELEPHONE ENCOUNTER
Patient made aware that Ramu Mendoza CNP called in Augmentin to her antibiotic. All questions answered.

## 2023-12-29 ENCOUNTER — TELEPHONE (OUTPATIENT)
Dept: WOUND CARE | Age: 53
End: 2023-12-29

## 2023-12-29 NOTE — TELEPHONE ENCOUNTER
[facility] [employer] notified that patient is requesting pain medication. Pain medication sen to patients pharmacy.

## 2023-12-29 NOTE — TELEPHONE ENCOUNTER
----- Message from Sandhya Dawn sent at 12/29/2023 10:20 AM EST -----   [phone] MARYA MISUNDERSTOOD, THOUGHT HER APPT WAS 10:30, IT WAS AT 9:30, SO HAD TO R/S FOR 1/8. IS ASKING FOR PAIN MEDICATION.

## 2024-01-08 ENCOUNTER — HOSPITAL ENCOUNTER (OUTPATIENT)
Dept: WOUND CARE | Age: 54
Discharge: HOME | End: 2024-01-08
Payer: COMMERCIAL

## 2024-01-08 VITALS
TEMPERATURE: 97.6 F | DIASTOLIC BLOOD PRESSURE: 70 MMHG | OXYGEN SATURATION: 96 % | HEART RATE: 85 BPM | SYSTOLIC BLOOD PRESSURE: 138 MMHG | RESPIRATION RATE: 16 BRPM

## 2024-01-08 DIAGNOSIS — S91.105A OPEN WOUND OF SECOND TOE, LEFT, INITIAL ENCOUNTER: ICD-10-CM

## 2024-01-08 DIAGNOSIS — S91.302D OPEN WOUND OF LEFT FOOT, SUBSEQUENT ENCOUNTER: Primary | ICD-10-CM

## 2024-01-08 DIAGNOSIS — S91.302A OPEN WOUND OF LEFT FOOT, INITIAL ENCOUNTER: ICD-10-CM

## 2024-01-08 PROCEDURE — 6370000000 HC RX 637 (ALT 250 FOR IP): Performed by: NURSE PRACTITIONER

## 2024-01-08 PROCEDURE — 87070 CULTURE OTHR SPECIMN AEROBIC: CPT

## 2024-01-08 PROCEDURE — 11042 DBRDMT SUBQ TIS 1ST 20SQCM/<: CPT

## 2024-01-08 PROCEDURE — 87077 CULTURE AEROBIC IDENTIFY: CPT

## 2024-01-08 PROCEDURE — 87186 SC STD MICRODIL/AGAR DIL: CPT

## 2024-01-08 PROCEDURE — 87147 CULTURE TYPE IMMUNOLOGIC: CPT

## 2024-01-08 PROCEDURE — 17250 CHEM CAUT OF GRANLTJ TISSUE: CPT

## 2024-01-08 PROCEDURE — 87205 SMEAR GRAM STAIN: CPT

## 2024-01-08 RX ORDER — IBUPROFEN 200 MG
TABLET ORAL ONCE
OUTPATIENT
Start: 2024-01-08 | End: 2024-01-08

## 2024-01-08 RX ORDER — BACITRACIN ZINC AND POLYMYXIN B SULFATE 500; 1000 [USP'U]/G; [USP'U]/G
OINTMENT TOPICAL ONCE
OUTPATIENT
Start: 2024-01-08 | End: 2024-01-08

## 2024-01-08 RX ORDER — LIDOCAINE HYDROCHLORIDE 40 MG/ML
SOLUTION TOPICAL ONCE
OUTPATIENT
Start: 2024-01-08 | End: 2024-01-08

## 2024-01-08 RX ORDER — CLOBETASOL PROPIONATE 0.5 MG/G
OINTMENT TOPICAL ONCE
OUTPATIENT
Start: 2024-01-08 | End: 2024-01-08

## 2024-01-08 RX ORDER — BETAMETHASONE DIPROPIONATE 0.5 MG/G
CREAM TOPICAL ONCE
OUTPATIENT
Start: 2024-01-08 | End: 2024-01-08

## 2024-01-08 RX ORDER — HYDROCODONE BITARTRATE AND ACETAMINOPHEN 5; 325 MG/1; MG/1
1 TABLET ORAL EVERY 4 HOURS PRN
Qty: 42 TABLET | Refills: 0 | Status: SHIPPED | OUTPATIENT
Start: 2024-01-08 | End: 2024-01-15

## 2024-01-08 RX ORDER — LIDOCAINE HYDROCHLORIDE 20 MG/ML
JELLY TOPICAL ONCE
OUTPATIENT
Start: 2024-01-08 | End: 2024-01-08

## 2024-01-08 RX ORDER — GENTAMICIN SULFATE 1 MG/G
OINTMENT TOPICAL ONCE
OUTPATIENT
Start: 2024-01-08 | End: 2024-01-08

## 2024-01-08 RX ORDER — DOXYCYCLINE HYCLATE 100 MG
100 TABLET ORAL 2 TIMES DAILY
Qty: 28 TABLET | Refills: 0 | Status: SHIPPED | OUTPATIENT
Start: 2024-01-08 | End: 2024-01-22

## 2024-01-08 RX ORDER — SODIUM CHLOR/HYPOCHLOROUS ACID 0.033 %
SOLUTION, IRRIGATION IRRIGATION ONCE
OUTPATIENT
Start: 2024-01-08 | End: 2024-01-08

## 2024-01-08 RX ORDER — LIDOCAINE 50 MG/G
OINTMENT TOPICAL ONCE
OUTPATIENT
Start: 2024-01-08 | End: 2024-01-08

## 2024-01-08 RX ORDER — GINSENG 100 MG
CAPSULE ORAL ONCE
OUTPATIENT
Start: 2024-01-08 | End: 2024-01-08

## 2024-01-08 RX ORDER — TRIAMCINOLONE ACETONIDE 1 MG/G
OINTMENT TOPICAL ONCE
OUTPATIENT
Start: 2024-01-08 | End: 2024-01-08

## 2024-01-08 RX ORDER — LIDOCAINE 40 MG/G
CREAM TOPICAL ONCE
OUTPATIENT
Start: 2024-01-08 | End: 2024-01-08

## 2024-01-08 RX ADMIN — SILVER NITRATE APPLICATORS 1 EACH: 25; 75 STICK TOPICAL at 10:43

## 2024-01-08 ASSESSMENT — PAIN DESCRIPTION - PAIN TYPE: TYPE: ACUTE PAIN

## 2024-01-08 ASSESSMENT — PAIN SCALES - GENERAL: PAINLEVEL_OUTOF10: 8

## 2024-01-08 ASSESSMENT — PAIN DESCRIPTION - DESCRIPTORS: DESCRIPTORS: ACHING

## 2024-01-08 ASSESSMENT — PAIN DESCRIPTION - ORIENTATION: ORIENTATION: LEFT

## 2024-01-08 ASSESSMENT — PAIN DESCRIPTION - LOCATION: LOCATION: LEG

## 2024-01-08 NOTE — PROGRESS NOTES
[facility]  [address]  [address]   [place], [place] [address]  Telephone: [phone]     FAX [phone]    Home health agencies: [facility] Phone # [phone], Fax # [phone]      Patient Instructions   Visit Discharge/Physician Orders:  - Continue DH walker shoe on left foot.  - Wound culture taken today.   - Wound debrided today and silver nitrate applied.  - Prescriptions for antibiotic and pain medication will be sent into pharmacy, take as prescribed.     Home health: [facility]    Wound Location: Left foot    Dressing orders:     1) Gather wound care supplies and arrange on clean table.     2) Wash your hands with soap and water or use alcohol based hand  for 20 seconds (sing \"Happy Birthday\" twice).    3) Cleanse wounds with normal saline or wound cleanser and gauze. Pat dry with clean gauze.    4) Left foot- Apply iodoflex to the wound (remove one side of the mesh and apply that side down) cover with ABD pad, wrap with roll gauze and coban. Change 3 times per week.    Keep all dressings clean & dry.    Follow up visit: 2 weeks -  Monday January 22nd at 10:00 am    Supplies:    Duration of dressings: 30 days    We have sent your supply order to the following company:  [employer] to order supplies  If you don't receive the items you were expecting or don't know what the items are that you received, call the company where the order was sent.   If you are unable to obtain wound supplies, continue to use the supplies you have available until you are able to reach us. It is most important to keep the wound covered at all times.  It is YOUR responsibility to make sure that supplies are re-ordered before you run out. Re-order telephone numbers are included in each package.     Keep next scheduled appointment. Please give 24 hour notice if unable to keep appointment. [phone]    If you experience any of the following, please call the 
Yes  Time out taken:  Yes    Debridement: Excisional Debridement - 41025    Using curette and tissue nippers the wound(s)/ulcer(s) was/were debrided down through and including the removal of epidermis, dermis, and subcutaneous tissue.        Devitalized Tissue Debrided:  fibrin, biofilm, slough, and callus    Pre Debridement Measurements:  Are located in the Wound/Ulcer Documentation Flow Sheet    Wound/Ulcer #: 1    Post Debridement Measurements:  Wound/Ulcer Descriptions are Pre Debridement except measurements:    Wound 12/11/23 Foot Left;Plantar (Active)   Wound Image   01/08/24 1045   Wound Etiology Diabetic 01/08/24 1029   Dressing Status Intact;New dressing applied 01/08/24 1029   Wound Cleansed Cleansed with saline 01/08/24 1029   Dressing/Treatment Other (comment);Gauze dressing/dressing sponge;Collagen with Ag;ABD;Roll gauze;Coban/self-adherent bandages 01/08/24 1045   Offloading for Diabetic Foot Ulcers Offloading ordered;Other (comment) 01/08/24 1029   Wound Length (cm) 0.6 cm 01/08/24 1029   Wound Width (cm) 0.9 cm 01/08/24 1029   Wound Depth (cm) 0.4 cm 01/08/24 1029   Wound Surface Area (cm^2) 0.54 cm^2 01/08/24 1029   Change in Wound Size % (l*w) 53.85 01/08/24 1029   Wound Volume (cm^3) 0.216 cm^3 01/08/24 1029   Wound Healing % 69 01/08/24 1029   Post-Procedure Length (cm) 0.9 cm 01/08/24 1045   Post-Procedure Width (cm) 1.1 cm 01/08/24 1045   Post-Procedure Depth (cm) 0.4 cm 01/08/24 1045   Post-Procedure Surface Area (cm^2) 0.99 cm^2 01/08/24 1045   Post-Procedure Volume (cm^3) 0.396 cm^3 01/08/24 1045   Undermining Starts ___ O'Clock 5 01/08/24 1029   Undermining Ends___ O'Clock 8 01/08/24 1029   Undermining Maxium Distance (cm) 0.4 01/08/24 1029   Wound Assessment Granulation tissue;Pale granulation tissue 01/08/24 1029   Drainage Amount Moderate (25-50%) 01/08/24 1029   Drainage Description Serosanguinous 01/08/24 1029   Odor None 01/08/24 1029   Kathleen-wound Assessment

## 2024-01-08 NOTE — PATIENT INSTRUCTIONS
changes due to slippage, breakthrough drainage    If you need medical attention outside of business hours, please contact your Primary Care Doctor or go to the nearest emergency room.

## 2024-01-08 NOTE — PLAN OF CARE
Problem: Wound:  Goal: Will show signs of wound healing; wound closure and no evidence of infection  Description: Will show signs of wound healing; wound closure and no evidence of infection  Outcome: Progressing   Patient presents to wound clinic for follow up of left foot wound. Discharge instructions/dressing orders per AVS. Wound culture taken today. Follow up scheduled in 2 weeks.     Care plan reviewed with patient.  Patient verbalize understanding of the plan of care and contribute to goal setting.

## 2024-01-10 LAB
BACTERIA SPEC AEROBE CULT: ABNORMAL
GRAM STN SPEC: ABNORMAL
ORGANISM: ABNORMAL
ORGANISM: ABNORMAL

## 2024-01-11 RX ORDER — CLOPIDOGREL BISULFATE 75 MG/1
TABLET ORAL
Qty: 60 TABLET | Refills: 0 | Status: SHIPPED | OUTPATIENT
Start: 2024-01-11

## 2024-01-17 ENCOUNTER — TELEPHONE (OUTPATIENT)
Dept: WOUND CARE | Age: 54
End: 2024-01-17

## 2024-01-17 NOTE — TELEPHONE ENCOUNTER
----- Message from Zena Stack sent at 1/16/2024  1:46 PM EST -----   [phone] MARYA REQUESTS A CALL BACK ABOUT WHAT INFECTION IS THAT SHE NEEDS ANOTHER ANTIBIOTIC, AND WHAT IS THE PLAN?

## 2024-01-17 NOTE — TELEPHONE ENCOUNTER
Spoke with patient and informed her if her foot becomes worse or she develops and fever or chills. We recommend she go to the ER. All other questions answered.

## 2024-01-22 ENCOUNTER — HOSPITAL ENCOUNTER (OUTPATIENT)
Dept: WOUND CARE | Age: 54
Discharge: HOME | End: 2024-01-22
Attending: NURSE PRACTITIONER
Payer: COMMERCIAL

## 2024-01-22 VITALS
HEART RATE: 81 BPM | TEMPERATURE: 97.8 F | OXYGEN SATURATION: 100 % | SYSTOLIC BLOOD PRESSURE: 131 MMHG | RESPIRATION RATE: 18 BRPM | DIASTOLIC BLOOD PRESSURE: 67 MMHG

## 2024-01-22 DIAGNOSIS — S91.302A OPEN WOUND OF LEFT FOOT, INITIAL ENCOUNTER: ICD-10-CM

## 2024-01-22 DIAGNOSIS — S91.105A OPEN WOUND OF SECOND TOE, LEFT, INITIAL ENCOUNTER: ICD-10-CM

## 2024-01-22 PROCEDURE — 17250 CHEM CAUT OF GRANLTJ TISSUE: CPT

## 2024-01-22 PROCEDURE — 11042 DBRDMT SUBQ TIS 1ST 20SQCM/<: CPT

## 2024-01-22 PROCEDURE — 6370000000 HC RX 637 (ALT 250 FOR IP): Performed by: NURSE PRACTITIONER

## 2024-01-22 RX ORDER — CLOBETASOL PROPIONATE 0.5 MG/G
OINTMENT TOPICAL ONCE
OUTPATIENT
Start: 2024-01-22 | End: 2024-01-22

## 2024-01-22 RX ORDER — BETAMETHASONE DIPROPIONATE 0.5 MG/G
CREAM TOPICAL ONCE
OUTPATIENT
Start: 2024-01-22 | End: 2024-01-22

## 2024-01-22 RX ORDER — LIDOCAINE HYDROCHLORIDE 40 MG/ML
SOLUTION TOPICAL ONCE
OUTPATIENT
Start: 2024-01-22 | End: 2024-01-22

## 2024-01-22 RX ORDER — SODIUM CHLOR/HYPOCHLOROUS ACID 0.033 %
SOLUTION, IRRIGATION IRRIGATION ONCE
OUTPATIENT
Start: 2024-01-22 | End: 2024-01-22

## 2024-01-22 RX ORDER — LIDOCAINE HYDROCHLORIDE 20 MG/ML
JELLY TOPICAL ONCE
OUTPATIENT
Start: 2024-01-22 | End: 2024-01-22

## 2024-01-22 RX ORDER — IBUPROFEN 200 MG
TABLET ORAL ONCE
OUTPATIENT
Start: 2024-01-22 | End: 2024-01-22

## 2024-01-22 RX ORDER — GINSENG 100 MG
CAPSULE ORAL ONCE
OUTPATIENT
Start: 2024-01-22 | End: 2024-01-22

## 2024-01-22 RX ORDER — LIDOCAINE 40 MG/G
CREAM TOPICAL ONCE
OUTPATIENT
Start: 2024-01-22 | End: 2024-01-22

## 2024-01-22 RX ORDER — GENTAMICIN SULFATE 1 MG/G
OINTMENT TOPICAL ONCE
OUTPATIENT
Start: 2024-01-22 | End: 2024-01-22

## 2024-01-22 RX ORDER — TRIAMCINOLONE ACETONIDE 1 MG/G
OINTMENT TOPICAL ONCE
OUTPATIENT
Start: 2024-01-22 | End: 2024-01-22

## 2024-01-22 RX ORDER — BACITRACIN ZINC AND POLYMYXIN B SULFATE 500; 1000 [USP'U]/G; [USP'U]/G
OINTMENT TOPICAL ONCE
OUTPATIENT
Start: 2024-01-22 | End: 2024-01-22

## 2024-01-22 RX ORDER — HYDROCODONE BITARTRATE AND ACETAMINOPHEN 5; 325 MG/1; MG/1
1 TABLET ORAL EVERY 4 HOURS PRN
Qty: 42 TABLET | Refills: 0 | Status: SHIPPED | OUTPATIENT
Start: 2024-01-22 | End: 2024-01-29

## 2024-01-22 RX ORDER — LIDOCAINE 50 MG/G
OINTMENT TOPICAL ONCE
OUTPATIENT
Start: 2024-01-22 | End: 2024-01-22

## 2024-01-22 RX ADMIN — SILVER NITRATE APPLICATORS 1 EACH: 25; 75 STICK TOPICAL at 10:31

## 2024-01-22 ASSESSMENT — PAIN SCALES - GENERAL: PAINLEVEL_OUTOF10: 9

## 2024-01-22 ASSESSMENT — PAIN DESCRIPTION - LOCATION: LOCATION: FOOT

## 2024-01-22 ASSESSMENT — PAIN DESCRIPTION - ORIENTATION: ORIENTATION: LEFT

## 2024-01-22 NOTE — PLAN OF CARE
Problem: Wound:  Goal: Will show signs of wound healing; wound closure and no evidence of infection  Description: Will show signs of wound healing; wound closure and no evidence of infection  Outcome: Progressing   Seen today for left foot wound. See AVS for discharge   Care plan reviewed with patient and son.  Patient and son verbalize understanding of the plan of care and contribute to goal setting.

## 2024-01-22 NOTE — PATIENT INSTRUCTIONS
Visit Discharge/Physician Orders:  - Continue DH walker shoe on left foot.  - finish antibiotic   - Wound debrided today and silver nitrate applied(could see some grey drainage on your first dressing change)      [facility]    Wound Location: Left foot    Dressing orders:     1) Gather wound care supplies and arrange on clean table.     2) Wash your hands with soap and water or use alcohol based hand  for 20 seconds (sing \"Happy Birthday\" twice).    3) Cleanse wounds with normal saline or wound cleanser and gauze. Pat dry with clean gauze.    4) Left foot- Apply iodoflex to the wound (remove one side of the mesh and apply that side down) cover with ABD pad, wrap with roll gauze and coban. Change Monday, Wednesday and Friday.    Keep all dressings clean & dry.    Follow up visit: 2 weeks -  Monday February 5 at 10:15 am     Supplies:    Duration of dressings: 30 days    We have sent your supply order to the following company:  [facility] to order supplies  If you don't receive the items you were expecting or don't know what the items are that you received, call the company where the order was sent.   If you are unable to obtain wound supplies, continue to use the supplies you have available until you are able to reach us. It is most important to keep the wound covered at all times.  It is YOUR responsibility to make sure that supplies are re-ordered before you run out. Re-order telephone numbers are included in each package.     Keep next scheduled appointment. Please give 24 hour notice if unable to keep appointment. [phone]    If you experience any of the following, please call the Wound Care Service during business hours: Monday through Friday 8:00 am - 4:30 pm  ([phone]).   *Increase in pain   *Temperature over 101   *Increase in drainage from your wound or a foul odor   *Uncontrolled swelling   *Need for compression bandage changes due to slippage, breakthrough

## 2024-01-22 NOTE — PROGRESS NOTES
[facility]         Consult and Procedure Note      Charla Stapleton  MEDICAL RECORD NUMBER:  [id number]  AGE: 53 y.o.   GENDER: female  : 1970  EPISODE DATE:  2024    Subjective:     Chief Complaint   Patient presents with    Wound Check     Left foot          HISTORY of PRESENT ILLNESS HPI     Charla Stapleton is a 53 y.o. female who is well-known to me through our outpatient [facility] clinic presents for ongoing wound to the plantar left forefoot x 3 months.  Upon assessment today wound is fiber granular nature.  This is a stage III pressure ulceration.  Patient is a poorly controlled type II diabetic who also suffers from severe obesity.  Patient presents to the office today wearing tennis shoes and is not do anything from an offloading standpoint for this wound even though she does have a fracture boot at home.  Patient states she does not like wearing her fracture because it throws off her back.  Patient was provided with an offloading DH walker shoe today.  Left plantar foot wound was excisionally debrided in office.  We did get an updated culture today.  Patient not currently on any antibiotics.  After debridement and culture was taken wound was treated with silver nitrate.  Wound was then dressed with Iodoflex, 4 x 4 gauze pad, ABD pad, Kerlix, and Coban.  Where can get patient set up with home health moving forward for dressing changes every Monday, Wednesday, and Friday.  We did discuss about proper offloading moving forward.  I did refill [place] for patient today that she takes as needed for pain.  No antibiotics were prescribed today.  Patient will follow back up with us in 2 weeks for reevaluation.    24  Patient is a pleasant 53-year-old female following up from an ongoing wound to the plantar aspect of the left foot.  This is a stage III pressure ulceration.  Patient is a poorly controlled type II diabetic who also suffers from severe obesity.  Patient was previously on 
01/22/24 1017   Kathleen-wound Assessment Dry/flaky;Hyperkeratosis (callous) 01/22/24 1017   Margins Attached edges;Unattached edges 01/22/24 1017   Wound Thickness Description not for Pressure Injury Full thickness 01/22/24 1017   Number of days: 42          Patient seen and treated on 1/22/2024    By PROVIDER'S NAME: Slava Perry CNP  NPI: [id number]

## 2024-01-31 ENCOUNTER — TELEPHONE (OUTPATIENT)
Dept: WOUND CARE | Age: 54
End: 2024-01-31

## 2024-01-31 NOTE — TELEPHONE ENCOUNTER
Call received from Janet from [facility] with update on patients wound. She states patient is having a lot of pain and burning from her wound. No increased redness or drainage. No fever or chills. Patient states the pain is not necessarily worse than usual its just a lot all the time. Patient does have prescribed pain medication she is taking as directed. She is finished with her antibiotics as well. Janet questioning if antibiotics should be extended until she is seen on Monday. Call placed to Slava Perry CNP who is seeing patient in wound clinic. He would like patient off her antibiotics for a few days prior to next appointment on Monday so that he can re culture wound if indicated. Have patient monitor wound closely if she has any new immediate concerns she will notify us. Patient updated on plan and verbalizes understanding.

## 2024-02-05 ENCOUNTER — HOSPITAL ENCOUNTER (OUTPATIENT)
Dept: WOUND CARE | Age: 54
Discharge: HOME | End: 2024-02-05
Attending: NURSE PRACTITIONER
Payer: COMMERCIAL

## 2024-02-05 VITALS
RESPIRATION RATE: 16 BRPM | OXYGEN SATURATION: 99 % | TEMPERATURE: 96.7 F | HEART RATE: 79 BPM | DIASTOLIC BLOOD PRESSURE: 64 MMHG | SYSTOLIC BLOOD PRESSURE: 119 MMHG

## 2024-02-05 DIAGNOSIS — S91.105A OPEN WOUND OF SECOND TOE, LEFT, INITIAL ENCOUNTER: ICD-10-CM

## 2024-02-05 DIAGNOSIS — S91.302A OPEN WOUND OF LEFT FOOT, INITIAL ENCOUNTER: Primary | ICD-10-CM

## 2024-02-05 PROCEDURE — 87070 CULTURE OTHR SPECIMN AEROBIC: CPT

## 2024-02-05 PROCEDURE — 87077 CULTURE AEROBIC IDENTIFY: CPT

## 2024-02-05 PROCEDURE — 87205 SMEAR GRAM STAIN: CPT

## 2024-02-05 PROCEDURE — 11042 DBRDMT SUBQ TIS 1ST 20SQCM/<: CPT

## 2024-02-05 PROCEDURE — 87186 SC STD MICRODIL/AGAR DIL: CPT

## 2024-02-05 RX ORDER — GINSENG 100 MG
CAPSULE ORAL ONCE
OUTPATIENT
Start: 2024-02-05 | End: 2024-02-05

## 2024-02-05 RX ORDER — IBUPROFEN 200 MG
TABLET ORAL ONCE
OUTPATIENT
Start: 2024-02-05 | End: 2024-02-05

## 2024-02-05 RX ORDER — BETAMETHASONE DIPROPIONATE 0.5 MG/G
CREAM TOPICAL ONCE
OUTPATIENT
Start: 2024-02-05 | End: 2024-02-05

## 2024-02-05 RX ORDER — HYDROCODONE BITARTRATE AND ACETAMINOPHEN 5; 325 MG/1; MG/1
1 TABLET ORAL EVERY 4 HOURS PRN
Qty: 42 TABLET | Refills: 0 | Status: SHIPPED | OUTPATIENT
Start: 2024-02-05 | End: 2024-02-12

## 2024-02-05 RX ORDER — GENTAMICIN SULFATE 1 MG/G
OINTMENT TOPICAL ONCE
OUTPATIENT
Start: 2024-02-05 | End: 2024-02-05

## 2024-02-05 RX ORDER — LIDOCAINE 50 MG/G
OINTMENT TOPICAL ONCE
OUTPATIENT
Start: 2024-02-05 | End: 2024-02-05

## 2024-02-05 RX ORDER — LIDOCAINE HYDROCHLORIDE 20 MG/ML
JELLY TOPICAL ONCE
OUTPATIENT
Start: 2024-02-05 | End: 2024-02-05

## 2024-02-05 RX ORDER — CLOBETASOL PROPIONATE 0.5 MG/G
OINTMENT TOPICAL ONCE
OUTPATIENT
Start: 2024-02-05 | End: 2024-02-05

## 2024-02-05 RX ORDER — LIDOCAINE 40 MG/G
CREAM TOPICAL ONCE
OUTPATIENT
Start: 2024-02-05 | End: 2024-02-05

## 2024-02-05 RX ORDER — SODIUM CHLOR/HYPOCHLOROUS ACID 0.033 %
SOLUTION, IRRIGATION IRRIGATION ONCE
OUTPATIENT
Start: 2024-02-05 | End: 2024-02-05

## 2024-02-05 RX ORDER — LIDOCAINE HYDROCHLORIDE 40 MG/ML
SOLUTION TOPICAL ONCE
OUTPATIENT
Start: 2024-02-05 | End: 2024-02-05

## 2024-02-05 RX ORDER — TRIAMCINOLONE ACETONIDE 1 MG/G
OINTMENT TOPICAL ONCE
OUTPATIENT
Start: 2024-02-05 | End: 2024-02-05

## 2024-02-05 RX ORDER — BACITRACIN ZINC AND POLYMYXIN B SULFATE 500; 1000 [USP'U]/G; [USP'U]/G
OINTMENT TOPICAL ONCE
OUTPATIENT
Start: 2024-02-05 | End: 2024-02-05

## 2024-02-05 ASSESSMENT — PAIN DESCRIPTION - ORIENTATION: ORIENTATION: LEFT

## 2024-02-05 ASSESSMENT — PAIN DESCRIPTION - LOCATION: LOCATION: FOOT

## 2024-02-05 ASSESSMENT — PAIN SCALES - GENERAL: PAINLEVEL_OUTOF10: 8

## 2024-02-05 ASSESSMENT — PAIN DESCRIPTION - DESCRIPTORS: DESCRIPTORS: BURNING;ACHING

## 2024-02-05 NOTE — PROGRESS NOTES
[facility]  [address]  [address]   [place], [place] [address]  Telephone: [phone]     FAX [phone]    Home health agencies: [facility] Phone # [phone], Fax # [phone]    Patient Instructions   Visit Discharge/Physician Orders:  - Continue DH walker shoe on left foot.  - Wound debrided today and silver nitrate applied.  - Wound culture taken today. We will contact you if an antibiotic is needed.  - Prescription for pain medication sent into pharmacy, take as directed as needed for pain.     Home health: [facility]    Wound Location: Left foot    Dressing orders:     1) Gather wound care supplies and arrange on clean table.     2) Wash your hands with soap and water or use alcohol based hand  for 20 seconds (sing \"Happy Birthday\" twice).    3) Cleanse wounds with normal saline or wound cleanser and gauze. Pat dry with clean gauze.    4) Left foot- Apply collagen to wound and moisten with saline. Cover with alginate. Cover with ABD pad, wrap with roll gauze and coban. Change 3 times weekly on Monday's, Wednesday's and Friday's.    Keep all dressings clean & dry.    Follow up visit: 2 weeks -  Monday February 19th at 10:30 am     Supplies:    Duration of dressings: 30 days    We have sent your supply order to the following company:  [employer] [facility] to order supplies  If you don't receive the items you were expecting or don't know what the items are that you received, call the company where the order was sent.   If you are unable to obtain wound supplies, continue to use the supplies you have available until you are able to reach us. It is most important to keep the wound covered at all times.  It is YOUR responsibility to make sure that supplies are re-ordered before you run out. Re-order telephone numbers are included in each package.     Keep next scheduled appointment. Please give 24 hour notice if unable to keep appointment. 
exposed ([facility]) E11.621, L97.502    Cellulitis of foot, right L03.115    Chronic anticoagulation Z79.01    History of DVT (deep vein thrombosis) Z86.718    Factor V Leiden mutation ([facility]) D68.51    History of CVA (cerebrovascular accident) Z86.73    Noncompliance Z91.199    Coronary artery disease involving native coronary artery of native heart without angina pectoris I25.10    CAD S/P percutaneous coronary angioplasty I25.10, Z98.61    Abnormal stress test R94.39    Open wound of second toe, left, initial encounter S91.105A    Syncope and collapse R55    Menorrhagia N92.0    Cervical polyp N84.1    Vaginal bleeding N93.9    Chest pressure R07.89    Normocytic anemia D64.9    Menorrhagia due to blood coagulation disorder ([facility]) N92.0, D68.9    Iron deficiency anemia due to chronic blood loss D50.0    DUB (dysfunctional uterine bleeding) N93.8    S/P cardiac cath Z98.890    Ulcer of left foot with fat layer exposed ([facility]) L97.522    Sepsis secondary to UTI ([facility]) A41.9, N39.0    Septic shock ([facility]) A41.9, R65.21    Cystitis N30.90    History of CAD (coronary artery disease) Z86.79    Mild intermittent asthma without complication J45.20    History of rheumatoid arthritis Z87.39    Urinary tract infection without hematuria N39.0    Lactic acidosis E87.20    Osteomyelitis of third toe of right foot ([facility]) M86.9    S/P drug eluting coronary stent placement Z95.5    S/P hysterectomy Z90.710    Asthma without acute exacerbation J45.909    Essential hypertension I10    Positive blood culture R78.81    Open wound of left foot S91.302A         Procedure Note  Indications:  Based on my examination of this patient's wound(s)/ulcer(s) today, debridement is required to promote healing and evaluate the wound base.    Performed by: SHANNON Mukherjee - CNP    Consent obtained:  Yes  Time out taken:  Yes    Debridement: Excisional Debridement - 03750    Using curette and tissue nippers the wound(s)/ulcer(s) was/were debrided down through

## 2024-02-05 NOTE — PLAN OF CARE
Problem: Wound:  Goal: Will show signs of wound healing; wound closure and no evidence of infection  Description: Will show signs of wound healing; wound closure and no evidence of infection  Outcome: Progressing   Patient presents to wound clinic for follow up of left foot wound. Discharge instructions/dressing orders per AVS. Follow up appointment scheduled in 2 weeks.    Care plan reviewed with patient.  Patient verbalize understanding of the plan of care and contribute to goal setting.

## 2024-02-05 NOTE — PATIENT INSTRUCTIONS
odor   *Uncontrolled swelling   *Need for compression bandage changes due to slippage, breakthrough drainage    If you need medical attention outside of business hours, please contact your Primary Care Doctor or go to the nearest emergency room.

## 2024-02-08 LAB
BACTERIA SPEC AEROBE CULT: ABNORMAL
GRAM STN SPEC: ABNORMAL
ORGANISM: ABNORMAL

## 2024-02-12 ENCOUNTER — TELEPHONE (OUTPATIENT)
Dept: WOUND CARE | Age: 54
End: 2024-02-12

## 2024-02-12 NOTE — TELEPHONE ENCOUNTER
Missed call from  nurse talking about patient having increased redness. Attempt to call her and the patient to ask further questions. No answer from both, voicemail's left to call office back

## 2024-02-19 ENCOUNTER — HOSPITAL ENCOUNTER (OUTPATIENT)
Dept: WOUND CARE | Age: 54
Discharge: HOME | End: 2024-02-19
Attending: NURSE PRACTITIONER
Payer: COMMERCIAL

## 2024-02-19 VITALS
HEART RATE: 83 BPM | OXYGEN SATURATION: 98 % | DIASTOLIC BLOOD PRESSURE: 67 MMHG | TEMPERATURE: 97.2 F | RESPIRATION RATE: 16 BRPM | SYSTOLIC BLOOD PRESSURE: 145 MMHG

## 2024-02-19 DIAGNOSIS — S91.302A OPEN WOUND OF LEFT FOOT, INITIAL ENCOUNTER: ICD-10-CM

## 2024-02-19 DIAGNOSIS — S91.105A OPEN WOUND OF SECOND TOE, LEFT, INITIAL ENCOUNTER: ICD-10-CM

## 2024-02-19 DIAGNOSIS — S91.302D OPEN WOUND OF LEFT FOOT, SUBSEQUENT ENCOUNTER: Primary | ICD-10-CM

## 2024-02-19 PROCEDURE — 6370000000 HC RX 637 (ALT 250 FOR IP): Performed by: NURSE PRACTITIONER

## 2024-02-19 PROCEDURE — 99213 OFFICE O/P EST LOW 20 MIN: CPT

## 2024-02-19 PROCEDURE — 11042 DBRDMT SUBQ TIS 1ST 20SQCM/<: CPT

## 2024-02-19 RX ORDER — SODIUM CHLOR/HYPOCHLOROUS ACID 0.033 %
SOLUTION, IRRIGATION IRRIGATION ONCE
OUTPATIENT
Start: 2024-02-19 | End: 2024-02-19

## 2024-02-19 RX ORDER — LIDOCAINE 40 MG/G
CREAM TOPICAL ONCE
OUTPATIENT
Start: 2024-02-19 | End: 2024-02-19

## 2024-02-19 RX ORDER — LIDOCAINE HYDROCHLORIDE 40 MG/ML
SOLUTION TOPICAL ONCE
OUTPATIENT
Start: 2024-02-19 | End: 2024-02-19

## 2024-02-19 RX ORDER — BETAMETHASONE DIPROPIONATE 0.5 MG/G
CREAM TOPICAL ONCE
OUTPATIENT
Start: 2024-02-19 | End: 2024-02-19

## 2024-02-19 RX ORDER — LIDOCAINE 50 MG/G
OINTMENT TOPICAL ONCE
OUTPATIENT
Start: 2024-02-19 | End: 2024-02-19

## 2024-02-19 RX ORDER — LIDOCAINE HYDROCHLORIDE 20 MG/ML
JELLY TOPICAL ONCE
OUTPATIENT
Start: 2024-02-19 | End: 2024-02-19

## 2024-02-19 RX ORDER — HYDROCODONE BITARTRATE AND ACETAMINOPHEN 5; 325 MG/1; MG/1
1 TABLET ORAL EVERY 6 HOURS PRN
Qty: 28 TABLET | Refills: 0 | Status: SHIPPED | OUTPATIENT
Start: 2024-02-19 | End: 2024-02-26

## 2024-02-19 RX ORDER — BACITRACIN ZINC AND POLYMYXIN B SULFATE 500; 1000 [USP'U]/G; [USP'U]/G
OINTMENT TOPICAL ONCE
OUTPATIENT
Start: 2024-02-19 | End: 2024-02-19

## 2024-02-19 RX ORDER — GENTAMICIN SULFATE 1 MG/G
OINTMENT TOPICAL ONCE
OUTPATIENT
Start: 2024-02-19 | End: 2024-02-19

## 2024-02-19 RX ORDER — GINSENG 100 MG
CAPSULE ORAL ONCE
OUTPATIENT
Start: 2024-02-19 | End: 2024-02-19

## 2024-02-19 RX ORDER — CLOBETASOL PROPIONATE 0.5 MG/G
OINTMENT TOPICAL ONCE
OUTPATIENT
Start: 2024-02-19 | End: 2024-02-19

## 2024-02-19 RX ORDER — IBUPROFEN 200 MG
TABLET ORAL ONCE
OUTPATIENT
Start: 2024-02-19 | End: 2024-02-19

## 2024-02-19 RX ORDER — TRIAMCINOLONE ACETONIDE 1 MG/G
OINTMENT TOPICAL ONCE
OUTPATIENT
Start: 2024-02-19 | End: 2024-02-19

## 2024-02-19 RX ADMIN — SILVER NITRATE APPLICATORS 1 EACH: 25; 75 STICK TOPICAL at 10:42

## 2024-02-19 ASSESSMENT — PAIN SCALES - GENERAL: PAINLEVEL_OUTOF10: 8

## 2024-02-19 ASSESSMENT — PAIN DESCRIPTION - ORIENTATION: ORIENTATION: LEFT

## 2024-02-19 ASSESSMENT — PAIN DESCRIPTION - LOCATION: LOCATION: FOOT

## 2024-02-19 ASSESSMENT — PAIN DESCRIPTION - DESCRIPTORS: DESCRIPTORS: ACHING

## 2024-02-19 NOTE — PROGRESS NOTES
[facility]         Consult and Procedure Note      Charla Stapleton  MEDICAL RECORD NUMBER:  [id number]  AGE: 53 y.o.   GENDER: female  : 1970  EPISODE DATE:  2024    Subjective:     Chief Complaint   Patient presents with    Wound Check     Left foot          HISTORY of PRESENT ILLNESS HPI     Charla Stapleton is a 53 y.o. female who is well-known to me through our outpatient [facility] clinic presents for ongoing wound to the plantar left forefoot x 3 months.  Upon assessment today wound is fiber granular nature.  This is a stage III pressure ulceration.  Patient is a poorly controlled type II diabetic who also suffers from severe obesity.  Patient presents to the office today wearing tennis shoes and is not do anything from an offloading standpoint for this wound even though she does have a fracture boot at home.  Patient states she does not like wearing her fracture because it throws off her back.  Patient was provided with an offloading DH walker shoe today.  Left plantar foot wound was excisionally debrided in office.  We did get an updated culture today.  Patient not currently on any antibiotics.  After debridement and culture was taken wound was treated with silver nitrate.  Wound was then dressed with Iodoflex, 4 x 4 gauze pad, ABD pad, Kerlix, and Coban.  Where can get patient set up with home health moving forward for dressing changes every Monday, Wednesday, and Friday.  We did discuss about proper offloading moving forward.  I did refill [place] for patient today that she takes as needed for pain.  No antibiotics were prescribed today.  Patient will follow back up with us in 2 weeks for reevaluation.    24  Patient is a pleasant 53-year-old female following up from an ongoing wound to the plantar aspect of the left foot.  This is a stage III pressure ulceration.  Patient is a poorly controlled type II diabetic who also suffers from severe obesity.  Patient was previously on 
when Home Care unavailable.    WOUNDS REQUIRING DRESSING Changes:     Wound 12/11/23 Foot Left;Plantar (Active)   Wound Image   02/19/24 1036   Wound Etiology Diabetic 02/19/24 1036   Dressing Status Intact;New dressing applied 02/19/24 1036   Wound Cleansed Cleansed with saline 02/19/24 1036   Dressing/Treatment Collagen;Alginate;ABD;Roll gauze;Coban/self-adherent bandages 02/19/24 1036   Offloading for Diabetic Foot Ulcers Offloading ordered;Other (comment) 02/19/24 1036   Wound Length (cm) 0.6 cm 02/19/24 1036   Wound Width (cm) 0.7 cm 02/19/24 1036   Wound Depth (cm) 0.3 cm 02/19/24 1036   Wound Surface Area (cm^2) 0.42 cm^2 02/19/24 1036   Change in Wound Size % (l*w) 64.1 02/19/24 1036   Wound Volume (cm^3) 0.126 cm^3 02/19/24 1036   Wound Healing % 82 02/19/24 1036   Post-Procedure Length (cm) 0.9 cm 01/08/24 1045   Post-Procedure Width (cm) 1.1 cm 01/08/24 1045   Post-Procedure Depth (cm) 0.4 cm 01/08/24 1045   Post-Procedure Surface Area (cm^2) 0.99 cm^2 01/08/24 1045   Post-Procedure Volume (cm^3) 0.396 cm^3 01/08/24 1045   Undermining Starts ___ O'Clock 3 02/19/24 1036   Undermining Ends___ O'Clock 6 02/19/24 1036   Undermining Maxium Distance (cm) 0.5 02/19/24 1036   Wound Assessment Granulation tissue;Pale granulation tissue 02/19/24 1036   Drainage Amount Small (< 25%) 02/19/24 1036   Drainage Description Yellow 02/19/24 1036   Odor None 02/19/24 1036   Kathleen-wound Assessment Hyperkeratosis (callous);Dry/flaky 02/19/24 1036   Margins Attached edges;Unattached edges 02/19/24 1036   Wound Thickness Description not for Pressure Injury Full thickness 02/19/24 1036   Number of days: 70          Patient seen and treated on 2/19/2024    By PROVIDER'S NAME: Slava Perry CNP NPI: [id number]

## 2024-02-19 NOTE — PATIENT INSTRUCTIONS
Visit Discharge/Physician Orders:  - Continue DH walker shoe on left foot.  - Wound debrided today and silver nitrate applied.  - Take antibiotics as prescribed.   - Keep blood sugars well controlled to help with wound healing.     Home health: [facility]    Wound Location: Left foot    Dressing orders:     1) Gather wound care supplies and arrange on clean table.     2) Wash your hands with soap and water or use alcohol based hand  for 20 seconds (sing \"Happy Birthday\" twice).    3) Cleanse wounds with normal saline or wound cleanser and gauze. Pat dry with clean gauze.    4) Left foot- Apply collagen to wound and moisten with saline. Cover with alginate. Cover with ABD pad, wrap with roll gauze and coban. Change 3 times weekly on Monday's, Wednesday's and Friday's.    Keep all dressings clean & dry.    Follow up visit: 2 weeks -  Monday March 4th at 10:30 am     Supplies: Per Home Health    Keep next scheduled appointment. Please give 24 hour notice if unable to keep appointment. [phone]    If you experience any of the following, please call the Wound Care Service during business hours: Monday through Friday 8:00 am - 4:30 pm  ([phone]).   *Increase in pain   *Temperature over 101   *Increase in drainage from your wound or a foul odor   *Uncontrolled swelling   *Need for compression bandage changes due to slippage, breakthrough drainage    If you need medical attention outside of business hours, please contact your Primary Care Doctor or go to the nearest emergency room.

## 2024-02-21 ENCOUNTER — HOSPITAL ENCOUNTER (EMERGENCY)
Age: 54
Discharge: LEFT BEFORE DISCHARGE | End: 2024-02-21
Payer: COMMERCIAL

## 2024-02-21 ENCOUNTER — APPOINTMENT (OUTPATIENT)
Dept: GENERAL RADIOLOGY | Age: 54
End: 2024-02-21
Payer: COMMERCIAL

## 2024-02-21 VITALS
OXYGEN SATURATION: 97 % | SYSTOLIC BLOOD PRESSURE: 124 MMHG | TEMPERATURE: 97.5 F | RESPIRATION RATE: 18 BRPM | WEIGHT: 277 LBS | BODY MASS INDEX: 41.98 KG/M2 | HEART RATE: 88 BPM | HEIGHT: 68 IN | DIASTOLIC BLOOD PRESSURE: 69 MMHG

## 2024-02-21 DIAGNOSIS — L97.529 ULCER OF LEFT FOOT, UNSPECIFIED ULCER STAGE (HCC): ICD-10-CM

## 2024-02-21 DIAGNOSIS — R53.83 FATIGUE, UNSPECIFIED TYPE: ICD-10-CM

## 2024-02-21 DIAGNOSIS — A63.0 GENITAL WARTS: Primary | ICD-10-CM

## 2024-02-21 LAB
ANION GAP SERPL CALC-SCNC: 10 MEQ/L (ref 8–16)
BACTERIA URNS QL MICRO: ABNORMAL /HPF
BASOPHILS ABSOLUTE: 0.1 THOU/MM3 (ref 0–0.1)
BASOPHILS NFR BLD AUTO: 1.1 %
BILIRUB UR QL STRIP.AUTO: NEGATIVE
BUN SERPL-MCNC: 18 MG/DL (ref 7–22)
CALCIUM SERPL-MCNC: 9.1 MG/DL (ref 8.5–10.5)
CASTS #/AREA URNS LPF: ABNORMAL /LPF
CASTS 2: ABNORMAL /LPF
CHARACTER UR: CLEAR
CHLAMYDIA TRACHOMATIS BY RT-PCR: NOT DETECTED
CHLORIDE SERPL-SCNC: 104 MEQ/L (ref 98–111)
CO2 SERPL-SCNC: 17 MEQ/L (ref 23–33)
COLOR: YELLOW
CREAT SERPL-MCNC: 0.5 MG/DL (ref 0.4–1.2)
CRYSTALS URNS MICRO: ABNORMAL
CT/NG SOURCE: NORMAL
DEPRECATED RDW RBC AUTO: 45.8 FL (ref 35–45)
EOSINOPHIL NFR BLD AUTO: 2.7 %
EOSINOPHILS ABSOLUTE: 0.2 THOU/MM3 (ref 0–0.4)
EPITHELIAL CELLS, UA: ABNORMAL /HPF
ERYTHROCYTE [DISTWIDTH] IN BLOOD BY AUTOMATED COUNT: 14.4 % (ref 11.5–14.5)
GFR SERPL CREATININE-BSD FRML MDRD: > 60 ML/MIN/1.73M2
GLUCOSE SERPL-MCNC: 160 MG/DL (ref 70–108)
GLUCOSE UR QL STRIP.AUTO: 100 MG/DL
HCT VFR BLD AUTO: 40.1 % (ref 37–47)
HGB BLD-MCNC: 13.4 GM/DL (ref 12–16)
HGB UR QL STRIP.AUTO: NEGATIVE
IMM GRANULOCYTES # BLD AUTO: 0.02 THOU/MM3 (ref 0–0.07)
IMM GRANULOCYTES NFR BLD AUTO: 0.4 %
KETONES UR QL STRIP.AUTO: NEGATIVE
KOH PREP SPEC: NORMAL
LACTATE SERPL-SCNC: 1.9 MMOL/L (ref 0.5–2)
LYMPHOCYTES ABSOLUTE: 1.4 THOU/MM3 (ref 1–4.8)
LYMPHOCYTES NFR BLD AUTO: 24.4 %
MCH RBC QN AUTO: 29.3 PG (ref 26–33)
MCHC RBC AUTO-ENTMCNC: 33.4 GM/DL (ref 32.2–35.5)
MCV RBC AUTO: 87.6 FL (ref 81–99)
MISCELLANEOUS 2: ABNORMAL
MONOCYTES ABSOLUTE: 0.4 THOU/MM3 (ref 0.4–1.3)
MONOCYTES NFR BLD AUTO: 6.2 %
NEISSERIA GONORRHOEAE BY RT-PCR: NOT DETECTED
NEUTROPHILS NFR BLD AUTO: 65.2 %
NITRITE UR QL STRIP: NEGATIVE
NRBC BLD AUTO-RTO: 0 /100 WBC
OSMOLALITY SERPL CALC.SUM OF ELEC: 268 MOSMOL/KG (ref 275–300)
PH UR STRIP.AUTO: 6 [PH] (ref 5–9)
PLATELET # BLD AUTO: 185 THOU/MM3 (ref 130–400)
PMV BLD AUTO: 9.8 FL (ref 9.4–12.4)
POTASSIUM SERPL-SCNC: 4.8 MEQ/L (ref 3.5–5.2)
PROCALCITONIN SERPL IA-MCNC: < 0.02 NG/ML (ref 0.01–0.09)
PROT UR STRIP.AUTO-MCNC: NEGATIVE MG/DL
RBC # BLD AUTO: 4.58 MILL/MM3 (ref 4.2–5.4)
RBC URINE: ABNORMAL /HPF
RENAL EPI CELLS #/AREA URNS HPF: ABNORMAL /[HPF]
SEGMENTED NEUTROPHILS ABSOLUTE COUNT: 3.7 THOU/MM3 (ref 1.8–7.7)
SODIUM SERPL-SCNC: 131 MEQ/L (ref 135–145)
SP GR UR REFRACT.AUTO: 1.02 (ref 1–1.03)
TRICHOMONAS PREP: NORMAL
UROBILINOGEN, URINE: 0.2 EU/DL (ref 0–1)
WBC # BLD AUTO: 5.7 THOU/MM3 (ref 4.8–10.8)
WBC #/AREA URNS HPF: ABNORMAL /HPF
WBC #/AREA URNS HPF: ABNORMAL /[HPF]
YEAST LIKE FUNGI URNS QL MICRO: ABNORMAL

## 2024-02-21 PROCEDURE — 84132 ASSAY OF SERUM POTASSIUM: CPT

## 2024-02-21 PROCEDURE — 82374 ASSAY BLOOD CARBON DIOXIDE: CPT

## 2024-02-21 PROCEDURE — 36415 COLL VENOUS BLD VENIPUNCTURE: CPT

## 2024-02-21 PROCEDURE — 84520 ASSAY OF UREA NITROGEN: CPT

## 2024-02-21 PROCEDURE — 87070 CULTURE OTHR SPECIMN AEROBIC: CPT

## 2024-02-21 PROCEDURE — 87220 TISSUE EXAM FOR FUNGI: CPT

## 2024-02-21 PROCEDURE — 87491 CHLMYD TRACH DNA AMP PROBE: CPT

## 2024-02-21 PROCEDURE — 87591 N.GONORRHOEAE DNA AMP PROB: CPT

## 2024-02-21 PROCEDURE — 85025 COMPLETE CBC W/AUTO DIFF WBC: CPT

## 2024-02-21 PROCEDURE — 99284 EMERGENCY DEPT VISIT MOD MDM: CPT

## 2024-02-21 PROCEDURE — 84145 PROCALCITONIN (PCT): CPT

## 2024-02-21 PROCEDURE — 83605 ASSAY OF LACTIC ACID: CPT

## 2024-02-21 PROCEDURE — 87040 BLOOD CULTURE FOR BACTERIA: CPT

## 2024-02-21 PROCEDURE — 87205 SMEAR GRAM STAIN: CPT

## 2024-02-21 PROCEDURE — 82947 ASSAY GLUCOSE BLOOD QUANT: CPT

## 2024-02-21 PROCEDURE — 82565 ASSAY OF CREATININE: CPT

## 2024-02-21 PROCEDURE — 84295 ASSAY OF SERUM SODIUM: CPT

## 2024-02-21 PROCEDURE — 73630 X-RAY EXAM OF FOOT: CPT

## 2024-02-21 PROCEDURE — 82310 ASSAY OF CALCIUM: CPT

## 2024-02-21 PROCEDURE — 82435 ASSAY OF BLOOD CHLORIDE: CPT

## 2024-02-21 PROCEDURE — 87210 SMEAR WET MOUNT SALINE/INK: CPT

## 2024-02-21 PROCEDURE — 81001 URINALYSIS AUTO W/SCOPE: CPT

## 2024-02-21 ASSESSMENT — PAIN DESCRIPTION - LOCATION: LOCATION: FOOT

## 2024-02-21 ASSESSMENT — PAIN DESCRIPTION - ORIENTATION: ORIENTATION: LEFT

## 2024-02-21 ASSESSMENT — PAIN - FUNCTIONAL ASSESSMENT: PAIN_FUNCTIONAL_ASSESSMENT: 0-10

## 2024-02-21 ASSESSMENT — PAIN SCALES - GENERAL: PAINLEVEL_OUTOF10: 7

## 2024-02-21 NOTE — ED TRIAGE NOTES
Pt to ED via [facility] c/o left foot pain and wound check. Pt states she  thinks she has a blood infection from the infections in her left foot. Rates pain 7/10. IV access established.
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
## 2024-02-21 NOTE — ED NOTES
Writer in to pt room to accompany Adele Rodriguez CNP during vaginal exam. Pt tolerated well. Patient resting in bed. Visitor at bedside. Respirations easy and unlabored. No distress noted. Call light within reach.

## 2024-02-21 NOTE — ED NOTES
Vitals collected. Pt family provided with coffee at this time. Pt denies any needs at this time. Pt respirations easy and unlabored.

## 2024-02-23 LAB — BACTERIA BLD AEROBE CULT: NORMAL

## 2024-02-24 LAB
BACTERIA GENITAL AEROBE CULT: ABNORMAL
BACTERIA GENITAL AEROBE CULT: ABNORMAL
GRAM STN GENITAL: ABNORMAL
ORGANISM: ABNORMAL

## 2024-02-26 ENCOUNTER — TELEPHONE (OUTPATIENT)
Dept: PHARMACY | Age: 54
End: 2024-02-26

## 2024-02-26 LAB — BACTERIA BLD AEROBE CULT: NORMAL

## 2024-02-26 RX ORDER — FLUCONAZOLE 150 MG/1
150 TABLET ORAL ONCE
Qty: 1 TABLET | Refills: 0 | Status: SHIPPED | OUTPATIENT
Start: 2024-02-26 | End: 2024-02-26

## 2024-02-26 NOTE — TELEPHONE ENCOUNTER
Pharmacy Note  ED Culture Follow-up    Charla Stapleton is a 53 y.o. female.     Allergies: Codeine, Demerol, Ultram [tramadol hcl], and Toradol [ketorolac tromethamine]     Labs:  Lab Results   Component Value Date    BUN 18 02/21/2024    CREATININE 0.5 02/21/2024    WBC 5.7 02/21/2024     Estimated Creatinine Clearance: 186 mL/min (based on SCr of 0.5 mg/dL).    Current antimicrobials:   None    ASSESSMENT:  Micro results:   Genital culture: positive for Candida albicans     PLAN:  Need for intervention: Yes  Discussed with: POPEYE Garcia  Chosen treatment:    Start fluconazole 150 mg x 1 dose    Patient response:   Called and spoke with patient. Patient made aware of the positive result and need for fluconazole.  Patient verbalized understanding.    Called/sent in prescription to:  [facility]    Please call with any questions. Ext. [phone]    Paz Hartmann RPH, PharmD 4:03 PM 2/26/2024

## 2024-03-04 ENCOUNTER — HOSPITAL ENCOUNTER (OUTPATIENT)
Dept: WOUND CARE | Age: 54
Discharge: HOME | End: 2024-03-04
Attending: NURSE PRACTITIONER
Payer: COMMERCIAL

## 2024-03-04 VITALS
HEART RATE: 82 BPM | SYSTOLIC BLOOD PRESSURE: 135 MMHG | TEMPERATURE: 97.6 F | DIASTOLIC BLOOD PRESSURE: 64 MMHG | OXYGEN SATURATION: 98 % | RESPIRATION RATE: 20 BRPM

## 2024-03-04 DIAGNOSIS — L97.522 ULCER OF LEFT FOOT WITH FAT LAYER EXPOSED (HCC): Primary | ICD-10-CM

## 2024-03-04 DIAGNOSIS — S91.302A OPEN WOUND OF LEFT FOOT, INITIAL ENCOUNTER: ICD-10-CM

## 2024-03-04 DIAGNOSIS — S91.105A OPEN WOUND OF SECOND TOE, LEFT, INITIAL ENCOUNTER: ICD-10-CM

## 2024-03-04 PROCEDURE — 6370000000 HC RX 637 (ALT 250 FOR IP): Performed by: NURSE PRACTITIONER

## 2024-03-04 PROCEDURE — 17250 CHEM CAUT OF GRANLTJ TISSUE: CPT

## 2024-03-04 PROCEDURE — 11042 DBRDMT SUBQ TIS 1ST 20SQCM/<: CPT

## 2024-03-04 RX ORDER — LIDOCAINE HYDROCHLORIDE 40 MG/ML
SOLUTION TOPICAL ONCE
OUTPATIENT
Start: 2024-03-04 | End: 2024-03-04

## 2024-03-04 RX ORDER — IBUPROFEN 200 MG
TABLET ORAL ONCE
OUTPATIENT
Start: 2024-03-04 | End: 2024-03-04

## 2024-03-04 RX ORDER — TRIAMCINOLONE ACETONIDE 1 MG/G
OINTMENT TOPICAL ONCE
OUTPATIENT
Start: 2024-03-04 | End: 2024-03-04

## 2024-03-04 RX ORDER — LIDOCAINE HYDROCHLORIDE 20 MG/ML
JELLY TOPICAL ONCE
OUTPATIENT
Start: 2024-03-04 | End: 2024-03-04

## 2024-03-04 RX ORDER — BETAMETHASONE DIPROPIONATE 0.5 MG/G
CREAM TOPICAL ONCE
OUTPATIENT
Start: 2024-03-04 | End: 2024-03-04

## 2024-03-04 RX ORDER — CLOBETASOL PROPIONATE 0.5 MG/G
OINTMENT TOPICAL ONCE
OUTPATIENT
Start: 2024-03-04 | End: 2024-03-04

## 2024-03-04 RX ORDER — GENTAMICIN SULFATE 1 MG/G
OINTMENT TOPICAL ONCE
OUTPATIENT
Start: 2024-03-04 | End: 2024-03-04

## 2024-03-04 RX ORDER — GINSENG 100 MG
CAPSULE ORAL ONCE
OUTPATIENT
Start: 2024-03-04 | End: 2024-03-04

## 2024-03-04 RX ORDER — LIDOCAINE 50 MG/G
OINTMENT TOPICAL ONCE
OUTPATIENT
Start: 2024-03-04 | End: 2024-03-04

## 2024-03-04 RX ORDER — BACITRACIN ZINC AND POLYMYXIN B SULFATE 500; 1000 [USP'U]/G; [USP'U]/G
OINTMENT TOPICAL ONCE
OUTPATIENT
Start: 2024-03-04 | End: 2024-03-04

## 2024-03-04 RX ORDER — HYDROCODONE BITARTRATE AND ACETAMINOPHEN 5; 325 MG/1; MG/1
1 TABLET ORAL EVERY 6 HOURS PRN
Qty: 28 TABLET | Refills: 0 | Status: SHIPPED | OUTPATIENT
Start: 2024-03-04 | End: 2024-03-11

## 2024-03-04 RX ORDER — LIDOCAINE 40 MG/G
CREAM TOPICAL ONCE
OUTPATIENT
Start: 2024-03-04 | End: 2024-03-04

## 2024-03-04 RX ORDER — SODIUM CHLOR/HYPOCHLOROUS ACID 0.033 %
SOLUTION, IRRIGATION IRRIGATION ONCE
OUTPATIENT
Start: 2024-03-04 | End: 2024-03-04

## 2024-03-04 RX ADMIN — SILVER NITRATE APPLICATORS 1 EACH: 25; 75 STICK TOPICAL at 10:46

## 2024-03-04 ASSESSMENT — PAIN SCALES - GENERAL: PAINLEVEL_OUTOF10: 7

## 2024-03-04 ASSESSMENT — PAIN DESCRIPTION - LOCATION: LOCATION: FOOT

## 2024-03-04 ASSESSMENT — PAIN DESCRIPTION - ORIENTATION: ORIENTATION: LEFT

## 2024-03-04 NOTE — PROGRESS NOTES
[facility]  [address]  [address]   [place], [place] [address]  Telephone: [phone]     FAX [phone]    Home health agencies: [facility] Phone # [phone], Fax # [phone]        Patient Instructions   Visit Discharge/Physician Orders:  - Continue DH walker shoe on left foot.  - Wound debrided today and silver nitrate applied.   - Keep blood sugars well controlled to help with wound healing.     Home health: [facility]    Wound Location: Left foot    Dressing orders:     1) Gather wound care supplies and arrange on clean table.     2) Wash your hands with soap and water or use alcohol based hand  for 20 seconds (sing \"Happy Birthday\" twice).    3) Cleanse wounds with normal saline or wound cleanser and gauze. Pat dry with clean gauze.    4) Left foot- Apply collagen to wound and moisten with saline. Cover with alginate. Cover with ABD pad, wrap with roll gauze and coban. Change 3 times weekly on Monday's, Wednesday's and Friday's.    Keep all dressings clean & dry.    Follow up visit: 2 weeks -  Monday March 18th at 10:15 am     Supplies: Per [facility]    Keep next scheduled appointment. Please give 24 hour notice if unable to keep appointment. [phone]    If you experience any of the following, please call the Wound Care Service during business hours: Monday through Friday 8:00 am - 4:30 pm  ([phone]).   *Increase in pain   *Temperature over 101   *Increase in drainage from your wound or a foul odor   *Uncontrolled swelling   *Need for compression bandage changes due to slippage, breakthrough drainage    If you need medical attention outside of business hours, please contact your Primary Care Doctor or go to the nearest emergency room.               Skilled nurse to evaluate and treat for wound care. Change dressing as ordered. Patient/Family/caregiver may change dressings as needed as instructed when Home Care

## 2024-03-04 NOTE — PATIENT INSTRUCTIONS
Visit Discharge/Physician Orders:  - Continue DH walker shoe on left foot.  - Wound debrided today and silver nitrate applied.   - Keep blood sugars well controlled to help with wound healing.     Home health: [facility]    Wound Location: Left foot    Dressing orders:     1) Gather wound care supplies and arrange on clean table.     2) Wash your hands with soap and water or use alcohol based hand  for 20 seconds (sing \"Happy Birthday\" twice).    3) Cleanse wounds with normal saline or wound cleanser and gauze. Pat dry with clean gauze.    4) Left foot- Apply collagen to wound and moisten with saline. Cover with alginate. Cover with ABD pad, wrap with roll gauze and coban. Change 3 times weekly on Monday's, Wednesday's and Friday's.    Keep all dressings clean & dry.    Follow up visit: 2 weeks -  Monday March 18th at 10:15 am     Supplies: Per Home Health    Keep next scheduled appointment. Please give 24 hour notice if unable to keep appointment. [phone]    If you experience any of the following, please call the Wound Care Service during business hours: Monday through Friday 8:00 am - 4:30 pm  ([phone]).   *Increase in pain   *Temperature over 101   *Increase in drainage from your wound or a foul odor   *Uncontrolled swelling   *Need for compression bandage changes due to slippage, breakthrough drainage    If you need medical attention outside of business hours, please contact your Primary Care Doctor or go to the nearest emergency room.

## 2024-03-04 NOTE — PROGRESS NOTES
[facility]         Consult and Procedure Note      Charla Stapleton  MEDICAL RECORD NUMBER:  [id number]  AGE: 53 y.o.   GENDER: female  : 1970  EPISODE DATE:  3/4/2024    Subjective:     Chief Complaint   Patient presents with    Wound Check     Left foot          HISTORY of PRESENT ILLNESS HPI     Charla Stapleton is a 53 y.o. female who is well-known to me through our outpatient [facility] clinic presents for ongoing wound to the plantar left forefoot x 3 months.  Upon assessment today wound is fiber granular nature.  This is a stage III pressure ulceration.  Patient is a poorly controlled type II diabetic who also suffers from severe obesity.  Patient presents to the office today wearing tennis shoes and is not do anything from an offloading standpoint for this wound even though she does have a fracture boot at home.  Patient states she does not like wearing her fracture because it throws off her back.  Patient was provided with an offloading DH walker shoe today.  Left plantar foot wound was excisionally debrided in office.  We did get an updated culture today.  Patient not currently on any antibiotics.  After debridement and culture was taken wound was treated with silver nitrate.  Wound was then dressed with Iodoflex, 4 x 4 gauze pad, ABD pad, Kerlix, and Coban.  Where can get patient set up with home health moving forward for dressing changes every Monday, Wednesday, and Friday.  We did discuss about proper offloading moving forward.  I did refill [place] for patient today that she takes as needed for pain.  No antibiotics were prescribed today.  Patient will follow back up with us in 2 weeks for reevaluation.    24  Patient is a pleasant 53-year-old female following up from an ongoing wound to the plantar aspect of the left foot.  This is a stage III pressure ulceration.  Patient is a poorly controlled type II diabetic who also suffers from severe obesity.  Patient was previously on

## 2024-03-04 NOTE — PLAN OF CARE
Problem: Wound:  Goal: Will show signs of wound healing; wound closure and no evidence of infection  Description: Will show signs of wound healing; wound closure and no evidence of infection  Outcome: Progressing   Seen today for left foot wound. See AVS for discharge  Care plan reviewed with patient.  Patient verbalize understanding of the plan of care and contribute to goal setting.

## 2024-03-11 RX ORDER — CLOPIDOGREL BISULFATE 75 MG/1
TABLET ORAL
Qty: 90 TABLET | Refills: 0 | Status: SHIPPED | OUTPATIENT
Start: 2024-03-11

## 2024-03-18 ENCOUNTER — HOSPITAL ENCOUNTER (OUTPATIENT)
Dept: WOUND CARE | Age: 54
Discharge: HOME | End: 2024-03-18
Attending: NURSE PRACTITIONER
Payer: COMMERCIAL

## 2024-03-18 VITALS
HEART RATE: 81 BPM | DIASTOLIC BLOOD PRESSURE: 72 MMHG | SYSTOLIC BLOOD PRESSURE: 132 MMHG | TEMPERATURE: 97.3 F | OXYGEN SATURATION: 99 %

## 2024-03-18 DIAGNOSIS — S91.302D OPEN WOUND OF LEFT FOOT, SUBSEQUENT ENCOUNTER: Primary | ICD-10-CM

## 2024-03-18 DIAGNOSIS — S91.301A OPEN WOUND OF RIGHT FOOT, INITIAL ENCOUNTER: ICD-10-CM

## 2024-03-18 DIAGNOSIS — S91.302A OPEN WOUND OF LEFT FOOT, INITIAL ENCOUNTER: ICD-10-CM

## 2024-03-18 DIAGNOSIS — S91.105A OPEN WOUND OF SECOND TOE, LEFT, INITIAL ENCOUNTER: ICD-10-CM

## 2024-03-18 PROCEDURE — 17250 CHEM CAUT OF GRANLTJ TISSUE: CPT

## 2024-03-18 PROCEDURE — 99213 OFFICE O/P EST LOW 20 MIN: CPT

## 2024-03-18 PROCEDURE — 87147 CULTURE TYPE IMMUNOLOGIC: CPT

## 2024-03-18 PROCEDURE — 87205 SMEAR GRAM STAIN: CPT

## 2024-03-18 PROCEDURE — 87070 CULTURE OTHR SPECIMN AEROBIC: CPT

## 2024-03-18 PROCEDURE — 87077 CULTURE AEROBIC IDENTIFY: CPT

## 2024-03-18 PROCEDURE — 11042 DBRDMT SUBQ TIS 1ST 20SQCM/<: CPT

## 2024-03-18 PROCEDURE — 6370000000 HC RX 637 (ALT 250 FOR IP): Performed by: NURSE PRACTITIONER

## 2024-03-18 PROCEDURE — 87186 SC STD MICRODIL/AGAR DIL: CPT

## 2024-03-18 RX ORDER — TRIAMCINOLONE ACETONIDE 1 MG/G
OINTMENT TOPICAL ONCE
OUTPATIENT
Start: 2024-03-18 | End: 2024-03-18

## 2024-03-18 RX ORDER — BETAMETHASONE DIPROPIONATE 0.5 MG/G
CREAM TOPICAL ONCE
OUTPATIENT
Start: 2024-03-18 | End: 2024-03-18

## 2024-03-18 RX ORDER — LIDOCAINE HYDROCHLORIDE 20 MG/ML
JELLY TOPICAL ONCE
OUTPATIENT
Start: 2024-03-18 | End: 2024-03-18

## 2024-03-18 RX ORDER — CLOBETASOL PROPIONATE 0.5 MG/G
OINTMENT TOPICAL ONCE
OUTPATIENT
Start: 2024-03-18 | End: 2024-03-18

## 2024-03-18 RX ORDER — HYDROCODONE BITARTRATE AND ACETAMINOPHEN 5; 325 MG/1; MG/1
1 TABLET ORAL EVERY 4 HOURS PRN
Qty: 42 TABLET | Refills: 0 | Status: SHIPPED | OUTPATIENT
Start: 2024-03-18 | End: 2024-03-25

## 2024-03-18 RX ORDER — GENTAMICIN SULFATE 1 MG/G
OINTMENT TOPICAL ONCE
OUTPATIENT
Start: 2024-03-18 | End: 2024-03-18

## 2024-03-18 RX ORDER — LIDOCAINE HYDROCHLORIDE 40 MG/ML
SOLUTION TOPICAL ONCE
OUTPATIENT
Start: 2024-03-18 | End: 2024-03-18

## 2024-03-18 RX ORDER — DOXYCYCLINE HYCLATE 100 MG
100 TABLET ORAL 2 TIMES DAILY
Qty: 20 TABLET | Refills: 0 | Status: SHIPPED | OUTPATIENT
Start: 2024-03-18 | End: 2024-03-28

## 2024-03-18 RX ORDER — BACITRACIN ZINC AND POLYMYXIN B SULFATE 500; 1000 [USP'U]/G; [USP'U]/G
OINTMENT TOPICAL ONCE
OUTPATIENT
Start: 2024-03-18 | End: 2024-03-18

## 2024-03-18 RX ORDER — SODIUM CHLOR/HYPOCHLOROUS ACID 0.033 %
SOLUTION, IRRIGATION IRRIGATION ONCE
OUTPATIENT
Start: 2024-03-18 | End: 2024-03-18

## 2024-03-18 RX ORDER — LIDOCAINE 50 MG/G
OINTMENT TOPICAL ONCE
OUTPATIENT
Start: 2024-03-18 | End: 2024-03-18

## 2024-03-18 RX ORDER — LIDOCAINE 40 MG/G
CREAM TOPICAL ONCE
OUTPATIENT
Start: 2024-03-18 | End: 2024-03-18

## 2024-03-18 RX ORDER — GINSENG 100 MG
CAPSULE ORAL ONCE
OUTPATIENT
Start: 2024-03-18 | End: 2024-03-18

## 2024-03-18 RX ORDER — IBUPROFEN 200 MG
TABLET ORAL ONCE
OUTPATIENT
Start: 2024-03-18 | End: 2024-03-18

## 2024-03-18 RX ADMIN — SILVER NITRATE APPLICATORS 1 EACH: 25; 75 STICK TOPICAL at 10:39

## 2024-03-18 ASSESSMENT — PAIN DESCRIPTION - ORIENTATION: ORIENTATION: RIGHT;LEFT

## 2024-03-18 ASSESSMENT — PAIN DESCRIPTION - DESCRIPTORS: DESCRIPTORS: ACHING

## 2024-03-18 ASSESSMENT — PAIN SCALES - GENERAL: PAINLEVEL_OUTOF10: 8

## 2024-03-18 ASSESSMENT — PAIN - FUNCTIONAL ASSESSMENT: PAIN_FUNCTIONAL_ASSESSMENT: ACTIVITIES ARE NOT PREVENTED

## 2024-03-18 ASSESSMENT — PAIN DESCRIPTION - LOCATION: LOCATION: FOOT

## 2024-03-18 NOTE — PROGRESS NOTES
previously on Augmentin for positive cultures of both staph and strep.  Patient still has some redness and warmth to the site.  We did get an updated culture today.  Patient was also started on doxycycline today.  Patient was also provided with Norco to be used as needed for pain.  Wound was excisionally debrided in office.  After debridement wound was treated with silver nitrate.  Wound was then dressed with Iodoflex, 4 4 gauze pad, ABD pad, Kerlix, and Coban.  Home health will continue to changes every Monday, Wednesday, and Friday.  Patient was encouraged to continue with offloading DH walker shoe.  Patient will follow back up with us in 2 weeks for reevaluation.    1/22/24  Patient 53-year-old female following up for ongoing pressure induced ulceration to the plantar aspect of the left plantar lateral foot.  This is an ongoing stage III pressure ulceration.  At previous visit wound was cultured which did come back growing staph and strep in which patient is still on Augmentin as well as doxycycline.  Very little active drainage noted.  No malodor of note.  Wound was excisionally debrided in office.  After debridement wound was treated with silver nitrate.  Wound was then dressed with Iodoflex, 4 4 gauze pad, ABD pad, Kerlix, and Coban.  Home health will continue to changes every Monday, Wednesday, and Friday.  Patient was encouraged to continue with offloading DH walker shoe.  Patient will follow back up with us in 2 weeks for reevaluation.  We will get an updated culture at next visit.  Updated prescription of [facility] was sent.    2/5/24  Patient is a pleasant 53-year-old female following up for an ongoing pressure induced ulceration to the plantar aspect of the left foot.  This is an ongoing stage III pressure ulceration.  Patient was previously on Augmentin as well as doxycycline for previous positive cultures of resistant staph and strep.  Very little drainage noted today.  No malodor of note.  Wound wound was 
dressing applied 03/18/24 1018   Wound Cleansed Cleansed with saline 03/18/24 1018   Dressing/Treatment ABD;Coban/self-adherent bandages;Roll gauze 03/18/24 1018   Offloading for Diabetic Foot Ulcers Offloading ordered 03/18/24 1018   Wound Length (cm) 3.5 cm 03/18/24 1018   Wound Width (cm) 3.5 cm 03/18/24 1018   Wound Depth (cm) 0 cm 03/18/24 1018   Wound Surface Area (cm^2) 12.25 cm^2 03/18/24 1018   Wound Volume (cm^3) 0 cm^3 03/18/24 1018   Wound Assessment Fluid filled blister;Purple/maroon 03/18/24 1018   Drainage Amount Moderate (25-50%) 03/18/24 1018   Drainage Description Serosanguinous 03/18/24 1018   Odor Mild 03/18/24 1018   Kathleen-wound Assessment Hyperkeratosis (callous);Ecchymosis 03/18/24 1018   Margins Attached edges 03/18/24 1018   Wound Thickness Description not for Pressure Injury Full thickness 03/18/24 1018   Number of days: 0          Patient seen and treated on 3/18/2024    By PROVIDER'S NAME: Slava Perry CNP NPI: [id number]

## 2024-03-18 NOTE — PATIENT INSTRUCTIONS
Visit Discharge/Physician Orders:  - Continue DH walker shoe on both feet  - Wounds debrided today and silver nitrate applied.   - Keep blood sugars well controlled to help with wound healing.   - culture taken today we will call with results    Home health: [facility]    Wound Location: Left foot    Dressing orders:     1) Gather wound care supplies and arrange on clean table.     2) Wash your hands with soap and water or use alcohol based hand  for 20 seconds (sing \"Happy Birthday\" twice).    3) Cleanse wounds with normal saline or wound cleanser and gauze. Pat dry with clean gauze.    4) Left foot- Apply collagen to wound and moisten with saline. Cover with alginate. Cover with ABD pad, wrap with roll gauze and coban. Change 3 times weekly on Monday's, Wednesday's and Friday's.    Right foot- Apply iodoflex to the wound (remove one side of mesh apply that side down) cover with ABD pad, roll gauze and coban. Change 3 times per week on Monday, Wednesday, Friday    Keep all dressings clean & dry.    Follow up visit: 2 weeks -  Monday April 1st at 10:15 am     Supplies: Per [facility]    Keep next scheduled appointment. Please give 24 hour notice if unable to keep appointment. [phone]    If you experience any of the following, please call the Wound Care Service during business hours: Monday through Friday 8:00 am - 4:30 pm  ([phone]).   *Increase in pain   *Temperature over 101   *Increase in drainage from your wound or a foul odor   *Uncontrolled swelling   *Need for compression bandage changes due to slippage, breakthrough drainage    If you need medical attention outside of business hours, please contact your Primary Care Doctor or go to the nearest emergency room.

## 2024-03-18 NOTE — PLAN OF CARE
Problem: Wound:  Goal: Will show signs of wound healing; wound closure and no evidence of infection  Description: Will show signs of wound healing; wound closure and no evidence of infection  Outcome: Not Progressing   Pt. Seen today for bilateral feet wounds see AVS for new orders. Follow up in 2 weeks.  Care plan reviewed with patient.  Patient verbalize understanding of the plan of care and contribute to goal setting.

## 2024-03-21 ENCOUNTER — TELEPHONE (OUTPATIENT)
Dept: WOUND CARE | Age: 54
End: 2024-03-21

## 2024-03-21 LAB
BACTERIA SPEC AEROBE CULT: ABNORMAL
GRAM STN SPEC: ABNORMAL
ORGANISM: ABNORMAL
ORGANISM: ABNORMAL

## 2024-03-21 RX ORDER — CIPROFLOXACIN 500 MG/1
500 TABLET, FILM COATED ORAL 2 TIMES DAILY
Qty: 28 TABLET | Refills: 0 | Status: SHIPPED | OUTPATIENT
Start: 2024-03-21 | End: 2024-04-04

## 2024-03-21 NOTE — TELEPHONE ENCOUNTER
Spoke with Slava regarding final wound culture results. Order for Ciprofloxacin 500 mg PO BID x 14 days. Patient to stop current antibiotic. Will add to allergy list per patient symptoms. Medication sent to patients preferred pharmacy. Attempted to call patient regarding symptoms and new medication and no answer at this time.VM left to return call.

## 2024-03-21 NOTE — TELEPHONE ENCOUNTER
----- Message from Zena Stack sent at 3/21/2024 12:15 PM EDT -----   [phone] FORGOT SHE IS ALLERGIC TO THE AB SHE IS CURRENTLY PRESCRIBED. BROKE OUT HEAD TO TOE AND ITCHING. CAN THERE BE A DIFFERENT AB PRESCRIBED AND SENT TO [facility] [address]

## 2024-03-25 ENCOUNTER — HOSPITAL ENCOUNTER (OUTPATIENT)
Dept: WOMENS IMAGING | Age: 54
Discharge: HOME | End: 2024-03-25
Payer: COMMERCIAL

## 2024-03-25 VITALS — HEIGHT: 68 IN | WEIGHT: 285 LBS | BODY MASS INDEX: 43.19 KG/M2

## 2024-03-25 DIAGNOSIS — Z12.31 SCREENING MAMMOGRAM FOR HIGH-RISK PATIENT: ICD-10-CM

## 2024-03-25 PROCEDURE — 77063 BREAST TOMOSYNTHESIS BI: CPT

## 2024-04-01 ENCOUNTER — HOSPITAL ENCOUNTER (OUTPATIENT)
Dept: WOUND CARE | Age: 54
Discharge: HOME | End: 2024-04-01
Attending: NURSE PRACTITIONER
Payer: COMMERCIAL

## 2024-04-01 VITALS
DIASTOLIC BLOOD PRESSURE: 64 MMHG | HEART RATE: 65 BPM | SYSTOLIC BLOOD PRESSURE: 131 MMHG | TEMPERATURE: 96.7 F | RESPIRATION RATE: 16 BRPM | OXYGEN SATURATION: 98 %

## 2024-04-01 DIAGNOSIS — S91.105A OPEN WOUND OF SECOND TOE, LEFT, INITIAL ENCOUNTER: ICD-10-CM

## 2024-04-01 DIAGNOSIS — S91.302D OPEN WOUND OF LEFT FOOT, SUBSEQUENT ENCOUNTER: ICD-10-CM

## 2024-04-01 DIAGNOSIS — S91.302A OPEN WOUND OF LEFT FOOT, INITIAL ENCOUNTER: Primary | ICD-10-CM

## 2024-04-01 PROCEDURE — 11042 DBRDMT SUBQ TIS 1ST 20SQCM/<: CPT

## 2024-04-01 PROCEDURE — 17250 CHEM CAUT OF GRANLTJ TISSUE: CPT

## 2024-04-01 RX ORDER — LIDOCAINE 40 MG/G
CREAM TOPICAL ONCE
OUTPATIENT
Start: 2024-04-01 | End: 2024-04-01

## 2024-04-01 RX ORDER — TRIAMCINOLONE ACETONIDE 1 MG/G
OINTMENT TOPICAL ONCE
OUTPATIENT
Start: 2024-04-01 | End: 2024-04-01

## 2024-04-01 RX ORDER — CLOBETASOL PROPIONATE 0.5 MG/G
OINTMENT TOPICAL ONCE
OUTPATIENT
Start: 2024-04-01 | End: 2024-04-01

## 2024-04-01 RX ORDER — GENTAMICIN SULFATE 1 MG/G
OINTMENT TOPICAL ONCE
OUTPATIENT
Start: 2024-04-01 | End: 2024-04-01

## 2024-04-01 RX ORDER — BACITRACIN ZINC AND POLYMYXIN B SULFATE 500; 1000 [USP'U]/G; [USP'U]/G
OINTMENT TOPICAL ONCE
OUTPATIENT
Start: 2024-04-01 | End: 2024-04-01

## 2024-04-01 RX ORDER — IBUPROFEN 200 MG
TABLET ORAL ONCE
OUTPATIENT
Start: 2024-04-01 | End: 2024-04-01

## 2024-04-01 RX ORDER — LIDOCAINE HYDROCHLORIDE 20 MG/ML
JELLY TOPICAL ONCE
OUTPATIENT
Start: 2024-04-01 | End: 2024-04-01

## 2024-04-01 RX ORDER — LIDOCAINE HYDROCHLORIDE 40 MG/ML
SOLUTION TOPICAL ONCE
OUTPATIENT
Start: 2024-04-01 | End: 2024-04-01

## 2024-04-01 RX ORDER — GINSENG 100 MG
CAPSULE ORAL ONCE
OUTPATIENT
Start: 2024-04-01 | End: 2024-04-01

## 2024-04-01 RX ORDER — LIDOCAINE 50 MG/G
OINTMENT TOPICAL ONCE
OUTPATIENT
Start: 2024-04-01 | End: 2024-04-01

## 2024-04-01 RX ORDER — SODIUM CHLOR/HYPOCHLOROUS ACID 0.033 %
SOLUTION, IRRIGATION IRRIGATION ONCE
OUTPATIENT
Start: 2024-04-01 | End: 2024-04-01

## 2024-04-01 RX ORDER — HYDROCODONE BITARTRATE AND ACETAMINOPHEN 5; 325 MG/1; MG/1
1 TABLET ORAL EVERY 4 HOURS PRN
Qty: 42 TABLET | Refills: 0 | Status: SHIPPED | OUTPATIENT
Start: 2024-04-01 | End: 2024-04-08

## 2024-04-01 RX ORDER — BETAMETHASONE DIPROPIONATE 0.5 MG/G
CREAM TOPICAL ONCE
OUTPATIENT
Start: 2024-04-01 | End: 2024-04-01

## 2024-04-01 ASSESSMENT — PAIN - FUNCTIONAL ASSESSMENT: PAIN_FUNCTIONAL_ASSESSMENT: ACTIVITIES ARE NOT PREVENTED

## 2024-04-01 ASSESSMENT — PAIN SCALES - GENERAL: PAINLEVEL_OUTOF10: 8

## 2024-04-01 ASSESSMENT — PAIN DESCRIPTION - ORIENTATION: ORIENTATION: RIGHT;LEFT

## 2024-04-01 ASSESSMENT — PAIN DESCRIPTION - DESCRIPTORS: DESCRIPTORS: ACHING

## 2024-04-01 ASSESSMENT — PAIN DESCRIPTION - LOCATION: LOCATION: FOOT

## 2024-04-01 NOTE — PLAN OF CARE
Problem: Wound:  Goal: Will show signs of wound healing; wound closure and no evidence of infection  Description: Will show signs of wound healing; wound closure and no evidence of infection  Outcome: Progressing   Pt. Seen today for bilateral feet wounds see AVS for new orders. Follow up in 2 weeks.  Care plan reviewed with patient.  Patient verbalize understanding of the plan of care and contribute to goal setting.

## 2024-04-01 NOTE — PATIENT INSTRUCTIONS
Visit Discharge/Physician Orders:  - Continue DH walker shoe on both feet  - Wounds debrided today and silver nitrate applied.   - Keep blood sugars well controlled to help with wound healing.   - finish out remainder of antibiotics    [facility]    Wound Location: Left foot    Dressing orders:     1) Gather wound care supplies and arrange on clean table.     2) Wash your hands with soap and water or use alcohol based hand  for 20 seconds (sing \"Happy Birthday\" twice).    3) Cleanse wounds with normal saline or wound cleanser and gauze. Pat dry with clean gauze.    4) Left foot- Apply collagen to wound and moisten with saline. Cover with alginate. Cover with ABD pad, wrap with roll gauze and coban. Change 3 times weekly on Monday's, Wednesday's and Friday's.    Right foot- Apply betadine to the area and cover with a gauze, roll gauze and coban. Change 3 times per week on Monday, Wednesday, Friday    Keep all dressings clean & dry.    Follow up visit: 2 weeks -  Monday April 15th at 10:30 am     Supplies: Per [facility]    Keep next scheduled appointment. Please give 24 hour notice if unable to keep appointment. [phone]    If you experience any of the following, please call the Wound Care Service during business hours: Monday through Friday 8:00 am - 4:30 pm  ([phone]).   *Increase in pain   *Temperature over 101   *Increase in drainage from your wound or a foul odor   *Uncontrolled swelling   *Need for compression bandage changes due to slippage, breakthrough drainage    If you need medical attention outside of business hours, please contact your Primary Care Doctor or go to the nearest emergency room.

## 2024-04-01 NOTE — PROGRESS NOTES
[facility]  [facility]  [address]  [address]   [place], [place] [address]  Telephone: [phone]     FAX [phone]    Home health agencies: [facility] Phone # [phone], Fax # [phone]    Patient Instructions   Visit Discharge/Physician Orders:  - Continue DH walker shoe on both feet  - Wounds debrided today and silver nitrate applied.   - Keep blood sugars well controlled to help with wound healing.   - finish out remainder of antibiotics    Home health: [facility]    Wound Location: Left foot    Dressing orders:     1) Gather wound care supplies and arrange on clean table.     2) Wash your hands with soap and water or use alcohol based hand  for 20 seconds (sing \"Happy Birthday\" twice).    3) Cleanse wounds with normal saline or wound cleanser and gauze. Pat dry with clean gauze.    4) Left foot- Apply collagen to wound and moisten with saline. Cover with alginate. Cover with ABD pad, wrap with roll gauze and coban. Change 3 times weekly on Monday's, Wednesday's and Friday's.    Right foot- Apply betadine to the area and cover with a gauze, roll gauze and coban. Change 3 times per week on Monday, Wednesday, Friday    Keep all dressings clean & dry.    Follow up visit: 2 weeks -  Monday April 15th at 10:30 am     Supplies: Per [facility]    Keep next scheduled appointment. Please give 24 hour notice if unable to keep appointment. [phone]    If you experience any of the following, please call the Wound Care Service during business hours: Monday through Friday 8:00 am - 4:30 pm  ([phone]).   *Increase in pain   *Temperature over 101   *Increase in drainage from your wound or a foul odor   *Uncontrolled swelling   *Need for compression bandage changes due to slippage, breakthrough drainage    If you need medical attention outside of business hours, please contact your Primary Care Doctor or go to the nearest emergency room.           
Etiology Diabetic 04/01/24 1018   Dressing Status Intact;New dressing applied 03/18/24 1018   Wound Cleansed Cleansed with saline 03/18/24 1018   Dressing/Treatment ABD;Coban/self-adherent bandages;Roll gauze 03/18/24 1018   Offloading for Diabetic Foot Ulcers Offloading ordered 03/18/24 1018   Wound Length (cm) 0 cm 04/01/24 1018   Wound Width (cm) 0 cm 04/01/24 1018   Wound Depth (cm) 0 cm 04/01/24 1018   Wound Surface Area (cm^2) 0 cm^2 04/01/24 1018   Change in Wound Size % (l*w) 100 04/01/24 1018   Wound Volume (cm^3) 0 cm^3 04/01/24 1018   Wound Assessment Fluid filled blister;Purple/maroon 03/18/24 1018   Drainage Amount Moderate (25-50%) 03/18/24 1018   Drainage Description Serosanguinous 03/18/24 1018   Odor Mild 03/18/24 1018   Kathleen-wound Assessment Hyperkeratosis (callous);Ecchymosis 03/18/24 1018   Margins Attached edges 03/18/24 1018   Wound Thickness Description not for Pressure Injury Full thickness 03/18/24 1018   Number of days: 14     Percent of Wound(s)/Ulcer(s) Debrided: 100%    Total Surface Area Debrided:  0.63 (left only)    Diabetic/Pressure/Non Pressure Ulcers only:  Ulcer: Diabetic ulcer, fat layer exposed     Estimated Blood Loss:  Minimal    Hemostasis Achieved:  by pressure and by silver nitrate stick  Procedural Pain:  3  / 10   Post Procedural Pain:  3 / 10     Response to treatment:  Well tolerated by patient., With complaints of pain.       Plan:   Patient seen and evaluated today.  Patient continues have an ongoing pressure induced ulceration to the plantar aspect of left foot.  Wound was excisionally debrided in office.  Wound was treated with silver nitrate. Wound was dressed with collagen, alginate, ABD, Kerlix, and Coban.   Wound to the right foot is vastly improved.  This is now a stage I ulceration.  No debridement was indicated.  We will discontinue Iodoflex and convert patient to daily Betadine wet-to-dry dressing.  Patient will continue with ciprofloxacin as

## 2024-04-15 ENCOUNTER — HOSPITAL ENCOUNTER (OUTPATIENT)
Dept: WOUND CARE | Age: 54
Discharge: HOME | End: 2024-04-15
Attending: NURSE PRACTITIONER
Payer: COMMERCIAL

## 2024-04-15 VITALS
RESPIRATION RATE: 16 BRPM | DIASTOLIC BLOOD PRESSURE: 72 MMHG | OXYGEN SATURATION: 98 % | SYSTOLIC BLOOD PRESSURE: 126 MMHG | TEMPERATURE: 98 F | HEART RATE: 78 BPM

## 2024-04-15 DIAGNOSIS — S91.105A OPEN WOUND OF SECOND TOE, LEFT, INITIAL ENCOUNTER: ICD-10-CM

## 2024-04-15 DIAGNOSIS — S91.302D OPEN WOUND OF LEFT FOOT, SUBSEQUENT ENCOUNTER: Primary | ICD-10-CM

## 2024-04-15 DIAGNOSIS — S91.302A OPEN WOUND OF LEFT FOOT, INITIAL ENCOUNTER: ICD-10-CM

## 2024-04-15 PROCEDURE — 99213 OFFICE O/P EST LOW 20 MIN: CPT

## 2024-04-15 RX ORDER — GINSENG 100 MG
CAPSULE ORAL ONCE
OUTPATIENT
Start: 2024-04-15 | End: 2024-04-15

## 2024-04-15 RX ORDER — LIDOCAINE 40 MG/G
CREAM TOPICAL ONCE
OUTPATIENT
Start: 2024-04-15 | End: 2024-04-15

## 2024-04-15 RX ORDER — LIDOCAINE 50 MG/G
OINTMENT TOPICAL ONCE
OUTPATIENT
Start: 2024-04-15 | End: 2024-04-15

## 2024-04-15 RX ORDER — BETAMETHASONE DIPROPIONATE 0.5 MG/G
CREAM TOPICAL ONCE
OUTPATIENT
Start: 2024-04-15 | End: 2024-04-15

## 2024-04-15 RX ORDER — SODIUM CHLOR/HYPOCHLOROUS ACID 0.033 %
SOLUTION, IRRIGATION IRRIGATION ONCE
OUTPATIENT
Start: 2024-04-15 | End: 2024-04-15

## 2024-04-15 RX ORDER — BACITRACIN ZINC AND POLYMYXIN B SULFATE 500; 1000 [USP'U]/G; [USP'U]/G
OINTMENT TOPICAL ONCE
OUTPATIENT
Start: 2024-04-15 | End: 2024-04-15

## 2024-04-15 RX ORDER — TRIAMCINOLONE ACETONIDE 1 MG/G
OINTMENT TOPICAL ONCE
OUTPATIENT
Start: 2024-04-15 | End: 2024-04-15

## 2024-04-15 RX ORDER — LIDOCAINE HYDROCHLORIDE 40 MG/ML
SOLUTION TOPICAL ONCE
OUTPATIENT
Start: 2024-04-15 | End: 2024-04-15

## 2024-04-15 RX ORDER — LIDOCAINE HYDROCHLORIDE 20 MG/ML
JELLY TOPICAL ONCE
OUTPATIENT
Start: 2024-04-15 | End: 2024-04-15

## 2024-04-15 RX ORDER — HYDROCODONE BITARTRATE AND ACETAMINOPHEN 5; 325 MG/1; MG/1
1 TABLET ORAL EVERY 4 HOURS PRN
Qty: 42 TABLET | Refills: 0 | Status: SHIPPED | OUTPATIENT
Start: 2024-04-15 | End: 2024-04-22

## 2024-04-15 RX ORDER — SULFAMETHOXAZOLE AND TRIMETHOPRIM 800; 160 MG/1; MG/1
1 TABLET ORAL 2 TIMES DAILY
Qty: 28 TABLET | Refills: 0 | Status: SHIPPED | OUTPATIENT
Start: 2024-04-15 | End: 2024-04-29

## 2024-04-15 RX ORDER — CLOBETASOL PROPIONATE 0.5 MG/G
OINTMENT TOPICAL ONCE
OUTPATIENT
Start: 2024-04-15 | End: 2024-04-15

## 2024-04-15 RX ORDER — IBUPROFEN 200 MG
TABLET ORAL ONCE
OUTPATIENT
Start: 2024-04-15 | End: 2024-04-15

## 2024-04-15 RX ORDER — GENTAMICIN SULFATE 1 MG/G
OINTMENT TOPICAL ONCE
OUTPATIENT
Start: 2024-04-15 | End: 2024-04-15

## 2024-04-15 ASSESSMENT — PAIN DESCRIPTION - LOCATION: LOCATION: FOOT

## 2024-04-15 ASSESSMENT — PAIN DESCRIPTION - ORIENTATION: ORIENTATION: LEFT

## 2024-04-15 ASSESSMENT — PAIN SCALES - GENERAL: PAINLEVEL_OUTOF10: 8

## 2024-04-15 NOTE — PLAN OF CARE
Problem: Wound:  Goal: Will show signs of wound healing; wound closure and no evidence of infection  Description: Will show signs of wound healing; wound closure and no evidence of infection  Outcome: Progressing     Patient presents to wound clinic for foot wound. See AVS for discharge instructions. Follow up with [facility] for surgery.     Care plan reviewed with patient .  Patient verbalize understanding of the plan of care and contribute to goal setting.

## 2024-04-15 NOTE — PATIENT INSTRUCTIONS
Visit Discharge/Physician Orders:  - Surgical options discussed today. [facility] will contact you to schedule appointment with Dr Andersen.   - Antibiotic script sent in today. Take as prescribed.  - Pain medication sent in to pharmacy. Take as prescribed.   - Continue DH walker shoe on both feet  - Keep blood sugars well controlled to help with wound healing.     [facility]     Wound Location: Left foot    Dressing orders:     1) Gather wound care supplies and arrange on clean table.     2) Wash your hands with soap and water or use alcohol based hand  for 20 seconds (sing \"Happy Birthday\" twice).    3) Cleanse wounds with normal saline or wound cleanser and gauze. Pat dry with clean gauze.    4) Left foot x 2 - Apply betadine moistened gauze to wounds . Cover with ABD pad, wrap with roll gauze and coban. Change daily.     Keep all dressings clean & dry.    Follow up visit: Will continue to follow at [facility] due to planning surgery .     Supplies: Per Home Health    Keep next scheduled appointment. Please give 24 hour notice if unable to keep appointment. [phone]    If you experience any of the following, please call the Wound Care Service during business hours: Monday through Friday 8:00 am - 4:30 pm  ([phone]).   *Increase in pain   *Temperature over 101   *Increase in drainage from your wound or a foul odor   *Uncontrolled swelling   *Need for compression bandage changes due to slippage, breakthrough drainage    If you need medical attention outside of business hours, please contact your Primary Care Doctor or go to the nearest emergency room.

## 2024-04-15 NOTE — PROGRESS NOTES
[facility]         Progress and Procedure Note      Charla Stapleton  MEDICAL RECORD NUMBER:  [id number]  AGE: 53 y.o.   GENDER: female  : 1970  EPISODE DATE:  4/15/2024    Subjective:     Chief Complaint   Patient presents with    Wound Check     Bilateral foot         HISTORY of PRESENT ILLNESS HPI     Charla Stapleton is a 53 y.o. female who is well-known to me through our outpatient [facility] clinic presents for ongoing wound to the plantar left forefoot x 3 months.  Upon assessment today wound is fiber granular nature.  This is a stage III pressure ulceration.  Patient is a poorly controlled type II diabetic who also suffers from severe obesity.  Patient presents to the office today wearing tennis shoes and is not do anything from an offloading standpoint for this wound even though she does have a fracture boot at home.  Patient states she does not like wearing her fracture because it throws off her back.  Patient was provided with an offloading DH walker shoe today.  Left plantar foot wound was excisionally debrided in office.  We did get an updated culture today.  Patient not currently on any antibiotics.  After debridement and culture was taken wound was treated with silver nitrate.  Wound was then dressed with Iodoflex, 4 x 4 gauze pad, ABD pad, Kerlix, and Coban.  Where can get patient set up with home health moving forward for dressing changes every Monday, Wednesday, and Friday.  We did discuss about proper offloading moving forward.  I did refill Branch for patient today that she takes as needed for pain.  No antibiotics were prescribed today.  Patient will follow back up with us in 2 weeks for reevaluation.    24  Patient is a pleasant 53-year-old female following up from an ongoing wound to the plantar aspect of the left foot.  This is a stage III pressure ulceration.  Patient is a poorly controlled type II diabetic who also suffers from severe obesity.  Patient was 
  Wound Cleansed Cleansed with saline 04/15/24 1016   Dressing/Treatment Betadine swabs/povidone iodine;Roll gauze;Moist to dry 04/15/24 1016   Offloading for Diabetic Foot Ulcers Offloading ordered;Other (comment) 04/15/24 1016   Wound Length (cm) 0.3 cm 04/15/24 1016   Wound Width (cm) 0.6 cm 04/15/24 1016   Wound Depth (cm) 0.6 cm 04/15/24 1016   Wound Surface Area (cm^2) 0.18 cm^2 04/15/24 1016   Wound Volume (cm^3) 0.108 cm^3 04/15/24 1016   Undermining Starts ___ O'Clock 12 04/15/24 1016   Undermining Ends___ O'Clock 12 04/15/24 1016   Undermining Maxium Distance (cm) 0.3 04/15/24 1016   Wound Assessment Granulation tissue 04/15/24 1016   Drainage Amount Moderate (25-50%) 04/15/24 1016   Drainage Description Serosanguinous;Yellow 04/15/24 1016   Odor None 04/15/24 1016   Kathleen-wound Assessment Blanchable erythema;Dry/flaky;Warm 04/15/24 1016   Margins Unattached edges 04/15/24 1016   Wound Thickness Description not for Pressure Injury Full thickness 04/15/24 1016   Number of days: 0          Patient seen and treated on 4/15/2024    By PROVIDER'S NAME: Slava Perry CNP NPI: [id number]

## 2024-04-20 ENCOUNTER — HOSPITAL ENCOUNTER (EMERGENCY)
Age: 54
Discharge: HOME | End: 2024-04-20
Attending: EMERGENCY MEDICINE
Payer: COMMERCIAL

## 2024-04-20 VITALS
TEMPERATURE: 97.6 F | WEIGHT: 285 LBS | RESPIRATION RATE: 18 BRPM | OXYGEN SATURATION: 99 % | BODY MASS INDEX: 43.19 KG/M2 | SYSTOLIC BLOOD PRESSURE: 107 MMHG | HEIGHT: 68 IN | DIASTOLIC BLOOD PRESSURE: 66 MMHG | HEART RATE: 73 BPM

## 2024-04-20 DIAGNOSIS — R42 DIZZINESS: Primary | ICD-10-CM

## 2024-04-20 LAB
ALBUMIN SERPL BCG-MCNC: 4.4 G/DL (ref 3.5–5.1)
ALP SERPL-CCNC: 101 U/L (ref 38–126)
ALT SERPL W/O P-5'-P-CCNC: 10 U/L (ref 11–66)
ANION GAP SERPL CALC-SCNC: 15 MEQ/L (ref 8–16)
AST SERPL-CCNC: 13 U/L (ref 5–40)
BASOPHILS ABSOLUTE: 0 THOU/MM3 (ref 0–0.1)
BASOPHILS NFR BLD AUTO: 0.8 %
BILIRUB SERPL-MCNC: 0.3 MG/DL (ref 0.3–1.2)
BILIRUB UR QL STRIP.AUTO: NEGATIVE
BUN SERPL-MCNC: 11 MG/DL (ref 7–22)
CALCIUM SERPL-MCNC: 9.2 MG/DL (ref 8.5–10.5)
CHARACTER UR: CLEAR
CHLORIDE SERPL-SCNC: 100 MEQ/L (ref 98–111)
CO2 SERPL-SCNC: 24 MEQ/L (ref 23–33)
COLOR: YELLOW
CREAT SERPL-MCNC: 0.6 MG/DL (ref 0.4–1.2)
DEPRECATED RDW RBC AUTO: 46.1 FL (ref 35–45)
EKG ATRIAL RATE: 81 BPM
EKG P AXIS: 22 DEGREES
EKG P-R INTERVAL: 210 MS
EKG Q-T INTERVAL: 388 MS
EKG QRS DURATION: 102 MS
EKG QTC CALCULATION (BAZETT): 450 MS
EKG R AXIS: 63 DEGREES
EKG T AXIS: 31 DEGREES
EKG VENTRICULAR RATE: 81 BPM
EOSINOPHIL NFR BLD AUTO: 3.6 %
EOSINOPHILS ABSOLUTE: 0.2 THOU/MM3 (ref 0–0.4)
ERYTHROCYTE [DISTWIDTH] IN BLOOD BY AUTOMATED COUNT: 14.2 % (ref 11.5–14.5)
GFR SERPL CREATININE-BSD FRML MDRD: > 90 ML/MIN/1.73M2
GLUCOSE SERPL-MCNC: 190 MG/DL (ref 70–108)
GLUCOSE UR QL STRIP.AUTO: NEGATIVE MG/DL
HCT VFR BLD AUTO: 42.6 % (ref 37–47)
HGB BLD-MCNC: 14.3 GM/DL (ref 12–16)
HGB UR QL STRIP.AUTO: NEGATIVE
IMM GRANULOCYTES # BLD AUTO: 0.02 THOU/MM3 (ref 0–0.07)
IMM GRANULOCYTES NFR BLD AUTO: 0.4 %
KETONES UR QL STRIP.AUTO: NEGATIVE
LYMPHOCYTES ABSOLUTE: 1 THOU/MM3 (ref 1–4.8)
LYMPHOCYTES NFR BLD AUTO: 19.3 %
MAGNESIUM SERPL-MCNC: 2 MG/DL (ref 1.6–2.4)
MCH RBC QN AUTO: 30 PG (ref 26–33)
MCHC RBC AUTO-ENTMCNC: 33.6 GM/DL (ref 32.2–35.5)
MCV RBC AUTO: 89.5 FL (ref 81–99)
MONOCYTES ABSOLUTE: 0.3 THOU/MM3 (ref 0.4–1.3)
MONOCYTES NFR BLD AUTO: 6.2 %
NEUTROPHILS NFR BLD AUTO: 69.7 %
NITRITE UR QL STRIP: NEGATIVE
NRBC BLD AUTO-RTO: 0 /100 WBC
OSMOLALITY SERPL CALC.SUM OF ELEC: 282 MOSMOL/KG (ref 275–300)
PH UR STRIP.AUTO: 6.5 [PH] (ref 5–9)
PLATELET # BLD AUTO: 204 THOU/MM3 (ref 130–400)
PMV BLD AUTO: 9.7 FL (ref 9.4–12.4)
POTASSIUM SERPL-SCNC: 4.5 MEQ/L (ref 3.5–5.2)
PROT SERPL-MCNC: 7.6 G/DL (ref 6.1–8)
PROT UR STRIP.AUTO-MCNC: NEGATIVE MG/DL
RBC # BLD AUTO: 4.76 MILL/MM3 (ref 4.2–5.4)
SEGMENTED NEUTROPHILS ABSOLUTE COUNT: 3.5 THOU/MM3 (ref 1.8–7.7)
SODIUM SERPL-SCNC: 139 MEQ/L (ref 135–145)
SP GR UR REFRACT.AUTO: 1.01 (ref 1–1.03)
TSH SERPL DL<=0.005 MIU/L-ACNC: 0.97 UIU/ML (ref 0.4–4.2)
UROBILINOGEN, URINE: 0.2 EU/DL (ref 0–1)
WBC # BLD AUTO: 5 THOU/MM3 (ref 4.8–10.8)
WBC #/AREA URNS HPF: NEGATIVE /[HPF]

## 2024-04-20 PROCEDURE — 84443 ASSAY THYROID STIM HORMONE: CPT

## 2024-04-20 PROCEDURE — 99284 EMERGENCY DEPT VISIT MOD MDM: CPT

## 2024-04-20 PROCEDURE — 80053 COMPREHEN METABOLIC PANEL: CPT

## 2024-04-20 PROCEDURE — 36415 COLL VENOUS BLD VENIPUNCTURE: CPT

## 2024-04-20 PROCEDURE — 85025 COMPLETE CBC W/AUTO DIFF WBC: CPT

## 2024-04-20 PROCEDURE — 93005 ELECTROCARDIOGRAM TRACING: CPT | Performed by: STUDENT IN AN ORGANIZED HEALTH CARE EDUCATION/TRAINING PROGRAM

## 2024-04-20 PROCEDURE — 83735 ASSAY OF MAGNESIUM: CPT

## 2024-04-20 PROCEDURE — 81003 URINALYSIS AUTO W/O SCOPE: CPT

## 2024-04-20 PROCEDURE — 2580000003 HC RX 258: Performed by: STUDENT IN AN ORGANIZED HEALTH CARE EDUCATION/TRAINING PROGRAM

## 2024-04-20 RX ORDER — SODIUM CHLORIDE, SODIUM LACTATE, POTASSIUM CHLORIDE, AND CALCIUM CHLORIDE .6; .31; .03; .02 G/100ML; G/100ML; G/100ML; G/100ML
1000 INJECTION, SOLUTION INTRAVENOUS ONCE
Status: COMPLETED | OUTPATIENT
Start: 2024-04-20 | End: 2024-04-20

## 2024-04-20 RX ADMIN — SODIUM CHLORIDE, POTASSIUM CHLORIDE, SODIUM LACTATE AND CALCIUM CHLORIDE 1000 ML: 600; 310; 30; 20 INJECTION, SOLUTION INTRAVENOUS at 13:19

## 2024-04-20 ASSESSMENT — PAIN - FUNCTIONAL ASSESSMENT: PAIN_FUNCTIONAL_ASSESSMENT: 0-10

## 2024-04-20 ASSESSMENT — PAIN SCALES - GENERAL: PAINLEVEL_OUTOF10: 10

## 2024-04-20 ASSESSMENT — PAIN DESCRIPTION - LOCATION: LOCATION: GENERALIZED

## 2024-04-20 ASSESSMENT — PAIN DESCRIPTION - DESCRIPTORS: DESCRIPTORS: ACHING

## 2024-04-20 NOTE — ED PROVIDER NOTES
[facility] EMERGENCY DEPARTMENT - VISIT NOTE    Patient Name: Charla Stapleton  MRN: [id number]  YOB: 1970  Date of Evaluation: 4/20/2024  Treating Resident Physician: Jaylen Garcia MD  Supervising Physician: Mayo Mckeon DO    CHIEF COMPLAINT       Chief Complaint   Patient presents with    Dizziness     Since January    Generalized Body Aches    Urinary Frequency       HISTORY OF PRESENT ILLNESS    HPI    History obtained from chart review and the patient.    Charla is a 53 y.o. old female who presents to the emergency department by Walk In for evaluation of generalized bodyaches, urinary frequency and dizziness.  She reports all the symptoms started in January.  Has not had any fever or chills.  She reports her pain is mostly in her joints occasionally also in her abdomen.  Reports urinary frequency but no pain with urination.  She reports she was seen here in the emergency room for similar symptoms in February of this year and had to leave and her significant other got into an argument with staff.  She reports that since then she has followed up with her primary care nurse practitioner and mentioned her symptoms to her but no workup was performed.  Patient send had no chest pain or shortness of breath.  She does have a chronic wound on her left foot which she is following podiatry for and placed in the next week to discuss surgical options of for this.  She is really concerned and here today because she is wants to make sure she does not have sepsis as last year she was quite ill and admitted to ICU.    Chart reviewed, notable for history described above      REVIEW OF SYSTEMS   Review of Systems  Negative unless documented in HPI    PAST MEDICAL AND SURGICAL HISTORY   Charla  has a past medical history of Asthma, Bipolar 1 disorder ([facility]), Blood circulation, collateral, CAD (coronary artery disease), CHF (congestive heart failure) ([facility]), Curvature of spine, Cystitis (06/12/2023), Diabetes mellitus  components:    Glucose 190 (*)     ALT 10 (*)     All other components within normal limits   URINALYSIS WITH REFLEX TO CULTURE   MAGNESIUM   TSH WITH REFLEX   ANION GAP   OSMOLALITY   GLOMERULAR FILTRATION RATE, ESTIMATED     (Any cultures that may have been sent were not resulted at the time of this patient visit. A negative COVID-19 test should be interpreted as COVID no longer suspected unless otherwise noted in this encounter documentation note)    MEDICAL DECISION MAKING   Initial Differential: Includes but is not limited to electrolyte disturbance, urinary tract infection, sepsis less likely given many months of symptoms, orthostatic hypotension, pain secondary osteoarthritis    Discrepancies:  None    Summary: This is a 53 y.o. old female here for evaluation of dizziness, body aches for the past number of months.  Her workup was unremarkable orthostatic vitals were negative, lab work did not show leukocytosis or electrolyte disturbance.  She is given a bolus of IV fluids.  Felt much better with this and will be discharged home instructed to follow-up with surgery as planned and with her primary care nurse practitioner         Medical Decision Making  Problems Addressed:  Dizziness: undiagnosed new problem with uncertain prognosis    Amount and/or Complexity of Data Reviewed  External Data Reviewed: notes.  Labs: ordered. Decision-making details documented in ED Course.  ECG/medicine tests: ordered and independent interpretation performed.    Risk  Diagnosis or treatment significantly limited by social determinants of health.        ED COURSE   ED Medications administered this visit (None if left blank):   Medications   lactated ringers bolus 1,000 mL (0 mLs IntraVENous Stopped 4/20/24 1437)            Procedures: (None if left blank)  Procedures:     Consultants:  None    Documentation:  N/A    MEDICATION CHANGES     Discharge Medication List as of 4/20/2024  2:38 PM          FINAL IMPRESSION     Final

## 2024-04-20 NOTE — ED NOTES
Bedside report taken from Winsome BARRETT  This nurse assuming care  Patient resting in bed. Respirations easy and unlabored. No distress noted. Call light within reach.  UA collected  Ortho's charted in flow sheet

## 2024-04-20 NOTE — ED NOTES
Patient presents to ED with complaint of dizziness since January, generalized body aches and increased urinary frequency since February.  Patient denies nausea, vomiting or diarrhea.  Patient states she came in today due to increased dizziness and pain.  Respirations unlabored.

## 2024-04-20 NOTE — DISCHARGE INSTRUCTIONS
You were seen today for dizziness and bodyaches for the past few months your lab workup is reassuring.    Please follow-up as planned with podiatry,    Follow-up with your primary care nurse practitioner.    If symptoms are worse or other new concerns please come back to the Emergency Department for re-evaluation.

## 2024-04-22 PROCEDURE — 93010 ELECTROCARDIOGRAM REPORT: CPT | Performed by: INTERNAL MEDICINE

## 2024-04-24 LAB
EKG ATRIAL RATE: 81 BPM
EKG P AXIS: 22 DEGREES
EKG P-R INTERVAL: 210 MS
EKG Q-T INTERVAL: 388 MS
EKG QRS DURATION: 102 MS
EKG QTC CALCULATION (BAZETT): 450 MS
EKG R AXIS: 63 DEGREES
EKG T AXIS: 31 DEGREES
EKG VENTRICULAR RATE: 81 BPM

## 2024-06-10 RX ORDER — CLOPIDOGREL BISULFATE 75 MG/1
TABLET ORAL
Qty: 30 TABLET | Refills: 0 | Status: SHIPPED | OUTPATIENT
Start: 2024-06-10

## 2024-06-22 ENCOUNTER — APPOINTMENT (OUTPATIENT)
Dept: CT IMAGING | Age: 54
End: 2024-06-22
Payer: COMMERCIAL

## 2024-06-22 ENCOUNTER — HOSPITAL ENCOUNTER (EMERGENCY)
Age: 54
Discharge: HOME | End: 2024-06-22
Attending: INTERNAL MEDICINE
Payer: COMMERCIAL

## 2024-06-22 VITALS
RESPIRATION RATE: 15 BRPM | HEART RATE: 80 BPM | SYSTOLIC BLOOD PRESSURE: 111 MMHG | OXYGEN SATURATION: 98 % | TEMPERATURE: 98 F | DIASTOLIC BLOOD PRESSURE: 60 MMHG

## 2024-06-22 DIAGNOSIS — R10.30 LOWER ABDOMINAL PAIN: Primary | ICD-10-CM

## 2024-06-22 LAB
ALBUMIN SERPL BCG-MCNC: 4.4 G/DL (ref 3.5–5.1)
ALP SERPL-CCNC: 82 U/L (ref 38–126)
ALT SERPL W/O P-5'-P-CCNC: 15 U/L (ref 11–66)
ANION GAP SERPL CALC-SCNC: 11 MEQ/L (ref 8–16)
AST SERPL-CCNC: 16 U/L (ref 5–40)
BASOPHILS ABSOLUTE: 0 THOU/MM3 (ref 0–0.1)
BASOPHILS NFR BLD AUTO: 0.9 %
BILIRUB SERPL-MCNC: 0.3 MG/DL (ref 0.3–1.2)
BILIRUB UR QL STRIP.AUTO: NEGATIVE
BUN SERPL-MCNC: 9 MG/DL (ref 7–22)
CALCIUM SERPL-MCNC: 9.6 MG/DL (ref 8.5–10.5)
CHARACTER UR: CLEAR
CHLORIDE SERPL-SCNC: 104 MEQ/L (ref 98–111)
CO2 SERPL-SCNC: 24 MEQ/L (ref 23–33)
COLOR: YELLOW
CREAT SERPL-MCNC: 0.5 MG/DL (ref 0.4–1.2)
DEPRECATED RDW RBC AUTO: 45.5 FL (ref 35–45)
EOSINOPHIL NFR BLD AUTO: 2.6 %
EOSINOPHILS ABSOLUTE: 0.1 THOU/MM3 (ref 0–0.4)
ERYTHROCYTE [DISTWIDTH] IN BLOOD BY AUTOMATED COUNT: 14.1 % (ref 11.5–14.5)
GFR SERPL CREATININE-BSD FRML MDRD: > 90 ML/MIN/1.73M2
GLUCOSE SERPL-MCNC: 113 MG/DL (ref 70–108)
GLUCOSE UR QL STRIP.AUTO: NEGATIVE MG/DL
HCT VFR BLD AUTO: 41.6 % (ref 37–47)
HGB BLD-MCNC: 14.4 GM/DL (ref 12–16)
HGB UR QL STRIP.AUTO: NEGATIVE
IMM GRANULOCYTES # BLD AUTO: 0.01 THOU/MM3 (ref 0–0.07)
IMM GRANULOCYTES NFR BLD AUTO: 0.2 %
KETONES UR QL STRIP.AUTO: NEGATIVE
LACTIC ACID, SEPSIS: 1.7 MMOL/L (ref 0.5–1.9)
LIPASE SERPL-CCNC: 24 U/L (ref 5.6–51.3)
LYMPHOCYTES ABSOLUTE: 1.3 THOU/MM3 (ref 1–4.8)
LYMPHOCYTES NFR BLD AUTO: 23.9 %
MAGNESIUM SERPL-MCNC: 2 MG/DL (ref 1.6–2.4)
MCH RBC QN AUTO: 30.4 PG (ref 26–33)
MCHC RBC AUTO-ENTMCNC: 34.6 GM/DL (ref 32.2–35.5)
MCV RBC AUTO: 87.9 FL (ref 81–99)
MONOCYTES ABSOLUTE: 0.3 THOU/MM3 (ref 0.4–1.3)
MONOCYTES NFR BLD AUTO: 6.6 %
NEUTROPHILS ABSOLUTE: 3.5 THOU/MM3 (ref 1.8–7.7)
NEUTROPHILS NFR BLD AUTO: 65.8 %
NITRITE UR QL STRIP: NEGATIVE
NRBC BLD AUTO-RTO: 0 /100 WBC
OSMOLALITY SERPL CALC.SUM OF ELEC: 277 MOSMOL/KG (ref 275–300)
PH UR STRIP.AUTO: 6.5 [PH] (ref 5–9)
PLATELET # BLD AUTO: 200 THOU/MM3 (ref 130–400)
PMV BLD AUTO: 9.8 FL (ref 9.4–12.4)
POTASSIUM SERPL-SCNC: 4.2 MEQ/L (ref 3.5–5.2)
PROT SERPL-MCNC: 7.5 G/DL (ref 6.1–8)
PROT UR STRIP.AUTO-MCNC: NEGATIVE MG/DL
RBC # BLD AUTO: 4.73 MILL/MM3 (ref 4.2–5.4)
SODIUM SERPL-SCNC: 139 MEQ/L (ref 135–145)
SP GR UR REFRACT.AUTO: < 1.005 (ref 1–1.03)
UROBILINOGEN, URINE: 0.2 EU/DL (ref 0–1)
WBC # BLD AUTO: 5.3 THOU/MM3 (ref 4.8–10.8)
WBC #/AREA URNS HPF: NEGATIVE /[HPF]

## 2024-06-22 PROCEDURE — 87801 DETECT AGNT MULT DNA AMPLI: CPT

## 2024-06-22 PROCEDURE — 99285 EMERGENCY DEPT VISIT HI MDM: CPT

## 2024-06-22 PROCEDURE — 81003 URINALYSIS AUTO W/O SCOPE: CPT

## 2024-06-22 PROCEDURE — 74177 CT ABD & PELVIS W/CONTRAST: CPT

## 2024-06-22 PROCEDURE — 2580000003 HC RX 258: Performed by: STUDENT IN AN ORGANIZED HEALTH CARE EDUCATION/TRAINING PROGRAM

## 2024-06-22 PROCEDURE — 83690 ASSAY OF LIPASE: CPT

## 2024-06-22 PROCEDURE — 80053 COMPREHEN METABOLIC PANEL: CPT

## 2024-06-22 PROCEDURE — 83605 ASSAY OF LACTIC ACID: CPT

## 2024-06-22 PROCEDURE — 36415 COLL VENOUS BLD VENIPUNCTURE: CPT

## 2024-06-22 PROCEDURE — 6360000004 HC RX CONTRAST MEDICATION: Performed by: STUDENT IN AN ORGANIZED HEALTH CARE EDUCATION/TRAINING PROGRAM

## 2024-06-22 PROCEDURE — 87040 BLOOD CULTURE FOR BACTERIA: CPT

## 2024-06-22 PROCEDURE — 85025 COMPLETE CBC W/AUTO DIFF WBC: CPT

## 2024-06-22 PROCEDURE — 83735 ASSAY OF MAGNESIUM: CPT

## 2024-06-22 PROCEDURE — 93005 ELECTROCARDIOGRAM TRACING: CPT | Performed by: INTERNAL MEDICINE

## 2024-06-22 RX ORDER — 0.9 % SODIUM CHLORIDE 0.9 %
500 INTRAVENOUS SOLUTION INTRAVENOUS ONCE
Status: COMPLETED | OUTPATIENT
Start: 2024-06-22 | End: 2024-06-22

## 2024-06-22 RX ORDER — DICYCLOMINE HYDROCHLORIDE 10 MG/1
10 CAPSULE ORAL EVERY 8 HOURS
Qty: 9 CAPSULE | Refills: 0 | Status: SHIPPED | OUTPATIENT
Start: 2024-06-22 | End: 2024-06-25

## 2024-06-22 RX ADMIN — IOPAMIDOL 80 ML: 755 INJECTION, SOLUTION INTRAVENOUS at 18:53

## 2024-06-22 RX ADMIN — SODIUM CHLORIDE 500 ML: 9 INJECTION, SOLUTION INTRAVENOUS at 18:08

## 2024-06-22 ASSESSMENT — PAIN SCALES - GENERAL: PAINLEVEL_OUTOF10: 7

## 2024-06-22 ASSESSMENT — PAIN - FUNCTIONAL ASSESSMENT: PAIN_FUNCTIONAL_ASSESSMENT: 0-10

## 2024-06-22 NOTE — ED TRIAGE NOTES
Pt arrives to ED from home with c/o flank pain and dysuria. Pt states the pain and frequency started about 4 days ago.

## 2024-06-22 NOTE — ED PROVIDER NOTES
[facility] EMERGENCY DEPT      EMERGENCY MEDICINE     Pt Name: Charla Stapleton  MRN: [id number]  Birthdate 1970  Date of evaluation: 6/22/2024  Provider: Suleiman Johnson MD    CHIEF COMPLAINT       Chief Complaint   Patient presents with    Flank Pain    Dysuria     HISTORY OF PRESENT ILLNESS   Charla Stapleton is a pleasant 53 y.o. female who presents to the emergency department from from home, by private vehicle for evaluation of flank pain.  Patient presents emergency department complaint of at least 1 week of right flank pain radiated to lower back, cramping type, 5/10 intensity, episodic, associated with weakness, fatigue, polyuria, dark urine; denies fever, no nausea or vomiting, no vaginal discharge, no chest pain or shortness of breath.  Patient states has had similar symptoms before when she was diagnosed with urosepsis.  .    PASTMEDICAL HISTORY     Past Medical History:   Diagnosis Date    Asthma     Bipolar 1 disorder ([facility])     Blood circulation, collateral     CAD (coronary artery disease)     CHF (congestive heart failure) ([facility])     Curvature of spine     Cystitis 06/12/2023    Diabetes mellitus ([facility])     DVT (deep venous thrombosis) ([facility])     Factor 5 Leiden mutation, heterozygous ([facility])     GERD (gastroesophageal reflux disease)     HTN, goal below 130/80     Hx of blood clots     PE x3 and DVT x3    Hx-TIA (transient ischemic attack)     Hyperlipidemia     PE (pulmonary embolism)     RA (rheumatoid arthritis) ([facility])     Unspecified cerebral artery occlusion with cerebral infarction        Patient Active Problem List   Diagnosis Code    Chest pain R07.9    History of pulmonary embolism Z86.711    Hyperlipidemia E78.5    Primary hypertension I10    Pulmonary embolus ([facility]) I26.99    Morbid obesity ([facility]) E66.01    Bilateral pulmonary embolism ([facility]) I26.99    DVT (deep venous thrombosis) ([facility]) I82.409    Acute right hip pain M25.551    Physical deconditioning R53.81    Metabolic acidosis E87.20  Heart caths    CHOLECYSTECTOMY  1992    CORONARY ANGIOPLASTY WITH STENT PLACEMENT      DILATION AND CURETTAGE OF UTERUS N/A 11/14/2022    Hysteroscopy D&C, Mirena placement performed by Jessi Mederos DO at [facility] OR    FOOT DEBRIDEMENT Right 09/30/2019    FOOT DEBRIDEMENT INCISION AND DRAINAGE performed by Celso Andersen DPM at [facility] OR    HYSTERECTOMY, TOTAL ABDOMINAL (CERVIX REMOVED)      2023    KNEE ARTHROSCOPY  2007&2010    LIPOMA RESECTION      OVARY REMOVAL Bilateral     2023    TOE AMPUTATION Left 04/11/2023    Left 2nd Digit Amputation performed by Celso Andersen DPM at [facility] OR    TOE AMPUTATION Right 06/14/2023    RIGHT FOOT, THIRD TOE AMPUTATION performed by Celso Andersen DPM at [facility] OR    TONSILLECTOMY      TUBAL LIGATION  2003    TYMPANOSTOMY TUBE PLACEMENT      UTERINE ARTERY EMBOLIZATION Right 01/04/2023    at [facility]       CURRENT MEDICATIONS       Discharge Medication List as of 6/22/2024  7:51 PM        CONTINUE these medications which have NOT CHANGED    Details   clopidogrel (PLAVIX) 75 MG tablet take 1 tablet by mouth once daily, Disp-30 tablet, R-0Normal      metoprolol succinate (TOPROL XL) 25 MG extended release tablet Take 1.5 tablets by mouth daily, Disp-135 tablet, R-3Normal      ferrous sulfate (IRON 325) 325 (65 Fe) MG tablet Take 1 tablet by mouth every 48 hours, Disp-30 tablet, R-3OTC      acetaminophen (TYLENOL) 500 MG tablet Take 2 tablets by mouth every 6 hours as needed for PainHistorical Med      HYDROcodone-Acetaminophen (VICODIN ES PO) Take 1 tablet by mouth every 6 hours as needed Max Daily Amount: 4 tabletsHistorical Med      TRULICITY 0.75 MG/0.5ML SOPN inject 0.5 milliliters ( 0.75 milligrams ) subcutaneously every 7...  (REFER TO PRESCRIPTION NOTES)., DAWHistorical Med      atorvastatin (LIPITOR) 80 MG tablet take 1 tablet by mouth at bedtime, Disp-90 tablet, R-2Normal      albuterol sulfate HFA (VENTOLIN HFA) 108 (90 Base) MCG/ACT inhaler Inhale 2 puffs into the

## 2024-06-23 LAB
ACB COMPLEX DNA BLD POS QL NAA+NON-PROBE: NOT DETECTED
B FRAGILIS DNA BLD POS QL NAA+NON-PROBE: NOT DETECTED
BACTERIA BLD AEROBE CULT: NORMAL
BACTERIA BLD AEROBE CULT: NORMAL
BLACTX-M ISLT/SPM QL: NORMAL
BLAIMP ISLT/SPM QL: NORMAL
BLAKPC ISLT/SPM QL: NORMAL
BLAOXA-48-LIKE ISLT/SPM QL: NORMAL
BLAVIM ISLT/SPM QL: NORMAL
BOTTLE TYPE: NORMAL
C ALBICANS DNA BLD POS QL NAA+NON-PROBE: NOT DETECTED
C AURIS DNA BLD POS QL NAA+NON-PROBE: NOT DETECTED
C GATTII+NEOFOR DNA BLD POS QL NAA+N-PRB: NOT DETECTED
C GLABRATA DNA BLD POS QL NAA+NON-PROBE: NOT DETECTED
C KRUSEI DNA BLD POS QL NAA+NON-PROBE: NOT DETECTED
C PARAP DNA BLD POS QL NAA+NON-PROBE: NOT DETECTED
C TROPICLS DNA BLD POS QL NAA+NON-PROBE: NOT DETECTED
COAG NEG STAPH DNA BLD QL NAA+PROBE: NOT DETECTED
COLISTIN RES MCR-1 ISLT/SPM QL: NORMAL
E CLOAC COMP DNA BLD POS NAA+NON-PROBE: NOT DETECTED
E COLI DNA BLD POS QL NAA+NON-PROBE: NOT DETECTED
E FAECALIS DNA BLD POS QL NAA+NON-PROBE: NOT DETECTED
E FAECIUM DNA BLD POS QL NAA+NON-PROBE: NOT DETECTED
EKG ATRIAL RATE: 76 BPM
EKG P AXIS: 51 DEGREES
EKG P-R INTERVAL: 216 MS
EKG Q-T INTERVAL: 394 MS
EKG QRS DURATION: 98 MS
EKG QTC CALCULATION (BAZETT): 443 MS
EKG R AXIS: 1 DEGREES
EKG T AXIS: 31 DEGREES
EKG VENTRICULAR RATE: 76 BPM
ENTEROBACTERALES DNA BLD POS NAA+N-PRB: NOT DETECTED
GP B STREP DNA SPEC QL NAA+PROBE: NOT DETECTED
GP B STREP DNA SPEC QL NAA+PROBE: NOT DETECTED
HAEM INFLU DNA BLD POS QL NAA+NON-PROBE: NOT DETECTED
K OXYTOCA DNA BLD POS QL NAA+NON-PROBE: NOT DETECTED
K OXYTOCA DNA BLD POS QL NAA+NON-PROBE: NOT DETECTED
KLEBSIELLA SP DNA BLD POS QL NAA+NON-PRB: NOT DETECTED
L MONOCYTOG DNA BLD POS QL NAA+NON-PROBE: NOT DETECTED
MECA ISLT/SPM QL: NORMAL
MECA+MECC+MREJ ISLT/SPM QL: NORMAL
N MEN DNA BLD POS QL NAA+NON-PROBE: NOT DETECTED
NDM: NORMAL
P AERUGINOSA DNA BLD POS NAA+NON-PROBE: NOT DETECTED
PROTEUS SPP: NOT DETECTED
S AUREUS DNA BLD POS QL NAA+NON-PROBE: NOT DETECTED
S EPIDERMIDIS DNA BLD POS QL NAA+NON-PRB: NOT DETECTED
S LUGDUNENSIS DNA BLD POS QL NAA+NON-PRB: NOT DETECTED
S MALTOPHILIA DNA BLD POS QL NAA+NON-PRB: NOT DETECTED
S MARCESCENS DNA BLD POS NAA+NON-PROBE: NOT DETECTED
S PYO DNA THROAT QL NAA+PROBE: NOT DETECTED
SALMONELLA DNA BLD POS QL NAA+NON-PROBE: NOT DETECTED
SOURCE OF BLOOD CULTURE: NORMAL
STREPTOCOCCUS DNA BLD QL NAA+PROBE: NOT DETECTED
VANA+VANB ISLT/SPM QL: NORMAL

## 2024-06-23 PROCEDURE — 93010 ELECTROCARDIOGRAM REPORT: CPT | Performed by: INTERNAL MEDICINE

## 2024-06-24 ENCOUNTER — HOSPITAL ENCOUNTER (EMERGENCY)
Age: 54
Discharge: HOME | End: 2024-06-24
Payer: COMMERCIAL

## 2024-06-24 VITALS
SYSTOLIC BLOOD PRESSURE: 120 MMHG | BODY MASS INDEX: 45.61 KG/M2 | DIASTOLIC BLOOD PRESSURE: 66 MMHG | TEMPERATURE: 98 F | OXYGEN SATURATION: 98 % | RESPIRATION RATE: 16 BRPM | HEART RATE: 82 BPM | WEIGHT: 293 LBS

## 2024-06-24 DIAGNOSIS — R53.83 FATIGUE, UNSPECIFIED TYPE: ICD-10-CM

## 2024-06-24 DIAGNOSIS — R78.81 POSITIVE BLOOD CULTURE: ICD-10-CM

## 2024-06-24 DIAGNOSIS — R10.33 PERIUMBILICAL ABDOMINAL PAIN: Primary | ICD-10-CM

## 2024-06-24 DIAGNOSIS — R35.89 POLYURIA: ICD-10-CM

## 2024-06-24 LAB
ALBUMIN SERPL BCG-MCNC: 4 G/DL (ref 3.5–5.1)
ALP SERPL-CCNC: 83 U/L (ref 38–126)
ALT SERPL W/O P-5'-P-CCNC: 13 U/L (ref 11–66)
ANION GAP SERPL CALC-SCNC: 12 MEQ/L (ref 8–16)
AST SERPL-CCNC: 12 U/L (ref 5–40)
BACTERIA BLD AEROBE CULT: ABNORMAL
BACTERIA BLD AEROBE CULT: ABNORMAL
BASOPHILS ABSOLUTE: 0 THOU/MM3 (ref 0–0.1)
BASOPHILS NFR BLD AUTO: 1 %
BILIRUB SERPL-MCNC: 0.3 MG/DL (ref 0.3–1.2)
BILIRUB UR QL STRIP.AUTO: NEGATIVE
BUN SERPL-MCNC: 16 MG/DL (ref 7–22)
CALCIUM SERPL-MCNC: 9.2 MG/DL (ref 8.5–10.5)
CHARACTER UR: CLEAR
CHLORIDE SERPL-SCNC: 107 MEQ/L (ref 98–111)
CO2 SERPL-SCNC: 22 MEQ/L (ref 23–33)
COLOR: YELLOW
CREAT SERPL-MCNC: 0.6 MG/DL (ref 0.4–1.2)
DEPRECATED RDW RBC AUTO: 46 FL (ref 35–45)
EOSINOPHIL NFR BLD AUTO: 2.9 %
EOSINOPHILS ABSOLUTE: 0.1 THOU/MM3 (ref 0–0.4)
ERYTHROCYTE [DISTWIDTH] IN BLOOD BY AUTOMATED COUNT: 14.1 % (ref 11.5–14.5)
GFR SERPL CREATININE-BSD FRML MDRD: > 90 ML/MIN/1.73M2
GLUCOSE SERPL-MCNC: 119 MG/DL (ref 70–108)
GLUCOSE UR QL STRIP.AUTO: NEGATIVE MG/DL
HCT VFR BLD AUTO: 41.3 % (ref 37–47)
HGB BLD-MCNC: 13.7 GM/DL (ref 12–16)
HGB UR QL STRIP.AUTO: NEGATIVE
IMM GRANULOCYTES # BLD AUTO: 0.01 THOU/MM3 (ref 0–0.07)
IMM GRANULOCYTES NFR BLD AUTO: 0.2 %
KETONES UR QL STRIP.AUTO: NEGATIVE
LACTIC ACID, SEPSIS: 1.5 MMOL/L (ref 0.5–1.9)
LYMPHOCYTES ABSOLUTE: 1 THOU/MM3 (ref 1–4.8)
LYMPHOCYTES NFR BLD AUTO: 24.6 %
MCH RBC QN AUTO: 29.8 PG (ref 26–33)
MCHC RBC AUTO-ENTMCNC: 33.2 GM/DL (ref 32.2–35.5)
MCV RBC AUTO: 90 FL (ref 81–99)
MONOCYTES ABSOLUTE: 0.3 THOU/MM3 (ref 0.4–1.3)
MONOCYTES NFR BLD AUTO: 7.9 %
NEUTROPHILS ABSOLUTE: 2.7 THOU/MM3 (ref 1.8–7.7)
NEUTROPHILS NFR BLD AUTO: 63.4 %
NITRITE UR QL STRIP: NEGATIVE
NRBC BLD AUTO-RTO: 0 /100 WBC
ORGANISM: ABNORMAL
OSMOLALITY SERPL CALC.SUM OF ELEC: 283.6 MOSMOL/KG (ref 275–300)
PH UR STRIP.AUTO: 6 [PH] (ref 5–9)
PLATELET # BLD AUTO: 175 THOU/MM3 (ref 130–400)
PMV BLD AUTO: 9.8 FL (ref 9.4–12.4)
POTASSIUM SERPL-SCNC: 4 MEQ/L (ref 3.5–5.2)
PROCALCITONIN SERPL IA-MCNC: 0.03 NG/ML (ref 0.01–0.09)
PROT SERPL-MCNC: 7.1 G/DL (ref 6.1–8)
PROT UR STRIP.AUTO-MCNC: NEGATIVE MG/DL
RBC # BLD AUTO: 4.59 MILL/MM3 (ref 4.2–5.4)
SODIUM SERPL-SCNC: 141 MEQ/L (ref 135–145)
SP GR UR REFRACT.AUTO: 1.01 (ref 1–1.03)
UROBILINOGEN, URINE: 0.2 EU/DL (ref 0–1)
WBC # BLD AUTO: 4.2 THOU/MM3 (ref 4.8–10.8)
WBC #/AREA URNS HPF: NEGATIVE /[HPF]

## 2024-06-24 PROCEDURE — 83605 ASSAY OF LACTIC ACID: CPT

## 2024-06-24 PROCEDURE — 87040 BLOOD CULTURE FOR BACTERIA: CPT

## 2024-06-24 PROCEDURE — 85025 COMPLETE CBC W/AUTO DIFF WBC: CPT

## 2024-06-24 PROCEDURE — 99283 EMERGENCY DEPT VISIT LOW MDM: CPT

## 2024-06-24 PROCEDURE — 36415 COLL VENOUS BLD VENIPUNCTURE: CPT

## 2024-06-24 PROCEDURE — 81003 URINALYSIS AUTO W/O SCOPE: CPT

## 2024-06-24 PROCEDURE — 80053 COMPREHEN METABOLIC PANEL: CPT

## 2024-06-24 PROCEDURE — 84145 PROCALCITONIN (PCT): CPT

## 2024-06-24 RX ORDER — DICYCLOMINE HYDROCHLORIDE 10 MG/ML
20 INJECTION INTRAMUSCULAR ONCE
Status: DISCONTINUED | OUTPATIENT
Start: 2024-06-24 | End: 2024-06-24 | Stop reason: HOSPADM

## 2024-06-24 ASSESSMENT — ENCOUNTER SYMPTOMS
BACK PAIN: 1
CONSTIPATION: 0
COUGH: 0
ABDOMINAL PAIN: 1
VOMITING: 0
RHINORRHEA: 0
SHORTNESS OF BREATH: 0
NAUSEA: 0
DIARRHEA: 0
SORE THROAT: 0

## 2024-06-24 ASSESSMENT — PAIN SCALES - GENERAL: PAINLEVEL_OUTOF10: 7

## 2024-06-24 ASSESSMENT — PAIN DESCRIPTION - ORIENTATION: ORIENTATION: RIGHT

## 2024-06-24 ASSESSMENT — PAIN DESCRIPTION - LOCATION: LOCATION: ABDOMEN;VAGINA;BACK

## 2024-06-24 ASSESSMENT — PAIN DESCRIPTION - PAIN TYPE: TYPE: ACUTE PAIN;CHRONIC PAIN

## 2024-06-24 NOTE — ED NOTES
Patient presents to the ED for concerns of positive blood cultures. She states she was called today and informed to report back to the ED for further work up and evaluation. Patient rates abdominal/vaginal pain at a 7/10. VSS.

## 2024-06-24 NOTE — ED PROVIDER NOTES
[facility] EMERGENCY DEPT      Pt Name: Charla Stapleton  MRN: [id number]  Birthdate 1970  Date of evaluation: 6/24/2024  Provider: Yoli Mai PA-C    CHIEF COMPLAINT       Chief Complaint   Patient presents with    Abnormal Lab     Instructed to be seen for positive blood cultures       Nurses Notes reviewed and I agree except as noted in the HPI.      HISTORY OF PRESENT ILLNESS    Charla Stapleton is a 53 y.o. female who presents from home via private vehicle for positive blood culture.  Patient was seen in the department on 6-22 for flank and low back pain along with urinary symptoms.  Labs and imaging studies were unremarkable for acute process and patient was felt appropriate for discharge.  She was called to come back to the emergency department for positive blood culture that grew gram-positive cocci in clusters.      Patient continues to have fatigue, pelvic pain, and difficulty urinating.  Patient affirms right low back pain with intermittent swelling.  There is frequency and urgency of urination but no dysuria or hematuria.  Patient explains that she has to push hard in order to urinate.  Her pelvic pain is described as constant aching with no modifying factors.  She reports having the symptoms previously with prior episodes of \"blood infection.\"  Patient denies fever, chills, change in appetite, vomiting, diarrhea, URI symptoms, chest pain, dyspnea, or other complaints.  Patient currently is taking Norco from a prior for foot surgery.    REVIEW OF SYSTEMS     Review of Systems   Constitutional:  Positive for fatigue. Negative for appetite change, chills and fever.   HENT:  Negative for congestion, ear pain, rhinorrhea and sore throat.    Respiratory:  Negative for cough and shortness of breath.    Cardiovascular:  Negative for chest pain.   Gastrointestinal:  Positive for abdominal pain. Negative for constipation, diarrhea, nausea and vomiting.   Genitourinary:  Positive for difficulty

## 2024-06-27 LAB — BACTERIA BLD AEROBE CULT: NORMAL

## 2024-06-28 RX ORDER — METOPROLOL SUCCINATE 25 MG/1
TABLET, EXTENDED RELEASE ORAL
Qty: 45 TABLET | Refills: 0 | Status: SHIPPED | OUTPATIENT
Start: 2024-06-28

## 2024-06-29 LAB
BACTERIA BLD AEROBE CULT: NORMAL
BACTERIA BLD AEROBE CULT: NORMAL

## 2024-08-13 RX ORDER — CLOPIDOGREL BISULFATE 75 MG/1
75 TABLET ORAL DAILY
Qty: 90 TABLET | Refills: 3 | Status: SHIPPED | OUTPATIENT
Start: 2024-08-13

## 2024-08-13 RX ORDER — METOPROLOL SUCCINATE 25 MG/1
37.5 TABLET, EXTENDED RELEASE ORAL DAILY
Qty: 135 TABLET | Refills: 3 | Status: SHIPPED | OUTPATIENT
Start: 2024-08-13

## 2024-08-13 RX ORDER — ASPIRIN 81 MG/1
81 TABLET ORAL DAILY
Qty: 90 TABLET | Refills: 3 | Status: SHIPPED | OUTPATIENT
Start: 2024-08-13

## 2024-08-13 NOTE — TELEPHONE ENCOUNTER
Charla Stapleton called requesting a refill on the following medications:  Requested Prescriptions     Pending Prescriptions Disp Refills    metoprolol succinate (TOPROL XL) 25 MG extended release tablet 45 tablet 0    clopidogrel (PLAVIX) 75 MG tablet 30 tablet 0     Sig: Take 1 tablet by mouth daily    aspirin 81 MG EC tablet 30 tablet 3     Sig: Take 1 tablet by mouth daily     Pharmacy verified:[facility] [phone]  .pv      Date of last visit: 05.28.2024  Date of next visit (if applicable): 12.03.2024

## 2024-08-29 ENCOUNTER — APPOINTMENT (OUTPATIENT)
Dept: CT IMAGING | Age: 54
End: 2024-08-29
Payer: COMMERCIAL

## 2024-08-29 ENCOUNTER — HOSPITAL ENCOUNTER (EMERGENCY)
Age: 54
Discharge: HOME | End: 2024-08-29
Attending: EMERGENCY MEDICINE
Payer: COMMERCIAL

## 2024-08-29 VITALS
DIASTOLIC BLOOD PRESSURE: 74 MMHG | HEIGHT: 68 IN | WEIGHT: 293 LBS | SYSTOLIC BLOOD PRESSURE: 135 MMHG | TEMPERATURE: 97.7 F | BODY MASS INDEX: 44.41 KG/M2 | RESPIRATION RATE: 16 BRPM | OXYGEN SATURATION: 99 % | HEART RATE: 90 BPM

## 2024-08-29 DIAGNOSIS — R31.9 URINARY TRACT INFECTION WITH HEMATURIA, SITE UNSPECIFIED: ICD-10-CM

## 2024-08-29 DIAGNOSIS — R31.9 HEMATURIA, UNSPECIFIED TYPE: Primary | ICD-10-CM

## 2024-08-29 DIAGNOSIS — N39.0 URINARY TRACT INFECTION WITH HEMATURIA, SITE UNSPECIFIED: ICD-10-CM

## 2024-08-29 DIAGNOSIS — R10.9 LEFT FLANK PAIN: ICD-10-CM

## 2024-08-29 LAB
ANION GAP SERPL CALC-SCNC: 14 MEQ/L (ref 8–16)
BACTERIA URNS QL MICRO: ABNORMAL /HPF
BASOPHILS ABSOLUTE: 0.1 THOU/MM3 (ref 0–0.1)
BASOPHILS NFR BLD AUTO: 0.6 %
BILIRUB UR QL STRIP.AUTO: NEGATIVE
BUN SERPL-MCNC: 13 MG/DL (ref 7–22)
CALCIUM SERPL-MCNC: 9.1 MG/DL (ref 8.5–10.5)
CASTS #/AREA URNS LPF: ABNORMAL /LPF
CASTS 2: ABNORMAL /LPF
CHARACTER UR: ABNORMAL
CHLORIDE SERPL-SCNC: 102 MEQ/L (ref 98–111)
CO2 SERPL-SCNC: 23 MEQ/L (ref 23–33)
COLOR, UA: ABNORMAL
CREAT SERPL-MCNC: 0.5 MG/DL (ref 0.4–1.2)
CRYSTALS URNS MICRO: ABNORMAL
DEPRECATED RDW RBC AUTO: 44.6 FL (ref 35–45)
EOSINOPHIL NFR BLD AUTO: 3.4 %
EOSINOPHILS ABSOLUTE: 0.3 THOU/MM3 (ref 0–0.4)
EPITHELIAL CELLS, UA: ABNORMAL /HPF
ERYTHROCYTE [DISTWIDTH] IN BLOOD BY AUTOMATED COUNT: 13.7 % (ref 11.5–14.5)
GFR SERPL CREATININE-BSD FRML MDRD: > 90 ML/MIN/1.73M2
GLUCOSE SERPL-MCNC: 138 MG/DL (ref 70–108)
GLUCOSE UR QL STRIP.AUTO: NEGATIVE MG/DL
HCT VFR BLD AUTO: 41.8 % (ref 37–47)
HGB BLD-MCNC: 14.1 GM/DL (ref 12–16)
HGB UR QL STRIP.AUTO: ABNORMAL
IMM GRANULOCYTES # BLD AUTO: 0.03 THOU/MM3 (ref 0–0.07)
IMM GRANULOCYTES NFR BLD AUTO: 0.3 %
KETONES UR QL STRIP.AUTO: NEGATIVE
LYMPHOCYTES ABSOLUTE: 1.3 THOU/MM3 (ref 1–4.8)
LYMPHOCYTES NFR BLD AUTO: 12.5 %
MCH RBC QN AUTO: 30.2 PG (ref 26–33)
MCHC RBC AUTO-ENTMCNC: 33.7 GM/DL (ref 32.2–35.5)
MCV RBC AUTO: 89.5 FL (ref 81–99)
MISCELLANEOUS 2: ABNORMAL
MONOCYTES ABSOLUTE: 0.5 THOU/MM3 (ref 0.4–1.3)
MONOCYTES NFR BLD AUTO: 4.8 %
NEUTROPHILS ABSOLUTE: 8 THOU/MM3 (ref 1.8–7.7)
NEUTROPHILS NFR BLD AUTO: 78.4 %
NITRITE UR QL STRIP: NEGATIVE
NRBC BLD AUTO-RTO: 0 /100 WBC
OSMOLALITY SERPL CALC.SUM OF ELEC: 279.8 MOSMOL/KG (ref 275–300)
PH UR STRIP.AUTO: 5.5 [PH] (ref 5–9)
PLATELET # BLD AUTO: 201 THOU/MM3 (ref 130–400)
PMV BLD AUTO: 9.8 FL (ref 9.4–12.4)
POTASSIUM SERPL-SCNC: 4 MEQ/L (ref 3.5–5.2)
PROT UR STRIP.AUTO-MCNC: 300 MG/DL
RBC # BLD AUTO: 4.67 MILL/MM3 (ref 4.2–5.4)
RBC URINE: ABNORMAL /HPF
RENAL EPI CELLS #/AREA URNS HPF: ABNORMAL /[HPF]
SODIUM SERPL-SCNC: 139 MEQ/L (ref 135–145)
SP GR UR REFRACT.AUTO: 1.01 (ref 1–1.03)
UROBILINOGEN, URINE: 0.2 EU/DL (ref 0–1)
WBC # BLD AUTO: 10.2 THOU/MM3 (ref 4.8–10.8)
WBC #/AREA URNS HPF: > 200 /HPF
WBC #/AREA URNS HPF: ABNORMAL /[HPF]
YEAST LIKE FUNGI URNS QL MICRO: ABNORMAL

## 2024-08-29 PROCEDURE — 81001 URINALYSIS AUTO W/SCOPE: CPT

## 2024-08-29 PROCEDURE — 2580000003 HC RX 258: Performed by: EMERGENCY MEDICINE

## 2024-08-29 PROCEDURE — 6370000000 HC RX 637 (ALT 250 FOR IP): Performed by: EMERGENCY MEDICINE

## 2024-08-29 PROCEDURE — 99284 EMERGENCY DEPT VISIT MOD MDM: CPT

## 2024-08-29 PROCEDURE — 74176 CT ABD & PELVIS W/O CONTRAST: CPT

## 2024-08-29 PROCEDURE — 80048 BASIC METABOLIC PNL TOTAL CA: CPT

## 2024-08-29 PROCEDURE — 96360 HYDRATION IV INFUSION INIT: CPT

## 2024-08-29 PROCEDURE — 96361 HYDRATE IV INFUSION ADD-ON: CPT

## 2024-08-29 PROCEDURE — 36415 COLL VENOUS BLD VENIPUNCTURE: CPT

## 2024-08-29 PROCEDURE — 87086 URINE CULTURE/COLONY COUNT: CPT

## 2024-08-29 PROCEDURE — 85025 COMPLETE CBC W/AUTO DIFF WBC: CPT

## 2024-08-29 RX ORDER — 0.9 % SODIUM CHLORIDE 0.9 %
1000 INTRAVENOUS SOLUTION INTRAVENOUS ONCE
Status: COMPLETED | OUTPATIENT
Start: 2024-08-29 | End: 2024-08-29

## 2024-08-29 RX ORDER — CEPHALEXIN 500 MG/1
500 CAPSULE ORAL 2 TIMES DAILY
Qty: 14 CAPSULE | Refills: 0 | Status: SHIPPED | OUTPATIENT
Start: 2024-08-29 | End: 2024-09-05

## 2024-08-29 RX ORDER — ACETAMINOPHEN 500 MG
1000 TABLET ORAL ONCE
Status: COMPLETED | OUTPATIENT
Start: 2024-08-29 | End: 2024-08-29

## 2024-08-29 RX ORDER — CEPHALEXIN 500 MG/1
500 CAPSULE ORAL 2 TIMES DAILY
Qty: 14 CAPSULE | Refills: 0 | Status: SHIPPED | OUTPATIENT
Start: 2024-08-29 | End: 2024-08-29

## 2024-08-29 RX ADMIN — ACETAMINOPHEN 1000 MG: 500 TABLET ORAL at 03:44

## 2024-08-29 RX ADMIN — CEPHALEXIN 500 MG: 250 CAPSULE ORAL at 05:18

## 2024-08-29 RX ADMIN — SODIUM CHLORIDE 1000 ML: 9 INJECTION, SOLUTION INTRAVENOUS at 03:49

## 2024-08-29 ASSESSMENT — PAIN SCALES - GENERAL
PAINLEVEL_OUTOF10: 9

## 2024-08-29 ASSESSMENT — PAIN - FUNCTIONAL ASSESSMENT: PAIN_FUNCTIONAL_ASSESSMENT: 0-10

## 2024-08-29 NOTE — ED NOTES
Pt resting in bed watching tv at this time. Pt updated on POC. Pt denies further needs at this time. Vitals collected. Pt breathing easy and unlabored. Call light in reach.

## 2024-08-29 NOTE — ED NOTES
Pt medicated per MAR. Pt assisted to bedside commode and back to cot. Pt denies further needs at this time. Vitals collected. Pt breathing easy and unlabored. Call light in reach.

## 2024-08-29 NOTE — ED NOTES
Pt to ED via self w/ report of vaginal bleeding and dysuria. Pt reports that these symptoms began around 2230 yesterday. Pt reports that she is also experiencing some back pain. Call light in reach.

## 2024-08-29 NOTE — DISCHARGE INSTRUCTIONS
You were seen at [facility] emergency department for hematuria.  In the ER, there was no kidney stone in your ureter, though you still have a large kidney stone in your left kidney.  A follow-up appointment was made for you September 3rd at 10:30 AM at the kidney stone clinic with urology.  You will also be put on antibiotics, because your urine showed that you have a UTI.  If you have new or worsening symptoms, return to the ER.

## 2024-08-30 LAB
BACTERIA UR CULT: ABNORMAL
ORGANISM: ABNORMAL

## 2024-09-01 NOTE — ED PROVIDER NOTES
DRAINAGE performed by Celso Andersen DPM at [facility] OR    HYSTERECTOMY, TOTAL ABDOMINAL (CERVIX REMOVED)      2023    KNEE ARTHROSCOPY  2007&2010    LIPOMA RESECTION      OVARY REMOVAL Bilateral     2023    TOE AMPUTATION Left 04/11/2023    Left 2nd Digit Amputation performed by Celso Andersen DPM at [facility] OR    TOE AMPUTATION Right 06/14/2023    RIGHT FOOT, THIRD TOE AMPUTATION performed by Celso Andersen DPM at [facility] OR    TONSILLECTOMY      TUBAL LIGATION  2003    TYMPANOSTOMY TUBE PLACEMENT      UTERINE ARTERY EMBOLIZATION Right 01/04/2023    at [facility]       MEDICATIONS   No current facility-administered medications for this encounter.    Current Outpatient Medications:     cephALEXin (KEFLEX) 500 MG capsule, Take 1 capsule by mouth 2 times daily for 7 days, Disp: 14 capsule, Rfl: 0    metoprolol succinate (TOPROL XL) 25 MG extended release tablet, Take 1.5 tablets by mouth daily, Disp: 135 tablet, Rfl: 3    clopidogrel (PLAVIX) 75 MG tablet, Take 1 tablet by mouth daily, Disp: 90 tablet, Rfl: 3    aspirin 81 MG EC tablet, Take 1 tablet by mouth daily, Disp: 90 tablet, Rfl: 3    dicyclomine (BENTYL) 10 MG capsule, Take 1 capsule by mouth every 8 (eight) hours for 3 days, Disp: 9 capsule, Rfl: 0    ferrous sulfate (IRON 325) 325 (65 Fe) MG tablet, Take 1 tablet by mouth every 48 hours, Disp: 30 tablet, Rfl: 3    acetaminophen (TYLENOL) 500 MG tablet, Take 2 tablets by mouth every 6 hours as needed for Pain, Disp: , Rfl:     HYDROcodone-Acetaminophen (VICODIN ES PO), Take 1 tablet by mouth every 6 hours as needed Max Daily Amount: 4 tablets (Patient not taking: Reported on 2/5/2024), Disp: , Rfl:     TRULICITY 0.75 MG/0.5ML SOPN, inject 0.5 milliliters ( 0.75 milligrams ) subcutaneously every 7...  (REFER TO PRESCRIPTION NOTES)., Disp: , Rfl:     atorvastatin (LIPITOR) 80 MG tablet, take 1 tablet by mouth at bedtime, Disp: 90 tablet, Rfl: 2    albuterol sulfate HFA (VENTOLIN HFA) 108 (90 Base) MCG/ACT inhaler,

## 2024-11-15 RX ORDER — METOPROLOL SUCCINATE 25 MG/1
37.5 TABLET, EXTENDED RELEASE ORAL DAILY
Qty: 135 TABLET | Refills: 0 | Status: SHIPPED | OUTPATIENT
Start: 2024-11-15

## 2024-11-15 RX ORDER — ASPIRIN 81 MG/1
81 TABLET ORAL DAILY
Qty: 90 TABLET | Refills: 0 | Status: SHIPPED | OUTPATIENT
Start: 2024-11-15

## 2024-11-15 NOTE — TELEPHONE ENCOUNTER
Charla Stapleton called requesting a refill on the following medications:  Requested Prescriptions     Pending Prescriptions Disp Refills    metoprolol succinate (TOPROL XL) 25 MG extended release tablet 135 tablet 3     Sig: Take 1.5 tablets by mouth daily    aspirin 81 MG EC tablet 90 tablet 3     Sig: Take 1 tablet by mouth daily     Pharmacy verified: [facility]   .pv      Date of last visit: 8/10/2023  Date of next visit (if applicable): 12/03/2024

## 2024-11-27 ENCOUNTER — TELEPHONE (OUTPATIENT)
Dept: CARDIOLOGY CLINIC | Age: 54
End: 2024-11-27

## 2024-11-30 ENCOUNTER — HOSPITAL ENCOUNTER (EMERGENCY)
Age: 54
Discharge: HOME | End: 2024-11-30
Payer: COMMERCIAL

## 2024-11-30 VITALS
OXYGEN SATURATION: 98 % | RESPIRATION RATE: 20 BRPM | HEART RATE: 90 BPM | BODY MASS INDEX: 45.01 KG/M2 | TEMPERATURE: 97.2 F | SYSTOLIC BLOOD PRESSURE: 161 MMHG | WEIGHT: 293 LBS | DIASTOLIC BLOOD PRESSURE: 84 MMHG

## 2024-11-30 DIAGNOSIS — N30.01 ACUTE CYSTITIS WITH HEMATURIA: Primary | ICD-10-CM

## 2024-11-30 LAB
BILIRUB UR STRIP.AUTO-MCNC: NEGATIVE MG/DL
CHARACTER UR: CLEAR
COLOR, UA: YELLOW
GLUCOSE UR QL STRIP.AUTO: NEGATIVE MG/DL
KETONES UR QL STRIP.AUTO: NEGATIVE
NITRITE UR QL STRIP.AUTO: NEGATIVE
PH UR STRIP.AUTO: 6 [PH] (ref 5–9)
PROT UR STRIP.AUTO-MCNC: NEGATIVE MG/DL
RBC #/AREA URNS HPF: ABNORMAL /[HPF]
SP GR UR STRIP.AUTO: <= 1.005 (ref 1–1.03)
UROBILINOGEN, URINE: 0.2 EU/DL (ref 0.2–1)
WBC #/AREA URNS HPF: ABNORMAL /[HPF]

## 2024-11-30 PROCEDURE — 87086 URINE CULTURE/COLONY COUNT: CPT

## 2024-11-30 PROCEDURE — 81003 URINALYSIS AUTO W/O SCOPE: CPT

## 2024-11-30 PROCEDURE — 99213 OFFICE O/P EST LOW 20 MIN: CPT

## 2024-11-30 PROCEDURE — 99214 OFFICE O/P EST MOD 30 MIN: CPT

## 2024-11-30 RX ORDER — NITROFURANTOIN 25; 75 MG/1; MG/1
100 CAPSULE ORAL 2 TIMES DAILY
Qty: 20 CAPSULE | Refills: 0 | Status: SHIPPED | OUTPATIENT
Start: 2024-11-30 | End: 2024-12-10

## 2024-11-30 RX ORDER — PHENAZOPYRIDINE HYDROCHLORIDE 100 MG/1
100 TABLET, FILM COATED ORAL 3 TIMES DAILY PRN
Qty: 9 TABLET | Refills: 0 | Status: SHIPPED | OUTPATIENT
Start: 2024-11-30 | End: 2024-12-03

## 2024-11-30 ASSESSMENT — PAIN - FUNCTIONAL ASSESSMENT: PAIN_FUNCTIONAL_ASSESSMENT: NONE - DENIES PAIN

## 2024-11-30 NOTE — ED TRIAGE NOTES
Patient to room with c/o pain with urination beginning two days ago. C/o visible blood in urine beginning today. Urine specimen obtained.

## 2024-12-01 LAB — BACTERIA UR CULT: NORMAL

## 2024-12-01 NOTE — DISCHARGE INSTRUCTIONS
I sent in pyridium and  Macrobid for you.     Advised patient that they were diagnosed with a UTI.  Antibiotic therapy was prescribed and sent to pharmacy.  Advised patient to push water intake.  Advised patient to avoid carbonated beverages and sugary drinks, as this can increase symptoms of UTI.  Did send in Pyridium for patient for bladder spasms and dysuria.  Advised patient to ensure that they have eaten prior to taking this medication, or they will vomit.  Advised patient that this will turn their urine a bright orange and that it can stain clothing and contact lens.  Advised patient to monitor for worsening signs and symptoms such as fever, chills, flank pain, lower abdominal pain/pelvic pain.  Urine culture was sent, advised patient that we will contact them if a change in antibiotic is needed.

## 2024-12-02 LAB — BACTERIA UR CULT: NORMAL

## 2024-12-02 NOTE — ED PROVIDER NOTES
contact them if a change in antibiotic is needed.  Pt actively participated in plan of care.  Medication education provided.  Discussed follow-up with PCP.  Questions and concerns answered at this time.        PATIENT REFERRED TO:  Sana Yu APRN - CNP  [address] [facility] [id number]      DISCHARGE MEDICATIONS:  Discharge Medication List as of 11/30/2024  7:03 PM        START taking these medications    Details   phenazopyridine (PYRIDIUM) 100 MG tablet Take 1 tablet by mouth 3 times daily as needed for Pain, Disp-9 tablet, R-0Normal      nitrofurantoin, macrocrystal-monohydrate, (MACROBID) 100 MG capsule Take 1 capsule by mouth 2 times daily for 10 days, Disp-20 capsule, R-0Normal             Discharge Medication List as of 11/30/2024  7:03 PM          Discharge Medication List as of 11/30/2024  7:03 PM          SHANNON Ornelas CNP    (Please note that portions of this note were completed with a voice recognition program. Efforts were made to edit the dictations but occasionally words are mis-transcribed.)            Dee Dee Luevano APRN - CNP  12/01/24 0169

## 2024-12-03 ENCOUNTER — OFFICE VISIT (OUTPATIENT)
Dept: CARDIOLOGY CLINIC | Age: 54
End: 2024-12-03
Payer: COMMERCIAL

## 2024-12-03 VITALS
DIASTOLIC BLOOD PRESSURE: 72 MMHG | HEART RATE: 77 BPM | WEIGHT: 289.4 LBS | BODY MASS INDEX: 43.86 KG/M2 | HEIGHT: 68 IN | SYSTOLIC BLOOD PRESSURE: 104 MMHG

## 2024-12-03 DIAGNOSIS — I25.10 CAD S/P PERCUTANEOUS CORONARY ANGIOPLASTY: Primary | ICD-10-CM

## 2024-12-03 DIAGNOSIS — Z01.818 PRE-OP TESTING: ICD-10-CM

## 2024-12-03 DIAGNOSIS — I10 ESSENTIAL HYPERTENSION: ICD-10-CM

## 2024-12-03 DIAGNOSIS — Z98.61 CAD S/P PERCUTANEOUS CORONARY ANGIOPLASTY: Primary | ICD-10-CM

## 2024-12-03 PROCEDURE — 3017F COLORECTAL CA SCREEN DOC REV: CPT | Performed by: INTERNAL MEDICINE

## 2024-12-03 PROCEDURE — 99214 OFFICE O/P EST MOD 30 MIN: CPT | Performed by: INTERNAL MEDICINE

## 2024-12-03 PROCEDURE — G8417 CALC BMI ABV UP PARAM F/U: HCPCS | Performed by: INTERNAL MEDICINE

## 2024-12-03 PROCEDURE — G8484 FLU IMMUNIZE NO ADMIN: HCPCS | Performed by: INTERNAL MEDICINE

## 2024-12-03 PROCEDURE — 3078F DIAST BP <80 MM HG: CPT | Performed by: INTERNAL MEDICINE

## 2024-12-03 PROCEDURE — G8427 DOCREV CUR MEDS BY ELIG CLIN: HCPCS | Performed by: INTERNAL MEDICINE

## 2024-12-03 PROCEDURE — 1036F TOBACCO NON-USER: CPT | Performed by: INTERNAL MEDICINE

## 2024-12-03 PROCEDURE — 93000 ELECTROCARDIOGRAM COMPLETE: CPT | Performed by: INTERNAL MEDICINE

## 2024-12-03 PROCEDURE — 3074F SYST BP LT 130 MM HG: CPT | Performed by: INTERNAL MEDICINE

## 2024-12-03 RX ORDER — METOPROLOL SUCCINATE 25 MG/1
37.5 TABLET, EXTENDED RELEASE ORAL DAILY
Qty: 135 TABLET | Refills: 3 | Status: SHIPPED | OUTPATIENT
Start: 2024-12-03

## 2024-12-03 RX ORDER — NITROGLYCERIN 0.4 MG/1
0.4 TABLET SUBLINGUAL EVERY 5 MIN PRN
Qty: 25 TABLET | Refills: 3 | Status: SHIPPED | OUTPATIENT
Start: 2024-12-03

## 2024-12-03 RX ORDER — ASPIRIN 81 MG/1
81 TABLET ORAL DAILY
Qty: 90 TABLET | Refills: 3 | Status: CANCELLED | OUTPATIENT
Start: 2024-12-03

## 2024-12-03 RX ORDER — ATORVASTATIN CALCIUM 80 MG/1
80 TABLET, FILM COATED ORAL NIGHTLY
Qty: 90 TABLET | Refills: 3 | Status: SHIPPED | OUTPATIENT
Start: 2024-12-03

## 2024-12-03 NOTE — PATIENT INSTRUCTIONS
Your assistants today were SARAH Dave and MARCIE Clements  Your provider today was Dr. Baker  Phone number: [phone]

## 2024-12-03 NOTE — PROGRESS NOTES
Patient is here for cardiac clearance for dental work with [facility].     EKG completed today in office  Med list up to date and pharmacy verified    Denies chest pain, SOB, or swelling in BLE  Intermittent palpitations depending upon activity.       Asking for a refill on NTG    
Exam   Constitutional: Oriented to person, place, and time. Appears well-developed and well-nourished.   ENT: Moist mucous membranes. No bleeding. Tongue is midline.    Head: Normocephalic and atraumatic.   Eyes: EOM are normal. Pupils are equal, round, and reactive to light.   Neck: Normal range of motion. Neck supple. No JVD present.   Cardiovascular: Normal rate, regular rhythm, murmur, no rubs, and intact distal pulses.    Pulmonary/Chest: Effort normal and breath sounds normal. No respiratory distress. No wheezes. No rales.   Abdominal: Soft. Bowel sounds are normal. No distension. There is no tenderness.   Musculoskeletal: Normal range of motion.trace edema.   Neurological: Alert and oriented to person, place, and time. No cranial nerve deficit. Coordination normal.   Skin: Skin is warm and dry.   Psychiatric: Normal mood and affect.     Results for orders placed or performed during the hospital encounter of 11/30/24   Culture, Urine    Specimen: Urine, clean catch   Result Value Ref Range    Urine Culture, Routine No growth-preliminary No growth    Urinalysis   Result Value Ref Range    Glucose, Ur Negative NEGATIVE mg/dl    Bilirubin, Urine Negative NEGATIVE    Ketones, Urine Negative NEGATIVE    Specific Gravity, UA <=1.005 1.002 - 1.030    Blood, Urine Large (A) NEGATIVE    pH, Urine 6.00 5.0 - 9.0    Protein, UA Negative NEGATIVE mg/dl    Urobilinogen, Urine 0.20 0.2 - 1.0 eu/dl    Nitrite, Urine Negative NEGATIVE    Leukocyte Esterase, Urine Large (A) NEGATIVE    Color, UA Yellow STRAW-YELLOW    Character, Urine Clear CLEAR-SL CLOUD       I have individually reviewed the below cardiac tests below:    Echocardiogram 2022   Summary   Normal left ventricle size and systolic function. Ejection fraction was   estimated at 55-60%. There were no regional left ventricular wall motion   abnormalities and wall thickness was within normal limits.      Signature

## 2025-01-10 ENCOUNTER — APPOINTMENT (OUTPATIENT)
Dept: GENERAL RADIOLOGY | Age: 55
End: 2025-01-10
Payer: COMMERCIAL

## 2025-01-10 ENCOUNTER — HOSPITAL ENCOUNTER (EMERGENCY)
Age: 55
Discharge: HOME | End: 2025-01-10
Payer: COMMERCIAL

## 2025-01-10 VITALS
OXYGEN SATURATION: 100 % | WEIGHT: 289 LBS | DIASTOLIC BLOOD PRESSURE: 90 MMHG | HEIGHT: 68 IN | RESPIRATION RATE: 18 BRPM | BODY MASS INDEX: 43.8 KG/M2 | TEMPERATURE: 98.4 F | SYSTOLIC BLOOD PRESSURE: 121 MMHG | HEART RATE: 72 BPM

## 2025-01-10 DIAGNOSIS — M17.12 OSTEOARTHRITIS OF LEFT KNEE, UNSPECIFIED OSTEOARTHRITIS TYPE: Primary | ICD-10-CM

## 2025-01-10 PROCEDURE — 6370000000 HC RX 637 (ALT 250 FOR IP): Performed by: PHYSICIAN ASSISTANT

## 2025-01-10 PROCEDURE — 73564 X-RAY EXAM KNEE 4 OR MORE: CPT

## 2025-01-10 PROCEDURE — 99283 EMERGENCY DEPT VISIT LOW MDM: CPT

## 2025-01-10 RX ORDER — ACETAMINOPHEN 500 MG
1000 TABLET ORAL ONCE
Status: COMPLETED | OUTPATIENT
Start: 2025-01-10 | End: 2025-01-10

## 2025-01-10 RX ORDER — ACETAMINOPHEN 500 MG
1000 TABLET ORAL EVERY 6 HOURS PRN
Qty: 120 TABLET | Refills: 0 | Status: SHIPPED | OUTPATIENT
Start: 2025-01-10

## 2025-01-10 RX ADMIN — ACETAMINOPHEN 1000 MG: 500 TABLET ORAL at 14:59

## 2025-01-10 NOTE — ED PROVIDER NOTES
None    Imaging: None    Labs:      None                 Decision Rules/Clinical Scores utilized:  None            External Documentation Reviewed:         Previous patient encounter documents & history available on EMR was reviewed yes             See Formal Diagnostic Results above for the lab and radiology tests and orders.    3)  Treatment and Disposition         ED Reassessment: Stable         Case discussed with (none if left blank)         Shared Decision-Making was performed and disposition discussed with the        Patient/Family and questions answered yes         Social determinants of health impacting treatment or disposition:  None         Code Status:  N/A      Summary of Patient Presentation:      MDM     Amount and/or Complexity of Data Reviewed  Discussion of test results with the performing providers: no  Decide to obtain previous medical records or to obtain history from someone other than the patient: yes  Obtain history from someone other than the patient: no  Review and summarize past medical records: yes  Discuss the patient with other providers: no  Independent visualization of images, tracings, or specimens: no    Risk of Complications, Morbidity, and/or Mortality  Presenting problems: moderate  Diagnostic procedures: moderate  Management options: moderate    Patient Progress  Patient progress: stable    /     Vitals Reviewed:    Vitals:    01/10/25 1331   BP: (!) 121/90   Pulse: 72   Resp: 18   Temp: 98.4 °F (36.9 °C)   TempSrc: Oral   SpO2: 100%   Weight: 131.1 kg (289 lb)   Height: 1.727 m (5' 8\")       The patient was seen and examined. Appropriate diagnostic testing was performed and results reviewed with the patient.      The results of pertinent diagnostic studies and exam findings were discussed.     ED Medications administered this visit:  (None if blank)  Medications   acetaminophen (TYLENOL) tablet 1,000 mg (1,000 mg Oral Given 1/10/25 1459)         PROCEDURES: (None if

## 2025-01-10 NOTE — ED TRIAGE NOTES
Pt presents to the ED through lobby with c/o knee pain. Pt states she has been having left knee pain and unable to walk normally due to it. States she had a meniscus repair that was \"cut too short\" so she is not sure if that what is causing the pain. No known injury

## 2025-03-07 ENCOUNTER — HOSPITAL ENCOUNTER (EMERGENCY)
Age: 55
Discharge: HOME | End: 2025-03-08
Payer: COMMERCIAL

## 2025-03-07 VITALS
SYSTOLIC BLOOD PRESSURE: 107 MMHG | OXYGEN SATURATION: 100 % | TEMPERATURE: 98.3 F | HEART RATE: 99 BPM | DIASTOLIC BLOOD PRESSURE: 71 MMHG | RESPIRATION RATE: 19 BRPM

## 2025-03-07 DIAGNOSIS — N30.01 ACUTE CYSTITIS WITH HEMATURIA: Primary | ICD-10-CM

## 2025-03-07 PROCEDURE — 99283 EMERGENCY DEPT VISIT LOW MDM: CPT

## 2025-03-07 PROCEDURE — 81001 URINALYSIS AUTO W/SCOPE: CPT

## 2025-03-07 PROCEDURE — 87086 URINE CULTURE/COLONY COUNT: CPT

## 2025-03-07 ASSESSMENT — PAIN - FUNCTIONAL ASSESSMENT: PAIN_FUNCTIONAL_ASSESSMENT: 0-10

## 2025-03-07 ASSESSMENT — PAIN SCALES - GENERAL: PAINLEVEL_OUTOF10: 2

## 2025-03-08 LAB
BACTERIA URNS QL MICRO: ABNORMAL /HPF
BILIRUB UR QL STRIP.AUTO: NEGATIVE
CASTS #/AREA URNS LPF: ABNORMAL /LPF
CASTS 2: ABNORMAL /LPF
CHARACTER UR: CLEAR
COLOR, UA: COLORLESS
CRYSTALS URNS MICRO: ABNORMAL
EPITHELIAL CELLS, UA: ABNORMAL /HPF
GLUCOSE UR QL STRIP.AUTO: NEGATIVE MG/DL
HGB UR QL STRIP.AUTO: ABNORMAL
KETONES UR QL STRIP.AUTO: NEGATIVE
MISCELLANEOUS 2: ABNORMAL
NITRITE UR QL STRIP: NEGATIVE
PH UR STRIP.AUTO: 7 [PH] (ref 5–9)
PROT UR STRIP.AUTO-MCNC: 30 MG/DL
RBC URINE: ABNORMAL /HPF
RENAL EPI CELLS #/AREA URNS HPF: ABNORMAL /[HPF]
SP GR UR REFRACT.AUTO: < 1.005 (ref 1–1.03)
UROBILINOGEN, URINE: 0.2 EU/DL (ref 0–1)
WBC #/AREA URNS HPF: ABNORMAL /HPF
WBC #/AREA URNS HPF: ABNORMAL /[HPF]
YEAST LIKE FUNGI URNS QL MICRO: ABNORMAL

## 2025-03-08 PROCEDURE — 6370000000 HC RX 637 (ALT 250 FOR IP): Performed by: PHYSICIAN ASSISTANT

## 2025-03-08 RX ORDER — NITROFURANTOIN 25; 75 MG/1; MG/1
100 CAPSULE ORAL EVERY 12 HOURS SCHEDULED
Status: DISCONTINUED | OUTPATIENT
Start: 2025-03-08 | End: 2025-03-08 | Stop reason: HOSPADM

## 2025-03-08 RX ORDER — PHENAZOPYRIDINE HYDROCHLORIDE 200 MG/1
200 TABLET, FILM COATED ORAL 3 TIMES DAILY PRN
Qty: 6 TABLET | Refills: 0 | Status: SHIPPED | OUTPATIENT
Start: 2025-03-08 | End: 2025-03-11

## 2025-03-08 RX ORDER — NITROFURANTOIN 25; 75 MG/1; MG/1
100 CAPSULE ORAL 2 TIMES DAILY
Qty: 14 CAPSULE | Refills: 0 | Status: SHIPPED | OUTPATIENT
Start: 2025-03-08 | End: 2025-03-15

## 2025-03-08 RX ADMIN — NITROFURANTOIN (MONOHYDRATE/MACROCRYSTALS) 100 MG: 25; 75 CAPSULE ORAL at 00:56

## 2025-03-08 NOTE — ED PROVIDER NOTES
[facility] EMERGENCY DEPT      Pt Name: Charla Stapleton  MRN: [id number]  Birthdate 1970  Date of evaluation: 3/7/2025  Provider: Yoli Mai PA-C    CHIEF COMPLAINT       Chief Complaint   Patient presents with    Dysuria       Nurses Notes reviewed and I agree except as noted in the HPI.      HISTORY OF PRESENT ILLNESS    Charla Stapleton is a 54 y.o. female who presents through the lobby for UTI.  Patient reports trouble urinating yesterday.  She thought maybe she was not drinking enough water so increased her water intake.  Then tonight around 1930 she started to experience urinary frequency, dysuria, and a small amount of hematuria.  Patient reports this feels like similar UTIs she has had in the past.  Last UTI was in November for which she was prescribed Macrobid and Pyridium.  She reports those medicines did help her infection.  Patient denies fever, chills, abdominal pain, back pain, decrease in appetite, weakness, dizziness, nausea, vomiting, or other complaints.     PAST MEDICAL HISTORY    has a past medical history of Asthma, Bipolar 1 disorder ([facility]), Blood circulation, collateral, CAD (coronary artery disease), CHF (congestive heart failure) ([facility]), Curvature of spine, Cystitis, Diabetes mellitus ([facility]), DVT (deep venous thrombosis) ([facility]), Factor 5 Leiden mutation, heterozygous, GERD (gastroesophageal reflux disease), HTN, goal below 130/80, Hx of blood clots, Hx-TIA (transient ischemic attack), Hyperlipidemia, PE (pulmonary embolism), RA (rheumatoid arthritis) ([facility]), and Unspecified cerebral artery occlusion with cerebral infarction.    SURGICAL HISTORY      has a past surgical history that includes Tubal ligation (2003); Cholecystectomy (1992); Knee arthroscopy (2007&2010); Tympanostomy tube placement; Tonsillectomy; Cardiac catheterization (02/2015); Foot Debridement (Right, 09/30/2019); Coronary angioplasty with stent; lipoma resection; Dilation and curettage of uterus (N/A, 11/14/2022);

## 2025-03-10 LAB
BACTERIA UR CULT: ABNORMAL
ORGANISM: ABNORMAL

## 2025-03-26 RX ORDER — NITROGLYCERIN 0.4 MG/1
TABLET SUBLINGUAL
Qty: 25 TABLET | Refills: 3 | Status: SHIPPED | OUTPATIENT
Start: 2025-03-26

## 2025-04-24 ENCOUNTER — HOSPITAL ENCOUNTER (OUTPATIENT)
Dept: WOMENS IMAGING | Age: 55
Discharge: HOME | End: 2025-04-24
Payer: COMMERCIAL

## 2025-04-24 VITALS — WEIGHT: 289.02 LBS | BODY MASS INDEX: 43.8 KG/M2 | HEIGHT: 68 IN

## 2025-04-24 DIAGNOSIS — Z12.31 VISIT FOR SCREENING MAMMOGRAM: ICD-10-CM

## 2025-04-24 PROCEDURE — 77063 BREAST TOMOSYNTHESIS BI: CPT

## 2025-05-12 RX ORDER — RIVAROXABAN 20 MG/1
10 TABLET, FILM COATED ORAL DAILY
Qty: 30 TABLET | Status: CANCELLED | OUTPATIENT
Start: 2025-05-12

## 2025-05-12 NOTE — TELEPHONE ENCOUNTER
Spoke to patient.  Patient states she is on Xarelto for factor 5.  Advised patient hematology needs to refill.  Patient states she no longer following with hematology.  Advised patient to check with PCP.   Patient states Dr. Baker has refilled Xarelto in the past?  Ok to refill?  If so, Xarelto 10 mg or 20 mg?

## 2025-05-12 NOTE — TELEPHONE ENCOUNTER
Charla Stapleton called requesting a refill on the following medications:    PATIENT TAKES 10MG, STATES SHE GETS 10 MG TAB  Requested Prescriptions     Pending Prescriptions Disp Refills    XARELTO 20 MG TABS tablet 30 tablet      Sig: Take 1 tablet by mouth daily     Pharmacy verified:  [facility]      Date of last visit: 12-03-24  Date of next visit (if applicable): 12-09-25

## 2025-06-14 ENCOUNTER — APPOINTMENT (OUTPATIENT)
Dept: CT IMAGING | Age: 55
End: 2025-06-14
Payer: COMMERCIAL

## 2025-06-14 ENCOUNTER — APPOINTMENT (OUTPATIENT)
Dept: GENERAL RADIOLOGY | Age: 55
End: 2025-06-14
Payer: COMMERCIAL

## 2025-06-14 ENCOUNTER — HOSPITAL ENCOUNTER (EMERGENCY)
Age: 55
Discharge: HOME | End: 2025-06-14
Attending: EMERGENCY MEDICINE
Payer: COMMERCIAL

## 2025-06-14 VITALS
WEIGHT: 293 LBS | HEIGHT: 68 IN | TEMPERATURE: 97.6 F | DIASTOLIC BLOOD PRESSURE: 77 MMHG | RESPIRATION RATE: 20 BRPM | SYSTOLIC BLOOD PRESSURE: 168 MMHG | HEART RATE: 78 BPM | OXYGEN SATURATION: 98 % | BODY MASS INDEX: 44.41 KG/M2

## 2025-06-14 DIAGNOSIS — R20.2 PARESTHESIA: ICD-10-CM

## 2025-06-14 DIAGNOSIS — R00.2 PALPITATIONS: Primary | ICD-10-CM

## 2025-06-14 LAB
ALBUMIN SERPL BCG-MCNC: 4.1 G/DL (ref 3.4–4.9)
ALP SERPL-CCNC: 95 U/L (ref 38–126)
ALT SERPL W/O P-5'-P-CCNC: 14 U/L (ref 10–35)
ANION GAP SERPL CALC-SCNC: 14 MEQ/L (ref 8–16)
AST SERPL-CCNC: 22 U/L (ref 10–35)
BASOPHILS ABSOLUTE: 0 THOU/MM3 (ref 0–0.1)
BASOPHILS NFR BLD AUTO: 0.9 %
BILIRUB SERPL-MCNC: 0.3 MG/DL (ref 0.3–1.2)
BILIRUB UR QL STRIP.AUTO: NEGATIVE
BUN SERPL-MCNC: 10 MG/DL (ref 8–23)
CA-I BLD ISE-SCNC: 1.1 MMOL/L (ref 1.12–1.32)
CALCIUM SERPL-MCNC: 9.6 MG/DL (ref 8.6–10)
CHARACTER UR: CLEAR
CHLORIDE SERPL-SCNC: 102 MEQ/L (ref 98–111)
CO2 SERPL-SCNC: 23 MEQ/L (ref 22–29)
COLOR, UA: YELLOW
CREAT SERPL-MCNC: 0.6 MG/DL (ref 0.5–0.9)
DEPRECATED RDW RBC AUTO: 45.7 FL (ref 35–45)
EOSINOPHIL NFR BLD AUTO: 2 %
EOSINOPHILS ABSOLUTE: 0.1 THOU/MM3 (ref 0–0.4)
ERYTHROCYTE [DISTWIDTH] IN BLOOD BY AUTOMATED COUNT: 13.5 % (ref 11.5–14.5)
FLUAV RNA RESP QL NAA+PROBE: NOT DETECTED
FLUBV RNA RESP QL NAA+PROBE: NOT DETECTED
GFR SERPL CREATININE-BSD FRML MDRD: > 90 ML/MIN/1.73M2
GLUCOSE SERPL-MCNC: 137 MG/DL (ref 74–109)
GLUCOSE UR QL STRIP.AUTO: NEGATIVE MG/DL
HCT VFR BLD AUTO: 43.1 % (ref 37–47)
HGB BLD-MCNC: 14 GM/DL (ref 12–16)
HGB UR QL STRIP.AUTO: NEGATIVE
IMM GRANULOCYTES # BLD AUTO: 0.01 THOU/MM3 (ref 0–0.07)
IMM GRANULOCYTES NFR BLD AUTO: 0.2 %
KETONES UR QL STRIP.AUTO: NEGATIVE
LYMPHOCYTES ABSOLUTE: 1.4 THOU/MM3 (ref 1–4.8)
LYMPHOCYTES NFR BLD AUTO: 25.1 %
MAGNESIUM SERPL-MCNC: 2.2 MG/DL (ref 1.6–2.6)
MCH RBC QN AUTO: 30 PG (ref 26–33)
MCHC RBC AUTO-ENTMCNC: 32.5 GM/DL (ref 32.2–35.5)
MCV RBC AUTO: 92.5 FL (ref 81–99)
MONOCYTES ABSOLUTE: 0.4 THOU/MM3 (ref 0.4–1.3)
MONOCYTES NFR BLD AUTO: 6.9 %
NEUTROPHILS ABSOLUTE: 3.6 THOU/MM3 (ref 1.8–7.7)
NEUTROPHILS NFR BLD AUTO: 64.9 %
NITRITE UR QL STRIP: NEGATIVE
NRBC BLD AUTO-RTO: 0 /100 WBC
NT-PROBNP SERPL IA-MCNC: 48 PG/ML (ref 0–124)
OSMOLALITY SERPL CALC.SUM OF ELEC: 278.7 MOSMOL/KG (ref 275–300)
PH UR STRIP.AUTO: 7 [PH] (ref 5–9)
PLATELET # BLD AUTO: 221 THOU/MM3 (ref 130–400)
PMV BLD AUTO: 9.5 FL (ref 9.4–12.4)
POTASSIUM SERPL-SCNC: 4.3 MEQ/L (ref 3.5–5.2)
PROT SERPL-MCNC: 7.3 G/DL (ref 6.4–8.3)
PROT UR STRIP.AUTO-MCNC: NEGATIVE MG/DL
RBC # BLD AUTO: 4.66 MILL/MM3 (ref 4.2–5.4)
SARS-COV-2 RNA RESP QL NAA+PROBE: NOT DETECTED
SODIUM SERPL-SCNC: 139 MEQ/L (ref 135–145)
SP GR UR REFRACT.AUTO: 1 (ref 1–1.03)
TROPONIN, HIGH SENSITIVITY: < 6 NG/L (ref 0–12)
TSH SERPL DL<=0.05 MIU/L-ACNC: 1.34 UIU/ML (ref 0.27–4.2)
UROBILINOGEN, URINE: 0.2 EU/DL (ref 0–1)
WBC # BLD AUTO: 5.5 THOU/MM3 (ref 4.8–10.8)
WBC #/AREA URNS HPF: NEGATIVE /[HPF]

## 2025-06-14 PROCEDURE — 83880 ASSAY OF NATRIURETIC PEPTIDE: CPT

## 2025-06-14 PROCEDURE — 99285 EMERGENCY DEPT VISIT HI MDM: CPT

## 2025-06-14 PROCEDURE — 85025 COMPLETE CBC W/AUTO DIFF WBC: CPT

## 2025-06-14 PROCEDURE — 83735 ASSAY OF MAGNESIUM: CPT

## 2025-06-14 PROCEDURE — 80053 COMPREHEN METABOLIC PANEL: CPT

## 2025-06-14 PROCEDURE — 82330 ASSAY OF CALCIUM: CPT

## 2025-06-14 PROCEDURE — 71275 CT ANGIOGRAPHY CHEST: CPT

## 2025-06-14 PROCEDURE — 71045 X-RAY EXAM CHEST 1 VIEW: CPT

## 2025-06-14 PROCEDURE — 84484 ASSAY OF TROPONIN QUANT: CPT

## 2025-06-14 PROCEDURE — 81003 URINALYSIS AUTO W/O SCOPE: CPT

## 2025-06-14 PROCEDURE — 6360000004 HC RX CONTRAST MEDICATION: Performed by: EMERGENCY MEDICINE

## 2025-06-14 PROCEDURE — 36415 COLL VENOUS BLD VENIPUNCTURE: CPT

## 2025-06-14 PROCEDURE — 87636 SARSCOV2 & INF A&B AMP PRB: CPT

## 2025-06-14 PROCEDURE — 84443 ASSAY THYROID STIM HORMONE: CPT

## 2025-06-14 PROCEDURE — 93005 ELECTROCARDIOGRAM TRACING: CPT | Performed by: EMERGENCY MEDICINE

## 2025-06-14 RX ORDER — IOPAMIDOL 755 MG/ML
80 INJECTION, SOLUTION INTRAVASCULAR
Status: COMPLETED | OUTPATIENT
Start: 2025-06-14 | End: 2025-06-14

## 2025-06-14 RX ADMIN — IOPAMIDOL 80 ML: 755 INJECTION, SOLUTION INTRAVENOUS at 19:55

## 2025-06-14 ASSESSMENT — PAIN - FUNCTIONAL ASSESSMENT
PAIN_FUNCTIONAL_ASSESSMENT: NONE - DENIES PAIN
PAIN_FUNCTIONAL_ASSESSMENT: NONE - DENIES PAIN

## 2025-06-14 NOTE — ED TRIAGE NOTES
Pt presents to ED with c/o palpitaions and tingling in fingers and toes. Pt states these palpitations started ysterday. Pt states she was sitting when they started. Pt states that she has noticed tingling in toes and fingers for the last couple of days. Pt reports taking all medications as prescribed. Pt reports of DVT and PE. EKG complete.

## 2025-06-14 NOTE — ED PROVIDER NOTES
I performed a history and physical examination of the patient and discussed management with the resident. I reviewed the resident’s note and agree with the documented findings and plan of care. Any areas of disagreement are noted on the chart. I was personally present for the key portions of any procedures. I have documented in the chart those procedures where I was not present during the key portions. I have reviewed the emergency nurses triage note. I agree with the chief complaint, past medical history, past surgical history, allergies, medications, social and family history as documented unless otherwise noted below. Documentation of the HPI, Physical Exam and Medical Decision Making performed by medical students or scribes is based on my personal performance of the HPI, PE and MDM. For Phys Assistant/ Nurse Practitioner cases/documentation I have personally evaluated this patient and have completed at least one if not all key elements of the E/M (history, physical exam, and MDM). My findings are as noted below.    In other words, I personally saw and examined the patient I have reviewed and agreed with the resident findings including all diagnostic interpretations and treatment plans as written.  I was present for the key portion of any procedures performed and the inclusive time noted in any critical care statement.    Patient presents today for shortness of breath.  She states she has chest pain on and off.  She states she feels like she has slight \"thumping in her chest\".  Patient states she has a history of DVTs and PEs.  She also has a history of cardiac disease she states she has 2 stents.  Patient states that she has been having weight gain in her lower extremities.  Patient came in today for concern for blood clots.  Patient is on Coumadin.  Patient is otherwise resting comfortably on cot no apparent distress no other physical complaints at this time.    EKG reveals normal sinus rhythm normal axis  ventricular rate of 91 SD interval 196 QRS duration 100 QT interval 368 QTc of 452      CTA CHEST W WO CONTRAST   Final Result   No pulmonary emboli or pulmonary infiltrates.               **This report has been created using voice recognition software. It may contain   minor errors which are inherent in voice recognition technology.**            Electronically signed by Dr Aki Sheldon      XR CHEST PORTABLE   Final Result   There is no acute intrathoracic process.               **This report has been created using voice recognition software. It may contain   minor errors which are inherent in voice recognition technology.**      Electronically signed by Dr Aki Sheldon        Labs Reviewed   COMPREHENSIVE METABOLIC PANEL - Abnormal; Notable for the following components:       Result Value    Glucose 137 (*)     All other components within normal limits   CBC WITH AUTO DIFFERENTIAL - Abnormal; Notable for the following components:    RDW-SD 45.7 (*)     All other components within normal limits   CALCIUM, IONIZED - Abnormal; Notable for the following components:    Calcium, Ionized 1.10 (*)     All other components within normal limits   COVID-19 & INFLUENZA COMBO   MAGNESIUM   BRAIN NATRIURETIC PEPTIDE   TSH   TROPONIN   URINALYSIS WITH REFLEX TO CULTURE   ANION GAP   GLOMERULAR FILTRATION RATE, ESTIMATED   OSMOLALITY       Not applicable    Final diagnoses:   Palpitations   Paresthesia   .  I have seen this patient with the resident Dr. Hope and agree with her assessment and plan     Adonay Vazquez, DO  06/14/25 2026

## 2025-06-14 NOTE — ED PROVIDER NOTES
Pt Name: Charla Stapleton  MRN: [id number]  Birthdate 1970  Date of evaluation: 2025  ED Resident: Radha Hope MD  ED Attending: Dr. Vazquez    CHIEF COMPLAINT       Chief Complaint   Patient presents with    Palpitations     History obtained from the patient.    HISTORY OF PRESENT ILLNESS    HPI  Charla Stapleton is a 54 y.o. female who presents to the emergency department for evaluation of palpitations.    The patient states that she has been having palpitations for 1 day, and paresthesias in her fingers and toes, currently the paresthesias are in her right foot and left hand, and she is having some swelling in her left ankle.  She has a chronic rash on her left ankle, but she is concerned about the swelling because she has a history of multiple DVTs and PEs.  She is taking Xarelto currently and has had 2 stents placed in her heart.  She states that she had palpitations like this when she had a PE and is worried that she might have a PE.  Patient is very tearful, states that her boyfriend  recently and that may be the stress is causing some of her symptoms.    Normal p.o. intake without nausea or vomiting, no abdominal pain, denies chest pain, but endorses shortness of breath on exertion.  Denies fever    ALLERGIES     Allergies   Allergen Reactions    Codeine Hives     + Nausea vomiting    Demerol      vomiting    Doxycycline Itching    Ultram [Tramadol Hcl] Other (See Comments)     Dizziness     Toradol [Ketorolac Tromethamine] Hallucinations     Rash, vomit         PHYSICAL EXAM     Additional Vital Signs:  Vitals:    25   BP: (!) 168/77   Pulse: 84   Resp: 22   Temp:    SpO2: 98%     Physical Exam  Constitutional:       Appearance: She is obese. She is ill-appearing (Chronically).   HENT:      Head: Normocephalic and atraumatic.      Nose: Nose normal.      Mouth/Throat:      Mouth: Mucous membranes are moist.      Pharynx: Oropharynx is clear.   Eyes:      Extraocular

## 2025-06-14 NOTE — ED NOTES
Pt ambulated back to room. Urine obtained and sent. Pt taken to CT at this time in stable condition.

## 2025-06-15 LAB
EKG ATRIAL RATE: 91 BPM
EKG P AXIS: 42 DEGREES
EKG P-R INTERVAL: 196 MS
EKG Q-T INTERVAL: 368 MS
EKG QRS DURATION: 100 MS
EKG QTC CALCULATION (BAZETT): 452 MS
EKG R AXIS: 37 DEGREES
EKG T AXIS: 62 DEGREES
EKG VENTRICULAR RATE: 91 BPM

## 2025-06-15 PROCEDURE — 93010 ELECTROCARDIOGRAM REPORT: CPT | Performed by: INTERNAL MEDICINE

## 2025-06-15 NOTE — DISCHARGE INSTRUCTIONS
We did not identify a life-threatening cause of your symptoms, but it is very important that you follow-up with your cardiologist and primary doctor in the next week.  If your symptoms worsen, return to the ER.

## 2025-06-16 ENCOUNTER — TELEPHONE (OUTPATIENT)
Dept: ADMINISTRATIVE | Age: 55
End: 2025-06-16

## 2025-06-16 NOTE — TELEPHONE ENCOUNTER
Patient called today saying she was in the ER, she wanted to know if you have looked over her ER visit notes and if so do you want to see her in the office.

## 2025-06-16 NOTE — TELEPHONE ENCOUNTER
Palpitations reported, if persist patient will benefit from cardiac event monitor   If recurrent cardiac complaints reported, may schedule office visit  Patient should follow ER instructions

## 2025-06-16 NOTE — TELEPHONE ENCOUNTER
Spoke to patient.  Palpitations have subsided.  Advised to call the office with further complaints.

## 2025-06-24 RX ORDER — ATORVASTATIN CALCIUM 80 MG/1
80 TABLET, FILM COATED ORAL NIGHTLY
Qty: 90 TABLET | Refills: 1 | Status: SHIPPED | OUTPATIENT
Start: 2025-06-24

## 2025-06-24 RX ORDER — CLOPIDOGREL BISULFATE 75 MG/1
75 TABLET ORAL DAILY
Qty: 90 TABLET | Refills: 1 | Status: SHIPPED | OUTPATIENT
Start: 2025-06-24

## 2025-06-24 NOTE — TELEPHONE ENCOUNTER
Charla Stapleton called requesting a refill on the following medications:  Requested Prescriptions     Pending Prescriptions Disp Refills    atorvastatin (LIPITOR) 80 MG tablet 90 tablet 3     Sig: Take 1 tablet by mouth at bedtime    clopidogrel (PLAVIX) 75 MG tablet 90 tablet 3     Sig: Take 1 tablet by mouth daily     Pharmacy verified: [facility]   .pv      Date of last visit: 12/03/2024  Date of next visit (if applicable): 12/09/2025              
Check here if all serologies below were negative, non-reactive or immune. Otherwise select appropriate status.

## 2025-08-07 ENCOUNTER — HOSPITAL ENCOUNTER (EMERGENCY)
Age: 55
Discharge: HOME | End: 2025-08-07
Payer: COMMERCIAL

## 2025-08-07 VITALS
SYSTOLIC BLOOD PRESSURE: 155 MMHG | BODY MASS INDEX: 47.9 KG/M2 | WEIGHT: 293 LBS | TEMPERATURE: 98 F | HEART RATE: 79 BPM | DIASTOLIC BLOOD PRESSURE: 81 MMHG | OXYGEN SATURATION: 100 % | RESPIRATION RATE: 18 BRPM

## 2025-08-07 DIAGNOSIS — S91.302A WOUND OF LEFT FOOT: ICD-10-CM

## 2025-08-07 DIAGNOSIS — H61.23 EXCESSIVE EAR WAX, BILATERAL: Primary | ICD-10-CM

## 2025-08-07 PROCEDURE — 99213 OFFICE O/P EST LOW 20 MIN: CPT | Performed by: NURSE PRACTITIONER

## 2025-08-07 PROCEDURE — 69209 REMOVE IMPACTED EAR WAX UNI: CPT

## 2025-08-07 PROCEDURE — 99213 OFFICE O/P EST LOW 20 MIN: CPT

## 2025-08-07 RX ORDER — SULFAMETHOXAZOLE AND TRIMETHOPRIM 800; 160 MG/1; MG/1
1 TABLET ORAL 2 TIMES DAILY
Qty: 20 TABLET | Refills: 0 | Status: SHIPPED | OUTPATIENT
Start: 2025-08-07 | End: 2025-08-17

## 2025-08-07 ASSESSMENT — PAIN DESCRIPTION - LOCATION: LOCATION: EAR

## 2025-08-07 ASSESSMENT — PAIN SCALES - GENERAL: PAINLEVEL_OUTOF10: 7

## 2025-08-07 ASSESSMENT — PAIN - FUNCTIONAL ASSESSMENT: PAIN_FUNCTIONAL_ASSESSMENT: 0-10

## 2025-08-07 ASSESSMENT — PAIN DESCRIPTION - ORIENTATION: ORIENTATION: RIGHT;LEFT

## 2025-08-07 ASSESSMENT — PAIN DESCRIPTION - DESCRIPTORS: DESCRIPTORS: PRESSURE

## 2025-08-07 ASSESSMENT — PAIN DESCRIPTION - PAIN TYPE: TYPE: ACUTE PAIN

## 2025-08-07 ASSESSMENT — PAIN DESCRIPTION - FREQUENCY: FREQUENCY: CONTINUOUS

## 2025-08-25 ENCOUNTER — HOSPITAL ENCOUNTER (OUTPATIENT)
Dept: PHYSICAL THERAPY | Age: 55
Setting detail: THERAPIES SERIES
Discharge: HOME | End: 2025-08-25
Payer: COMMERCIAL

## 2025-08-25 PROCEDURE — 97110 THERAPEUTIC EXERCISES: CPT

## 2025-08-25 PROCEDURE — 97162 PT EVAL MOD COMPLEX 30 MIN: CPT

## 2025-09-06 ENCOUNTER — HOSPITAL ENCOUNTER (EMERGENCY)
Age: 55
Discharge: HOME | End: 2025-09-06
Attending: EMERGENCY MEDICINE
Payer: COMMERCIAL

## 2025-09-06 ENCOUNTER — APPOINTMENT (OUTPATIENT)
Dept: GENERAL RADIOLOGY | Age: 55
End: 2025-09-06
Payer: COMMERCIAL

## 2025-09-06 VITALS
OXYGEN SATURATION: 95 % | SYSTOLIC BLOOD PRESSURE: 147 MMHG | RESPIRATION RATE: 16 BRPM | TEMPERATURE: 100.3 F | DIASTOLIC BLOOD PRESSURE: 74 MMHG | WEIGHT: 293 LBS | HEIGHT: 68 IN | HEART RATE: 90 BPM | BODY MASS INDEX: 44.41 KG/M2

## 2025-09-06 DIAGNOSIS — L03.116 CELLULITIS OF LEFT LOWER EXTREMITY: Primary | ICD-10-CM

## 2025-09-06 LAB
ANION GAP SERPL CALC-SCNC: 15 MEQ/L (ref 8–16)
BASOPHILS ABSOLUTE: 0 THOU/MM3 (ref 0–0.1)
BASOPHILS NFR BLD AUTO: 0.7 %
BILIRUB UR QL STRIP.AUTO: NEGATIVE
BUN SERPL-MCNC: 10 MG/DL (ref 8–23)
CALCIUM SERPL-MCNC: 9.2 MG/DL (ref 8.5–10.5)
CHARACTER UR: CLEAR
CHLORIDE SERPL-SCNC: 105 MEQ/L (ref 98–111)
CO2 SERPL-SCNC: 22 MEQ/L (ref 22–29)
COLOR, UA: YELLOW
CREAT SERPL-MCNC: 0.8 MG/DL (ref 0.5–0.9)
CRP SERPL-MCNC: 1.23 MG/DL (ref 0–0.5)
DEPRECATED MEAN GLUCOSE BLD GHB EST-ACNC: 120 MG/DL (ref 70–126)
DEPRECATED RDW RBC AUTO: 45.1 FL (ref 35–45)
EKG ATRIAL RATE: 105 BPM
EKG P AXIS: 56 DEGREES
EKG P-R INTERVAL: 216 MS
EKG Q-T INTERVAL: 332 MS
EKG QRS DURATION: 94 MS
EKG QTC CALCULATION (BAZETT): 438 MS
EKG R AXIS: 3 DEGREES
EKG T AXIS: 41 DEGREES
EKG VENTRICULAR RATE: 105 BPM
EOSINOPHIL NFR BLD AUTO: 1 %
EOSINOPHILS ABSOLUTE: 0.1 THOU/MM3 (ref 0–0.4)
ERYTHROCYTE [DISTWIDTH] IN BLOOD BY AUTOMATED COUNT: 13.9 % (ref 11.5–14.5)
ERYTHROCYTE [SEDIMENTATION RATE] IN BLOOD BY WESTERGREN METHOD: 32 MM/HR (ref 0–20)
FLUAV RNA RESP QL NAA+PROBE: NOT DETECTED
FLUBV RNA RESP QL NAA+PROBE: NOT DETECTED
GFR SERPL CREATININE-BSD FRML MDRD: 88 ML/MIN/1.73M2
GLUCOSE SERPL-MCNC: 136 MG/DL (ref 74–109)
GLUCOSE UR QL STRIP.AUTO: NEGATIVE MG/DL
HBA1C MFR BLD HPLC: 6 % (ref 4–6)
HCT VFR BLD AUTO: 39.5 % (ref 37–47)
HGB BLD-MCNC: 13.3 GM/DL (ref 12–16)
HGB UR QL STRIP.AUTO: NEGATIVE
IMM GRANULOCYTES # BLD AUTO: 0.02 THOU/MM3 (ref 0–0.07)
IMM GRANULOCYTES NFR BLD AUTO: 0.3 %
KETONES UR QL STRIP.AUTO: NEGATIVE
LACTATE SERPL-SCNC: 2.2 MMOL/L (ref 0.5–2.2)
LYMPHOCYTES ABSOLUTE: 0.7 THOU/MM3 (ref 1–4.8)
LYMPHOCYTES NFR BLD AUTO: 11.6 %
MCH RBC QN AUTO: 30 PG (ref 26–33)
MCHC RBC AUTO-ENTMCNC: 33.7 GM/DL (ref 32.2–35.5)
MCV RBC AUTO: 89.2 FL (ref 81–99)
MONOCYTES ABSOLUTE: 0.3 THOU/MM3 (ref 0.4–1.3)
MONOCYTES NFR BLD AUTO: 4.6 %
NEUTROPHILS ABSOLUTE: 4.7 THOU/MM3 (ref 1.8–7.7)
NEUTROPHILS NFR BLD AUTO: 81.8 %
NITRITE UR QL STRIP: NEGATIVE
NRBC BLD AUTO-RTO: 0 /100 WBC
OSMOLALITY SERPL CALC.SUM OF ELEC: 284.2 MOSMOL/KG (ref 275–300)
PH UR STRIP.AUTO: 7.5 [PH] (ref 5–9)
PLATELET # BLD AUTO: 179 THOU/MM3 (ref 130–400)
PMV BLD AUTO: 9.6 FL (ref 9.4–12.4)
POTASSIUM SERPL-SCNC: 4.4 MEQ/L (ref 3.5–5.2)
PROCALCITONIN SERPL IA-MCNC: 0.05 NG/ML (ref 0.01–0.09)
PROT UR STRIP.AUTO-MCNC: NEGATIVE MG/DL
RBC # BLD AUTO: 4.43 MILL/MM3 (ref 4.2–5.4)
SARS-COV-2 RNA RESP QL NAA+PROBE: NOT DETECTED
SODIUM SERPL-SCNC: 142 MEQ/L (ref 135–145)
SP GR UR REFRACT.AUTO: 1 (ref 1–1.03)
TROPONIN, HIGH SENSITIVITY: < 6 NG/L (ref 0–12)
UROBILINOGEN, URINE: 0.2 EU/DL (ref 0–1)
WBC # BLD AUTO: 5.8 THOU/MM3 (ref 4.8–10.8)
WBC #/AREA URNS HPF: NEGATIVE /[HPF]

## 2025-09-06 PROCEDURE — 71046 X-RAY EXAM CHEST 2 VIEWS: CPT

## 2025-09-06 PROCEDURE — 85651 RBC SED RATE NONAUTOMATED: CPT

## 2025-09-06 PROCEDURE — 6370000000 HC RX 637 (ALT 250 FOR IP)

## 2025-09-06 PROCEDURE — 73630 X-RAY EXAM OF FOOT: CPT

## 2025-09-06 PROCEDURE — 83036 HEMOGLOBIN GLYCOSYLATED A1C: CPT

## 2025-09-06 PROCEDURE — 36415 COLL VENOUS BLD VENIPUNCTURE: CPT

## 2025-09-06 PROCEDURE — 87040 BLOOD CULTURE FOR BACTERIA: CPT

## 2025-09-06 PROCEDURE — 93010 ELECTROCARDIOGRAM REPORT: CPT | Performed by: INTERNAL MEDICINE

## 2025-09-06 PROCEDURE — 87636 SARSCOV2 & INF A&B AMP PRB: CPT

## 2025-09-06 PROCEDURE — 2580000003 HC RX 258

## 2025-09-06 PROCEDURE — 84484 ASSAY OF TROPONIN QUANT: CPT

## 2025-09-06 PROCEDURE — 6360000002 HC RX W HCPCS

## 2025-09-06 PROCEDURE — 93005 ELECTROCARDIOGRAM TRACING: CPT | Performed by: STUDENT IN AN ORGANIZED HEALTH CARE EDUCATION/TRAINING PROGRAM

## 2025-09-06 PROCEDURE — 86140 C-REACTIVE PROTEIN: CPT

## 2025-09-06 PROCEDURE — 80048 BASIC METABOLIC PNL TOTAL CA: CPT

## 2025-09-06 PROCEDURE — 84145 PROCALCITONIN (PCT): CPT

## 2025-09-06 PROCEDURE — 81003 URINALYSIS AUTO W/O SCOPE: CPT

## 2025-09-06 PROCEDURE — 83605 ASSAY OF LACTIC ACID: CPT

## 2025-09-06 PROCEDURE — 85025 COMPLETE CBC W/AUTO DIFF WBC: CPT

## 2025-09-06 RX ORDER — CLINDAMYCIN HYDROCHLORIDE 150 MG/1
300 CAPSULE ORAL ONCE
Status: COMPLETED | OUTPATIENT
Start: 2025-09-06 | End: 2025-09-06

## 2025-09-06 RX ORDER — 0.9 % SODIUM CHLORIDE 0.9 %
1000 INTRAVENOUS SOLUTION INTRAVENOUS ONCE
Status: COMPLETED | OUTPATIENT
Start: 2025-09-06 | End: 2025-09-06

## 2025-09-06 RX ORDER — PROCHLORPERAZINE EDISYLATE 5 MG/ML
10 INJECTION INTRAMUSCULAR; INTRAVENOUS ONCE
Status: COMPLETED | OUTPATIENT
Start: 2025-09-06 | End: 2025-09-06

## 2025-09-06 RX ORDER — METRONIDAZOLE 500 MG/100ML
500 INJECTION, SOLUTION INTRAVENOUS ONCE
Status: DISCONTINUED | OUTPATIENT
Start: 2025-09-06 | End: 2025-09-06

## 2025-09-06 RX ORDER — IBUPROFEN 200 MG
400 TABLET ORAL ONCE
Status: COMPLETED | OUTPATIENT
Start: 2025-09-06 | End: 2025-09-06

## 2025-09-06 RX ORDER — DIPHENHYDRAMINE HYDROCHLORIDE 50 MG/ML
25 INJECTION, SOLUTION INTRAMUSCULAR; INTRAVENOUS ONCE
Status: COMPLETED | OUTPATIENT
Start: 2025-09-06 | End: 2025-09-06

## 2025-09-06 RX ORDER — CLINDAMYCIN HYDROCHLORIDE 300 MG/1
CAPSULE ORAL
COMMUNITY
Start: 2025-09-05

## 2025-09-06 RX ADMIN — PROCHLORPERAZINE EDISYLATE 10 MG: 5 INJECTION INTRAMUSCULAR; INTRAVENOUS at 02:57

## 2025-09-06 RX ADMIN — PIPERACILLIN AND TAZOBACTAM 4500 MG: 4; .5 INJECTION, POWDER, LYOPHILIZED, FOR SOLUTION INTRAVENOUS at 03:03

## 2025-09-06 RX ADMIN — IBUPROFEN 400 MG: 200 TABLET, FILM COATED ORAL at 02:57

## 2025-09-06 RX ADMIN — CLINDAMYCIN HYDROCHLORIDE 300 MG: 150 CAPSULE ORAL at 02:12

## 2025-09-06 RX ADMIN — DIPHENHYDRAMINE HYDROCHLORIDE 25 MG: 50 INJECTION INTRAMUSCULAR; INTRAVENOUS at 02:54

## 2025-09-06 RX ADMIN — SODIUM CHLORIDE 1000 ML: 0.9 INJECTION, SOLUTION INTRAVENOUS at 02:11

## 2025-09-06 ASSESSMENT — PAIN DESCRIPTION - LOCATION: LOCATION: TOE (COMMENT WHICH ONE)

## 2025-09-06 ASSESSMENT — PAIN SCALES - GENERAL: PAINLEVEL_OUTOF10: 7

## 2025-09-06 ASSESSMENT — PAIN DESCRIPTION - DESCRIPTORS: DESCRIPTORS: ACHING

## 2025-09-06 ASSESSMENT — PAIN - FUNCTIONAL ASSESSMENT: PAIN_FUNCTIONAL_ASSESSMENT: 0-10

## 2025-09-06 ASSESSMENT — PAIN DESCRIPTION - ORIENTATION: ORIENTATION: LEFT

## 2025-09-07 LAB
BACTERIA BLD AEROBE CULT: NORMAL
BACTERIA BLD AEROBE CULT: NORMAL

## (undated) DEVICE — 35 ML SYRINGE LUER-LOCK TIP: Brand: MONOJECT

## (undated) DEVICE — DRAPE,UNDERBUTTOCKS,PCH,STERILE: Brand: MEDLINE

## (undated) DEVICE — ROYAL SILK SURGICAL GOWN, XXL: Brand: CONVERTORS

## (undated) DEVICE — BANDAGE,GAUZE,4.5"X4.1YD,STERILE,LF: Brand: MEDLINE

## (undated) DEVICE — GLOVE ORANGE PI 7   MSG9070

## (undated) DEVICE — GLOVE ORANGE PI 8   MSG9080

## (undated) DEVICE — 1016 S-DRAPE IRRIG POUCH 10/BOX: Brand: STERI-DRAPE™

## (undated) DEVICE — PACK-MAJOR

## (undated) DEVICE — GOWN,SIRUS,NON REINFRCD,LARGE,SET IN SL: Brand: MEDLINE

## (undated) DEVICE — BANDAGE COMPR W4INXL12FT E DISP ESMARCH EVEN

## (undated) DEVICE — GOWN,SIRUS,NONRNF,SETINSLV,XL,20/CS: Brand: MEDLINE

## (undated) DEVICE — SET UP: Brand: MEDLINE INDUSTRIES, INC.

## (undated) DEVICE — DRAPE,EXTREMITY,89X128,STERILE: Brand: MEDLINE

## (undated) DEVICE — 450 ML BOTTLE OF 0.05% CHLORHEXIDINE GLUCONATE IN 99.95% STERILE WATER FOR IRRIGATION, USP AND APPLICATOR.: Brand: IRRISEPT ANTIMICROBIAL WOUND LAVAGE

## (undated) DEVICE — IMPREGNATED GAUZE DRESSING: Brand: CUTICERIN 7.5X7.5CM CTN 50

## (undated) DEVICE — GLOVE ORANGE PI 7 1/2   MSG9075

## (undated) DEVICE — PREP SOL PVP IODINE 4%  4 OZ/BTL

## (undated) DEVICE — PACK PROCEDURE SURG SET UP SRMC

## (undated) DEVICE — PREMIUM DRY TRAY LF: Brand: MEDLINE INDUSTRIES, INC.

## (undated) DEVICE — INTENDED FOR TISSUE SEPARATION, AND OTHER PROCEDURES THAT REQUIRE A SHARP SURGICAL BLADE TO PUNCTURE OR CUT.: Brand: BARD-PARKER ® CARBON RIB-BACK BLADES

## (undated) DEVICE — TUBING, SUCTION, 1/4" X 20', STRAIGHT: Brand: MEDLINE INDUSTRIES, INC.

## (undated) DEVICE — GLOVE SURG SZ 65 THK91MIL LTX FREE SYN POLYISOPRENE

## (undated) DEVICE — DRAPE GYN LAPSCP UROLOGY CLR DISP STRL OCVD CLRVIEWDRP

## (undated) DEVICE — SPONGE GZ W4XL4IN COT 12 PLY TYP VII WVN C FLD DSGN STERILE

## (undated) DEVICE — Z DISCONTINUED BY MEDLINE USE 2711682 TRAY SKIN PREP DRY W/ PREM GLV

## (undated) DEVICE — PENCIL SMK EVAC ALL IN 1 DSGN ENH VISIBILITY IMPROVED AIR

## (undated) DEVICE — BASIC SINGLE BASIN BTC-LF: Brand: MEDLINE INDUSTRIES, INC.

## (undated) DEVICE — Y-TYPE TUR/BLADDER IRRIGATION SET, REGULATING CLAMP

## (undated) DEVICE — SPONGE GZ W4XL4IN COT 12 PLY TYP VII WVN C FLD DSGN

## (undated) DEVICE — BANDAGE COMPR E SGL LAYERED CLSR BGE W/ CLP W4INXL15FT

## (undated) DEVICE — DRAPE,U/SHT,SPLIT,FILM,60X84,STERILE: Brand: MEDLINE

## (undated) DEVICE — PACK PROCEDURE SURG ORTH BASIC SRHP LF

## (undated) DEVICE — PAD,NON-ADHERENT,3X8,STERILE,LF,1/PK: Brand: MEDLINE

## (undated) DEVICE — 3M™ TRANSPORE™ WHITE SURGICAL TAPE 1534-1, 1 INCH X 10 YARD (2,5CM X 9,1M), 12 ROLLS/CARTON 10 CARTONS/CASE: Brand: 3M™ TRANSPORE™

## (undated) DEVICE — GAUZE,SPONGE,8"X4",12PLY,XRAY,STRL,LF: Brand: MEDLINE

## (undated) DEVICE — SUTURE MCRYL SZ 3-0 L27IN ABSRB UD L26MM SH 1/2 CIR Y416H

## (undated) DEVICE — SUTURE PROL SZ 3-0 L30IN NONABSORBABLE BLU L26MM CT-2 1/2 8422H

## (undated) DEVICE — SPONGE LAP W18XL18IN WHT COT 4 PLY FLD STRUNG RADPQ DISP ST

## (undated) DEVICE — SPONGE LAP W18XL18IN WHT COT 4 PLY FLD STRUNG RADPQ DISP ST 2 PER PACK

## (undated) DEVICE — PACK PROC LAP II AURORA

## (undated) DEVICE — SOLUTION SURG PREP POV IOD 7.5% 4 OZ

## (undated) DEVICE — ADHESIVE SKIN CLSR 0.7ML TOP DERMBND ADV

## (undated) DEVICE — SOLUTION IV IRRIG WATER 1000ML POUR BRL 2F7114

## (undated) DEVICE — PAD,SANITARY,11 IN,MAXI,W/WINGS,N-STRL: Brand: MEDLINE

## (undated) DEVICE — PATIENT RETURN ELECTRODE, SINGLE-USE, CONTACT QUALITY MONITORING, ADULT, WITH 9FT CORD, FOR PATIENTS WEIGING OVER 33LBS. (15KG): Brand: MEGADYNE

## (undated) DEVICE — THIN OFFSET (9.0 X 0.38 X 25.0MM)